# Patient Record
Sex: MALE | Race: WHITE | NOT HISPANIC OR LATINO | Employment: OTHER | URBAN - METROPOLITAN AREA
[De-identification: names, ages, dates, MRNs, and addresses within clinical notes are randomized per-mention and may not be internally consistent; named-entity substitution may affect disease eponyms.]

---

## 2017-02-02 ENCOUNTER — OFFICE VISIT (OUTPATIENT)
Dept: NEUROSURGERY | Facility: CLINIC | Age: 62
End: 2017-02-02

## 2017-02-02 VITALS
HEART RATE: 94 BPM | HEIGHT: 71 IN | DIASTOLIC BLOOD PRESSURE: 98 MMHG | BODY MASS INDEX: 29.96 KG/M2 | SYSTOLIC BLOOD PRESSURE: 154 MMHG | WEIGHT: 214 LBS

## 2017-02-02 DIAGNOSIS — M51.36 DDD (DEGENERATIVE DISC DISEASE), LUMBAR: Primary | ICD-10-CM

## 2017-02-02 PROCEDURE — 99212 OFFICE O/P EST SF 10 MIN: CPT | Performed by: NEUROLOGICAL SURGERY

## 2017-02-02 RX ORDER — CYCLOBENZAPRINE HCL 10 MG
10 TABLET ORAL 3 TIMES DAILY PRN
Qty: 50 TABLET | Refills: 0 | Status: SHIPPED | OUTPATIENT
Start: 2017-02-02 | End: 2017-03-16

## 2017-02-02 NOTE — PROGRESS NOTES
Subjective   Patient ID: Prem Thrasher is a 61 y.o. male is here today for follow-up. He has completed PT and doing well.  He complains of some intermittent leg pain.    History of Present Illness    This patient has some occasional flareup of pain in his leg but nothing on a regular basis.  He says when the pain really flares up it goes away fairly quickly if he gets up and moves.  Muscle relaxants seem to help.    The following portions of the patient's history were reviewed and updated as appropriate: allergies, current medications, past family history, past medical history, past social history, past surgical history and problem list.    Review of Systems   Respiratory: Negative for shortness of breath.    Cardiovascular: Negative for chest pain.   Musculoskeletal:        Left leg pain    Neurological: Negative for weakness.   All other systems reviewed and are negative.      Objective   Physical Exam   Constitutional: He is oriented to person, place, and time. He appears well-developed and well-nourished.   Neurological: He is oriented to person, place, and time.     Neurologic Exam     Mental Status   Oriented to person, place, and time.       Assessment/Plan   Independent Review of Radiographic Studies:      Medical Decision Making:      I told this patient that I thought this was just the nerve healing.  I think that these episodes will gradually decrease.  I offered to send him for an MRI but I did not recommend doing that right now.  I told him to stay on his exercise program.  We can give him a few more muscle relaxants and we will check him again in about 6 weeks.    I counseled the patient with regard to smoking cessation.  We gave the patient a handout about how to quit.    Prem was seen today for follow-up.    Diagnoses and all orders for this visit:    DDD (degenerative disc disease), lumbar    Other orders  -     cyclobenzaprine (FLEXERIL) 10 MG tablet; Take 1 tablet by mouth 3 (Three) Times a  Day As Needed for muscle spasms.      Return in about 6 weeks (around 3/16/2017).

## 2017-02-08 RX ORDER — ESCITALOPRAM OXALATE 10 MG/1
10 TABLET ORAL DAILY
Qty: 30 TABLET | Refills: 0 | Status: SHIPPED | OUTPATIENT
Start: 2017-02-08 | End: 2017-03-06

## 2017-02-26 ENCOUNTER — OFFICE VISIT (OUTPATIENT)
Dept: RETAIL CLINIC | Facility: CLINIC | Age: 62
End: 2017-02-26

## 2017-02-26 VITALS
HEART RATE: 84 BPM | DIASTOLIC BLOOD PRESSURE: 70 MMHG | SYSTOLIC BLOOD PRESSURE: 138 MMHG | RESPIRATION RATE: 18 BRPM | OXYGEN SATURATION: 96 % | TEMPERATURE: 99 F

## 2017-02-26 DIAGNOSIS — J06.9 ACUTE URI: Primary | ICD-10-CM

## 2017-02-26 PROCEDURE — 99213 OFFICE O/P EST LOW 20 MIN: CPT | Performed by: NURSE PRACTITIONER

## 2017-02-26 RX ORDER — ALBUTEROL SULFATE 90 UG/1
2 AEROSOL, METERED RESPIRATORY (INHALATION) EVERY 4 HOURS PRN
Qty: 1 INHALER | Refills: 0 | Status: SHIPPED | OUTPATIENT
Start: 2017-02-26 | End: 2017-03-08

## 2017-02-26 RX ORDER — PREDNISONE 20 MG/1
20 TABLET ORAL 2 TIMES DAILY
Qty: 10 TABLET | Refills: 0 | Status: SHIPPED | OUTPATIENT
Start: 2017-02-26 | End: 2017-03-16

## 2017-02-26 RX ORDER — BROMPHENIRAMINE MALEATE, PSEUDOEPHEDRINE HYDROCHLORIDE, AND DEXTROMETHORPHAN HYDROBROMIDE 2; 30; 10 MG/5ML; MG/5ML; MG/5ML
SYRUP ORAL
Qty: 240 ML | Refills: 0 | Status: SHIPPED | OUTPATIENT
Start: 2017-02-26 | End: 2017-03-16

## 2017-02-26 RX ORDER — MUPIROCIN CALCIUM 20 MG/G
CREAM TOPICAL 3 TIMES DAILY
Qty: 15 G | Refills: 0 | Status: SHIPPED | OUTPATIENT
Start: 2017-02-26 | End: 2017-03-05

## 2017-02-26 NOTE — PROGRESS NOTES
Subjective:     Prem Thrasher is a 62 y.o.     URI    This is a new problem. The current episode started 1 to 4 weeks ago. The problem has been gradually worsening. There has been no fever. Associated symptoms include congestion, coughing, headaches, rhinorrhea, vomiting and wheezing. Pertinent negatives include no diarrhea, ear pain, nausea, rash or sinus pain. Sore throat: related to drainage. Treatments tried: robitussin, alkaseltzer plus, sudafed, stahist. The treatment provided mild relief.         The following portions of the patient's history were reviewed and updated as appropriate: allergies, current medications, past family history, past medical history, past social history, past surgical history and problem list.      Review of Systems   Constitutional: Negative for appetite change. Unexpected weight change: unaware.   HENT: Positive for congestion, postnasal drip and rhinorrhea. Negative for ear pain and sinus pressure. Sore throat: related to drainage.         C/o sore in nose   Respiratory: Positive for cough and wheezing. Negative for shortness of breath.    Cardiovascular:        Hx: hypertension   Gastrointestinal: Positive for vomiting. Negative for diarrhea and nausea.   Musculoskeletal: Back pain: baseline. Myalgias: baeline from back pain.   Skin: Negative for color change, pallor and rash.   Allergic/Immunologic: Positive for environmental allergies.   Neurological: Positive for headaches. Negative for dizziness. Light-headedness: woth cough.         Objective:      Physical Exam   Constitutional: He is oriented to person, place, and time. He appears well-developed and well-nourished. He is cooperative.   HENT:   Head: Normocephalic and atraumatic.   Right Ear: Ear canal normal.   Left Ear: Ear canal normal.   Mouth/Throat: No oropharyngeal exudate or posterior oropharyngeal erythema.   Nares with some erythema and crusted lesions >left, no congestion   Eyes: EOM and lids are normal. Pupils  are equal, round, and reactive to light.   Neck: Normal range of motion. Neck supple.   Cardiovascular: Normal rate, regular rhythm, S1 normal, S2 normal and normal heart sounds.    Pulmonary/Chest: Effort normal. He has rhonchi in the right upper field, the right middle field and the left upper field.   Clear with cough   Abdominal: Soft. Normal appearance and bowel sounds are normal. There is no tenderness.   Musculoskeletal: Normal range of motion.   Lymphadenopathy:     He has no cervical adenopathy.   Neurological: He is alert and oriented to person, place, and time.   Skin: Skin is warm, dry and intact.   Psychiatric: He has a normal mood and affect. His speech is normal and behavior is normal.   Vitals reviewed.          Prem was seen today for uri.    Diagnoses and all orders for this visit:    Acute URI    Other orders  -     predniSONE (DELTASONE) 20 MG tablet; Take 1 tablet by mouth 2 (Two) Times a Day.  -     brompheniramine-pseudoephedrine-DM (BROMFED DM) 30-2-10 MG/5ML syrup; 5 to 10 cc every 4 hours as needed for cough, congestion, allergies  -     albuterol (PROVENTIL HFA;VENTOLIN HFA) 108 (90 BASE) MCG/ACT inhaler; Inhale 2 puffs Every 4 (Four) Hours As Needed for wheezing for up to 10 days.

## 2017-03-06 ENCOUNTER — OFFICE VISIT (OUTPATIENT)
Dept: FAMILY MEDICINE CLINIC | Facility: CLINIC | Age: 62
End: 2017-03-06

## 2017-03-06 VITALS
TEMPERATURE: 98 F | DIASTOLIC BLOOD PRESSURE: 76 MMHG | RESPIRATION RATE: 16 BRPM | OXYGEN SATURATION: 96 % | WEIGHT: 211 LBS | HEIGHT: 71 IN | HEART RATE: 88 BPM | BODY MASS INDEX: 29.54 KG/M2 | SYSTOLIC BLOOD PRESSURE: 122 MMHG

## 2017-03-06 DIAGNOSIS — J44.9 CHRONIC OBSTRUCTIVE PULMONARY DISEASE, UNSPECIFIED COPD TYPE (HCC): ICD-10-CM

## 2017-03-06 DIAGNOSIS — R73.01 IFG (IMPAIRED FASTING GLUCOSE): Primary | ICD-10-CM

## 2017-03-06 DIAGNOSIS — F41.1 GENERALIZED ANXIETY DISORDER: ICD-10-CM

## 2017-03-06 DIAGNOSIS — E78.2 MIXED HYPERLIPIDEMIA: ICD-10-CM

## 2017-03-06 PROCEDURE — 99214 OFFICE O/P EST MOD 30 MIN: CPT | Performed by: PHYSICIAN ASSISTANT

## 2017-03-06 RX ORDER — LEVOFLOXACIN 500 MG/1
500 TABLET, FILM COATED ORAL DAILY
Qty: 10 TABLET | Refills: 1 | Status: SHIPPED | OUTPATIENT
Start: 2017-03-06 | End: 2017-03-16

## 2017-03-06 RX ORDER — IRBESARTAN 300 MG/1
300 TABLET ORAL DAILY
Qty: 90 TABLET | Refills: 1 | Status: SHIPPED | OUTPATIENT
Start: 2017-03-06 | End: 2017-09-15 | Stop reason: SDUPTHER

## 2017-03-06 RX ORDER — EZETIMIBE 10 MG/1
10 TABLET ORAL EVERY EVENING
Qty: 90 TABLET | Refills: 3 | Status: SHIPPED | OUTPATIENT
Start: 2017-03-06 | End: 2018-04-23 | Stop reason: SDUPTHER

## 2017-03-06 RX ORDER — ESCITALOPRAM OXALATE 20 MG/1
20 TABLET ORAL DAILY
Qty: 90 TABLET | Refills: 3 | Status: SHIPPED | OUTPATIENT
Start: 2017-03-06 | End: 2018-04-23 | Stop reason: SDUPTHER

## 2017-03-06 RX ORDER — AMLODIPINE BESYLATE 10 MG/1
10 TABLET ORAL EVERY EVENING
Qty: 90 TABLET | Refills: 1 | Status: SHIPPED | OUTPATIENT
Start: 2017-03-06 | End: 2017-09-15 | Stop reason: SDUPTHER

## 2017-03-06 NOTE — PATIENT INSTRUCTIONS
For throat pain:  Can gargle with 1/2 Maalox and 1/2 Benadryl liquid ---mix, gargle and spit Take Tylenol for fever or aches  Rest as needed  Call not better in 7 days  Increase fluids  Call if worse  Can use generic Afrin nasal spray up to 5 days for nasal congestion  Finish antibiotic course of therapy  Low glycemic index diet  Exercise 30 minutes most days of the week  Make sure you get results on any labs or tests we ordered today  We discussed medications and how to take them as prescribed  Sleep 6-8 hours each night if possible  If you have not signed up for [facility], please activate your code ASAP from your After Visit Summary today    LDL goal <100  LDL goal if heart disease <70  HDL goal >60  Triglyceride goal <150  BP goal =<130/80  Fasting glucose <100    Stop smoking

## 2017-03-06 NOTE — PROGRESS NOTES
Subjective   Prem Thrasher is a 62 y.o. male.     History of Present Illness   Prem Thrasher 62 y.o. male who presents today for routine follow up check and medication refills.  he has a history of   Patient Active Problem List   Diagnosis   • Allergy   • COPD (chronic obstructive pulmonary disease)   • Hyperlipidemia   • IFG (impaired fasting glucose)   • Rheumatoid arthritis   • Hypertension   • Anxiety disorder   • Depression   • DDD (degenerative disc disease), lumbar   .  Since the last visit, he has overall felt tired.  He has Hypertenision and is well controlled on medication.  he has been compliant with current medications have reviewed them.  The patient denies medication side effects.    No results found for: CHOL  Lab Results   Component Value Date    TRIG 58 06/17/2016    TRIG 81 03/07/2015     Lab Results   Component Value Date    HDL 47 06/17/2016    HDL 35 (L) 03/07/2015     No results found for: LDLCALC  Lab Results   Component Value Date     (H) 06/17/2016    LDL 87 03/07/2015     No results found for: HDLLDLRATIO  No components found for: CHOLHDL  Lab Results   Component Value Date    GLUCOSE 88 10/03/2016    BUN 12 10/03/2016    CREATININE 1.14 10/03/2016    EGFRIFNONA 65 10/03/2016    EGFRIFAFRI 88 06/17/2016    BCR 10.5 10/03/2016    K 3.9 10/03/2016    CO2 22.4 10/03/2016    CALCIUM 9.7 10/03/2016    PROTENTOTREF 7.1 06/17/2016    ALBUMIN 4.60 06/17/2016    LABIL2 1.8 06/17/2016    AST 18 06/17/2016    ALT 17 06/17/2016     Lab Results   Component Value Date    HGBA1C 5.70 (H) 06/17/2016     He takes Zetia for cholesterol    He is on several meds for RA with DR John Thrasher male 62 y.o. who presents today for follow up of Anxiety.  He reports medication is working well, patient desires to continue on Rx, and needs refill. Onset of symptoms was approximately several years ago.  He denies current suicidal and homicidal ideation. Risk factors are family history of anxiety  and or depression, lifestyle of multiple roles, chronic illness and chronic pain.  Previous treatment includes current Rx.  He complains of the following medication side effects: none.  The patient declines to go to counseling..  Prem Thrasher 62 y.o. male who presents for evaluation of sinus pressure and congestion, cough. Symptoms include ear pressure, post nasal drip and cough described as productive of yellow sputum.  Onset of symptoms was 2 weeks ago, unchanged since that time. Patient denies fever.   Evaluation to date: [facility] Treatment to date:  pred and cough med.         The following portions of the patient's history were reviewed and updated as appropriate: allergies, current medications, past family history, past medical history, past social history, past surgical history and problem list.    Review of Systems   Constitutional: Positive for unexpected weight change. Negative for activity change and appetite change.   HENT: Positive for congestion, sinus pressure and tinnitus. Negative for nosebleeds and trouble swallowing.    Eyes: Negative for pain and visual disturbance.   Respiratory: Positive for cough and chest tightness. Negative for shortness of breath and wheezing.    Cardiovascular: Negative for chest pain and palpitations.   Gastrointestinal: Negative for abdominal pain and blood in stool.   Endocrine: Negative.    Genitourinary: Negative for difficulty urinating and hematuria.   Musculoskeletal: Positive for arthralgias and back pain. Negative for joint swelling.   Skin: Negative for color change and rash.   Allergic/Immunologic: Negative.    Neurological: Positive for headaches. Negative for syncope and speech difficulty.   Hematological: Negative for adenopathy.   Psychiatric/Behavioral: Negative for agitation and confusion.   All other systems reviewed and are negative.      Objective   Physical Exam   Constitutional: He is oriented to person, place, and time. He appears  well-developed and well-nourished.  Non-toxic appearance. No distress.   HENT:   Head: Normocephalic and atraumatic. Hair is normal.   Right Ear: External ear normal. No drainage, swelling or tenderness. Tympanic membrane is retracted.   Left Ear: External ear normal. No drainage, swelling or tenderness. Tympanic membrane is retracted.   Nose: Mucosal edema present. No epistaxis.   Mouth/Throat: Uvula is midline and mucous membranes are normal. No oral lesions. No uvula swelling. Posterior oropharyngeal erythema present. No oropharyngeal exudate.   Eyes: Conjunctivae and EOM are normal. Pupils are equal, round, and reactive to light. Right eye exhibits no discharge. Left eye exhibits no discharge. No scleral icterus.   Neck: Normal range of motion. Neck supple.   Cardiovascular: Normal rate, regular rhythm, normal heart sounds and intact distal pulses.  Exam reveals no gallop.    No murmur heard.  Pulmonary/Chest: Breath sounds normal. No stridor. No respiratory distress. He has no wheezes. He has no rales. He exhibits no tenderness.   Abdominal: Soft. There is no tenderness.   Lymphadenopathy:     He has cervical adenopathy.   Neurological: He is alert and oriented to person, place, and time. He exhibits normal muscle tone.   Skin: Skin is warm and dry. No rash noted. He is not diaphoretic.   Psychiatric: He has a normal mood and affect. His behavior is normal. Judgment and thought content normal.   Nursing note and vitals reviewed.      Assessment/Plan   Problems Addressed this Visit        Cardiovascular and Mediastinum    Hyperlipidemia    Relevant Medications    ezetimibe (ZETIA) 10 MG tablet    Other Relevant Orders    Comprehensive metabolic panel    Lipid panel    CBC and Differential    TSH    PSA    T4, Free       Respiratory    COPD (chronic obstructive pulmonary disease)    Relevant Orders    Comprehensive metabolic panel    Lipid panel    CBC and Differential    TSH    PSA    T4, Free       Endocrine     IFG (impaired fasting glucose) - Primary    Relevant Orders    Comprehensive metabolic panel    Lipid panel    CBC and Differential    TSH    PSA    T4, Free       Other    Anxiety disorder    Relevant Medications    escitalopram (LEXAPRO) 20 MG tablet    Other Relevant Orders    Comprehensive metabolic panel    Lipid panel    CBC and Differential    TSH    PSA    T4, Free

## 2017-03-16 ENCOUNTER — OFFICE VISIT (OUTPATIENT)
Dept: NEUROSURGERY | Facility: CLINIC | Age: 62
End: 2017-03-16

## 2017-03-16 VITALS — BODY MASS INDEX: 29.54 KG/M2 | WEIGHT: 211 LBS | HEIGHT: 71 IN

## 2017-03-16 DIAGNOSIS — M51.36 DDD (DEGENERATIVE DISC DISEASE), LUMBAR: Primary | ICD-10-CM

## 2017-03-16 PROCEDURE — 99212 OFFICE O/P EST SF 10 MIN: CPT | Performed by: NEUROLOGICAL SURGERY

## 2017-03-16 NOTE — PATIENT INSTRUCTIONS
Exercising to Lose Weight  Exercising can help you to lose weight. In order to lose weight through exercise, you need to do vigorous-intensity exercise. You can tell that you are exercising with vigorous intensity if you are breathing very hard and fast and cannot hold a conversation while exercising.  Moderate-intensity exercise helps to maintain your current weight. You can tell that you are exercising at a moderate level if you have a higher heart rate and faster breathing, but you are still able to hold a conversation.  HOW OFTEN SHOULD I EXERCISE?  Choose an activity that you enjoy and set realistic goals. Your health care provider can help you to make an activity plan that works for you. Exercise regularly as directed by your health care provider. This may include:  · Doing resistance training twice each week, such as:    Push-ups.    Sit-ups.    Lifting weights.    Using resistance bands.  · Doing a given intensity of exercise for a given amount of time. Choose from these options:    150 minutes of moderate-intensity exercise every week.    75 minutes of vigorous-intensity exercise every week.    A mix of moderate-intensity and vigorous-intensity exercise every week.  Children, pregnant women, people who are out of shape, people who are overweight, and older adults may need to consult a health care provider for individual recommendations. If you have any sort of medical condition, be sure to consult your health care provider before starting a new exercise program.  WHAT ARE SOME ACTIVITIES THAT CAN HELP ME TO LOSE WEIGHT?   · Walking at a rate of at least 4.5 miles an hour.  · Jogging or running at a rate of 5 miles per hour.  · Biking at a rate of at least 10 miles per hour.  · Lap swimming.  · Roller-skating or in-line skating.  · Cross-country skiing.  · Vigorous competitive sports, such as football, basketball, and soccer.  · Jumping rope.  · Aerobic dancing.  HOW CAN I BE MORE ACTIVE IN MY DAY-TO-DAY  "ACTIVITIES?  · Use the stairs instead of the elevator.  · Take a walk during your lunch break.  · If you drive, park your car farther away from work or school.  · If you take public transportation, get off one stop early and walk the rest of the way.  · Make all of your phone calls while standing up and walking around.  · Get up, stretch, and walk around every 30 minutes throughout the day.  WHAT GUIDELINES SHOULD I FOLLOW WHILE EXERCISING?  · Do not exercise so much that you hurt yourself, feel dizzy, or get very short of breath.  · Consult your health care provider prior to starting a new exercise program.  · Wear comfortable clothes and shoes with good support.  · Drink plenty of water while you exercise to prevent dehydration or heat stroke. Body water is lost during exercise and must be replaced.  · Work out until you breathe faster and your heart beats faster.     This information is not intended to replace advice given to you by your health care provider. Make sure you discuss any questions you have with your health care provider.     Document Released: 01/20/2012 Document Revised: 01/08/2016 Document Reviewed: 05/21/2015  [employer] Interactive Patient Education ©2016 [employer]    You Can Quit Smoking  If you are ready to quit smoking or are thinking about it, congratulations! You have chosen to help yourself be healthier and live longer! There are lots of different ways to quit smoking. Nicotine gum, nicotine patches, a nicotine inhaler, or nicotine nasal spray can help with physical craving. Hypnosis, support groups, and medicines help break the habit of smoking.  TIPS TO GET OFF AND STAY OFF CIGARETTES  · Learn to predict your moods. Do not let a bad situation be your excuse to have a cigarette. Some situations in your life might tempt you to have a cigarette.  · Ask friends and co-workers not to smoke around you.  · Make your home smoke-free.  · Never have \"just one\" cigarette. It leads to wanting " "another and another. Remind yourself of your decision to quit.  · On a card, make a list of your reasons for not smoking. Read it at least the same number of times a day as you have a cigarette. Tell yourself everyday, \"I do not want to smoke. I choose not to smoke.\"  · Ask someone at home or work to help you with your plan to quit smoking.  · Have something planned after you eat or have a cup of coffee. Take a walk or get other exercise to perk you up. This will help to keep you from overeating.  · Try a relaxation exercise to calm you down and decrease your stress. Remember, you may be tense and nervous the first two weeks after you quit. This will pass.  · Find new activities to keep your hands busy. Play with a pen, coin, or rubber band. Doodle or draw things on paper.  · Brush your teeth right after eating. This will help cut down the craving for the taste of tobacco after meals. You can try mouthwash too.  · Try gum, breath mints, or diet candy to keep something in your mouth.  IF YOU SMOKE AND WANT TO QUIT:  · Do not stock up on cigarettes. Never buy a carton. Wait until one pack is finished before you buy another.  · Never carry cigarettes with you at work or at home.  · Keep cigarettes as far away from you as possible. Leave them with someone else.  · Never carry matches or a lighter with you.  · Ask yourself, \"Do I need this cigarette or is this just a reflex?\"  · Bet with someone that you can quit. Put cigarette money in a [employer] bank every morning. If you smoke, you give up the money. If you do not smoke, by the end of the week, you keep the money.  · Keep trying. It takes 21 days to change a habit!  · Talk to your doctor about using medicines to help you quit. These include nicotine replacement gum, lozenges, or skin patches.     This information is not intended to replace advice given to you by your health care provider. Make sure you discuss any questions you have with your health care provider.   "   Document Released: 10/14/2010 Document Revised: 03/11/2013 Document Reviewed: 05/03/2016  [employer] Interactive Patient Education ©2016 [employer]

## 2017-03-16 NOTE — PROGRESS NOTES
Subjective   Patient ID: Prem Thrasher is a 62 y.o. male is here today for follow-up. He is 5 months out from a left L2-L3, L3-L4 laminectomy/discectomy with [facility] on 10/10/16. He has left leg pain and mild back pain.    History of Present Illness    This patient still has some pain in his leg but it does come and go.  Sometimes he doesn't have it at all and sometimes it is there.  He has taken steroids for other reasons in the past and this completely gets rid of his leg pain.    The following portions of the patient's history were reviewed and updated as appropriate: allergies, current medications, past family history, past medical history, past social history, past surgical history and problem list.    Review of Systems   Respiratory: Negative for chest tightness and shortness of breath.    Cardiovascular: Negative for chest pain.   Musculoskeletal: Positive for back pain.        Left leg pain   All other systems reviewed and are negative.      Objective   Physical Exam   Constitutional: He is oriented to person, place, and time. He appears well-developed and well-nourished.   Neurological: He is oriented to person, place, and time.     Neurologic Exam     Mental Status   Oriented to person, place, and time.       Assessment/Plan   Independent Review of Radiographic Studies:      Medical Decision Making:      I told the patient that I would be glad to send him for an MRI but I don't think it is all that likely to show anything and I think we would truly just been running up his bill.  With the pain coming and going I really think the problem is mostly inflammatory and the nerve.  He does admit that the pain is better now than it was a few months ago.  We will check him again in a couple of months from now.    Perm was seen today for back pain and leg pain.    Diagnoses and all orders for this visit:    DDD (degenerative disc disease), lumbar    Return in about 2 months (around 5/16/2017).

## 2017-04-02 RX ORDER — ESCITALOPRAM OXALATE 10 MG/1
TABLET ORAL
Qty: 30 TABLET | Refills: 5 | Status: SHIPPED | OUTPATIENT
Start: 2017-04-02 | End: 2017-05-16 | Stop reason: ALTCHOICE

## 2017-05-16 ENCOUNTER — OFFICE VISIT (OUTPATIENT)
Dept: NEUROSURGERY | Facility: CLINIC | Age: 62
End: 2017-05-16

## 2017-05-16 VITALS
HEART RATE: 87 BPM | SYSTOLIC BLOOD PRESSURE: 132 MMHG | BODY MASS INDEX: 28.7 KG/M2 | WEIGHT: 205 LBS | HEIGHT: 71 IN | DIASTOLIC BLOOD PRESSURE: 87 MMHG

## 2017-05-16 DIAGNOSIS — M51.36 DDD (DEGENERATIVE DISC DISEASE), LUMBAR: Primary | ICD-10-CM

## 2017-05-16 PROCEDURE — 99213 OFFICE O/P EST LOW 20 MIN: CPT | Performed by: NEUROLOGICAL SURGERY

## 2017-05-31 ENCOUNTER — HOSPITAL ENCOUNTER (OUTPATIENT)
Dept: MRI IMAGING | Facility: HOSPITAL | Age: 62
Discharge: HOME | End: 2017-05-31
Attending: NEUROLOGICAL SURGERY | Admitting: NEUROLOGICAL SURGERY

## 2017-05-31 ENCOUNTER — HOSPITAL ENCOUNTER (OUTPATIENT)
Dept: GENERAL RADIOLOGY | Facility: HOSPITAL | Age: 62
Discharge: HOME | End: 2017-05-31
Attending: NEUROLOGICAL SURGERY

## 2017-05-31 DIAGNOSIS — M51.36 DDD (DEGENERATIVE DISC DISEASE), LUMBAR: ICD-10-CM

## 2017-05-31 PROCEDURE — 82565 ASSAY OF CREATININE: CPT

## 2017-05-31 PROCEDURE — A9577 INJ MULTIHANCE: HCPCS | Performed by: NEUROLOGICAL SURGERY

## 2017-05-31 PROCEDURE — 0 GADOBENATE DIMEGLUMINE 529 MG/ML SOLUTION: Performed by: NEUROLOGICAL SURGERY

## 2017-05-31 PROCEDURE — 72158 MRI LUMBAR SPINE W/O & W/DYE: CPT

## 2017-05-31 PROCEDURE — 72114 X-RAY EXAM L-S SPINE BENDING: CPT

## 2017-05-31 RX ADMIN — GADOBENATE DIMEGLUMINE 20 ML: 529 INJECTION, SOLUTION INTRAVENOUS at 11:45

## 2017-06-01 LAB — CREAT BLDA-MCNC: 1.1 MG/DL (ref 0.6–1.3)

## 2017-06-15 DIAGNOSIS — R97.20 PSA ELEVATION: Primary | ICD-10-CM

## 2017-06-15 LAB
ALBUMIN SERPL-MCNC: 4.3 G/DL (ref 3.6–4.8)
ALBUMIN/GLOB SERPL: 1.7 {RATIO} (ref 1.2–2.2)
ALP SERPL-CCNC: 47 IU/L (ref 39–117)
ALT SERPL-CCNC: 18 IU/L (ref 0–44)
AST SERPL-CCNC: 19 IU/L (ref 0–40)
BASOPHILS # BLD AUTO: 0.1 X10E3/UL (ref 0–0.2)
BASOPHILS NFR BLD AUTO: 2 %
BILIRUB SERPL-MCNC: 0.4 MG/DL (ref 0–1.2)
BUN SERPL-MCNC: 15 MG/DL (ref 8–27)
BUN/CREAT SERPL: 13 (ref 10–24)
CALCIUM SERPL-MCNC: 9.3 MG/DL (ref 8.6–10.2)
CHLORIDE SERPL-SCNC: 98 MMOL/L (ref 96–106)
CHOLEST SERPL-MCNC: 169 MG/DL (ref 100–199)
CO2 SERPL-SCNC: 21 MMOL/L (ref 18–29)
CREAT SERPL-MCNC: 1.16 MG/DL (ref 0.76–1.27)
EOSINOPHIL # BLD AUTO: 0.2 X10E3/UL (ref 0–0.4)
EOSINOPHIL NFR BLD AUTO: 3 %
ERYTHROCYTE [DISTWIDTH] IN BLOOD BY AUTOMATED COUNT: 13.4 % (ref 12.3–15.4)
GLOBULIN SER CALC-MCNC: 2.6 G/DL (ref 1.5–4.5)
GLUCOSE SERPL-MCNC: 99 MG/DL (ref 65–99)
HCT VFR BLD AUTO: 40 % (ref 37.5–51)
HDLC SERPL-MCNC: 47 MG/DL
HGB BLD-MCNC: 13.4 G/DL (ref 12.6–17.7)
IMM GRANULOCYTES # BLD: 0 X10E3/UL (ref 0–0.1)
IMM GRANULOCYTES NFR BLD: 0 %
LDLC SERPL CALC-MCNC: 111 MG/DL (ref 0–99)
LYMPHOCYTES # BLD AUTO: 2.6 X10E3/UL (ref 0.7–3.1)
LYMPHOCYTES NFR BLD AUTO: 37 %
MCH RBC QN AUTO: 30 PG (ref 26.6–33)
MCHC RBC AUTO-ENTMCNC: 33.5 G/DL (ref 31.5–35.7)
MCV RBC AUTO: 90 FL (ref 79–97)
MONOCYTES # BLD AUTO: 0.8 X10E3/UL (ref 0.1–0.9)
MONOCYTES NFR BLD AUTO: 11 %
NEUTROPHILS # BLD AUTO: 3.3 X10E3/UL (ref 1.4–7)
NEUTROPHILS NFR BLD AUTO: 47 %
PLATELET # BLD AUTO: 280 X10E3/UL (ref 150–379)
POTASSIUM SERPL-SCNC: 4.5 MMOL/L (ref 3.5–5.2)
PROT SERPL-MCNC: 6.9 G/DL (ref 6–8.5)
PSA SERPL-MCNC: 2.9 NG/ML (ref 0–4)
RBC # BLD AUTO: 4.46 X10E6/UL (ref 4.14–5.8)
SODIUM SERPL-SCNC: 134 MMOL/L (ref 134–144)
T4 FREE SERPL-MCNC: 0.92 NG/DL (ref 0.82–1.77)
TRIGL SERPL-MCNC: 54 MG/DL (ref 0–149)
TSH SERPL DL<=0.005 MIU/L-ACNC: 0.82 UIU/ML (ref 0.45–4.5)
VLDLC SERPL CALC-MCNC: 11 MG/DL (ref 5–40)
WBC # BLD AUTO: 7 X10E3/UL (ref 3.4–10.8)

## 2017-06-22 ENCOUNTER — OFFICE VISIT (OUTPATIENT)
Dept: NEUROSURGERY | Facility: CLINIC | Age: 62
End: 2017-06-22

## 2017-06-22 VITALS
HEART RATE: 104 BPM | BODY MASS INDEX: 28.7 KG/M2 | SYSTOLIC BLOOD PRESSURE: 114 MMHG | HEIGHT: 71 IN | DIASTOLIC BLOOD PRESSURE: 81 MMHG | WEIGHT: 205 LBS

## 2017-06-22 DIAGNOSIS — M51.36 DDD (DEGENERATIVE DISC DISEASE), LUMBAR: Primary | ICD-10-CM

## 2017-06-22 PROCEDURE — 99213 OFFICE O/P EST LOW 20 MIN: CPT | Performed by: NEUROLOGICAL SURGERY

## 2017-06-22 NOTE — PROGRESS NOTES
Subjective   Patient ID: Prem Thrasher is a 62 y.o. male is here today for follow-up with new MRI and XR. He is 8 months out from a left L2-L3, L3-L4 laminectomy/discectomy with [facility] on 10/10/16. He has back pain that radiates into left leg.        Back Pain   This is a chronic problem. The current episode started more than 1 year ago. The problem occurs constantly. The problem has been waxing and waning since onset. The pain is present in the lumbar spine. The quality of the pain is described as aching and stabbing. Associated symptoms include leg pain, paresthesias, pelvic pain, tingling and weakness ( Low back and left leg). Pertinent negatives include no abdominal pain, bladder incontinence, bowel incontinence, chest pain, dysuria, fever, headaches, numbness, paresis, perianal numbness or weight loss. Risk factors include lack of exercise, obesity and sedentary lifestyle.     This patient continues with pain in his back and into his left leg.  It is primarily in the left buttock and left lateral thigh and lateral calf.  He doesn't have any trouble with his right leg at all.    The following portions of the patient's history were reviewed and updated as appropriate: allergies, current medications, past family history, past medical history, past social history, past surgical history and problem list.    Review of Systems   Constitutional: Negative for fever and weight loss.   Respiratory: Negative for chest tightness and shortness of breath.    Cardiovascular: Negative for chest pain.   Gastrointestinal: Negative for abdominal pain and bowel incontinence.   Genitourinary: Positive for pelvic pain. Negative for bladder incontinence and dysuria.   Musculoskeletal: Positive for back pain.   Neurological: Positive for tingling, weakness ( Low back and left leg) and paresthesias. Negative for numbness and headaches.   All other systems reviewed and are negative.      Objective   Physical Exam   Constitutional: He  is oriented to person, place, and time. He appears well-developed and well-nourished.   Neurological: He is oriented to person, place, and time.     Neurologic Exam     Mental Status   Oriented to person, place, and time.       Assessment/Plan   Independent Review of Radiographic Studies:      I reviewed his plain films, an MRI.  His plain films show degenerative change particularly at L2-3 but no evidence of movement on flexion and extension.  On the MRI there is some narrowing on the left side at L3 4 and a little bit at L2-3 but it is actually worse on the right than the left.  The other levels for the most part look okay.  I also reviewed his myelogram which looks similar at L2-3 on the right but the left side appears to be more open.  L3 4 also shows or patency on the left side of the right side looks about the same.    Medical Decision Making:      I told the patient and his wife about the imaging.  I told them that we could certainly consider an aggressive cleanout at L2-3 and L3 4 but if we do that it will definitely require a fusion.  I told the patient about the risks, complications and expected outcome of the lumbar surgery.  I explained that there was an 80% chance of getting rid of the pain in the leg.  I explained that there would still be back pain after the surgery.  Initially this will be quite severe but will improve over time.  There is a 2 or 3% chance of infection, bleeding, CSF leak, damage to the nerve as a result of surgery, paralysis, as well as anesthetic risk.  There is a 10% chance of recurrent problems.  There is a 10% chance of nonunion or failure of the instrumentation.  We discussed the postoperative hospital and home course.  I suggested that before we make a definite decision that we get him into see an orthopedic spine surgeon for both a second opinion as well as a fusion evaluation.  I told him he should stay away from surgery as long as he possibly can however.    Subsequent to  this visit the patient called the office having seen Dr. Perez and sheila to proceed with surgery.  He will need to be scheduled for a: L2 to L4 laminectomy with neural lysis and a fusion by orthopedics    Prem was seen today for back pain.    Diagnoses and all orders for this visit:    DDD (degenerative disc disease), lumbar  -     Ambulatory Referral to Orthopedic Surgery  -     Case Request; Standing  -     vancomycin (VANCOCIN) 1,500 mg in sodium chloride 0.9 % 250 mL IVPB; Infuse 1,500 mg into a venous catheter 1 (One) Time.  -     Case Request    Other orders  -     Follow anesthesia standing orders.  -     Obtain informed consent  -     Follow anesthesia standing orders.; Standing  -     OLIVIA hose- To be placed on patient in pre-op; Standing  -     SCD (sequential compression device)- to be placed on patient in Pre-op; Standing      Return for 2-3 week post op.

## 2017-07-12 ENCOUNTER — OFFICE VISIT (OUTPATIENT)
Dept: ORTHOPEDIC SURGERY | Facility: CLINIC | Age: 62
End: 2017-07-12

## 2017-07-12 VITALS — HEIGHT: 71 IN | BODY MASS INDEX: 28.56 KG/M2 | TEMPERATURE: 98.2 F | WEIGHT: 204 LBS

## 2017-07-12 DIAGNOSIS — M48.061 SPINAL STENOSIS OF LUMBAR REGION: ICD-10-CM

## 2017-07-12 DIAGNOSIS — M43.16 SPONDYLOLISTHESIS OF LUMBAR REGION: Primary | ICD-10-CM

## 2017-07-12 PROCEDURE — 99204 OFFICE O/P NEW MOD 45 MIN: CPT | Performed by: ORTHOPAEDIC SURGERY

## 2017-07-12 RX ORDER — LEVOFLOXACIN 500 MG/1
TABLET, FILM COATED ORAL
Refills: 1 | COMMUNITY
Start: 2017-07-06 | End: 2017-07-19

## 2017-07-12 NOTE — PROGRESS NOTES
Answers for HPI/ROS submitted by the patient on 7/5/2017   Back pain  Chronicity: chronic  Onset: more than 1 year ago  Frequency: constantly  Progression since onset: waxing and waning  Pain location: lumbar spine  Pain quality: aching, burning, stabbing  Radiates to: left foot, left knee, left thigh  Pain - numeric: 7/10  Pain is: worse during the night  Aggravated by: bending, position, sitting, standing, twisting  Stiffness is present: all day  abdominal pain: No  bladder incontinence: No  bowel incontinence: No  chest pain: No  dysuria: No  fever: No  headaches: No  leg pain: Yes  numbness: Yes  paresis: No  paresthesias: No  pelvic pain: Yes  perianal numbness: No  tingling: No  weakness: Yes  weight loss: No  Risk factors: lack of exercise, obesity, poor posture, sedentary lifestyle    New patient or new problem visit    Chief Complaint   Patient presents with   • Lumbar Spine - Pain       HPI: He is seen today at request of Dr. Rick for evaluation of moderate to severe chronic constant aching and burning low back pain.  He has failed to improve with physical therapy, epidurals and medication.  In October 2016 Dr. Rick performed a laminectomy which gave some fleeting relief of leg pain.  Patient has also complains of numbness in the foot.    PFSH: See chart- reviewed    Review of Systems   Constitutional: Negative for activity change, appetite change, chills, diaphoresis, fatigue, fever and unexpected weight change.   HENT: Negative for congestion, hearing loss, nosebleeds, postnasal drip, sore throat, tinnitus and trouble swallowing.    Eyes: Negative for pain and visual disturbance.   Respiratory: Negative for apnea, cough, chest tightness, shortness of breath and wheezing.    Cardiovascular: Negative for chest pain and palpitations.   Gastrointestinal: Negative for abdominal pain, blood in stool, diarrhea and nausea.   Genitourinary: Negative for difficulty urinating and dysuria.   Musculoskeletal:  Positive for back pain. Negative for arthralgias and joint swelling.   Skin: Negative for color change.   Neurological: Positive for weakness and numbness. Negative for light-headedness and headaches.   Psychiatric/Behavioral: Negative for agitation. The patient is not nervous/anxious.        PE: Constitutional: Vital signs above-noted.  Awake, alert and oriented    Psychiatric: Affect and insight do not appear grossly disturbed.    Pulmonary: Breathing is unlabored, color is good.    Skin: Warm, dry and normal turgor    Cardiac:  pedal pulses intact.  No edema.    Eyesight and hearing appear adequate for examination purposes      Musculoskeletal:  There is no tenderness to percussion and palpation of the spine. Motion appears undisturbed.  Posture is unremarkable to coronal and sagittal inspection.    The skin about the area is intact.  There is no palpable or visible deformity.  There is no local spasm.       Neurologic:   Reflexes are 2+ and symmetrical in the patellae and absent in the Achilles.   Motor function is undisturbed in quadriceps, EHL, and gastrocnemius      Sensation appears symmetrically intact to light touch   .  In the bilateral lower extremities there is no evidence of atrophy.   Clonus is absent..  Gait appears undisturbed.  SLR test negative      MEDICAL DECISION MAKING    XRAY: Plain film x-rays including flexion-extension films obtained recently and are compared to those obtained last year.  Each demonstrates 9 mm of retrolisthesis at L2-3 where there is extensive degenerative change and disc with significant narrowing along with the 4 mm of retrolisthesis at L3 4.  MRI scan before and after his lumbar laminectomiy are reviewed and demonstrate improvement in the stenosis but continued foraminal narrowing bilaterally.  I reviewed the radiologist's report.    Other: n/a    Impression: L2-3, L3-4 retrolisthesis and recurrent spinal stenosis and 62-year-old smoker with otherwise pristine discs  who has exhausted conservative care.    Plan: I think he would benefit from surgical intervention and I recommended L23, L3 4 repeat laminectomy and fusion with instrumentation with Dr. Rick.  I discussed the risks and benefits of posterior spinal fusion, including where applicable, laminectomy.  Risks include adverse anesthetic events such as death, stroke and myocardial infarction.  More specific surgical risks include infection, nonunion, hardware failure, spinal fluid leakage, nerve root injury or paralysis, visceral or vascular injury, persistent pain, deep venous thrombosis, and pulmonary embolism among others. There is a 70 to 90 % chance of success.   Alternatives have been discussed.  After careful consideration the patient wishes to proceed with surgery.  The significant amount of time 5 minutes or more was spent the discussion the adverse effects of smoking and the benefits to be obtained if he quits.

## 2017-07-14 RX ORDER — SODIUM CHLORIDE, SODIUM LACTATE, POTASSIUM CHLORIDE, CALCIUM CHLORIDE 600; 310; 30; 20 MG/100ML; MG/100ML; MG/100ML; MG/100ML
100 INJECTION, SOLUTION INTRAVENOUS CONTINUOUS
Status: CANCELLED | OUTPATIENT
Start: 2017-07-31

## 2017-07-14 RX ORDER — SODIUM CHLORIDE 0.9 % (FLUSH) 0.9 %
1-10 SYRINGE (ML) INJECTION AS NEEDED
Status: CANCELLED | OUTPATIENT
Start: 2017-07-31

## 2017-07-17 DIAGNOSIS — M43.16 SPONDYLOLISTHESIS OF LUMBAR REGION: Primary | ICD-10-CM

## 2017-07-17 DIAGNOSIS — M48.061 SPINAL STENOSIS OF LUMBAR REGION: ICD-10-CM

## 2017-07-17 DIAGNOSIS — F17.200 SMOKER: ICD-10-CM

## 2017-07-18 ENCOUNTER — OFFICE VISIT (OUTPATIENT)
Dept: NEUROSURGERY | Facility: CLINIC | Age: 62
End: 2017-07-18

## 2017-07-18 VITALS
HEIGHT: 71 IN | HEART RATE: 104 BPM | DIASTOLIC BLOOD PRESSURE: 85 MMHG | SYSTOLIC BLOOD PRESSURE: 123 MMHG | WEIGHT: 204 LBS | BODY MASS INDEX: 28.56 KG/M2

## 2017-07-18 DIAGNOSIS — M54.16 LUMBAR RADICULOPATHY: Primary | ICD-10-CM

## 2017-07-18 PROCEDURE — 99213 OFFICE O/P EST LOW 20 MIN: CPT | Performed by: NEUROLOGICAL SURGERY

## 2017-07-18 NOTE — PROGRESS NOTES
Subjective   Patient ID: Prem Thrasher is a 62 y.o. male is here today for follow-up. He is 9 months out from a left L2-L3, L3-L4 laminectomy/discectomy with [facility] on 10/10/16. He has back pain that radiates into left leg.    History of Present Illness    This patient continues with pain in his back and radiation into his left leg.  It is mostly in the left buttock and down the lateral thigh and lateral calf.  His right leg is still okay.  The pain is still coming and going but it times is quite severe.  He has no difficulty with bowel or bladder control or other associated symptoms.  Any kind of activity or sitting for a long time makes him worse.    The following portions of the patient's history were reviewed and updated as appropriate: allergies, current medications, past family history, past medical history, past social history, past surgical history and problem list.    Review of Systems   Constitutional: Positive for activity change.   Respiratory: Negative for chest tightness and shortness of breath.    Cardiovascular: Negative for chest pain.   Musculoskeletal: Positive for back pain.        Left leg   Neurological: Positive for weakness and numbness.        Tingling    All other systems reviewed and are negative.      Objective   Physical Exam   Constitutional: He is oriented to person, place, and time. He appears well-developed and well-nourished.   Eyes: EOM are normal. Pupils are equal, round, and reactive to light.   Neurological: He is oriented to person, place, and time. He has a normal Finger-Nose-Finger Test and a normal Heel to Shin Test. Gait normal.   Reflex Scores:       Tricep reflexes are 2+ on the right side and 2+ on the left side.       Bicep reflexes are 2+ on the right side and 2+ on the left side.       Brachioradialis reflexes are 2+ on the right side and 2+ on the left side.       Patellar reflexes are 2+ on the right side and 2+ on the left side.       Achilles reflexes are 2+ on  the right side and 2+ on the left side.  Psychiatric: His speech is normal.     Neurologic Exam     Mental Status   Oriented to person, place, and time.   Registration of memory: Good recent and remote memory.   Attention: normal. Concentration: normal.   Speech: speech is normal   Level of consciousness: alert  Knowledge: consistent with education.     Cranial Nerves     CN II   Visual fields full to confrontation.   Visual acuity: normal    CN III, IV, VI   Pupils are equal, round, and reactive to light.  Extraocular motions are normal.     CN V   Facial sensation intact.   Right corneal reflex: normal  Left corneal reflex: normal    CN VII   Facial expression full, symmetric.   Right facial weakness: none  Left facial weakness: none    CN VIII   Hearing: intact    CN IX, X   Palate: symmetric    CN XI   Right sternocleidomastoid strength: normal  Left sternocleidomastoid strength: normal    CN XII   Tongue: not atrophic  Tongue deviation: none    Motor Exam   Muscle bulk: normal  Right arm tone: normal  Left arm tone: normal  Right leg tone: normal  Left leg tone: normal    Strength   Strength 5/5 except as noted.     Sensory Exam   Light touch normal.     Gait, Coordination, and Reflexes     Gait  Gait: normal    Coordination   Finger to nose coordination: normal  Heel to shin coordination: normal    Reflexes   Right brachioradialis: 2+  Left brachioradialis: 2+  Right biceps: 2+  Left biceps: 2+  Right triceps: 2+  Left triceps: 2+  Right patellar: 2+  Left patellar: 2+  Right achilles: 2+  Left achilles: 2+  Right : 2+  Left : 2+      Assessment/Plan   Independent Review of Radiographic Studies:      I again reviewed his plain films and his MRI.  His plain films show degenerative change particularly at L2-3 but no evidence of movement on flexion and extension. On the MRI there is some narrowing on the left side at L3 4 and a little bit at L2-3 but it is actually worse on the right than the left. The  other levels for the most part look okay. I also reviewed his myelogram which looks similar at L2-3 on the right but the left side appears to be more open.  L3 4 shows some narrowing on the left side but the right side is fairly open.    Medical Decision Making:      I told the patient that I thought it was reasonable to proceed with a lumbar decompression and neural lysis at L2-3 and L3 4.  I told the patient about the risks, complications and expected outcome of the lumbar surgery.  I explained that there was an 80% chance of getting rid of the pain in the leg.  I explained that there would still be back pain after the surgery.  Initially this will be quite severe but will improve over time.  There is a 2 or 3% chance of infection, bleeding, CSF leak, damage to the nerve as a result of surgery, paralysis, as well as anesthetic risk.  There is a 10% chance of recurrent problems.  There is a 10% chance of nonunion or failure of the instrumentation.  We discussed the postoperative hospital and home course.  The patient does ask to proceed.    He will need to be scheduled for a: L2 to L4 laminectomy with neural lysis and a fusion by orthopedics    Prem was seen today for back pain.    Diagnoses and all orders for this visit:    Lumbar radiculopathy    Return for 2-3 week post op.

## 2017-07-18 NOTE — PATIENT INSTRUCTIONS
"You Can Quit Smoking  If you are ready to quit smoking or are thinking about it, congratulations! You have chosen to help yourself be healthier and live longer! There are lots of different ways to quit smoking. Nicotine gum, nicotine patches, a nicotine inhaler, or nicotine nasal spray can help with physical craving. Hypnosis, support groups, and medicines help break the habit of smoking.  TIPS TO GET OFF AND STAY OFF CIGARETTES  · Learn to predict your moods. Do not let a bad situation be your excuse to have a cigarette. Some situations in your life might tempt you to have a cigarette.  · Ask friends and co-workers not to smoke around you.  · Make your home smoke-free.  · Never have \"just one\" cigarette. It leads to wanting another and another. Remind yourself of your decision to quit.  · On a card, make a list of your reasons for not smoking. Read it at least the same number of times a day as you have a cigarette. Tell yourself everyday, \"I do not want to smoke. I choose not to smoke.\"  · Ask someone at home or work to help you with your plan to quit smoking.  · Have something planned after you eat or have a cup of coffee. Take a walk or get other exercise to perk you up. This will help to keep you from overeating.  · Try a relaxation exercise to calm you down and decrease your stress. Remember, you may be tense and nervous the first two weeks after you quit. This will pass.  · Find new activities to keep your hands busy. Play with a pen, coin, or rubber band. Doodle or draw things on paper.  · Brush your teeth right after eating. This will help cut down the craving for the taste of tobacco after meals. You can try mouthwash too.  · Try gum, breath mints, or diet candy to keep something in your mouth.  IF YOU SMOKE AND WANT TO QUIT:  · Do not stock up on cigarettes. Never buy a carton. Wait until one pack is finished before you buy another.  · Never carry cigarettes with you at work or at home.  · Keep cigarettes " "as far away from you as possible. Leave them with someone else.  · Never carry matches or a lighter with you.  · Ask yourself, \"Do I need this cigarette or is this just a reflex?\"  · Bet with someone that you can quit. Put cigarette money in a [employer] bank every morning. If you smoke, you give up the money. If you do not smoke, by the end of the week, you keep the money.  · Keep trying. It takes 21 days to change a habit!  · Talk to your doctor about using medicines to help you quit. These include nicotine replacement gum, lozenges, or skin patches.     This information is not intended to replace advice given to you by your health care provider. Make sure you discuss any questions you have with your health care provider.     Document Released: 10/14/2010 Document Revised: 03/11/2013 Document Reviewed: 05/03/2016  [employer] Interactive Patient Education ©2017 [employer]    Exercising to Lose Weight  Exercising can help you to lose weight. In order to lose weight through exercise, you need to do vigorous-intensity exercise. You can tell that you are exercising with vigorous intensity if you are breathing very hard and fast and cannot hold a conversation while exercising.  Moderate-intensity exercise helps to maintain your current weight. You can tell that you are exercising at a moderate level if you have a higher heart rate and faster breathing, but you are still able to hold a conversation.  HOW OFTEN SHOULD I EXERCISE?  Choose an activity that you enjoy and set realistic goals. Your health care provider can help you to make an activity plan that works for you. Exercise regularly as directed by your health care provider. This may include:  · Doing resistance training twice each week, such as:    Push-ups.    Sit-ups.    Lifting weights.    Using resistance bands.  · Doing a given intensity of exercise for a given amount of time. Choose from these options:    150 minutes of moderate-intensity exercise every week.   "  75 minutes of vigorous-intensity exercise every week.    A mix of moderate-intensity and vigorous-intensity exercise every week.  Children, pregnant women, people who are out of shape, people who are overweight, and older adults may need to consult a health care provider for individual recommendations. If you have any sort of medical condition, be sure to consult your health care provider before starting a new exercise program.  WHAT ARE SOME ACTIVITIES THAT CAN HELP ME TO LOSE WEIGHT?   · Walking at a rate of at least 4.5 miles an hour.  · Jogging or running at a rate of 5 miles per hour.  · Biking at a rate of at least 10 miles per hour.  · Lap swimming.  · Roller-skating or in-line skating.  · Cross-country skiing.  · Vigorous competitive sports, such as football, basketball, and soccer.  · Jumping rope.  · Aerobic dancing.  HOW CAN I BE MORE ACTIVE IN MY DAY-TO-DAY ACTIVITIES?  · Use the stairs instead of the elevator.  · Take a walk during your lunch break.  · If you drive, park your car farther away from work or school.  · If you take public transportation, get off one stop early and walk the rest of the way.  · Make all of your phone calls while standing up and walking around.  · Get up, stretch, and walk around every 30 minutes throughout the day.  WHAT GUIDELINES SHOULD I FOLLOW WHILE EXERCISING?  · Do not exercise so much that you hurt yourself, feel dizzy, or get very short of breath.  · Consult your health care provider prior to starting a new exercise program.  · Wear comfortable clothes and shoes with good support.  · Drink plenty of water while you exercise to prevent dehydration or heat stroke. Body water is lost during exercise and must be replaced.  · Work out until you breathe faster and your heart beats faster.     This information is not intended to replace advice given to you by your health care provider. Make sure you discuss any questions you have with your health care provider.     Document  Released: 01/20/2012 Document Revised: 01/08/2016 Document Reviewed: 05/21/2015  [employer] Interactive Patient Education ©2017 [employer]

## 2017-07-19 ENCOUNTER — APPOINTMENT (OUTPATIENT)
Dept: PREADMISSION TESTING | Facility: HOSPITAL | Age: 62
End: 2017-07-19

## 2017-07-19 VITALS
BODY MASS INDEX: 29.4 KG/M2 | TEMPERATURE: 97.6 F | SYSTOLIC BLOOD PRESSURE: 131 MMHG | DIASTOLIC BLOOD PRESSURE: 90 MMHG | WEIGHT: 210 LBS | HEART RATE: 90 BPM | OXYGEN SATURATION: 94 % | HEIGHT: 71 IN

## 2017-07-19 LAB
ANION GAP SERPL CALCULATED.3IONS-SCNC: 12.8 MMOL/L
BUN BLD-MCNC: 12 MG/DL (ref 8–23)
BUN/CREAT SERPL: 11.1 (ref 7–25)
CALCIUM SPEC-SCNC: 9.4 MG/DL (ref 8.6–10.5)
CHLORIDE SERPL-SCNC: 98 MMOL/L (ref 98–107)
CO2 SERPL-SCNC: 22.2 MMOL/L (ref 22–29)
CREAT BLD-MCNC: 1.08 MG/DL (ref 0.76–1.27)
DEPRECATED RDW RBC AUTO: 44.2 FL (ref 37–54)
ERYTHROCYTE [DISTWIDTH] IN BLOOD BY AUTOMATED COUNT: 13.2 % (ref 11.5–14.5)
GFR SERPL CREATININE-BSD FRML MDRD: 69 ML/MIN/1.73
GLUCOSE BLD-MCNC: 110 MG/DL (ref 65–99)
HCT VFR BLD AUTO: 40.8 % (ref 40.4–52.2)
HGB BLD-MCNC: 13.3 G/DL (ref 13.7–17.6)
MCH RBC QN AUTO: 29.9 PG (ref 27–32.7)
MCHC RBC AUTO-ENTMCNC: 32.6 G/DL (ref 32.6–36.4)
MCV RBC AUTO: 91.7 FL (ref 79.8–96.2)
PLATELET # BLD AUTO: 258 10*3/MM3 (ref 140–500)
PMV BLD AUTO: 10.3 FL (ref 6–12)
POTASSIUM BLD-SCNC: 3.8 MMOL/L (ref 3.5–5.2)
RBC # BLD AUTO: 4.45 10*6/MM3 (ref 4.6–6)
SODIUM BLD-SCNC: 133 MMOL/L (ref 136–145)
WBC NRBC COR # BLD: 7.37 10*3/MM3 (ref 4.5–10.7)

## 2017-07-19 PROCEDURE — 85027 COMPLETE CBC AUTOMATED: CPT | Performed by: INTERNAL MEDICINE

## 2017-07-19 PROCEDURE — 36415 COLL VENOUS BLD VENIPUNCTURE: CPT

## 2017-07-19 PROCEDURE — 93005 ELECTROCARDIOGRAM TRACING: CPT

## 2017-07-19 PROCEDURE — 93010 ELECTROCARDIOGRAM REPORT: CPT | Performed by: INTERNAL MEDICINE

## 2017-07-19 PROCEDURE — 80048 BASIC METABOLIC PNL TOTAL CA: CPT | Performed by: INTERNAL MEDICINE

## 2017-07-19 RX ORDER — CYCLOBENZAPRINE HCL 10 MG
10 TABLET ORAL 3 TIMES DAILY PRN
COMMUNITY
End: 2017-08-31 | Stop reason: SDUPTHER

## 2017-07-19 NOTE — DISCHARGE INSTRUCTIONS
Take the following medications the morning of surgery with a small sip of water:    NONE    ARRIVE AT 5:30    General Instructions:  • Do not eat solid food after midnight the night before surgery.  • You may drink clear liquids day of surgery but must stop at least one hour before your hospital arrival time.  • It is beneficial for you to have a clear drink that contains carbohydrates the day of surgery.  We suggest a 20 ounce bottle of Gatorade or Powerade for non-diabetic patients or a 20 ounce bottle of G2 or Powerade Zero for diabetic patients. (Pediatric patients, are not advised to drink a 20 ounce carbohydrate drink)    Clear liquids are liquids you can see through.  Nothing red in color.     Plain water                               Sports drinks  Sodas                                   Gelatin (Jell-O)  Fruit juices without pulp such as white grape juice and apple juice  Popsicles that contain no fruit or yogurt  Tea or coffee (no cream or milk added)  Gatorade / Powerade  G2 / Powerade Zero    • Infants may have breast milk up to four hours before surgery.  • Infants drinking formula may drink formula up to six hours before surgery.   • Patients who avoid smoking, chewing tobacco and alcohol for 4 weeks prior to surgery have a reduced risk of post-operative complications.  Quit smoking as many days before surgery as you can.  • Do not smoke, use chewing tobacco or drink alcohol the day of surgery.   • If applicable bring your C-PAP/ BI-PAP machine.  • Bring any papers given to you in the doctor’s office.  • Wear clean comfortable clothes and socks.  • Do not wear contact lenses or make-up.  Bring a case for your glasses.   • Bring crutches or walker if applicable.  • Leave all other valuables and jewelry at home.  • The Pre-Admission Testing nurse will instruct you to bring medications if unable to obtain an accurate list in Pre-Admission Testing.        If you were given a blood bank ID arm band remember  to bring it with you the day of surgery.    Preventing a Surgical Site Infection:  • For 2 to 3 days before surgery, avoid shaving with a razor because the razor can irritate skin and make it easier to develop an infection.  • The night prior to surgery sleep in a clean bed with clean clothing.  Do not allow pets to sleep with you.  • Shower on the morning of surgery using a fresh bar of anti-bacterial soap (such as Dial) and clean washcloth.  Dry with a clean towel and dress in clean clothing.  • Ask your surgeon if you will be receiving antibiotics prior to surgery.  • Make sure you, your family, and all healthcare providers clean their hands with soap and water or an alcohol based hand  before caring for you or your wound.    Day of surgery:  Upon arrival, a Pre-op nurse and Anesthesiologist will review your health history, obtain vital signs, and answer questions you may have.  The only belongings needed at this time will be your home medications and if applicable your C-PAP/BI-PAP machine.  If you are staying overnight your family can leave the rest of your belongings in the car and bring them to your room later.  A Pre-op nurse will start an IV and you may receive medication in preparation for surgery, including something to help you relax.  Your family will be able to see you in the Pre-op area.  While you are in surgery your family should notify the waiting room  if they leave the waiting room area and provide a contact phone number.    Please be aware that surgery does come with discomfort.  We want to make every effort to control your discomfort so please discuss any uncontrolled symptoms with your nurse.   Your doctor will most likely have prescribed pain medications.      If you are going home after surgery you will receive individualized written care instructions before being discharged.  A responsible adult must drive you to and from the hospital on the day of your surgery and stay  with you for 24 hours.    If you are staying overnight following surgery, you will be transported to your hospital room following the recovery period.  [facility] has all private rooms.    If you have any questions please call Pre-Admission Testing at [phone].  Deductibles and co-payments are collected on the day of service. Please be prepared to pay the required co-pay, deductible or deposit on the day of service as defined by your plan.

## 2017-07-26 ENCOUNTER — TELEPHONE (OUTPATIENT)
Dept: ORTHOPEDIC SURGERY | Facility: CLINIC | Age: 62
End: 2017-07-26

## 2017-07-26 NOTE — TELEPHONE ENCOUNTER
Have spoken with the patient.  We discussed all of his home medications and have given him the medication list that he should discontinue before surgery and also what medications he should take the morning of surgery

## 2017-07-31 ENCOUNTER — ANESTHESIA EVENT (OUTPATIENT)
Dept: PERIOP | Facility: HOSPITAL | Age: 62
End: 2017-07-31

## 2017-07-31 ENCOUNTER — APPOINTMENT (OUTPATIENT)
Dept: GENERAL RADIOLOGY | Facility: HOSPITAL | Age: 62
End: 2017-07-31

## 2017-07-31 ENCOUNTER — HOSPITAL ENCOUNTER (INPATIENT)
Facility: HOSPITAL | Age: 62
LOS: 3 days | Discharge: HOME | End: 2017-08-03
Attending: ORTHOPAEDIC SURGERY | Admitting: ORTHOPAEDIC SURGERY

## 2017-07-31 ENCOUNTER — ANESTHESIA (OUTPATIENT)
Dept: PERIOP | Facility: HOSPITAL | Age: 62
End: 2017-07-31

## 2017-07-31 DIAGNOSIS — M43.16 SPONDYLOLISTHESIS OF LUMBAR REGION: ICD-10-CM

## 2017-07-31 DIAGNOSIS — M48.061 SPINAL STENOSIS OF LUMBAR REGION: ICD-10-CM

## 2017-07-31 DIAGNOSIS — M54.16 LUMBAR RADICULOPATHY: ICD-10-CM

## 2017-07-31 DIAGNOSIS — R26.89 IMPAIRED GAIT AND MOBILITY: Primary | ICD-10-CM

## 2017-07-31 DIAGNOSIS — M51.36 DDD (DEGENERATIVE DISC DISEASE), LUMBAR: ICD-10-CM

## 2017-07-31 LAB
ABO GROUP BLD: NORMAL
BLD GP AB SCN SERPL QL: NEGATIVE
HCT VFR BLD AUTO: 36.4 % (ref 40.4–52.2)
HGB BLD-MCNC: 11.4 G/DL (ref 13.7–17.6)
RH BLD: POSITIVE

## 2017-07-31 PROCEDURE — C1713 ANCHOR/SCREW BN/BN,TIS/BN: HCPCS | Performed by: ORTHOPAEDIC SURGERY

## 2017-07-31 PROCEDURE — 25010000002 FENTANYL CITRATE (PF) 100 MCG/2ML SOLUTION: Performed by: NURSE ANESTHETIST, CERTIFIED REGISTERED

## 2017-07-31 PROCEDURE — 63048 LAM FACETEC &FORAMOT EA ADDL: CPT | Performed by: NEUROLOGICAL SURGERY

## 2017-07-31 PROCEDURE — 25010000003 CEFAZOLIN PER 500 MG: Performed by: ORTHOPAEDIC SURGERY

## 2017-07-31 PROCEDURE — 25010000002 DEXAMETHASONE PER 1 MG: Performed by: NURSE ANESTHETIST, CERTIFIED REGISTERED

## 2017-07-31 PROCEDURE — 0SP004Z REMOVAL OF INTERNAL FIXATION DEVICE FROM LUMBAR VERTEBRAL JOINT, OPEN APPROACH: ICD-10-PCS | Performed by: NEUROLOGICAL SURGERY

## 2017-07-31 PROCEDURE — 85018 HEMOGLOBIN: CPT | Performed by: ORTHOPAEDIC SURGERY

## 2017-07-31 PROCEDURE — 07DR3ZZ EXTRACTION OF ILIAC BONE MARROW, PERCUTANEOUS APPROACH: ICD-10-PCS | Performed by: ORTHOPAEDIC SURGERY

## 2017-07-31 PROCEDURE — 25010000002 ONDANSETRON PER 1 MG: Performed by: NURSE ANESTHETIST, CERTIFIED REGISTERED

## 2017-07-31 PROCEDURE — 25010000002 VANCOMYCIN 10 G RECONSTITUTED SOLUTION: Performed by: NEUROLOGICAL SURGERY

## 2017-07-31 PROCEDURE — 86900 BLOOD TYPING SEROLOGIC ABO: CPT | Performed by: ORTHOPAEDIC SURGERY

## 2017-07-31 PROCEDURE — 22612 ARTHRD PST TQ 1NTRSPC LUMBAR: CPT | Performed by: ORTHOPAEDIC SURGERY

## 2017-07-31 PROCEDURE — 63047 LAM FACETEC & FORAMOT LUMBAR: CPT | Performed by: NEUROLOGICAL SURGERY

## 2017-07-31 PROCEDURE — 22614 ARTHRD PST TQ 1NTRSPC EA ADD: CPT | Performed by: ORTHOPAEDIC SURGERY

## 2017-07-31 PROCEDURE — 22842 INSERT SPINE FIXATION DEVICE: CPT | Performed by: ORTHOPAEDIC SURGERY

## 2017-07-31 PROCEDURE — 25010000002 PROPOFOL 10 MG/ML EMULSION: Performed by: NURSE ANESTHETIST, CERTIFIED REGISTERED

## 2017-07-31 PROCEDURE — 25010000002 NEOSTIGMINE PER 0.5 MG: Performed by: NURSE ANESTHETIST, CERTIFIED REGISTERED

## 2017-07-31 PROCEDURE — 72100 X-RAY EXAM L-S SPINE 2/3 VWS: CPT

## 2017-07-31 PROCEDURE — 25010000002 FENTANYL CITRATE (PF) 100 MCG/2ML SOLUTION: Performed by: ANESTHESIOLOGY

## 2017-07-31 PROCEDURE — 25010000002 HYDROMORPHONE PER 4 MG: Performed by: NURSE ANESTHETIST, CERTIFIED REGISTERED

## 2017-07-31 PROCEDURE — 86850 RBC ANTIBODY SCREEN: CPT | Performed by: ORTHOPAEDIC SURGERY

## 2017-07-31 PROCEDURE — 94640 AIRWAY INHALATION TREATMENT: CPT

## 2017-07-31 PROCEDURE — 25010000002 HEPARIN (PORCINE) PER 1000 UNITS: Performed by: ORTHOPAEDIC SURGERY

## 2017-07-31 PROCEDURE — 85014 HEMATOCRIT: CPT | Performed by: ORTHOPAEDIC SURGERY

## 2017-07-31 PROCEDURE — 0SG1071 FUSION OF 2 OR MORE LUMBAR VERTEBRAL JOINTS WITH AUTOLOGOUS TISSUE SUBSTITUTE, POSTERIOR APPROACH, POSTERIOR COLUMN, OPEN APPROACH: ICD-10-PCS | Performed by: ORTHOPAEDIC SURGERY

## 2017-07-31 PROCEDURE — 76000 FLUOROSCOPY <1 HR PHYS/QHP: CPT

## 2017-07-31 PROCEDURE — 25010000002 VANCOMYCIN 10 G RECONSTITUTED SOLUTION: Performed by: ORTHOPAEDIC SURGERY

## 2017-07-31 PROCEDURE — 25810000003 POTASSIUM CHLORIDE PER 2 MEQ: Performed by: ORTHOPAEDIC SURGERY

## 2017-07-31 PROCEDURE — 38220 DX BONE MARROW ASPIRATIONS: CPT | Performed by: ORTHOPAEDIC SURGERY

## 2017-07-31 PROCEDURE — 94799 UNLISTED PULMONARY SVC/PX: CPT

## 2017-07-31 PROCEDURE — 86901 BLOOD TYPING SEROLOGIC RH(D): CPT | Performed by: ORTHOPAEDIC SURGERY

## 2017-07-31 DEVICE — SCRW INNR EXPEDIUM: Type: IMPLANTABLE DEVICE | Site: SPINE LUMBAR | Status: FUNCTIONAL

## 2017-07-31 DEVICE — ROD PREBNT SPINE EXPEDIUM TI 5.5X85MM: Type: IMPLANTABLE DEVICE | Site: SPINE LUMBAR | Status: FUNCTIONAL

## 2017-07-31 DEVICE — STRIP 7800310 MASTERGRAFT STRIP 10CM
Type: IMPLANTABLE DEVICE | Site: SPINE LUMBAR | Status: FUNCTIONAL
Brand: MASTERGRAFT® STRIP

## 2017-07-31 DEVICE — SCRW EXPEDIUM PA TI 6X45MM: Type: IMPLANTABLE DEVICE | Site: SPINE LUMBAR | Status: FUNCTIONAL

## 2017-07-31 DEVICE — SCRW EXPEDIUM PA TI 6X40MM: Type: IMPLANTABLE DEVICE | Site: SPINE LUMBAR | Status: FUNCTIONAL

## 2017-07-31 RX ORDER — PROMETHAZINE HYDROCHLORIDE 25 MG/1
25 SUPPOSITORY RECTAL ONCE AS NEEDED
Status: DISCONTINUED | OUTPATIENT
Start: 2017-07-31 | End: 2017-07-31 | Stop reason: HOSPADM

## 2017-07-31 RX ORDER — OXYCODONE HYDROCHLORIDE AND ACETAMINOPHEN 5; 325 MG/1; MG/1
2 TABLET ORAL EVERY 4 HOURS PRN
Status: DISCONTINUED | OUTPATIENT
Start: 2017-07-31 | End: 2017-08-03 | Stop reason: HOSPADM

## 2017-07-31 RX ORDER — DEXAMETHASONE SODIUM PHOSPHATE 10 MG/ML
INJECTION INTRAMUSCULAR; INTRAVENOUS AS NEEDED
Status: DISCONTINUED | OUTPATIENT
Start: 2017-07-31 | End: 2017-07-31 | Stop reason: SURG

## 2017-07-31 RX ORDER — FAMOTIDINE 10 MG/ML
20 INJECTION, SOLUTION INTRAVENOUS ONCE
Status: COMPLETED | OUTPATIENT
Start: 2017-07-31 | End: 2017-07-31

## 2017-07-31 RX ORDER — PROMETHAZINE HYDROCHLORIDE 25 MG/ML
6.25 INJECTION, SOLUTION INTRAMUSCULAR; INTRAVENOUS ONCE AS NEEDED
Status: DISCONTINUED | OUTPATIENT
Start: 2017-07-31 | End: 2017-07-31 | Stop reason: HOSPADM

## 2017-07-31 RX ORDER — ONDANSETRON 4 MG/1
4 TABLET, ORALLY DISINTEGRATING ORAL EVERY 6 HOURS PRN
Status: DISCONTINUED | OUTPATIENT
Start: 2017-07-31 | End: 2017-08-01

## 2017-07-31 RX ORDER — HYDRALAZINE HYDROCHLORIDE 20 MG/ML
5 INJECTION INTRAMUSCULAR; INTRAVENOUS
Status: DISCONTINUED | OUTPATIENT
Start: 2017-07-31 | End: 2017-07-31 | Stop reason: HOSPADM

## 2017-07-31 RX ORDER — ONDANSETRON 4 MG/1
4 TABLET, FILM COATED ORAL EVERY 6 HOURS PRN
Status: DISCONTINUED | OUTPATIENT
Start: 2017-07-31 | End: 2017-08-01

## 2017-07-31 RX ORDER — SENNA AND DOCUSATE SODIUM 50; 8.6 MG/1; MG/1
1 TABLET, FILM COATED ORAL 2 TIMES DAILY
Status: DISCONTINUED | OUTPATIENT
Start: 2017-07-31 | End: 2017-08-03 | Stop reason: HOSPADM

## 2017-07-31 RX ORDER — SODIUM CHLORIDE 0.9 % (FLUSH) 0.9 %
1-10 SYRINGE (ML) INJECTION AS NEEDED
Status: DISCONTINUED | OUTPATIENT
Start: 2017-07-31 | End: 2017-07-31 | Stop reason: HOSPADM

## 2017-07-31 RX ORDER — HYDROMORPHONE HCL IN 0.9% NACL 10 MG/50ML
PATIENT CONTROLLED ANALGESIA SYRINGE INTRAVENOUS CONTINUOUS
Status: DISCONTINUED | OUTPATIENT
Start: 2017-07-31 | End: 2017-08-03 | Stop reason: HOSPADM

## 2017-07-31 RX ORDER — HYDROMORPHONE HYDROCHLORIDE 1 MG/ML
0.5 INJECTION, SOLUTION INTRAMUSCULAR; INTRAVENOUS; SUBCUTANEOUS
Status: DISCONTINUED | OUTPATIENT
Start: 2017-07-31 | End: 2017-07-31 | Stop reason: HOSPADM

## 2017-07-31 RX ORDER — SODIUM CHLORIDE AND POTASSIUM CHLORIDE 150; 450 MG/100ML; MG/100ML
100 INJECTION, SOLUTION INTRAVENOUS CONTINUOUS
Status: DISCONTINUED | OUTPATIENT
Start: 2017-07-31 | End: 2017-08-02

## 2017-07-31 RX ORDER — ONDANSETRON 2 MG/ML
INJECTION INTRAMUSCULAR; INTRAVENOUS AS NEEDED
Status: DISCONTINUED | OUTPATIENT
Start: 2017-07-31 | End: 2017-07-31 | Stop reason: SURG

## 2017-07-31 RX ORDER — ONDANSETRON 2 MG/ML
4 INJECTION INTRAMUSCULAR; INTRAVENOUS EVERY 6 HOURS PRN
Status: DISCONTINUED | OUTPATIENT
Start: 2017-07-31 | End: 2017-08-01

## 2017-07-31 RX ORDER — LIDOCAINE HYDROCHLORIDE 20 MG/ML
INJECTION, SOLUTION INFILTRATION; PERINEURAL AS NEEDED
Status: DISCONTINUED | OUTPATIENT
Start: 2017-07-31 | End: 2017-07-31 | Stop reason: SURG

## 2017-07-31 RX ORDER — BISACODYL 10 MG
10 SUPPOSITORY, RECTAL RECTAL DAILY PRN
Status: DISCONTINUED | OUTPATIENT
Start: 2017-07-31 | End: 2017-08-01

## 2017-07-31 RX ORDER — FENTANYL CITRATE 50 UG/ML
50 INJECTION, SOLUTION INTRAMUSCULAR; INTRAVENOUS
Status: DISCONTINUED | OUTPATIENT
Start: 2017-07-31 | End: 2017-07-31 | Stop reason: HOSPADM

## 2017-07-31 RX ORDER — PROMETHAZINE HYDROCHLORIDE 25 MG/1
25 TABLET ORAL ONCE AS NEEDED
Status: DISCONTINUED | OUTPATIENT
Start: 2017-07-31 | End: 2017-07-31 | Stop reason: HOSPADM

## 2017-07-31 RX ORDER — ALBUTEROL SULFATE 2.5 MG/3ML
2.5 SOLUTION RESPIRATORY (INHALATION) ONCE AS NEEDED
Status: DISCONTINUED | OUTPATIENT
Start: 2017-07-31 | End: 2017-07-31 | Stop reason: HOSPADM

## 2017-07-31 RX ORDER — GLYCOPYRROLATE 0.2 MG/ML
INJECTION INTRAMUSCULAR; INTRAVENOUS AS NEEDED
Status: DISCONTINUED | OUTPATIENT
Start: 2017-07-31 | End: 2017-07-31 | Stop reason: SURG

## 2017-07-31 RX ORDER — PROPOFOL 10 MG/ML
VIAL (ML) INTRAVENOUS AS NEEDED
Status: DISCONTINUED | OUTPATIENT
Start: 2017-07-31 | End: 2017-07-31 | Stop reason: SURG

## 2017-07-31 RX ORDER — EPHEDRINE SULFATE 50 MG/ML
INJECTION, SOLUTION INTRAVENOUS AS NEEDED
Status: DISCONTINUED | OUTPATIENT
Start: 2017-07-31 | End: 2017-07-31 | Stop reason: SURG

## 2017-07-31 RX ORDER — SODIUM CHLORIDE, SODIUM LACTATE, POTASSIUM CHLORIDE, CALCIUM CHLORIDE 600; 310; 30; 20 MG/100ML; MG/100ML; MG/100ML; MG/100ML
100 INJECTION, SOLUTION INTRAVENOUS CONTINUOUS
Status: DISCONTINUED | OUTPATIENT
Start: 2017-07-31 | End: 2017-08-01

## 2017-07-31 RX ORDER — MAGNESIUM HYDROXIDE 1200 MG/15ML
LIQUID ORAL AS NEEDED
Status: DISCONTINUED | OUTPATIENT
Start: 2017-07-31 | End: 2017-07-31 | Stop reason: HOSPADM

## 2017-07-31 RX ORDER — DIPHENHYDRAMINE HYDROCHLORIDE 50 MG/ML
12.5 INJECTION INTRAMUSCULAR; INTRAVENOUS
Status: DISCONTINUED | OUTPATIENT
Start: 2017-07-31 | End: 2017-07-31 | Stop reason: HOSPADM

## 2017-07-31 RX ORDER — POLYETHYLENE GLYCOL 3350 17 G/17G
17 POWDER, FOR SOLUTION ORAL DAILY PRN
Status: DISCONTINUED | OUTPATIENT
Start: 2017-07-31 | End: 2017-08-03 | Stop reason: HOSPADM

## 2017-07-31 RX ORDER — MIDAZOLAM HYDROCHLORIDE 1 MG/ML
1 INJECTION INTRAMUSCULAR; INTRAVENOUS
Status: DISCONTINUED | OUTPATIENT
Start: 2017-07-31 | End: 2017-07-31 | Stop reason: HOSPADM

## 2017-07-31 RX ORDER — PROMETHAZINE HYDROCHLORIDE 25 MG/ML
12.5 INJECTION, SOLUTION INTRAMUSCULAR; INTRAVENOUS ONCE AS NEEDED
Status: DISCONTINUED | OUTPATIENT
Start: 2017-07-31 | End: 2017-07-31 | Stop reason: HOSPADM

## 2017-07-31 RX ORDER — SODIUM CHLORIDE 0.9 % (FLUSH) 0.9 %
1-10 SYRINGE (ML) INJECTION AS NEEDED
Status: DISCONTINUED | OUTPATIENT
Start: 2017-07-31 | End: 2017-08-03 | Stop reason: HOSPADM

## 2017-07-31 RX ORDER — TEMAZEPAM 15 MG/1
15 CAPSULE ORAL NIGHTLY PRN
Status: DISCONTINUED | OUTPATIENT
Start: 2017-07-31 | End: 2017-08-03 | Stop reason: HOSPADM

## 2017-07-31 RX ORDER — ESCITALOPRAM OXALATE 20 MG/1
20 TABLET ORAL DAILY
Status: DISCONTINUED | OUTPATIENT
Start: 2017-07-31 | End: 2017-08-03 | Stop reason: HOSPADM

## 2017-07-31 RX ORDER — FENTANYL CITRATE 50 UG/ML
INJECTION, SOLUTION INTRAMUSCULAR; INTRAVENOUS AS NEEDED
Status: DISCONTINUED | OUTPATIENT
Start: 2017-07-31 | End: 2017-07-31 | Stop reason: SURG

## 2017-07-31 RX ORDER — SODIUM CHLORIDE, SODIUM LACTATE, POTASSIUM CHLORIDE, CALCIUM CHLORIDE 600; 310; 30; 20 MG/100ML; MG/100ML; MG/100ML; MG/100ML
9 INJECTION, SOLUTION INTRAVENOUS CONTINUOUS
Status: DISCONTINUED | OUTPATIENT
Start: 2017-07-31 | End: 2017-08-01

## 2017-07-31 RX ORDER — ROCURONIUM BROMIDE 10 MG/ML
INJECTION, SOLUTION INTRAVENOUS AS NEEDED
Status: DISCONTINUED | OUTPATIENT
Start: 2017-07-31 | End: 2017-07-31 | Stop reason: SURG

## 2017-07-31 RX ORDER — LABETALOL HYDROCHLORIDE 5 MG/ML
5 INJECTION, SOLUTION INTRAVENOUS
Status: DISCONTINUED | OUTPATIENT
Start: 2017-07-31 | End: 2017-07-31 | Stop reason: HOSPADM

## 2017-07-31 RX ORDER — HYDROCODONE BITARTRATE AND ACETAMINOPHEN 5; 325 MG/1; MG/1
1 TABLET ORAL ONCE AS NEEDED
Status: DISCONTINUED | OUTPATIENT
Start: 2017-07-31 | End: 2017-07-31 | Stop reason: HOSPADM

## 2017-07-31 RX ORDER — HYDROMORPHONE HCL IN 0.9% NACL 10 MG/50ML
PATIENT CONTROLLED ANALGESIA SYRINGE INTRAVENOUS
Status: COMPLETED
Start: 2017-07-31 | End: 2017-07-31

## 2017-07-31 RX ORDER — CYCLOBENZAPRINE HCL 10 MG
10 TABLET ORAL 3 TIMES DAILY PRN
Status: DISCONTINUED | OUTPATIENT
Start: 2017-07-31 | End: 2017-08-01

## 2017-07-31 RX ORDER — NALOXONE HCL 0.4 MG/ML
0.1 VIAL (ML) INJECTION
Status: DISCONTINUED | OUTPATIENT
Start: 2017-07-31 | End: 2017-08-03 | Stop reason: HOSPADM

## 2017-07-31 RX ORDER — ONDANSETRON 2 MG/ML
4 INJECTION INTRAMUSCULAR; INTRAVENOUS ONCE AS NEEDED
Status: DISCONTINUED | OUTPATIENT
Start: 2017-07-31 | End: 2017-07-31 | Stop reason: HOSPADM

## 2017-07-31 RX ORDER — CYCLOBENZAPRINE HCL 10 MG
10 TABLET ORAL 3 TIMES DAILY PRN
Status: DISCONTINUED | OUTPATIENT
Start: 2017-07-31 | End: 2017-08-03 | Stop reason: HOSPADM

## 2017-07-31 RX ORDER — MIDAZOLAM HYDROCHLORIDE 1 MG/ML
2 INJECTION INTRAMUSCULAR; INTRAVENOUS
Status: DISCONTINUED | OUTPATIENT
Start: 2017-07-31 | End: 2017-07-31 | Stop reason: HOSPADM

## 2017-07-31 RX ADMIN — FENTANYL CITRATE 50 MCG: 50 INJECTION INTRAMUSCULAR; INTRAVENOUS at 07:09

## 2017-07-31 RX ADMIN — EPHEDRINE SULFATE 10 MG: 50 INJECTION INTRAMUSCULAR; INTRAVENOUS; SUBCUTANEOUS at 09:30

## 2017-07-31 RX ADMIN — ROCURONIUM BROMIDE 10 MG: 10 INJECTION INTRAVENOUS at 09:36

## 2017-07-31 RX ADMIN — POTASSIUM CHLORIDE AND SODIUM CHLORIDE 100 ML/HR: 450; 150 INJECTION, SOLUTION INTRAVENOUS at 23:40

## 2017-07-31 RX ADMIN — PROPOFOL 150 MG: 10 INJECTION, EMULSION INTRAVENOUS at 07:42

## 2017-07-31 RX ADMIN — POTASSIUM CHLORIDE AND SODIUM CHLORIDE 100 ML/HR: 450; 150 INJECTION, SOLUTION INTRAVENOUS at 13:36

## 2017-07-31 RX ADMIN — DOCUSATE SODIUM -SENNOSIDES 1 TABLET: 50; 8.6 TABLET, COATED ORAL at 17:54

## 2017-07-31 RX ADMIN — SODIUM CHLORIDE, POTASSIUM CHLORIDE, SODIUM LACTATE AND CALCIUM CHLORIDE 100 ML/HR: 600; 310; 30; 20 INJECTION, SOLUTION INTRAVENOUS at 06:19

## 2017-07-31 RX ADMIN — DEXAMETHASONE SODIUM PHOSPHATE 8 MG: 10 INJECTION INTRAMUSCULAR; INTRAVENOUS at 07:46

## 2017-07-31 RX ADMIN — FENTANYL CITRATE 50 MCG: 50 INJECTION INTRAMUSCULAR; INTRAVENOUS at 11:41

## 2017-07-31 RX ADMIN — FENTANYL CITRATE 100 MCG: 50 INJECTION INTRAMUSCULAR; INTRAVENOUS at 07:42

## 2017-07-31 RX ADMIN — EPHEDRINE SULFATE 10 MG: 50 INJECTION INTRAMUSCULAR; INTRAVENOUS; SUBCUTANEOUS at 08:45

## 2017-07-31 RX ADMIN — EPHEDRINE SULFATE 5 MG: 50 INJECTION INTRAMUSCULAR; INTRAVENOUS; SUBCUTANEOUS at 10:15

## 2017-07-31 RX ADMIN — FENTANYL CITRATE 50 MCG: 50 INJECTION INTRAMUSCULAR; INTRAVENOUS at 12:00

## 2017-07-31 RX ADMIN — ROCURONIUM BROMIDE 5 MG: 10 INJECTION INTRAVENOUS at 10:45

## 2017-07-31 RX ADMIN — LIDOCAINE HYDROCHLORIDE 60 MG: 20 INJECTION, SOLUTION INFILTRATION; PERINEURAL at 07:42

## 2017-07-31 RX ADMIN — Medication: at 11:56

## 2017-07-31 RX ADMIN — ONDANSETRON 4 MG: 2 INJECTION INTRAMUSCULAR; INTRAVENOUS at 10:50

## 2017-07-31 RX ADMIN — SODIUM CHLORIDE, POTASSIUM CHLORIDE, SODIUM LACTATE AND CALCIUM CHLORIDE: 600; 310; 30; 20 INJECTION, SOLUTION INTRAVENOUS at 10:51

## 2017-07-31 RX ADMIN — GLYCOPYRROLATE 0.6 MG: 0.2 INJECTION INTRAMUSCULAR; INTRAVENOUS at 11:05

## 2017-07-31 RX ADMIN — ESCITALOPRAM 20 MG: 20 TABLET, FILM COATED ORAL at 17:54

## 2017-07-31 RX ADMIN — EPHEDRINE SULFATE 5 MG: 50 INJECTION INTRAMUSCULAR; INTRAVENOUS; SUBCUTANEOUS at 08:40

## 2017-07-31 RX ADMIN — NEOSTIGMINE METHYLSULFATE 4 MG: 1 INJECTION INTRAMUSCULAR; INTRAVENOUS; SUBCUTANEOUS at 11:05

## 2017-07-31 RX ADMIN — VANCOMYCIN HYDROCHLORIDE 1500 MG: 10 INJECTION, POWDER, LYOPHILIZED, FOR SOLUTION INTRAVENOUS at 15:24

## 2017-07-31 RX ADMIN — SODIUM CHLORIDE, POTASSIUM CHLORIDE, SODIUM LACTATE AND CALCIUM CHLORIDE: 600; 310; 30; 20 INJECTION, SOLUTION INTRAVENOUS at 09:01

## 2017-07-31 RX ADMIN — TIOTROPIUM BROMIDE 1 CAPSULE: 18 CAPSULE ORAL; RESPIRATORY (INHALATION) at 22:23

## 2017-07-31 RX ADMIN — FAMOTIDINE 20 MG: 10 INJECTION INTRAVENOUS at 07:10

## 2017-07-31 RX ADMIN — CYCLOBENZAPRINE HYDROCHLORIDE 10 MG: 10 TABLET, FILM COATED ORAL at 12:10

## 2017-07-31 RX ADMIN — EPHEDRINE SULFATE 5 MG: 50 INJECTION INTRAMUSCULAR; INTRAVENOUS; SUBCUTANEOUS at 08:50

## 2017-07-31 RX ADMIN — EPHEDRINE SULFATE 5 MG: 50 INJECTION INTRAMUSCULAR; INTRAVENOUS; SUBCUTANEOUS at 10:13

## 2017-07-31 RX ADMIN — ROCURONIUM BROMIDE 50 MG: 10 INJECTION INTRAVENOUS at 07:42

## 2017-07-31 RX ADMIN — HYDROMORPHONE HYDROCHLORIDE 0.5 MG: 1 INJECTION, SOLUTION INTRAMUSCULAR; INTRAVENOUS; SUBCUTANEOUS at 11:41

## 2017-07-31 RX ADMIN — EPHEDRINE SULFATE 10 MG: 50 INJECTION INTRAMUSCULAR; INTRAVENOUS; SUBCUTANEOUS at 10:30

## 2017-07-31 RX ADMIN — VANCOMYCIN HYDROCHLORIDE 1500 MG: 10 INJECTION, POWDER, LYOPHILIZED, FOR SOLUTION INTRAVENOUS at 06:29

## 2017-07-31 RX ADMIN — ROCURONIUM BROMIDE 10 MG: 10 INJECTION INTRAVENOUS at 08:43

## 2017-07-31 NOTE — ANESTHESIA POSTPROCEDURE EVALUATION
Patient: Prem Thrasher    Procedure Summary     Date Anesthesia Start Anesthesia Stop Room / Location    07/31/17 0726 1126  [facility] / [facility]       Procedure Diagnosis Surgeon Provider    LUMBAR FUSION DECOMPRESSON WITH PEDICLE SCREWS with LAURA L2-3,L3-4 (N/A Spine Lumbar); L2 to L4 laminectomy with neural lysis and a fusion by orthopedics (N/A Spine Lumbar) Spinal stenosis of lumbar region; Spondylolisthesis of lumbar region  (Spinal stenosis of lumbar region [M48.06]; Spondylolisthesis of lumbar region [M43.16]) Jacky Perez MD; MD Eliceo Peacock MD          Anesthesia Type: general  Last vitals  BP   106/88 (07/31/17 1215)    Temp        Pulse   77 (07/31/17 1215)   Resp   16 (07/31/17 1215)    SpO2   96 % (07/31/17 1215)      Post Anesthesia Care and Evaluation    Patient location during evaluation: PACU  Patient participation: complete - patient participated  Level of consciousness: responsive to verbal stimuli  Anesthetic complications: No anesthetic complications

## 2017-07-31 NOTE — ANESTHESIA PREPROCEDURE EVALUATION
Anesthesia Evaluation     Patient summary reviewed and Nursing notes reviewed   NPO Solid Status: > 8 hours  NPO Liquid Status: < 2 hours     Airway   Neck ROM: full  no difficulty expected  Dental    (+) lower dentures and upper dentures    Pulmonary    (+) COPD,   Cardiovascular     Rhythm: regular    (+) hypertension, hyperlipidemia      Neuro/Psych  (+) numbness, psychiatric history Anxiety and Depression,    GI/Hepatic/Renal/Endo    (+) obesity,      Musculoskeletal     Abdominal   (+) obese,    Substance History      OB/GYN          Other   (+) arthritis                                     Anesthesia Plan    ASA 3     general   (Prone position discussed, both shoulders bad, one s/p repair)  intravenous induction   Anesthetic plan and risks discussed with patient.

## 2017-07-31 NOTE — ANESTHESIA PROCEDURE NOTES
Airway  Urgency: elective    Airway not difficult    General Information and Staff    Patient location during procedure: OR  Anesthesiologist: MARLA PURDY  CRNA: CARLOS PALOMINO    Indications and Patient Condition  Indications for airway management: airway protection    Preoxygenated: yes  MILS not maintained throughout  Mask difficulty assessment: 1 - vent by mask    Final Airway Details  Final airway type: endotracheal airway      Successful airway: ETT  Cuffed: yes   Successful intubation technique: direct laryngoscopy  Facilitating devices/methods: intubating stylet  Endotracheal tube insertion site: oral  Blade: Yvonne  Blade size: #3  ETT size: 8.0 mm  Cormack-Lehane Classification: grade I - full view of glottis  Placement verified by: chest auscultation   Cuff volume (mL): 9  Measured from: lips  ETT to lips (cm): 23  Number of attempts at approach: 1    Additional Comments  PreO2 100% face mask, IV induction, easy mask, DVL x1, cords noted, tube through, cuff up, EBBSH, +etCO2, = chest movement, tube secured in place, atraumatic, teeth and lips intact as preop.

## 2017-08-01 ENCOUNTER — APPOINTMENT (OUTPATIENT)
Dept: GENERAL RADIOLOGY | Facility: HOSPITAL | Age: 62
End: 2017-08-01
Attending: HOSPITALIST

## 2017-08-01 PROBLEM — R11.2 POSTOPERATIVE NAUSEA AND VOMITING: Status: ACTIVE | Noted: 2017-08-01

## 2017-08-01 PROBLEM — Z98.890 POSTOPERATIVE NAUSEA AND VOMITING: Status: ACTIVE | Noted: 2017-08-01

## 2017-08-01 PROBLEM — K56.7 POSTOPERATIVE ILEUS: Status: ACTIVE | Noted: 2017-08-01

## 2017-08-01 PROBLEM — K91.89 POSTOPERATIVE ILEUS: Status: ACTIVE | Noted: 2017-08-01

## 2017-08-01 LAB
ANION GAP SERPL CALCULATED.3IONS-SCNC: 12.4 MMOL/L
BUN BLD-MCNC: 11 MG/DL (ref 8–23)
BUN/CREAT SERPL: 11 (ref 7–25)
CALCIUM SPEC-SCNC: 8.5 MG/DL (ref 8.6–10.5)
CHLORIDE SERPL-SCNC: 98 MMOL/L (ref 98–107)
CO2 SERPL-SCNC: 23.6 MMOL/L (ref 22–29)
CREAT BLD-MCNC: 1 MG/DL (ref 0.76–1.27)
GFR SERPL CREATININE-BSD FRML MDRD: 75 ML/MIN/1.73
GLUCOSE BLD-MCNC: 137 MG/DL (ref 65–99)
HCT VFR BLD AUTO: 35.5 % (ref 40.4–52.2)
HGB BLD-MCNC: 11.5 G/DL (ref 13.7–17.6)
POTASSIUM BLD-SCNC: 4.5 MMOL/L (ref 3.5–5.2)
SODIUM BLD-SCNC: 134 MMOL/L (ref 136–145)

## 2017-08-01 PROCEDURE — 99024 POSTOP FOLLOW-UP VISIT: CPT | Performed by: ORTHOPAEDIC SURGERY

## 2017-08-01 PROCEDURE — 99024 POSTOP FOLLOW-UP VISIT: CPT | Performed by: PHYSICIAN ASSISTANT

## 2017-08-01 PROCEDURE — 85018 HEMOGLOBIN: CPT | Performed by: ORTHOPAEDIC SURGERY

## 2017-08-01 PROCEDURE — 74000 HC ABDOMEN KUB: CPT

## 2017-08-01 PROCEDURE — 80048 BASIC METABOLIC PNL TOTAL CA: CPT | Performed by: HOSPITALIST

## 2017-08-01 PROCEDURE — 25010000002 METOCLOPRAMIDE PER 10 MG: Performed by: HOSPITALIST

## 2017-08-01 PROCEDURE — 25810000003 POTASSIUM CHLORIDE PER 2 MEQ: Performed by: ORTHOPAEDIC SURGERY

## 2017-08-01 PROCEDURE — 25010000002 PROMETHAZINE PER 50 MG: Performed by: HOSPITALIST

## 2017-08-01 PROCEDURE — 25010000002 ONDANSETRON PER 1 MG: Performed by: ORTHOPAEDIC SURGERY

## 2017-08-01 PROCEDURE — 85014 HEMATOCRIT: CPT | Performed by: ORTHOPAEDIC SURGERY

## 2017-08-01 RX ORDER — PROMETHAZINE HYDROCHLORIDE 25 MG/ML
12.5 INJECTION, SOLUTION INTRAMUSCULAR; INTRAVENOUS EVERY 4 HOURS PRN
Status: DISCONTINUED | OUTPATIENT
Start: 2017-08-01 | End: 2017-08-03 | Stop reason: HOSPADM

## 2017-08-01 RX ORDER — ONDANSETRON 2 MG/ML
4 INJECTION INTRAMUSCULAR; INTRAVENOUS EVERY 4 HOURS PRN
Status: DISCONTINUED | OUTPATIENT
Start: 2017-08-01 | End: 2017-08-03 | Stop reason: HOSPADM

## 2017-08-01 RX ORDER — DEXTROSE, SODIUM CHLORIDE, AND POTASSIUM CHLORIDE 5; .45; .15 G/100ML; G/100ML; G/100ML
75 INJECTION INTRAVENOUS CONTINUOUS
Status: DISCONTINUED | OUTPATIENT
Start: 2017-08-01 | End: 2017-08-03 | Stop reason: HOSPADM

## 2017-08-01 RX ORDER — BISACODYL 10 MG
10 SUPPOSITORY, RECTAL RECTAL 2 TIMES DAILY
Status: DISCONTINUED | OUTPATIENT
Start: 2017-08-01 | End: 2017-08-02

## 2017-08-01 RX ORDER — FAMOTIDINE 10 MG/ML
20 INJECTION, SOLUTION INTRAVENOUS EVERY 12 HOURS SCHEDULED
Status: DISCONTINUED | OUTPATIENT
Start: 2017-08-01 | End: 2017-08-02

## 2017-08-01 RX ORDER — METOCLOPRAMIDE HYDROCHLORIDE 5 MG/ML
5 INJECTION INTRAMUSCULAR; INTRAVENOUS ONCE
Status: COMPLETED | OUTPATIENT
Start: 2017-08-01 | End: 2017-08-01

## 2017-08-01 RX ADMIN — PROMETHAZINE HYDROCHLORIDE 12.5 MG: 25 INJECTION INTRAMUSCULAR; INTRAVENOUS at 10:13

## 2017-08-01 RX ADMIN — ONDANSETRON 4 MG: 2 INJECTION INTRAMUSCULAR; INTRAVENOUS at 08:03

## 2017-08-01 RX ADMIN — POTASSIUM CHLORIDE, DEXTROSE MONOHYDRATE AND SODIUM CHLORIDE 75 ML/HR: 150; 5; 450 INJECTION, SOLUTION INTRAVENOUS at 14:05

## 2017-08-01 RX ADMIN — FAMOTIDINE 20 MG: 10 INJECTION, SOLUTION INTRAVENOUS at 20:59

## 2017-08-01 RX ADMIN — FAMOTIDINE 20 MG: 10 INJECTION, SOLUTION INTRAVENOUS at 09:45

## 2017-08-01 RX ADMIN — DOCUSATE SODIUM -SENNOSIDES 1 TABLET: 50; 8.6 TABLET, COATED ORAL at 17:36

## 2017-08-01 RX ADMIN — BISACODYL 10 MG: 10 SUPPOSITORY RECTAL at 17:39

## 2017-08-01 RX ADMIN — BISACODYL 10 MG: 10 SUPPOSITORY RECTAL at 09:41

## 2017-08-01 RX ADMIN — Medication: at 04:28

## 2017-08-01 RX ADMIN — POTASSIUM CHLORIDE AND SODIUM CHLORIDE 100 ML/HR: 450; 150 INJECTION, SOLUTION INTRAVENOUS at 10:13

## 2017-08-01 RX ADMIN — METOCLOPRAMIDE 5 MG: 5 INJECTION, SOLUTION INTRAMUSCULAR; INTRAVENOUS at 11:56

## 2017-08-01 RX ADMIN — POTASSIUM CHLORIDE AND SODIUM CHLORIDE 100 ML/HR: 450; 150 INJECTION, SOLUTION INTRAVENOUS at 14:00

## 2017-08-01 NOTE — NURSING NOTE
"Pt laying on left side, turns himself side to side frequently, HOB FLAT per orders.  No episodes of emesis since 0915 this morning (see flow sheets).  Pt is passing flatus frequently and burping frequently, but no BM yet today. Suppository given this AM (no results as of this time), see MAR.   Active bowel sounds in all quadrants, pt reports \"I'm feeling much better than this AM\".  Diet changed to Clear Liquid Diet, IV fluids changed per orders, see MAR.  Patient currently resting quietly, wife at bedside, no complaints at this time, denies any needs.   "

## 2017-08-01 NOTE — NURSING NOTE
"Called Dr IVY Winn reported patient is vomiting large amounts dark brown liquid, complains of \"burning\" to epigastric area.  Informed MD that I just gave suppository and IV pepcid, MD to enter order for abdominal xray, no new nursing orders at this time.  "

## 2017-08-01 NOTE — PROGRESS NOTES
Doing a bit better now. Had a lot of N/V earlier this am.   Pain in back as expected, not much leg pain  Has been flat since surgery, no HA    AVSS other than HR, 105-128  AA,Ox3  LIMON well  Incision ok-clean, flat, dressing dry    ..    Results from last 7 days  Lab Units 08/01/17  0452   HEMOGLOBIN g/dL 11.5*   HEMATOCRIT % 35.5*       POD#1 s/p L2-L4 decompression with fusion with small dural defect/CSF leak  ?ileus-IM following    Cont care  CBC in am  Keep flat for now

## 2017-08-01 NOTE — PROGRESS NOTES
Discharge Planning Assessment  [facility]     Patient Name: Prem Thrasher  MRN: [id number]  Today's Date: 8/1/2017    Admit Date: 7/31/2017          Discharge Needs Assessment       08/01/17 1419    Living Environment    Lives With spouse    Living Arrangements house    Home Accessibility stairs to enter home    Number of Stairs to Enter Home 2    Stair Railings at Home none    Type of Financial/Environmental Concern none    Transportation Available car;family or friend will provide    Living Environment    Provides Primary Care For no one    Quality Of Family Relationships supportive    Able to Return to Prior Living Arrangements yes    Discharge Needs Assessment    Concerns To Be Addressed no discharge needs identified    Readmission Within The Last 30 Days no previous admission in last 30 days    Anticipated Changes Related to Illness none    Equipment Currently Used at Home raised toilet;grab bar;shower chair;walker, rolling;cane, straight    Equipment Needed After Discharge none    Discharge Facility/Level Of Care Needs home with home health    Discharge Disposition home or self-care            Discharge Plan       08/01/17 1423    Case Management/Social Work Plan    Plan Home with wife    Patient/Family In Agreement With Plan yes    Additional Comments Met with patient and wife at bedside.  Verified facesheet info.  Patient plans to return home at discharge.  Has all necessary DME at home.  Will follow.          Discharge Placement     No information found                Demographic Summary       08/01/17 1418    Referral Information    Admission Type inpatient    Arrived From admitted as an inpatient    Referral Source admission list    Reason For Consult discharge planning    Primary Care Physician Information    Name ZO Boss            Functional Status       08/01/17 1418    Functional Status Current    Ambulation --   on bedrest    Transferring --   on bedrest    Toileting 2-->assistive person     Bathing 2-->assistive person    Dressing 2-->assistive person    Eating 0-->independent    Communication 0-->understands/communicates without difficulty    Swallowing (if score 2 or more for any item, consult Rehab Services) 0-->swallows foods/liquids without difficulty    Change in Functional Status Since Onset of Current Illness/Injury yes    Functional Status Prior    Ambulation 0-->independent    Transferring 0-->independent    Toileting 0-->independent    Bathing 0-->independent    Dressing 0-->independent    Eating 0-->independent    Communication 0-->understands/communicates without difficulty    Swallowing 0-->swallows foods/liquids without difficulty    IADL    Medications independent    Meal Preparation independent    Housekeeping assistive person    Laundry assistive person    Shopping assistive person    Oral Care independent    Activity Tolerance    Current Activity Limitations spinal precautions    Usual Activity Tolerance good    Current Activity Tolerance moderate    Cognitive/Perceptual/Developmental    Current Mental Status/Cognitive Functioning no deficits noted    Recent Changes in Mental Status/Cognitive Functioning no changes            Psychosocial     None            Abuse/Neglect     None            Legal     None            Substance Abuse     None            Patient Forms     None          Naz Gregg, RN

## 2017-08-01 NOTE — NURSING NOTE
Daisha Gentile RT on [address] and this nurse informed her that pt has order for IS and breathing tx's.

## 2017-08-01 NOTE — PROGRESS NOTES
Name: Prem Thrasher ADMIT: 2017   : 1955  PCP: Montse Cain PA-C    MRN: [id number] LOS: 1 days   AGE/SEX: 62 y.o. male  ROOM: [facility]     Subjective   Subjective  Complains of indigestion.  Has had mild nausea and vomiting this morning.  Feels abdomen is bloated.  Some abdominal cramping but no pain in his abdomen or chest.  No shortness of breath or diaphoresis.    Objective   Vital Signs  Temp:  [97.9 °F (36.6 °C)-98.1 °F (36.7 °C)] 97.9 °F (36.6 °C)  Heart Rate:  [] 105  Resp:  [16-20] 20  BP: (100-134)/(63-91) 119/88  SpO2:  [91 %-96 %] 94 %  on  Flow (L/min):  [2-4] 2;   O2 Device: nasal cannula  Body mass index is 29.19 kg/(m^2).    Physical Exam   Constitutional: He is oriented to person, place, and time. No distress.   Cardiovascular: Normal rate and regular rhythm.    No murmur heard.  Pulmonary/Chest: Effort normal and breath sounds normal.   Abdominal: Soft. Bowel sounds are normal. He exhibits distension. There is no tenderness.   Musculoskeletal: Normal range of motion. He exhibits no edema.   Neurological: He is alert and oriented to person, place, and time.   Skin: Skin is warm and dry. He is not diaphoretic.       Results Review:       I reviewed the patient's new clinical results.    Results from last 7 days  Lab Units 17  0452 17  1315   HEMOGLOBIN g/dL 11.5* 11.4*     Results from last 7 days  Lab Units 17  0452   SODIUM mmol/L 134*   POTASSIUM mmol/L 4.5   CHLORIDE mmol/L 98   CO2 mmol/L 23.6   BUN mg/dL 11   CREATININE mg/dL 1.00   GLUCOSE mg/dL 137*   Estimated Creatinine Clearance: 88.9 mL/min (by C-G formula based on Cr of 1).  Results from last 7 days  Lab Units 17  0452   CALCIUM mg/dL 8.5*       Lab Results   Component Value Date    HGBA1C 5.70 (H) 2016    HGBA1C 6.1 (H) 2015   No results found for: POCGLU      escitalopram 20 mg Oral Daily   sennosides-docusate sodium 1 tablet Oral BID   tiotropium 1 capsule Inhalation Q PM            HYDROmorphone HCl-NaCl     lactated ringers 100 mL/hr Last Rate: 100 mL/hr (07/31/17 0619)   lactated ringers 9 mL/hr    lactated ringers 100 mL/hr Last Rate: Stopped (07/31/17 1313)   sodium chloride 0.45 % with KCl 20 mEq 100 mL/hr Last Rate: 100 mL/hr (07/31/17 2340)         Assessment/Plan   Assessment:     Active Hospital Problems (** Indicates Principal Problem)    Diagnosis Date Noted   • **Spondylolisthesis of lumbar region [M43.16] 07/31/2017   • Postoperative nausea and vomiting [R11.2, Z98.890] 08/01/2017   • COPD (chronic obstructive pulmonary disease) [J44.9]    • Rheumatoid arthritis [M06.9]    • Hypertension [I10]       Resolved Hospital Problems    Diagnosis Date Noted Date Resolved   No resolved problems to display.       Plan:   - POD#1 LUMBAR FUSION DECOMPRESSON WITH PEDICLE SCREWS with LAURA L2-3,L3-4  - BMP this morning okay including glucose.  - Continue to hold his AVAPRO and NORVASC.    - We will start IV Pepcid.  Talked to nursing staff regarding monitoring his bowel sounds.  Symptoms could be due to developing ileus but currently has normal bowel sounds.  May benefit from a BM.  Advised nursing if still feels bad later this morning to administer suppository.  - Rheumatoid meds on hold.  - Continue IVFs as ordered.    - SCDs have been ordered for DVT prophylaxis per ortho.  - He was encouraged to use incentive spirometer as instructed.      Hernan Winn MD  [facility]  08/01/17  6:36 AM      ADDENDUM  Abdominal x-ray consistent with a postoperative ileus.  Will keep him on clear liquids for now.  Discussed with nurse and he has had no further nausea or vomiting.  Will hold off on a nasogastric tube.  Will schedule his suppositories for now.        Hernan Winn MD  [facility]  08/01/17  1:24 PM

## 2017-08-01 NOTE — PROGRESS NOTES
Postop day 1: AVSS awake alert and oriented.  No headache but he complains of nausea vomiting and abdominal distention.  No leg pain per se and he moves them well.  The abdomen is rotund tympanitic and nontender.  Hemoglobin 11.5.  He appears to have an ileus.  Our intention was to keep him flat another day in which case the NG decompression may be useful.  I will discuss this with Dr. Rick.

## 2017-08-01 NOTE — PLAN OF CARE
Problem: Patient Care Overview (Adult)  Goal: Plan of Care Review  Outcome: Ongoing (interventions implemented as appropriate)    08/01/17 5504   Coping/Psychosocial Response Interventions   Plan Of Care Reviewed With patient;spouse   Patient Care Overview   Progress improving   Outcome Evaluation   Outcome Summary/Follow up Plan Patient laying flat this shift, had large amount of emesis beginning of this shift, medicated per orders, see MAR. Pt to Radiology for abd xray which was positive for ileus, pt diet changed to CLD and IV fluids changed per orders, see MAR. Pt reports positive frequent flatus and burping, bowel sounds per charting, see notes. Pt using Dilaudid PCA for pain. wife and visitors at bedside.

## 2017-08-01 NOTE — PLAN OF CARE
Problem: Patient Care Overview (Adult)  Goal: Plan of Care Review  Outcome: Ongoing (interventions implemented as appropriate)    08/01/17 0309   Coping/Psychosocial Response Interventions   Plan Of Care Reviewed With patient   Patient Care Overview   Progress improving   Outcome Evaluation   Outcome Summary/Follow up Plan htn well controlled with po meds. pain well controlled.          Problem: Fall Risk (Adult)  Goal: Absence of Falls  Outcome: Ongoing (interventions implemented as appropriate)    08/01/17 0309   Fall Risk (Adult)   Absence of Falls making progress toward outcome

## 2017-08-02 LAB
ANION GAP SERPL CALCULATED.3IONS-SCNC: 13.6 MMOL/L
BASOPHILS # BLD AUTO: 0.05 10*3/MM3 (ref 0–0.2)
BASOPHILS NFR BLD AUTO: 0.4 % (ref 0–1.5)
BUN BLD-MCNC: 9 MG/DL (ref 8–23)
BUN/CREAT SERPL: 10.3 (ref 7–25)
CALCIUM SPEC-SCNC: 8.1 MG/DL (ref 8.6–10.5)
CHLORIDE SERPL-SCNC: 95 MMOL/L (ref 98–107)
CO2 SERPL-SCNC: 25.4 MMOL/L (ref 22–29)
CREAT BLD-MCNC: 0.87 MG/DL (ref 0.76–1.27)
DEPRECATED RDW RBC AUTO: 46.2 FL (ref 37–54)
EOSINOPHIL # BLD AUTO: 0.04 10*3/MM3 (ref 0–0.7)
EOSINOPHIL NFR BLD AUTO: 0.3 % (ref 0.3–6.2)
ERYTHROCYTE [DISTWIDTH] IN BLOOD BY AUTOMATED COUNT: 13.4 % (ref 11.5–14.5)
GFR SERPL CREATININE-BSD FRML MDRD: 89 ML/MIN/1.73
GLUCOSE BLD-MCNC: 108 MG/DL (ref 65–99)
HCT VFR BLD AUTO: 34.7 % (ref 40.4–52.2)
HGB BLD-MCNC: 11.1 G/DL (ref 13.7–17.6)
IMM GRANULOCYTES # BLD: 0.02 10*3/MM3 (ref 0–0.03)
IMM GRANULOCYTES NFR BLD: 0.2 % (ref 0–0.5)
LYMPHOCYTES # BLD AUTO: 2.26 10*3/MM3 (ref 0.9–4.8)
LYMPHOCYTES NFR BLD AUTO: 19.3 % (ref 19.6–45.3)
MAGNESIUM SERPL-MCNC: 2.2 MG/DL (ref 1.6–2.4)
MCH RBC QN AUTO: 30.1 PG (ref 27–32.7)
MCHC RBC AUTO-ENTMCNC: 32 G/DL (ref 32.6–36.4)
MCV RBC AUTO: 94 FL (ref 79.8–96.2)
MONOCYTES # BLD AUTO: 1.73 10*3/MM3 (ref 0.2–1.2)
MONOCYTES NFR BLD AUTO: 14.8 % (ref 5–12)
NEUTROPHILS # BLD AUTO: 7.61 10*3/MM3 (ref 1.9–8.1)
NEUTROPHILS NFR BLD AUTO: 65 % (ref 42.7–76)
PLATELET # BLD AUTO: 219 10*3/MM3 (ref 140–500)
PMV BLD AUTO: 10.5 FL (ref 6–12)
POTASSIUM BLD-SCNC: 3.8 MMOL/L (ref 3.5–5.2)
RBC # BLD AUTO: 3.69 10*6/MM3 (ref 4.6–6)
SODIUM BLD-SCNC: 134 MMOL/L (ref 136–145)
WBC NRBC COR # BLD: 11.71 10*3/MM3 (ref 4.5–10.7)

## 2017-08-02 PROCEDURE — 97162 PT EVAL MOD COMPLEX 30 MIN: CPT

## 2017-08-02 PROCEDURE — 99024 POSTOP FOLLOW-UP VISIT: CPT | Performed by: ORTHOPAEDIC SURGERY

## 2017-08-02 PROCEDURE — 99024 POSTOP FOLLOW-UP VISIT: CPT | Performed by: NURSE PRACTITIONER

## 2017-08-02 PROCEDURE — 83735 ASSAY OF MAGNESIUM: CPT | Performed by: HOSPITALIST

## 2017-08-02 PROCEDURE — 94799 UNLISTED PULMONARY SVC/PX: CPT

## 2017-08-02 PROCEDURE — 85025 COMPLETE CBC W/AUTO DIFF WBC: CPT | Performed by: PHYSICIAN ASSISTANT

## 2017-08-02 PROCEDURE — 80048 BASIC METABOLIC PNL TOTAL CA: CPT | Performed by: HOSPITALIST

## 2017-08-02 RX ORDER — BISACODYL 10 MG
10 SUPPOSITORY, RECTAL RECTAL DAILY PRN
Status: DISCONTINUED | OUTPATIENT
Start: 2017-08-02 | End: 2017-08-03 | Stop reason: HOSPADM

## 2017-08-02 RX ADMIN — OXYCODONE HYDROCHLORIDE AND ACETAMINOPHEN 2 TABLET: 5; 325 TABLET ORAL at 15:58

## 2017-08-02 RX ADMIN — DOCUSATE SODIUM -SENNOSIDES 1 TABLET: 50; 8.6 TABLET, COATED ORAL at 08:39

## 2017-08-02 RX ADMIN — POTASSIUM CHLORIDE, DEXTROSE MONOHYDRATE AND SODIUM CHLORIDE 75 ML/HR: 150; 5; 450 INJECTION, SOLUTION INTRAVENOUS at 16:06

## 2017-08-02 RX ADMIN — ESCITALOPRAM 20 MG: 20 TABLET, FILM COATED ORAL at 08:39

## 2017-08-02 RX ADMIN — DOCUSATE SODIUM -SENNOSIDES 1 TABLET: 50; 8.6 TABLET, COATED ORAL at 17:26

## 2017-08-02 RX ADMIN — CYCLOBENZAPRINE HYDROCHLORIDE 10 MG: 10 TABLET, FILM COATED ORAL at 17:26

## 2017-08-02 RX ADMIN — TIOTROPIUM BROMIDE 1 CAPSULE: 18 CAPSULE ORAL; RESPIRATORY (INHALATION) at 23:37

## 2017-08-02 NOTE — PLAN OF CARE
Problem: Patient Care Overview (Adult)  Goal: Plan of Care Review  Outcome: Ongoing (interventions implemented as appropriate)    08/02/17 0307   Coping/Psychosocial Response Interventions   Plan Of Care Reviewed With patient   Patient Care Overview   Progress progress towards functional goals is fair   Outcome Evaluation   Outcome Summary/Follow up Plan Patient alert and verbal with needs. Continues with PCA. BS + No BM this shift. Passing gas. Continues with IVF. Clear Liquid diet. Tolerating well.          Problem: Perioperative Period (Adult)  Goal: Signs and Symptoms of Listed Potential Problems Will be Absent or Manageable (Perioperative Period)  Outcome: Ongoing (interventions implemented as appropriate)    08/02/17 0307   Perioperative Period   Problems Assessed (Perioperative Period) all   Problems Present (Perioperative Period) pain;situational response;postoperative ileus         Problem: Fall Risk (Adult)  Goal: Identify Related Risk Factors and Signs and Symptoms  Outcome: Ongoing (interventions implemented as appropriate)    08/02/17 0307   Fall Risk   Fall Risk: Related Risk Factors culprit medication(s);gait/mobility problems;neuro disease/injury   Fall Risk: Signs and Symptoms presence of risk factors       Goal: Absence of Falls  Outcome: Ongoing (interventions implemented as appropriate)

## 2017-08-02 NOTE — PROGRESS NOTES
Name: Prem Thrasher ADMIT: 2017   : 1955  PCP: Montse Cain PA-C    MRN: [id number] LOS: 2 days   AGE/SEX: 62 y.o. male  ROOM: [facility]     Subjective   Subjective  GI complaints improved.  Had bowel movement last night and this morning.  No further nausea or emesis.  Has been cleared to ambulate by neurosurgery.  Will maintain clear liquid diet through lunch and advance as tolerated thereafter.    Objective   Vital Signs  Temp:  [98.2 °F (36.8 °C)-98.7 °F (37.1 °C)] 98.7 °F (37.1 °C)  Heart Rate:  [] 102  Resp:  [16-20] 16  BP: (111-136)/(69-91) 119/91  SpO2:  [89 %-97 %] 89 %  on  Flow (L/min):  [2] 2;   O2 Device: nasal cannula  Body mass index is 29.19 kg/(m^2).    Physical Exam   Constitutional: He is oriented to person, place, and time. No distress.   Cardiovascular: Normal rate and regular rhythm.    No murmur heard.  Pulmonary/Chest: Effort normal and breath sounds normal.   Abdominal: Soft. Bowel sounds are normal. He exhibits distension. There is no tenderness.   Musculoskeletal: Normal range of motion. He exhibits no edema.   Neurological: He is alert and oriented to person, place, and time.   Skin: Skin is warm and dry. He is not diaphoretic.       Results Review:       I reviewed the patient's new clinical results.    Results from last 7 days  Lab Units 17  0454 17  0452 17  1315   WBC 10*3/mm3 11.71*  --   --    HEMOGLOBIN g/dL 11.1* 11.5* 11.4*   PLATELETS 10*3/mm3 219  --   --        Results from last 7 days  Lab Units 17  0454 17  0452   SODIUM mmol/L 134* 134*   POTASSIUM mmol/L 3.8 4.5   CHLORIDE mmol/L 95* 98   CO2 mmol/L 25.4 23.6   BUN mg/dL 9 11   CREATININE mg/dL 0.87 1.00   GLUCOSE mg/dL 108* 137*   Estimated Creatinine Clearance: 102.2 mL/min (by C-G formula based on Cr of 0.87).    Results from last 7 days  Lab Units 17  0454 17  0452   CALCIUM mg/dL 8.1* 8.5*   MAGNESIUM mg/dL 2.2  --        EMERGENCY SUPINE  ABDOMEN     HISTORY: 62-year-old male with nausea vomiting abdominal distention  following lumbar spine surgery one day earlier     COMPARISON: None available     FINDINGS:  1. Prominent gaseous distention of the stomach.  2. Gaseous distention of the colon probably transverse measuring up to  76 mm.  3. Findings are most consistent with postop ileus without definite  obstruction.  4. Satisfactory appearance following posterior lumbar fusion L2-L3-4.        bisacodyl 10 mg Rectal BID   escitalopram 20 mg Oral Daily   famotidine 20 mg Intravenous Q12H   sennosides-docusate sodium 1 tablet Oral BID   tiotropium 1 capsule Inhalation Q PM       dextrose 5 % and sodium chloride 0.45 % with KCl 20 mEq/L 75 mL/hr Last Rate: 75 mL/hr (08/01/17 1405)   HYDROmorphone HCl-NaCl     sodium chloride 0.45 % with KCl 20 mEq 100 mL/hr Last Rate: 100 mL/hr (08/01/17 1400)         Assessment/Plan   Assessment:     Active Hospital Problems (** Indicates Principal Problem)    Diagnosis Date Noted   • **Spondylolisthesis of lumbar region [M43.16] 07/31/2017   • Postoperative nausea and vomiting [R11.2, Z98.890] 08/01/2017   • Postoperative ileus [K91.3] 08/01/2017   • COPD (chronic obstructive pulmonary disease) [J44.9]    • Rheumatoid arthritis [M06.9]    • Hypertension [I10]       Resolved Hospital Problems    Diagnosis Date Noted Date Resolved   No resolved problems to display.       Plan:   - POD#2 LUMBAR FUSION DECOMPRESSON WITH PEDICLE SCREWS with LAURA L2-3,L3-4  - Continue to hold his AVAPRO and NORVASC.    - Hopefully GI symptoms continue to improve.  May try advancing diet later today.  - Rheumatoid meds on hold.  - Continue IVFs as ordered.    - SCDs have been ordered for DVT prophylaxis per ortho.  - He was encouraged to use incentive spirometer as instructed.      Hernan Winn MD  [facility]  08/02/17  7:16 AM

## 2017-08-02 NOTE — PLAN OF CARE
Problem: Patient Care Overview (Adult)  Goal: Plan of Care Review  Outcome: Ongoing (interventions implemented as appropriate)    08/02/17 0307 08/02/17 1001 08/02/17 1935   Coping/Psychosocial Response Interventions   Plan Of Care Reviewed With --  patient --    Patient Care Overview   Progress progress towards functional goals is fair --  --    Outcome Evaluation   Outcome Summary/Follow up Plan --  --  PT pain well controlled with PRN pain medication. Mcgowan Cath pulled this shift and PT voiding without incident. Diet advanced to soft texture/ bland and tolerating. vital signs stable. PT up to chair for all meals.         Problem: Fall Risk (Adult)  Goal: Identify Related Risk Factors and Signs and Symptoms  Outcome: Ongoing (interventions implemented as appropriate)    08/02/17 0307   Fall Risk   Fall Risk: Related Risk Factors culprit medication(s);gait/mobility problems;neuro disease/injury   Fall Risk: Signs and Symptoms presence of risk factors       Goal: Absence of Falls  Outcome: Ongoing (interventions implemented as appropriate)    08/02/17 0307   Fall Risk (Adult)   Absence of Falls making progress toward outcome

## 2017-08-02 NOTE — PROGRESS NOTES
"   LOS: 2 days   Patient Care Team:  Montse Cain PA-C as PCP - General (Family Medicine)  Maria Antonia Lopez MD as Consulting Physician (Rheumatology)  Sawyer Rick MD as Surgeon (Neurosurgery)  Pradip Mcmillan MD as Consulting Physician (Pulmonary Disease)    Chief Complaint:     Post op visit    Subjective     Back Pain   The problem is unchanged. The pain is present in the lumbar spine. The quality of the pain is described as aching. The pain is moderate. The symptoms are aggravated by position (Slow moving but up in chair when I saw today.). Pertinent negatives include no abdominal pain, bladder incontinence (Mcgowan removed this am), chest pain, fever or leg pain. He has tried analgesics (encouraged use of muscle relaxers for back pain) for the symptoms.       Subjective:  Symptoms:  Stable.  No shortness of breath, chest pain or headache.    Diet:  No nausea (Better with reglan. Bowels moved last pm and again this am. ) or vomiting.    Pain:  He complains of pain that is moderate.        History taken from: patient chart    Objective     Vital Signs  Temp:  [98.2 °F (36.8 °C)-98.7 °F (37.1 °C)] 98.3 °F (36.8 °C)  Heart Rate:  [] 100  Resp:  [16-20] 18  BP: (111-136)/(69-91) 119/91    Objective:  General Appearance:  In no acute distress (Up in chair. Wearing lumbar brace).    Vital signs: (most recent): Blood pressure 119/91, pulse 100, temperature 98.3 °F (36.8 °C), temperature source Oral, resp. rate 18, height 71\" (180.3 cm), weight 209 lb 5 oz (94.9 kg), SpO2 94 %.  Vital signs are normal.  No fever.    Lungs:  Normal effort.    Heart: Normal rate.    Abdomen: Abdomen is soft.    Extremities: Normal range of motion.    Neurological: Patient is alert and oriented to person, place and time.  Normal strength.  GCS score is 15.    Skin:  Warm and dry.              Results Review:     I reviewed the patient's new clinical results.     Results for CARRERAMELINDAYELENA GARY (MRN [id number]) as of 8/2/2017 " 10:49   Ref. Range 8/2/2017 04:54   WBC Latest Ref Range: 4.50 - 10.70 10*3/mm3 11.71 (H)   RBC Latest Ref Range: 4.60 - 6.00 10*6/mm3 3.69 (L)   Hemoglobin Latest Ref Range: 13.7 - 17.6 g/dL 11.1 (L)   Hematocrit Latest Ref Range: 40.4 - 52.2 % 34.7 (L)         Assessment/Plan     Principal Problem:    Spondylolisthesis of lumbar region  Active Problems:    COPD (chronic obstructive pulmonary disease)    Rheumatoid arthritis    Hypertension    Postoperative nausea and vomiting    Postoperative ileus      Assessment & Plan    POD 2 bilateral L2/3, L3/4 lmain with entire L L2/3 facet removal buy Dr. Rick and L2-L4 fusion by Dr. Perez. Small non-repairable dural defect. Patient is headache-free.  Post op nausea/vomiting - improved  Post op ileus - improving    Continue to mobilize; PT to see  CBC in       TEN Jovel  08/02/17  10:41 AM

## 2017-08-02 NOTE — PROGRESS NOTES
Post op day 2: AVSS awake alert and oriented.  No new complaints.  No headache.  He's had loose stools after suppository and his abdominal pain is much improved.  Abdomen is soft, less tympanitic and rotund, and the nurses report return of bowel sounds.  He moves legs well.  The wound is pristine and completely dry.  Hemoglobin 11.1 WBC 11.7.  At this point I think that we can mobilize him and begin physical therapy.  Appreciate medical service help with regard to ileus.

## 2017-08-02 NOTE — PLAN OF CARE
Problem: Patient Care Overview (Adult)  Goal: Plan of Care Review    08/02/17 1001   Coping/Psychosocial Response Interventions   Plan Of Care Reviewed With patient   Outcome Evaluation   Outcome Summary/Follow up Plan pt presents w, generalized weakness POD 2 lumbar fusion/decompession w/small CSF leak, 1st time up with PT this AM limited to ambulation in room 2' 9/10 pain. may benefit from skilled PT acutely to address functional deficits, gait training. will cont to monitor status in prep for DC. Educated on mobility spinal precautions and brace use.          Problem: Inpatient Physical Therapy  Goal: Bed Mobility Goal LTG- PT    08/02/17 1001   Bed Mobility PT LTG   Bed Mobility PT LTG, Date Established 08/02/17   Bed Mobility PT LTG, Time to Achieve 1 wk   Bed Mobility PT LTG, Activity Type all bed mobility   Bed Mobility PT LTG, Cross Level supervision required       Goal: Transfer Training Goal 1 LTG- PT    08/02/17 1001   Transfer Training PT LTG   Transfer Training PT LTG, Date Established 08/02/17   Transfer Training PT LTG, Time to Achieve 1 wk   Transfer Training PT LTG, Activity Type all transfers   Transfer Training PT LTG, Cross Level supervision required   Transfer Training PT LTG, Assist Device walker, rolling       Goal: Gait Training Goal LTG- PT    08/02/17 1001   Gait Training PT LTG   Gait Training Goal PT LTG, Date Established 08/02/17   Gait Training Goal PT LTG, Time to Achieve 1 wk   Gait Training Goal PT LTG, Cross Level supervision required   Gait Training Goal PT LTG, Assist Device walker, rolling   Gait Training Goal PT LTG, Distance to Achieve 150       Goal: Stair Training Goal LTG- PT    08/02/17 1001   Stair Training PT LTG   Stair Training Goal PT LTG, Date Established 08/02/17   Stair Training Goal PT LTG, Time to Achieve 1 wk   Stair Training Goal PT LTG, Number of Steps 4   Stair Training Goal PT LTG, Cross Level contact guard assist

## 2017-08-03 VITALS
BODY MASS INDEX: 29.3 KG/M2 | TEMPERATURE: 98.4 F | HEART RATE: 82 BPM | WEIGHT: 209.31 LBS | DIASTOLIC BLOOD PRESSURE: 77 MMHG | OXYGEN SATURATION: 93 % | RESPIRATION RATE: 18 BRPM | HEIGHT: 71 IN | SYSTOLIC BLOOD PRESSURE: 108 MMHG

## 2017-08-03 PROBLEM — Z98.890 POSTOPERATIVE NAUSEA AND VOMITING: Status: RESOLVED | Noted: 2017-08-01 | Resolved: 2017-08-03

## 2017-08-03 PROBLEM — K91.89 POSTOPERATIVE ILEUS (HCC): Status: RESOLVED | Noted: 2017-08-01 | Resolved: 2017-08-03

## 2017-08-03 PROBLEM — R11.2 POSTOPERATIVE NAUSEA AND VOMITING: Status: RESOLVED | Noted: 2017-08-01 | Resolved: 2017-08-03

## 2017-08-03 PROBLEM — K56.7 POSTOPERATIVE ILEUS: Status: RESOLVED | Noted: 2017-08-01 | Resolved: 2017-08-03

## 2017-08-03 LAB
ANION GAP SERPL CALCULATED.3IONS-SCNC: 10.2 MMOL/L
BUN BLD-MCNC: 10 MG/DL (ref 8–23)
BUN/CREAT SERPL: 10.9 (ref 7–25)
CALCIUM SPEC-SCNC: 8.6 MG/DL (ref 8.6–10.5)
CHLORIDE SERPL-SCNC: 97 MMOL/L (ref 98–107)
CO2 SERPL-SCNC: 26.8 MMOL/L (ref 22–29)
CREAT BLD-MCNC: 0.92 MG/DL (ref 0.76–1.27)
DEPRECATED RDW RBC AUTO: 45.8 FL (ref 37–54)
ERYTHROCYTE [DISTWIDTH] IN BLOOD BY AUTOMATED COUNT: 13.4 % (ref 11.5–14.5)
GFR SERPL CREATININE-BSD FRML MDRD: 83 ML/MIN/1.73
GLUCOSE BLD-MCNC: 99 MG/DL (ref 65–99)
HCT VFR BLD AUTO: 35.1 % (ref 40.4–52.2)
HGB BLD-MCNC: 11.2 G/DL (ref 13.7–17.6)
MCH RBC QN AUTO: 29.9 PG (ref 27–32.7)
MCHC RBC AUTO-ENTMCNC: 31.9 G/DL (ref 32.6–36.4)
MCV RBC AUTO: 93.6 FL (ref 79.8–96.2)
PLATELET # BLD AUTO: 236 10*3/MM3 (ref 140–500)
PMV BLD AUTO: 10.5 FL (ref 6–12)
POTASSIUM BLD-SCNC: 4.4 MMOL/L (ref 3.5–5.2)
RBC # BLD AUTO: 3.75 10*6/MM3 (ref 4.6–6)
SODIUM BLD-SCNC: 134 MMOL/L (ref 136–145)
WBC NRBC COR # BLD: 12.63 10*3/MM3 (ref 4.5–10.7)

## 2017-08-03 PROCEDURE — 99024 POSTOP FOLLOW-UP VISIT: CPT | Performed by: ORTHOPAEDIC SURGERY

## 2017-08-03 PROCEDURE — 25010000002 PNEUMOCOCCAL VAC POLYVALENT PER 0.5 ML: Performed by: ORTHOPAEDIC SURGERY

## 2017-08-03 PROCEDURE — 99024 POSTOP FOLLOW-UP VISIT: CPT | Performed by: PHYSICIAN ASSISTANT

## 2017-08-03 PROCEDURE — 90732 PPSV23 VACC 2 YRS+ SUBQ/IM: CPT | Performed by: ORTHOPAEDIC SURGERY

## 2017-08-03 PROCEDURE — 80048 BASIC METABOLIC PNL TOTAL CA: CPT | Performed by: HOSPITALIST

## 2017-08-03 PROCEDURE — 97110 THERAPEUTIC EXERCISES: CPT

## 2017-08-03 PROCEDURE — G0009 ADMIN PNEUMOCOCCAL VACCINE: HCPCS | Performed by: ORTHOPAEDIC SURGERY

## 2017-08-03 PROCEDURE — 85027 COMPLETE CBC AUTOMATED: CPT | Performed by: NURSE PRACTITIONER

## 2017-08-03 PROCEDURE — 94799 UNLISTED PULMONARY SVC/PX: CPT

## 2017-08-03 PROCEDURE — 25010000002 VANCOMYCIN 10 G RECONSTITUTED SOLUTION: Performed by: ORTHOPAEDIC SURGERY

## 2017-08-03 RX ORDER — SENNA AND DOCUSATE SODIUM 50; 8.6 MG/1; MG/1
1 TABLET, FILM COATED ORAL 2 TIMES DAILY
Qty: 30 TABLET | Refills: 1 | Status: SHIPPED | OUTPATIENT
Start: 2017-08-03 | End: 2017-09-01 | Stop reason: SDUPTHER

## 2017-08-03 RX ORDER — OXYCODONE HYDROCHLORIDE AND ACETAMINOPHEN 5; 325 MG/1; MG/1
2 TABLET ORAL EVERY 4 HOURS PRN
Qty: 60 TABLET | Refills: 0 | Status: SHIPPED | OUTPATIENT
Start: 2017-08-03 | End: 2017-08-10

## 2017-08-03 RX ADMIN — ESCITALOPRAM 20 MG: 20 TABLET, FILM COATED ORAL at 09:19

## 2017-08-03 RX ADMIN — OXYCODONE HYDROCHLORIDE AND ACETAMINOPHEN 2 TABLET: 5; 325 TABLET ORAL at 12:26

## 2017-08-03 RX ADMIN — VANCOMYCIN HYDROCHLORIDE 1500 MG: 10 INJECTION, POWDER, LYOPHILIZED, FOR SOLUTION INTRAVENOUS at 09:23

## 2017-08-03 RX ADMIN — DOCUSATE SODIUM -SENNOSIDES 1 TABLET: 50; 8.6 TABLET, COATED ORAL at 09:19

## 2017-08-03 RX ADMIN — PNEUMOCOCCAL VACCINE POLYVALENT 0.5 ML
25; 25; 25; 25; 25; 25; 25; 25; 25; 25; 25; 25; 25; 25; 25; 25; 25; 25; 25; 25; 25; 25; 25 INJECTION, SOLUTION INTRAMUSCULAR; SUBCUTANEOUS at 13:02

## 2017-08-03 NOTE — PROGRESS NOTES
Doing well, c/o expected back pain  Walking well  Voiding ok    AVSS  AA,Ox3  Incision ok    ..    Results from last 7 days  Lab Units 08/03/17  0453   WBC 10*3/mm3 12.63*   HEMOGLOBIN g/dL 11.2*   HEMATOCRIT % 35.1*   PLATELETS 10*3/mm3 236       POD#3 s/p L2-4 decompression/fusion    D/c home. Doing well, has f/u scheduled. Call as needed.

## 2017-08-03 NOTE — DISCHARGE SUMMARY
Orthopedic Discharge Summary      Patient: Prem Thrasher  YOB: 1955  Medical Record Number: [id number]    Attending Physician: Jacky Perez MD  Consulting Physician(s):     Date of Admission: 7/31/2017  5:38 AM  Date of Discharge:     Discharge Diagnosis: LUMBAR FUSION DECOMPRESSON WITH PEDICLE SCREWS with LAURA L2-3,L3-4,   Acute Blood Loss Anemia      Presenting Problem/History of Present Illness: Spinal stenosis of lumbar region [M48.06]  Spondylolisthesis of lumbar region [M43.16]  Spondylolisthesis of lumbar region [M43.16]        Allergies:   Allergies   Allergen Reactions   • Atorvastatin      Leg cramps   • Ceclor [Cefaclor] Rash       Discharge Medications   Prem Thrasher   Home Medication Instructions FINESSE:[id number]    Printed on:08/03/17 1049   Medication Information                      amLODIPine (NORVASC) 10 MG tablet  Take 1 tablet by mouth Every Evening. For BP             aspirin 81 MG tablet  Take 81 mg by mouth Every Evening. TO HOLD 1 WEEK BEFORE SURGERY             Calcium Carbonate-Vitamin D (CALCIUM + D PO)  Take 1,200 mg by mouth Every Evening.             Cholecalciferol (VITAMIN D) 2000 UNITS tablet  Take 2,000 Units by mouth Every Evening.             cyclobenzaprine (FLEXERIL) 10 MG tablet  Take 10 mg by mouth 3 (Three) Times a Day As Needed for Muscle Spasms.             escitalopram (LEXAPRO) 20 MG tablet  Take 1 tablet by mouth Daily. For anxiety             ezetimibe (ZETIA) 10 MG tablet  Take 1 tablet by mouth Every Evening. For cholesterol             HUMIRA PEN 40 MG/0.8ML Pen-injector Kit  Inject 40 mg into the shoulder, thigh, or buttocks. TAKES EVERY OTHER WEEK/ TO CHECK WITH DR. PEREZ TO SEE WHEN HE HAS TO STOP             irbesartan (AVAPRO) 300 MG tablet  Take 1 tablet by mouth Daily. One PO daily for bp             leflunomide (ARAVA) 20 MG tablet  Take 20 mg by mouth Every Evening.             oxyCODONE-acetaminophen (PERCOCET) 5-325 MG per  tablet  Take 2 tablets by mouth Every 4 (Four) Hours As Needed for Moderate Pain (4-6) for up to 7 days.             sennosides-docusate sodium (SENOKOT-S) 8.6-50 MG tablet  Take 1 tablet by mouth 2 (Two) Times a Day.             tiotropium (SPIRIVA) 18 MCG per inhalation capsule  Place 1 capsule into inhaler and inhale Every Evening.                   Past Medical History:   Diagnosis Date   • Acute sinusitis    • Allergy    • Anxiety disorder    • Cervical disc disorder    • COPD (chronic obstructive pulmonary disease)    • CTS (carpal tunnel syndrome)    • Dermatophytosis of groin    • Encounter for eye exam 10/2014    normal   • History of bone density study 03/2014    Dr. Maria Antonia Lopez; normal   • History of chest x-ray 02/15/2011    also 3-6-15; normal chest   • History of nuclear stress test 03/09/2015    cardiolite; Dr. Greenberg; negative   • History of pulmonary function tests 03/2015    COPD   • Hyperlipidemia    • Hypertension    • IFG (impaired fasting glucose)    • Low back pain    • Lumbosacral disc disease    • Nerve damage     KAYLYNN ELBOWS   • Osteoarthritis, multiple sites    • Postoperative nausea and vomiting 8/1/2017   • Rheumatoid arthritis    • Sciatica    • Thoracic disc disorder         Past Surgical History:   Procedure Laterality Date   • BACK SURGERY     • COLONOSCOPY  02/02/2011    unremarkable only for scattered diverticulosis; had hx of prior polyp   • CYST REMOVAL  03/2016    x2  FROM FACE AND EAR   • DENTAL PROCEDURE  2008    teeth removed; dental implants   • EPIDURAL BLOCK  1995    L/S spine   • HAND SURGERY Right 2003    avulsion lacerations 4th R finger debrided   • LUMBAR DISCECTOMY FUSION INSTRUMENTATION Left 10/10/2016    Procedure: LEFT L2-L3, L3-L4 LUMBAR LAMINECTOMY/ DISCECTOMY WITH METRIX ;  Surgeon: Sawyer Rick MD;  Location: [facility] OR;  Service:    • LUMBAR DISCECTOMY FUSION INSTRUMENTATION N/A 7/31/2017    Procedure: LUMBAR FUSION DECOMPRESSON WITH PEDICLE SCREWS  with LAURA L2-3,L3-4;  Surgeon: Jacky Perez MD;  Location: [facility];  Service:    • LUMBAR LAMINECTOMY DISCECTOMY DECOMPRESSION N/A 7/31/2017    Procedure: L2 to L4 laminectomy with neural lysis and a fusion by orthopedics;  Surgeon: Sawyer Rick MD;  Location: [facility];  Service:    • SHOULDER SURGERY Right 02/23/2011    Dr. Navarro   • SINUS SURGERY  2003    Dr. Barrera        Social History     Occupational History   • Not on file.     Social History Main Topics   • Smoking status: Current Every Day Smoker     Packs/day: 1.00     Years: 45.00     Types: Cigarettes   • Smokeless tobacco: Not on file      Comment: last cigarette 7/30/17   • Alcohol use No   • Drug use: No   • Sexual activity: Not Currently     Partners: Female     Birth control/ protection: None    Social History     Social History Narrative        Family History   Problem Relation Age of Onset   • Diabetes Mother    • Heart disease Mother    • Hyperlipidemia Mother    • Stroke Mother    • Heart disease Father    • Rheum arthritis Father    • Alcohol abuse Father    • Hyperlipidemia Father    • Depression Brother    • Cancer Brother      prostate   • Depression Brother    • Heart disease Brother    • Hyperlipidemia Brother    • Cancer Brother    • Thyroid disease Sister          Physical Exam: 62 y.o. male  General Appearance:    Alert, cooperative, in no acute distress                    Vitals:    08/02/17 1736 08/02/17 2041 08/02/17 2337 08/03/17 0410   BP: 105/68 108/64  125/83   BP Location: Right arm Right arm  Right arm   Patient Position: Lying Lying  Lying   Pulse: 91 83 85 87   Resp: 18 18 16    Temp: 98.2 °F (36.8 °C) 97.6 °F (36.4 °C)  98.4 °F (36.9 °C)   TempSrc: Oral Oral  Oral   SpO2: 90% 96% 94% 94%   Weight:       Height:            DIAGNOSTIC TESTS:   Admission on 07/31/2017   Component Date Value Ref Range Status   • ABO Type 07/31/2017 A   Final   • RH type 07/31/2017 Positive   Final   • Antibody Screen  07/31/2017 Negative   Final   • Hemoglobin 07/31/2017 11.4* 13.7 - 17.6 g/dL Final   • Hematocrit 07/31/2017 36.4* 40.4 - 52.2 % Final   • Hemoglobin 08/01/2017 11.5* 13.7 - 17.6 g/dL Final   • Hematocrit 08/01/2017 35.5* 40.4 - 52.2 % Final   • Glucose 08/01/2017 137* 65 - 99 mg/dL Final   • BUN 08/01/2017 11  8 - 23 mg/dL Final   • Creatinine 08/01/2017 1.00  0.76 - 1.27 mg/dL Final   • Sodium 08/01/2017 134* 136 - 145 mmol/L Final   • Potassium 08/01/2017 4.5  3.5 - 5.2 mmol/L Final   • Chloride 08/01/2017 98  98 - 107 mmol/L Final   • CO2 08/01/2017 23.6  22.0 - 29.0 mmol/L Final   • Calcium 08/01/2017 8.5* 8.6 - 10.5 mg/dL Final   • eGFR Non African Amer 08/01/2017 75  >60 mL/min/1.73 Final   • BUN/Creatinine Ratio 08/01/2017 11.0  7.0 - 25.0 Final   • Anion Gap 08/01/2017 12.4  mmol/L Final   • Glucose 08/02/2017 108* 65 - 99 mg/dL Final   • BUN 08/02/2017 9  8 - 23 mg/dL Final   • Creatinine 08/02/2017 0.87  0.76 - 1.27 mg/dL Final   • Sodium 08/02/2017 134* 136 - 145 mmol/L Final   • Potassium 08/02/2017 3.8  3.5 - 5.2 mmol/L Final   • Chloride 08/02/2017 95* 98 - 107 mmol/L Final   • CO2 08/02/2017 25.4  22.0 - 29.0 mmol/L Final   • Calcium 08/02/2017 8.1* 8.6 - 10.5 mg/dL Final   • eGFR Non African Amer 08/02/2017 89  >60 mL/min/1.73 Final   • BUN/Creatinine Ratio 08/02/2017 10.3  7.0 - 25.0 Final   • Anion Gap 08/02/2017 13.6  mmol/L Final   • Magnesium 08/02/2017 2.2  1.6 - 2.4 mg/dL Final   • WBC 08/02/2017 11.71* 4.50 - 10.70 10*3/mm3 Final   • RBC 08/02/2017 3.69* 4.60 - 6.00 10*6/mm3 Final   • Hemoglobin 08/02/2017 11.1* 13.7 - 17.6 g/dL Final   • Hematocrit 08/02/2017 34.7* 40.4 - 52.2 % Final   • MCV 08/02/2017 94.0  79.8 - 96.2 fL Final   • MCH 08/02/2017 30.1  27.0 - 32.7 pg Final   • MCHC 08/02/2017 32.0* 32.6 - 36.4 g/dL Final   • RDW 08/02/2017 13.4  11.5 - 14.5 % Final   • RDW-SD 08/02/2017 46.2  37.0 - 54.0 fl Final   • MPV 08/02/2017 10.5  6.0 - 12.0 fL Final   • Platelets 08/02/2017 219   140 - 500 10*3/mm3 Final   • Neutrophil % 08/02/2017 65.0  42.7 - 76.0 % Final   • Lymphocyte % 08/02/2017 19.3* 19.6 - 45.3 % Final   • Monocyte % 08/02/2017 14.8* 5.0 - 12.0 % Final   • Eosinophil % 08/02/2017 0.3  0.3 - 6.2 % Final   • Basophil % 08/02/2017 0.4  0.0 - 1.5 % Final   • Immature Grans % 08/02/2017 0.2  0.0 - 0.5 % Final   • Neutrophils, Absolute 08/02/2017 7.61  1.90 - 8.10 10*3/mm3 Final   • Lymphocytes, Absolute 08/02/2017 2.26  0.90 - 4.80 10*3/mm3 Final   • Monocytes, Absolute 08/02/2017 1.73* 0.20 - 1.20 10*3/mm3 Final   • Eosinophils, Absolute 08/02/2017 0.04  0.00 - 0.70 10*3/mm3 Final   • Basophils, Absolute 08/02/2017 0.05  0.00 - 0.20 10*3/mm3 Final   • Immature Grans, Absolute 08/02/2017 0.02  0.00 - 0.03 10*3/mm3 Final   • Glucose 08/03/2017 99  65 - 99 mg/dL Final   • BUN 08/03/2017 10  8 - 23 mg/dL Final   • Creatinine 08/03/2017 0.92  0.76 - 1.27 mg/dL Final   • Sodium 08/03/2017 134* 136 - 145 mmol/L Final   • Potassium 08/03/2017 4.4  3.5 - 5.2 mmol/L Final   • Chloride 08/03/2017 97* 98 - 107 mmol/L Final   • CO2 08/03/2017 26.8  22.0 - 29.0 mmol/L Final   • Calcium 08/03/2017 8.6  8.6 - 10.5 mg/dL Final   • eGFR Non  Amer 08/03/2017 83  >60 mL/min/1.73 Final   • BUN/Creatinine Ratio 08/03/2017 10.9  7.0 - 25.0 Final   • Anion Gap 08/03/2017 10.2  mmol/L Final   • WBC 08/03/2017 12.63* 4.50 - 10.70 10*3/mm3 Final   • RBC 08/03/2017 3.75* 4.60 - 6.00 10*6/mm3 Final   • Hemoglobin 08/03/2017 11.2* 13.7 - 17.6 g/dL Final   • Hematocrit 08/03/2017 35.1* 40.4 - 52.2 % Final   • MCV 08/03/2017 93.6  79.8 - 96.2 fL Final   • MCH 08/03/2017 29.9  27.0 - 32.7 pg Final   • MCHC 08/03/2017 31.9* 32.6 - 36.4 g/dL Final   • RDW 08/03/2017 13.4  11.5 - 14.5 % Final   • RDW-SD 08/03/2017 45.8  37.0 - 54.0 fl Final   • MPV 08/03/2017 10.5  6.0 - 12.0 fL Final   • Platelets 08/03/2017 236  140 - 500 10*3/mm3 Final       Xr Spine Lumbar 2 Or 3 View    Result Date: 7/31/2017  Narrative: XR  SPINE LUMBAR 2 OR 3 VW-, FL C ARM DURING SURGERY-  INDICATIONS: Lumbar fusion  TECHNIQUE: FLUOROSCOPIC ASSISTANCE IN THE OPERATING ROOM.  FINDINGS:  3 intraoperative fluoroscopic spot views were obtained and recorded the PACS for review, revealing posterior julieta and screw fusion hardware at L2, L3, L4. Please see operative report for full details.  Fluoroscopy time: 12 seconds      Impression:  As described.  This report was finalized on 7/31/2017 3:27 PM by Dr. Leroy Jordan MD.      Fl C Arm During Surgery    Result Date: 7/31/2017  Narrative: XR SPINE LUMBAR 2 OR 3 VW-, FL C ARM DURING SURGERY-  INDICATIONS: Lumbar fusion  TECHNIQUE: FLUOROSCOPIC ASSISTANCE IN THE OPERATING ROOM.  FINDINGS:  3 intraoperative fluoroscopic spot views were obtained and recorded the PACS for review, revealing posterior julieta and screw fusion hardware at L2, L3, L4. Please see operative report for full details.  Fluoroscopy time: 12 seconds      Impression:  As described.  This report was finalized on 7/31/2017 3:27 PM by Dr. Leroy Jordan MD.      Xr Abdomen Kub    Result Date: 8/1/2017  Narrative: EMERGENCY SUPINE ABDOMEN  HISTORY: 62-year-old male with nausea vomiting abdominal distention following lumbar spine surgery one day earlier  COMPARISON: None available  FINDINGS: 1. Prominent gaseous distention of the stomach. 2. Gaseous distention of the colon probably transverse measuring up to 76 mm. 3. Findings are most consistent with postop ileus without definite obstruction. 4. Satisfactory appearance following posterior lumbar fusion L2-L3-4.  This report was finalized on 8/1/2017 10:54 AM by Dr. Jacob Mckoy MD.        Hospital Course:  62 y.o. male admitted to [facility] to services of Jacky Perez MD with Spinal stenosis of lumbar region [M48.06]  Spondylolisthesis of lumbar region [M43.16]  Spondylolisthesis of lumbar region [M43.16] on 7/31/2017 and underwent LUMBAR FUSION DECOMPRESSON WITH PEDICLE SCREWS  with LAURA L2-3,L3-4  Per Jacky Perez MD. Antibiotic and VTE prophylaxis were per SCIP protocols.  The patient was admitted to the floor where IV and/or oral pain medications were administered for postoperative pain.  Because of the CSF leak he was kept flat in bed on postoperative day 1.  He had no headache or wound drainage and was mobilized without event.  At discharge the incisional pain was tolerable and preop neurologic function was intact.  The dressing was dry and the wound was clean.    Condition on Discharge:  Stable    Discharge Instructions: . Patient may weight bear as tolerated unless otherwise specified. Continue OLIVIA hose daily (for two weeks) and ice regularly. Patient also instructed on incentive spirometer during hospitalization and encouraged to continue to use at home regularly. Patient may shower on POD #3 if and when all wound drainage has stopped.  Where applicable, the brace should be worn when up and about.  It need not be worn to the bathroom and certainly not in the shower.  A detailed list of instructions specific to the operation was given to the patient at the time of discharge.    Follow up Instructions:  Follow up in the office with Dr. Jacky Perez Jr. in 2-3 weeks - patient to call the office at [phone] to schedule. Prescriptions were given for pain medication.    Follow-up Appointments  Future Appointments  Date Time Provider Department Center   8/31/2017 2:00 PM TEN JovelK NS [facility] None         Discharge Disposition Plan:today to home    Date: 8/3/2017    Jacky Perez MD

## 2017-08-03 NOTE — PROGRESS NOTES
Continued Stay Note  [facility]     Patient Name: Prem Thrasher  MRN: [id number]  Today's Date: 8/3/2017    Admit Date: 7/31/2017          Discharge Plan       08/03/17 1248    Case Management/Social Work Plan    Additional Comments Patient will need a walker at discharge. Patient agreeable to using [employer]. [employer] contacted and walker delivered.......    ANNI La      08/03/17 1235    Final Note    Final Note Home              Discharge Codes       08/03/17 1236    Discharge Codes    Discharge Codes 01  Discharge to home        Expected Discharge Date and Time     Expected Discharge Date Expected Discharge Time    Aug 3, 2017             ANNI La

## 2017-08-03 NOTE — PLAN OF CARE
Problem: Patient Care Overview (Adult)  Goal: Plan of Care Review  Outcome: Ongoing (interventions implemented as appropriate)    08/03/17 0258   Coping/Psychosocial Response Interventions   Plan Of Care Reviewed With patient   Patient Care Overview   Progress progress towards functional goals is fair   Outcome Evaluation   Outcome Summary/Follow up Plan Patient alert and verbal with needs. Denies having any pain. Voiding without difficulty. BS+          Problem: Perioperative Period (Adult)  Goal: Signs and Symptoms of Listed Potential Problems Will be Absent or Manageable (Perioperative Period)  Outcome: Ongoing (interventions implemented as appropriate)    08/03/17 0258   Perioperative Period   Problems Assessed (Perioperative Period) all   Problems Present (Perioperative Period) pain;postoperative ileus;situational response         Problem: Fall Risk (Adult)  Goal: Identify Related Risk Factors and Signs and Symptoms  Outcome: Ongoing (interventions implemented as appropriate)    08/03/17 0258   Fall Risk   Fall Risk: Related Risk Factors gait/mobility problems;neuro disease/injury;culprit medication(s)   Fall Risk: Signs and Symptoms presence of risk factors       Goal: Absence of Falls  Outcome: Ongoing (interventions implemented as appropriate)

## 2017-08-03 NOTE — PLAN OF CARE
Problem: Patient Care Overview (Adult)  Goal: Plan of Care Review    08/03/17 1121   Coping/Psychosocial Response Interventions   Plan Of Care Reviewed With patient   Outcome Evaluation   Outcome Summary/Follow up Plan pt reports feeling much better, aldo bed mobility, long amb, and stair training CGA w/ FWW. Plan to DC home this afternoon w/ HH. FWW ordered for short term use while progressing. Pt demonstrated safe amb and mobility, educated on spinal precautions, does not warrant any further skilled PT intervention. PT will sign off.

## 2017-08-03 NOTE — PROGRESS NOTES
Name: Prem Thrasher ADMIT: 2017   : 1955  PCP: Montse Cain PA-C    MRN: [id number] LOS: 3 days   AGE/SEX: 62 y.o. male  ROOM: [facility]/     Subjective   Subjective  + BMs.  No further n/v.      Objective   Vital Signs  Temp:  [97.6 °F (36.4 °C)-99.1 °F (37.3 °C)] 98.4 °F (36.9 °C)  Heart Rate:  [] 87  Resp:  [16-18] 16  BP: (105-125)/(64-83) 125/83  SpO2:  [90 %-96 %] 94 %  on  Flow (L/min):  [2] 2;   O2 Device: nasal cannula  Body mass index is 29.19 kg/(m^2).    Physical Exam   Constitutional: He is oriented to person, place, and time. No distress.   Cardiovascular: Normal rate and regular rhythm.    No murmur heard.  Pulmonary/Chest: Effort normal and breath sounds normal.   Abdominal: Soft. Bowel sounds are normal. He exhibits no distension. There is no tenderness.   Musculoskeletal: Normal range of motion. He exhibits no edema.   Neurological: He is alert and oriented to person, place, and time.   Skin: Skin is warm and dry. He is not diaphoretic.       Results Review:       I reviewed the patient's new clinical results.    Results from last 7 days  Lab Units 17  0453 08/02/17  0454 08/01/17  04517  1315   WBC 10*3/mm3 12.63* 11.71*  --   --    HEMOGLOBIN g/dL 11.2* 11.1* 11.5* 11.4*   PLATELETS 10*3/mm3 236 219  --   --        Results from last 7 days  Lab Units 17  0453 08/02/17  04517  045   SODIUM mmol/L 134* 134* 134*   POTASSIUM mmol/L 4.4 3.8 4.5   CHLORIDE mmol/L 97* 95* 98   CO2 mmol/L 26.8 25.4 23.6   BUN mg/dL 10 9 11   CREATININE mg/dL 0.92 0.87 1.00   GLUCOSE mg/dL 99 108* 137*   Estimated Creatinine Clearance: 96.6 mL/min (by C-G formula based on Cr of 0.92).    Results from last 7 days  Lab Units 17  0453 17  0454 17  0452   CALCIUM mg/dL 8.6 8.1* 8.5*   MAGNESIUM mg/dL  --  2.2  --          escitalopram 20 mg Oral Daily   sennosides-docusate sodium 1 tablet Oral BID   tiotropium 1 capsule Inhalation Q PM       dextrose 5 %  and sodium chloride 0.45 % with KCl 20 mEq/L 75 mL/hr Last Rate: 75 mL/hr (08/02/17 1606)   HYDROmorphone HCl-NaCl           Assessment/Plan   Assessment:     Active Hospital Problems (** Indicates Principal Problem)    Diagnosis Date Noted   • **Spondylolisthesis of lumbar region [M43.16] 07/31/2017   • COPD (chronic obstructive pulmonary disease) [J44.9]    • Rheumatoid arthritis [M06.9]    • Hypertension [I10]       Resolved Hospital Problems    Diagnosis Date Noted Date Resolved   • Postoperative nausea and vomiting [R11.2, Z98.890] 08/01/2017 08/03/2017   • Postoperative ileus [K91.3] 08/01/2017 08/03/2017       Plan:   - POD#3 LUMBAR FUSION DECOMPRESSON WITH PEDICLE SCREWS with LAURA L2-3,L3-4  - Continue to hold his AVAPRO and NORVASC.  May resume at discharge.  - Tolerating regular diet.  - Rheumatoid meds on hold.  - SCDs have been ordered for DVT prophylaxis per ortho.  - He was encouraged to use incentive spirometer as instructed.      Hernan Winn MD  [facility]  08/03/17  7:12 AM

## 2017-08-03 NOTE — THERAPY DISCHARGE NOTE
Acute Care - Physical Therapy Treatment Note/Discharge  [facility]     Patient Name: Prem Thrasher  : 1955  MRN: [id number]  Today's Date: 8/3/2017  Onset of Illness/Injury or Date of Surgery Date: 17  Date of Referral to PT: 17  Referring Physician: Chris    Admit Date: 2017    Visit Dx:    ICD-10-CM ICD-9-CM   1. Impaired gait and mobility R26.89 781.2   2. Spondylolisthesis of lumbar region M43.16 738.4   3. Spinal stenosis of lumbar region M48.06 724.02   4. DDD (degenerative disc disease), lumbar M51.36 722.52     Patient Active Problem List   Diagnosis   • Allergy   • COPD (chronic obstructive pulmonary disease)   • Hyperlipidemia   • IFG (impaired fasting glucose)   • Rheumatoid arthritis   • Hypertension   • Anxiety disorder   • Depression   • Lumbar radiculopathy   • Spondylolisthesis of lumbar region       Physical Therapy Education     Title: PT OT SLP Therapies (Done)     Topic: Physical Therapy (Done)     Point: Mobility training (Done)    Learning Progress Summary    Learner Readiness Method Response Comment Documented by Status   Patient Acceptance E [facility] 17 1121 Done    Acceptance E NR   17 1001 Active               Point: Home exercise program (Done)    Learning Progress Summary    Learner Readiness Method Response Comment Documented by Status   Patient Acceptance E [facility] 17 1121 Done    Acceptance E NR   17 1001 Active               Point: Body mechanics (Done)    Learning Progress Summary    Learner Readiness Method Response Comment Documented by Status   Patient Acceptance E [facility] 17 1121 Done               Point: Precautions (Done)    Learning Progress Summary    Learner Readiness Method Response Comment Documented by Status   Patient Acceptance E [facility] 17 1121 Done                      User Key     Initials Effective Dates Name Provider Type Discipline     17 -  Naomie Francis, PT Physical Therapist PT    KS  05/08/17 -  Martine Gama, PT Student PT Student PT                    IP PT Goals       08/02/17 1001          Bed Mobility PT LTG    Bed Mobility PT LTG, Date Established 08/02/17  -      Bed Mobility PT LTG, Time to Achieve 1 wk  -LH      Bed Mobility PT LTG, Activity Type all bed mobility  -      Bed Mobility PT LTG, [place] Level supervision required  -      Transfer Training PT LTG    Transfer Training PT LTG, Date Established 08/02/17  -      Transfer Training PT LTG, Time to Achieve 1 wk  -LH      Transfer Training PT LTG, Activity Type all transfers  -      Transfer Training PT LTG, [place] Level supervision required  -      Transfer Training PT LTG, Assist Device walker, rolling  -      Gait Training PT LTG    Gait Training Goal PT LTG, Date Established 08/02/17  -      Gait Training Goal PT LTG, Time to Achieve 1 wk  -LH      Gait Training Goal PT LTG, [place] Level supervision required  -      Gait Training Goal PT LTG, Assist Device walker, rolling  -      Gait Training Goal PT LTG, Distance to Achieve 150  -LH      Stair Training PT LTG    Stair Training Goal PT LTG, Date Established 08/02/17  -      Stair Training Goal PT LTG, Time to Achieve 1 wk  -      Stair Training Goal PT LTG, Number of Steps 4  -LH      Stair Training Goal PT LTG, [place] Level contact guard assist  -        User Key  (r) = Recorded By, (t) = Taken By, (c) = Cosigned By    Initials Name Provider Type     Naomie Francis, PT Physical Therapist              Adult Rehabilitation Note       08/03/17 1100          Rehab Assessment/Intervention    Discipline (P)  physical therapist  -KS      Document Type (P)  therapy note (daily note);discharge summary  -KS      Subjective Information (P)  agree to therapy;no complaints  -KS      Patient Effort, Rehab Treatment (P)  excellent  -KS      Symptoms Noted During/After Treatment (P)  none  -KS      Precautions/Limitations (P)  brace on  when up;fall precautions  -KS      Precautions/Limitations, Vision (P)  WFL with corrective lenses  -KS      Precautions/Limitations, Hearing (P)  WFL  -KS      Equipment Issued to Patient (P)  front wheeled walker  -KS      Recorded by [KS] RAVINDER Howell      Vision Assessment/Intervention    Visual Impairment (P)  WFL with corrective lenses  -KS      Recorded by [KS] RAVINDER Howell      Cognitive Assessment/Intervention    Current Cognitive/Communication Assessment (P)  functional  -KS      Orientation Status (P)  oriented x 4  -KS      Follows Commands/Answers Questions (P)  100% of the time;able to follow multi-step instructions  -KS      Personal Safety (P)  WNL/WFL  -KS      Recorded by [KS] RAVINDER Howell      ROM (Range of Motion)    General ROM (P)  no range of motion deficits identified  -KS      Recorded by [KS] RAVINDER Howell      MMT (Manual Muscle Testing)    General MMT Assessment (P)  no strength deficits identified  -KS      Recorded by [KS] RAVINDER Howell      Bed Mobility, Assessment/Treatment    Bed Mobility, Assistive Device (P)  bed rails  -KS      Bed Mobility, Roll Left, [place] (P)  nonverbal cues required (demo/gesture);verbal cues required;supervision required  -KS      Bed Mobility, Scoot/Bridge, [place] (P)  contact guard assist  -KS      Bed Mob, Sidelying to Sit, [place] (P)  contact guard assist  -KS      Bed Mobility, Impairments (P)  pain  -KS      Bed Mobility, Comment (P)  log roll   -KS      Recorded by [KS] RAVINDER Howell      Transfer Assessment/Treatment    Transfers, Sit-Stand [place] (P)  contact guard assist  -KS      Transfers, Stand-Sit [place] (P)  contact guard assist  -KS      Transfers, Sit-Stand-Sit, Assist Device (P)  rolling walker  -KS      Transfer, Impairments (P)  pain  -KS      Recorded by [KS] RAVINDER Howell      Gait  Assessment/Treatment    Gait, [place] Level (P)  contact guard assist  -KS      Gait, Assistive Device (P)  rolling walker  -KS      Gait, Distance (Feet) (P)  100  -KS      Gait, Gait Pattern Analysis (P)  swing-through gait  -KS      Gait, Gait Deviations (P)  antalgic;norman decreased  -KS      Gait, Maintain Weight Bearing Status (P)  able to maintain weight bearing status  -KS      Gait, Impairments (P)  pain  -KS      Recorded by [KS] Martine Gama PT Student      Stairs Assessment/Treatment    Number of Stairs (P)  4  -KS      Stairs, Handrail Location (P)  right side (ascending)  -KS      Stairs, [place] Level (P)  contact guard assist;1 person + 1 person to manage equipment  -KS      Stairs, Technique Used (P)  step to step (descending);step to step (ascending)  -KS      Stairs, Maintain Weight Bearing Status (P)  able to maintain weight bearing status  -KS      Stairs, Impairments (P)  pain  -KS      Recorded by [KS] RAVINDER Howell      Positioning and Restraints    Pre-Treatment Position (P)  in bed  -KS      Post Treatment Position (P)  chair  -KS      In Chair (P)  call light within reach;encouraged to call for assist;sitting  -KS      Recorded by [KS] RAVINDER Howell        User Key  (r) = Recorded By, (t) = Taken By, (c) = Cosigned By    Initials Name Effective Dates    ANA MARÍA Gama PT Student 05/08/17 -           PT Recommendation and Plan  Anticipated Discharge Disposition: (P) home with home health  Planned Therapy Interventions: balance training, bed mobility training, gait training, home exercise program, ROM (Range of Motion), stair training, strengthening, stretching, transfer training  PT Frequency: daily  Plan of Care Review  Plan Of Care Reviewed With: (P) patient  Outcome Summary/Follow up Plan: (P) pt reports feeling much better, aldo bed mobility, long amb, and stair training CGA w/ FWW. Plan to DC home this afternoon w/ [facility]. FWW  ordered for short term use while progressing. Pt demonstrated safe amb and mobility, educated on spinal precautions, does not warrant any further skilled PT intervention. PT will sign off.           Outcome Measures       08/03/17 1100 08/02/17 1000       How much help from another person do you currently need...    Turning from your back to your side while in flat bed without using bedrails? (P)  4  -KS 2  -LH     Moving from lying on back to sitting on the side of a flat bed without bedrails? (P)  4  -KS 2  -LH     Moving to and from a bed to a chair (including a wheelchair)? (P)  4  -KS 2  -LH     Standing up from a chair using your arms (e.g., wheelchair, bedside chair)? (P)  4  -KS 2  -LH     Climbing 3-5 steps with a railing? (P)  3  -KS 2  -LH     To walk in hospital room? (P)  4  -KS 2  -LH     AM-PAC 6 Clicks Score (P)  23  -KS 12  -LH     Functional Assessment    Outcome Measure Options (P)  AM-PAC 6 Clicks Basic Mobility (PT)  -KS AM-PAC 6 Clicks Basic Mobility (PT)  -LH       User Key  (r) = Recorded By, (t) = Taken By, (c) = Cosigned By    Initials Name Provider Type     Naomie Francis, PT Physical Therapist    ANA MARÍA Gama, PT Student PT Student           Time Calculation:         PT Charges       08/03/17 1125          Time Calculation    Start Time (P)  1100  -KS      Stop Time (P)  1118  -KS      Time Calculation (min) (P)  18 min  -KS      PT Received On (P)  08/03/17  -KS        User Key  (r) = Recorded By, (t) = Taken By, (c) = Cosigned By    Initials Name Provider Type    ANA MARÍA Gama PT Student PT Student          Therapy Charges for Today     Code Description Service Date Service Provider Modifiers Qty    21902169900 HC PT THER PROC EA 15 MIN 8/3/2017 Martine Gama PT Student GP 1    89570264158 HC PT THER SUPP EA 15 MIN 8/3/2017 Martine Gama PT Student GP 1          PT G-Codes  Outcome Measure Options: (P) AM-PAC 6 Clicks Basic Mobility (PT)    PT Discharge  Summary  Anticipated Discharge Disposition: (P) home with home health  Reason for Discharge: (P) Independent    Martine Gama, PT Student  8/3/2017

## 2017-08-03 NOTE — PLAN OF CARE
Problem: Inpatient Physical Therapy  Goal: Bed Mobility Goal LTG- PT  Outcome: Unable to achieve outcome(s) by discharge Date Met:  08/03/17 08/03/17 1132   Bed Mobility PT LTG   Bed Mobility PT LTG, Date Goal Reviewed 08/03/17   Bed Mobility PT LTG, Outcome goal not met       Goal: Transfer Training Goal 1 LTG- PT  Outcome: Unable to achieve outcome(s) by discharge Date Met:  08/03/17 08/03/17 1132   Transfer Training PT LTG   Transfer Training PT LTG, Date Goal Reviewed 08/03/17   Transfer Training PT LTG, Outcome goal not met       Goal: Gait Training Goal LTG- PT  Outcome: Unable to achieve outcome(s) by discharge Date Met:  08/03/17 08/03/17 1132   Gait Training PT LTG   Gait Training Goal PT LTG, Date Goal Reviewed 08/03/17   Gait Training Goal PT LTG, Outcome goal not met       Goal: Stair Training Goal LTG- PT  Outcome: Outcome(s) achieved Date Met:  08/03/17 08/03/17 1132   Stair Training PT LTG   Stair Training Goal PT LTG, Date Goal Reviewed 08/03/17   Stair Training Goal PT LTG, Outcome goal met

## 2017-08-03 NOTE — DISCHARGE INSTR - ACTIVITY
LUMBAR FUSION SURGERY   HOME INSTRUCTIONS     1.     You will need to check your incision or have a family member to check           your incision EVERYDAY for the following signs:             Increase in redness           Increase in swelling around the incision and low back area           Increase in pain           Any drainage noted from the incision           Edges of the incision are pulling apart           Increase in overall body temperature (greater than 100.5 degrees)             Call your physician if any of these listed above occur after you are           discharged from the hospital.  DO NOT wait until your office visit to           inform the physician.     2.     Continue with the exercise program twice a day as instructed by the           physical therapist at the hospital.  A more involved physical therapy           program may be started 3 months after your surgery.     3.     Walking must be done on a daily basis.  You should walk at least twice           a day and more if you are able.  Start with short distances and gradually           add a little distance everyday.     4.     You may wish to use an elevated toilet seat for up to12 weeks after surgery.     5.     You MUST wear your brace anytime you are up.  You do not have to wear             your brace at nighttime when walking a short distance to the bathroom.     6.     You may shower three days after your surgery as long as there is no             drainage. You will need to keep your incision clean and dry.  It is not               necessary to cover your incision while taking a shower.  You may remove           your brace to shower.                                                                                                             7.     You can sit in a high-straight back chair with arms as tolerated, unless   instructed otherwise.  Do not flex your hips more than 90 degrees when        sitting.  Avoid low, soft chairs.  "    8.     You may want to sleep on a firm mattress.  Avoid waterbeds for 3-6 months             after surgery.     9.     Patients should not smoke.  Smoking interferes with the fusion and the                 healing time.     10.   There will be no lifting of anything over 5 pounds for the first few months.     11.   You may drive a car, usually 2-4 weeks after surgery, but only after you           have checked with your physician.     12.   You may ride in a car after your surgery and for no more than           30 minutes to one hour at a time.  Short distance riding to and from the           physician's office for follow-up visits is the only time you should be in a           car until after the 2-4 weeks time frame.     13.   You may go home in a car.  You must wear your seat belt.     14.   You will be sent home with pain medication, but it will only be used up to             4 weeks after surgery.  Refills may be obtained, but ONLY during office           hours.     15.   You may go and down stairs, but take it easy at first.  It is preferable that   the first few weeks at home, try to only use the stairs once or twice a day          until you have had a chance to regain your strength.     16.   NO bending at the waist.  You may need to use your \"reacher\" to assist you           in picking up items off of the floor. (not mandatory to have)  If you absolutely                   something, you may squat by bending at your hips and knees.           need to reach for.     17.   Sexual activity should be avoided for 2 weeks after surgery.  Be careful and         use common sense.  You may have to adjust your position to lessen the strain on your back.     18.   Must wear your compression stockings during the day and may remove           them at night for 4 weeks after your surgery.           19.   Return to the office in 2 weeks to see Dr. Perez.      SHANTAL PEREZ JR., M.D.   ORTHOPAEDIC SURGERY   [address] [place] [address] " [place] [address]     LUMBAR FUSION SURGERY   HOME INSTRUCTIONS     1.     You will need to check your incision or have a family member to check           your incision EVERYDAY for the following signs:             Increase in redness           Increase in swelling around the incision and low back area           Increase in pain           Any drainage noted from the incision           Edges of the incision are pulling apart           Increase in overall body temperature (greater than 100.5 degrees)             Call your physician if any of these listed above occur after you are           discharged from the hospital.  DO NOT wait until your office visit to           inform the physician.     2.     Continue with the exercise program twice a day as instructed by the           physical therapist at the hospital.  A more involved physical therapy           program may be started 3 months after your surgery.     3.     Walking must be done on a daily basis.  You should walk at least twice           a day and more if you are able.  Start with short distances and gradually           add a little distance everyday.     4.     You may wish to use an elevated toilet seat for up to12 weeks after surgery.     5.     You MUST wear your brace anytime you are up.  You do not have to wear             your brace at nighttime when walking a short distance to the bathroom.     6.     You may shower three days after your surgery as long as there is no             drainage. You will need to keep your incision clean and dry.  It is not               necessary to cover your incision while taking a shower.  You may remove           your brace to shower.                                                                                                             7.     You can sit in a high-straight back chair with arms as tolerated, unless   instructed otherwise.  Do not flex your hips more than 90 degrees when        " sitting.  Avoid low, soft chairs.     8.     You may want to sleep on a firm mattress.  Avoid waterbeds for 3-6 months             after surgery.     9.     Patients should not smoke.  Smoking interferes with the fusion and the                 healing time.     10.   There will be no lifting of anything over 5 pounds for the first few months.     11.   You may drive a car, usually 2-4 weeks after surgery, but only after you           have checked with your physician.     12.   You may ride in a car after your surgery and for no more than           30 minutes to one hour at a time.  Short distance riding to and from the           physician's office for follow-up visits is the only time you should be in a           car until after the 2-4 weeks time frame.     13.   You may go home in a car.  You must wear your seat belt.     14.   You will be sent home with pain medication, but it will only be used up to             4 weeks after surgery.  Refills may be obtained, but ONLY during office           hours.     15.   You may go and down stairs, but take it easy at first.  It is preferable that   the first few weeks at home, try to only use the stairs once or twice a day          until you have had a chance to regain your strength.     16.   NO bending at the waist.  You may need to use your \"reacher\" to assist you           in picking up items off of the floor. (not mandatory to have)  If you absolutely                   something, you may squat by bending at your hips and knees.           need to reach for.     17.   Sexual activity should be avoided for 2 weeks after surgery.  Be careful and         use common sense.  You may have to adjust your position to lessen the strain on your back.     18.   Must wear your compression stockings during the day and may remove           them at night for 4 weeks after your surgery.           19.   Return to the office in 2 weeks to see Dr. Perez.                         "

## 2017-08-03 NOTE — PROGRESS NOTES
Postop day 3: Maximum temperature 99.1, VSS.   No headache no leg pain moves legs well.  Expected back discomfort.  No further abdominal complaints.  Abdomen is soft and nontender.  The wound is clean and dry, nonfluctuant or indurated   Hemoglobin 11.2.  WBC 12.6.    Doing well.  Plan DC home today

## 2017-08-07 ENCOUNTER — TELEPHONE (OUTPATIENT)
Dept: NEUROSURGERY | Facility: CLINIC | Age: 62
End: 2017-08-07

## 2017-08-07 NOTE — TELEPHONE ENCOUNTER
How are you feeling? Sore    Are you having any pain? Where? Incisional pain, legs are tight and weak..Left side Is worse    Rate pain from 1-10 - 10    Are you taking the pain RX? yes    Do you think it's helping? Just a little bit    Do you feel better than before surgery? NO, feels worse    Was your dressing clean and dry?     Dermabond OK? Feels like the glue is putting pressure on the incision    Is you incision red, swollen or bleeding? None,has had a fever of 99    Are you having any trouble with nausea or constipation? Constipation, starting to use Mirilax.  Also taking a stool softener    Were your discharge instructions easy to understand? yes    Any other questions?    Confirm post op appt - Yes

## 2017-08-15 ENCOUNTER — OFFICE VISIT (OUTPATIENT)
Dept: ORTHOPEDIC SURGERY | Facility: CLINIC | Age: 62
End: 2017-08-15

## 2017-08-15 DIAGNOSIS — Z98.1 S/P LUMBAR SPINAL FUSION: Primary | ICD-10-CM

## 2017-08-15 PROCEDURE — 72100 X-RAY EXAM L-S SPINE 2/3 VWS: CPT | Performed by: ORTHOPAEDIC SURGERY

## 2017-08-15 PROCEDURE — 99024 POSTOP FOLLOW-UP VISIT: CPT | Performed by: ORTHOPAEDIC SURGERY

## 2017-08-15 RX ORDER — OXYCODONE HYDROCHLORIDE AND ACETAMINOPHEN 5; 325 MG/1; MG/1
TABLET ORAL
Qty: 50 TABLET | Refills: 0 | Status: SHIPPED | OUTPATIENT
Start: 2017-08-15 | End: 2017-08-31

## 2017-08-17 RX ORDER — CYCLOBENZAPRINE HCL 10 MG
TABLET ORAL
Qty: 50 TABLET | Refills: 0 | Status: ON HOLD | OUTPATIENT
Start: 2017-08-17 | End: 2017-09-02 | Stop reason: SDUPTHER

## 2017-08-31 ENCOUNTER — OFFICE VISIT (OUTPATIENT)
Dept: NEUROSURGERY | Facility: CLINIC | Age: 62
End: 2017-08-31

## 2017-08-31 VITALS
HEART RATE: 107 BPM | BODY MASS INDEX: 29.26 KG/M2 | SYSTOLIC BLOOD PRESSURE: 123 MMHG | WEIGHT: 209 LBS | HEIGHT: 71 IN | DIASTOLIC BLOOD PRESSURE: 81 MMHG

## 2017-08-31 DIAGNOSIS — Z09 SURGERY FOLLOW-UP EXAMINATION: Primary | ICD-10-CM

## 2017-08-31 PROCEDURE — 99024 POSTOP FOLLOW-UP VISIT: CPT | Performed by: NURSE PRACTITIONER

## 2017-09-02 RX ORDER — INCONTINENCE PAD,LINER,DISP
EACH MISCELLANEOUS
Qty: 30 TABLET | Refills: 1 | Status: SHIPPED | OUTPATIENT
Start: 2017-09-02 | End: 2017-09-26 | Stop reason: SDUPTHER

## 2017-09-03 ENCOUNTER — HOSPITAL ENCOUNTER (INPATIENT)
Facility: HOSPITAL | Age: 62
LOS: 4 days | Discharge: HOME HEALTH | End: 2017-09-08
Attending: EMERGENCY MEDICINE | Admitting: HOSPITALIST

## 2017-09-03 ENCOUNTER — APPOINTMENT (OUTPATIENT)
Dept: CT IMAGING | Facility: HOSPITAL | Age: 62
End: 2017-09-03

## 2017-09-03 DIAGNOSIS — M43.16 SPONDYLOLISTHESIS OF LUMBAR REGION: ICD-10-CM

## 2017-09-03 DIAGNOSIS — R10.9 RIGHT FLANK PAIN: ICD-10-CM

## 2017-09-03 DIAGNOSIS — R50.9 FEVER CHILLS: Primary | ICD-10-CM

## 2017-09-03 DIAGNOSIS — M54.50 ACUTE LOW BACK PAIN WITHOUT SCIATICA, UNSPECIFIED BACK PAIN LATERALITY: ICD-10-CM

## 2017-09-03 DIAGNOSIS — D72.829 LEUKOCYTOSIS, UNSPECIFIED TYPE: ICD-10-CM

## 2017-09-03 DIAGNOSIS — M54.16 LUMBAR RADICULOPATHY: ICD-10-CM

## 2017-09-03 LAB
ALBUMIN SERPL-MCNC: 3.3 G/DL (ref 3.5–5.2)
ALBUMIN/GLOB SERPL: 0.9 G/DL
ALP SERPL-CCNC: 80 U/L (ref 39–117)
ALT SERPL W P-5'-P-CCNC: 16 U/L (ref 1–41)
ANION GAP SERPL CALCULATED.3IONS-SCNC: 13.6 MMOL/L
AST SERPL-CCNC: 15 U/L (ref 1–40)
BACTERIA UR QL AUTO: NORMAL /HPF
BASOPHILS # BLD AUTO: 0.04 10*3/MM3 (ref 0–0.2)
BASOPHILS NFR BLD AUTO: 0.3 % (ref 0–1.5)
BILIRUB SERPL-MCNC: 0.5 MG/DL (ref 0.1–1.2)
BILIRUB UR QL STRIP: NEGATIVE
BUN BLD-MCNC: 14 MG/DL (ref 8–23)
BUN/CREAT SERPL: 13.5 (ref 7–25)
CALCIUM SPEC-SCNC: 8.5 MG/DL (ref 8.6–10.5)
CHLORIDE SERPL-SCNC: 94 MMOL/L (ref 98–107)
CLARITY UR: CLEAR
CO2 SERPL-SCNC: 25.4 MMOL/L (ref 22–29)
COLOR UR: YELLOW
CREAT BLD-MCNC: 1.04 MG/DL (ref 0.76–1.27)
D-LACTATE SERPL-SCNC: 1.3 MMOL/L (ref 0.5–2)
DEPRECATED RDW RBC AUTO: 44 FL (ref 37–54)
EOSINOPHIL # BLD AUTO: 0.05 10*3/MM3 (ref 0–0.7)
EOSINOPHIL NFR BLD AUTO: 0.4 % (ref 0.3–6.2)
ERYTHROCYTE [DISTWIDTH] IN BLOOD BY AUTOMATED COUNT: 13.2 % (ref 11.5–14.5)
GFR SERPL CREATININE-BSD FRML MDRD: 72 ML/MIN/1.73
GLOBULIN UR ELPH-MCNC: 3.6 GM/DL
GLUCOSE BLD-MCNC: 118 MG/DL (ref 65–99)
GLUCOSE UR STRIP-MCNC: NEGATIVE MG/DL
HCT VFR BLD AUTO: 31.4 % (ref 40.4–52.2)
HGB BLD-MCNC: 10.3 G/DL (ref 13.7–17.6)
HGB UR QL STRIP.AUTO: NEGATIVE
HYALINE CASTS UR QL AUTO: NORMAL /LPF
IMM GRANULOCYTES # BLD: 0.04 10*3/MM3 (ref 0–0.03)
IMM GRANULOCYTES NFR BLD: 0.3 % (ref 0–0.5)
KETONES UR QL STRIP: NEGATIVE
LEUKOCYTE ESTERASE UR QL STRIP.AUTO: NEGATIVE
LIPASE SERPL-CCNC: 20 U/L (ref 13–60)
LYMPHOCYTES # BLD AUTO: 1.77 10*3/MM3 (ref 0.9–4.8)
LYMPHOCYTES NFR BLD AUTO: 14.2 % (ref 19.6–45.3)
MCH RBC QN AUTO: 30 PG (ref 27–32.7)
MCHC RBC AUTO-ENTMCNC: 32.8 G/DL (ref 32.6–36.4)
MCV RBC AUTO: 91.5 FL (ref 79.8–96.2)
MONOCYTES # BLD AUTO: 1.63 10*3/MM3 (ref 0.2–1.2)
MONOCYTES NFR BLD AUTO: 13 % (ref 5–12)
NEUTROPHILS # BLD AUTO: 8.97 10*3/MM3 (ref 1.9–8.1)
NEUTROPHILS NFR BLD AUTO: 71.8 % (ref 42.7–76)
NITRITE UR QL STRIP: NEGATIVE
PH UR STRIP.AUTO: 6 [PH] (ref 5–8)
PLATELET # BLD AUTO: 275 10*3/MM3 (ref 140–500)
PMV BLD AUTO: 9.8 FL (ref 6–12)
POTASSIUM BLD-SCNC: 3.4 MMOL/L (ref 3.5–5.2)
PROT SERPL-MCNC: 6.9 G/DL (ref 6–8.5)
PROT UR QL STRIP: ABNORMAL
RBC # BLD AUTO: 3.43 10*6/MM3 (ref 4.6–6)
RBC # UR: NORMAL /HPF
REF LAB TEST METHOD: NORMAL
SODIUM BLD-SCNC: 133 MMOL/L (ref 136–145)
SP GR UR STRIP: 1.02 (ref 1–1.03)
SQUAMOUS #/AREA URNS HPF: NORMAL /HPF
UROBILINOGEN UR QL STRIP: ABNORMAL
WBC NRBC COR # BLD: 12.5 10*3/MM3 (ref 4.5–10.7)
WBC UR QL AUTO: NORMAL /HPF

## 2017-09-03 PROCEDURE — 87150 DNA/RNA AMPLIFIED PROBE: CPT | Performed by: EMERGENCY MEDICINE

## 2017-09-03 PROCEDURE — 85025 COMPLETE CBC W/AUTO DIFF WBC: CPT | Performed by: EMERGENCY MEDICINE

## 2017-09-03 PROCEDURE — 83605 ASSAY OF LACTIC ACID: CPT | Performed by: EMERGENCY MEDICINE

## 2017-09-03 PROCEDURE — 83690 ASSAY OF LIPASE: CPT | Performed by: PHYSICIAN ASSISTANT

## 2017-09-03 PROCEDURE — 87147 CULTURE TYPE IMMUNOLOGIC: CPT | Performed by: EMERGENCY MEDICINE

## 2017-09-03 PROCEDURE — 99284 EMERGENCY DEPT VISIT MOD MDM: CPT

## 2017-09-03 PROCEDURE — 81001 URINALYSIS AUTO W/SCOPE: CPT | Performed by: PHYSICIAN ASSISTANT

## 2017-09-03 PROCEDURE — 36415 COLL VENOUS BLD VENIPUNCTURE: CPT

## 2017-09-03 PROCEDURE — 25010000002 ONDANSETRON PER 1 MG: Performed by: EMERGENCY MEDICINE

## 2017-09-03 PROCEDURE — 80053 COMPREHEN METABOLIC PANEL: CPT | Performed by: EMERGENCY MEDICINE

## 2017-09-03 PROCEDURE — 86140 C-REACTIVE PROTEIN: CPT | Performed by: PHYSICIAN ASSISTANT

## 2017-09-03 PROCEDURE — 74177 CT ABD & PELVIS W/CONTRAST: CPT

## 2017-09-03 PROCEDURE — 0 IOPAMIDOL 61 % SOLUTION: Performed by: EMERGENCY MEDICINE

## 2017-09-03 PROCEDURE — 85652 RBC SED RATE AUTOMATED: CPT | Performed by: PHYSICIAN ASSISTANT

## 2017-09-03 PROCEDURE — 87040 BLOOD CULTURE FOR BACTERIA: CPT | Performed by: EMERGENCY MEDICINE

## 2017-09-03 PROCEDURE — 87186 SC STD MICRODIL/AGAR DIL: CPT | Performed by: EMERGENCY MEDICINE

## 2017-09-03 PROCEDURE — 25010000002 MORPHINE PER 10 MG: Performed by: EMERGENCY MEDICINE

## 2017-09-03 RX ORDER — ASPIRIN 81 MG/1
TABLET ORAL
COMMUNITY
End: 2017-09-08 | Stop reason: HOSPADM

## 2017-09-03 RX ORDER — ACETAMINOPHEN 500 MG
TABLET ORAL
COMMUNITY
End: 2018-03-30

## 2017-09-03 RX ORDER — SPIRONOLACTONE AND HYDROCHLOROTHIAZIDE 25; 25 MG/1; MG/1
1 TABLET ORAL
COMMUNITY
End: 2017-09-26

## 2017-09-03 RX ORDER — ONDANSETRON 2 MG/ML
4 INJECTION INTRAMUSCULAR; INTRAVENOUS ONCE
Status: COMPLETED | OUTPATIENT
Start: 2017-09-03 | End: 2017-09-03

## 2017-09-03 RX ORDER — CIPROFLOXACIN 500 MG/1
500 TABLET, FILM COATED ORAL
COMMUNITY
Start: 2017-09-03 | End: 2017-09-08 | Stop reason: HOSPADM

## 2017-09-03 RX ORDER — SODIUM CHLORIDE 0.9 % (FLUSH) 0.9 %
10 SYRINGE (ML) INJECTION AS NEEDED
Status: DISCONTINUED | OUTPATIENT
Start: 2017-09-03 | End: 2017-09-04

## 2017-09-03 RX ORDER — ALPRAZOLAM 0.25 MG/1
0.25 TABLET ORAL
COMMUNITY
End: 2018-04-23

## 2017-09-03 RX ORDER — MELOXICAM 15 MG/1
15 TABLET ORAL
Status: ON HOLD | COMMUNITY
End: 2017-09-08

## 2017-09-03 RX ADMIN — ONDANSETRON 4 MG: 2 INJECTION INTRAMUSCULAR; INTRAVENOUS at 22:25

## 2017-09-03 RX ADMIN — IOPAMIDOL 85 ML: 612 INJECTION, SOLUTION INTRAVENOUS at 23:34

## 2017-09-03 RX ADMIN — MORPHINE SULFATE 4 MG: 4 INJECTION, SOLUTION INTRAMUSCULAR; INTRAVENOUS at 22:26

## 2017-09-03 RX ADMIN — SODIUM CHLORIDE 1000 ML: 9 INJECTION, SOLUTION INTRAVENOUS at 22:25

## 2017-09-04 ENCOUNTER — APPOINTMENT (OUTPATIENT)
Dept: MRI IMAGING | Facility: HOSPITAL | Age: 62
End: 2017-09-04

## 2017-09-04 PROBLEM — R50.9 FEVER CHILLS: Status: ACTIVE | Noted: 2017-09-04

## 2017-09-04 LAB
ANION GAP SERPL CALCULATED.3IONS-SCNC: 13.6 MMOL/L
BACTERIA BLD CULT: ABNORMAL
BUN BLD-MCNC: 10 MG/DL (ref 8–23)
BUN/CREAT SERPL: 11.1 (ref 7–25)
CALCIUM SPEC-SCNC: 8.1 MG/DL (ref 8.6–10.5)
CHLORIDE SERPL-SCNC: 98 MMOL/L (ref 98–107)
CO2 SERPL-SCNC: 22.4 MMOL/L (ref 22–29)
CREAT BLD-MCNC: 0.9 MG/DL (ref 0.76–1.27)
CRP SERPL-MCNC: 16.42 MG/DL (ref 0–0.5)
DEPRECATED RDW RBC AUTO: 45.3 FL (ref 37–54)
ERYTHROCYTE [DISTWIDTH] IN BLOOD BY AUTOMATED COUNT: 13.4 % (ref 11.5–14.5)
ERYTHROCYTE [SEDIMENTATION RATE] IN BLOOD: 93 MM/HR (ref 0–20)
GFR SERPL CREATININE-BSD FRML MDRD: 85 ML/MIN/1.73
GLUCOSE BLD-MCNC: 126 MG/DL (ref 65–99)
HCT VFR BLD AUTO: 29.5 % (ref 40.4–52.2)
HGB BLD-MCNC: 9.6 G/DL (ref 13.7–17.6)
HOLD SPECIMEN: NORMAL
HOLD SPECIMEN: NORMAL
MAGNESIUM SERPL-MCNC: 2.2 MG/DL (ref 1.6–2.4)
MCH RBC QN AUTO: 29.9 PG (ref 27–32.7)
MCHC RBC AUTO-ENTMCNC: 32.5 G/DL (ref 32.6–36.4)
MCV RBC AUTO: 91.9 FL (ref 79.8–96.2)
PHOSPHATE SERPL-MCNC: 3.1 MG/DL (ref 2.5–4.5)
PLATELET # BLD AUTO: 256 10*3/MM3 (ref 140–500)
PMV BLD AUTO: 9.6 FL (ref 6–12)
POTASSIUM BLD-SCNC: 3.1 MMOL/L (ref 3.5–5.2)
RBC # BLD AUTO: 3.21 10*6/MM3 (ref 4.6–6)
SODIUM BLD-SCNC: 134 MMOL/L (ref 136–145)
WBC NRBC COR # BLD: 12.65 10*3/MM3 (ref 4.5–10.7)
WHOLE BLOOD HOLD SPECIMEN: NORMAL
WHOLE BLOOD HOLD SPECIMEN: NORMAL

## 2017-09-04 PROCEDURE — 36415 COLL VENOUS BLD VENIPUNCTURE: CPT | Performed by: HOSPITALIST

## 2017-09-04 PROCEDURE — 72158 MRI LUMBAR SPINE W/O & W/DYE: CPT

## 2017-09-04 PROCEDURE — 72157 MRI CHEST SPINE W/O & W/DYE: CPT

## 2017-09-04 PROCEDURE — A9577 INJ MULTIHANCE: HCPCS | Performed by: INTERNAL MEDICINE

## 2017-09-04 PROCEDURE — 25010000002 VANCOMYCIN 10 G RECONSTITUTED SOLUTION: Performed by: INTERNAL MEDICINE

## 2017-09-04 PROCEDURE — 80048 BASIC METABOLIC PNL TOTAL CA: CPT | Performed by: HOSPITALIST

## 2017-09-04 PROCEDURE — 85027 COMPLETE CBC AUTOMATED: CPT | Performed by: HOSPITALIST

## 2017-09-04 PROCEDURE — 25010000002 MORPHINE PER 10 MG: Performed by: EMERGENCY MEDICINE

## 2017-09-04 PROCEDURE — 0 GADOBENATE DIMEGLUMINE 529 MG/ML SOLUTION: Performed by: INTERNAL MEDICINE

## 2017-09-04 PROCEDURE — 25010000002 VANCOMYCIN: Performed by: PHYSICIAN ASSISTANT

## 2017-09-04 PROCEDURE — 25010000002 HYDROMORPHONE PER 4 MG: Performed by: HOSPITALIST

## 2017-09-04 PROCEDURE — 84100 ASSAY OF PHOSPHORUS: CPT | Performed by: HOSPITALIST

## 2017-09-04 PROCEDURE — 87040 BLOOD CULTURE FOR BACTERIA: CPT | Performed by: INTERNAL MEDICINE

## 2017-09-04 PROCEDURE — 83735 ASSAY OF MAGNESIUM: CPT | Performed by: HOSPITALIST

## 2017-09-04 PROCEDURE — 87147 CULTURE TYPE IMMUNOLOGIC: CPT | Performed by: INTERNAL MEDICINE

## 2017-09-04 RX ORDER — ACETAMINOPHEN 500 MG
TABLET ORAL
Status: COMPLETED
Start: 2017-09-04 | End: 2017-09-04

## 2017-09-04 RX ORDER — SODIUM CHLORIDE 9 MG/ML
100 INJECTION, SOLUTION INTRAVENOUS CONTINUOUS
Status: DISCONTINUED | OUTPATIENT
Start: 2017-09-04 | End: 2017-09-06

## 2017-09-04 RX ORDER — OXYCODONE AND ACETAMINOPHEN 10; 325 MG/1; MG/1
1 TABLET ORAL EVERY 6 HOURS PRN
Status: DISCONTINUED | OUTPATIENT
Start: 2017-09-04 | End: 2017-09-07

## 2017-09-04 RX ORDER — ACETAMINOPHEN 325 MG/1
650 TABLET ORAL EVERY 4 HOURS PRN
Status: DISCONTINUED | OUTPATIENT
Start: 2017-09-04 | End: 2017-09-08 | Stop reason: HOSPADM

## 2017-09-04 RX ORDER — ALPRAZOLAM 0.25 MG/1
0.25 TABLET ORAL NIGHTLY PRN
Status: DISCONTINUED | OUTPATIENT
Start: 2017-09-04 | End: 2017-09-08 | Stop reason: HOSPADM

## 2017-09-04 RX ORDER — ESCITALOPRAM OXALATE 20 MG/1
20 TABLET ORAL NIGHTLY
Status: DISCONTINUED | OUTPATIENT
Start: 2017-09-04 | End: 2017-09-08 | Stop reason: HOSPADM

## 2017-09-04 RX ORDER — ONDANSETRON 4 MG/1
4 TABLET, ORALLY DISINTEGRATING ORAL EVERY 6 HOURS PRN
Status: DISCONTINUED | OUTPATIENT
Start: 2017-09-04 | End: 2017-09-08 | Stop reason: HOSPADM

## 2017-09-04 RX ORDER — LOSARTAN POTASSIUM 50 MG/1
100 TABLET ORAL NIGHTLY
Status: DISCONTINUED | OUTPATIENT
Start: 2017-09-04 | End: 2017-09-08 | Stop reason: HOSPADM

## 2017-09-04 RX ORDER — ASPIRIN 81 MG/1
81 TABLET ORAL DAILY
Status: DISCONTINUED | OUTPATIENT
Start: 2017-09-04 | End: 2017-09-05

## 2017-09-04 RX ORDER — ACETAMINOPHEN 500 MG
1000 TABLET ORAL ONCE
Status: COMPLETED | OUTPATIENT
Start: 2017-09-04 | End: 2017-09-04

## 2017-09-04 RX ORDER — FAMOTIDINE 20 MG/1
20 TABLET, FILM COATED ORAL 2 TIMES DAILY
Status: DISCONTINUED | OUTPATIENT
Start: 2017-09-04 | End: 2017-09-08 | Stop reason: HOSPADM

## 2017-09-04 RX ORDER — BISACODYL 5 MG/1
5 TABLET, DELAYED RELEASE ORAL DAILY PRN
Status: DISCONTINUED | OUTPATIENT
Start: 2017-09-04 | End: 2017-09-08 | Stop reason: HOSPADM

## 2017-09-04 RX ORDER — SENNA AND DOCUSATE SODIUM 50; 8.6 MG/1; MG/1
2 TABLET, FILM COATED ORAL NIGHTLY
Status: DISCONTINUED | OUTPATIENT
Start: 2017-09-04 | End: 2017-09-08 | Stop reason: HOSPADM

## 2017-09-04 RX ORDER — AMLODIPINE BESYLATE 10 MG/1
10 TABLET ORAL EVERY EVENING
Status: DISCONTINUED | OUTPATIENT
Start: 2017-09-04 | End: 2017-09-08 | Stop reason: HOSPADM

## 2017-09-04 RX ORDER — ACETAMINOPHEN 500 MG
TABLET ORAL
Status: DISPENSED
Start: 2017-09-04 | End: 2017-09-04

## 2017-09-04 RX ORDER — BISACODYL 10 MG
10 SUPPOSITORY, RECTAL RECTAL DAILY PRN
Status: DISCONTINUED | OUTPATIENT
Start: 2017-09-04 | End: 2017-09-08 | Stop reason: HOSPADM

## 2017-09-04 RX ORDER — HYDROMORPHONE HYDROCHLORIDE 1 MG/ML
0.5 INJECTION, SOLUTION INTRAMUSCULAR; INTRAVENOUS; SUBCUTANEOUS
Status: DISCONTINUED | OUTPATIENT
Start: 2017-09-04 | End: 2017-09-07

## 2017-09-04 RX ORDER — CYCLOBENZAPRINE HCL 5 MG
5 TABLET ORAL 3 TIMES DAILY PRN
Status: DISCONTINUED | OUTPATIENT
Start: 2017-09-04 | End: 2017-09-08 | Stop reason: HOSPADM

## 2017-09-04 RX ORDER — POTASSIUM CHLORIDE 750 MG/1
40 CAPSULE, EXTENDED RELEASE ORAL ONCE
Status: COMPLETED | OUTPATIENT
Start: 2017-09-04 | End: 2017-09-04

## 2017-09-04 RX ORDER — SODIUM CHLORIDE 0.9 % (FLUSH) 0.9 %
1-10 SYRINGE (ML) INJECTION AS NEEDED
Status: DISCONTINUED | OUTPATIENT
Start: 2017-09-04 | End: 2017-09-08 | Stop reason: HOSPADM

## 2017-09-04 RX ORDER — ONDANSETRON 4 MG/1
4 TABLET, FILM COATED ORAL EVERY 6 HOURS PRN
Status: DISCONTINUED | OUTPATIENT
Start: 2017-09-04 | End: 2017-09-08 | Stop reason: HOSPADM

## 2017-09-04 RX ORDER — ONDANSETRON 2 MG/ML
4 INJECTION INTRAMUSCULAR; INTRAVENOUS EVERY 6 HOURS PRN
Status: DISCONTINUED | OUTPATIENT
Start: 2017-09-04 | End: 2017-09-08 | Stop reason: HOSPADM

## 2017-09-04 RX ADMIN — HYDROMORPHONE HYDROCHLORIDE 0.5 MG: 1 INJECTION, SOLUTION INTRAMUSCULAR; INTRAVENOUS; SUBCUTANEOUS at 03:44

## 2017-09-04 RX ADMIN — AZTREONAM 2 G: 2 INJECTION, POWDER, FOR SOLUTION INTRAMUSCULAR; INTRAVENOUS at 10:56

## 2017-09-04 RX ADMIN — FAMOTIDINE 20 MG: 20 TABLET, FILM COATED ORAL at 10:56

## 2017-09-04 RX ADMIN — LOSARTAN POTASSIUM 100 MG: 50 TABLET, FILM COATED ORAL at 21:00

## 2017-09-04 RX ADMIN — VANCOMYCIN HYDROCHLORIDE 1500 MG: 1 INJECTION, POWDER, LYOPHILIZED, FOR SOLUTION INTRAVENOUS at 13:34

## 2017-09-04 RX ADMIN — POTASSIUM CHLORIDE 40 MEQ: 750 CAPSULE, EXTENDED RELEASE ORAL at 13:37

## 2017-09-04 RX ADMIN — OXYCODONE AND ACETAMINOPHEN 1 TABLET: 10; 325 TABLET ORAL at 10:56

## 2017-09-04 RX ADMIN — DOCUSATE SODIUM -SENNOSIDES 2 TABLET: 50; 8.6 TABLET, COATED ORAL at 21:00

## 2017-09-04 RX ADMIN — Medication 1000 MG: at 01:26

## 2017-09-04 RX ADMIN — OXYCODONE AND ACETAMINOPHEN 1 TABLET: 10; 325 TABLET ORAL at 18:04

## 2017-09-04 RX ADMIN — FAMOTIDINE 20 MG: 20 TABLET, FILM COATED ORAL at 18:04

## 2017-09-04 RX ADMIN — AZTREONAM 2 G: 2 INJECTION, POWDER, FOR SOLUTION INTRAMUSCULAR; INTRAVENOUS at 18:04

## 2017-09-04 RX ADMIN — SODIUM CHLORIDE 100 ML/HR: 9 INJECTION, SOLUTION INTRAVENOUS at 04:26

## 2017-09-04 RX ADMIN — ASPIRIN 81 MG: 81 TABLET ORAL at 10:56

## 2017-09-04 RX ADMIN — AMLODIPINE BESYLATE 10 MG: 10 TABLET ORAL at 18:04

## 2017-09-04 RX ADMIN — ACETAMINOPHEN 1000 MG: 500 TABLET ORAL at 01:26

## 2017-09-04 RX ADMIN — VANCOMYCIN HYDROCHLORIDE 1750 MG: 1 INJECTION, POWDER, LYOPHILIZED, FOR SOLUTION INTRAVENOUS at 01:18

## 2017-09-04 RX ADMIN — MORPHINE SULFATE 4 MG: 4 INJECTION, SOLUTION INTRAMUSCULAR; INTRAVENOUS at 01:26

## 2017-09-04 RX ADMIN — ESCITALOPRAM 20 MG: 20 TABLET, FILM COATED ORAL at 21:00

## 2017-09-04 RX ADMIN — GADOBENATE DIMEGLUMINE 20 ML: 529 INJECTION, SOLUTION INTRAVENOUS at 08:59

## 2017-09-04 NOTE — SIGNIFICANT NOTE
09/04/17 1123   Rehab Treatment   Discipline physical therapist   Rehab Evaluation   Evaluation Not Performed patient unavailable for evaluation  (Pt admitted last night, h/o discectomy 7/31.  MRI results pending and consult to ortho MD.  Plan to attempt PT evaluation 9/5.   )   Recommendation   PT - Next Appointment 09/05/17

## 2017-09-04 NOTE — ED PROVIDER NOTES
" EMERGENCY DEPARTMENT ENCOUNTER    CHIEF COMPLAINT  Chief Complaint: Flank Pain  History given by: Patient  History limited by:   Room Number: P388/1  PMD: Montse Cain PA-C  Neurosurgeon: Dr. Rick    HPI:  Pt is a 62 y.o. male who presents complaining of R sided flank pain onset 2 days ago. He states the pain radiates to the RLQ. Pt has a hx of back issues. Pt was seen at [facility] today and was referred to ED. He denies any hx of nephrolithiasis. He states some mild dysuria, but denies any hematuria. He denies any N/V. Pt has had decreased PO. Pt denies any CP or SOA. Pt had a discectomy performed on 7/31/17.     Duration:  2 days  Onset: gradual  Timing: constant  Location: R flank  Radiation: none  Quality: \"pain\"  Intensity/Severity: moderate  Progression: worsening  Associated Symptoms: dysuria, decreased PO  Aggravating Factors: movement  Alleviating Factors: none  Previous Episodes: hx of back pain  Treatment before arrival: seen at [facility] and referred to ED.     PAST MEDICAL HISTORY  Active Ambulatory Problems     Diagnosis Date Noted   • Allergy    • COPD (chronic obstructive pulmonary disease)    • Hyperlipidemia    • IFG (impaired fasting glucose)    • Rheumatoid arthritis    • Hypertension    • Anxiety disorder 06/27/2016   • Depression 06/27/2016   • Lumbar radiculopathy 06/27/2016   • Spondylolisthesis of lumbar region 07/31/2017     Resolved Ambulatory Problems     Diagnosis Date Noted   • Surgery follow-up examination 10/24/2016   • Postoperative nausea and vomiting 08/01/2017   • Postoperative ileus 08/01/2017     Past Medical History:   Diagnosis Date   • Acute sinusitis    • Allergy    • Anxiety disorder    • Cervical disc disorder    • COPD (chronic obstructive pulmonary disease)    • CTS (carpal tunnel syndrome)    • Dermatophytosis of groin    • Encounter for eye exam 10/2014   • History of bone density study 03/2014   • History of chest x-ray 02/15/2011   • History of nuclear stress " test 03/09/2015   • History of pulmonary function tests 03/2015   • Hyperlipidemia    • Hypertension    • IFG (impaired fasting glucose)    • Low back pain    • Lumbosacral disc disease    • Nerve damage    • Osteoarthritis, multiple sites    • Postoperative nausea and vomiting 8/1/2017   • Rheumatoid arthritis    • Sciatica    • Thoracic disc disorder        PAST SURGICAL HISTORY  Past Surgical History:   Procedure Laterality Date   • BACK SURGERY     • COLONOSCOPY  02/02/2011    unremarkable only for scattered diverticulosis; had hx of prior polyp   • CYST REMOVAL  03/2016    x2  FROM FACE AND EAR   • DENTAL PROCEDURE  2008    teeth removed; dental implants   • EPIDURAL BLOCK  1995    L/S spine   • HAND SURGERY Right 2003    avulsion lacerations 4th R finger debrided   • LUMBAR DISCECTOMY FUSION INSTRUMENTATION Left 10/10/2016    Procedure: LEFT L2-L3, L3-L4 LUMBAR LAMINECTOMY/ DISCECTOMY WITH METRIX ;  Surgeon: Sawyer Rick MD;  Location: [facility];  Service:    • LUMBAR DISCECTOMY FUSION INSTRUMENTATION N/A 7/31/2017    Procedure: LUMBAR FUSION DECOMPRESSON WITH PEDICLE SCREWS with LAURA L2-3,L3-4;  Surgeon: Jacky Perez MD;  Location: [facility];  Service:    • LUMBAR LAMINECTOMY DISCECTOMY DECOMPRESSION N/A 7/31/2017    Procedure: L2 to L4 laminectomy with neural lysis and a fusion by orthopedics;  Surgeon: Sawyer Rick MD;  Location: [facility];  Service:    • SHOULDER SURGERY Right 02/23/2011    Dr. Navarro   • SINUS SURGERY  2003    Dr. Barrera       FAMILY HISTORY  Family History   Problem Relation Age of Onset   • Diabetes Mother    • Heart disease Mother    • Hyperlipidemia Mother    • Stroke Mother    • Heart disease Father    • Rheum arthritis Father    • Alcohol abuse Father    • Hyperlipidemia Father    • Depression Brother    • Cancer Brother      prostate   • Depression Brother    • Heart disease Brother    • Hyperlipidemia Brother    • Cancer Brother    • Thyroid disease  Sister        SOCIAL HISTORY  Social History     Social History   • Marital status:      Spouse name: N/A   • Number of children: N/A   • Years of education: N/A     Occupational History   • Not on file.     Social History Main Topics   • Smoking status: Current Every Day Smoker     Packs/day: 1.00     Years: 45.00     Types: Cigarettes   • Smokeless tobacco: Not on file      Comment: last cigarette 7/30/17   • Alcohol use No   • Drug use: No   • Sexual activity: Not Currently     Partners: Female     Birth control/ protection: None     Other Topics Concern   • Not on file     Social History Narrative       ALLERGIES  Atorvastatin and Ceclor [cefaclor]    REVIEW OF SYSTEMS  Review of Systems   Constitutional: Positive for appetite change (decreased). Negative for activity change and fever.   HENT: Negative for congestion and sore throat.    Eyes: Negative.    Respiratory: Negative for cough and shortness of breath.    Cardiovascular: Negative for chest pain and leg swelling.   Gastrointestinal: Positive for abdominal pain. Negative for diarrhea and vomiting.   Endocrine: Negative.    Genitourinary: Positive for dysuria (mild) and flank pain. Negative for decreased urine volume and hematuria.   Musculoskeletal: Negative for neck pain.   Skin: Negative for rash and wound.   Allergic/Immunologic: Negative.    Neurological: Negative for weakness, numbness and headaches.   Hematological: Negative.    Psychiatric/Behavioral: Negative.    All other systems reviewed and are negative.      PHYSICAL EXAM  ED Triage Vitals   Temp Heart Rate Resp BP SpO2   09/03/17 2143 09/03/17 2143 09/03/17 2143 09/03/17 2143 09/03/17 2143   101 °F (38.3 °C) 106 18 143/89 95 %      Temp src Heart Rate Source Patient Position BP Location FiO2 (%)   09/03/17 2143 09/03/17 2143 -- -- --   Tympanic Monitor          Physical Exam   Constitutional: He is oriented to person, place, and time and well-developed, well-nourished, and in no  distress. He appears healthy.  Non-toxic appearance.   HENT:   Head: Normocephalic and atraumatic.   Eyes: EOM are normal. Pupils are equal, round, and reactive to light.   Neck: Normal range of motion. Neck supple.   Cardiovascular: Normal rate, regular rhythm and normal heart sounds.    Pulmonary/Chest: Effort normal and breath sounds normal. No respiratory distress.   Abdominal: Soft. There is no tenderness. There is CVA tenderness (R side). There is no rebound and no guarding.   Musculoskeletal: Normal range of motion. He exhibits no edema.        Lumbar back: He exhibits tenderness.   Neurological: He is alert and oriented to person, place, and time. He has normal sensation and normal strength.   Skin: Skin is warm and dry.   L-spine surgical scar this is clean, dry, and intact without sign of infection.    Psychiatric: Mood and affect normal.   Nursing note and vitals reviewed.      LAB RESULTS  Lab Results (last 24 hours)     Procedure Component Value Units Date/Time    CBC & Differential [[id number]] Collected:  09/03/17 2224    Specimen:  Blood Updated:  09/03/17 2240    Narrative:       The following orders were created for panel order CBC & Differential.  Procedure                               Abnormality         Status                     ---------                               -----------         ------                     CBC Auto Differential[[id number]]        Abnormal            Final result                 Please view results for these tests on the individual orders.    Comprehensive Metabolic Panel [[id number]]  (Abnormal) Collected:  09/03/17 2224    Specimen:  Blood Updated:  09/03/17 2300     Glucose 118 (H) mg/dL      BUN 14 mg/dL      Creatinine 1.04 mg/dL      Sodium 133 (L) mmol/L      Potassium 3.4 (L) mmol/L      Chloride 94 (L) mmol/L      CO2 25.4 mmol/L      Calcium 8.5 (L) mg/dL      Total Protein 6.9 g/dL      Albumin 3.30 (L) g/dL      ALT (SGPT) 16 U/L      AST (SGOT) 15 U/L       Alkaline Phosphatase 80 U/L      Total Bilirubin 0.5 mg/dL      eGFR Non African Amer 72 mL/min/1.73      Globulin 3.6 gm/dL      A/G Ratio 0.9 g/dL      BUN/Creatinine Ratio 13.5     Anion Gap 13.6 mmol/L     Lactic Acid, Plasma [[id number]]  (Normal) Collected:  09/03/17 2224    Specimen:  Blood Updated:  09/03/17 2300     Lactate 1.3 mmol/L     Blood Culture [[id number]] Collected:  09/03/17 2224    Specimen:  Blood from Arm, Left Updated:  09/03/17 2233    CBC Auto Differential [[id number]]  (Abnormal) Collected:  09/03/17 2224    Specimen:  Blood Updated:  09/03/17 2240     WBC 12.50 (H) 10*3/mm3      RBC 3.43 (L) 10*6/mm3      Hemoglobin 10.3 (L) g/dL      Hematocrit 31.4 (L) %      MCV 91.5 fL      MCH 30.0 pg      MCHC 32.8 g/dL      RDW 13.2 %      RDW-SD 44.0 fl      MPV 9.8 fL      Platelets 275 10*3/mm3      Neutrophil % 71.8 %      Lymphocyte % 14.2 (L) %      Monocyte % 13.0 (H) %      Eosinophil % 0.4 %      Basophil % 0.3 %      Immature Grans % 0.3 %      Neutrophils, Absolute 8.97 (H) 10*3/mm3      Lymphocytes, Absolute 1.77 10*3/mm3      Monocytes, Absolute 1.63 (H) 10*3/mm3      Eosinophils, Absolute 0.05 10*3/mm3      Basophils, Absolute 0.04 10*3/mm3      Immature Grans, Absolute 0.04 (H) 10*3/mm3     Lipase [[id number]]  (Normal) Collected:  09/03/17 2224    Specimen:  Blood Updated:  09/03/17 2300     Lipase 20 U/L     Sedimentation Rate [[id number]]  (Abnormal) Collected:  09/03/17 2224    Specimen:  Blood Updated:  09/04/17 0122     Sed Rate 93 (H) mm/hr     C-reactive Protein [[id number]]  (Abnormal) Collected:  09/03/17 2224    Specimen:  Blood Updated:  09/04/17 0034     C-Reactive Protein 16.42 (H) mg/dL     Urinalysis With / Culture If Indicated [[id number]]  (Abnormal) Collected:  09/03/17 7642    Specimen:  Urine from Urine, Catheter Updated:  09/03/17 2310     Color, UA Yellow     Appearance, UA Clear     pH, UA 6.0     Specific Gravity, UA 1.017     Glucose, UA Negative     Ketones, UA  Negative     Bilirubin, UA Negative     Blood, UA Negative     Protein, UA 30 mg/dL (1+) (A)     Leuk Esterase, UA Negative     Nitrite, UA Negative     Urobilinogen, UA 0.2 E.U./dL    Urinalysis, Microscopic Only [[id number]] Collected:  09/03/17 2256    Specimen:  Urine from Urine, Catheter Updated:  09/03/17 2312     RBC, UA 0-2 /HPF      WBC, UA 0-2 /HPF      Bacteria, UA None Seen /HPF      Squamous Epithelial Cells, UA 0-2 /HPF      Hyaline Casts, UA 3-6 /LPF      Methodology Automated Microscopy    Blood Culture [[id number]] Collected:  09/03/17 2302    Specimen:  Blood from Arm, Left Updated:  09/03/17 2305          I ordered the above labs and reviewed the results    RADIOLOGY  CT Abdomen Pelvis With Contrast   Final Result   IMPRESSION :    1. Mild colonic diverticulosis, otherwise unremarkable exam                           This study was performed with techniques to keep radiation doses as low   as reasonably achievable (ALARA). Individualized dose reduction   techniques using automated exposure control or adjustment of mA and/or   kV according to the patient size were employed.        This report was finalized on 9/3/2017 11:48 PM by Reza Linares MD.          MRI Lumbar Spine With & Without Contrast    (Results Pending)   MRI Thoracic Spine With & Without Contrast    (Results Pending)        I ordered the above noted radiological studies. Interpreted by radiologist.  Reviewed by me in PACS.       PROCEDURES  Procedures      PROGRESS AND CONSULTS  ED Course   10:10 PM  Ordered CT abd/pel to rule out abnormality. Blood work and UA ordered.  12:28 AM  Reviewed pt's history and workup with Dr. Michelle.  After a bedside evaluation; Dr Michelle agrees with the plan of care. Will admit to rule out epidural abscess.  Time 12:37 AM  Discussed case with Dr Coyle (Hospitalist)  Reviewed history, exam, results and treatments.  Discussed concerns and plan of care. Dr Coyle accepts pt to be admitted to med/surg.  Will start on Vancomycin.             MEDICAL DECISION MAKING  Results were reviewed/discussed with the patient and they were also made aware of online access. Pt also made aware that some labs, such as cultures, will not be resulted during ER visit and follow up with PMD is necessary.     MDM  Number of Diagnoses or Management Options  Fever chills:   Leukocytosis, unspecified type:      Amount and/or Complexity of Data Reviewed  Clinical lab tests: ordered and reviewed (WBC 12.50, CRP 16.42)  Tests in the radiology section of CPT®: reviewed and ordered  Discuss the patient with other providers: yes (Dr. Coyle)           DIAGNOSIS  Final diagnoses:   Fever chills   Leukocytosis, unspecified type   Acute low back pain without sciatica, unspecified back pain laterality   Right flank pain       DISPOSITION  ADMISSION    Discussed treatment plan and reason for admission with pt/family and admitting physician.  Pt/family voiced understanding of the plan for admission for further testing/treatment as needed.         Latest Documented Vital Signs:  As of 4:49 AM  BP- 117/73 HR- 96 Temp- (!) 102.2 °F (39 °C) (Oral) O2 sat- (!) 88%    --  Documentation assistance provided by rubina Pham for Singh Villar PA-C.  Information recorded by the rubina was done at my direction and has been verified and validated by me.     Tyrone Pham  09/04/17 0039       ZO Celaya III  09/04/17 0449

## 2017-09-04 NOTE — ED PROVIDER NOTES
Pt presents to the ED complaining of back pain that onset 1 week ago. Pt is febrile. Pt denies numbness/tingling. Pt underwent a back surgery on 7/31/17. Pt states that his back pain was improved after his surgery before these sxs began. Discussed unremarkable lab and imaging findings. Discussed plan to admit for IV antibiotics and further evaluation of presumed post-operative infection. Pt and family agree to plan and all questions are addressed.     On exam, pt is in moderate distress due to pain, not septic appearing, has a midline incision to the lumbar spine that is clean, dry, and intact, has soft tissue tenderness and warmth to R flank in upper lumbar region with no abscess or redness of the skin, has no new neurologic deficit (pre-existing tingling in R thigh), and no sign of acute spinal cord compression.     I supervised care provided by the midlevel provider.    We have discussed this patient's history, physical exam, and treatment plan.   I have reviewed the note and personally saw and examined the patient and agree with the plan of care.    Documentation assistance provided by rubina Patel for Dr. Michelle.  Information recorded by the rubina was done at my direction and has been verified and validated by me.     Suzi Patel  09/04/17 [id number]       Antione Michelle MD  09/04/17 5057

## 2017-09-04 NOTE — CONSULTS
Inpatient Consult to Orthopedic Surgery  Consult performed by: MARIE YANG JR  Consult ordered by: DIAN JETT          Patient Care Team:  Montse Cain PA-C as PCP - General (Family Medicine)  Maria Antonia Lopez MD as Consulting Physician (Rheumatology)  Sawyer Rick MD as Surgeon (Neurosurgery)  Pradip Mcmillan MD as Consulting Physician (Pulmonary Disease)    Chief complaint: Back pain, malaise    Subjective     History of Present Illness    The patient is a 61 y/o male approximately 5 weeks s/p PSF by Dr. Perez who was admitted with increasing back/flank pain and swelling with some delayed incisional wound healing.  He does have RA maintained on DMARDs.  In the ED, WBC was 12.5 on arrival, markedly elevated ESR and CRP.  He was febrile to 39.0 in the ED.  Blood cultures have been NGTD, urine studies unrevealing.  He was admitted to the medicine service.    Orthopaedics was consulted for evaluation and management of possible spine infection.    He endorses malaise and pain and subjective weakness.  Denies SOB, CP.          Review of Systems   Review of Systems:  Constitutional: Malaise and weakness per HPI  HENT: Negative  Eyes: Negative  Respiratory: Negative; no shortness of breath  Cardiovascular: Negative; no current chest pain  Gastrointestinal: Negative  : Negative  Skin: Negative except as listed in HPI  Neurological: Negative, no numbness or deficits  Hematological: Negative  Muscoloskeletal: Per HPI    Past Medical History:   Diagnosis Date   • Acute sinusitis    • Allergy    • Anxiety disorder    • Cervical disc disorder    • COPD (chronic obstructive pulmonary disease)    • CTS (carpal tunnel syndrome)    • Dermatophytosis of groin    • Encounter for eye exam 10/2014    normal   • History of bone density study 03/2014    Dr. Maria Antonia Lopez; normal   • History of chest x-ray 02/15/2011    also 3-6-15; normal chest   • History of nuclear stress test 03/09/2015    cardiolite;   Dionicio; negative   • History of pulmonary function tests 03/2015    COPD   • Hyperlipidemia    • Hypertension    • IFG (impaired fasting glucose)    • Low back pain    • Lumbosacral disc disease    • Nerve damage     KAYLYNN ELBOWS   • Osteoarthritis, multiple sites    • Postoperative nausea and vomiting 8/1/2017   • Rheumatoid arthritis    • Sciatica    • Thoracic disc disorder    ,   Past Surgical History:   Procedure Laterality Date   • BACK SURGERY     • COLONOSCOPY  02/02/2011    unremarkable only for scattered diverticulosis; had hx of prior polyp   • CYST REMOVAL  03/2016    x2  FROM FACE AND EAR   • DENTAL PROCEDURE  2008    teeth removed; dental implants   • EPIDURAL BLOCK  1995    L/S spine   • HAND SURGERY Right 2003    avulsion lacerations 4th R finger debrided   • LUMBAR DISCECTOMY FUSION INSTRUMENTATION Left 10/10/2016    Procedure: LEFT L2-L3, L3-L4 LUMBAR LAMINECTOMY/ DISCECTOMY WITH METRIX ;  Surgeon: Sawyer Rick MD;  Location: [facility];  Service:    • LUMBAR DISCECTOMY FUSION INSTRUMENTATION N/A 7/31/2017    Procedure: LUMBAR FUSION DECOMPRESSON WITH PEDICLE SCREWS with LAURA L2-3,L3-4;  Surgeon: Jacky Perez MD;  Location: [facility];  Service:    • LUMBAR LAMINECTOMY DISCECTOMY DECOMPRESSION N/A 7/31/2017    Procedure: L2 to L4 laminectomy with neural lysis and a fusion by orthopedics;  Surgeon: Sawyer Rick MD;  Location: [facility];  Service:    • SHOULDER SURGERY Right 02/23/2011    Dr. Navarro   • SINUS SURGERY  2003    Dr. Barrera   ,   Family History   Problem Relation Age of Onset   • Diabetes Mother    • Heart disease Mother    • Hyperlipidemia Mother    • Stroke Mother    • Heart disease Father    • Rheum arthritis Father    • Alcohol abuse Father    • Hyperlipidemia Father    • Depression Brother    • Cancer Brother      prostate   • Depression Brother    • Heart disease Brother    • Hyperlipidemia Brother    • Cancer Brother    • Thyroid disease Sister    ,   Social  History   Substance Use Topics   • Smoking status: Current Every Day Smoker     Packs/day: 1.00     Years: 45.00     Types: Cigarettes   • Smokeless tobacco: None      Comment: last cigarette 7/30/17   • Alcohol use No   ,   Prescriptions Prior to Admission   Medication Sig Dispense Refill Last Dose   • ciprofloxacin (CIPRO) 500 MG tablet Take 500 mg by mouth.      • ALPRAZolam (XANAX) 0.25 MG tablet Take 0.25 mg by mouth.      • amLODIPine (NORVASC) 10 MG tablet Take 1 tablet by mouth Every Evening. For BP 90 tablet 1 Taking   • aspirin 81 MG EC tablet Take  by mouth.      • aspirin 81 MG tablet Take 81 mg by mouth Every Evening. TO HOLD 1 WEEK BEFORE SURGERY   Taking   • Calcium Carbonate-Vit D-Min (CALCIUM 1200) 2447-6835 MG-UNIT chewable tablet Chew.      • Calcium Carbonate-Vitamin D (CALCIUM + D PO) Take 1,200 mg by mouth Every Evening.   Taking   • Cholecalciferol (VITAMIN D) 2000 UNITS tablet Take 2,000 Units by mouth Every Evening.   Taking   • [employer] SENNA PLUS 8.6-50 MG per tablet TAKE 1 TABLET BY MOUTH TWICE A DAY 30 tablet 1    • cyclobenzaprine (FLEXERIL) 10 MG tablet TAKE 1 TABLET BY MOUTH 3 (THREE) TIMES A DAY AS NEEDED FOR MUSCLE SPASMS. 50 tablet 0 Taking   • escitalopram (LEXAPRO) 20 MG tablet Take 1 tablet by mouth Daily. For anxiety (Patient taking differently: Take 20 mg by mouth Every Night. For anxiety) 90 tablet 3 Taking   • ezetimibe (ZETIA) 10 MG tablet Take 1 tablet by mouth Every Evening. For cholesterol 90 tablet 3 Taking   • HUMIRA PEN 40 MG/0.8ML Pen-injector Kit Inject 40 mg into the shoulder, thigh, or buttocks. TAKES EVERY OTHER WEEK/ TO CHECK WITH DR. NGUYỄN TO SEE WHEN HE HAS TO STOP   Taking   • irbesartan (AVAPRO) 300 MG tablet Take 1 tablet by mouth Daily. One PO daily for bp (Patient taking differently: Take 300 mg by mouth Every Night. One PO daily for bp) 90 tablet 1 Taking   • leflunomide (ARAVA) 20 MG tablet Take 20 mg by mouth Every Evening.   Taking   • meloxicam (MOBIC)  15 MG tablet Take 15 mg by mouth.      • spironolactone-hydrochlorothiazide (ALDACTAZIDE) 25-25 MG tablet Take 1 tablet by mouth.      • tiotropium (SPIRIVA) 18 MCG per inhalation capsule Place 1 capsule into inhaler and inhale Every Evening.   Taking   , Scheduled Meds:    amLODIPine 10 mg Oral Q PM   aspirin 81 mg Oral Daily   aztreonam 2 g Intravenous Q8H   escitalopram 20 mg Oral Nightly   famotidine 20 mg Oral BID   losartan 100 mg Oral Nightly   sennosides-docusate sodium 2 tablet Oral Nightly   tiotropium 1 capsule Inhalation Q PM   vancomycin 1,500 mg Intravenous Q12H   , Continuous Infusions:    Pharmacy to dose vancomycin     sodium chloride 100 mL/hr Last Rate: 100 mL/hr (09/04/17 0426)   , PRN Meds:  •  acetaminophen  •  ALPRAZolam  •  bisacodyl  •  bisacodyl  •  cyclobenzaprine  •  HYDROmorphone  •  ondansetron **OR** ondansetron ODT **OR** ondansetron  •  oxyCODONE-acetaminophen  •  Pharmacy to dose vancomycin  •  sodium chloride and Allergies:  Atorvastatin and Ceclor [cefaclor]    Objective      Vital Signs  Temp:  [98.4 °F (36.9 °C)-102.2 °F (39 °C)] 98.4 °F (36.9 °C)  Heart Rate:  [] 86  Resp:  [18-20] 20  BP: (107-143)/(66-89) 114/75    Physical Exam   Physical Exam:  Vital signs reviewed.  Constitutional:  Appears to be in mild distress due to pain/uncomfortable.  Nontoxic appearing.  HEENT:  Head: normocephalic, atraumatic  Eyes: EOMI, grossly normal.  No scleral icterus.  Neck: Normal range of motion.  Supple, no tracheal deviation.  Cardiovascular: Regular rate.  Pulmonary: Effort normal, symmetric chest expansion, no labored breathing.  Abdominal: Soft, non distended  Neurological: No gross deficits, oriented to person, place, and time.  Skin: Warm and dry  Psychiatric: Normal mood and affect  Musculoskeletal:  Examination of his lower back shows the majority of the incision to be healed.  There is a small ellipsoid area centrally that has not sealed, ~2cm in length.  Appears to have  granulation tissue without deep undermining/dehiscence.  No significant surrounding erythema, no drainage.  He is very tender to palpation along the incision and right flank.  No obviously palpable abscess/fluid collection    BLE:  4+/5 hamstrings, TA limited by pain/effort; 5/5 Q/GS, 5/5 EHL on left, 4+/5 EHL on right  SILT L2-S1 bilaterally    Results Review:      CT of the C/A/P from yesterday independently reviewed.  PSF hardware in place L2-L4 without malposition/failure.  No obvious abscess in posterior spine/back.      Assessment/Plan     Active Problems:    COPD (chronic obstructive pulmonary disease)    Rheumatoid arthritis    Hypertension    Spondylolisthesis of lumbar region    Fever chills      Assessment & Plan    This is a 61 y/o male with RA on DMARDs 5 weeks s/p PSF by Dr. Perez with increasing pain in the back/flank in the setting of elevated inflammatory markers/WBC and fevers.  However, there is no gross infection/drainage from the wound.  CT was unrevealing.  MRI pending.    - Will f/u MRI results for possible abscess  - Will discuss case with Dr. Perez  - Pain control  - F/U ID input, antibiotics per primary/ID  - DVT: OLIVIA/SCDs  -PT: [facility]  - Dispo: pending      Will follow, call with questions, thank you for this consult    Ryan Nazario Jr, MD  09/04/17  11:52 AM

## 2017-09-04 NOTE — H&P
Name: Prem Thrasher ADMIT: 9/3/2017   : 1955  PCP: Montse Cain PA-C    MRN: [id number] LOS: 0 days   AGE/SEX: 62 y.o. male  ROOM: P388/     Chief Complaint   Patient presents with   • Flank Pain     Right flank pain radiating to the right abdomen with elevated WBC and fever from [facility].       Subjective   Patient is a 62 y.o. male who presents to [facility] with the above chief complaint.  Started having pain last week.  He went and saw his doctor for follow-up and he had some little drainage from the superior wound edge.  Some fluid was expressed that it was felt not be infectious.  He was otherwise given a clean bill of health at that follow-up.  He's continued to have increasing pain and symptoms over the last week.  He began having fevers and chills a couple days ago.  He went to the urgent care today and was started on oral antibiotics.  They said if he continued to have fevers to go to the emergency room for further evaluation.  He's been off pain medication from since the weekend given increased constipation.  The pains been worsening his fevers and chills of been worsening.  He denies any other new symptoms or complaints this we admitted him last.    Flank Pain   Associated symptoms include weakness. Pertinent negatives include no abdominal pain, chest pain, fever or numbness.       Past Medical History:   Diagnosis Date   • Acute sinusitis    • Allergy    • Anxiety disorder    • Cervical disc disorder    • COPD (chronic obstructive pulmonary disease)    • CTS (carpal tunnel syndrome)    • Dermatophytosis of groin    • Encounter for eye exam 10/2014    normal   • History of bone density study 2014    Dr. Maria Antonia Lopez; normal   • History of chest x-ray 02/15/2011    also 3-6-15; normal chest   • History of nuclear stress test 2015    cardiolite; Dr. Greenberg; negative   • History of pulmonary function tests 2015    COPD   • Hyperlipidemia    •  Hypertension    • IFG (impaired fasting glucose)    • Low back pain    • Lumbosacral disc disease    • Nerve damage     KAYLYNN ELBOWS   • Osteoarthritis, multiple sites    • Postoperative nausea and vomiting 8/1/2017   • Rheumatoid arthritis    • Sciatica    • Thoracic disc disorder      Past Surgical History:   Procedure Laterality Date   • BACK SURGERY     • COLONOSCOPY  02/02/2011    unremarkable only for scattered diverticulosis; had hx of prior polyp   • CYST REMOVAL  03/2016    x2  FROM FACE AND EAR   • DENTAL PROCEDURE  2008    teeth removed; dental implants   • EPIDURAL BLOCK  1995    L/S spine   • HAND SURGERY Right 2003    avulsion lacerations 4th R finger debrided   • LUMBAR DISCECTOMY FUSION INSTRUMENTATION Left 10/10/2016    Procedure: LEFT L2-L3, L3-L4 LUMBAR LAMINECTOMY/ DISCECTOMY WITH METRIX ;  Surgeon: Sawyer Rick MD;  Location: [facility];  Service:    • LUMBAR DISCECTOMY FUSION INSTRUMENTATION N/A 7/31/2017    Procedure: LUMBAR FUSION DECOMPRESSON WITH PEDICLE SCREWS with LAURA L2-3,L3-4;  Surgeon: Jacky Perez MD;  Location: [facility];  Service:    • LUMBAR LAMINECTOMY DISCECTOMY DECOMPRESSION N/A 7/31/2017    Procedure: L2 to L4 laminectomy with neural lysis and a fusion by orthopedics;  Surgeon: Sawyer Rick MD;  Location: [facility];  Service:    • SHOULDER SURGERY Right 02/23/2011    Dr. Navarro   • SINUS SURGERY  2003    Dr. Barrera     Family History   Problem Relation Age of Onset   • Diabetes Mother    • Heart disease Mother    • Hyperlipidemia Mother    • Stroke Mother    • Heart disease Father    • Rheum arthritis Father    • Alcohol abuse Father    • Hyperlipidemia Father    • Depression Brother    • Cancer Brother      prostate   • Depression Brother    • Heart disease Brother    • Hyperlipidemia Brother    • Cancer Brother    • Thyroid disease Sister      Social History   Substance Use Topics   • Smoking status: Current Every Day Smoker     Packs/day: 1.00      Years: 45.00     Types: Cigarettes   • Smokeless tobacco: None      Comment: last cigarette 7/30/17   • Alcohol use No     Prescriptions Prior to Admission   Medication Sig Dispense Refill Last Dose   • ciprofloxacin (CIPRO) 500 MG tablet Take 500 mg by mouth.      • ALPRAZolam (XANAX) 0.25 MG tablet Take 0.25 mg by mouth.      • amLODIPine (NORVASC) 10 MG tablet Take 1 tablet by mouth Every Evening. For BP 90 tablet 1 Taking   • aspirin 81 MG EC tablet Take  by mouth.      • aspirin 81 MG tablet Take 81 mg by mouth Every Evening. TO HOLD 1 WEEK BEFORE SURGERY   Taking   • Calcium Carbonate-Vit D-Min (CALCIUM 1200) 9693-1429 MG-UNIT chewable tablet Chew.      • Calcium Carbonate-Vitamin D (CALCIUM + D PO) Take 1,200 mg by mouth Every Evening.   Taking   • Cholecalciferol (VITAMIN D) 2000 UNITS tablet Take 2,000 Units by mouth Every Evening.   Taking   • [employer] SENNA PLUS 8.6-50 MG per tablet TAKE 1 TABLET BY MOUTH TWICE A DAY 30 tablet 1    • cyclobenzaprine (FLEXERIL) 10 MG tablet TAKE 1 TABLET BY MOUTH 3 (THREE) TIMES A DAY AS NEEDED FOR MUSCLE SPASMS. 50 tablet 0 Taking   • escitalopram (LEXAPRO) 20 MG tablet Take 1 tablet by mouth Daily. For anxiety (Patient taking differently: Take 20 mg by mouth Every Night. For anxiety) 90 tablet 3 Taking   • ezetimibe (ZETIA) 10 MG tablet Take 1 tablet by mouth Every Evening. For cholesterol 90 tablet 3 Taking   • HUMIRA PEN 40 MG/0.8ML Pen-injector Kit Inject 40 mg into the shoulder, thigh, or buttocks. TAKES EVERY OTHER WEEK/ TO CHECK WITH DR. NGUYỄN TO SEE WHEN HE HAS TO STOP   Taking   • irbesartan (AVAPRO) 300 MG tablet Take 1 tablet by mouth Daily. One PO daily for bp (Patient taking differently: Take 300 mg by mouth Every Night. One PO daily for bp) 90 tablet 1 Taking   • leflunomide (ARAVA) 20 MG tablet Take 20 mg by mouth Every Evening.   Taking   • meloxicam (MOBIC) 15 MG tablet Take 15 mg by mouth.      • spironolactone-hydrochlorothiazide (ALDACTAZIDE) 25-25 MG  tablet Take 1 tablet by mouth.      • tiotropium (SPIRIVA) 18 MCG per inhalation capsule Place 1 capsule into inhaler and inhale Every Evening.   Taking     Allergies:  Atorvastatin and Ceclor [cefaclor]    Review of Systems   Constitutional: Negative for fatigue, fever and unexpected weight change.   HENT: Negative for congestion, rhinorrhea and tinnitus.    Eyes: Negative for photophobia, redness and visual disturbance.   Respiratory: Negative for apnea, cough and wheezing.    Cardiovascular: Negative for chest pain, palpitations and leg swelling.   Gastrointestinal: Negative for abdominal distention, abdominal pain and blood in stool.   Endocrine: Negative for polydipsia, polyphagia and polyuria.   Genitourinary: Positive for flank pain. Negative for difficulty urinating, hematuria and urgency.   Musculoskeletal: Positive for back pain. Negative for arthralgias, neck pain and neck stiffness.   Skin: Negative for pallor and rash.   Allergic/Immunologic: Negative for environmental allergies and immunocompromised state.   Neurological: Positive for weakness. Negative for dizziness, tremors and numbness.   Hematological: Negative for adenopathy. Does not bruise/bleed easily.   Psychiatric/Behavioral: Negative for agitation, behavioral problems and confusion.        Objective    Vital Signs  Temp:  [101 °F (38.3 °C)-101.9 °F (38.8 °C)] 101.9 °F (38.8 °C)  Heart Rate:  [] 98  Resp:  [18] 18  BP: (123-143)/(72-89) 123/78  SpO2:  [93 %-96 %] 96 %  on   ;   O2 Device: room air  Body mass index is 27.62 kg/(m^2).    Physical Exam   Constitutional: He is oriented to person, place, and time. He appears well-developed and well-nourished.   HENT:   Head: Normocephalic and atraumatic.   Nose: Nose normal.   Eyes: EOM are normal. No scleral icterus.   Neck: Neck supple. No JVD present.   Cardiovascular: Normal rate, regular rhythm and normal heart sounds.    Pulmonary/Chest: Effort normal and breath sounds normal. No  respiratory distress.   Abdominal: Soft. Bowel sounds are normal. He exhibits no distension. There is no tenderness.   Musculoskeletal: Normal range of motion.   The incision is closed and well-healed there is no fluctuance or drainage noted.  There is some erythema and the wound is hot.  It's extremely tender to palpation.   Neurological: He is alert and oriented to person, place, and time. No cranial nerve deficit.   Skin: Skin is warm. No erythema.   Psychiatric: He has a normal mood and affect. His behavior is normal. Thought content normal.   Nursing note and vitals reviewed.      Results Review:   I reviewed the patient's new clinical results.    Results from last 7 days  Lab Units 09/03/17  2224   WBC 10*3/mm3 12.50*   HEMOGLOBIN g/dL 10.3*   PLATELETS 10*3/mm3 275     Results from last 7 days  Lab Units 09/03/17  2224   SODIUM mmol/L 133*   POTASSIUM mmol/L 3.4*   CHLORIDE mmol/L 94*   CO2 mmol/L 25.4   BUN mg/dL 14   CREATININE mg/dL 1.04   GLUCOSE mg/dL 118*   ALBUMIN g/dL 3.30*   BILIRUBIN mg/dL 0.5   ALK PHOS U/L 80   AST (SGOT) U/L 15   ALT (SGPT) U/L 16   Estimated Creatinine Clearance: 92.3 mL/min (by C-G formula based on Cr of 1.04).      Results from last 7 days  Lab Units 09/03/17  2256   NITRITE UA  Negative   WBC UA /HPF 0-2   BACTERIA UA /HPF None Seen   SQUAM EPITHEL UA /HPF 0-2     CT Abdomen Pelvis With Contrast   Final Result   IMPRESSION :    1. Mild colonic diverticulosis, otherwise unremarkable exam                           This study was performed with techniques to keep radiation doses as low   as reasonably achievable (ALARA). Individualized dose reduction   techniques using automated exposure control or adjustment of mA and/or   kV according to the patient size were employed.        This report was finalized on 9/3/2017 11:48 PM by Reza Linares MD.          MRI Lumbar Spine With & Without Contrast    (Results Pending)   MRI Thoracic Spine With & Without Contrast    (Results  Pending)     Assessment/Plan     Active Problems:    COPD (chronic obstructive pulmonary disease)    Rheumatoid arthritis    Hypertension    Spondylolisthesis of lumbar region    Fever chills      Assessment & Plan  I'm concerned with his fevers and chills and accompanying elevated inflammatory markers that he may have an infection.  He would be at increased risk for development of this given his Humira and Arava.  We'll plan to check an MRI of his thoracic and lumbar spine tomorrow.  I've asked Dr. Bower to come by and evaluate.  We'll start on empiric antibiotics with vancomycin for now and ask infectious disease to further way and in the morning.  I've ordered pain medications and other meds for symptom control and management.    I discussed the patients findings and my recommendations with patient, family and nursing staff.    Josh Coyle MD  [facility]  09/04/17  2:09 AM

## 2017-09-04 NOTE — CONSULTS
CONSULT NOTE    Infectious Diseases - Charli Benjamin MD  [facility]       Patient Identification:  Name: Prem Thrasher  Age: 62 y.o.  Sex: male  :  1955  MRN: [id number]             Date of Consultation: 2017        Primary Care Physician: Montse Cain PA-C                               Requesting Physician: Dr. Josh Coyle  Reason for Consultation: Sepsis    Impression: 61-year-old male with  1-sepsis likely due to  2-lumbar surgical site infection with underlying process could range from superficial abscess to deep abscess with hardware involvement  3-status post lumbar fusion, decompression with pedicle screws on 2017  4-other diagnosis as per internal medicine service.      Recommendations/Discussions: At this juncture I agree with her antibody was consisting of vancomycin, would like to add gram-negative coverage until culture data is available.  Patient would need MRI to decide on the depth of infection and also to decide if the surgical intervention is needed besides antibiotic therapy to address that issue.  The duration of antibiotic treatment would depend upon his   -  clinical course and    -  results of culture types of pathogen isolated   -  and depth of infection and    -  need for surgery and intervention.  Thank you Dr. Coyle for letting me be the part of your patient care please see above impression and recommendation        History of Present Illness:   Patient is a 61-year-old male who underwent elective lumbar fusion with instrumentation and decompression involving the L2 and L3 4 lumbar spondylolisthesis on 2017.  His postoperative course was essentially unremarkable until about a week ago when he started having increasing pain and discomfort and drainage from the surgical incision.  The pain was getting worse and at times shooting in his legs.  Over the weekend his condition deteriorated to the point that he could not move and felt really ill  with fever and chills.  Patient was brought to the emergency room and is being admitted for further care.  At this time patient feels awful and complains of severe pain in his back.  He says that his appetite is not good      Past Medical History:  Past Medical History:   Diagnosis Date   • Acute sinusitis    • Allergy    • Anxiety disorder    • Cervical disc disorder    • COPD (chronic obstructive pulmonary disease)    • CTS (carpal tunnel syndrome)    • Dermatophytosis of groin    • Encounter for eye exam 10/2014    normal   • History of bone density study 03/2014    Dr. Maria Antonia Lopez; normal   • History of chest x-ray 02/15/2011    also 3-6-15; normal chest   • History of nuclear stress test 03/09/2015    cardiolite; Dr. Greenberg; negative   • History of pulmonary function tests 03/2015    COPD   • Hyperlipidemia    • Hypertension    • IFG (impaired fasting glucose)    • Low back pain    • Lumbosacral disc disease    • Nerve damage     KAYLYNN ELBOWS   • Osteoarthritis, multiple sites    • Postoperative nausea and vomiting 8/1/2017   • Rheumatoid arthritis    • Sciatica    • Thoracic disc disorder      Past Surgical History:  Past Surgical History:   Procedure Laterality Date   • BACK SURGERY     • COLONOSCOPY  02/02/2011    unremarkable only for scattered diverticulosis; had hx of prior polyp   • CYST REMOVAL  03/2016    x2  FROM FACE AND EAR   • DENTAL PROCEDURE  2008    teeth removed; dental implants   • EPIDURAL BLOCK  1995    L/S spine   • HAND SURGERY Right 2003    avulsion lacerations 4th R finger debrided   • LUMBAR DISCECTOMY FUSION INSTRUMENTATION Left 10/10/2016    Procedure: LEFT L2-L3, L3-L4 LUMBAR LAMINECTOMY/ DISCECTOMY WITH METRIX ;  Surgeon: Sawyer Rick MD;  Location: [facility];  Service:    • LUMBAR DISCECTOMY FUSION INSTRUMENTATION N/A 7/31/2017    Procedure: LUMBAR FUSION DECOMPRESSON WITH PEDICLE SCREWS with LAURA L2-3,L3-4;  Surgeon: Jacky Perez MD;  Location: [facility];   Service:    • LUMBAR LAMINECTOMY DISCECTOMY DECOMPRESSION N/A 7/31/2017    Procedure: L2 to L4 laminectomy with neural lysis and a fusion by orthopedics;  Surgeon: Sawyer Rick MD;  Location: [facility] OR;  Service:    • SHOULDER SURGERY Right 02/23/2011    Dr. Navarro   • SINUS SURGERY  2003    Dr. Barrera      Home Meds:  Prescriptions Prior to Admission   Medication Sig Dispense Refill Last Dose   • ciprofloxacin (CIPRO) 500 MG tablet Take 500 mg by mouth.      • ALPRAZolam (XANAX) 0.25 MG tablet Take 0.25 mg by mouth.      • amLODIPine (NORVASC) 10 MG tablet Take 1 tablet by mouth Every Evening. For BP 90 tablet 1 Taking   • aspirin 81 MG EC tablet Take  by mouth.      • aspirin 81 MG tablet Take 81 mg by mouth Every Evening. TO HOLD 1 WEEK BEFORE SURGERY   Taking   • Calcium Carbonate-Vit D-Min (CALCIUM 1200) 7041-4580 MG-UNIT chewable tablet Chew.      • Calcium Carbonate-Vitamin D (CALCIUM + D PO) Take 1,200 mg by mouth Every Evening.   Taking   • Cholecalciferol (VITAMIN D) 2000 UNITS tablet Take 2,000 Units by mouth Every Evening.   Taking   • CVS SENNA PLUS 8.6-50 MG per tablet TAKE 1 TABLET BY MOUTH TWICE A DAY 30 tablet 1    • cyclobenzaprine (FLEXERIL) 10 MG tablet TAKE 1 TABLET BY MOUTH 3 (THREE) TIMES A DAY AS NEEDED FOR MUSCLE SPASMS. 50 tablet 0 Taking   • escitalopram (LEXAPRO) 20 MG tablet Take 1 tablet by mouth Daily. For anxiety (Patient taking differently: Take 20 mg by mouth Every Night. For anxiety) 90 tablet 3 Taking   • ezetimibe (ZETIA) 10 MG tablet Take 1 tablet by mouth Every Evening. For cholesterol 90 tablet 3 Taking   • HUMIRA PEN 40 MG/0.8ML Pen-injector Kit Inject 40 mg into the shoulder, thigh, or buttocks. TAKES EVERY OTHER WEEK/ TO CHECK WITH DR. NGUYỄN TO SEE WHEN HE HAS TO STOP   Taking   • irbesartan (AVAPRO) 300 MG tablet Take 1 tablet by mouth Daily. One PO daily for bp (Patient taking differently: Take 300 mg by mouth Every Night. One PO daily for bp) 90 tablet 1 Taking    • leflunomide (ARAVA) 20 MG tablet Take 20 mg by mouth Every Evening.   Taking   • meloxicam (MOBIC) 15 MG tablet Take 15 mg by mouth.      • spironolactone-hydrochlorothiazide (ALDACTAZIDE) 25-25 MG tablet Take 1 tablet by mouth.      • tiotropium (SPIRIVA) 18 MCG per inhalation capsule Place 1 capsule into inhaler and inhale Every Evening.   Taking     Current Meds:     Current Facility-Administered Medications:   •  acetaminophen (TYLENOL) tablet 650 mg, 650 mg, Oral, Q4H PRN, Josh Coyle MD  •  ALPRAZolam (XANAX) tablet 0.25 mg, 0.25 mg, Oral, Nightly PRN, Josh Coyle MD  •  amLODIPine (NORVASC) tablet 10 mg, 10 mg, Oral, Q PM, Josh Coyle MD  •  aspirin EC tablet 81 mg, 81 mg, Oral, Daily, Josh Coyle MD  •  bisacodyl (DULCOLAX) EC tablet 5 mg, 5 mg, Oral, Daily PRN, Josh Coyle MD  •  bisacodyl (DULCOLAX) suppository 10 mg, 10 mg, Rectal, Daily PRN, Josh Coyle MD  •  cyclobenzaprine (FLEXERIL) tablet 5 mg, 5 mg, Oral, TID PRN, Josh Coyle MD  •  escitalopram (LEXAPRO) tablet 20 mg, 20 mg, Oral, Nightly, Josh Coyle MD  •  famotidine (PEPCID) tablet 20 mg, 20 mg, Oral, BID, Josh Coyle MD  •  HYDROmorphone (DILAUDID) injection 0.5 mg, 0.5 mg, Intravenous, Q2H PRN, Josh Coyle MD, 0.5 mg at 09/04/17 0344  •  losartan (COZAAR) tablet 100 mg, 100 mg, Oral, Nightly, Josh Coyle MD  •  ondansetron (ZOFRAN) tablet 4 mg, 4 mg, Oral, Q6H PRN **OR** ondansetron ODT (ZOFRAN-ODT) disintegrating tablet 4 mg, 4 mg, Oral, Q6H PRN **OR** ondansetron (ZOFRAN) injection 4 mg, 4 mg, Intravenous, Q6H PRN, Josh Coyle MD  •  oxyCODONE-acetaminophen (PERCOCET)  MG per tablet 1 tablet, 1 tablet, Oral, Q6H PRN, Josh Coyle MD  •  sennosides-docusate sodium (SENOKOT-S) 8.6-50 MG tablet 2 tablet, 2 tablet, Oral, Nightly, Josh Coyle MD  •  sodium chloride 0.9 % flush 1-10 mL, 1-10 mL, Intravenous, PRN, Josh HAYNES  MD Leonides  •  sodium chloride 0.9 % infusion, 100 mL/hr, Intravenous, Continuous, Josh Coyle MD, Last Rate: 100 mL/hr at 09/04/17 0426, 100 mL/hr at 09/04/17 0426  •  tiotropium (SPIRIVA) 18 MCG per inhalation capsule 1 capsule, 1 capsule, Inhalation, Q PM, Josh Coyle MD  Allergies:  Allergies   Allergen Reactions   • Atorvastatin      Leg cramps   • Ceclor [Cefaclor] Rash     Social History:   Social History   Substance Use Topics   • Smoking status: Current Every Day Smoker     Packs/day: 1.00     Years: 45.00     Types: Cigarettes   • Smokeless tobacco: Not on file      Comment: last cigarette 7/30/17   • Alcohol use No      Family History:  Family History   Problem Relation Age of Onset   • Diabetes Mother    • Heart disease Mother    • Hyperlipidemia Mother    • Stroke Mother    • Heart disease Father    • Rheum arthritis Father    • Alcohol abuse Father    • Hyperlipidemia Father    • Depression Brother    • Cancer Brother      prostate   • Depression Brother    • Heart disease Brother    • Hyperlipidemia Brother    • Cancer Brother    • Thyroid disease Sister           Review of Systems  See history of present illness and past medical history.   Constitutional: Positive for appetite change (decreased). Negative for activity change and fever.   HENT: Negative for congestion and sore throat.    Eyes: Negative.    Respiratory: Negative for cough and shortness of breath.    Cardiovascular: Negative for chest pain and leg swelling.   Gastrointestinal: Positive for abdominal pain. Negative for diarrhea and vomiting.   Endocrine: Negative.    Genitourinary: Positive for dysuria (mild) and flank pain. Negative for decreased urine volume and hematuria.   Musculoskeletal: Negative for neck pain.   Skin: Negative for rash and wound.   Allergic/Immunologic: Negative.    Neurological: Negative for weakness, numbness and headaches.   Hematological: Negative.    Psychiatric/Behavioral: Negative.   "    Vitals:   /66 (BP Location: Left arm, Patient Position: Lying)  Pulse 82  Temp 99.5 °F (37.5 °C) (Oral)   Resp 20  Ht 71\" (180.3 cm)  Wt 206 lb 9.6 oz (93.7 kg)  SpO2 93%  BMI 28.81 kg/m2  I/O:   Intake/Output Summary (Last 24 hours) at 09/04/17 0741  Last data filed at 09/04/17 0644   Gross per 24 hour   Intake             1362 ml   Output             1175 ml   Net              187 ml     Exam:  General Appearance:    Ill-appearing male who appears toxic and in distress because of pain    Head:    Normocephalic, without obvious abnormality, atraumatic   Eyes:    PERRL, conjunctiva/corneas clear, EOM's intact, both eyes   Ears:    Normal external ear canals, both ears   Nose:   Nares normal, septum midline, mucosa normal, no drainage    or sinus tenderness   Throat:   Lips, tongue, gums normal; oral mucosa pink and moist   Neck:   Supple, symmetrical, trachea midline, no adenopathy;     thyroid:  no enlargement/tenderness/nodules; no carotid    bruit or JVD   Back:     Lumbar incision is approximated with tenderness along the incision with minimal drainage    Lungs:     Clear to auscultation bilaterally, respirations unlabored   Chest Wall:    No tenderness or deformity    Heart:    Regular rate and rhythm, S1 and S2 normal, no murmur, rub   or gallop   Abdomen:     Soft, non-tender, bowel sounds active all four quadrants,     no masses, no hepatomegaly, no splenomegaly   Extremities:   Extremities normal, atraumatic, no cyanosis or edema   Pulses:   Pulses palpable in all extremities; symmetric all extremities   Skin:   Skin color normal, Skin is warm and dry,  no rashes or palpable lesions   Neurologic:   Grossly nonfocal       Data Review:    I reviewed the patient's new clinical results.    Results from last 7 days  Lab Units 09/04/17  0519 09/03/17  2224   WBC 10*3/mm3 12.65* 12.50*   HEMOGLOBIN g/dL 9.6* 10.3*   PLATELETS 10*3/mm3 256 275       Results from last 7 days  Lab Units " 09/04/17  0519 09/03/17  2224   SODIUM mmol/L 134* 133*   POTASSIUM mmol/L 3.1* 3.4*   CHLORIDE mmol/L 98 94*   CO2 mmol/L 22.4 25.4   BUN mg/dL 10 14   CREATININE mg/dL 0.90 1.04   CALCIUM mg/dL 8.1* 8.5*   GLUCOSE mg/dL 126* 118*     Blood cultures are pending    Assessment:  Active Hospital Problems (** Indicates Principal Problem)    Diagnosis Date Noted   • Fever chills [R50.9] 09/04/2017   • Spondylolisthesis of lumbar region [M43.16] 07/31/2017   • COPD (chronic obstructive pulmonary disease) [J44.9]    • Rheumatoid arthritis [M06.9]    • Hypertension [I10]       Resolved Hospital Problems    Diagnosis Date Noted Date Resolved   No resolved problems to display.         Plan:  See above  Charli Sen MD   9/4/2017  7:41 AM    Much of this encounter note is an electronic transcription/translation of spoken language to printed text. The electronic translation of spoken language may permit erroneous, or at times, nonsensical words or phrases to be inadvertently transcribed; Although I have reviewed the note for such errors, some may still exist

## 2017-09-04 NOTE — PLAN OF CARE
Problem: Patient Care Overview (Adult)  Goal: Plan of Care Review  Outcome: Ongoing (interventions implemented as appropriate)    09/04/17 0307 09/04/17 0339   Outcome Evaluation   Outcome Summary/Follow up Plan --  From ER. C/O lower back and right flank pain. Dilaudid given. On 2 liters o2. Up with assist. Tingling reported anette lower extremities. TEDS and SCDS on. Temp 102. Vanc given, blood cultures and urine sent. Iv fluids started. ID and ortho consults made.    Coping/Psychosocial Response Interventions   Plan Of Care Reviewed With patient --        Goal: Adult Individualization and Mutuality  Outcome: Ongoing (interventions implemented as appropriate)  Goal: Discharge Needs Assessment  Outcome: Ongoing (interventions implemented as appropriate)    Problem: Fall Risk (Adult)  Goal: Identify Related Risk Factors and Signs and Symptoms  Outcome: Ongoing (interventions implemented as appropriate)  Goal: Absence of Falls  Outcome: Ongoing (interventions implemented as appropriate)    Problem: Pain, Acute (Adult)  Goal: Identify Related Risk Factors and Signs and Symptoms  Outcome: Ongoing (interventions implemented as appropriate)  Goal: Acceptable Pain Control/Comfort Level  Outcome: Ongoing (interventions implemented as appropriate)    Problem: Infection, Risk/Actual (Adult)  Goal: Identify Related Risk Factors and Signs and Symptoms  Outcome: Ongoing (interventions implemented as appropriate)  Goal: Infection Prevention/Resolution  Outcome: Ongoing (interventions implemented as appropriate)

## 2017-09-04 NOTE — PLAN OF CARE
Problem: Patient Care Overview (Adult)  Goal: Plan of Care Review  Outcome: Ongoing (interventions implemented as appropriate)    09/04/17 1705   Outcome Evaluation   Outcome Summary/Follow up Plan pt took lortab x 1, iv antibiotics, sleeping today, VSS,NPo at midnight, MRI of lumbar spine today, 40 meq of potassium given for 3.1 potassium, oxygen at 3 liters to maintain sats       Goal: Adult Individualization and Mutuality  Outcome: Ongoing (interventions implemented as appropriate)    Problem: Fall Risk (Adult)  Goal: Identify Related Risk Factors and Signs and Symptoms  Outcome: Ongoing (interventions implemented as appropriate)  Goal: Absence of Falls  Outcome: Ongoing (interventions implemented as appropriate)    09/04/17 1705   Fall Risk (Adult)   Absence of Falls making progress toward outcome         Problem: Pain, Acute (Adult)  Goal: Identify Related Risk Factors and Signs and Symptoms  Outcome: Ongoing (interventions implemented as appropriate)  Goal: Acceptable Pain Control/Comfort Level  Outcome: Ongoing (interventions implemented as appropriate)    09/04/17 1705   Pain, Acute (Adult)   Acceptable Pain Control/Comfort Level making progress toward outcome         Problem: Infection, Risk/Actual (Adult)  Goal: Identify Related Risk Factors and Signs and Symptoms  Outcome: Ongoing (interventions implemented as appropriate)  Goal: Infection Prevention/Resolution  Outcome: Ongoing (interventions implemented as appropriate)    09/04/17 1705   Infection, Risk/Actual (Adult)   Infection Prevention/Resolution making progress toward outcome

## 2017-09-04 NOTE — PROGRESS NOTES
"Pharmacokinetic Evaluation - Vancomycin    Prem Thrasher is a 62 y.o. male on vancomycin pharmacy to dose.  MRN: [id number]  : 1955    Day of vancomycin therapy: 1  Indication: SSTI  Consulted by: Dr. Sen  Goal trough: 15-20mg/L    Other antimicrobials: azactam 2gm q8h    Blood pressure 107/66, pulse 82, temperature 99.5 °F (37.5 °C), temperature source Oral, resp. rate 20, height 71\" (180.3 cm), weight 206 lb 9.6 oz (93.7 kg), SpO2 93 %.    Results from last 7 days  Lab Units 17  0519 17  2224   CREATININE mg/dL 0.90 1.04     Estimated Creatinine Clearance: 98.3 mL/min (by C-G formula based on Cr of 0.9).    Results from last 7 days  Lab Units 17  0519 17  2224   WBC 10*3/mm3 12.65* 12.50*   HEMOGLOBIN g/dL 9.6* 10.3*   HEMATOCRIT % 29.5* 31.4*   PLATELETS 10*3/mm3 256 275       Other relevant labs/chart info: CRP 16.42    Cultures:   9/3 Bcx: in process      Assessment:    Renal function is appears to be normal and stable. Pt rec'd 1750mg in ER @ 0118 this morning. Will plan to start schedule dosing today. Trough after 5 total doses (steady state) and check Scr in am     Plan:  1) start vancomycin 1500 mg (16mg/kg) every 12 hours.  2) Next trough on  at 1130 after 5 total doses.  3) Monitor for renal dysfunction. Scr in am, monitor UOP closely.    Nicko Bryan [facility]    "

## 2017-09-05 ENCOUNTER — ANESTHESIA EVENT (OUTPATIENT)
Dept: PERIOP | Facility: HOSPITAL | Age: 62
End: 2017-09-05

## 2017-09-05 ENCOUNTER — ANESTHESIA (OUTPATIENT)
Dept: PERIOP | Facility: HOSPITAL | Age: 62
End: 2017-09-05

## 2017-09-05 PROBLEM — A41.9 SEPSIS: Status: ACTIVE | Noted: 2017-09-04

## 2017-09-05 LAB
ANION GAP SERPL CALCULATED.3IONS-SCNC: 13.9 MMOL/L
BUN BLD-MCNC: 7 MG/DL (ref 8–23)
BUN/CREAT SERPL: 8.2 (ref 7–25)
CALCIUM SPEC-SCNC: 8.6 MG/DL (ref 8.6–10.5)
CHLORIDE SERPL-SCNC: 94 MMOL/L (ref 98–107)
CO2 SERPL-SCNC: 23.1 MMOL/L (ref 22–29)
CREAT BLD-MCNC: 0.85 MG/DL (ref 0.76–1.27)
DEPRECATED RDW RBC AUTO: 42.5 FL (ref 37–54)
ERYTHROCYTE [DISTWIDTH] IN BLOOD BY AUTOMATED COUNT: 13 % (ref 11.5–14.5)
GFR SERPL CREATININE-BSD FRML MDRD: 91 ML/MIN/1.73
GLUCOSE BLD-MCNC: 95 MG/DL (ref 65–99)
HCT VFR BLD AUTO: 29.1 % (ref 40.4–52.2)
HCT VFR BLD AUTO: 30.5 % (ref 40.4–52.2)
HGB BLD-MCNC: 9.4 G/DL (ref 13.7–17.6)
HGB BLD-MCNC: 9.7 G/DL (ref 13.7–17.6)
MCH RBC QN AUTO: 28.9 PG (ref 27–32.7)
MCHC RBC AUTO-ENTMCNC: 32.3 G/DL (ref 32.6–36.4)
MCV RBC AUTO: 89.5 FL (ref 79.8–96.2)
PLATELET # BLD AUTO: 289 10*3/MM3 (ref 140–500)
PMV BLD AUTO: 9.6 FL (ref 6–12)
POTASSIUM BLD-SCNC: 3.5 MMOL/L (ref 3.5–5.2)
RBC # BLD AUTO: 3.25 10*6/MM3 (ref 4.6–6)
SODIUM BLD-SCNC: 131 MMOL/L (ref 136–145)
WBC NRBC COR # BLD: 12.98 10*3/MM3 (ref 4.5–10.7)

## 2017-09-05 PROCEDURE — 22015 I&D ABSCESS P-SPINE L/S/LS: CPT | Performed by: ORTHOPAEDIC SURGERY

## 2017-09-05 PROCEDURE — 25010000002 FENTANYL CITRATE (PF) 100 MCG/2ML SOLUTION: Performed by: ANESTHESIOLOGY

## 2017-09-05 PROCEDURE — 87186 SC STD MICRODIL/AGAR DIL: CPT | Performed by: ORTHOPAEDIC SURGERY

## 2017-09-05 PROCEDURE — 87205 SMEAR GRAM STAIN: CPT | Performed by: ORTHOPAEDIC SURGERY

## 2017-09-05 PROCEDURE — 85014 HEMATOCRIT: CPT | Performed by: ORTHOPAEDIC SURGERY

## 2017-09-05 PROCEDURE — 80048 BASIC METABOLIC PNL TOTAL CA: CPT | Performed by: INTERNAL MEDICINE

## 2017-09-05 PROCEDURE — 97110 THERAPEUTIC EXERCISES: CPT

## 2017-09-05 PROCEDURE — 94799 UNLISTED PULMONARY SVC/PX: CPT

## 2017-09-05 PROCEDURE — 0QP00KZ REMOVAL OF NONAUTOLOGOUS TISSUE SUBSTITUTE FROM LUMBAR VERTEBRA, OPEN APPROACH: ICD-10-PCS | Performed by: ORTHOPAEDIC SURGERY

## 2017-09-05 PROCEDURE — 85027 COMPLETE CBC AUTOMATED: CPT | Performed by: INTERNAL MEDICINE

## 2017-09-05 PROCEDURE — 87075 CULTR BACTERIA EXCEPT BLOOD: CPT | Performed by: ORTHOPAEDIC SURGERY

## 2017-09-05 PROCEDURE — 97162 PT EVAL MOD COMPLEX 30 MIN: CPT

## 2017-09-05 PROCEDURE — 25010000002 ONDANSETRON PER 1 MG: Performed by: HOSPITALIST

## 2017-09-05 PROCEDURE — 25010000002 VANCOMYCIN: Performed by: INTERNAL MEDICINE

## 2017-09-05 PROCEDURE — 25010000002 HYDROMORPHONE PER 4 MG: Performed by: HOSPITALIST

## 2017-09-05 PROCEDURE — 25010000002 NEOSTIGMINE 10 MG/10ML SOLUTION: Performed by: ANESTHESIOLOGY

## 2017-09-05 PROCEDURE — 25010000002 VANCOMYCIN PER 500 MG: Performed by: ORTHOPAEDIC SURGERY

## 2017-09-05 PROCEDURE — 99024 POSTOP FOLLOW-UP VISIT: CPT | Performed by: ORTHOPAEDIC SURGERY

## 2017-09-05 PROCEDURE — 25010000002 HYDROMORPHONE PER 4 MG: Performed by: ANESTHESIOLOGY

## 2017-09-05 PROCEDURE — 3E01329 INTRODUCTION OF OTHER ANTI-INFECTIVE INTO SUBCUTANEOUS TISSUE, PERCUTANEOUS APPROACH: ICD-10-PCS | Performed by: ORTHOPAEDIC SURGERY

## 2017-09-05 PROCEDURE — 25010000002 ONDANSETRON PER 1 MG: Performed by: ANESTHESIOLOGY

## 2017-09-05 PROCEDURE — 0J9700Z DRAINAGE OF BACK SUBCUTANEOUS TISSUE AND FASCIA WITH DRAINAGE DEVICE, OPEN APPROACH: ICD-10-PCS | Performed by: ORTHOPAEDIC SURGERY

## 2017-09-05 PROCEDURE — 25810000003 POTASSIUM CHLORIDE PER 2 MEQ: Performed by: ORTHOPAEDIC SURGERY

## 2017-09-05 PROCEDURE — 25010000002 PROPOFOL 10 MG/ML EMULSION: Performed by: ANESTHESIOLOGY

## 2017-09-05 PROCEDURE — 25010000002 MIDAZOLAM PER 1 MG: Performed by: ANESTHESIOLOGY

## 2017-09-05 PROCEDURE — 87070 CULTURE OTHR SPECIMN AEROBIC: CPT | Performed by: ORTHOPAEDIC SURGERY

## 2017-09-05 PROCEDURE — 87147 CULTURE TYPE IMMUNOLOGIC: CPT | Performed by: ORTHOPAEDIC SURGERY

## 2017-09-05 PROCEDURE — 85018 HEMOGLOBIN: CPT | Performed by: ORTHOPAEDIC SURGERY

## 2017-09-05 RX ORDER — HYDROCODONE BITARTRATE AND ACETAMINOPHEN 7.5; 325 MG/1; MG/1
1 TABLET ORAL ONCE AS NEEDED
Status: DISCONTINUED | OUTPATIENT
Start: 2017-09-05 | End: 2017-09-05 | Stop reason: HOSPADM

## 2017-09-05 RX ORDER — FLUMAZENIL 0.1 MG/ML
0.2 INJECTION INTRAVENOUS AS NEEDED
Status: DISCONTINUED | OUTPATIENT
Start: 2017-09-05 | End: 2017-09-05 | Stop reason: HOSPADM

## 2017-09-05 RX ORDER — FAMOTIDINE 10 MG/ML
20 INJECTION, SOLUTION INTRAVENOUS ONCE
Status: COMPLETED | OUTPATIENT
Start: 2017-09-05 | End: 2017-09-05

## 2017-09-05 RX ORDER — FENTANYL CITRATE 50 UG/ML
INJECTION, SOLUTION INTRAMUSCULAR; INTRAVENOUS AS NEEDED
Status: DISCONTINUED | OUTPATIENT
Start: 2017-09-05 | End: 2017-09-05 | Stop reason: SURG

## 2017-09-05 RX ORDER — EPHEDRINE SULFATE 50 MG/ML
5 INJECTION, SOLUTION INTRAVENOUS ONCE AS NEEDED
Status: DISCONTINUED | OUTPATIENT
Start: 2017-09-05 | End: 2017-09-05 | Stop reason: HOSPADM

## 2017-09-05 RX ORDER — NEOSTIGMINE METHYLSULFATE 1 MG/ML
INJECTION, SOLUTION INTRAVENOUS AS NEEDED
Status: DISCONTINUED | OUTPATIENT
Start: 2017-09-05 | End: 2017-09-05 | Stop reason: SURG

## 2017-09-05 RX ORDER — SODIUM CHLORIDE 0.9 % (FLUSH) 0.9 %
1-10 SYRINGE (ML) INJECTION AS NEEDED
Status: DISCONTINUED | OUTPATIENT
Start: 2017-09-05 | End: 2017-09-08 | Stop reason: HOSPADM

## 2017-09-05 RX ORDER — IPRATROPIUM BROMIDE AND ALBUTEROL SULFATE 2.5; .5 MG/3ML; MG/3ML
3 SOLUTION RESPIRATORY (INHALATION) EVERY 6 HOURS PRN
Status: DISCONTINUED | OUTPATIENT
Start: 2017-09-05 | End: 2017-09-08 | Stop reason: HOSPADM

## 2017-09-05 RX ORDER — LIDOCAINE HYDROCHLORIDE 20 MG/ML
INJECTION, SOLUTION INFILTRATION; PERINEURAL AS NEEDED
Status: DISCONTINUED | OUTPATIENT
Start: 2017-09-05 | End: 2017-09-05 | Stop reason: SURG

## 2017-09-05 RX ORDER — PROMETHAZINE HYDROCHLORIDE 25 MG/ML
12.5 INJECTION, SOLUTION INTRAMUSCULAR; INTRAVENOUS ONCE AS NEEDED
Status: DISCONTINUED | OUTPATIENT
Start: 2017-09-05 | End: 2017-09-05 | Stop reason: HOSPADM

## 2017-09-05 RX ORDER — ONDANSETRON 2 MG/ML
INJECTION INTRAMUSCULAR; INTRAVENOUS AS NEEDED
Status: DISCONTINUED | OUTPATIENT
Start: 2017-09-05 | End: 2017-09-05 | Stop reason: SURG

## 2017-09-05 RX ORDER — PROMETHAZINE HYDROCHLORIDE 25 MG/1
25 TABLET ORAL ONCE AS NEEDED
Status: DISCONTINUED | OUTPATIENT
Start: 2017-09-05 | End: 2017-09-05 | Stop reason: HOSPADM

## 2017-09-05 RX ORDER — HYDROMORPHONE HYDROCHLORIDE 1 MG/ML
0.5 INJECTION, SOLUTION INTRAMUSCULAR; INTRAVENOUS; SUBCUTANEOUS
Status: DISCONTINUED | OUTPATIENT
Start: 2017-09-05 | End: 2017-09-05 | Stop reason: HOSPADM

## 2017-09-05 RX ORDER — DIPHENHYDRAMINE HYDROCHLORIDE 50 MG/ML
12.5 INJECTION INTRAMUSCULAR; INTRAVENOUS
Status: DISCONTINUED | OUTPATIENT
Start: 2017-09-05 | End: 2017-09-05 | Stop reason: HOSPADM

## 2017-09-05 RX ORDER — MIDAZOLAM HYDROCHLORIDE 1 MG/ML
1 INJECTION INTRAMUSCULAR; INTRAVENOUS
Status: DISCONTINUED | OUTPATIENT
Start: 2017-09-05 | End: 2017-09-05 | Stop reason: HOSPADM

## 2017-09-05 RX ORDER — OXYCODONE HYDROCHLORIDE AND ACETAMINOPHEN 5; 325 MG/1; MG/1
2 TABLET ORAL EVERY 4 HOURS PRN
Status: DISCONTINUED | OUTPATIENT
Start: 2017-09-05 | End: 2017-09-08 | Stop reason: HOSPADM

## 2017-09-05 RX ORDER — SODIUM CHLORIDE AND POTASSIUM CHLORIDE 150; 450 MG/100ML; MG/100ML
100 INJECTION, SOLUTION INTRAVENOUS CONTINUOUS
Status: DISCONTINUED | OUTPATIENT
Start: 2017-09-05 | End: 2017-09-06

## 2017-09-05 RX ORDER — OXYCODONE AND ACETAMINOPHEN 7.5; 325 MG/1; MG/1
1 TABLET ORAL ONCE AS NEEDED
Status: DISCONTINUED | OUTPATIENT
Start: 2017-09-05 | End: 2017-09-05 | Stop reason: HOSPADM

## 2017-09-05 RX ORDER — SODIUM CHLORIDE 0.9 % (FLUSH) 0.9 %
1-10 SYRINGE (ML) INJECTION AS NEEDED
Status: DISCONTINUED | OUTPATIENT
Start: 2017-09-05 | End: 2017-09-05 | Stop reason: HOSPADM

## 2017-09-05 RX ORDER — ROCURONIUM BROMIDE 10 MG/ML
INJECTION, SOLUTION INTRAVENOUS AS NEEDED
Status: DISCONTINUED | OUTPATIENT
Start: 2017-09-05 | End: 2017-09-05 | Stop reason: SURG

## 2017-09-05 RX ORDER — SODIUM CHLORIDE, SODIUM LACTATE, POTASSIUM CHLORIDE, CALCIUM CHLORIDE 600; 310; 30; 20 MG/100ML; MG/100ML; MG/100ML; MG/100ML
9 INJECTION, SOLUTION INTRAVENOUS CONTINUOUS
Status: DISCONTINUED | OUTPATIENT
Start: 2017-09-05 | End: 2017-09-08 | Stop reason: HOSPADM

## 2017-09-05 RX ORDER — PROMETHAZINE HYDROCHLORIDE 25 MG/1
25 SUPPOSITORY RECTAL ONCE AS NEEDED
Status: DISCONTINUED | OUTPATIENT
Start: 2017-09-05 | End: 2017-09-05 | Stop reason: HOSPADM

## 2017-09-05 RX ORDER — CYCLOBENZAPRINE HCL 10 MG
TABLET ORAL
Qty: 50 TABLET | Refills: 0 | OUTPATIENT
Start: 2017-09-05 | End: 2017-09-08 | Stop reason: HOSPADM

## 2017-09-05 RX ORDER — LABETALOL HYDROCHLORIDE 5 MG/ML
5 INJECTION, SOLUTION INTRAVENOUS
Status: DISCONTINUED | OUTPATIENT
Start: 2017-09-05 | End: 2017-09-05 | Stop reason: HOSPADM

## 2017-09-05 RX ORDER — NALOXONE HCL 0.4 MG/ML
0.2 VIAL (ML) INJECTION AS NEEDED
Status: DISCONTINUED | OUTPATIENT
Start: 2017-09-05 | End: 2017-09-05 | Stop reason: HOSPADM

## 2017-09-05 RX ORDER — FENTANYL CITRATE 50 UG/ML
50 INJECTION, SOLUTION INTRAMUSCULAR; INTRAVENOUS
Status: DISCONTINUED | OUTPATIENT
Start: 2017-09-05 | End: 2017-09-05 | Stop reason: HOSPADM

## 2017-09-05 RX ORDER — ONDANSETRON 2 MG/ML
4 INJECTION INTRAMUSCULAR; INTRAVENOUS ONCE AS NEEDED
Status: DISCONTINUED | OUTPATIENT
Start: 2017-09-05 | End: 2017-09-05 | Stop reason: HOSPADM

## 2017-09-05 RX ORDER — HYDRALAZINE HYDROCHLORIDE 20 MG/ML
5 INJECTION INTRAMUSCULAR; INTRAVENOUS
Status: DISCONTINUED | OUTPATIENT
Start: 2017-09-05 | End: 2017-09-05 | Stop reason: HOSPADM

## 2017-09-05 RX ORDER — GLYCOPYRROLATE 0.2 MG/ML
INJECTION INTRAMUSCULAR; INTRAVENOUS AS NEEDED
Status: DISCONTINUED | OUTPATIENT
Start: 2017-09-05 | End: 2017-09-05 | Stop reason: SURG

## 2017-09-05 RX ORDER — PROPOFOL 10 MG/ML
VIAL (ML) INTRAVENOUS AS NEEDED
Status: DISCONTINUED | OUTPATIENT
Start: 2017-09-05 | End: 2017-09-05 | Stop reason: SURG

## 2017-09-05 RX ORDER — MIDAZOLAM HYDROCHLORIDE 1 MG/ML
2 INJECTION INTRAMUSCULAR; INTRAVENOUS
Status: DISCONTINUED | OUTPATIENT
Start: 2017-09-05 | End: 2017-09-05 | Stop reason: HOSPADM

## 2017-09-05 RX ORDER — PROMETHAZINE HYDROCHLORIDE 25 MG/1
12.5 TABLET ORAL ONCE AS NEEDED
Status: DISCONTINUED | OUTPATIENT
Start: 2017-09-05 | End: 2017-09-05 | Stop reason: HOSPADM

## 2017-09-05 RX ADMIN — POTASSIUM CHLORIDE AND SODIUM CHLORIDE 100 ML/HR: 450; 150 INJECTION, SOLUTION INTRAVENOUS at 21:28

## 2017-09-05 RX ADMIN — OXYCODONE AND ACETAMINOPHEN 1 TABLET: 10; 325 TABLET ORAL at 23:27

## 2017-09-05 RX ADMIN — GLYCOPYRROLATE 0.6 MG: 0.2 INJECTION INTRAMUSCULAR; INTRAVENOUS at 17:50

## 2017-09-05 RX ADMIN — LOSARTAN POTASSIUM 100 MG: 50 TABLET, FILM COATED ORAL at 21:04

## 2017-09-05 RX ADMIN — NAFCILLIN SODIUM 2 G: 2 INJECTION, POWDER, LYOPHILIZED, FOR SOLUTION INTRAMUSCULAR; INTRAVENOUS at 14:35

## 2017-09-05 RX ADMIN — MIDAZOLAM 1 MG: 1 INJECTION INTRAMUSCULAR; INTRAVENOUS at 15:39

## 2017-09-05 RX ADMIN — SODIUM CHLORIDE, POTASSIUM CHLORIDE, SODIUM LACTATE AND CALCIUM CHLORIDE 9 ML/HR: 600; 310; 30; 20 INJECTION, SOLUTION INTRAVENOUS at 15:35

## 2017-09-05 RX ADMIN — FENTANYL CITRATE 50 MCG: 50 INJECTION INTRAMUSCULAR; INTRAVENOUS at 15:39

## 2017-09-05 RX ADMIN — ONDANSETRON 4 MG: 2 INJECTION INTRAMUSCULAR; INTRAVENOUS at 17:18

## 2017-09-05 RX ADMIN — FAMOTIDINE 20 MG: 10 INJECTION, SOLUTION INTRAVENOUS at 15:38

## 2017-09-05 RX ADMIN — ONDANSETRON 4 MG: 2 INJECTION INTRAMUSCULAR; INTRAVENOUS at 11:19

## 2017-09-05 RX ADMIN — HYDROMORPHONE HYDROCHLORIDE 0.5 MG: 1 INJECTION, SOLUTION INTRAMUSCULAR; INTRAVENOUS; SUBCUTANEOUS at 22:32

## 2017-09-05 RX ADMIN — AMLODIPINE BESYLATE 10 MG: 10 TABLET ORAL at 21:04

## 2017-09-05 RX ADMIN — NAFCILLIN SODIUM 2 G: 2 INJECTION, POWDER, LYOPHILIZED, FOR SOLUTION INTRAMUSCULAR; INTRAVENOUS at 17:34

## 2017-09-05 RX ADMIN — FENTANYL CITRATE 50 MCG: 50 INJECTION INTRAMUSCULAR; INTRAVENOUS at 17:53

## 2017-09-05 RX ADMIN — CYCLOBENZAPRINE HYDROCHLORIDE 5 MG: 5 TABLET, FILM COATED ORAL at 23:27

## 2017-09-05 RX ADMIN — NAFCILLIN SODIUM 2 G: 2 INJECTION, POWDER, LYOPHILIZED, FOR SOLUTION INTRAMUSCULAR; INTRAVENOUS at 21:27

## 2017-09-05 RX ADMIN — FENTANYL CITRATE 50 MCG: 50 INJECTION INTRAMUSCULAR; INTRAVENOUS at 17:33

## 2017-09-05 RX ADMIN — LIDOCAINE HYDROCHLORIDE 100 MG: 20 INJECTION, SOLUTION INFILTRATION; PERINEURAL at 16:38

## 2017-09-05 RX ADMIN — NAFCILLIN SODIUM 2 G: 2 INJECTION, POWDER, LYOPHILIZED, FOR SOLUTION INTRAMUSCULAR; INTRAVENOUS at 11:13

## 2017-09-05 RX ADMIN — HYDROMORPHONE HYDROCHLORIDE 0.5 MG: 1 INJECTION, SOLUTION INTRAMUSCULAR; INTRAVENOUS; SUBCUTANEOUS at 20:29

## 2017-09-05 RX ADMIN — OXYCODONE AND ACETAMINOPHEN 1 TABLET: 10; 325 TABLET ORAL at 00:04

## 2017-09-05 RX ADMIN — VANCOMYCIN HYDROCHLORIDE 1500 MG: 1 INJECTION, POWDER, LYOPHILIZED, FOR SOLUTION INTRAVENOUS at 00:00

## 2017-09-05 RX ADMIN — DOCUSATE SODIUM -SENNOSIDES 2 TABLET: 50; 8.6 TABLET, COATED ORAL at 21:04

## 2017-09-05 RX ADMIN — NEOSTIGMINE METHYLSULFATE 3 MG: 1 INJECTION INTRAVENOUS at 17:50

## 2017-09-05 RX ADMIN — PROPOFOL 150 MG: 10 INJECTION, EMULSION INTRAVENOUS at 16:38

## 2017-09-05 RX ADMIN — FENTANYL CITRATE 100 MCG: 50 INJECTION INTRAMUSCULAR; INTRAVENOUS at 16:38

## 2017-09-05 RX ADMIN — HYDROMORPHONE HYDROCHLORIDE 0.5 MG: 1 INJECTION, SOLUTION INTRAMUSCULAR; INTRAVENOUS; SUBCUTANEOUS at 14:36

## 2017-09-05 RX ADMIN — FAMOTIDINE 20 MG: 20 TABLET, FILM COATED ORAL at 21:04

## 2017-09-05 RX ADMIN — ROCURONIUM BROMIDE 30 MG: 10 INJECTION INTRAVENOUS at 16:38

## 2017-09-05 RX ADMIN — HYDROMORPHONE HYDROCHLORIDE 0.5 MG: 1 INJECTION, SOLUTION INTRAMUSCULAR; INTRAVENOUS; SUBCUTANEOUS at 09:08

## 2017-09-05 RX ADMIN — PROPOFOL 50 MG: 10 INJECTION, EMULSION INTRAVENOUS at 17:33

## 2017-09-05 RX ADMIN — ESCITALOPRAM 20 MG: 20 TABLET, FILM COATED ORAL at 21:04

## 2017-09-05 RX ADMIN — HYDROMORPHONE HYDROCHLORIDE 0.5 MG: 1 INJECTION, SOLUTION INTRAMUSCULAR; INTRAVENOUS; SUBCUTANEOUS at 18:45

## 2017-09-05 NOTE — PLAN OF CARE
Problem: Perioperative Period (Adult)  Goal: Signs and Symptoms of Listed Potential Problems Will be Absent or Manageable (Perioperative Period)  Outcome: Ongoing (interventions implemented as appropriate)    09/05/17 9345   Perioperative Period   Problems Assessed (Perioperative Period) all   Problems Present (Perioperative Period) none

## 2017-09-05 NOTE — ANESTHESIA PREPROCEDURE EVALUATION
Anesthesia Evaluation     Patient summary reviewed and Nursing notes reviewed   history of anesthetic complications: PONV  NPO Solid Status: > 8 hours  NPO Liquid Status: > 8 hours     Airway   Mallampati: II  TM distance: >3 FB  Neck ROM: full  no difficulty expected  Dental - normal exam     Pulmonary - normal exam   (+) a smoker Current, COPD,   Cardiovascular - normal exam  Exercise tolerance: good (4-7 METS)    ECG reviewed  Rhythm: regular  Rate: normal    (+) hypertension, hyperlipidemia      Neuro/Psych  (+) numbness (carpel tunnel, cubital tunnel), psychiatric history Anxiety,    GI/Hepatic/Renal/Endo      Musculoskeletal     Abdominal  - normal exam   Substance History - negative use     OB/GYN          Other   (+) arthritis                                     Anesthesia Plan    ASA 3     general     intravenous induction   Anesthetic plan and risks discussed with patient.

## 2017-09-05 NOTE — PLAN OF CARE
Problem: Patient Care Overview (Adult)  Goal: Plan of Care Review  Outcome: Ongoing (interventions implemented as appropriate)    09/05/17 1529   Outcome Evaluation   Outcome Summary/Follow up Plan preparing for surgery   Coping/Psychosocial Response Interventions   Plan Of Care Reviewed With patient   Patient Care Overview   Progress no change         Problem: Perioperative Period (Adult)  Goal: Signs and Symptoms of Listed Potential Problems Will be Absent or Manageable (Perioperative Period)  Outcome: Ongoing (interventions implemented as appropriate)    09/05/17 1529   Perioperative Period   Problems Assessed (Perioperative Period) pain;infection;situational response   Problems Present (Perioperative Period) pain;infection

## 2017-09-05 NOTE — PLAN OF CARE
Problem: Patient Care Overview (Adult)  Goal: Plan of Care Review    09/05/17 1122   Outcome Evaluation   Outcome Summary/Follow up Plan Patient is a pleasant 62 y.o. male s/p lumbar fusion decompression and admitted with fever and chills. Assistance required for bed mobility, transfers, and ambulation. Patient will benefit from skilled PT services acutely to address funct mobility deficits and improve level of independence.    Coping/Psychosocial Response Interventions   Plan Of Care Reviewed With patient         Problem: Inpatient Physical Therapy  Goal: Bed Mobility Goal LTG- PT    09/05/17 1122   Bed Mobility PT LTG   Bed Mobility PT LTG, Date Established 09/05/17   Bed Mobility PT LTG, Time to Achieve 1 wk   Bed Mobility PT LTG, Activity Type all bed mobility   Bed Mobility PT LTG, [place] Level supervision required       Goal: Transfer Training Goal 1 LTG- PT    09/05/17 1122   Transfer Training PT LTG   Transfer Training PT LTG, Date Established 09/05/17   Transfer Training PT LTG, Time to Achieve 1 wk   Transfer Training PT LTG, Activity Type all transfers   Transfer Training PT LTG, [place] Level supervision required   Transfer Training PT LTG, Assist Device walker, rolling       Goal: Gait Training Goal LTG- PT    09/05/17 1122   Gait Training PT LTG   Gait Training Goal PT LTG, Date Established 09/05/17   Gait Training Goal PT LTG, Time to Achieve 1 wk   Gait Training Goal PT LTG, [place] Level supervision required   Gait Training Goal PT LTG, Assist Device walker, rolling   Gait Training Goal PT LTG, Distance to Achieve 250       Goal: Stair Training Goal LTG- PT    09/05/17 1122   Stair Training PT LTG   Stair Training Goal PT LTG, Date Established 09/05/17   Stair Training Goal PT LTG, Time to Achieve 1 wk   Stair Training Goal PT LTG, Number of Steps 4   Stair Training Goal PT LTG, [place] Level contact guard assist   Stair Training Goal PT LTG, Assist Device walker, rolling;1  handrail

## 2017-09-05 NOTE — PLAN OF CARE
Problem: Patient Care Overview (Adult)  Goal: Plan of Care Review  Outcome: Ongoing (interventions implemented as appropriate)    09/05/17 0333   Outcome Evaluation   Outcome Summary/Follow up Plan Pt admitted on 9/3 with fever and chills. Pt is status post a lumbar fusion decompression. Pt has another set of blood cultures pending. Pt continues on IV Vancomycin . Pt continues on 3L O2, O2 sat: 94-95%. Pt is NPO for possible surgery in AM. Dr. Perez will see pt in AM. Pt is resting at this time, will continue to monitor.   Coping/Psychosocial Response Interventions   Plan Of Care Reviewed With patient   Patient Care Overview   Progress progress toward functional goals as expected       Goal: Adult Individualization and Mutuality  Outcome: Ongoing (interventions implemented as appropriate)  Goal: Discharge Needs Assessment  Outcome: Ongoing (interventions implemented as appropriate)    Problem: Fall Risk (Adult)  Goal: Identify Related Risk Factors and Signs and Symptoms  Outcome: Ongoing (interventions implemented as appropriate)  Goal: Absence of Falls  Outcome: Ongoing (interventions implemented as appropriate)    Problem: Pain, Acute (Adult)  Goal: Identify Related Risk Factors and Signs and Symptoms  Outcome: Ongoing (interventions implemented as appropriate)  Goal: Acceptable Pain Control/Comfort Level  Outcome: Ongoing (interventions implemented as appropriate)    Problem: Infection, Risk/Actual (Adult)  Goal: Identify Related Risk Factors and Signs and Symptoms  Outcome: Ongoing (interventions implemented as appropriate)

## 2017-09-05 NOTE — ANESTHESIA POSTPROCEDURE EVALUATION
Patient: Prem Thrasher    Procedure Summary     Date Anesthesia Start Anesthesia Stop Room / Location    09/05/17 2069 [id number]  [facility] /  [facility]       Procedure Diagnosis Surgeon Provider    I&D LUMBAR SPINE  (N/A Spine Lumbar) Spondylolisthesis of lumbar region  (Spondylolisthesis of lumbar region [M43.16]) MD Rayray Coronel MD          Anesthesia Type: general  Last vitals  BP   116/76 (09/05/17 1935)    Temp   37 °C (98.6 °F) (09/05/17 1935)    Pulse   81 (09/05/17 1935)   Resp   20 (09/05/17 1935)    SpO2   93 % (09/05/17 1935)      Post Anesthesia Care and Evaluation    Patient location during evaluation: PACU  Patient participation: complete - patient participated  Level of consciousness: awake and alert  Pain management: adequate  Airway patency: patent  Anesthetic complications: No anesthetic complications    Cardiovascular status: acceptable  Respiratory status: acceptable  Hydration status: acceptable

## 2017-09-05 NOTE — ANESTHESIA PROCEDURE NOTES
Airway  Airway not difficult    General Information and Staff    Anesthesiologist: MINERVA RIVAS    Indications and Patient Condition    Preoxygenated: yes  Mask difficulty assessment: 1 - vent by mask    Final Airway Details  Final airway type: endotracheal airway      Successful airway: ETT  Cuffed: yes   Successful intubation technique: direct laryngoscopy  Endotracheal tube insertion site: oral  Blade: [place]  Blade size: #3  ETT size: 7.5 mm  Cormack-Lehane Classification: grade I - full view of glottis  Placement verified by: chest auscultation and capnometry   Measured from: gums  ETT to gums (cm): 22    Additional Comments  Teeth checked after intubation, no damage noted.

## 2017-09-05 NOTE — THERAPY EVALUATION
Acute Care - Physical Therapy Initial Evaluation  [facility]     Patient Name: Prem Thrasher  : 1955  MRN: [id number]  Today's Date: 2017   Onset of Illness/Injury or Date of Surgery Date: 17  Date of Referral to PT: 17  Referring Physician: Dr. Coyle      Admit Date: 9/3/2017     Visit Dx:    ICD-10-CM ICD-9-CM   1. Fever chills R50.9 780.60   2. Leukocytosis, unspecified type D72.829 288.60   3. Acute low back pain without sciatica, unspecified back pain laterality M54.5 724.2   4. Right flank pain R10.9 789.09   5. Spondylolisthesis of lumbar region M43.16 738.4     Patient Active Problem List   Diagnosis   • Allergy   • COPD (chronic obstructive pulmonary disease)   • Hyperlipidemia   • IFG (impaired fasting glucose)   • Rheumatoid arthritis   • Hypertension   • Anxiety disorder   • Depression   • Lumbar radiculopathy   • Spondylolisthesis of lumbar region   • Sepsis     Past Medical History:   Diagnosis Date   • Acute sinusitis    • Allergy    • Anxiety disorder    • Cervical disc disorder    • COPD (chronic obstructive pulmonary disease)    • CTS (carpal tunnel syndrome)    • Dermatophytosis of groin    • Encounter for eye exam 10/2014    normal   • History of bone density study 2014    Dr. Maria Antonia Lopez; normal   • History of chest x-ray 02/15/2011    also 3-6-15; normal chest   • History of nuclear stress test 2015    cardiolite; Dr. Greenberg; negative   • History of pulmonary function tests 2015    COPD   • Hyperlipidemia    • Hypertension    • IFG (impaired fasting glucose)    • Low back pain    • Lumbosacral disc disease    • Nerve damage     KAYLYNN ELBOWS   • Osteoarthritis, multiple sites    • Postoperative nausea and vomiting 2017   • Rheumatoid arthritis    • Sciatica    • Thoracic disc disorder      Past Surgical History:   Procedure Laterality Date   • BACK SURGERY     • COLONOSCOPY  2011    unremarkable only for scattered diverticulosis; had hx of  prior polyp   • CYST REMOVAL  03/2016    x2  FROM FACE AND EAR   • DENTAL PROCEDURE  2008    teeth removed; dental implants   • EPIDURAL BLOCK  1995    L/S spine   • HAND SURGERY Right 2003    avulsion lacerations 4th R finger debrided   • LUMBAR DISCECTOMY FUSION INSTRUMENTATION Left 10/10/2016    Procedure: LEFT L2-L3, L3-L4 LUMBAR LAMINECTOMY/ DISCECTOMY WITH METRIX ;  Surgeon: Sawyer Rick MD;  Location: [facility];  Service:    • LUMBAR DISCECTOMY FUSION INSTRUMENTATION N/A 7/31/2017    Procedure: LUMBAR FUSION DECOMPRESSON WITH PEDICLE SCREWS with LAURA L2-3,L3-4;  Surgeon: Jacky Perez MD;  Location: [facility] OR;  Service:    • LUMBAR LAMINECTOMY DISCECTOMY DECOMPRESSION N/A 7/31/2017    Procedure: L2 to L4 laminectomy with neural lysis and a fusion by orthopedics;  Surgeon: Sawyer Rick MD;  Location: [facility];  Service:    • SHOULDER SURGERY Right 02/23/2011    Dr. Navarro   • SINUS SURGERY  2003    Dr. Barrera          PT ASSESSMENT (last 72 hours)      PT Evaluation       09/05/17 1114 09/05/17 0333    Rehab Evaluation    Document Type evaluation  -MA     Subjective Information agree to therapy;complains of;pain  -MA     Patient Effort, Rehab Treatment good  -MA     Symptoms Noted During/After Treatment increased pain;other (see comments)   nausea  -MA     Symptoms Noted Comment nausea/dizziness noted post ambulation. RN notifed and aware.  -MA     General Information    Patient Profile Review yes  -MA     Onset of Illness/Injury or Date of Surgery Date 09/05/17  -MA     Referring Physician Dr. Coyle  -MA     General Observations patient supine in bed with HOB elevated, no acute distress noted  -MA     Precautions/Limitations fall precautions  -MA     Prior Level of Function independent:;all household mobility;community mobility  -MA     Equipment Currently Used at Home raised toilet;walker, rolling  -MA raised toilet;walker, rolling  -JF    Plans/Goals Discussed With patient  -MA      Barriers to Rehab none identified  -MA     Living Environment    Transportation Available  car  -JF    Clinical Impression    Date of Referral to PT 09/05/17  -MA     PT Diagnosis decr funct mobility, decr strength, decr endurance  -MA     Criteria for Skilled Therapeutic Interventions Met yes;treatment indicated  -MA     Impairments Found (describe specific impairments) aerobic capacity/endurance;gait, locomotion, and balance  -MA     Rehab Potential good, to achieve stated therapy goals  -MA     Pain Assessment    Pain Assessment 0-10  -MA     Pain Score 5  -MA     Pain Type Acute pain  -MA     Pain Location --   R flank pain  -MA     Pain Intervention(s) Repositioned;Ambulation/increased activity;Rest  -MA     Response to Interventions tolerated well  -MA     Vision Assessment/Intervention    Visual Impairment WFL with corrective lenses  -MA     Cognitive Assessment/Intervention    Current Cognitive/Communication Assessment functional  -MA     Orientation Status oriented x 4  -MA     Follows Commands/Answers Questions 100% of the time;able to follow multi-step instructions;needs cueing  -MA     Personal Safety WNL/WFL  -MA     Personal Safety Interventions fall prevention program maintained;gait belt;nonskid shoes/slippers when out of bed  -MA     ROM (Range of Motion)    General ROM no range of motion deficits identified  -MA     MMT (Manual Muscle Testing)    General MMT Assessment Detail B LE MMT grossly 4+/5   not formally assessed  -MA     Bed Mobility, Assessment/Treatment    Bed Mobility, Assistive Device bed rails;head of bed elevated  -MA     Bed Mobility, Scoot/Bridge, [place] contact guard assist;verbal cues required  -MA     Bed Mob, Supine to Sit, [place] minimum assist (75% patient effort);verbal cues required  -MA     Bed Mobility, Safety Issues impaired trunk control for bed mobility  -MA     Bed Mobility, Impairments strength decreased  -MA     Bed Mobility, Comment VC for sequencing   -MA     Transfer Assessment/Treatment    Transfers, Sit-Stand [place] contact guard assist;verbal cues required  -MA     Transfers, Stand-Sit [place] contact guard assist;verbal cues required  -MA     Transfers, Sit-Stand-Sit, Assist Device rolling walker  -MA     Transfer, Safety Issues sequencing ability decreased  -MA     Transfer, Impairments strength decreased  -MA     Transfer, Comment VC for posture  -MA     Gait Assessment/Treatment    Gait, [place] Level contact guard assist;verbal cues required  -MA     Gait, Assistive Device rolling walker  -MA     Gait, Distance (Feet) 100  -MA     Gait, Gait Pattern Analysis swing-to gait  -MA     Gait, Gait Deviations bilateral:;norman decreased;decreased heel strike;step length decreased  -MA     Gait, Safety Issues step length decreased  -MA     Gait, Impairments strength decreased  -MA     Gait, Comment VC for step length, posture, and norman  -MA     Therapy Exercises    Exercise Protocols --   Did not perform exs this date due to nausea episode  -MA     Positioning and Restraints    Pre-Treatment Position in bed  -MA     Post Treatment Position chair  -MA     In Chair notified nsg;sitting;call light within reach;encouraged to call for assist;with nsg;legs elevated  -MA       09/04/17 1123 09/04/17 0308    Rehab Evaluation    Evaluation Not Performed patient unavailable for evaluation   Pt admitted last night, h/o discectomy 7/31.  MRI results pending and consult to ortho MD.  Plan to attempt PT evaluation 9/5.     -AR     Living Environment    Lives With  spouse  -MB    Living Arrangements  house  -MB    Home Accessibility  bed and bath on same level;stairs to enter home  -MB    Number of Stairs to Enter Home  4  -MB    Stair Railings at Home  outside, present on left side  -MB    Type of Financial/Environmental Concern  none  -MB    Transportation Available  car  -MB      09/04/17 0300       General Information    Equipment Currently Used at  Home raised toilet;walker, rolling  -MB       User Key  (r) = Recorded By, (t) = Taken By, (c) = Cosigned By    Initials Name Provider Type    RACHELLE Hunt, PT Physical Therapist    VITO Rodriguez, RN Registered Nurse    KEVIN Domingo, RN Registered Nurse    TANIKA Perkins PT Physical Therapist          Physical Therapy Education     Title: PT OT SLP Therapies (Active)     Topic: Physical Therapy (Active)     Point: Mobility training (Done)    Learning Progress Summary    Learner Readiness Method Response Comment Documented by Status   Patient Acceptance [facility] 09/05/17 1122 Done               Point: Body mechanics (Done)    Learning Progress Summary    Learner Readiness Method Response Comment Documented by Status   Patient Acceptance [facility] 09/05/17 1122 Done               Point: Precautions (Done)    Learning Progress Summary    Learner Readiness Method Response Comment Documented by Status   Patient Acceptance [facility] 09/05/17 1122 Done                      User Key     Initials Effective Dates Name Provider Type Discipline    MA 12/13/16 -  Susu Perkins, PT Physical Therapist PT                PT Recommendation and Plan  Anticipated Discharge Disposition: home, home with assist, home with home health (pending patient's progress)  Planned Therapy Interventions: balance training, bed mobility training, gait training, home exercise program, patient/family education, postural re-education, strengthening, transfer training  PT Frequency: daily  Plan of Care Review  Plan Of Care Reviewed With: (P) patient  Outcome Summary/Follow up Plan: (P) Patient is a pleasant 62 y.o. male s/p lumbar fusion decompression and admitted with fever and chills. Assistance required for bed mobility, transfers, and ambulation. Patient will benefit from skilled PT services acutely to address funct mobility deficits and improve level of independence.           IP PT Goals       09/05/17 1122          Bed  Mobility PT LTG    Bed Mobility PT LTG, Date Established (P)  09/05/17  -MA      Bed Mobility PT LTG, Time to Achieve (P)  1 wk  -MA      Bed Mobility PT LTG, Activity Type (P)  all bed mobility  -MA      Bed Mobility PT LTG, [place] Level (P)  supervision required  -MA      Transfer Training PT LTG    Transfer Training PT LTG, Date Established (P)  09/05/17  -MA      Transfer Training PT LTG, Time to Achieve (P)  1 wk  -MA      Transfer Training PT LTG, Activity Type (P)  all transfers  -MA      Transfer Training PT LTG, [place] Level (P)  supervision required  -MA      Transfer Training PT LTG, Assist Device (P)  walker, rolling  -MA      Gait Training PT LTG    Gait Training Goal PT LTG, Date Established (P)  09/05/17  -MA      Gait Training Goal PT LTG, Time to Achieve (P)  1 wk  -MA      Gait Training Goal PT LTG, [place] Level (P)  supervision required  -MA      Gait Training Goal PT LTG, Assist Device (P)  walker, rolling  -MA      Gait Training Goal PT LTG, Distance to Achieve (P)  250  -MA      Stair Training PT LTG    Stair Training Goal PT LTG, Date Established (P)  09/05/17  -MA      Stair Training Goal PT LTG, Time to Achieve (P)  1 wk  -MA      Stair Training Goal PT LTG, Number of Steps (P)  4  -MA      Stair Training Goal PT LTG, [place] Level (P)  contact guard assist  -MA      Stair Training Goal PT LTG, Assist Device (P)  walker, rolling;1 handrail  -MA        User Key  (r) = Recorded By, (t) = Taken By, (c) = Cosigned By    Initials Name Provider Type    TANIKA Perkins, PT Physical Therapist                Outcome Measures       09/05/17 1100          How much help from another person do you currently need...    Turning from your back to your side while in flat bed without using bedrails? 3  -MA      Moving from lying on back to sitting on the side of a flat bed without bedrails? 3  -MA      Moving to and from a bed to a chair (including a wheelchair)? 3  -MA       Standing up from a chair using your arms (e.g., wheelchair, bedside chair)? 3  -MA      Climbing 3-5 steps with a railing? 3  -MA      To walk in hospital room? 3  -MA      AM-PAC 6 Clicks Score 18  -MA      Functional Assessment    Outcome Measure Options AM-PAC 6 Clicks Basic Mobility (PT)  -MA        User Key  (r) = Recorded By, (t) = Taken By, (c) = Cosigned By    Initials Name Provider Type    TANIKA Perkins PT Physical Therapist           Time Calculation:         PT Charges       09/05/17 1125          Time Calculation    Start Time 1103  -MA      Stop Time 1122  -MA      Time Calculation (min) 19 min  -MA      PT Received On 09/05/17  -MA      PT - Next Appointment 09/06/17  -MA      PT Goal Re-Cert Due Date 09/12/17  -MA        User Key  (r) = Recorded By, (t) = Taken By, (c) = Cosigned By    Initials Name Provider Type    TANIKA Perkins PT Physical Therapist          Therapy Charges for Today     Code Description Service Date Service Provider Modifiers Qty    49872864701 HC PT EVAL MOD COMPLEXITY 2 9/5/2017 Susu Perkins, PT GP 1    64546953609 HC PT THER PROC EA 15 MIN 9/5/2017 Susu Perkins, PT GP 1    98628631887 HC PT THER SUPP EA 15 MIN 9/5/2017 Susu Perkins, PT GP 1          PT G-Codes  Outcome Measure Options: AM-PAC 6 Clicks Basic Mobility (PT)      Susu Perkins PT  9/5/2017

## 2017-09-05 NOTE — PROGRESS NOTES
"  Infectious Diseases Progress Note    Charli Sen MD     [facility]  [place]: 1 day  Patient Identification:  Name: Prem Thrasher  Age: 62 y.o.  Sex: male  :  1955  MRN: [id number]         Primary Care Physician: Montse Cain PA-C            Subjective: Resting well after surgery  Interval History: Had I&D today    Objective:    Scheduled Meds:  amLODIPine 10 mg Oral Q PM   aspirin 81 mg Oral Daily   escitalopram 20 mg Oral Nightly   famotidine 20 mg Oral BID   losartan 100 mg Oral Nightly   nafcillin 2 g Intravenous Q4H   sennosides-docusate sodium 2 tablet Oral Nightly   tiotropium 1 capsule Inhalation Q PM     Continuous Infusions:  Pharmacy to dose vancomycin     sodium chloride 100 mL/hr Last Rate: 100 mL/hr (17 0426)       Vital signs in last 24 hours:  Temp:  [97 °F (36.1 °C)-102.4 °F (39.1 °C)] 100 °F (37.8 °C)  Heart Rate:  [86-94] 90  Resp:  [16-20] 16  BP: (114-144)/(71-91) 136/82    Intake/Output:    Intake/Output Summary (Last 24 hours) at 17 0932  Last data filed at 17 0923   Gross per 24 hour   Intake              480 ml   Output             2415 ml   Net            -1935 ml       Exam:  /82 (BP Location: Right arm, Patient Position: Lying)  Pulse 90  Temp 100 °F (37.8 °C) (Oral)   Resp 16  Ht 71\" (180.3 cm)  Wt 211 lb 9.6 oz (96 kg)  SpO2 90%  BMI 29.51 kg/m2    General Appearance:    Alert, cooperative, no distress, AAOx3, able to get up and go to bathroom and walk earlier.  Now sleeping after surgery                          Head:    Normocephalic, without obvious abnormality, atraumatic                           Eyes:    PERRL, conjunctiva/corneas clear, EOM's intact, both eyes                         Throat:   Lips, tongue, gums normal; oral mucosa pink and moist                           Neck:   Supple, symmetrical, trachea midline, no JVD                         Lungs:    Clear to auscultation bilaterally, respirations unlabored             "     Chest Wall:    No tenderness or deformity                          Heart:    Regular rate and rhythm, S1 and S2 normal, no murmur,no  Rub                                      or gallop                  Abdomen:     Soft, non-tender, bowel sounds active, no masses, no                                                        organomegaly                  Extremities:   Extremities normal, atraumatic, no cyanosis or edema                        Pulses:   Pulses palpable in all extremities                            Skin:   Surgical incision dressed drains in place                      Data Review:    I reviewed the patient's new clinical results.    Results from last 7 days  Lab Units 09/05/17 0739 09/04/17 0519 09/03/17 2224   WBC 10*3/mm3 12.98* 12.65* 12.50*   HEMOGLOBIN g/dL 9.4* 9.6* 10.3*   PLATELETS 10*3/mm3 289 256 275       Results from last 7 days  Lab Units 09/05/17 0739 09/04/17 0519 09/03/17 2224   SODIUM mmol/L 131* 134* 133*   POTASSIUM mmol/L 3.5 3.1* 3.4*   CHLORIDE mmol/L 94* 98 94*   CO2 mmol/L 23.1 22.4 25.4   BUN mg/dL 7* 10 14   CREATININE mg/dL 0.85 0.90 1.04   CALCIUM mg/dL 8.6 8.1* 8.5*   GLUCOSE mg/dL 95 126* 118*     Microbiology Results (last 10 days)     Procedure Component Value - Date/Time    Blood Culture [[id number]]  (Abnormal) Collected:  09/03/17 2302    Lab Status:  Preliminary result Specimen:  Blood from Arm, Left Updated:  09/04/17 2001     Blood Culture Abnormal Stain (A)     Gram Stain Result Anaerobic Bottle Gram positive cocci in clusters      Aerobic Bottle Gram positive cocci in clusters    Blood Culture ID, PCR [[id number]]  (Abnormal) Collected:  09/03/17 2302    Lab Status:  Final result Specimen:  Blood from Arm, Left Updated:  09/04/17 2337     BCID, PCR Staphylococcus aureus, not MRSA. Identification by BCID PCR. (C)    Blood Culture [[id number]]  (Abnormal) Collected:  09/03/17 2224    Lab Status:  Preliminary result Specimen:  Blood from Arm, Left Updated:   09/04/17 2001     Blood Culture Abnormal Stain (A)     Gram Stain Result Aerobic Bottle Gram positive cocci in clusters            Assessment:  Principal Problem:    Sepsis  Active Problems:    COPD (chronic obstructive pulmonary disease)    Rheumatoid arthritis    Hypertension    Spondylolisthesis of lumbar region      Plan:  Dc vanc, start nafcillin.  This on the operative report it appears to infection is in close proximity to the hardware.  We'll add rifampin to consider 6-8 weeks of IV antibiotic therapy followed by may be chronic suppressive therapy based on patient's clinical response.     Charli Sen MD  9/5/2017  9:32 AM    Much of this encounter note is an electronic transcription/translation of spoken language to printed text. The electronic translation of spoken language may permit erroneous, or at times, nonsensical words or phrases to be inadvertently transcribed; Although I have reviewed the note for such errors, some may still exist

## 2017-09-05 NOTE — PROGRESS NOTES
Name: Prem Thrasher ADMIT: 9/3/2017   : 1955  PCP: Montse Cain PA-C    MRN: [id number] LOS: 1 days   AGE/SEX: 62 y.o. male  ROOM: [facility] [place]   Subjective   Subjective  Still with fever and back pain. Pain more on right side, lumbar. No CP SOA NVD or HA.    Objective   Vital Signs  Temp:  [97 °F (36.1 °C)-102.4 °F (39.1 °C)] 100 °F (37.8 °C)  Heart Rate:  [86-94] 90  Resp:  [16-20] 16  BP: (114-144)/(71-91) 136/82  SpO2:  [90 %-94 %] 90 %  on  Flow (L/min):  [1-3] 3;   O2 Device: nasal cannula  Body mass index is 29.51 kg/(m^2).    Physical Exam   Constitutional: He appears well-developed and well-nourished. No distress.   HENT:   Head: Normocephalic and atraumatic.   Nose: Nose normal.   Eyes: EOM are normal. Pupils are equal, round, and reactive to light.   Neck: Normal range of motion. Neck supple.   Cardiovascular: Normal rate, regular rhythm, normal heart sounds and intact distal pulses.    No murmur heard.  Pulmonary/Chest: Effort normal. He has no wheezes. He has rhonchi. He has no rales.   Abdominal: Soft. Bowel sounds are normal. He exhibits no distension. There is no tenderness.   Musculoskeletal: Normal range of motion. He exhibits no edema.   Neurological: He is alert. No cranial nerve deficit.   Skin: Skin is warm and dry. He is not diaphoretic.   Psychiatric: He has a normal mood and affect. His behavior is normal.   Nursing note and vitals reviewed.      Results Review:       I reviewed the patient's new clinical results. Reviewed imaging, agree with interpretation. Reviewed telemetry, sinus rhythm.    Results from last 7 days  Lab Units 17  0739 17  0519 17  2224   WBC 10*3/mm3 12.98* 12.65* 12.50*   HEMOGLOBIN g/dL 9.4* 9.6* 10.3*   PLATELETS 10*3/mm3 289 256 275     Results from last 7 days  Lab Units 17  0739 17  0519 17  2224   SODIUM mmol/L 131* 134* 133*   POTASSIUM mmol/L 3.5 3.1* 3.4*   CHLORIDE mmol/L 94* 98 94*   CO2 mmol/L 23.1 22.4 25.4   BUN  mg/dL 7* 10 14   CREATININE mg/dL 0.85 0.90 1.04   GLUCOSE mg/dL 95 126* 118*   Estimated Creatinine Clearance: 105.2 mL/min (by C-G formula based on Cr of 0.85).  Results from last 7 days  Lab Units 09/05/17  0739 09/04/17  0519 09/03/17  2224   CALCIUM mg/dL 8.6 8.1* 8.5*   ALBUMIN g/dL  --   --  3.30*   MAGNESIUM mg/dL  --  2.2  --    PHOSPHORUS mg/dL  --  3.1  --          amLODIPine 10 mg Oral Q PM   aspirin 81 mg Oral Daily   escitalopram 20 mg Oral Nightly   famotidine 20 mg Oral BID   losartan 100 mg Oral Nightly   sennosides-docusate sodium 2 tablet Oral Nightly   tiotropium 1 capsule Inhalation Q PM   vancomycin 1,500 mg Intravenous Q12H       Pharmacy to dose vancomycin     sodium chloride 100 mL/hr Last Rate: 100 mL/hr (09/04/17 0426)   NPO Diet      Assessment/Plan   Assessment:     Active Hospital Problems (** Indicates Principal Problem)    Diagnosis Date Noted   • **Sepsis [A41.9] 09/04/2017   • Spondylolisthesis of lumbar region [M43.16] 07/31/2017   • COPD (chronic obstructive pulmonary disease) [J44.9]    • Rheumatoid arthritis [M06.9]    • Hypertension [I10]       Resolved Hospital Problems    Diagnosis Date Noted Date Resolved   No resolved problems to display.       Plan:   - Sepsis: MSSA by PCR of Blood Cx. Possible spinal seroma v abscess on lumbar MRI. Repeat Cx pending. Plan for OR today. Continue Vancomycin and await formal speciation of blood cultures. Orthopedics and ID following.  - COPD: Some rhonchi today but no wheezes. Continue supportive care.  - HTN: Stable  - Hypokalemia: Resolved    Disposition  · TBD.    Luke Lacy MD  [facility]  09/05/17  9:25 AM

## 2017-09-05 NOTE — OP NOTE
Operative note    Preoperative diagnosis:  Lumbar spinal postoperative deep wound infection    Postoperative diagnosis:  same    Procedure:  Irrigation and debridement of lumbar spinal deep wound infection    Surgeon:  Jacky Perez Jr. MD    Asst.:  Bina Lord    Anesthetic: Gen.    EBL:  Minimal    Condition: Satisfactory    Specimens: Superficial and deep culturettes to microbiology for Gram stain culture and sensitivity    Findings: Deep wound abscess, superficial wound abscess    Description of procedure:    The patient was anesthetized and positioned prone on a Fredo table.  Bony prominences were well padded.  The back was prepped and draped in sterile fashion.  I elliptically excised the's previous scar.  A small subcutaneous abscess was encountered and cultured.  The fluid was seropurulent.  I found no obvious deep connection bed elevated the fascia and stripped the muscle back down to the hardware bilaterally.  I found bilateral abscesses around the implants with about 30 cc of creamy pus on either side.  A second culture was obtained.  All the tissue appeared well vascularized.  I removed a small amount of the master graft bone graft substitute the bone graft itself looked decent.  The wound was vigorously irrigated with 3 L of bacitracin saline.  Hemostasis was obtained.  I placed 1 g of vancomycin powder in divided segments in the lateral gutters and over the hardware on each side.  An eighth inch Hemovac drain was brought out through the fascia and to the right upper aspect of the wound area, away from the incision.  The fascia was closed with running and interrupted #1 Prolene.  Skin and subcutaneous tissues were closed with 3-0 nylon in vertical mattress sutures and staples.  Sterile dressing was applied the drain was sewn to the skin.  Patient tolerated the procedure was about returned recovery.

## 2017-09-05 NOTE — CONSULTS
New patient or new problem visit    CC: Low back pain    HPI: He is 1 month out from L2 to 4 laminectomy and fusion with instrumentation which I performed with Dr. Rick.  I saw him 2 weeks ago and the he was seen in Dr. Rick's office just last week at which time he was doing reasonably well.  He's had increasing back pain but the real problem is fevers and the elevated white blood count.  He's had no drainage said no neurologic symptoms.  He has rheumatoid arthritis.  See Dr. Ryan Nazario' consultation dictated yesterday    PFSH: See attached    ROS: See attached    PE: On exam he is lying in bed comfortable enough.  I had MRIs and inspected the back couple of areas of macerated eschar near the apex of the incision which is about an inch in length near the bottom which is about half inch there is no drainage whatsoever from either area and there is minimal surrounding redness no fluctuance no induration is noted.  Good strength in lower extremities bilaterally and sensations intact.    XRAY: MRI scan of the lumbar spine is reviewed in detail and demonstrates appropriately placed hardware from L2 to 4 where laminectomy was performed no significant signal change in the disc spaces are within the spinal canal.  No evidence of the epidural abscess.  There is subcutaneous fluid collection present in tubular fashion above the fascial closure in the midline.    Other: White count 12 .4 sedimentation rate CRP markedly elevated.    Impression: Probable lumbar deep wound infection, may be subcutaneous only.    Plan: I have recommended irrigation and debridement which I will perform tonight.  Risk and benefits were discussed.

## 2017-09-05 NOTE — PLAN OF CARE
Problem: Patient Care Overview (Adult)  Goal: Plan of Care Review  Outcome: Ongoing (interventions implemented as appropriate)  Goal: Adult Individualization and Mutuality  Outcome: Ongoing (interventions implemented as appropriate)  Goal: Discharge Needs Assessment  Outcome: Ongoing (interventions implemented as appropriate)    Problem: Fall Risk (Adult)  Goal: Identify Related Risk Factors and Signs and Symptoms  Outcome: Outcome(s) achieved Date Met:  09/05/17  Goal: Absence of Falls  Outcome: Ongoing (interventions implemented as appropriate)    Problem: Pain, Acute (Adult)  Goal: Identify Related Risk Factors and Signs and Symptoms  Outcome: Outcome(s) achieved Date Met:  09/05/17  Goal: Acceptable Pain Control/Comfort Level  Outcome: Ongoing (interventions implemented as appropriate)    Problem: Infection, Risk/Actual (Adult)  Goal: Identify Related Risk Factors and Signs and Symptoms  Outcome: Outcome(s) achieved Date Met:  09/05/17  Goal: Infection Prevention/Resolution  Outcome: Ongoing (interventions implemented as appropriate)

## 2017-09-06 PROBLEM — M46.26 INFECTION OF LUMBAR SPINE (HCC): Status: ACTIVE | Noted: 2017-09-06

## 2017-09-06 LAB
ANION GAP SERPL CALCULATED.3IONS-SCNC: 13.9 MMOL/L
BACTERIA SPEC AEROBE CULT: ABNORMAL
BACTERIA SPEC AEROBE CULT: ABNORMAL
BASOPHILS # BLD AUTO: 0.05 10*3/MM3 (ref 0–0.2)
BASOPHILS NFR BLD AUTO: 0.4 % (ref 0–1.5)
BUN BLD-MCNC: 10 MG/DL (ref 8–23)
BUN/CREAT SERPL: 10.8 (ref 7–25)
CALCIUM SPEC-SCNC: 8.7 MG/DL (ref 8.6–10.5)
CHLORIDE SERPL-SCNC: 92 MMOL/L (ref 98–107)
CO2 SERPL-SCNC: 24.1 MMOL/L (ref 22–29)
CREAT BLD-MCNC: 0.93 MG/DL (ref 0.76–1.27)
DEPRECATED RDW RBC AUTO: 43.7 FL (ref 37–54)
EOSINOPHIL # BLD AUTO: 0.22 10*3/MM3 (ref 0–0.7)
EOSINOPHIL NFR BLD AUTO: 1.9 % (ref 0.3–6.2)
ERYTHROCYTE [DISTWIDTH] IN BLOOD BY AUTOMATED COUNT: 13 % (ref 11.5–14.5)
GFR SERPL CREATININE-BSD FRML MDRD: 82 ML/MIN/1.73
GLUCOSE BLD-MCNC: 99 MG/DL (ref 65–99)
GRAM STN SPEC: ABNORMAL
HCT VFR BLD AUTO: 30.9 % (ref 40.4–52.2)
HGB BLD-MCNC: 10 G/DL (ref 13.7–17.6)
IMM GRANULOCYTES # BLD: 0 10*3/MM3 (ref 0–0.03)
IMM GRANULOCYTES NFR BLD: 0 % (ref 0–0.5)
LYMPHOCYTES # BLD AUTO: 2.19 10*3/MM3 (ref 0.9–4.8)
LYMPHOCYTES NFR BLD AUTO: 18.6 % (ref 19.6–45.3)
MCH RBC QN AUTO: 29.9 PG (ref 27–32.7)
MCHC RBC AUTO-ENTMCNC: 32.4 G/DL (ref 32.6–36.4)
MCV RBC AUTO: 92.5 FL (ref 79.8–96.2)
MONOCYTES # BLD AUTO: 1.09 10*3/MM3 (ref 0.2–1.2)
MONOCYTES NFR BLD AUTO: 9.3 % (ref 5–12)
NEUTROPHILS # BLD AUTO: 8.21 10*3/MM3 (ref 1.9–8.1)
NEUTROPHILS NFR BLD AUTO: 69.8 % (ref 42.7–76)
PLATELET # BLD AUTO: 320 10*3/MM3 (ref 140–500)
PMV BLD AUTO: 9.7 FL (ref 6–12)
POTASSIUM BLD-SCNC: 3.6 MMOL/L (ref 3.5–5.2)
RBC # BLD AUTO: 3.34 10*6/MM3 (ref 4.6–6)
SODIUM BLD-SCNC: 130 MMOL/L (ref 136–145)
WBC NRBC COR # BLD: 11.76 10*3/MM3 (ref 4.5–10.7)

## 2017-09-06 PROCEDURE — 25010000002 RIFAMPIN 600 MG RECONSTITUTED SOLUTION 1 EACH VIAL: Performed by: INTERNAL MEDICINE

## 2017-09-06 PROCEDURE — 97110 THERAPEUTIC EXERCISES: CPT

## 2017-09-06 PROCEDURE — 94799 UNLISTED PULMONARY SVC/PX: CPT

## 2017-09-06 PROCEDURE — 80048 BASIC METABOLIC PNL TOTAL CA: CPT | Performed by: INTERNAL MEDICINE

## 2017-09-06 PROCEDURE — 94640 AIRWAY INHALATION TREATMENT: CPT

## 2017-09-06 PROCEDURE — 85025 COMPLETE CBC W/AUTO DIFF WBC: CPT | Performed by: INTERNAL MEDICINE

## 2017-09-06 PROCEDURE — 99024 POSTOP FOLLOW-UP VISIT: CPT | Performed by: ORTHOPAEDIC SURGERY

## 2017-09-06 RX ORDER — POTASSIUM CHLORIDE 750 MG/1
40 CAPSULE, EXTENDED RELEASE ORAL AS NEEDED
Status: DISCONTINUED | OUTPATIENT
Start: 2017-09-06 | End: 2017-09-08 | Stop reason: HOSPADM

## 2017-09-06 RX ORDER — POTASSIUM CHLORIDE 7.45 MG/ML
10 INJECTION INTRAVENOUS
Status: DISCONTINUED | OUTPATIENT
Start: 2017-09-06 | End: 2017-09-08 | Stop reason: HOSPADM

## 2017-09-06 RX ORDER — POTASSIUM CHLORIDE 1.5 G/1.77G
40 POWDER, FOR SOLUTION ORAL AS NEEDED
Status: DISCONTINUED | OUTPATIENT
Start: 2017-09-06 | End: 2017-09-08 | Stop reason: HOSPADM

## 2017-09-06 RX ADMIN — NAFCILLIN SODIUM 2 G: 2 INJECTION, POWDER, LYOPHILIZED, FOR SOLUTION INTRAMUSCULAR; INTRAVENOUS at 20:32

## 2017-09-06 RX ADMIN — OXYCODONE AND ACETAMINOPHEN 1 TABLET: 10; 325 TABLET ORAL at 04:06

## 2017-09-06 RX ADMIN — NAFCILLIN SODIUM 2 G: 2 INJECTION, POWDER, LYOPHILIZED, FOR SOLUTION INTRAMUSCULAR; INTRAVENOUS at 11:06

## 2017-09-06 RX ADMIN — SODIUM CHLORIDE 300 MG: 9 INJECTION, SOLUTION INTRAVENOUS at 01:50

## 2017-09-06 RX ADMIN — FAMOTIDINE 20 MG: 20 TABLET, FILM COATED ORAL at 17:11

## 2017-09-06 RX ADMIN — LOSARTAN POTASSIUM 100 MG: 50 TABLET, FILM COATED ORAL at 20:32

## 2017-09-06 RX ADMIN — TIOTROPIUM BROMIDE 1 CAPSULE: 18 CAPSULE ORAL; RESPIRATORY (INHALATION) at 19:24

## 2017-09-06 RX ADMIN — NAFCILLIN SODIUM 2 G: 2 INJECTION, POWDER, LYOPHILIZED, FOR SOLUTION INTRAMUSCULAR; INTRAVENOUS at 05:20

## 2017-09-06 RX ADMIN — NAFCILLIN SODIUM 2 G: 2 INJECTION, POWDER, LYOPHILIZED, FOR SOLUTION INTRAMUSCULAR; INTRAVENOUS at 02:27

## 2017-09-06 RX ADMIN — OXYCODONE HYDROCHLORIDE AND ACETAMINOPHEN 2 TABLET: 5; 325 TABLET ORAL at 17:06

## 2017-09-06 RX ADMIN — NAFCILLIN SODIUM 2 G: 2 INJECTION, POWDER, LYOPHILIZED, FOR SOLUTION INTRAMUSCULAR; INTRAVENOUS at 17:53

## 2017-09-06 RX ADMIN — ESCITALOPRAM 20 MG: 20 TABLET, FILM COATED ORAL at 20:33

## 2017-09-06 RX ADMIN — NAFCILLIN SODIUM 2 G: 2 INJECTION, POWDER, LYOPHILIZED, FOR SOLUTION INTRAMUSCULAR; INTRAVENOUS at 14:41

## 2017-09-06 RX ADMIN — FAMOTIDINE 20 MG: 20 TABLET, FILM COATED ORAL at 08:13

## 2017-09-06 RX ADMIN — POTASSIUM CHLORIDE 40 MEQ: 750 CAPSULE, EXTENDED RELEASE ORAL at 11:02

## 2017-09-06 RX ADMIN — OXYCODONE HYDROCHLORIDE AND ACETAMINOPHEN 2 TABLET: 5; 325 TABLET ORAL at 23:14

## 2017-09-06 RX ADMIN — AMLODIPINE BESYLATE 10 MG: 10 TABLET ORAL at 17:11

## 2017-09-06 RX ADMIN — SODIUM CHLORIDE 300 MG: 9 INJECTION, SOLUTION INTRAVENOUS at 17:06

## 2017-09-06 RX ADMIN — DOCUSATE SODIUM -SENNOSIDES 2 TABLET: 50; 8.6 TABLET, COATED ORAL at 20:33

## 2017-09-06 RX ADMIN — BISACODYL 5 MG: 5 TABLET, COATED ORAL at 17:53

## 2017-09-06 RX ADMIN — SODIUM CHLORIDE 300 MG: 9 INJECTION, SOLUTION INTRAVENOUS at 08:13

## 2017-09-06 RX ADMIN — OXYCODONE HYDROCHLORIDE AND ACETAMINOPHEN 2 TABLET: 5; 325 TABLET ORAL at 09:08

## 2017-09-06 NOTE — PROGRESS NOTES
Name: Prem Thrasher ADMIT: 9/3/2017   : 1955  PCP: Montse Cain PA-C    MRN: [id number] LOS: 2 days   AGE/SEX: 62 y.o. male  ROOM: \[facility]\""/   Subjective   Subjective  Back pain improved. Some irritation with drain. No CP SOA NVD.    Objective   Vital Signs  Temp:  [98.3 °F (36.8 °C)-99.8 °F (37.7 °C)] 98.3 °F (36.8 °C)  Heart Rate:  [] 104  Resp:  [14-22] 18  BP: (101-148)/(71-96) 128/75  SpO2:  [91 %-97 %] 94 %  on  Flow (L/min):  [3-4] 3;   O2 Device: room air  Body mass index is 29.51 kg/(m^2).    Physical Exam   Constitutional: He appears well-developed and well-nourished. No distress.   HENT:   Head: Normocephalic and atraumatic.   Nose: Nose normal.   Eyes: EOM are normal. Pupils are equal, round, and reactive to light.   Neck: Normal range of motion. Neck supple.   Cardiovascular: Normal rate, regular rhythm, normal heart sounds and intact distal pulses.    No murmur heard.  Pulmonary/Chest: Effort normal and breath sounds normal. He has no wheezes.   Abdominal: Soft. Bowel sounds are normal. He exhibits no distension. There is no tenderness.   Musculoskeletal: Normal range of motion. He exhibits no edema.   Lumbar spine dressed, drain in place   Neurological: He is alert. No cranial nerve deficit.   Skin: Skin is warm and dry. He is not diaphoretic.   Psychiatric: He has a normal mood and affect. His behavior is normal.   Nursing note and vitals reviewed.      Results Review:       I reviewed the patient's new clinical results.    Results from last 7 days  Lab Units 17  0510 17  2041 17  0739 17  0519 17  2224   WBC 10*3/mm3 11.76*  --  12.98* 12.65* 12.50*   HEMOGLOBIN g/dL 10.0* 9.7* 9.4* 9.6* 10.3*   PLATELETS 10*3/mm3 320  --  289 256 275     Results from last 7 days  Lab Units 17  0510 17  0739 17  0519 17  2224   SODIUM mmol/L 130* 131* 134* 133*   POTASSIUM mmol/L 3.6 3.5 3.1* 3.4*   CHLORIDE mmol/L 92* 94* 98 94*   CO2 mmol/L  24.1 23.1 22.4 25.4   BUN mg/dL 10 7* 10 14   CREATININE mg/dL 0.93 0.85 0.90 1.04   GLUCOSE mg/dL 99 95 126* 118*   Estimated Creatinine Clearance: 96.1 mL/min (by C-G formula based on Cr of 0.93).  Results from last 7 days  Lab Units 09/06/17  0510 09/05/17  0739 09/04/17  0519 09/03/17  2224   CALCIUM mg/dL 8.7 8.6 8.1* 8.5*   ALBUMIN g/dL  --   --   --  3.30*   MAGNESIUM mg/dL  --   --  2.2  --    PHOSPHORUS mg/dL  --   --  3.1  --          amLODIPine 10 mg Oral Q PM   escitalopram 20 mg Oral Nightly   famotidine 20 mg Oral BID   losartan 100 mg Oral Nightly   nafcillin 2 g Intravenous Q4H   rifAMPin 300 mg Intravenous Q8H   sennosides-docusate sodium 2 tablet Oral Nightly   tiotropium 1 capsule Inhalation Q PM       lactated ringers 9 mL/hr Last Rate: Stopped (09/05/17 1805)   sodium chloride 0.45 % with KCl 20 mEq 100 mL/hr Last Rate: 100 mL/hr (09/05/17 2128)   sodium chloride 100 mL/hr Last Rate: 100 mL/hr (09/04/17 0426)   Diet Regular      Assessment/Plan   Assessment:     Active Hospital Problems (** Indicates Principal Problem)    Diagnosis Date Noted   • **Infection of lumbar spine [M86.9] 09/06/2017   • Sepsis [A41.9] 09/04/2017   • Spondylolisthesis of lumbar region [M43.16] 07/31/2017   • COPD (chronic obstructive pulmonary disease) [J44.9]    • Rheumatoid arthritis [M06.9]    • Hypertension [I10]       Resolved Hospital Problems    Diagnosis Date Noted Date Resolved   No resolved problems to display.       Plan:   - Lumbar spine deep wound infection: Sepsis with MSSA Bacteremia as well. Wound Cx pending. WBC improving and afebrile. Continue Nafcillin and Rifampin (hardware present). With source drained and afebrile, if repeat BCx remain negative can place PICC tomorrow.Orthopedics and ID following.  - COPD: Stable  - HTN: Stable    Disposition  HH/few days    Luke Lacy MD  [facility]  09/06/17  9:08 AM

## 2017-09-06 NOTE — PROGRESS NOTES
Postop day 1: AVSS awake alert oriented back pain better.  Globin 10 white count 11 5, down slightly.  Gram stain showed gram-positive cocci and gross pus was seen at the surgery.  Blood cultures growing staph aureus for which she is on antibiotics is managed by Dr. Sen.  Overall doing better plan home on IV antibiotics and a couple of days.

## 2017-09-06 NOTE — PLAN OF CARE
Problem: Patient Care Overview (Adult)  Goal: Plan of Care Review    09/06/17 1525   Outcome Evaluation   Outcome Summary/Follow up Plan Improved tolerance to functional activity this day with an increase in gait distance and decreased assist required for functional mobility. Pt. also able to ambulate without assist from A.D.   Coping/Psychosocial Response Interventions   Plan Of Care Reviewed With patient   Patient Care Overview   Progress improving

## 2017-09-06 NOTE — PLAN OF CARE
Problem: Patient Care Overview (Adult)  Goal: Plan of Care Review  Outcome: Ongoing (interventions implemented as appropriate)    09/06/17 1341   Outcome Evaluation   Outcome Summary/Follow up Plan up in chair, ambulated x 2 with brace on, percocet given for pain, on 2 iv abx, may have picc placed tomorrow for home abx, minimal outpt in drain,   Coping/Psychosocial Response Interventions   Plan Of Care Reviewed With patient   Patient Care Overview   Progress improving       Goal: Adult Individualization and Mutuality  Outcome: Ongoing (interventions implemented as appropriate)  Goal: Discharge Needs Assessment  Outcome: Ongoing (interventions implemented as appropriate)    Problem: Fall Risk (Adult)  Goal: Absence of Falls  Outcome: Ongoing (interventions implemented as appropriate)    Problem: Pain, Acute (Adult)  Goal: Acceptable Pain Control/Comfort Level  Outcome: Ongoing (interventions implemented as appropriate)    Problem: Infection, Risk/Actual (Adult)  Goal: Infection Prevention/Resolution  Outcome: Ongoing (interventions implemented as appropriate)    Problem: Perioperative Period (Adult)  Goal: Signs and Symptoms of Listed Potential Problems Will be Absent or Manageable (Perioperative Period)  Outcome: Ongoing (interventions implemented as appropriate)

## 2017-09-06 NOTE — PROGRESS NOTES
Orthopedic Progress Note      Patient: Prem Thrasher    YOB: 1955    Medical Record Number: [id number]    Attending Physician: Luke Lacy MD    Date of Admission: 9/3/2017  9:49 PM    Status Post: I&D LUMBAR SPINE     Post Operative Day Number: 1    Admitting Dx: Fever chills [R50.9]  Right flank pain [R10.9]  Leukocytosis, unspecified type [D72.829]  Acute low back pain without sciatica, unspecified back pain laterality [M54.5]    Current Problem List:   Patient Active Problem List   Diagnosis   • Allergy   • COPD (chronic obstructive pulmonary disease)   • Hyperlipidemia   • IFG (impaired fasting glucose)   • Rheumatoid arthritis   • Hypertension   • Anxiety disorder   • Depression   • Lumbar radiculopathy   • Spondylolisthesis of lumbar region   • Sepsis   • Infection of lumbar spine         Past Medical History:   Diagnosis Date   • Acute sinusitis    • Allergy    • Anxiety disorder    • Cervical disc disorder    • COPD (chronic obstructive pulmonary disease)    • CTS (carpal tunnel syndrome)    • Dermatophytosis of groin    • Encounter for eye exam 10/2014    normal   • History of bone density study 03/2014    Dr. Maria Antonia Lopez; normal   • History of chest x-ray 02/15/2011    also 3-6-15; normal chest   • History of nuclear stress test 03/09/2015    cardiolite; Dr. Greenberg; negative   • History of pulmonary function tests 03/2015    COPD   • Hyperlipidemia    • Hypertension    • IFG (impaired fasting glucose)    • Low back pain    • Lumbosacral disc disease    • Nerve damage     KAYLYNN ELBOWS   • Osteoarthritis, multiple sites    • Postoperative nausea and vomiting 8/1/2017   • Rheumatoid arthritis    • Sciatica    • Thoracic disc disorder        Current Medications:  Scheduled Meds:  amLODIPine 10 mg Oral Q PM   escitalopram 20 mg Oral Nightly   famotidine 20 mg Oral BID   losartan 100 mg Oral Nightly   nafcillin 2 g Intravenous Q4H   rifAMPin 300 mg Intravenous Q8H    sennosides-docusate sodium 2 tablet Oral Nightly   tiotropium 1 capsule Inhalation Q PM     PRN Meds:.•  acetaminophen  •  ALPRAZolam  •  bisacodyl  •  bisacodyl  •  cyclobenzaprine  •  HYDROmorphone  •  ipratropium-albuterol  •  ondansetron **OR** ondansetron ODT **OR** ondansetron  •  oxyCODONE-acetaminophen  •  oxyCODONE-acetaminophen  •  potassium chloride **OR** potassium chloride **OR** potassium chloride  •  sodium chloride  •  sodium chloride    Diagnostic Tests:    Lab Results (last 24 hours)     Procedure Component Value Units Date/Time    Anaerobic Culture [[id number]] Collected:  09/05/17 1706    Specimen:  Wound from Spine, Lumbar Updated:  09/05/17 1726    Anaerobic Culture [[id number]] Collected:  09/05/17 1709    Specimen:  Wound from Spine, Lumbar Updated:  09/05/17 1726    Hemoglobin & Hematocrit, Blood [[id number]]  (Abnormal) Collected:  09/05/17 2041    Specimen:  Blood Updated:  09/05/17 2108     Hemoglobin 9.7 (L) g/dL      Hematocrit 30.5 (L) %     Blood Culture [[id number]]  (Normal) Collected:  09/04/17 2335    Specimen:  Blood from Arm, Left Updated:  09/06/17 0004     Blood Culture No growth at 24 hours    Blood Culture [[id number]]  (Normal) Collected:  09/04/17 2336    Specimen:  Blood from Arm, Left Updated:  09/06/17 0004     Blood Culture No growth at 24 hours    CBC & Differential [[id number]] Collected:  09/06/17 0510    Specimen:  Blood Updated:  09/06/17 0604    Narrative:       The following orders were created for panel order CBC & Differential.  Procedure                               Abnormality         Status                     ---------                               -----------         ------                     CBC Auto Differential[[id number]]        Abnormal            Final result                 Please view results for these tests on the individual orders.    CBC Auto Differential [[id number]]  (Abnormal) Collected:  09/06/17 0510    Specimen:  Blood Updated:  09/06/17 0604      WBC 11.76 (H) 10*3/mm3      RBC 3.34 (L) 10*6/mm3      Hemoglobin 10.0 (L) g/dL      Hematocrit 30.9 (L) %      MCV 92.5 fL      MCH 29.9 pg      MCHC 32.4 (L) g/dL      RDW 13.0 %      RDW-SD 43.7 fl      MPV 9.7 fL      Platelets 320 10*3/mm3      Neutrophil % 69.8 %      Lymphocyte % 18.6 (L) %      Monocyte % 9.3 %      Eosinophil % 1.9 %      Basophil % 0.4 %      Immature Grans % 0.0 %      Neutrophils, Absolute 8.21 (H) 10*3/mm3      Lymphocytes, Absolute 2.19 10*3/mm3      Monocytes, Absolute 1.09 10*3/mm3      Eosinophils, Absolute 0.22 10*3/mm3      Basophils, Absolute 0.05 10*3/mm3      Immature Grans, Absolute 0.00 10*3/mm3     Basic Metabolic Panel [[id number]]  (Abnormal) Collected:  09/06/17 0510    Specimen:  Blood Updated:  09/06/17 0630     Glucose 99 mg/dL      BUN 10 mg/dL      Creatinine 0.93 mg/dL      Sodium 130 (L) mmol/L      Potassium 3.6 mmol/L      Chloride 92 (L) mmol/L      CO2 24.1 mmol/L      Calcium 8.7 mg/dL      eGFR Non African Amer 82 mL/min/1.73      BUN/Creatinine Ratio 10.8     Anion Gap 13.9 mmol/L     Narrative:       GFR Normal >60  Chronic Kidney Disease <60  Kidney Failure <15    Blood Culture [[id number]]  (Abnormal)  (Susceptibility) Collected:  09/03/17 2302    Specimen:  Blood from Arm, Left Updated:  09/06/17 0715     Blood Culture Staphylococcus aureus (A)        Consider infectious disease consult to rule out distant focus of infection.        Gram Stain Result Anaerobic Bottle Gram positive cocci in clusters      Aerobic Bottle Gram positive cocci in clusters    Susceptibility      Staphylococcus aureus     MAUREEN     Clindamycin <=0.25 ug/ml Susceptible     Erythromycin >=8 ug/ml Resistant     Oxacillin 0.5 ug/ml Susceptible     Penicillin G 0.06 ug/ml Susceptible     Rifampin <=0.5 ug/ml Susceptible     Tetracycline <=1 ug/ml Susceptible     Trimethoprim + Sulfamethoxazole <=10 ug/ml Susceptible     Vancomycin <=0.5 ug/ml Susceptible                    Blood  Culture [[id number]]  (Abnormal) Collected:  09/03/17 2224    Specimen:  Blood from Arm, Left Updated:  09/06/17 0718     Blood Culture Staphylococcus aureus (A)      Refer to previous blood culture collected on 9/3/17 for MAUREEN results    Consider infectious disease consult to rule out distant focus of infection.        Gram Stain Result Aerobic Bottle Gram positive cocci in clusters      Anaerobic Bottle Gram positive cocci in clusters    Wound Culture [[id number]]  (Abnormal) Collected:  09/05/17 1709    Specimen:  Wound from Spine, Lumbar Updated:  09/06/17 0925     Wound Culture Light growth (2+) Staphylococcus aureus (A)     Gram Stain Result Moderate (3+) Red blood cells      Few (2+) WBCs seen      Few (2+) Gram positive cocci in pairs and clusters    Wound Culture [[id number]]  (Abnormal) Collected:  09/05/17 1706    Specimen:  Wound from Spine, Lumbar Updated:  09/06/17 0948     Wound Culture Light growth (2+) Staphylococcus aureus (A)     Gram Stain Result Moderate (3+) Red blood cells      Rare (1+) WBCs seen      No organisms seen          Objective:  General Appearance:  62 y.o. male . Awake and alert.  Felling better today.     Assessment:  Physical Exam: Patient up independently at bedside this morning and accidentally pulled Hemovac drain apart and the reservoir.  Patient reinserted tubing, however, there is air in the tubing and the drain does not appear to be working.   Lumbar dressing also coming apart.  Dressing changed.  Drain sutured in and does not appear to have pulled loose.  Incision intact with staples.  Moderate bloody drainage on dressing, but no drainage noted from incision.  Dressing changed.  Pain better today.  Cultures positive for Staph aureus.     Vitals:    09/05/17 2222 09/06/17 0007 09/06/17 0338 09/06/17 0807   BP:  122/77 125/78 128/75   BP Location:  Right arm Right arm Right arm   Patient Position:  Lying Lying Sitting   Pulse:  88 96 104   Resp:  18 16 18   Temp:  98.7 °F  (37.1 °C) 99.8 °F (37.7 °C) 98.3 °F (36.8 °C)   TempSrc:  Oral Oral Oral   SpO2: 93% 92% 94% 94%   Weight:       Height:         I/O last 3 completed shifts:  In: 5166.7 [P.O.:3080; I.V.:1786.7; IV Piggyback:300]  Out: 4810 [Urine:4760; Other:50]          Plan:  Continue IV antibiotics per ID.    Continue Pain management efforts  Continue mobilization efforts  Continue incisional care  Continue DVT Prophylaxis - Mechanical      Date: 9/6/2017    ADDISON Lopez MD

## 2017-09-06 NOTE — PLAN OF CARE
Problem: Patient Care Overview (Adult)  Goal: Plan of Care Review  Outcome: Ongoing (interventions implemented as appropriate)    09/06/17 0612   Outcome Evaluation   Outcome Summary/Follow up Plan Doing well post-op. Ambulated x 1 and sat in chair x 1 overnight. Brace in room. Some complaints of pain overngiht. Percocet given with good results. I/d added another abx to regimen. VSS, continue to monitor.    Coping/Psychosocial Response Interventions   Plan Of Care Reviewed With patient   Patient Care Overview   Progress progress toward functional goals as expected       Goal: Discharge Needs Assessment  Outcome: Ongoing (interventions implemented as appropriate)    Problem: Fall Risk (Adult)  Goal: Absence of Falls  Outcome: Ongoing (interventions implemented as appropriate)    Problem: Pain, Acute (Adult)  Goal: Acceptable Pain Control/Comfort Level  Outcome: Ongoing (interventions implemented as appropriate)    Problem: Infection, Risk/Actual (Adult)  Goal: Infection Prevention/Resolution  Outcome: Ongoing (interventions implemented as appropriate)    Problem: Perioperative Period (Adult)  Goal: Signs and Symptoms of Listed Potential Problems Will be Absent or Manageable (Perioperative Period)  Outcome: Ongoing (interventions implemented as appropriate)

## 2017-09-06 NOTE — PROGRESS NOTES
Discharge Planning Assessment  [facility]     Patient Name: Prem Thrasher  MRN: [id number]  Today's Date: 9/6/2017    Admit Date: 9/3/2017          Discharge Needs Assessment       09/06/17 1006    Living Environment    Lives With spouse    Living Arrangements house    Home Accessibility bed and bath on same level;stairs to enter home    Transportation Available car;family or friend will provide    Discharge Needs Assessment    Concerns To Be Addressed discharge planning concerns    Equipment Needed After Discharge walker, rolling;raised toilet    Current Discharge Risk dependent with mobility/activities of daily living            Discharge Plan       09/06/17 1008    Case Management/Social Work Plan    Plan Assessing home vs needing a SNU stay    Additional Comments Spoke with pt for screening of DC Plan/needs.  Pt confirmed facesheet information as correct.   Pt reports that he lives at home with his wife who does work during the day at the [employer].   Pt reports that he was using a walker at home since surgery 5 weeks ago.   Discussed the probable need for IV antibx therapy  upon D/C.  Pt voiced understanding and that he would prefer to return home if at all possible.   Informed pt that [facility] would need to make a referral to  to follow.  Pt stated that he would like [facility] to talk with his wife Linda  [phone] to inform her of need for HH choice and discuss DCP.     Call placed and VM left for pt's wife Linda to retturn call to [facility] x [phone] to discuss DCP.               Discharge Placement     No information found                Demographic Summary       09/06/17 1005    Referral Information    Admission Type inpatient    Arrived From home or self-care    Referral Source admission list    Reason For Consult discharge planning            Functional Status       09/06/17 1005    Functional Status Current    Ambulation 3-->assistive equipment and person    Transferring 1-->assistive equipment     Toileting 1-->assistive equipment    Bathing 2-->assistive person    Dressing 0-->independent    Eating 0-->independent    Communication 0-->understands/communicates without difficulty    Functional Status Prior    Ambulation 1-->assistive equipment    Transferring 1-->assistive equipment    Toileting 1-->assistive equipment    Bathing 1-->assistive equipment    Dressing 0-->independent    Eating 0-->independent    Communication 0-->understands/communicates without difficulty    Swallowing 0-->swallows foods/liquids without difficulty            Psychosocial     None            Abuse/Neglect     None            Legal     None            Substance Abuse     None            Patient Forms     None          ROGELIO Franks

## 2017-09-06 NOTE — PROGRESS NOTES
"  Infectious Diseases Progress Note    Charli Sen MD     [facility]  [place]: 2 days  Patient Identification:  Name: Prem Thrasher  Age: 62 y.o.  Sex: male  :  1955  MRN: [id number]         Primary Care Physician: Montse Cain PA-C            Subjective: Feeling much better denies any fever and chills  Interval History: Had I&D 2017   Objective:    Scheduled Meds:    amLODIPine 10 mg Oral Q PM   escitalopram 20 mg Oral Nightly   famotidine 20 mg Oral BID   losartan 100 mg Oral Nightly   nafcillin 2 g Intravenous Q4H   rifAMPin 300 mg Intravenous Q8H   sennosides-docusate sodium 2 tablet Oral Nightly   tiotropium 1 capsule Inhalation Q PM     Continuous Infusions:    lactated ringers 9 mL/hr Last Rate: Stopped (17 180)   sodium chloride 0.45 % with KCl 20 mEq 100 mL/hr Last Rate: 100 mL/hr (17)   sodium chloride 100 mL/hr Last Rate: 100 mL/hr (17)       Vital signs in last 24 hours:  Temp:  [98.3 °F (36.8 °C)-99.8 °F (37.7 °C)] 98.3 °F (36.8 °C)  Heart Rate:  [] 104  Resp:  [14-22] 18  BP: (101-148)/(71-96) 128/75    Intake/Output:    Intake/Output Summary (Last 24 hours) at 17 0819  Last data filed at 17 0644   Gross per 24 hour   Intake          5046.67 ml   Output             3650 ml   Net          1396.67 ml       Exam:  /75 (BP Location: Right arm, Patient Position: Sitting)  Pulse 104  Temp 98.3 °F (36.8 °C) (Oral)   Resp 18  Ht 71\" (180.3 cm)  Wt 211 lb 9.6 oz (96 kg)  SpO2 94%  BMI 29.51 kg/m2    General Appearance:    Alert, cooperative, no distress, AAOx3, able to get up and go to bathroom and walk earlier.  Now sleeping after surgery                          Head:    Normocephalic, without obvious abnormality, atraumatic                           Eyes:    PERRL, conjunctiva/corneas clear, EOM's intact, both eyes                         Throat:   Lips, tongue, gums normal; oral mucosa pink and moist                       "     Neck:   Supple, symmetrical, trachea midline, no JVD                         Lungs:    Clear to auscultation bilaterally, respirations unlabored                 Chest Wall:    No tenderness or deformity                          Heart:    Regular rate and rhythm, S1 and S2 normal, no murmur,no  Rub                                      or gallop                  Abdomen:     Soft, non-tender, bowel sounds active, no masses, no                                                        organomegaly                  Extremities:   Extremities normal, atraumatic, no cyanosis or edema                        Pulses:   Pulses palpable in all extremities                            Skin:   Surgical incision dressed drains in place                      Data Review:    I reviewed the patient's new clinical results.    Results from last 7 days  Lab Units 09/06/17  0510 09/05/17  2041 09/05/17  0739 09/04/17  0519 09/03/17  2224   WBC 10*3/mm3 11.76*  --  12.98* 12.65* 12.50*   HEMOGLOBIN g/dL 10.0* 9.7* 9.4* 9.6* 10.3*   PLATELETS 10*3/mm3 320  --  289 256 275       Results from last 7 days  Lab Units 09/06/17  0510 09/05/17  0739 09/04/17  0519 09/03/17  2224   SODIUM mmol/L 130* 131* 134* 133*   POTASSIUM mmol/L 3.6 3.5 3.1* 3.4*   CHLORIDE mmol/L 92* 94* 98 94*   CO2 mmol/L 24.1 23.1 22.4 25.4   BUN mg/dL 10 7* 10 14   CREATININE mg/dL 0.93 0.85 0.90 1.04   CALCIUM mg/dL 8.7 8.6 8.1* 8.5*   GLUCOSE mg/dL 99 95 126* 118*     Microbiology Results (last 10 days)     Procedure Component Value - Date/Time    Wound Culture [[id number]] Collected:  09/05/17 1709    Lab Status:  Preliminary result Specimen:  Wound from Spine, Lumbar Updated:  09/06/17 0617     Gram Stain Result Moderate (3+) Red blood cells      Few (2+) WBCs seen      Few (2+) Gram positive cocci in pairs and clusters    Wound Culture [[id number]] Collected:  09/05/17 1706    Lab Status:  Preliminary result Specimen:  Wound from Spine, Lumbar Updated:  09/06/17 0620      Gram Stain Result Moderate (3+) Red blood cells      Rare (1+) WBCs seen      No organisms seen    Blood Culture [[id number]]  (Normal) Collected:  09/04/17 2336    Lab Status:  Preliminary result Specimen:  Blood from Arm, Left Updated:  09/06/17 0004     Blood Culture No growth at 24 hours    Blood Culture [[id number]]  (Normal) Collected:  09/04/17 2335    Lab Status:  Preliminary result Specimen:  Blood from Arm, Left Updated:  09/06/17 0004     Blood Culture No growth at 24 hours    Blood Culture [[id number]]  (Abnormal)  (Susceptibility) Collected:  09/03/17 2302    Lab Status:  Final result Specimen:  Blood from Arm, Left Updated:  09/06/17 0715     Blood Culture Staphylococcus aureus (A)        Consider infectious disease consult to rule out distant focus of infection.        Gram Stain Result Anaerobic Bottle Gram positive cocci in clusters      Aerobic Bottle Gram positive cocci in clusters    Susceptibility      Staphylococcus aureus     MAUREEN     Clindamycin <=0.25 ug/ml Susceptible     Erythromycin >=8 ug/ml Resistant     Oxacillin 0.5 ug/ml Susceptible     Penicillin G 0.06 ug/ml Susceptible     Rifampin <=0.5 ug/ml Susceptible     Tetracycline <=1 ug/ml Susceptible     Trimethoprim + Sulfamethoxazole <=10 ug/ml Susceptible     Vancomycin <=0.5 ug/ml Susceptible                    Blood Culture ID, PCR [[id number]]  (Abnormal) Collected:  09/03/17 2302    Lab Status:  Final result Specimen:  Blood from Arm, Left Updated:  09/04/17 2337     BCID, PCR Staphylococcus aureus, not MRSA. Identification by BCID PCR. (C)    Blood Culture [[id number]]  (Abnormal) Collected:  09/03/17 2224    Lab Status:  Final result Specimen:  Blood from Arm, Left Updated:  09/06/17 0718     Blood Culture Staphylococcus aureus (A)      Refer to previous blood culture collected on 9/3/17 for MAUREEN results    Consider infectious disease consult to rule out distant focus of infection.        Gram Stain Result Aerobic Bottle Gram  positive cocci in clusters      Anaerobic Bottle Gram positive cocci in clusters            Assessment:  Principal Problem:    Sepsis  Active Problems:    COPD (chronic obstructive pulmonary disease)    Rheumatoid arthritis    Hypertension    Spondylolisthesis of lumbar region      Plan:  Dc vanc, start nafcillin. Based on the operative report it appears to infection is in close proximity to the hardware.  We'll add rifampin to consider 6-8 weeks of IV antibiotic therapy followed by may be chronic suppressive therapy based on patient's clinical response.     Charli Sen MD  9/6/2017  8:19 AM    Much of this encounter note is an electronic transcription/translation of spoken language to printed text. The electronic translation of spoken language may permit erroneous, or at times, nonsensical words or phrases to be inadvertently transcribed; Although I have reviewed the note for such errors, some may still exist

## 2017-09-06 NOTE — THERAPY TREATMENT NOTE
"Acute Care - Physical Therapy Treatment Note  [facility]     Patient Name: Prem Thrasher  : 1955  MRN: [id number]  Today's Date: 2017  Onset of Illness/Injury or Date of Surgery Date: 17  Date of Referral to PT: 17  Referring Physician: Dr. Coyle    Admit Date: 9/3/2017    Visit Dx:    ICD-10-CM ICD-9-CM   1. Fever chills R50.9 780.60   2. Leukocytosis, unspecified type D72.829 288.60   3. Acute low back pain without sciatica, unspecified back pain laterality M54.5 724.2   4. Right flank pain R10.9 789.09   5. Spondylolisthesis of lumbar region M43.16 738.4     Patient Active Problem List   Diagnosis   • Allergy   • COPD (chronic obstructive pulmonary disease)   • Hyperlipidemia   • IFG (impaired fasting glucose)   • Rheumatoid arthritis   • Hypertension   • Anxiety disorder   • Depression   • Lumbar radiculopathy   • Spondylolisthesis of lumbar region   • Sepsis   • Infection of lumbar spine               Adult Rehabilitation Note       17 1521          Rehab Assessment/Intervention    Discipline physical therapist  -MS      Document Type therapy note (daily note)  -MS      Subjective Information agree to therapy;complains of;fatigue;pain  -MS      Patient Effort, Rehab Treatment good  -MS      Symptoms Noted Comment Pt. reports feeling \"tired\" this PM with continued back pain/soreness from his surgery  -MS      Precautions/Limitations fall precautions;brace on when up   back precautions; Hemovac drain  -MS      Recorded by [MS] Reza Suarez, PT      Pain Assessment    Pain Assessment 0-10  -MS      Pain Score 5  -MS      Post Pain Score 5  -MS      Pain Type Acute pain;Surgical pain  -MS      Pain Location Back  -MS      Pain Intervention(s) Medication (See MAR);Repositioned;Elevated;Rest  -MS      Recorded by [MS] Reza Suarez, PT      Cognitive Assessment/Intervention    Current Cognitive/Communication Assessment functional  -MS      Orientation Status oriented x 4  " -MS      Follows Commands/Answers Questions 100% of the time  -MS      Personal Safety WNL/WFL  -MS      Personal Safety Interventions fall prevention program maintained;gait belt;nonskid shoes/slippers when out of bed;supervised activity  -MS      Recorded by [MS] Reza Suarez, PT      Bed Mobility, Assessment/Treatment    Bed Mob, Supine to Sit, Bates contact guard assist  -MS      Bed Mob, Sit to Supine, Bates contact guard assist  -MS      Bed Mobility, Comment via logroll  -MS      Recorded by [MS] Reza Suarez PT      Transfer Assessment/Treatment    Transfers, Sit-Stand Bates contact guard assist  -MS      Transfers, Stand-Sit Bates contact guard assist  -MS      Transfer, Comment no use of A.D. this day.  -MS      Recorded by [MS] Reza Suarez PT      Gait Assessment/Treatment    Gait, Bates Level contact guard assist  -MS      Gait, Distance (Feet) 350  -MS      Gait, Gait Pattern Analysis swing-through gait  -MS      Gait, Gait Deviations antalgic;norman decreased;step length decreased  -MS      Gait, Comment Guarded in ambulation with decreased bilateral arm swing.  Unsteady at times but no overt losses of balance. No use of A.D. this day.  -MS      Recorded by [MS] Reza Suarez PT      Positioning and Restraints    Pre-Treatment Position in bed  -MS      Post Treatment Position bed  -MS      In Bed notified nsg;supine;call light within reach;encouraged to call for assist;with family/caregiver   All lines intact.  -MS      Recorded by [MS] Reza Suarez PT        User Key  (r) = Recorded By, (t) = Taken By, (c) = Cosigned By    Initials Name Effective Dates    MS Reza Suarez, PT 12/01/15 -                 IP PT Goals       09/05/17 1122          Bed Mobility PT LTG    Bed Mobility PT LTG, Date Established 09/05/17  -MA      Bed Mobility PT LTG, Time to Achieve 1 wk  -MA      Bed Mobility PT LTG, Activity Type all bed mobility  -MA      Bed  Mobility PT LTG, Sharp Level supervision required  -MA      Transfer Training PT LTG    Transfer Training PT LTG, Date Established 09/05/17  -MA      Transfer Training PT LTG, Time to Achieve 1 wk  -MA      Transfer Training PT LTG, Activity Type all transfers  -MA      Transfer Training PT LTG, Sharp Level supervision required  -MA      Transfer Training PT LTG, Assist Device walker, rolling  -MA      Gait Training PT LTG    Gait Training Goal PT LTG, Date Established 09/05/17  -MA      Gait Training Goal PT LTG, Time to Achieve 1 wk  -MA      Gait Training Goal PT LTG, Sharp Level supervision required  -MA      Gait Training Goal PT LTG, Assist Device walker, rolling  -MA      Gait Training Goal PT LTG, Distance to Achieve 250  -MA      Stair Training PT LTG    Stair Training Goal PT LTG, Date Established 09/05/17  -MA      Stair Training Goal PT LTG, Time to Achieve 1 wk  -MA      Stair Training Goal PT LTG, Number of Steps 4  -MA      Stair Training Goal PT LTG, Sharp Level contact guard assist  -MA      Stair Training Goal PT LTG, Assist Device walker, rolling;1 handrail  -MA        User Key  (r) = Recorded By, (t) = Taken By, (c) = Cosigned By    Initials Name Provider Type    TANIKA Perkins PT Physical Therapist          Physical Therapy Education     Title: PT OT SLP Therapies (Done)     Topic: Physical Therapy (Done)     Point: Mobility training (Done)    Learning Progress Summary    Learner Readiness Method Response Comment Documented by Status   Patient Acceptance SELENE FLANNERY NR  MS 09/06/17 1525 Done    Acceptance E LEAH  MA 09/05/17 1122 Done               Point: Home exercise program (Done)    Learning Progress Summary    Learner Readiness Method Response Comment Documented by Status   Patient Acceptance SELENE FLANNERY NR  MS 09/06/17 1525 Done               Point: Body mechanics (Done)    Learning Progress Summary    Learner Readiness Method Response Comment Documented by Status    Patient Acceptance SELENE FLANNERY NR  MS 09/06/17 1525 Done    Acceptance E LEAH  MA 09/05/17 1122 Done               Point: Precautions (Done)    Learning Progress Summary    Learner Readiness Method Response Comment Documented by Status   Patient Acceptance SELENE FLANNERY NR  MS 09/06/17 1525 Done    Acceptance E LEAH  MA 09/05/17 1122 Done                      User Key     Initials Effective Dates Name Provider Type Discipline    MS 12/01/15 -  Reza Suarez, PT Physical Therapist PT    MA 12/13/16 -  Susu Perkins, PT Physical Therapist PT                    PT Recommendation and Plan  Anticipated Discharge Disposition: home, home with assist, home with home health (pending patient's progress)  Planned Therapy Interventions: balance training, bed mobility training, gait training, home exercise program, patient/family education, postural re-education, strengthening, transfer training  PT Frequency: daily  Plan of Care Review  Plan Of Care Reviewed With: patient  Progress: improving  Outcome Summary/Follow up Plan: Improved tolerance to functional activity this day with an increase in gait distance and decreased assist required for functional mobility. Pt. also able to ambulate without assist from A.D.          Outcome Measures       09/06/17 1500 09/05/17 1100       How much help from another person do you currently need...    Turning from your back to your side while in flat bed without using bedrails? 3  -MS 3  -MA     Moving from lying on back to sitting on the side of a flat bed without bedrails? 3  -MS 3  -MA     Moving to and from a bed to a chair (including a wheelchair)? 3  -MS 3  -MA     Standing up from a chair using your arms (e.g., wheelchair, bedside chair)? 3  -MS 3  -MA     Climbing 3-5 steps with a railing? 3  -MS 3  -MA     To walk in hospital room? 3  -MS 3  -MA     AM-PAC 6 Clicks Score 18  -MS 18  -MA     Functional Assessment    Outcome Measure Options AM-PAC 6 Clicks Basic Mobility (PT)  -MS AM-PAC 6  Clicks Basic Mobility (PT)  -MA       User Key  (r) = Recorded By, (t) = Taken By, (c) = Cosigned By    Initials Name Provider Type    MS Reza Suarez, PT Physical Therapist    TANIKA Perkins, PT Physical Therapist           Time Calculation:         PT Charges       09/06/17 1526          Time Calculation    Start Time 1450  -MS      Stop Time 1504  -MS      Time Calculation (min) 14 min  -MS      PT Received On 09/06/17  -MS      PT - Next Appointment 09/07/17  -MS        User Key  (r) = Recorded By, (t) = Taken By, (c) = Cosigned By    Initials Name Provider Type    MS Reza Suarez, PT Physical Therapist          Therapy Charges for Today     Code Description Service Date Service Provider Modifiers Qty    09821378643 HC PT THER PROC EA 15 MIN 9/6/2017 Reza Suarez, PT GP 1          PT G-Codes  Outcome Measure Options: AM-PAC 6 Clicks Basic Mobility (PT)    Reza Suarez, PT  9/6/2017

## 2017-09-07 ENCOUNTER — APPOINTMENT (OUTPATIENT)
Dept: GENERAL RADIOLOGY | Facility: HOSPITAL | Age: 62
End: 2017-09-07
Attending: INTERNAL MEDICINE

## 2017-09-07 LAB
ANION GAP SERPL CALCULATED.3IONS-SCNC: 13.3 MMOL/L
BACTERIA SPEC AEROBE CULT: ABNORMAL
BACTERIA SPEC AEROBE CULT: ABNORMAL
BASOPHILS # BLD AUTO: 0.08 10*3/MM3 (ref 0–0.2)
BASOPHILS NFR BLD AUTO: 0.9 % (ref 0–1.5)
BUN BLD-MCNC: 6 MG/DL (ref 8–23)
BUN/CREAT SERPL: 6.1 (ref 7–25)
CALCIUM SPEC-SCNC: 8.7 MG/DL (ref 8.6–10.5)
CHLORIDE SERPL-SCNC: 95 MMOL/L (ref 98–107)
CO2 SERPL-SCNC: 25.7 MMOL/L (ref 22–29)
CREAT BLD-MCNC: 0.98 MG/DL (ref 0.76–1.27)
DEPRECATED RDW RBC AUTO: 43.2 FL (ref 37–54)
EOSINOPHIL # BLD AUTO: 0.38 10*3/MM3 (ref 0–0.7)
EOSINOPHIL NFR BLD AUTO: 4.4 % (ref 0.3–6.2)
ERYTHROCYTE [DISTWIDTH] IN BLOOD BY AUTOMATED COUNT: 13.1 % (ref 11.5–14.5)
GFR SERPL CREATININE-BSD FRML MDRD: 77 ML/MIN/1.73
GLUCOSE BLD-MCNC: 117 MG/DL (ref 65–99)
GRAM STN SPEC: ABNORMAL
HCT VFR BLD AUTO: 28.5 % (ref 40.4–52.2)
HGB BLD-MCNC: 9.4 G/DL (ref 13.7–17.6)
IMM GRANULOCYTES # BLD: 0.02 10*3/MM3 (ref 0–0.03)
IMM GRANULOCYTES NFR BLD: 0.2 % (ref 0–0.5)
LYMPHOCYTES # BLD AUTO: 1.73 10*3/MM3 (ref 0.9–4.8)
LYMPHOCYTES NFR BLD AUTO: 20.2 % (ref 19.6–45.3)
MCH RBC QN AUTO: 29.8 PG (ref 27–32.7)
MCHC RBC AUTO-ENTMCNC: 33 G/DL (ref 32.6–36.4)
MCV RBC AUTO: 90.5 FL (ref 79.8–96.2)
MONOCYTES # BLD AUTO: 1.15 10*3/MM3 (ref 0.2–1.2)
MONOCYTES NFR BLD AUTO: 13.4 % (ref 5–12)
NEUTROPHILS # BLD AUTO: 5.21 10*3/MM3 (ref 1.9–8.1)
NEUTROPHILS NFR BLD AUTO: 60.9 % (ref 42.7–76)
PLATELET # BLD AUTO: 329 10*3/MM3 (ref 140–500)
PMV BLD AUTO: 9.2 FL (ref 6–12)
POTASSIUM BLD-SCNC: 3.5 MMOL/L (ref 3.5–5.2)
RBC # BLD AUTO: 3.15 10*6/MM3 (ref 4.6–6)
SODIUM BLD-SCNC: 134 MMOL/L (ref 136–145)
WBC NRBC COR # BLD: 8.57 10*3/MM3 (ref 4.5–10.7)

## 2017-09-07 PROCEDURE — 85025 COMPLETE CBC W/AUTO DIFF WBC: CPT | Performed by: INTERNAL MEDICINE

## 2017-09-07 PROCEDURE — C1894 INTRO/SHEATH, NON-LASER: HCPCS

## 2017-09-07 PROCEDURE — 02HV33Z INSERTION OF INFUSION DEVICE INTO SUPERIOR VENA CAVA, PERCUTANEOUS APPROACH: ICD-10-PCS | Performed by: INTERNAL MEDICINE

## 2017-09-07 PROCEDURE — 99024 POSTOP FOLLOW-UP VISIT: CPT | Performed by: ORTHOPAEDIC SURGERY

## 2017-09-07 PROCEDURE — 71010 HC CHEST PA OR AP: CPT

## 2017-09-07 PROCEDURE — 94799 UNLISTED PULMONARY SVC/PX: CPT

## 2017-09-07 PROCEDURE — 80048 BASIC METABOLIC PNL TOTAL CA: CPT | Performed by: INTERNAL MEDICINE

## 2017-09-07 PROCEDURE — 25010000002 RIFAMPIN 600 MG RECONSTITUTED SOLUTION 1 EACH VIAL: Performed by: INTERNAL MEDICINE

## 2017-09-07 PROCEDURE — 25010000002 HYDROMORPHONE PER 4 MG: Performed by: INTERNAL MEDICINE

## 2017-09-07 RX ORDER — RIFAMPIN 300 MG/1
300 CAPSULE ORAL 3 TIMES DAILY
Qty: 126 CAPSULE | Refills: 5 | Status: SHIPPED | OUTPATIENT
Start: 2017-09-05 | End: 2017-10-17

## 2017-09-07 RX ORDER — SODIUM CHLORIDE 0.9 % (FLUSH) 0.9 %
10 SYRINGE (ML) INJECTION EVERY 12 HOURS SCHEDULED
Status: DISCONTINUED | OUTPATIENT
Start: 2017-09-07 | End: 2017-09-08 | Stop reason: HOSPADM

## 2017-09-07 RX ORDER — SODIUM CHLORIDE 0.9 % (FLUSH) 0.9 %
10 SYRINGE (ML) INJECTION AS NEEDED
Status: DISCONTINUED | OUTPATIENT
Start: 2017-09-07 | End: 2017-09-08 | Stop reason: HOSPADM

## 2017-09-07 RX ORDER — RIFAMPIN 300 MG/1
300 CAPSULE ORAL 3 TIMES DAILY
Status: DISCONTINUED | OUTPATIENT
Start: 2017-09-07 | End: 2017-09-08 | Stop reason: HOSPADM

## 2017-09-07 RX ORDER — RIFAMPIN 300 MG/1
300 CAPSULE ORAL 3 TIMES DAILY
Status: DISCONTINUED | OUTPATIENT
Start: 2017-09-07 | End: 2017-09-07

## 2017-09-07 RX ORDER — OXYCODONE AND ACETAMINOPHEN 10; 325 MG/1; MG/1
1 TABLET ORAL EVERY 4 HOURS PRN
Status: DISCONTINUED | OUTPATIENT
Start: 2017-09-07 | End: 2017-09-08 | Stop reason: HOSPADM

## 2017-09-07 RX ORDER — OXYCODONE HYDROCHLORIDE AND ACETAMINOPHEN 5; 325 MG/1; MG/1
TABLET ORAL
Qty: 60 TABLET | Refills: 0 | Status: SHIPPED | OUTPATIENT
Start: 2017-09-07 | End: 2017-09-18 | Stop reason: SDUPTHER

## 2017-09-07 RX ADMIN — HYDROMORPHONE HYDROCHLORIDE 1 MG: 1 INJECTION, SOLUTION INTRAMUSCULAR; INTRAVENOUS; SUBCUTANEOUS at 18:08

## 2017-09-07 RX ADMIN — DOCUSATE SODIUM -SENNOSIDES 2 TABLET: 50; 8.6 TABLET, COATED ORAL at 20:07

## 2017-09-07 RX ADMIN — OXYCODONE HYDROCHLORIDE AND ACETAMINOPHEN 2 TABLET: 5; 325 TABLET ORAL at 05:48

## 2017-09-07 RX ADMIN — RIFAMPIN 300 MG: 300 CAPSULE ORAL at 16:28

## 2017-09-07 RX ADMIN — NAFCILLIN SODIUM 2 G: 2 INJECTION, POWDER, LYOPHILIZED, FOR SOLUTION INTRAMUSCULAR; INTRAVENOUS at 22:07

## 2017-09-07 RX ADMIN — HYDROMORPHONE HYDROCHLORIDE 1 MG: 1 INJECTION, SOLUTION INTRAMUSCULAR; INTRAVENOUS; SUBCUTANEOUS at 15:15

## 2017-09-07 RX ADMIN — SODIUM CHLORIDE 300 MG: 9 INJECTION, SOLUTION INTRAVENOUS at 00:47

## 2017-09-07 RX ADMIN — Medication 10 ML: at 15:15

## 2017-09-07 RX ADMIN — BISACODYL 5 MG: 5 TABLET, COATED ORAL at 08:16

## 2017-09-07 RX ADMIN — ESCITALOPRAM 20 MG: 20 TABLET, FILM COATED ORAL at 20:07

## 2017-09-07 RX ADMIN — NAFCILLIN SODIUM 2 G: 2 INJECTION, POWDER, LYOPHILIZED, FOR SOLUTION INTRAMUSCULAR; INTRAVENOUS at 15:14

## 2017-09-07 RX ADMIN — RIFAMPIN 300 MG: 300 CAPSULE ORAL at 11:21

## 2017-09-07 RX ADMIN — FAMOTIDINE 20 MG: 20 TABLET, FILM COATED ORAL at 16:49

## 2017-09-07 RX ADMIN — RIFAMPIN 300 MG: 300 CAPSULE ORAL at 20:07

## 2017-09-07 RX ADMIN — SODIUM CHLORIDE 300 MG: 9 INJECTION, SOLUTION INTRAVENOUS at 08:16

## 2017-09-07 RX ADMIN — BISACODYL 10 MG: 10 SUPPOSITORY RECTAL at 16:50

## 2017-09-07 RX ADMIN — OXYCODONE AND ACETAMINOPHEN 1 TABLET: 10; 325 TABLET ORAL at 11:22

## 2017-09-07 RX ADMIN — POTASSIUM CHLORIDE 40 MEQ: 750 CAPSULE, EXTENDED RELEASE ORAL at 10:12

## 2017-09-07 RX ADMIN — NAFCILLIN SODIUM 2 G: 2 INJECTION, POWDER, LYOPHILIZED, FOR SOLUTION INTRAMUSCULAR; INTRAVENOUS at 18:08

## 2017-09-07 RX ADMIN — LOSARTAN POTASSIUM 100 MG: 50 TABLET, FILM COATED ORAL at 20:07

## 2017-09-07 RX ADMIN — FAMOTIDINE 20 MG: 20 TABLET, FILM COATED ORAL at 08:16

## 2017-09-07 RX ADMIN — TIOTROPIUM BROMIDE 1 CAPSULE: 18 CAPSULE ORAL; RESPIRATORY (INHALATION) at 19:21

## 2017-09-07 RX ADMIN — AMLODIPINE BESYLATE 10 MG: 10 TABLET ORAL at 16:28

## 2017-09-07 RX ADMIN — NAFCILLIN SODIUM 2 G: 2 INJECTION, POWDER, LYOPHILIZED, FOR SOLUTION INTRAMUSCULAR; INTRAVENOUS at 02:18

## 2017-09-07 RX ADMIN — NAFCILLIN SODIUM 2 G: 2 INJECTION, POWDER, LYOPHILIZED, FOR SOLUTION INTRAMUSCULAR; INTRAVENOUS at 06:52

## 2017-09-07 RX ADMIN — Medication 10 ML: at 22:00

## 2017-09-07 NOTE — PROGRESS NOTES
Name: Prem Thrasher ADMIT: 9/3/2017   : 1955  PCP: Montse Cain PA-C    MRN: [id number] LOS: 3 days   AGE/SEX: 62 y.o. male  ROOM: [facility]   Subjective   Subjective  Pain in back when laying down, poorly controlled. No CP SOA NVD.    Objective   Vital Signs  Temp:  [97.5 °F (36.4 °C)-99.8 °F (37.7 °C)] 98.1 °F (36.7 °C)  Heart Rate:  [80-90] 84  Resp:  [16-18] 16  BP: (122-140)/(79-85) 122/84  SpO2:  [93 %-97 %] 97 %  on   ;   O2 Device: room air  Body mass index is 29.16 kg/(m^2).    Physical Exam   Constitutional: He appears well-developed and well-nourished. No distress.   HENT:   Head: Normocephalic and atraumatic.   Nose: Nose normal.   Eyes: EOM are normal. Pupils are equal, round, and reactive to light.   Neck: Normal range of motion. Neck supple.   Cardiovascular: Normal rate, regular rhythm, normal heart sounds and intact distal pulses.    No murmur heard.  Pulmonary/Chest: Effort normal and breath sounds normal. He has no wheezes.   Abdominal: Soft. Bowel sounds are normal. He exhibits no distension. There is no tenderness.   Musculoskeletal: Normal range of motion. He exhibits no edema.   Lumbar spine dressed, drain in place   Neurological: He is alert. No cranial nerve deficit.   Skin: Skin is warm and dry. He is not diaphoretic.   Psychiatric: He has a normal mood and affect. His behavior is normal.   Nursing note and vitals reviewed.      Results Review:       I reviewed the patient's new clinical results.    Results from last 7 days  Lab Units 17  0733 17  0510 17  2041 17  0739 17  0519 17  2224   WBC 10*3/mm3 8.57 11.76*  --  12.98* 12.65* 12.50*   HEMOGLOBIN g/dL 9.4* 10.0* 9.7* 9.4* 9.6* 10.3*   PLATELETS 10*3/mm3 329 320  --  289 256 275     Results from last 7 days  Lab Units 17  0733 17  0510 17  0739 17  0519 17  2224   SODIUM mmol/L 134* 130* 131* 134* 133*   POTASSIUM mmol/L 3.5 3.6 3.5 3.1* 3.4*   CHLORIDE  mmol/L 95* 92* 94* 98 94*   CO2 mmol/L 25.7 24.1 23.1 22.4 25.4   BUN mg/dL 6* 10 7* 10 14   CREATININE mg/dL 0.98 0.93 0.85 0.90 1.04   GLUCOSE mg/dL 117* 99 95 126* 118*   Estimated Creatinine Clearance: 90.7 mL/min (by C-G formula based on Cr of 0.98).  Results from last 7 days  Lab Units 09/07/17  0733 09/06/17  0510 09/05/17  0739 09/04/17  0519 09/03/17  2224   CALCIUM mg/dL 8.7 8.7 8.6 8.1* 8.5*   ALBUMIN g/dL  --   --   --   --  3.30*   MAGNESIUM mg/dL  --   --   --  2.2  --    PHOSPHORUS mg/dL  --   --   --  3.1  --          amLODIPine 10 mg Oral Q PM   escitalopram 20 mg Oral Nightly   famotidine 20 mg Oral BID   losartan 100 mg Oral Nightly   nafcillin 2 g Intravenous Q4H   rifAMPin 300 mg Oral TID   sennosides-docusate sodium 2 tablet Oral Nightly   tiotropium 1 capsule Inhalation Q PM       lactated ringers 9 mL/hr Last Rate: Stopped (09/05/17 1805)   Diet Regular      Assessment/Plan   Assessment:     Active Hospital Problems (** Indicates Principal Problem)    Diagnosis Date Noted   • **Infection of lumbar spine [M86.9] 09/06/2017   • Sepsis [A41.9] 09/04/2017   • Spondylolisthesis of lumbar region [M43.16] 07/31/2017   • COPD (chronic obstructive pulmonary disease) [J44.9]    • Rheumatoid arthritis [M06.9]    • Hypertension [I10]       Resolved Hospital Problems    Diagnosis Date Noted Date Resolved   No resolved problems to display.       Plan:   - Lumbar spine deep wound infection: Sepsis with MSSA on blood and wound cx. Continue Nafcillin and Rifampin (hardware present). Repeat Cx negative, afebrile, WBC improving. Ok to place PICC. Increase pain regimen today. Continue bowel regimen. Orthopedics and ID following.  - COPD: Stable  - HTN: Stable     Disposition  HH/few days    Luke Lacy MD  [facility]  09/07/17  9:37 AM

## 2017-09-07 NOTE — PROGRESS NOTES
Orthopedic Progress Note      Patient: Prem Thrasher    YOB: 1955    Medical Record Number: [id number]    Attending Physician: Luke Lcay MD    Date of Admission: 9/3/2017  9:49 PM    Status Post: I&D LUMBAR SPINE     Post Operative Day Number: 2    Admitting Dx: Fever chills [R50.9]  Right flank pain [R10.9]  Leukocytosis, unspecified type [D72.829]  Acute low back pain without sciatica, unspecified back pain laterality [M54.5]    Current Problem List:   Patient Active Problem List   Diagnosis   • Allergy   • COPD (chronic obstructive pulmonary disease)   • Hyperlipidemia   • IFG (impaired fasting glucose)   • Rheumatoid arthritis   • Hypertension   • Anxiety disorder   • Depression   • Lumbar radiculopathy   • Spondylolisthesis of lumbar region   • Sepsis   • Infection of lumbar spine         Past Medical History:   Diagnosis Date   • Acute sinusitis    • Allergy    • Anxiety disorder    • Cervical disc disorder    • COPD (chronic obstructive pulmonary disease)    • CTS (carpal tunnel syndrome)    • Dermatophytosis of groin    • Encounter for eye exam 10/2014    normal   • History of bone density study 03/2014    Dr. Maria Antonia Lopez; normal   • History of chest x-ray 02/15/2011    also 3-6-15; normal chest   • History of nuclear stress test 03/09/2015    cardiolite; Dr. Greenberg; negative   • History of pulmonary function tests 03/2015    COPD   • Hyperlipidemia    • Hypertension    • IFG (impaired fasting glucose)    • Low back pain    • Lumbosacral disc disease    • Nerve damage     KAYLYNN ELBOWS   • Osteoarthritis, multiple sites    • Postoperative nausea and vomiting 8/1/2017   • Rheumatoid arthritis    • Sciatica    • Thoracic disc disorder        Current Medications:  Scheduled Meds:  amLODIPine 10 mg Oral Q PM   escitalopram 20 mg Oral Nightly   famotidine 20 mg Oral BID   losartan 100 mg Oral Nightly   nafcillin 2 g Intravenous Q4H   rifAMPin 300 mg Oral TID   sennosides-docusate  sodium 2 tablet Oral Nightly   tiotropium 1 capsule Inhalation Q PM     PRN Meds:.•  acetaminophen  •  ALPRAZolam  •  bisacodyl  •  bisacodyl  •  cyclobenzaprine  •  HYDROmorphone  •  ipratropium-albuterol  •  ondansetron **OR** ondansetron ODT **OR** ondansetron  •  oxyCODONE-acetaminophen  •  oxyCODONE-acetaminophen  •  potassium chloride **OR** potassium chloride **OR** potassium chloride  •  sodium chloride  •  sodium chloride    Diagnostic Tests:    Lab Results (last 24 hours)     Procedure Component Value Units Date/Time    Blood Culture [[id number]]  (Normal) Collected:  09/04/17 2335    Specimen:  Blood from Arm, Left Updated:  09/07/17 0005     Blood Culture No growth at 2 days    Blood Culture [[id number]]  (Normal) Collected:  09/04/17 2336    Specimen:  Blood from Arm, Left Updated:  09/07/17 0005     Blood Culture No growth at 2 days    CBC & Differential [[id number]] Collected:  09/07/17 0733    Specimen:  Blood Updated:  09/07/17 0757    Narrative:       The following orders were created for panel order CBC & Differential.  Procedure                               Abnormality         Status                     ---------                               -----------         ------                     CBC Auto Differential[[id number]]        Abnormal            Final result                 Please view results for these tests on the individual orders.    CBC Auto Differential [[id number]]  (Abnormal) Collected:  09/07/17 0733    Specimen:  Blood Updated:  09/07/17 0757     WBC 8.57 10*3/mm3      RBC 3.15 (L) 10*6/mm3      Hemoglobin 9.4 (L) g/dL      Hematocrit 28.5 (L) %      MCV 90.5 fL      MCH 29.8 pg      MCHC 33.0 g/dL      RDW 13.1 %      RDW-SD 43.2 fl      MPV 9.2 fL      Platelets 329 10*3/mm3      Neutrophil % 60.9 %      Lymphocyte % 20.2 %      Monocyte % 13.4 (H) %      Eosinophil % 4.4 %      Basophil % 0.9 %      Immature Grans % 0.2 %      Neutrophils, Absolute 5.21 10*3/mm3      Lymphocytes,  "Absolute 1.73 10*3/mm3      Monocytes, Absolute 1.15 10*3/mm3      Eosinophils, Absolute 0.38 10*3/mm3      Basophils, Absolute 0.08 10*3/mm3      Immature Grans, Absolute 0.02 10*3/mm3     Basic Metabolic Panel [[id number]]  (Abnormal) Collected:  09/07/17 0733    Specimen:  Blood Updated:  09/07/17 0821     Glucose 117 (H) mg/dL      BUN 6 (L) mg/dL      Creatinine 0.98 mg/dL      Sodium 134 (L) mmol/L      Potassium 3.5 mmol/L      Chloride 95 (L) mmol/L      CO2 25.7 mmol/L      Calcium 8.7 mg/dL      eGFR Non African Amer 77 mL/min/1.73      BUN/Creatinine Ratio 6.1 (L)     Anion Gap 13.3 mmol/L     Narrative:       GFR Normal >60  Chronic Kidney Disease <60  Kidney Failure <15    Wound Culture [[id number]]  (Abnormal)  (Susceptibility) Collected:  09/05/17 1706    Specimen:  Wound from Spine, Lumbar Updated:  09/07/17 1015     Wound Culture Light growth (2+) Staphylococcus aureus (A)      D test is negative. Isolate does not exhibit \"inducible\" resistance to Clindamycin (isolate remains susceptible to Clindamycin).            Gram Stain Result Moderate (3+) Red blood cells      Rare (1+) WBCs seen      No organisms seen    Susceptibility      Staphylococcus aureus     MAUREEN     Clindamycin <=0.25 ug/ml Susceptible     Erythromycin >=8 ug/ml Resistant     Oxacillin <=0.25 ug/ml Susceptible     Penicillin G 0.06 ug/ml Susceptible     Rifampin <=0.5 ug/ml Susceptible     Tetracycline <=1 ug/ml Susceptible     Trimethoprim + Sulfamethoxazole <=10 ug/ml Susceptible     Vancomycin <=0.5 ug/ml Susceptible                    Wound Culture [[id number]]  (Abnormal)  (Susceptibility) Collected:  09/05/17 1709    Specimen:  Wound from Spine, Lumbar Updated:  09/07/17 1016     Wound Culture Light growth (2+) Staphylococcus aureus (A)      D test is negative. Isolate does not exhibit \"inducible\" resistance to Clindamycin (isolate remains susceptible to Clindamycin).            Gram Stain Result Moderate (3+) Red blood cells      " Few (2+) WBCs seen      Few (2+) Gram positive cocci in pairs and clusters    Susceptibility      Staphylococcus aureus     MAUREEN     Clindamycin <=0.25 ug/ml Susceptible     Erythromycin >=8 ug/ml Resistant     Oxacillin 0.5 ug/ml Susceptible     Penicillin G 0.06 ug/ml Susceptible     Rifampin <=0.5 ug/ml Susceptible     Tetracycline <=1 ug/ml Susceptible     Trimethoprim + Sulfamethoxazole <=10 ug/ml Susceptible     Vancomycin <=0.5 ug/ml Susceptible                          Objective:  General Appearance:  62 y.o. male. Awake and alert.  Moderate pain today, but has not been taking meds on a regular basis.  Anxious to go home     Assessment:  Physical Exam:  Back dressing dry and intact.  Did have drainage from distal end of incision yesterday, but none at present.  Moves legs well.  PICC line today.  IV antibiotics per Dr. Sen.  Ambulating well    Vitals:    09/06/17 2006 09/07/17 0337 09/07/17 0600 09/07/17 0739   BP: 132/80 140/85  122/84   BP Location: Right arm Left arm  Left arm   Patient Position: Lying Sitting  Sitting   Pulse: 80 90  84   Resp: 16 18  16   Temp: 98.4 °F (36.9 °C) 97.5 °F (36.4 °C)  98.1 °F (36.7 °C)   TempSrc: Oral Oral  Oral   SpO2: 94% 96%  97%   Weight:   209 lb 1.6 oz (94.8 kg)    Height:         I/O last 3 completed shifts:  In: 6802.7 [P.O.:4640; I.V.:1062.7; IV Piggyback:1100]  Out: 3100 [Urine:3050; Other:50]          Plan:  Continue present treatment.  Drain not staying compressed.  Will change dressing and d/c drain  Continue Pain management efforts  Continue mobilization efforts  Continue incisional care    Discharge Plan:  Home tomorrow with home health.     Date: 9/7/2017    ADDISON Lopez MD

## 2017-09-07 NOTE — PROGRESS NOTES
Continued Stay Note  [facility]     Patient Name: Prem Thrasher  MRN: [id number]  Today's Date: 9/7/2017    Admit Date: 9/3/2017          Discharge Plan       09/07/17 1015    Case Management/Social Work Plan    Plan to be determined, IV ABX needs HH vs SNF    Additional Comments VM for Linda Thrasher, spouse, [phone].  Per patient's instructions to discuss need for HH vs skilled nursing and discharge planning.  CCP will continue to assist..................Lizeth Barlow RN              Discharge Codes     None            Lizeth Barlow RN

## 2017-09-07 NOTE — PROGRESS NOTES
Continued Stay Note  [facility]     Patient Name: Prem Thrasher  MRN: [id number]  Today's Date: 9/7/2017    Admit Date: 9/3/2017          Discharge Plan       09/07/17 1101    Case Management/Social Work Plan    Plan HH for IV ABX and labs and PT and nursing    Additional Comments Spoke with Linda Thrasher, spouse.  She prefers [facility] and referral called to [facility].  Continue to assist with DC planning...................Lizeth Barlow RN      09/07/17 1015    Case Management/Social Work Plan    Plan to be determined, IV ABX needs HH vs SNF    Additional Comments VM for Linda Thrasher, spouse, [phone].  Per patient's instructions to discuss need for HH vs skilled nursing and discharge planning.  CCP will continue to assist..................Lizeth Barlow RN              Discharge Codes     None            Lizeth Barlow RN

## 2017-09-07 NOTE — PLAN OF CARE
Problem: Patient Care Overview (Adult)  Goal: Plan of Care Review  Outcome: Ongoing (interventions implemented as appropriate)    09/07/17 3191   Outcome Evaluation   Outcome Summary/Follow up Plan picc placed for home abx (6 weeks), drain removed, dressing chgd, percocet and lortab given for pain, steady gait   Coping/Psychosocial Response Interventions   Plan Of Care Reviewed With patient   Patient Care Overview   Progress improving       Goal: Adult Individualization and Mutuality  Outcome: Ongoing (interventions implemented as appropriate)  Goal: Discharge Needs Assessment  Outcome: Ongoing (interventions implemented as appropriate)    Problem: Fall Risk (Adult)  Goal: Absence of Falls  Outcome: Ongoing (interventions implemented as appropriate)    Problem: Pain, Acute (Adult)  Goal: Acceptable Pain Control/Comfort Level  Outcome: Ongoing (interventions implemented as appropriate)    Problem: Infection, Risk/Actual (Adult)  Goal: Infection Prevention/Resolution  Outcome: Ongoing (interventions implemented as appropriate)

## 2017-09-07 NOTE — SIGNIFICANT NOTE
09/07/17 1523   Rehab Treatment   Treatment Not Performed other (see comments)  (Observed pt. ambulating independently (No A.D.) 350 feet and perform transfers as well as bed mobility independently. Per nurse, pt. has been up ad mo. Pt. no longer requires skilled inpt. P.T. Will sign off.)

## 2017-09-07 NOTE — DISCHARGE PLACEMENT REQUEST
"Yelena Thrasher (62 y.o. Male)     Date of Birth Social Security Number Address Home Phone MRN    1955  [address] [place] [facility] [address] [phone] [id number]    Hoahaoism Marital Status          Sabianist        Admission Date Admission Type Admitting Provider Attending Provider Department, Room/Bed    9/3/17 Emergency Josh Coyle MD Baumann, Patrick D, MD [address], [address]    Discharge Date Discharge Disposition Discharge Destination                      Attending Provider: Luke Lacy MD     Allergies:  Atorvastatin, Methotrexate Derivatives, Ceclor [Cefaclor]    Isolation:  None   Infection:  None   Code Status:  FULL    Ht:  71\" (180.3 cm)   Wt:  209 lb 1.6 oz (94.8 kg)    Admission Cmt:  None   Principal Problem:  Infection of lumbar spine [M86.9]                 Active Insurance as of 9/3/2017     Primary Coverage     Payor Plan Insurance Group Employer/Plan Group    MEDICARE MEDICARE A & B      Payor Plan Address Payor Plan Phone Number Effective From Effective To    [address] [phone] 4/1/2017     [place] [address]       Subscriber Name Subscriber Birth Date Member ID       YELENA THRASHER 1955 [id number]           Secondary Coverage     Payor Plan Insurance Group Employer/Plan Group    [employer] BLUE [employer]OSS [employer] BLUE CROSS BLUE SHIELD PPO [id number]     Payor Plan Address Payor Plan Phone Number Effective From Effective To    [address] [phone] 1/1/2013     [place] [address]       Subscriber Name Subscriber Birth Date Member ID       YELENA THRASHER 1955 [id number]                 Emergency Contacts      (Rel.) Home Phone Work Phone Mobile Phone    Linda Thrasher (Spouse) [phone] -- [phone]            "

## 2017-09-07 NOTE — PLAN OF CARE
Problem: Patient Care Overview (Adult)  Goal: Plan of Care Review  Outcome: Ongoing (interventions implemented as appropriate)    09/06/17 1525 09/06/17 2000 09/07/17 0051   Outcome Evaluation   Outcome Summary/Follow up Plan --  --  Antibiotics continued. Hemivac with scant amount serosangerious drainage. ABD and 4x4s dressings to mid-back incision. Percocet for incisional pain. Steady gait.    Coping/Psychosocial Response Interventions   Plan Of Care Reviewed With --  patient --    Patient Care Overview   Progress improving --  --        Goal: Adult Individualization and Mutuality  Outcome: Ongoing (interventions implemented as appropriate)  Goal: Discharge Needs Assessment  Outcome: Ongoing (interventions implemented as appropriate)    Problem: Fall Risk (Adult)  Goal: Absence of Falls  Outcome: Ongoing (interventions implemented as appropriate)    Problem: Pain, Acute (Adult)  Goal: Acceptable Pain Control/Comfort Level  Outcome: Ongoing (interventions implemented as appropriate)    Problem: Infection, Risk/Actual (Adult)  Goal: Infection Prevention/Resolution  Outcome: Ongoing (interventions implemented as appropriate)    Problem: Perioperative Period (Adult)  Goal: Signs and Symptoms of Listed Potential Problems Will be Absent or Manageable (Perioperative Period)  Outcome: Ongoing (interventions implemented as appropriate)

## 2017-09-07 NOTE — PROGRESS NOTES
"  Infectious Diseases Progress Note    Charli Sen MD     [facility]  Los: 3 days  Patient Identification:  Name: Prem Thrasher  Age: 62 y.o.  Sex: male  :  1955  MRN: [id number]         Primary Care Physician: Montse Cain PA-C            Subjective: Feeling better.  Up and about in the hallways.  Interval History: Had I&D 2017   Objective:    Scheduled Meds:    amLODIPine 10 mg Oral Q PM   escitalopram 20 mg Oral Nightly   famotidine 20 mg Oral BID   losartan 100 mg Oral Nightly   nafcillin 2 g Intravenous Q4H   rifAMPin 300 mg Intravenous Q8H   sennosides-docusate sodium 2 tablet Oral Nightly   tiotropium 1 capsule Inhalation Q PM     Continuous Infusions:    lactated ringers 9 mL/hr Last Rate: Stopped (17 1805)       Vital signs in last 24 hours:  Temp:  [97.5 °F (36.4 °C)-99.8 °F (37.7 °C)] 98.1 °F (36.7 °C)  Heart Rate:  [80-90] 84  Resp:  [16-18] 16  BP: (122-140)/(79-85) 122/84    Intake/Output:    Intake/Output Summary (Last 24 hours) at 17 0843  Last data filed at 17 0600   Gross per 24 hour   Intake             2196 ml   Output                0 ml   Net             2196 ml       Exam:  /84 (BP Location: Left arm, Patient Position: Sitting)  Pulse 84  Temp 98.1 °F (36.7 °C) (Oral)   Resp 16  Ht 71\" (180.3 cm)  Wt 209 lb 1.6 oz (94.8 kg)  SpO2 97%  BMI 29.16 kg/m2    General Appearance:    Alert, cooperative, no distress, AAOx3, able to get up and go to bathroom and walk earlier.  Now sleeping after surgery                          Head:    Normocephalic, without obvious abnormality, atraumatic                           Eyes:    PERRL, conjunctiva/corneas clear, EOM's intact, both eyes                         Throat:   Lips, tongue, gums normal; oral mucosa pink and moist                           Neck:   Supple, symmetrical, trachea midline, no JVD                         Lungs:    Clear to auscultation bilaterally, respirations unlabored        "          Chest Wall:    No tenderness or deformity                          Heart:    Regular rate and rhythm, S1 and S2 normal, no murmur,no  Rub                                      or gallop                  Abdomen:     Soft, non-tender, bowel sounds active, no masses, no                                                        organomegaly                  Extremities:   Extremities normal, atraumatic, no cyanosis or edema                        Pulses:   Pulses palpable in all extremities                            Skin:   Surgical incision place drain in place                      Data Review:    I reviewed the patient's new clinical results.    Results from last 7 days  Lab Units 09/07/17  0733 09/06/17  0510 09/05/17  2041 09/05/17  0739 09/04/17  0519 09/03/17  2224   WBC 10*3/mm3 8.57 11.76*  --  12.98* 12.65* 12.50*   HEMOGLOBIN g/dL 9.4* 10.0* 9.7* 9.4* 9.6* 10.3*   PLATELETS 10*3/mm3 329 320  --  289 256 275       Results from last 7 days  Lab Units 09/07/17  0733 09/06/17  0510 09/05/17  0739 09/04/17  0519 09/03/17  2224   SODIUM mmol/L 134* 130* 131* 134* 133*   POTASSIUM mmol/L 3.5 3.6 3.5 3.1* 3.4*   CHLORIDE mmol/L 95* 92* 94* 98 94*   CO2 mmol/L 25.7 24.1 23.1 22.4 25.4   BUN mg/dL 6* 10 7* 10 14   CREATININE mg/dL 0.98 0.93 0.85 0.90 1.04   CALCIUM mg/dL 8.7 8.7 8.6 8.1* 8.5*   GLUCOSE mg/dL 117* 99 95 126* 118*     Microbiology Results (last 10 days)     Procedure Component Value - Date/Time    Wound Culture [[id number]]  (Abnormal) Collected:  09/05/17 1709    Lab Status:  Preliminary result Specimen:  Wound from Spine, Lumbar Updated:  09/06/17 0925     Wound Culture Light growth (2+) Staphylococcus aureus (A)     Gram Stain Result Moderate (3+) Red blood cells      Few (2+) WBCs seen      Few (2+) Gram positive cocci in pairs and clusters    Wound Culture [[id number]]  (Abnormal) Collected:  09/05/17 1706    Lab Status:  Preliminary result Specimen:  Wound from Spine, Lumbar Updated:  09/06/17  0948     Wound Culture Light growth (2+) Staphylococcus aureus (A)     Gram Stain Result Moderate (3+) Red blood cells      Rare (1+) WBCs seen      No organisms seen    Blood Culture [[id number]]  (Normal) Collected:  09/04/17 2336    Lab Status:  Preliminary result Specimen:  Blood from Arm, Left Updated:  09/07/17 0005     Blood Culture No growth at 2 days    Blood Culture [[id number]]  (Normal) Collected:  09/04/17 2335    Lab Status:  Preliminary result Specimen:  Blood from Arm, Left Updated:  09/07/17 0005     Blood Culture No growth at 2 days    Blood Culture [[id number]]  (Abnormal)  (Susceptibility) Collected:  09/03/17 2302    Lab Status:  Final result Specimen:  Blood from Arm, Left Updated:  09/06/17 0715     Blood Culture Staphylococcus aureus (A)        Consider infectious disease consult to rule out distant focus of infection.        Gram Stain Result Anaerobic Bottle Gram positive cocci in clusters      Aerobic Bottle Gram positive cocci in clusters    Susceptibility      Staphylococcus aureus     MAUREEN     Clindamycin <=0.25 ug/ml Susceptible     Erythromycin >=8 ug/ml Resistant     Oxacillin 0.5 ug/ml Susceptible     Penicillin G 0.06 ug/ml Susceptible     Rifampin <=0.5 ug/ml Susceptible     Tetracycline <=1 ug/ml Susceptible     Trimethoprim + Sulfamethoxazole <=10 ug/ml Susceptible     Vancomycin <=0.5 ug/ml Susceptible                    Blood Culture ID, PCR [[id number]]  (Abnormal) Collected:  09/03/17 2302    Lab Status:  Final result Specimen:  Blood from Arm, Left Updated:  09/04/17 2337     BCID, PCR Staphylococcus aureus, not MRSA. Identification by BCID PCR. (C)    Blood Culture [[id number]]  (Abnormal) Collected:  09/03/17 2224    Lab Status:  Final result Specimen:  Blood from Arm, Left Updated:  09/06/17 0718     Blood Culture Staphylococcus aureus (A)      Refer to previous blood culture collected on 9/3/17 for MAUREEN results    Consider infectious disease consult to rule out distant focus  of infection.        Gram Stain Result Aerobic Bottle Gram positive cocci in clusters      Anaerobic Bottle Gram positive cocci in clusters            Assessment:  Principal Problem:    Infection of lumbar spine  Active Problems:    COPD (chronic obstructive pulmonary disease)    Rheumatoid arthritis    Hypertension    Spondylolisthesis of lumbar region    Sepsis      Plan:  See CCP orders.  Start PICC line today.  Arrange for 42 days of IV nafcillin 2 g few 4 hours and oral rifampin 300 mg every 8 hours followed by chronic suppressive therapy with doxycycline(patient is allergic to cephalosporins)       Charli Sen MD  9/7/2017  8:43 AM    Much of this encounter note is an electronic transcription/translation of spoken language to printed text. The electronic translation of spoken language may permit erroneous, or at times, nonsensical words or phrases to be inadvertently transcribed; Although I have reviewed the note for such errors, some may still exist

## 2017-09-08 VITALS
HEIGHT: 71 IN | RESPIRATION RATE: 16 BRPM | DIASTOLIC BLOOD PRESSURE: 90 MMHG | HEART RATE: 77 BPM | OXYGEN SATURATION: 96 % | TEMPERATURE: 98.4 F | SYSTOLIC BLOOD PRESSURE: 151 MMHG | WEIGHT: 209.1 LBS | BODY MASS INDEX: 29.27 KG/M2

## 2017-09-08 LAB
ANION GAP SERPL CALCULATED.3IONS-SCNC: 13.5 MMOL/L
BACTERIA SPEC AEROBE CULT: ABNORMAL
BUN BLD-MCNC: 5 MG/DL (ref 8–23)
BUN/CREAT SERPL: 7 (ref 7–25)
CALCIUM SPEC-SCNC: 8.5 MG/DL (ref 8.6–10.5)
CHLORIDE SERPL-SCNC: 96 MMOL/L (ref 98–107)
CO2 SERPL-SCNC: 25.5 MMOL/L (ref 22–29)
CREAT BLD-MCNC: 0.71 MG/DL (ref 0.76–1.27)
DEPRECATED RDW RBC AUTO: 44.2 FL (ref 37–54)
ERYTHROCYTE [DISTWIDTH] IN BLOOD BY AUTOMATED COUNT: 13.3 % (ref 11.5–14.5)
GFR SERPL CREATININE-BSD FRML MDRD: 112 ML/MIN/1.73
GLUCOSE BLD-MCNC: 101 MG/DL (ref 65–99)
GRAM STN SPEC: ABNORMAL
HCT VFR BLD AUTO: 26.6 % (ref 40.4–52.2)
HGB BLD-MCNC: 8.7 G/DL (ref 13.7–17.6)
MCH RBC QN AUTO: 29.9 PG (ref 27–32.7)
MCHC RBC AUTO-ENTMCNC: 32.7 G/DL (ref 32.6–36.4)
MCV RBC AUTO: 91.4 FL (ref 79.8–96.2)
PLATELET # BLD AUTO: 337 10*3/MM3 (ref 140–500)
PMV BLD AUTO: 9.1 FL (ref 6–12)
POTASSIUM BLD-SCNC: 3.4 MMOL/L (ref 3.5–5.2)
RBC # BLD AUTO: 2.91 10*6/MM3 (ref 4.6–6)
SODIUM BLD-SCNC: 135 MMOL/L (ref 136–145)
WBC NRBC COR # BLD: 6.82 10*3/MM3 (ref 4.5–10.7)

## 2017-09-08 PROCEDURE — 80048 BASIC METABOLIC PNL TOTAL CA: CPT | Performed by: INTERNAL MEDICINE

## 2017-09-08 PROCEDURE — 85027 COMPLETE CBC AUTOMATED: CPT | Performed by: INTERNAL MEDICINE

## 2017-09-08 PROCEDURE — 99024 POSTOP FOLLOW-UP VISIT: CPT | Performed by: ORTHOPAEDIC SURGERY

## 2017-09-08 PROCEDURE — 25010000002 ONDANSETRON PER 1 MG: Performed by: HOSPITALIST

## 2017-09-08 PROCEDURE — 25010000002 HYDROMORPHONE PER 4 MG: Performed by: INTERNAL MEDICINE

## 2017-09-08 RX ORDER — MELOXICAM 15 MG/1
15 TABLET ORAL DAILY
Start: 2017-09-08 | End: 2018-03-30

## 2017-09-08 RX ORDER — LEFLUNOMIDE 20 MG/1
20 TABLET ORAL EVERY EVENING
Start: 2017-09-08 | End: 2018-03-30

## 2017-09-08 RX ORDER — ADALIMUMAB 40MG/0.8ML
40 KIT SUBCUTANEOUS
Qty: 2 EACH
Start: 2017-09-08 | End: 2018-03-30

## 2017-09-08 RX ORDER — ONDANSETRON 4 MG/1
4 TABLET, FILM COATED ORAL EVERY 8 HOURS PRN
Qty: 30 TABLET | Refills: 0 | Status: SHIPPED | OUTPATIENT
Start: 2017-09-08 | End: 2018-06-29

## 2017-09-08 RX ADMIN — Medication 10 ML: at 08:54

## 2017-09-08 RX ADMIN — POTASSIUM CHLORIDE 40 MEQ: 750 CAPSULE, EXTENDED RELEASE ORAL at 06:31

## 2017-09-08 RX ADMIN — NAFCILLIN SODIUM 2 G: 2 INJECTION, POWDER, LYOPHILIZED, FOR SOLUTION INTRAMUSCULAR; INTRAVENOUS at 02:18

## 2017-09-08 RX ADMIN — FAMOTIDINE 20 MG: 20 TABLET, FILM COATED ORAL at 08:54

## 2017-09-08 RX ADMIN — NAFCILLIN SODIUM 2 G: 2 INJECTION, POWDER, LYOPHILIZED, FOR SOLUTION INTRAMUSCULAR; INTRAVENOUS at 10:18

## 2017-09-08 RX ADMIN — ONDANSETRON 4 MG: 2 INJECTION INTRAMUSCULAR; INTRAVENOUS at 08:54

## 2017-09-08 RX ADMIN — RIFAMPIN 300 MG: 300 CAPSULE ORAL at 08:54

## 2017-09-08 RX ADMIN — HYDROMORPHONE HYDROCHLORIDE 1 MG: 1 INJECTION, SOLUTION INTRAMUSCULAR; INTRAVENOUS; SUBCUTANEOUS at 07:40

## 2017-09-08 RX ADMIN — NAFCILLIN SODIUM 2 G: 2 INJECTION, POWDER, LYOPHILIZED, FOR SOLUTION INTRAMUSCULAR; INTRAVENOUS at 06:31

## 2017-09-08 NOTE — PROGRESS NOTES
"  Infectious Diseases Progress Note    Charli Sen MD     [facility]  [place]: 4 days  Patient Identification:  Name: Prem Thrsaher  Age: 62 y.o.  Sex: male  :  1955  MRN: [id number]         Primary Care Physician: Montse Cain PA-C            Subjective: Up and about doing well ready to go home  Interval History: Had I&D 2017   Objective:    Scheduled Meds:    amLODIPine 10 mg Oral Q PM   escitalopram 20 mg Oral Nightly   famotidine 20 mg Oral BID   losartan 100 mg Oral Nightly   nafcillin 2 g Intravenous Q4H   rifAMPin 300 mg Oral TID   sennosides-docusate sodium 2 tablet Oral Nightly   sodium chloride 10 mL Intracatheter Q12H   tiotropium 1 capsule Inhalation Q PM     Continuous Infusions:    lactated ringers 9 mL/hr Last Rate: Stopped (17 1805)       Vital signs in last 24 hours:  Temp:  [97.7 °F (36.5 °C)-98.8 °F (37.1 °C)] 98.4 °F (36.9 °C)  Heart Rate:  [75-88] 77  Resp:  [16] 16  BP: (135-169)/(85-91) 151/90    Intake/Output:    Intake/Output Summary (Last 24 hours) at 17 0835  Last data filed at 17 0539   Gross per 24 hour   Intake             4213 ml   Output                0 ml   Net             4213 ml       Exam:  /90 (BP Location: Right arm, Patient Position: Lying)  Pulse 77  Temp 98.4 °F (36.9 °C) (Oral)   Resp 16  Ht 71\" (180.3 cm)  Wt 209 lb 1.6 oz (94.8 kg)  SpO2 96%  BMI 29.16 kg/m2    General Appearance:    Alert, cooperative, no distress, AAOx3, able to get up and go to bathroom and walk earlier.  Now sleeping after surgery                          Head:    Normocephalic, without obvious abnormality, atraumatic                           Eyes:    PERRL, conjunctiva/corneas clear, EOM's intact, both eyes                         Throat:   Lips, tongue, gums normal; oral mucosa pink and moist                           Neck:   Supple, symmetrical, trachea midline, no JVD                         Lungs:    Clear to auscultation bilaterally, " "respirations unlabored                 Chest Wall:    No tenderness or deformity                          Heart:    Regular rate and rhythm, S1 and S2 normal, no murmur,no  Rub                                      or gallop                  Abdomen:     Soft, non-tender, bowel sounds active, no masses, no                                                        organomegaly                  Extremities:   Extremities normal, atraumatic, no cyanosis or edema                        Pulses:   Pulses palpable in all extremities                            Skin:   Surgical incision place drain in place                      Data Review:    I reviewed the patient's new clinical results.    Results from last 7 days  Lab Units 09/08/17  0439 09/07/17  0733 09/06/17  0510 09/05/17  2041 09/05/17  0739 09/04/17  0519 09/03/17  2224   WBC 10*3/mm3 6.82 8.57 11.76*  --  12.98* 12.65* 12.50*   HEMOGLOBIN g/dL 8.7* 9.4* 10.0* 9.7* 9.4* 9.6* 10.3*   PLATELETS 10*3/mm3 337 329 320  --  289 256 275       Results from last 7 days  Lab Units 09/08/17  0439 09/07/17  0733 09/06/17  0510 09/05/17  0739 09/04/17  0519 09/03/17  2224   SODIUM mmol/L 135* 134* 130* 131* 134* 133*   POTASSIUM mmol/L 3.4* 3.5 3.6 3.5 3.1* 3.4*   CHLORIDE mmol/L 96* 95* 92* 94* 98 94*   CO2 mmol/L 25.5 25.7 24.1 23.1 22.4 25.4   BUN mg/dL 5* 6* 10 7* 10 14   CREATININE mg/dL 0.71* 0.98 0.93 0.85 0.90 1.04   CALCIUM mg/dL 8.5* 8.7 8.7 8.6 8.1* 8.5*   GLUCOSE mg/dL 101* 117* 99 95 126* 118*     Microbiology Results (last 10 days)     Procedure Component Value - Date/Time    Wound Culture [[id number]]  (Abnormal)  (Susceptibility) Collected:  09/05/17 1703    Lab Status:  Edited Result - FINAL Specimen:  Wound from Spine, Lumbar Updated:  09/07/17 1016     Wound Culture Light growth (2+) Staphylococcus aureus (A)      D test is negative. Isolate does not exhibit \"inducible\" resistance to Clindamycin (isolate remains susceptible to Clindamycin).            Gram Stain " "Result Moderate (3+) Red blood cells      Few (2+) WBCs seen      Few (2+) Gram positive cocci in pairs and clusters    Susceptibility      Staphylococcus aureus     MAUREEN     Clindamycin <=0.25 ug/ml Susceptible     Erythromycin >=8 ug/ml Resistant     Oxacillin 0.5 ug/ml Susceptible     Penicillin G 0.06 ug/ml Susceptible     Rifampin <=0.5 ug/ml Susceptible     Tetracycline <=1 ug/ml Susceptible     Trimethoprim + Sulfamethoxazole <=10 ug/ml Susceptible     Vancomycin <=0.5 ug/ml Susceptible                    Wound Culture [[id number]]  (Abnormal)  (Susceptibility) Collected:  09/05/17 1706    Lab Status:  Edited Result - FINAL Specimen:  Wound from Spine, Lumbar Updated:  09/07/17 1015     Wound Culture Light growth (2+) Staphylococcus aureus (A)      D test is negative. Isolate does not exhibit \"inducible\" resistance to Clindamycin (isolate remains susceptible to Clindamycin).            Gram Stain Result Moderate (3+) Red blood cells      Rare (1+) WBCs seen      No organisms seen    Susceptibility      Staphylococcus aureus     MAUREEN     Clindamycin <=0.25 ug/ml Susceptible     Erythromycin >=8 ug/ml Resistant     Oxacillin <=0.25 ug/ml Susceptible     Penicillin G 0.06 ug/ml Susceptible     Rifampin <=0.5 ug/ml Susceptible     Tetracycline <=1 ug/ml Susceptible     Trimethoprim + Sulfamethoxazole <=10 ug/ml Susceptible     Vancomycin <=0.5 ug/ml Susceptible                    Blood Culture [[id number]]  (Normal) Collected:  09/04/17 2336    Lab Status:  Preliminary result Specimen:  Blood from Arm, Left Updated:  09/08/17 0004     Blood Culture No growth at 3 days    Blood Culture [[id number]]  (Abnormal) Collected:  09/04/17 2335    Lab Status:  Preliminary result Specimen:  Blood from Arm, Left Updated:  09/07/17 1311     Blood Culture Abnormal Stain (A)     Gram Stain Result Anaerobic Bottle Gram positive cocci in clusters    Blood Culture [[id number]]  (Abnormal)  (Susceptibility) Collected:  09/03/17 2302    " Lab Status:  Final result Specimen:  Blood from Arm, Left Updated:  09/06/17 0715     Blood Culture Staphylococcus aureus (A)        Consider infectious disease consult to rule out distant focus of infection.        Gram Stain Result Anaerobic Bottle Gram positive cocci in clusters      Aerobic Bottle Gram positive cocci in clusters    Susceptibility      Staphylococcus aureus     MAUREEN     Clindamycin <=0.25 ug/ml Susceptible     Erythromycin >=8 ug/ml Resistant     Oxacillin 0.5 ug/ml Susceptible     Penicillin G 0.06 ug/ml Susceptible     Rifampin <=0.5 ug/ml Susceptible     Tetracycline <=1 ug/ml Susceptible     Trimethoprim + Sulfamethoxazole <=10 ug/ml Susceptible     Vancomycin <=0.5 ug/ml Susceptible                    Blood Culture ID, PCR [[id number]]  (Abnormal) Collected:  09/03/17 2302    Lab Status:  Final result Specimen:  Blood from Arm, Left Updated:  09/04/17 2337     BCID, PCR Staphylococcus aureus, not MRSA. Identification by BCID PCR. (C)    Blood Culture [[id number]]  (Abnormal) Collected:  09/03/17 2224    Lab Status:  Final result Specimen:  Blood from Arm, Left Updated:  09/06/17 0718     Blood Culture Staphylococcus aureus (A)      Refer to previous blood culture collected on 9/3/17 for MAUREEN results    Consider infectious disease consult to rule out distant focus of infection.        Gram Stain Result Aerobic Bottle Gram positive cocci in clusters      Anaerobic Bottle Gram positive cocci in clusters            Assessment:  Principal Problem:    Infection of lumbar spine  Active Problems:    COPD (chronic obstructive pulmonary disease)    Rheumatoid arthritis    Hypertension    Spondylolisthesis of lumbar region    Sepsis      Plan:See below  See CCP orders.  Start PICC line today.  Arrange for 42 days of IV nafcillin 2 g few 4 hours and oral rifampin 300 mg every 8 hours followed by chronic suppressive therapy with doxycycline(patient is allergic to cephalosporins)       Charli Sen  MD  9/8/2017  8:35 AM    Much of this encounter note is an electronic transcription/translation of spoken language to printed text. The electronic translation of spoken language may permit erroneous, or at times, nonsensical words or phrases to be inadvertently transcribed; Although I have reviewed the note for such errors, some may still exist

## 2017-09-08 NOTE — PROGRESS NOTES
Case Management Discharge Note    Final Note: [facility] and [facility] for IV ABX.  Per Crystal/ [facility] copay is zero. Family transport    Discharge Placement     Facility/Agency Request Status Selected? Address Phone Number Fax Number    [facility] Accepted    Yes [address] [place] [id number] [phone]        Lizeth Barlow RN 9/7/2017 11:00    Mary notified of referral                   Taxi: other (family transport)  Other: Other ([facility])    Discharge Codes: 06  Discharged/transferred to home under care of organized home health service organization in anticipation of skilled care

## 2017-09-08 NOTE — PROGRESS NOTES
Postop day 3: AVSS awake alert oriented.  No new complaints.  The drain was discontinued yesterday.  He moves the legs well his pain is tolerable.  The wound is evening and dry and there is minimal serosanguineous drainage from the drain site.  Overall doing well.  He'll be discharged home today with home IV antibiotics for staph aureus postoperative lumbar wound infection.  I'll see him back in 2 weeks them available to see him any time meantime.

## 2017-09-08 NOTE — DISCHARGE INSTR - ACTIVITY
LUMBAR FUSION SURGERY   HOME INSTRUCTIONS     1.     You will need to check your incision or have a family member to check           your incision EVERYDAY for the following signs:             Increase in redness           Increase in swelling around the incision and low back area           Increase in pain           Any drainage noted from the incision           Edges of the incision are pulling apart           Increase in overall body temperature (greater than 100.5 degrees)             Call your physician if any of these listed above occur after you are           discharged from the hospital.  DO NOT wait until your office visit to           inform the physician.     2.     Continue with the exercise program twice a day as instructed by the           physical therapist at the hospital.  A more involved physical therapy           program may be started 3 months after your surgery.     3.     Walking must be done on a daily basis.  You should walk at least twice           a day and more if you are able.  Start with short distances and gradually           add a little distance everyday.     4.     You may wish to use an elevated toilet seat for up to12 weeks after surgery.     5.     You MUST wear your brace anytime you are up.  You do not have to wear             your brace at nighttime when walking a short distance to the bathroom.     6.     You may shower three days after your surgery as long as there is no             drainage. You will need to keep your incision clean and dry.  It is not               necessary to cover your incision while taking a shower.  You may remove           your brace to shower.                                                                                                             7.     You can sit in a high-straight back chair with arms as tolerated, unless   instructed otherwise.  Do not flex your hips more than 90 degrees when        sitting.  Avoid low, soft chairs.     8.      "You may want to sleep on a firm mattress.  Avoid waterbeds for 3-6 months             after surgery.     9.     Patients should not smoke.  Smoking interferes with the fusion and the                 healing time.     10.   There will be no lifting of anything over 5 pounds for the first few months.     11.   You may drive a car, usually 2-4 weeks after surgery, but only after you           have checked with your physician.     12.   You may ride in a car after your surgery and for no more than           30 minutes to one hour at a time.  Short distance riding to and from the           physician's office for follow-up visits is the only time you should be in a           car until after the 2-4 weeks time frame.     13.   You may go home in a car.  You must wear your seat belt.     14.   You will be sent home with pain medication, but it will only be used up to             4 weeks after surgery.  Refills may be obtained, but ONLY during office           hours.     15.   You may go and down stairs, but take it easy at first.  It is preferable that   the first few weeks at home, try to only use the stairs once or twice a day          until you have had a chance to regain your strength.     16.   NO bending at the waist.  You may need to use your \"reacher\" to assist you           in picking up items off of the floor. (not mandatory to have)  If you absolutely                   something, you may squat by bending at your hips and knees.           need to reach for.     17.   Sexual activity should be avoided for 2 weeks after surgery.  Be careful and         use common sense.  You may have to adjust your position to lessen the strain on your back.     18.   Must wear your compression stockings during the day and may remove           them at night for 4 weeks after your surgery.           19.   Return to the office in 2 weeks to see Dr. Perez.  "

## 2017-09-08 NOTE — DISCHARGE SUMMARY
Date of Admission: 9/3/2017  Date of Discharge:  9/8/2017  Primary Care Physician: Montse Cain PA-C     Discharge Diagnosis:  Active Hospital Problems (** Indicates Principal Problem)    Diagnosis Date Noted   • **Infection of lumbar spine [M86.9] 09/06/2017   • Sepsis [A41.9] 09/04/2017   • Spondylolisthesis of lumbar region [M43.16] 07/31/2017   • COPD (chronic obstructive pulmonary disease) [J44.9]    • Rheumatoid arthritis [M06.9]    • Hypertension [I10]       Resolved Hospital Problems    Diagnosis Date Noted Date Resolved   No resolved problems to display.       Presenting Problem/History of Present Illness:  Fever chills [R50.9]  Right flank pain [R10.9]  Leukocytosis, unspecified type [D72.829]  Acute low back pain without sciatica, unspecified back pain laterality [M54.5]     Patient is a 62 y.o. male who presents to [facility] with the above chief complaint.  Started having pain last week.  He went and saw his doctor for follow-up and he had some little drainage from the superior wound edge.  Some fluid was expressed that it was felt not be infectious.  He was otherwise given a clean bill of health at that follow-up.  He's continued to have increasing pain and symptoms over the last week.  He began having fevers and chills a couple days ago.  He went to the urgent care today and was started on oral antibiotics.  They said if he continued to have fevers to go to the emergency room for further evaluation.  He's been off pain medication from since the weekend given increased constipation.  The pains been worsening his fevers and chills of been worsening.  He denies any other new symptoms or complaints this we admitted him last.    Hospital Course:  The patient is a 62 y.o. male who presented with sepsis from MSSA bacteremia and lumbar spine deep wound infection complicated by presence of hardware. He was admitted and placed on antibiotics. CT and MRI were performed and showed possible seroma  vs abscess. Dr Perez, Orthopedic Surgery, and  Dr Sen, Infectious Diesease, were consulted. He underwent irrigation, debridement, and percutaneous drain on 09/05/17. Blood and Wound Cx returned with MSSA. His drain was removed yesterday. Repeat BCx were negative and he remained afebrile. PICC was placed. He will be discharged today with  for infusions and lab monitoring. He should take Nafcillin 2g Q4H IV and Rifampin 300mg Q8H PO x42 days. This is to be followed by chronic suppressive therapy with doxycycline.    MRI Thoracis/Lumbar Spine:  1. In the thoracic spine, the thoracic spinal cord is normal in size and  position. The vertebral height and disc spaces are well-maintained.  There is no central or foraminal encroachment. There is no evidence of  epidural abscess in the thoracic spine.  2. The conus appears normal. There is no disc or bony abnormality at  T11-12 or T12-L1. At L1-2, there is also no disc or bony abnormality.  3. At L2-3, the patient has had previous laminectomy and posterior  fusion including plates and pedicle screws. There is some metallic  artifact present with signal loss. There is very severe disc space  narrowing at L2-3 with posterior subluxation of L2 measuring 7 mm and  slight unroofing of the disc. There is a somewhat rounded area of  decreased signal in the right lateral recess showing no enhancement as  seen on both T1 and T2 sequences. It measures 10 x 10 mm. This could  potentially affect the exiting right L2 nerve root. There are some  localized postsurgical changes at the laminectomy site but no evidence  of epidural abscess. There is a small fluid collection located more  superficially near the skin surface just beneath the midline incision.  It measures up to 1.9 cm in transverse diameter, 1.8 cm in AP diameter,  and 7.7 cm in craniocaudal extent. The signal characteristics suggest a  postsurgical seroma although I cannot completely exclude the possibility  of infected  fluid.  4. At L3-4, the patient has had laminectomy and posterior fusion  including posterior plates and pedicle screws. There is no central or  foraminal encroachment. There is no evidence of epidural abscess.  5. At L4-5, there is no disc abnormality. There is mild facet  hypertrophy and spurring without central or foraminal encroachment.  6. At L5-S1, there is a very minimal posterior disc bulge and mild facet  hypertrophy and spurring resulting in some borderline foraminal  encroachment on the left    Culture   Staphylococcus aureus (A)     Consider infectious disease consult to rule out distant focus of infection.   Stain   Anaerobic Bottle Gram positive cocci in clusters      Aerobic Bottle Gram positive cocci in clusters            Resulting Agency: [facility]   Susceptibility    Staphylococcus aureus     MAUREEN     Clindamycin <=0.25 ug/ml Susceptible     Erythromycin >=8 ug/ml Resistant     Oxacillin 0.5 ug/ml Susceptible     Penicillin G 0.06 ug/ml Susceptible     Rifampin <=0.5 ug/ml Susceptible     Tetracycline <=1 ug/ml Susceptible     Trimethoprim + Sulfamethoxazole <=10 ug/ml Susceptible     Vancomycin <=0.5 ug/ml Susceptible         Exam Today:  Constitutional: He appears well-developed and well-nourished. No distress.   HENT:   Head: Normocephalic and atraumatic.   Nose: Nose normal.   Eyes: EOM are normal. Pupils are equal, round, and reactive to light.   Neck: Normal range of motion. Neck supple.   Cardiovascular: Normal rate, regular rhythm, normal heart sounds and intact distal pulses.    No murmur heard.  Pulmonary/Chest: Effort normal and breath sounds normal. He has no wheezes.   Abdominal: Soft. Bowel sounds are normal. He exhibits no distension. There is no tenderness.   Musculoskeletal: Normal range of motion. He exhibits no edema.   Lumbar spine dressed   Neurological: He is alert. No cranial nerve deficit.   Skin: Skin is warm and dry. He is not diaphoretic.   Psychiatric: He has a normal  mood and affect. His behavior is normal.     Procedures Performed:  Procedure(s):  I&D LUMBAR SPINE        Consults:   Consults     Date and Time Order Name Status Description    9/4/2017 0134 Inpatient Consult to Infectious Diseases Completed     9/4/2017 0134 Inpatient Consult to Orthopedic Surgery Completed     9/4/2017 0027 [facility] (on-call MD unless specified) Completed            Discharge Disposition:  [place] [facility] [place]    Discharge Medications:   ThrasherRacielPrem W   Home Medication Instructions FINESSE:[id number]    Printed on:09/08/17 9315   Medication Information                      ALPRAZolam (XANAX) 0.25 MG tablet  Take 0.25 mg by mouth.             amLODIPine (NORVASC) 10 MG tablet  Take 1 tablet by mouth Every Evening. For BP             aspirin 81 MG tablet  Take 81 mg by mouth Every Evening. TO HOLD 1 WEEK BEFORE SURGERY             Calcium Carbonate-Vit D-Min (CALCIUM 1200) 3490-9386 MG-UNIT chewable tablet  Chew.             Cholecalciferol (VITAMIN D) 2000 UNITS tablet  Take 2,000 Units by mouth Every Evening.             [employer] SENNA PLUS 8.6-50 MG per tablet  TAKE 1 TABLET BY MOUTH TWICE A DAY             escitalopram (LEXAPRO) 20 MG tablet  Take 1 tablet by mouth Daily. For anxiety             ezetimibe (ZETIA) 10 MG tablet  Take 1 tablet by mouth Every Evening. For cholesterol             HUMIRA PEN 40 MG/0.8ML Pen-injector Kit  Inject 40 mg into the shoulder, thigh, or buttocks. TAKES EVERY OTHER WEEK/ TO CHECK WITH DR. NGUYỄN TO SEE WHEN HE HAS TO STOP             irbesartan (AVAPRO) 300 MG tablet  Take 1 tablet by mouth Daily. One PO daily for bp             leflunomide (ARAVA) 20 MG tablet  Take 20 mg by mouth Every Evening.             nafcillin (UNIPEN) 2 g/100 mL 0.9% NS IVPB (mbp)  Infuse 100 mL into a venous catheter Every 4 (Four) Hours for 42 days. Indications: Bacteria in the Blood             ondansetron (ZOFRAN) 4 MG tablet  Take 1 tablet by mouth Every 8 (Eight) Hours As Needed for  Nausea or Vomiting.             oxyCODONE-acetaminophen (PERCOCET) 5-325 MG per tablet  Take 1-2 tabs po q 4 hours prn pain             rifAMPin (RIFADIN) 300 MG capsule  Take 1 capsule by mouth 3 (Three) Times a Day for 42 days. Indications: Bacteria in the Blood, Bone and/or Joint Infection             spironolactone-hydrochlorothiazide (ALDACTAZIDE) 25-25 MG tablet  Take 1 tablet by mouth.             tiotropium (SPIRIVA) 18 MCG per inhalation capsule  Place 1 capsule into inhaler and inhale Every Evening.                 Discharge Diet:   Diet Instructions     Resume home diet               Activity at Discharge:   Activity Instructions     LUMBAR FUSION SURGERY   HOME INSTRUCTIONS     1.     You will need to check your incision or have a family member to check           your incision EVERYDAY for the following signs:             Increase in redness           Increase in swelling around the incision and low back area           Increase in pain           Any drainage noted from the incision           Edges of the incision are pulling apart           Increase in overall body temperature (greater than 100.5 degrees)             Call your physician if any of these listed above occur after you are           discharged from the hospital.  DO NOT wait until your office visit to           inform the physician.     2.     Continue with the exercise program twice a day as instructed by the           physical therapist at the hospital.  A more involved physical therapy           program may be started 3 months after your surgery.     3.     Walking must be done on a daily basis.  You should walk at least twice           a day and more if you are able.  Start with short distances and gradually           add a little distance everyday.     4.     You may wish to use an elevated toilet seat for up to12 weeks after surgery.     5.     You MUST wear your brace anytime you are up.  You do not have to wear             your brace  "at nighttime when walking a short distance to the bathroom.     6.     You may shower three days after your surgery as long as there is no             drainage. You will need to keep your incision clean and dry.  It is not               necessary to cover your incision while taking a shower.  You may remove           your brace to shower.                                                                                                             7.     You can sit in a high-straight back chair with arms as tolerated, unless   instructed otherwise.  Do not flex your hips more than 90 degrees when        sitting.  Avoid low, soft chairs.     8.     You may want to sleep on a firm mattress.  Avoid waterbeds for 3-6 months             after surgery.     9.     Patients should not smoke.  Smoking interferes with the fusion and the                 healing time.     10.   There will be no lifting of anything over 5 pounds for the first few months.     11.   You may drive a car, usually 2-4 weeks after surgery, but only after you           have checked with your physician.     12.   You may ride in a car after your surgery and for no more than           30 minutes to one hour at a time.  Short distance riding to and from the           physician's office for follow-up visits is the only time you should be in a           car until after the 2-4 weeks time frame.     13.   You may go home in a car.  You must wear your seat belt.     14.   You will be sent home with pain medication, but it will only be used up to             4 weeks after surgery.  Refills may be obtained, but ONLY during office           hours.     15.   You may go and down stairs, but take it easy at first.  It is preferable that   the first few weeks at home, try to only use the stairs once or twice a day          until you have had a chance to regain your strength.     16.   NO bending at the waist.  You may need to use your \"reacher\" to assist you           " in picking up items off of the floor. (not mandatory to have)  If you absolutely                   something, you may squat by bending at your hips and knees.           need to reach for.     17.   Sexual activity should be avoided for 2 weeks after surgery.  Be careful and         use common sense.  You may have to adjust your position to lessen the strain on your back.     18.   Must wear your compression stockings during the day and may remove           them at night for 4 weeks after your surgery.           19.   Return to the office in 2 weeks to see Dr. Perez.               Follow-up Appointments:  Future Appointments  Date Time Provider Department Center   9/26/2017 3:50 PM Shantal Perez MD [facility] None     Additional Instructions for the Follow-ups that You Need to Schedule     Ambulatory Referral to Home Health    As directed    Face to Face Visit Date:  9/7/2017   Follow-up Provider for Plan of Care?:  I will be treating the patient on an ongoing basis.  Please send me the Plan of Care for signature.   Follow-up Provider:  SHANTAL PEREZ   Reason/Clinical Findings:  post surgical   Describe mobility limitations that make leaving home difficult:  Requires the assistance of another to leave home   Nursing/Therapeutic Services Requested:   Skilled Nursing  Physical Therapy      Skilled nursing orders:   Neurovascular assessments Comment - IV antibiotics and labs per Dr. Sen  Wound care dressing/changes  PICC line care/instruction  Other      PT orders:   Therapeutic exercise  Gait Training  Transfer training  Strengthening      Weight Bearing Status:  As Tolerated   Frequency:  1 Week 1       Discharge Follow-up with PCP    As directed    Follow Up Details:  1-2 weeks       Discharge Follow-up with Specialty    As directed    Specialty:  Orthopedics   Follow Up Details:  as scheduled       Discharge Follow-up with Specialty    As directed    Specialty:  Infectious Disease   Follow Up Details:  as  scheduled                 Test Results Pending at Discharge:   Order Current Status    Anaerobic Culture In process    Anaerobic Culture In process    Blood Culture Preliminary result    Blood Culture Preliminary result           Luke Lacy MD  09/08/17  9:27 AM    Time Spent on Discharge Activities: >30 minutes

## 2017-09-08 NOTE — PLAN OF CARE
Problem: Patient Care Overview (Adult)  Goal: Plan of Care Review  Outcome: Ongoing (interventions implemented as appropriate)    09/07/17 1729 09/07/17 2000 09/08/17 0029   Outcome Evaluation   Outcome Summary/Follow up Plan --  --  Steady gsit. Pos d/c home on iv antibiotics 9/8. Antibiotics continued. TEDS on. Dressing to mid-back incision. percocet for incisional pain.    Coping/Psychosocial Response Interventions   Plan Of Care Reviewed With --  patient --    Patient Care Overview   Progress improving --  --        Goal: Adult Individualization and Mutuality  Outcome: Ongoing (interventions implemented as appropriate)  Goal: Discharge Needs Assessment  Outcome: Ongoing (interventions implemented as appropriate)    Problem: Fall Risk (Adult)  Goal: Absence of Falls  Outcome: Ongoing (interventions implemented as appropriate)    Problem: Pain, Acute (Adult)  Goal: Acceptable Pain Control/Comfort Level  Outcome: Ongoing (interventions implemented as appropriate)    Problem: Infection, Risk/Actual (Adult)  Goal: Infection Prevention/Resolution  Outcome: Ongoing (interventions implemented as appropriate)    Problem: Perioperative Period (Adult)  Goal: Signs and Symptoms of Listed Potential Problems Will be Absent or Manageable (Perioperative Period)  Outcome: Ongoing (interventions implemented as appropriate)

## 2017-09-08 NOTE — PROGRESS NOTES
Continued Stay Note  [facility]     Patient Name: Prem Thrasher  MRN: [id number]  Today's Date: 9/8/2017    Admit Date: 9/3/2017          Discharge Plan       09/08/17 1113    Case Management/Social Work Plan    Plan [facility] and [facility] infusion    Additional Comments Spoke with Mary and notified of patient DC to home, [facility] pending copay with patient.  Spoke with Crystal/ [facility] and she was notified of patient DC to home.  She will follow up with patient at home.  She states information of this copay with his Medicare is still pending.  [facility] will follow up with patient at home if needed........................Lizeth Barlow RN      09/08/17 1036    Case Management/Social Work Plan    Plan [facility]    Additional Comments Mary with [facility] was notified of DC orders and she is working with [facility] for IV ABX.  She accepts patient and requested 1000 dose of medication be given prior DC to home.  Per nursing patient has a walker and elevated toilet seat.  No other DME needs............................Lizeth Barlow RN              Discharge Codes     None        Expected Discharge Date and Time     Expected Discharge Date Expected Discharge Time    Sep 8, 2017             Lizeth Barlow RN

## 2017-09-08 NOTE — PROGRESS NOTES
Continued Stay Note  [facility]     Patient Name: Prem Thrasher  MRN: [id number]  Today's Date: 9/8/2017    Admit Date: 9/3/2017          Discharge Plan       09/08/17 1036    Case Management/Social Work Plan    Plan [facility]    Additional Comments Mary with [facility]  was notified of DC orders and she is working with [facility] for IV ABX.  She accepts patient and requested 1000 dose of medication be given prior DC to home.  Per nursing patient has a walker and elevated toilet seat.  No other DME needs............................Lizeth Barlow RN              Discharge Codes     None        Expected Discharge Date and Time     Expected Discharge Date Expected Discharge Time    Sep 8, 2017             Lizeth Barlow RN

## 2017-09-10 LAB
BACTERIA SPEC AEROBE CULT: NORMAL
BACTERIA SPEC ANAEROBE CULT: NORMAL
BACTERIA SPEC ANAEROBE CULT: NORMAL

## 2017-09-15 RX ORDER — IRBESARTAN 300 MG/1
TABLET ORAL
Qty: 90 TABLET | Refills: 0 | Status: SHIPPED | OUTPATIENT
Start: 2017-09-15 | End: 2018-02-06 | Stop reason: SDUPTHER

## 2017-09-15 RX ORDER — AMLODIPINE BESYLATE 10 MG/1
TABLET ORAL
Qty: 90 TABLET | Refills: 0 | Status: SHIPPED | OUTPATIENT
Start: 2017-09-15 | End: 2018-02-06 | Stop reason: SDUPTHER

## 2017-09-18 DIAGNOSIS — M54.50 ACUTE LOW BACK PAIN WITHOUT SCIATICA, UNSPECIFIED BACK PAIN LATERALITY: ICD-10-CM

## 2017-09-18 DIAGNOSIS — R50.9 FEVER CHILLS: ICD-10-CM

## 2017-09-18 DIAGNOSIS — M54.16 LUMBAR RADICULOPATHY: ICD-10-CM

## 2017-09-19 RX ORDER — OXYCODONE HYDROCHLORIDE AND ACETAMINOPHEN 5; 325 MG/1; MG/1
TABLET ORAL
Qty: 60 TABLET | Refills: 0 | Status: SHIPPED | OUTPATIENT
Start: 2017-09-19 | End: 2017-09-26 | Stop reason: SDUPTHER

## 2017-09-22 ENCOUNTER — DOCUMENTATION (OUTPATIENT)
Dept: INTERNAL MEDICINE | Facility: HOSPITAL | Age: 62
End: 2017-09-22

## 2017-09-23 NOTE — PROGRESS NOTES
Delayed entry documentation for office visit on 2017      Infectious Diseases Progress Note    Charli Sen MD     [facility]  Los: 0 days  Patient Identification:  Name: Prem Thrasher  Age: 62 y.o.  Sex: male  :  1955  MRN: [id number]         Primary Care Physician: Montse Cain PA-C        While here for the follow-up after being discharged from the hospital on 2017 when he was treated for MSSA bacteremic sepsis secondary to infected lumbar wound and infected hardware    Subjective: Feeling much better still have back pain but improving at times when he turns he gets dizzy.  No weakness of arm or legs.  Denies any diarrhea or rash.  Interval History:The patient is a 62 y.o. male who presented with sepsis from MSSA bacteremia and lumbar spine deep wound infection complicated by presence of hardware. He was admitted and placed on antibiotics. CT and MRI were performed and showed possible seroma vs abscess. Dr Perez, Orthopedic Surgery, and myself, were consulted. He underwent irrigation, debridement, and percutaneous drain on 17. Blood and Wound Cx returned with MSSA. His drain was removed yesterday. Repeat BCx were negative and he remained afebrile. PICC was placed. He will be discharged today with  for infusions and lab monitoring. He should take Nafcillin 2g Q4H IV and Rifampin 300mg Q8H PO x42 days from final I&D. This is to be followed by chronic suppressive therapy with doxycycline.    Objective:   afebrile vital signs stable  General Appearance:    Alert, cooperative, no distress, AAOx3                          Head:    Normocephalic, without obvious abnormality, atraumatic                           Eyes:    PERRL, conjunctiva/corneas clear, EOM's intact, both eyes                         Throat:   Lips, tongue, gums normal; oral mucosa pink and moist                           Neck:   Supple, symmetrical, trachea midline, no JVD                         Lungs:     Clear to auscultation bilaterally, respirations unlabored                 Chest Wall:    No tenderness or deformity                          Heart:    Regular rate and rhythm, S1 and S2 normal, no murmur                  Abdomen:     Soft, non-tender, bowel sounds active, no masses                 Extremities:   Extremities normal, atraumatic, no cyanosis or edema, PICC site is clean                        Pulses:   Pulses palpable in all extremities                            Skin:   Surgical site in the spine area is clean                         Data Review:      Assessment: Overall improved   MSSA bacteremia and lumbar spine deep wound infection complicated by presence of hardware.  Status post I&D washout and retention of hardware on 9/5/2017  • Spondylolisthesis of lumbar region [M43.16] 07/31/2017   • COPD (chronic obstructive pulmonary disease) [J44.9]     • Rheumatoid arthritis [M06.9]     • Hypertension [I10]     Immunocompromised host    Plan:  Continue IV nafcillin and oral rifampin for 42 days from 9/5/2017.  Follow-up on weekly labs as ordered and instructions are to be called to me if they are abnormal.  Keep follow-up with Dr. Perez and I will see him back on 10/11/2017.  Plan written for [facility] to arrange at the time of discharge is as follows:  Please arrange 42 days of IV nafcillin at 2 g every 4 hours starting 9/5/2017.  Check weekly CBC CMP UA ESR and CRP and call abnormal results to Dr. vergara at [phone].  Educated the home health services to provide patient with hard copy of lab results to bring with them on the day of follow-up office visit with me.    Diagnosis   1-postoperative lumbar spine infection with probable involvement of hardware with methicillin sensitive staph aureus  2-staph aureus bacteremia  Charli Vergara MD  9/20/2017  11:45 AM  Much of this encounter note is an electronic transcription/translation of spoken language to printed text. The electronic translation of spoken  language may permit erroneous, or at times, nonsensical words or phrases to be inadvertently transcribed; Although I have reviewed the note for such errors, some may still exist

## 2017-09-25 ENCOUNTER — DOCUMENTATION (OUTPATIENT)
Dept: INTERNAL MEDICINE | Facility: HOSPITAL | Age: 62
End: 2017-09-25

## 2017-09-26 ENCOUNTER — TELEPHONE (OUTPATIENT)
Dept: FAMILY MEDICINE CLINIC | Facility: CLINIC | Age: 62
End: 2017-09-26

## 2017-09-26 ENCOUNTER — OFFICE VISIT (OUTPATIENT)
Dept: ORTHOPEDIC SURGERY | Facility: CLINIC | Age: 62
End: 2017-09-26

## 2017-09-26 VITALS — WEIGHT: 209 LBS | TEMPERATURE: 98.4 F | HEIGHT: 71 IN | BODY MASS INDEX: 29.26 KG/M2

## 2017-09-26 DIAGNOSIS — M54.50 ACUTE LOW BACK PAIN WITHOUT SCIATICA, UNSPECIFIED BACK PAIN LATERALITY: ICD-10-CM

## 2017-09-26 DIAGNOSIS — R50.9 FEVER CHILLS: ICD-10-CM

## 2017-09-26 DIAGNOSIS — M54.16 LUMBAR RADICULOPATHY: ICD-10-CM

## 2017-09-26 PROCEDURE — 99024 POSTOP FOLLOW-UP VISIT: CPT | Performed by: ORTHOPAEDIC SURGERY

## 2017-09-26 RX ORDER — OXYCODONE HYDROCHLORIDE AND ACETAMINOPHEN 5; 325 MG/1; MG/1
TABLET ORAL
Qty: 60 TABLET | Refills: 0 | Status: SHIPPED | OUTPATIENT
Start: 2017-09-26 | End: 2017-10-04 | Stop reason: SDUPTHER

## 2017-09-26 RX ORDER — AMOXICILLIN 250 MG
1 CAPSULE ORAL 2 TIMES DAILY
Qty: 60 TABLET | Refills: 1 | Status: SHIPPED | OUTPATIENT
Start: 2017-09-26 | End: 2018-01-15 | Stop reason: SDUPTHER

## 2017-09-26 RX ORDER — POTASSIUM CHLORIDE 750 MG/1
TABLET, FILM COATED, EXTENDED RELEASE ORAL
Qty: 60 TABLET | Refills: 5 | Status: SHIPPED | OUTPATIENT
Start: 2017-09-26 | End: 2017-09-27 | Stop reason: SDUPTHER

## 2017-09-26 NOTE — PROGRESS NOTES
No new complaints still with slight leg pain and back pain is better no fevers he has had some tenderness of 99 and he's on IV antibiotics for deep wound infection the wound is absolutely pristine.  He has some irritation around the staples and mattress sutures because I have left him in, intentionally, for 3 weeks there is no fluctuance induration and no evidence of warmth or redness.  No new x-rays today.  Overall doing well I refilled his meds I'll see him back in a month  Answers for HPI/ROS submitted by the patient on 9/19/2017   Back pain  Chronicity: chronic  Onset: more than 1 year ago  Frequency: constantly  Progression since onset: waxing and waning  Pain location: lumbar spine  Pain quality: aching, burning, cramping, shooting, stabbing  Pain - numeric: 7/10  Pain is: the same all the time  Aggravated by: bending, position, lying down, sitting, standing, stress, twisting  Stiffness is present: all day  abdominal pain: Yes  bladder incontinence: No  bowel incontinence: No  chest pain: No  dysuria: No  fever: No  headaches: Yes  leg pain: Yes  numbness: Yes  paresis: No  paresthesias: Yes  perianal numbness: No  tingling: Yes  weakness: Yes  weight loss: No  Risk factors: poor posture

## 2017-09-26 NOTE — TELEPHONE ENCOUNTER
Bina SANCHEZ is calling about low potassium 2.5. Pt is wanting a RX for Potassium. I seen where Dr Sen took the call yesterday (please review his documentation). Please advise pt has not had any potassium.

## 2017-09-26 NOTE — PROGRESS NOTES
"9/25/2017  Call received from [facility] about his labs and critical potassium of 2.5.  I called phone number [phone] and left a message for April to provide him with 40 mEq of by mouth potassium and instruct him to follow-up with his primary care physician preferably today to get his medicines adjusted and ongoing potassium replacement program and follow-up labs to be arranged.  Later  Mr. Thrasher called to find out \"game plan\".  He is having generalized body aches and muscle spasms and was questioning whether potassium could be contributing to it.  I've explained that there is a possibility and is very important that he sees his primary care physician to go over his medications and have a proper potassium replacement program in place.  I have explained to him that it is important to have his electrolytes monitored closely and corrected appropriately.  It's important that after these interventions somebody checks his labs to make sure the potassium is normalized.  He expressed understanding  "

## 2017-09-26 NOTE — TELEPHONE ENCOUNTER
I called him and talked to patient and Linda;  He is NOT taking Aldactazide and has no idea why Rx on list.  He is taking my 2 bp meds:  Avapro and Amlodipine  I called [facility] nurse and discussed K+ 2.5mg and pt did not get Rx.  I did send 20meq BID with stat BMP ordered for tomorrow to confirm this low K+.  I plan f/u labs also.  He is on IV antibiotics.

## 2017-09-27 ENCOUNTER — TELEPHONE (OUTPATIENT)
Dept: FAMILY MEDICINE CLINIC | Facility: CLINIC | Age: 62
End: 2017-09-27

## 2017-09-27 RX ORDER — CYCLOBENZAPRINE HCL 10 MG
TABLET ORAL
Qty: 50 TABLET | Refills: 0 | OUTPATIENT
Start: 2017-09-27 | End: 2018-03-30

## 2017-09-27 RX ORDER — POTASSIUM CHLORIDE 750 MG/1
TABLET, FILM COATED, EXTENDED RELEASE ORAL
Qty: 60 TABLET | Refills: 0
Start: 2017-09-27 | End: 2017-10-02

## 2017-09-27 NOTE — TELEPHONE ENCOUNTER
I did call him.  I had sent 10meq KCl---he needs total daily dose of 40mg;  He will take 2 PO BID with food.  I did call him.  I see him Monday.  I will get BMP again Sat and contacted home health [facility].  He sees me Monday

## 2017-10-02 ENCOUNTER — OFFICE VISIT (OUTPATIENT)
Dept: FAMILY MEDICINE CLINIC | Facility: CLINIC | Age: 62
End: 2017-10-02

## 2017-10-02 VITALS
TEMPERATURE: 98.8 F | DIASTOLIC BLOOD PRESSURE: 100 MMHG | BODY MASS INDEX: 28 KG/M2 | WEIGHT: 200 LBS | RESPIRATION RATE: 16 BRPM | HEIGHT: 71 IN | OXYGEN SATURATION: 97 % | SYSTOLIC BLOOD PRESSURE: 160 MMHG | HEART RATE: 91 BPM

## 2017-10-02 DIAGNOSIS — E87.6 HYPOKALEMIA: ICD-10-CM

## 2017-10-02 DIAGNOSIS — Z86.19 HISTORY OF SEPSIS: ICD-10-CM

## 2017-10-02 DIAGNOSIS — I10 ESSENTIAL HYPERTENSION: Primary | ICD-10-CM

## 2017-10-02 DIAGNOSIS — J44.9 CHRONIC OBSTRUCTIVE PULMONARY DISEASE, UNSPECIFIED COPD TYPE (HCC): ICD-10-CM

## 2017-10-02 PROCEDURE — 99214 OFFICE O/P EST MOD 30 MIN: CPT | Performed by: PHYSICIAN ASSISTANT

## 2017-10-02 RX ORDER — CARVEDILOL 6.25 MG/1
6.25 TABLET ORAL 2 TIMES DAILY WITH MEALS
Qty: 60 TABLET | Refills: 1 | Status: SHIPPED | OUTPATIENT
Start: 2017-10-02 | End: 2017-10-11

## 2017-10-02 RX ORDER — NEBIVOLOL 5 MG/1
5 TABLET ORAL DAILY
Qty: 30 TABLET | Refills: 1 | Status: SHIPPED | OUTPATIENT
Start: 2017-10-02 | End: 2017-10-02

## 2017-10-02 NOTE — PATIENT INSTRUCTIONS
Low glycemic index diet  Exercise 30 minutes most days of the week  Make sure you get results on any labs or tests we ordered today  We discussed medications and how to take them as prescribed  Sleep 6-8 hours each night if possible  If you have not signed up for [employer], please activate your code ASAP from your After Visit Summary today    LDL goal <100  LDL goal if heart disease <70  HDL goal >60  Triglyceride goal <150  BP goal =<130/80  Fasting glucose <100    Stop smoking    Will cont KCL

## 2017-10-02 NOTE — PROGRESS NOTES
Subjective   Prem Thrasher is a 62 y.o. male.     History of Present Illness   Prem Thrasher 62 y.o. male who presents today for routine follow up check and medication refills.  he has a history of   Patient Active Problem List   Diagnosis   • Allergy   • COPD (chronic obstructive pulmonary disease)   • Hyperlipidemia   • IFG (impaired fasting glucose)   • Rheumatoid arthritis   • Hypertension   • Anxiety disorder   • Depression   • Lumbar radiculopathy   • Spondylolisthesis of lumbar region   • Sepsis   • Infection of lumbar spine   .  Since the last visit, he has overall felt tired.  He has has been having elevated blood pressure readings and here to evaluate this and hospital follow up.  he has been compliant with current medications have reviewed them.  The patient denies medication side effects.    He has been to Dr. Perez since inpatient 9-3 to 9-9-17  For wound infection    He is being followed by infectious disease and is on IV ab for deep tissue wound infx and is healing;  I started him on KCl on Thurs and did 20meq that day and then increased to 40meq daily after confirming labs:  2.5 then 2.7 Wed, then Sat 3.1  I still have him on KCL and he confirmed he was NOT on Aldactone    Nafcillin can lower K+;  Dr Hartmann was here for lunch and went over pt hx and labs/meds  Need to figure   Nurse took blood today  Plan f/u labs    RA med on hold from Dr. Lopez d/t antibiotics  He has RA and is in pain  Did have the back surgery  Stop smoking  163/99  170s/100      Current outpatient and discharge medications have been reconciled for the patient.  Montse Cain PA-C    The following portions of the patient's history were reviewed and updated as appropriate: allergies, current medications, past family history, past medical history, past social history, past surgical history and problem list.    Review of Systems   Constitutional: Positive for fatigue. Negative for activity change, appetite change and  unexpected weight change.   HENT: Positive for tinnitus. Negative for nosebleeds and trouble swallowing.    Eyes: Negative for pain and visual disturbance.   Respiratory: Negative for chest tightness, shortness of breath and wheezing.    Cardiovascular: Negative for chest pain, palpitations and leg swelling.   Gastrointestinal: Negative for abdominal pain and blood in stool.   Endocrine: Negative.    Genitourinary: Negative for difficulty urinating and hematuria.   Musculoskeletal: Positive for arthralgias, back pain, joint swelling and myalgias.   Skin: Positive for wound. Negative for color change and rash.   Allergic/Immunologic: Negative.    Neurological: Positive for weakness. Negative for syncope and speech difficulty.   Hematological: Negative for adenopathy.   Psychiatric/Behavioral: Positive for dysphoric mood and sleep disturbance. Negative for agitation and confusion. The patient is nervous/anxious.    All other systems reviewed and are negative.      Objective   Physical Exam   Constitutional: He is oriented to person, place, and time. He appears well-developed and well-nourished. No distress.   HENT:   Head: Normocephalic and atraumatic.   Eyes: Conjunctivae and EOM are normal. Pupils are equal, round, and reactive to light. Right eye exhibits no discharge. Left eye exhibits no discharge. No scleral icterus.   Neck: Normal range of motion. Neck supple. No tracheal deviation present. No thyromegaly present.   Cardiovascular: Normal rate, regular rhythm, normal heart sounds, intact distal pulses and normal pulses.  Exam reveals no gallop.    No murmur heard.  Pulmonary/Chest: Effort normal and breath sounds normal. No respiratory distress. He has no wheezes. He has no rales.   Musculoskeletal: Normal range of motion.   Neurological: He is alert and oriented to person, place, and time. He exhibits normal muscle tone. Coordination normal.   Skin: Skin is warm. No rash noted. No erythema. No pallor.    Psychiatric: He has a normal mood and affect. His behavior is normal. Judgment and thought content normal.   Nursing note and vitals reviewed.      Assessment/Plan   Problems Addressed this Visit        Cardiovascular and Mediastinum    Hypertension - Primary       Respiratory    COPD (chronic obstructive pulmonary disease)      Other Visit Diagnoses     Hypokalemia        History of sepsis

## 2017-10-04 DIAGNOSIS — R50.9 FEVER CHILLS: ICD-10-CM

## 2017-10-04 DIAGNOSIS — M54.16 LUMBAR RADICULOPATHY: ICD-10-CM

## 2017-10-04 DIAGNOSIS — M54.50 ACUTE LOW BACK PAIN WITHOUT SCIATICA, UNSPECIFIED BACK PAIN LATERALITY: ICD-10-CM

## 2017-10-04 RX ORDER — OXYCODONE HYDROCHLORIDE AND ACETAMINOPHEN 5; 325 MG/1; MG/1
TABLET ORAL
Qty: 60 TABLET | Refills: 0 | Status: SHIPPED | OUTPATIENT
Start: 2017-10-04 | End: 2017-10-13 | Stop reason: SDUPTHER

## 2017-10-10 ENCOUNTER — HOSPITAL ENCOUNTER (EMERGENCY)
Facility: HOSPITAL | Age: 62
Discharge: HOME | End: 2017-10-11
Attending: EMERGENCY MEDICINE | Admitting: EMERGENCY MEDICINE

## 2017-10-10 ENCOUNTER — APPOINTMENT (OUTPATIENT)
Dept: CT IMAGING | Facility: HOSPITAL | Age: 62
End: 2017-10-10

## 2017-10-10 DIAGNOSIS — G43.909 MIGRAINE WITHOUT STATUS MIGRAINOSUS, NOT INTRACTABLE, UNSPECIFIED MIGRAINE TYPE: Primary | ICD-10-CM

## 2017-10-10 PROCEDURE — 70450 CT HEAD/BRAIN W/O DYE: CPT

## 2017-10-10 PROCEDURE — 99284 EMERGENCY DEPT VISIT MOD MDM: CPT

## 2017-10-10 PROCEDURE — 96374 THER/PROPH/DIAG INJ IV PUSH: CPT

## 2017-10-10 PROCEDURE — 96361 HYDRATE IV INFUSION ADD-ON: CPT

## 2017-10-10 PROCEDURE — 96375 TX/PRO/DX INJ NEW DRUG ADDON: CPT

## 2017-10-10 RX ORDER — KETOROLAC TROMETHAMINE 30 MG/ML
15 INJECTION, SOLUTION INTRAMUSCULAR; INTRAVENOUS ONCE
Status: COMPLETED | OUTPATIENT
Start: 2017-10-10 | End: 2017-10-11

## 2017-10-10 RX ORDER — DIPHENHYDRAMINE HYDROCHLORIDE 50 MG/ML
25 INJECTION INTRAMUSCULAR; INTRAVENOUS ONCE
Status: COMPLETED | OUTPATIENT
Start: 2017-10-10 | End: 2017-10-11

## 2017-10-10 RX ORDER — POTASSIUM CHLORIDE 750 MG/1
10 TABLET, EXTENDED RELEASE ORAL 2 TIMES DAILY
COMMUNITY
End: 2017-10-12 | Stop reason: SDUPTHER

## 2017-10-10 RX ORDER — SODIUM CHLORIDE 0.9 % (FLUSH) 0.9 %
10 SYRINGE (ML) INJECTION AS NEEDED
Status: DISCONTINUED | OUTPATIENT
Start: 2017-10-10 | End: 2017-10-11 | Stop reason: HOSPADM

## 2017-10-11 ENCOUNTER — DOCUMENTATION (OUTPATIENT)
Dept: INTERNAL MEDICINE | Facility: HOSPITAL | Age: 62
End: 2017-10-11

## 2017-10-11 VITALS
HEART RATE: 91 BPM | SYSTOLIC BLOOD PRESSURE: 154 MMHG | TEMPERATURE: 97.7 F | RESPIRATION RATE: 18 BRPM | BODY MASS INDEX: 28 KG/M2 | WEIGHT: 200 LBS | DIASTOLIC BLOOD PRESSURE: 86 MMHG | HEIGHT: 71 IN | OXYGEN SATURATION: 95 %

## 2017-10-11 LAB
ALBUMIN SERPL-MCNC: 4 G/DL (ref 3.5–5.2)
ALBUMIN/GLOB SERPL: 1.1 G/DL
ALP SERPL-CCNC: 63 U/L (ref 39–117)
ALT SERPL W P-5'-P-CCNC: 18 U/L (ref 1–41)
ANION GAP SERPL CALCULATED.3IONS-SCNC: 14.7 MMOL/L
AST SERPL-CCNC: 20 U/L (ref 1–40)
BASOPHILS # BLD AUTO: 0.13 10*3/MM3 (ref 0–0.2)
BASOPHILS NFR BLD AUTO: 1.7 % (ref 0–1.5)
BILIRUB SERPL-MCNC: 0.4 MG/DL (ref 0.1–1.2)
BUN BLD-MCNC: 11 MG/DL (ref 8–23)
BUN/CREAT SERPL: 13.4 (ref 7–25)
CALCIUM SPEC-SCNC: 9.6 MG/DL (ref 8.6–10.5)
CHLORIDE SERPL-SCNC: 98 MMOL/L (ref 98–107)
CO2 SERPL-SCNC: 24.3 MMOL/L (ref 22–29)
CREAT BLD-MCNC: 0.82 MG/DL (ref 0.76–1.27)
DEPRECATED RDW RBC AUTO: 58.5 FL (ref 37–54)
EOSINOPHIL # BLD AUTO: 0.24 10*3/MM3 (ref 0–0.7)
EOSINOPHIL NFR BLD AUTO: 3.1 % (ref 0.3–6.2)
ERYTHROCYTE [DISTWIDTH] IN BLOOD BY AUTOMATED COUNT: 16.9 % (ref 11.5–14.5)
GFR SERPL CREATININE-BSD FRML MDRD: 95 ML/MIN/1.73
GLOBULIN UR ELPH-MCNC: 3.5 GM/DL
GLUCOSE BLD-MCNC: 121 MG/DL (ref 65–99)
HCT VFR BLD AUTO: 35.5 % (ref 40.4–52.2)
HGB BLD-MCNC: 11.6 G/DL (ref 13.7–17.6)
IMM GRANULOCYTES # BLD: 0.02 10*3/MM3 (ref 0–0.03)
IMM GRANULOCYTES NFR BLD: 0.3 % (ref 0–0.5)
LYMPHOCYTES # BLD AUTO: 1.17 10*3/MM3 (ref 0.9–4.8)
LYMPHOCYTES NFR BLD AUTO: 15.1 % (ref 19.6–45.3)
MCH RBC QN AUTO: 30.9 PG (ref 27–32.7)
MCHC RBC AUTO-ENTMCNC: 32.7 G/DL (ref 32.6–36.4)
MCV RBC AUTO: 94.7 FL (ref 79.8–96.2)
MONOCYTES # BLD AUTO: 0.64 10*3/MM3 (ref 0.2–1.2)
MONOCYTES NFR BLD AUTO: 8.2 % (ref 5–12)
NEUTROPHILS # BLD AUTO: 5.56 10*3/MM3 (ref 1.9–8.1)
NEUTROPHILS NFR BLD AUTO: 71.6 % (ref 42.7–76)
PLATELET # BLD AUTO: 352 10*3/MM3 (ref 140–500)
PMV BLD AUTO: 10.5 FL (ref 6–12)
POTASSIUM BLD-SCNC: 3.2 MMOL/L (ref 3.5–5.2)
PROT SERPL-MCNC: 7.5 G/DL (ref 6–8.5)
RBC # BLD AUTO: 3.75 10*6/MM3 (ref 4.6–6)
SODIUM BLD-SCNC: 137 MMOL/L (ref 136–145)
WBC NRBC COR # BLD: 7.76 10*3/MM3 (ref 4.5–10.7)

## 2017-10-11 PROCEDURE — 96361 HYDRATE IV INFUSION ADD-ON: CPT

## 2017-10-11 PROCEDURE — 25010000002 DIPHENHYDRAMINE PER 50 MG: Performed by: EMERGENCY MEDICINE

## 2017-10-11 PROCEDURE — 25010000002 PROCHLORPERAZINE EDISYLATE PER 10 MG: Performed by: EMERGENCY MEDICINE

## 2017-10-11 PROCEDURE — 85025 COMPLETE CBC W/AUTO DIFF WBC: CPT | Performed by: EMERGENCY MEDICINE

## 2017-10-11 PROCEDURE — 96374 THER/PROPH/DIAG INJ IV PUSH: CPT

## 2017-10-11 PROCEDURE — 80053 COMPREHEN METABOLIC PANEL: CPT | Performed by: EMERGENCY MEDICINE

## 2017-10-11 PROCEDURE — 96375 TX/PRO/DX INJ NEW DRUG ADDON: CPT

## 2017-10-11 PROCEDURE — 25010000002 KETOROLAC TROMETHAMINE PER 15 MG: Performed by: EMERGENCY MEDICINE

## 2017-10-11 RX ORDER — CARVEDILOL 12.5 MG/1
12.5 TABLET ORAL 2 TIMES DAILY WITH MEALS
Qty: 60 TABLET | Refills: 5 | Status: SHIPPED | OUTPATIENT
Start: 2017-10-11 | End: 2018-04-10 | Stop reason: SDUPTHER

## 2017-10-11 RX ADMIN — SODIUM CHLORIDE 500 ML: 9 INJECTION, SOLUTION INTRAVENOUS at 00:51

## 2017-10-11 RX ADMIN — PROCHLORPERAZINE EDISYLATE 10 MG: 5 INJECTION INTRAMUSCULAR; INTRAVENOUS at 00:58

## 2017-10-11 RX ADMIN — KETOROLAC TROMETHAMINE 15 MG: 30 INJECTION, SOLUTION INTRAMUSCULAR at 00:57

## 2017-10-11 RX ADMIN — DIPHENHYDRAMINE HYDROCHLORIDE 25 MG: 50 INJECTION, SOLUTION INTRAMUSCULAR; INTRAVENOUS at 01:02

## 2017-10-11 NOTE — PROGRESS NOTES
Infectious Diseases Progress Note    Charli Sen MD     [facility]  Los: 0 days  Patient Identification:  Name: Prem Thrasher  Age: 62 y.o.  Sex: male  :  1955  MRN: [id number]         Primary Care Physician: Montse Cain PA-C    Here  for the follow-up after being discharged from the hospital on 2017 when he was treated for MSSA bacteremic sepsis secondary to infected lumbar wound and infected hardware    Subjective: Issues about low potassium has been addressed by his primary care physician.  Denies any fever and chills.  Still have mechanical issues including back pain and limited mobility.        Interval History:The patient is a 62 y.o. male who presented with sepsis from MSSA bacteremia and lumbar spine deep wound infection complicated by presence of hardware. He was admitted and placed on antibiotics. CT and MRI were performed and showed possible seroma vs abscess. Dr Perez, Orthopedic Surgery, and myself, were consulted. He underwent irrigation, debridement, and percutaneous drain on 17. Blood and Wound Cx returned with MSSA. His drain was removed yesterday. Repeat BCx were negative and he remained afebrile. PICC was placed. He will be discharged today with  for infusions and lab monitoring. He should take Nafcillin 2g Q4H IV and Rifampin 300mg Q8H PO x42 days from final I&D. This is to be followed by 6 months of  suppressive therapy with doxycycline and rifampin.    Objective:   afebrile vital signs stable  General Appearance:    Alert, cooperative, no distress, AAOx3                          Head:    Normocephalic, without obvious abnormality, atraumatic                           Eyes:    PERRL, conjunctiva/corneas clear, EOM's intact, both eyes                         Throat:   Lips, tongue, gums normal; oral mucosa pink and moist                           Neck:   Supple, symmetrical, trachea midline, no JVD                         Lungs:    Clear to  auscultation bilaterally, respirations unlabored                 Chest Wall:    No tenderness or deformity                          Heart:    Regular rate and rhythm, S1 and S2 normal, no murmur                  Abdomen:     Soft, non-tender, bowel sounds active, no masses                 Extremities:   Extremities normal, atraumatic, no cyanosis or edema, PICC site is clean                        Pulses:   Pulses palpable in all extremities                            Skin:   Abdomen surgical site is clean with no drainage no erythema or tenderness or warmth                           Results from last 7 days  Lab Units 10/11/17  0050   WBC 10*3/mm3 7.76   HEMOGLOBIN g/dL 11.6*   PLATELETS 10*3/mm3 352       Results from last 7 days  Lab Units 10/11/17  0050   SODIUM mmol/L 137   POTASSIUM mmol/L 3.2*   CHLORIDE mmol/L 98   CO2 mmol/L 24.3   BUN mg/dL 11   CREATININE mg/dL 0.82   CALCIUM mg/dL 9.6   GLUCOSE mg/dL 121*               Assessment: Overall improved   MSSA bacteremia and lumbar spine deep wound infection complicated by presence of hardware.  Status post I&D washout and retention of hardware on 9/5/2017  • Spondylolisthesis of lumbar region [M43.16] 07/31/2017   • COPD (chronic obstructive pulmonary disease) [J44.9]     • Rheumatoid arthritis [M06.9]     • Hypertension [I10]     Immunocompromised host    Plan/Recommendation: Continue IV nafcillin and oral rifampin through 10/17/2017 to finish 6 weeks of treatment for MSSA sepsis manifesting as bacteremia and deep lumbar spine infection with probably infected hardware with attempt to retain hardware.  Subsequently he would like to use 6 months of rifampin and doxycycline with his lab to be checked once a month and that would include CMP and CBC and CRP and UA.  After 6 months of combination treatment he needs to be reassessed for:   - Discontinuation of antibiotics and observe without any further treatment or   - Placed him on indefinite oral  doxycycline.  Patient is instructed to keep a close follow-up with Dr. Perez and with his primary care physician.  I have called his pharmacy [phone] and have left a prescription for:   -doxycycline 100 mg by mouth twice a day 90 day supply with 1 refill and    -rifampin 300 mg by mouth every 8 hours 90 day supply with 1 refill.    Much of this encounter note is an electronic transcription/translation of spoken language to printed text. The electronic translation of spoken language may permit erroneous, or at times, nonsensical words or phrases to be inadvertently transcribed; Although I have reviewed the note for such errors, some may still exist

## 2017-10-11 NOTE — ED PROVIDER NOTES
"EMERGENCY DEPARTMENT ENCOUNTER    CHIEF COMPLAINT  Chief Complaint: HA  History given by: pt  History limited by: N/A  Room Number: 12/12  PMD: Montse Cain PA-C      HPI:  Pt is a 62 y.o. male who presents complaining of a worsening HA that began this morning.  Pt also c/o of N/V, dizziness, and photophobia, but denies neck pain, neck stiffness, or any other pertinent symptoms. Pt denies a hx of HA's/ migraines. Pt states light and auditory stimuli worsen his HA.    Duration:  1 day   Onset: gradual  Timing: constant   Location: Head  Radiation: None reported   Quality: \"pain\"  Intensity/Severity: moderate   Progression: worsening   Associated Symptoms: N/V, dizziness, photophobia   Aggravating Factors: Light and auditory stimuli   Alleviating Factors: None reported   Previous Episodes: PT denies a hx of HA's/ migraines.   Treatment before arrival: None reported     PAST MEDICAL HISTORY  Active Ambulatory Problems     Diagnosis Date Noted   • Allergy    • COPD (chronic obstructive pulmonary disease)    • Hyperlipidemia    • IFG (impaired fasting glucose)    • Rheumatoid arthritis    • Hypertension    • Anxiety disorder 06/27/2016   • Depression 06/27/2016   • Lumbar radiculopathy 06/27/2016   • Spondylolisthesis of lumbar region 07/31/2017   • Sepsis 09/04/2017   • Infection of lumbar spine 09/06/2017     Resolved Ambulatory Problems     Diagnosis Date Noted   • Surgery follow-up examination 10/24/2016   • Postoperative nausea and vomiting 08/01/2017   • Postoperative ileus 08/01/2017     Past Medical History:   Diagnosis Date   • Acute sinusitis    • Allergy    • Anxiety disorder    • Cervical disc disorder    • COPD (chronic obstructive pulmonary disease)    • CTS (carpal tunnel syndrome)    • Dermatophytosis of groin    • Encounter for eye exam 10/2014   • History of bone density study 03/2014   • History of chest x-ray 02/15/2011   • History of nuclear stress test 03/09/2015   • History of pulmonary function " tests 03/2015   • Hyperlipidemia    • Hypertension    • IFG (impaired fasting glucose)    • Low back pain    • Lumbosacral disc disease    • Nerve damage    • Osteoarthritis, multiple sites    • Postoperative nausea and vomiting 8/1/2017   • Rheumatoid arthritis    • Sciatica    • Thoracic disc disorder        PAST SURGICAL HISTORY  Past Surgical History:   Procedure Laterality Date   • BACK SURGERY     • COLONOSCOPY  02/02/2011    unremarkable only for scattered diverticulosis; had hx of prior polyp   • CYST REMOVAL  03/2016    x2  FROM FACE AND EAR   • DENTAL PROCEDURE  2008    teeth removed; dental implants   • EPIDURAL BLOCK  1995    L/S spine   • HAND SURGERY Right 2003    avulsion lacerations 4th R finger debrided   • LUMBAR DISCECTOMY FUSION INSTRUMENTATION Left 10/10/2016    Procedure: LEFT L2-L3, L3-L4 LUMBAR LAMINECTOMY/ DISCECTOMY WITH METRIX ;  Surgeon: Sawyer Rick MD;  Location: [facility] OR;  Service:    • LUMBAR DISCECTOMY FUSION INSTRUMENTATION N/A 7/31/2017    Procedure: LUMBAR FUSION DECOMPRESSON WITH PEDICLE SCREWS with LAURA L2-3,L3-4;  Surgeon: Jacky Perez MD;  Location: [facility] OR;  Service:    • LUMBAR LAMINECTOMY DISCECTOMY DECOMPRESSION N/A 7/31/2017    Procedure: L2 to L4 laminectomy with neural lysis and a fusion by orthopedics;  Surgeon: Sawyer Rick MD;  Location: [facility] OR;  Service:    • LUMBAR LAMINECTOMY DISCECTOMY DECOMPRESSION N/A 9/5/2017    Procedure: I&D LUMBAR SPINE ;  Surgeon: Jacky Perez MD;  Location: [facility] OR;  Service:    • SHOULDER SURGERY Right 02/23/2011    Dr. Navarro   • SINUS SURGERY  2003    Dr. Barrera       FAMILY HISTORY  Family History   Problem Relation Age of Onset   • Diabetes Mother    • Heart disease Mother    • Hyperlipidemia Mother    • Stroke Mother    • Heart disease Father    • Rheum arthritis Father    • Alcohol abuse Father    • Hyperlipidemia Father    • Depression Brother    • Cancer Brother      prostate   •  Depression Brother    • Heart disease Brother    • Hyperlipidemia Brother    • Cancer Brother    • Thyroid disease Sister        SOCIAL HISTORY  Social History     Social History   • Marital status:      Spouse name: N/A   • Number of children: N/A   • Years of education: N/A     Occupational History   • Not on file.     Social History Main Topics   • Smoking status: Current Every Day Smoker     Packs/day: 1.00     Years: 45.00     Types: Cigarettes   • Smokeless tobacco: Never Used      Comment: last cigarette 7/30/17   • Alcohol use No   • Drug use: No   • Sexual activity: Not Currently     Partners: Female     Birth control/ protection: None     Other Topics Concern   • Not on file     Social History Narrative       ALLERGIES  Atorvastatin; Methotrexate derivatives; and Ceclor [cefaclor]    REVIEW OF SYSTEMS  Review of Systems   Constitutional: Negative.  Negative for chills and fever.   HENT: Negative for sore throat.    Eyes: Positive for photophobia.   Respiratory: Negative.  Negative for cough.    Cardiovascular: Negative.  Negative for chest pain.   Gastrointestinal: Positive for nausea and vomiting.   Genitourinary: Negative.  Negative for dysuria.   Musculoskeletal: Negative.  Negative for back pain.   Skin: Negative.  Negative for rash.   Neurological: Positive for dizziness and headaches.       PHYSICAL EXAM  ED Triage Vitals   Temp Heart Rate Resp BP SpO2   10/10/17 2211 10/10/17 2211 10/10/17 2220 10/10/17 2227 10/10/17 2211   97.7 °F (36.5 °C) 93 18 179/105 97 %      Temp src Heart Rate Source Patient Position BP Location FiO2 (%)   10/10/17 2211 -- -- -- --   Oral           Physical Exam   Constitutional: He is oriented to person, place, and time and well-developed, well-nourished, and in no distress.   Pt has a current BP of 174/104 and O2 Sats of 94% on room air.    HENT:   Head: Normocephalic and atraumatic.   Eyes: EOM are normal. Pupils are equal, round, and reactive to light.    Photophobia    Neck: Normal range of motion. Neck supple.   Cardiovascular: Normal rate, regular rhythm and normal heart sounds.    Pulmonary/Chest: Effort normal and breath sounds normal. No respiratory distress.   Abdominal: Soft. There is no tenderness. There is no rebound and no guarding.   Musculoskeletal: Normal range of motion. He exhibits no edema.   Neurological: He is alert and oriented to person, place, and time. He has normal sensation and normal strength.   Skin: Skin is warm and dry.   Psychiatric: Mood and affect normal.   Nursing note and vitals reviewed.      LAB RESULTS  Lab Results (last 24 hours)     Procedure Component Value Units Date/Time    CBC & Differential [[id number]] Collected:  10/11/17 0050    Specimen:  Blood Updated:  10/11/17 0102    Narrative:       The following orders were created for panel order CBC & Differential.  Procedure                               Abnormality         Status                     ---------                               -----------         ------                     CBC Auto Differential[[id number]]        Abnormal            Final result                 Please view results for these tests on the individual orders.    Comprehensive Metabolic Panel [[id number]]  (Abnormal) Collected:  10/11/17 0050    Specimen:  Blood Updated:  10/11/17 0126     Glucose 121 (H) mg/dL      BUN 11 mg/dL      Creatinine 0.82 mg/dL      Sodium 137 mmol/L      Potassium 3.2 (L) mmol/L      Chloride 98 mmol/L      CO2 24.3 mmol/L      Calcium 9.6 mg/dL      Total Protein 7.5 g/dL      Albumin 4.00 g/dL      ALT (SGPT) 18 U/L      AST (SGOT) 20 U/L      Alkaline Phosphatase 63 U/L      Total Bilirubin 0.4 mg/dL      eGFR Non African Amer 95 mL/min/1.73      Globulin 3.5 gm/dL      A/G Ratio 1.1 g/dL      BUN/Creatinine Ratio 13.4     Anion Gap 14.7 mmol/L     CBC Auto Differential [[id number]]  (Abnormal) Collected:  10/11/17 0050    Specimen:  Blood Updated:  10/11/17 0102     WBC  7.76 10*3/mm3      RBC 3.75 (L) 10*6/mm3      Hemoglobin 11.6 (L) g/dL      Hematocrit 35.5 (L) %      MCV 94.7 fL      MCH 30.9 pg      MCHC 32.7 g/dL      RDW 16.9 (H) %      RDW-SD 58.5 (H) fl      MPV 10.5 fL      Platelets 352 10*3/mm3      Neutrophil % 71.6 %      Lymphocyte % 15.1 (L) %      Monocyte % 8.2 %      Eosinophil % 3.1 %      Basophil % 1.7 (H) %      Immature Grans % 0.3 %      Neutrophils, Absolute 5.56 10*3/mm3      Lymphocytes, Absolute 1.17 10*3/mm3      Monocytes, Absolute 0.64 10*3/mm3      Eosinophils, Absolute 0.24 10*3/mm3      Basophils, Absolute 0.13 10*3/mm3      Immature Grans, Absolute 0.02 10*3/mm3           I ordered the above labs and reviewed the results    RADIOLOGY  CT Head Without Contrast   Preliminary Result   No definite acute intracranial pathology.                           I ordered the above noted radiological studies. Interpreted by radiologist. Reviewed by me in PACS.       PROCEDURES  Procedures      PROGRESS AND CONSULTS  ED Course     2331: Ordered IVF, Toradol, Compazine, and benadryl for HA. Ordered CT Head and labs for further evaluation.     0237: Rechecked pt who is feeling improved. Pt states he still has a HA, but it is only mild. Pt reports resolved nausea. Discussed pt's lab and radiology results. Discussed that pt most likely experienced a migraine. Discussed discharge plans with pt who understands and agrees to plan. All questions addressed at time.     MEDICAL DECISION MAKING  Results were reviewed/discussed with the patient and they were also made aware of online access. Pt also made aware that some labs, such as cultures, will not be resulted during ER visit and follow up with PMD is necessary.     MDM  Number of Diagnoses or Management Options     Amount and/or Complexity of Data Reviewed  Clinical lab tests: ordered and reviewed (Potassium =3.2, Glucose =121, RBC =3.75, HGB =11.6)  Tests in the radiology section of CPT®: ordered and reviewed (CT  Head: Impression   No definite acute intracranial pathology.     )  Independent visualization of images, tracings, or specimens: yes           DIAGNOSIS  Final diagnoses:   Migraine without status migrainosus, not intractable, unspecified migraine type       DISPOSITION  DISCHARGE    Patient discharged in stable condition.    Reviewed implications of results, diagnosis, meds, responsibility to follow up, warning signs and symptoms of possible worsening, potential complications and reasons to return to ER.    Patient/Family voiced understanding of above instructions.    Discussed plan for discharge, as there is no emergent indication for admission.  Pt/family is agreeable and understands need for follow up and repeat testing.  Pt is aware that discharge does not mean that nothing is wrong but it indicates no emergency is present that requires admission and they must continue care with follow-up as given below or physician of their choice.     FOLLOW-UP  Montse Cain PA-C  [id number] [id number] [phone]    Schedule an appointment as soon as possible for a visit           Medication List      Changed          escitalopram 20 MG tablet   Commonly known as:  LEXAPRO   Take 1 tablet by mouth Daily. For anxiety   What changed:    - when to take this  - additional instructions         Stop          CALCIUM 1200 8848-9513 MG-UNIT chewable tablet       cyclobenzaprine 10 MG tablet   Commonly known as:  FLEXERIL       HUMIRA PEN 40 MG/0.8ML Pen-injector Kit   Generic drug:  Adalimumab       leflunomide 20 MG tablet   Commonly known as:  ARAVA       meloxicam 15 MG tablet   Commonly known as:  MOBIC               Latest Documented Vital Signs:  As of 5:09 AM  BP- 154/86 HR- 91 Temp- 97.7 °F (36.5 °C) (Oral) O2 sat- 95%    --  Documentation assistance provided by rubina Carrasco for Dr. Bay.  Information recorded by the scribe was done at my direction and has been verified and  validated by me.     Jerson Carrasco  10/11/17 0152       Jerson Carrasco  10/11/17 0242       Satya Bay MD  10/11/17 [id number]

## 2017-10-12 ENCOUNTER — TELEPHONE (OUTPATIENT)
Dept: SOCIAL WORK | Facility: HOSPITAL | Age: 62
End: 2017-10-12

## 2017-10-12 RX ORDER — POTASSIUM CHLORIDE 750 MG/1
20 TABLET, EXTENDED RELEASE ORAL 2 TIMES DAILY
Qty: 120 TABLET | Refills: 5 | Status: SHIPPED | OUTPATIENT
Start: 2017-10-12 | End: 2018-01-23

## 2017-10-13 DIAGNOSIS — M54.50 ACUTE LOW BACK PAIN WITHOUT SCIATICA, UNSPECIFIED BACK PAIN LATERALITY: ICD-10-CM

## 2017-10-13 DIAGNOSIS — R50.9 FEVER CHILLS: ICD-10-CM

## 2017-10-13 DIAGNOSIS — M54.16 LUMBAR RADICULOPATHY: ICD-10-CM

## 2017-10-13 RX ORDER — OXYCODONE HYDROCHLORIDE AND ACETAMINOPHEN 5; 325 MG/1; MG/1
TABLET ORAL
Qty: 60 TABLET | Refills: 0 | Status: SHIPPED | OUTPATIENT
Start: 2017-10-13 | End: 2018-01-23

## 2017-10-13 NOTE — TELEPHONE ENCOUNTER
Rx ready at the ; tried calling pt to inform but there was no answer or option to leave a message.

## 2017-10-17 ENCOUNTER — TELEPHONE (OUTPATIENT)
Dept: FAMILY MEDICINE CLINIC | Facility: CLINIC | Age: 62
End: 2017-10-17

## 2017-10-17 DIAGNOSIS — E87.6 HYPOKALEMIA: Primary | ICD-10-CM

## 2017-10-17 NOTE — TELEPHONE ENCOUNTER
----- Message from Prem Thrasher sent at 10/17/2017  8:33 AM EDT -----  Regarding: Visit Follow-Up Question  Contact: [phone]  Blood pressure, 161/95, pulse 84

## 2017-10-18 NOTE — TELEPHONE ENCOUNTER
He has finish the IV antibiotic but he is taking 2 oral antibiotics. Doxycycline 100 and rifampin 300. I have put in a order. I talked with pt

## 2017-10-18 NOTE — TELEPHONE ENCOUNTER
He should of had last dose antibiotics yesterday;  K+ was 3.2 and will do KCl until Friday and then want BMP Monday;  May have to order lab;  Not sure is still on home health

## 2017-10-19 ENCOUNTER — DOCUMENTATION (OUTPATIENT)
Dept: INTERNAL MEDICINE | Facility: HOSPITAL | Age: 62
End: 2017-10-19

## 2017-10-19 NOTE — PROGRESS NOTES
I had conversation with Dr. Maria Antonia Lopez - his rheumatologist earlier today. Question was: When can he restart his anti-rheumatic regimen?  We talked about the importance of intact immune system needed to deal with invasive bacterial infection. But at the same time he should not be overwhelmed with underlying RA. The best recommendation is : to treat the most pressing issue to its fullest based on treatment guidelines and standard of care while monitoring the underlying condition.  So if he has flare of Rheumatoid arthritis and he is being miserable from that then he needs to be treated with best treatment option for this while continuing suppressive treatment for underlying MSSA infection with retained hardware.    I then spoke with Mr. Thrasher. He had an episode of migraine and left sided pain which were better after he woke up this morning and  not taking any of his meds except blood pressure meds yesterday.  I explained to him about importance of this regimen of doxy and rifampin in the context of MSSA sepsis with retained hardware.   See my last office visit documentation.  He wanted medical records for dr. Lopez. I asked him to get it from his PCP office and have it forwarded to Dr. Lopez as all of his care is documented in [facility]

## 2017-10-24 ENCOUNTER — OFFICE VISIT (OUTPATIENT)
Dept: ORTHOPEDIC SURGERY | Facility: CLINIC | Age: 62
End: 2017-10-24

## 2017-10-24 VITALS — TEMPERATURE: 98.8 F | HEIGHT: 71 IN | WEIGHT: 200 LBS | BODY MASS INDEX: 28 KG/M2

## 2017-10-24 DIAGNOSIS — M43.26 FUSION OF LUMBAR SPINE: Primary | ICD-10-CM

## 2017-10-24 LAB
BUN SERPL-MCNC: 17 MG/DL (ref 8–27)
BUN/CREAT SERPL: 17 (ref 10–24)
CALCIUM SERPL-MCNC: 10 MG/DL (ref 8.6–10.2)
CHLORIDE SERPL-SCNC: 100 MMOL/L (ref 96–106)
CO2 SERPL-SCNC: 19 MMOL/L (ref 18–29)
CREAT SERPL-MCNC: 1 MG/DL (ref 0.76–1.27)
GFR SERPLBLD CREATININE-BSD FMLA CKD-EPI: 80 ML/MIN/1.73
GFR SERPLBLD CREATININE-BSD FMLA CKD-EPI: 92 ML/MIN/1.73
GLUCOSE SERPL-MCNC: 117 MG/DL (ref 65–99)
POTASSIUM SERPL-SCNC: 4.7 MMOL/L (ref 3.5–5.2)
SODIUM SERPL-SCNC: 139 MMOL/L (ref 134–144)

## 2017-10-24 PROCEDURE — 72100 X-RAY EXAM L-S SPINE 2/3 VWS: CPT | Performed by: ORTHOPAEDIC SURGERY

## 2017-10-24 PROCEDURE — 99024 POSTOP FOLLOW-UP VISIT: CPT | Performed by: ORTHOPAEDIC SURGERY

## 2017-10-24 RX ORDER — PREDNISONE 1 MG/1
TABLET ORAL
Refills: 0 | COMMUNITY
Start: 2017-10-18 | End: 2018-04-23

## 2017-10-24 NOTE — PROGRESS NOTES
He complains of back pain now 3 months out from 2 level decompression and fusion which was complicated by infection.  The wound looks pristine.  No fluctuance or induration or sign of infection.  He is afebrile.  Minimal tenderness to palpation.  Good strength in the lower extremities for a slight EHL weakness.  Two-view x-ray show good position of graft and implants unchanged from prior films.  Continue observation see him back in a month he also has a heel bursa with a small prominence and swelling which is not infected but which is causing him some pain I put a moleskin donut around this but told him he'll need to follow up with the podiatrist if it continues.  I'll see him back in 6 weeks with repeat lumbar x-rays.  Answers for HPI/ROS submitted by the patient on 10/17/2017   Back pain  Chronicity: chronic  Onset: more than 1 year ago  Frequency: constantly  Progression since onset: waxing and waning  Pain location: lumbar spine  Pain quality: aching, stabbing  Radiates to: left foot, left knee, left thigh  Pain - numeric: 7/10  Pain is: the same all the time  Aggravated by: bending, position, lying down, sitting, standing, stress, twisting  Stiffness is present: all day  abdominal pain: No  bladder incontinence: No  bowel incontinence: No  chest pain: No  dysuria: Yes  fever: No  headaches: Yes  leg pain: Yes  numbness: Yes  paresis: No  paresthesias: Yes  pelvic pain: Yes  perianal numbness: No  tingling: Yes  weakness: Yes  weight loss: Yes  Risk factors: lack of exercise, sedentary lifestyle

## 2017-10-25 ENCOUNTER — DOCUMENTATION (OUTPATIENT)
Dept: INTERNAL MEDICINE | Facility: HOSPITAL | Age: 62
End: 2017-10-25

## 2017-10-26 NOTE — PROGRESS NOTES
Infectious Diseases Progress Note    Charli Sen MD     [facility]  Los: 0 days  Patient Identification:  Name: Prem Thrasher  Age: 62 y.o.  Sex: male  :  1955  MRN: [id number]         Primary Care Physician: Montse Cain PA-C            Subjective: Here for the follow-up.  Claims that he's feeling better except for significant pain after the period of negativity mainly affects his back.  He denies any fever or denies any chills.  He says that he is tolerating his rifampin and doxycycline without any issues.  He has been to see his rheumatologist with whom I had conversation on 10/19/2017.  See below.  Interval History:I had conversation with Dr. Maria Antonia Lopez on 10/19/2017 - his rheumatologist. Question was: When can he restart his anti-rheumatic regimen?  We talked about the importance of intact immune system needed to deal with invasive bacterial infection. But at the same time he should not be overwhelmed with underlying RA.   The best recommendation is: to treat the most pressing issue to its fullest based on treatment guidelines and standard of care while monitoring the underlying condition.  So if he has flare of Rheumatoid arthritis and he is being miserable from that then he needs to be treated with best treatment option for this while continuing suppressive treatment for underlying MSSA infection with retained hardware.     I then spoke with Mr. Thrasher. He had an episode of migraine and left sided pain which were better after he woke up this morning and  not taking any of his meds except blood pressure meds yesterday.  I explained to him about importance of this regimen of doxy and rifampin in the context of MSSA sepsis with retained hardware.      Objective:      Afebrile vital signs stable  General Appearance:    Alert, cooperative, no distress, AAOx3 ambulating and does not appear to be toxic or septic.                          Head:    Normocephalic, without obvious  abnormality, atraumatic                           Eyes:    PERRL, conjunctiva/corneas clear, EOM's intact, both eyes                         Throat:   Lips, tongue, gums normal; oral mucosa pink and moist                           Neck:   Supple, symmetrical, trachea midline, no JVD                         Lungs:    Clear to auscultation bilaterally, respirations unlabored                 Chest Wall:    No tenderness or deformity                          Heart:    Regular rate and rhythm, S1 and S2 normal                  Abdomen:     Soft, non-tender, bowel sounds active, no masses                 Extremities:   Extremities normal, atraumatic, no cyanosis or edema                        Pulses:   Pulses palpable in all extremities                            Skin:   Skin is warm and dry,  no rashes or palpable lesions, lumbar spine surgical incision is healed and no drainage or redness noted.                  Neurologic:   CNII-XII intact, motor strength grossly intact       Data Review:    I reviewed the patient's new clinical results.        Results from last 7 days  Lab Units 10/23/17  1111   SODIUM mmol/L 139   POTASSIUM mmol/L 4.7   CHLORIDE mmol/L 100   TOTAL CO2, ARTERIAL mmol/L 19   BUN mg/dL 17   CREATININE mg/dL 1.00   CALCIUM mg/dL 10.0         Assessment:  MSSA bacteremia and lumbar spine deep wound infection complicated by presence of hardware.  Status post I&D washout and retention of hardware on 9/5/2017        • Spondylolisthesis of lumbar region [M43.16] 07/31/2017   • COPD (chronic obstructive pulmonary disease) [J44.9]      • Rheumatoid arthritis [M06.9]      • Hypertension [I10]       Immunocompromised host      Plan:  Continue rifampin and doxycycline for 6 months from 10/17/2017 with monthly CBC CMP ESR CRP and UA.  We would like his primary care physician Ms. Montse Cain to coordinate with me these labs so that antibiotic toxicity can be monitored.  I would be available for any conversation with  his primary care physician if there is a need.  Can reach me at [phone] anytime.  I recommended the patient is to follow-up with me in few months.  He is instructed to keep a close follow-up with his spine surgeon and primary care physician.    Charli Sen MD  10/25/2017  9:33 PM    Much of this encounter note is an electronic transcription/translation of spoken language to printed text. The electronic translation of spoken language may permit erroneous, or at times, nonsensical words or phrases to be inadvertently transcribed; Although I have reviewed the note for such errors, some may still exist

## 2017-10-31 LAB
BUN SERPL-MCNC: 20 MG/DL (ref 8–27)
BUN/CREAT SERPL: 19 (ref 10–24)
CALCIUM SERPL-MCNC: 9.4 MG/DL (ref 8.6–10.2)
CHLORIDE SERPL-SCNC: 97 MMOL/L (ref 96–106)
CO2 SERPL-SCNC: 19 MMOL/L (ref 18–29)
CREAT SERPL-MCNC: 1.06 MG/DL (ref 0.76–1.27)
GFR SERPLBLD CREATININE-BSD FMLA CKD-EPI: 74 ML/MIN/1.73
GFR SERPLBLD CREATININE-BSD FMLA CKD-EPI: 86 ML/MIN/1.73
GLUCOSE SERPL-MCNC: 89 MG/DL (ref 65–99)
POTASSIUM SERPL-SCNC: 4.7 MMOL/L (ref 3.5–5.2)
SODIUM SERPL-SCNC: 137 MMOL/L (ref 134–144)

## 2017-11-21 ENCOUNTER — OFFICE VISIT (OUTPATIENT)
Dept: ORTHOPEDIC SURGERY | Facility: CLINIC | Age: 62
End: 2017-11-21

## 2017-11-21 VITALS — WEIGHT: 187 LBS | TEMPERATURE: 98.2 F | HEIGHT: 71 IN | BODY MASS INDEX: 26.18 KG/M2

## 2017-11-21 DIAGNOSIS — Z98.1 HISTORY OF LUMBAR FUSION: Primary | ICD-10-CM

## 2017-11-21 PROCEDURE — 72100 X-RAY EXAM L-S SPINE 2/3 VWS: CPT | Performed by: ORTHOPAEDIC SURGERY

## 2017-11-21 PROCEDURE — 99213 OFFICE O/P EST LOW 20 MIN: CPT | Performed by: ORTHOPAEDIC SURGERY

## 2017-11-21 RX ORDER — DOXYCYCLINE HYCLATE 100 MG/1
CAPSULE ORAL
Refills: 1 | COMMUNITY
Start: 2017-10-12 | End: 2018-04-23

## 2017-11-21 RX ORDER — RIFAMPIN 150 MG/1
150 CAPSULE ORAL
COMMUNITY
End: 2018-04-23

## 2017-11-21 NOTE — PROGRESS NOTES
He is doing reasonably well was doing better before the infection he states.  Still with some leg pain but overall improved.  The wound is clean neurologic function intact two-view x-rays of lumbar spine show posterior lateral bone but does not appear to exhibit any further consolidation since prior films.  I'm going to send him to PT see him back in 2 months with repeat x-rays.  I told him he needs to use a bone stimulator which she hasn't been doing regularly.  Answers for HPI/ROS submitted by the patient on 11/14/2017   Back pain  Chronicity: chronic  Onset: more than 1 year ago  Frequency: constantly  Progression since onset: waxing and waning  Pain location: lumbar spine  Pain quality: aching, cramping, stabbing  Radiates to: left foot  Pain - numeric: 6/10  Pain is: the same all the time  Aggravated by: bending, position, sitting, standing, stress, twisting  Stiffness is present: all day  bowel incontinence: Yes  leg pain: Yes  pelvic pain: Yes  weakness: Yes  weight loss: Yes  Risk factors: poor posture, sedentary lifestyle

## 2017-11-22 ENCOUNTER — DOCUMENTATION (OUTPATIENT)
Dept: INTERNAL MEDICINE | Facility: HOSPITAL | Age: 62
End: 2017-11-22
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
## 2017-11-27 ENCOUNTER — TELEPHONE (OUTPATIENT)
Dept: FAMILY MEDICINE CLINIC | Facility: CLINIC | Age: 62
End: 2017-11-27

## 2017-11-27 DIAGNOSIS — D64.9 LOW HEMOGLOBIN: ICD-10-CM

## 2017-11-27 DIAGNOSIS — E87.6 HYPOKALEMIA: ICD-10-CM

## 2017-11-27 DIAGNOSIS — I10 ESSENTIAL HYPERTENSION: Primary | ICD-10-CM

## 2017-11-27 NOTE — TELEPHONE ENCOUNTER
Pt is wanting to know if he needs to do any follow up labs.     Also he went to the foot dr, he has neuropathy in the heel. He was given gabapentin and wants to make sure it is okay to take.

## 2017-11-29 LAB
ALBUMIN SERPL-MCNC: 3.8 G/DL (ref 3.6–4.8)
ALBUMIN/GLOB SERPL: 1.4 {RATIO} (ref 1.2–2.2)
ALP SERPL-CCNC: 76 IU/L (ref 39–117)
ALT SERPL-CCNC: 15 IU/L (ref 0–44)
AST SERPL-CCNC: 17 IU/L (ref 0–40)
BASOPHILS # BLD AUTO: 0.1 X10E3/UL (ref 0–0.2)
BASOPHILS NFR BLD AUTO: 1 %
BILIRUB SERPL-MCNC: 0.3 MG/DL (ref 0–1.2)
BUN SERPL-MCNC: 20 MG/DL (ref 8–27)
BUN/CREAT SERPL: 22 (ref 10–24)
CALCIUM SERPL-MCNC: 9.3 MG/DL (ref 8.6–10.2)
CHLORIDE SERPL-SCNC: 100 MMOL/L (ref 96–106)
CO2 SERPL-SCNC: 22 MMOL/L (ref 18–29)
CREAT SERPL-MCNC: 0.9 MG/DL (ref 0.76–1.27)
EOSINOPHIL # BLD AUTO: 0.1 X10E3/UL (ref 0–0.4)
EOSINOPHIL NFR BLD AUTO: 1 %
ERYTHROCYTE [DISTWIDTH] IN BLOOD BY AUTOMATED COUNT: 14.4 % (ref 12.3–15.4)
GFR SERPLBLD CREATININE-BSD FMLA CKD-EPI: 105 ML/MIN/1.73
GFR SERPLBLD CREATININE-BSD FMLA CKD-EPI: 91 ML/MIN/1.73
GLOBULIN SER CALC-MCNC: 2.7 G/DL (ref 1.5–4.5)
GLUCOSE SERPL-MCNC: 114 MG/DL (ref 65–99)
HCT VFR BLD AUTO: 35.9 % (ref 37.5–51)
HGB BLD-MCNC: 12 G/DL (ref 12.6–17.7)
IMM GRANULOCYTES # BLD: 0 X10E3/UL (ref 0–0.1)
IMM GRANULOCYTES NFR BLD: 0 %
LYMPHOCYTES # BLD AUTO: 1.2 X10E3/UL (ref 0.7–3.1)
LYMPHOCYTES NFR BLD AUTO: 19 %
MCH RBC QN AUTO: 30.2 PG (ref 26.6–33)
MCHC RBC AUTO-ENTMCNC: 33.4 G/DL (ref 31.5–35.7)
MCV RBC AUTO: 90 FL (ref 79–97)
MONOCYTES # BLD AUTO: 0.5 X10E3/UL (ref 0.1–0.9)
MONOCYTES NFR BLD AUTO: 8 %
NEUTROPHILS # BLD AUTO: 4.3 X10E3/UL (ref 1.4–7)
NEUTROPHILS NFR BLD AUTO: 71 %
PLATELET # BLD AUTO: 289 X10E3/UL (ref 150–379)
POTASSIUM SERPL-SCNC: 4.3 MMOL/L (ref 3.5–5.2)
PROT SERPL-MCNC: 6.5 G/DL (ref 6–8.5)
RBC # BLD AUTO: 3.97 X10E6/UL (ref 4.14–5.8)
SODIUM SERPL-SCNC: 138 MMOL/L (ref 134–144)
WBC # BLD AUTO: 6.1 X10E3/UL (ref 3.4–10.8)

## 2018-01-15 RX ORDER — INCONTINENCE PAD,LINER,DISP
EACH MISCELLANEOUS
Qty: 60 TABLET | Refills: 1 | Status: SHIPPED | OUTPATIENT
Start: 2018-01-15 | End: 2018-05-05 | Stop reason: SDUPTHER

## 2018-01-23 ENCOUNTER — OFFICE VISIT (OUTPATIENT)
Dept: ORTHOPEDIC SURGERY | Facility: CLINIC | Age: 63
End: 2018-01-23

## 2018-01-23 VITALS — BODY MASS INDEX: 26.6 KG/M2 | WEIGHT: 190 LBS | TEMPERATURE: 98 F | HEIGHT: 71 IN

## 2018-01-23 DIAGNOSIS — Z98.1 S/P LUMBAR FUSION: Primary | ICD-10-CM

## 2018-01-23 PROCEDURE — 99213 OFFICE O/P EST LOW 20 MIN: CPT | Performed by: ORTHOPAEDIC SURGERY

## 2018-01-23 NOTE — PROGRESS NOTES
6 months out and still having some back pain no real leg pain good strength on exam some lumbar tenderness.  X-rays today are poor quality and I can't say anything about the fusion other than to say that the hardware remains in a similar position.  I did not charge him for these x-rays in any event she'll need to continue the stimulator.  Again recommended cessation of smoking.  I'll see him back in 2 months with repeat films.  Answers for HPI/ROS submitted by the patient on 1/17/2018   Back pain  Chronicity: chronic  Onset: more than 1 year ago  Frequency: every several days  Progression since onset: waxing and waning  Pain location: lumbar spine  Pain quality: aching, cramping  Radiates to: left foot, left knee  Pain - numeric: 6/10  Pain is: the same all the time  Aggravated by: bending, position, lying down, sitting, standing, twisting  Stiffness is present: all day  bladder incontinence: No  chest pain: No  dysuria: No  headaches: Yes  leg pain: Yes  paresis: No  paresthesias: Yes  perianal numbness: No  tingling: Yes  weight loss: Yes  Risk factors: lack of exercise, sedentary lifestyle

## 2018-02-06 ENCOUNTER — TELEPHONE (OUTPATIENT)
Dept: ORTHOPEDIC SURGERY | Facility: CLINIC | Age: 63
End: 2018-02-06

## 2018-02-07 RX ORDER — IRBESARTAN 300 MG/1
TABLET ORAL
Qty: 90 TABLET | Refills: 0 | Status: SHIPPED | OUTPATIENT
Start: 2018-02-07 | End: 2018-04-23 | Stop reason: SDUPTHER

## 2018-02-07 RX ORDER — AMLODIPINE BESYLATE 10 MG/1
TABLET ORAL
Qty: 90 TABLET | Refills: 0 | Status: SHIPPED | OUTPATIENT
Start: 2018-02-07 | End: 2018-04-23 | Stop reason: SDUPTHER

## 2018-03-05 ENCOUNTER — TELEPHONE (OUTPATIENT)
Dept: ORTHOPEDIC SURGERY | Facility: CLINIC | Age: 63
End: 2018-03-05

## 2018-03-05 DIAGNOSIS — M54.50 LUMBAR SPINE PAIN: Primary | ICD-10-CM

## 2018-03-22 ENCOUNTER — TELEPHONE (OUTPATIENT)
Dept: FAMILY MEDICINE CLINIC | Facility: CLINIC | Age: 63
End: 2018-03-22

## 2018-03-22 NOTE — TELEPHONE ENCOUNTER
Dr Sen wants pt to have labs done before his appt 4/11/18. He also took patient off antibiotics. He would like for you to order the labs

## 2018-03-23 ENCOUNTER — OFFICE VISIT (OUTPATIENT)
Dept: ORTHOPEDIC SURGERY | Facility: CLINIC | Age: 63
End: 2018-03-23

## 2018-03-23 VITALS — BODY MASS INDEX: 30.57 KG/M2 | HEIGHT: 69 IN | WEIGHT: 206.4 LBS | TEMPERATURE: 98.7 F

## 2018-03-23 DIAGNOSIS — E87.6 LOW SERUM POTASSIUM: ICD-10-CM

## 2018-03-23 DIAGNOSIS — IMO0001 POSTOPERATIVE WOUND INFECTION, SUBSEQUENT ENCOUNTER: ICD-10-CM

## 2018-03-23 DIAGNOSIS — J44.9 CHRONIC OBSTRUCTIVE PULMONARY DISEASE, UNSPECIFIED COPD TYPE (HCC): Primary | ICD-10-CM

## 2018-03-23 DIAGNOSIS — Z98.1 HISTORY OF LUMBAR FUSION: Primary | ICD-10-CM

## 2018-03-23 PROCEDURE — 99213 OFFICE O/P EST LOW 20 MIN: CPT | Performed by: ORTHOPAEDIC SURGERY

## 2018-03-23 PROCEDURE — 72100 X-RAY EXAM L-S SPINE 2/3 VWS: CPT | Performed by: ORTHOPAEDIC SURGERY

## 2018-03-23 RX ORDER — GABAPENTIN 300 MG/1
CAPSULE ORAL
Refills: 0 | COMMUNITY
Start: 2018-02-19 | End: 2018-06-29

## 2018-03-23 RX ORDER — METHYLPREDNISOLONE 4 MG/1
TABLET ORAL
Qty: 21 TABLET | Refills: 0 | Status: SHIPPED | OUTPATIENT
Start: 2018-03-23 | End: 2018-04-23

## 2018-03-23 NOTE — PROGRESS NOTES
4 months out from multilevel decompression and fusion and overall doing well still some heel pain which I think may be intrinsic and plantar fasciitis good strength on exam.  The patellar reflexes intact sensation intact.  Two-view x-rays of the lumbar spine show good position of graft and implants much improved posture from preop.  There is posterior lateral healing bone but also screw lucency unchanged from last x-ray.  I want to see him back in 2 months for final x-rays and evaluation.  Answers for HPI/ROS submitted by the patient on 3/21/2018   Lower extremity pain  Incident occurred: more than 1 week ago  Incident location: other  Injury mechanism: other  Pain location: left ankle  Pain quality: aching, shooting, stabbing  Pain - numeric: 7/10  tingling: No  inability to bear weight: Yes  loss of motion: Yes  muscle weakness: Yes  Foreign body present: unknown  Aggravated by: palpation, weight bearing

## 2018-03-27 LAB
ALBUMIN SERPL-MCNC: 4 G/DL (ref 3.6–4.8)
ALBUMIN/GLOB SERPL: 1.3 {RATIO} (ref 1.2–2.2)
ALP SERPL-CCNC: 60 IU/L (ref 39–117)
ALT SERPL-CCNC: 19 IU/L (ref 0–44)
AST SERPL-CCNC: 29 IU/L (ref 0–40)
BASOPHILS # BLD AUTO: 0.1 X10E3/UL (ref 0–0.2)
BASOPHILS NFR BLD AUTO: 1 %
BILIRUB SERPL-MCNC: 0.2 MG/DL (ref 0–1.2)
BUN SERPL-MCNC: 17 MG/DL (ref 8–27)
BUN/CREAT SERPL: 16 (ref 10–24)
CALCIUM SERPL-MCNC: 9.3 MG/DL (ref 8.6–10.2)
CHLORIDE SERPL-SCNC: 98 MMOL/L (ref 96–106)
CO2 SERPL-SCNC: 22 MMOL/L (ref 18–29)
CREAT SERPL-MCNC: 1.04 MG/DL (ref 0.76–1.27)
EOSINOPHIL # BLD AUTO: 0.1 X10E3/UL (ref 0–0.4)
EOSINOPHIL NFR BLD AUTO: 1 %
ERYTHROCYTE [DISTWIDTH] IN BLOOD BY AUTOMATED COUNT: 13.8 % (ref 12.3–15.4)
GFR SERPLBLD CREATININE-BSD FMLA CKD-EPI: 76 ML/MIN/1.73
GFR SERPLBLD CREATININE-BSD FMLA CKD-EPI: 87 ML/MIN/1.73
GLOBULIN SER CALC-MCNC: 3 G/DL (ref 1.5–4.5)
GLUCOSE SERPL-MCNC: 101 MG/DL (ref 65–99)
HCT VFR BLD AUTO: 39.1 % (ref 37.5–51)
HGB BLD-MCNC: 13.1 G/DL (ref 13–17.7)
IMM GRANULOCYTES # BLD: 0 X10E3/UL (ref 0–0.1)
IMM GRANULOCYTES NFR BLD: 0 %
LYMPHOCYTES # BLD AUTO: 2.4 X10E3/UL (ref 0.7–3.1)
LYMPHOCYTES NFR BLD AUTO: 30 %
MCH RBC QN AUTO: 29.5 PG (ref 26.6–33)
MCHC RBC AUTO-ENTMCNC: 33.5 G/DL (ref 31.5–35.7)
MCV RBC AUTO: 88 FL (ref 79–97)
MONOCYTES # BLD AUTO: 0.6 X10E3/UL (ref 0.1–0.9)
MONOCYTES NFR BLD AUTO: 7 %
NEUTROPHILS # BLD AUTO: 4.9 X10E3/UL (ref 1.4–7)
NEUTROPHILS NFR BLD AUTO: 61 %
PLATELET # BLD AUTO: 270 X10E3/UL (ref 150–379)
POTASSIUM SERPL-SCNC: 5.9 MMOL/L (ref 3.5–5.2)
PROT SERPL-MCNC: 7 G/DL (ref 6–8.5)
RBC # BLD AUTO: 4.44 X10E6/UL (ref 4.14–5.8)
SODIUM SERPL-SCNC: 136 MMOL/L (ref 134–144)
WBC # BLD AUTO: 8.1 X10E3/UL (ref 3.4–10.8)

## 2018-03-28 ENCOUNTER — TELEPHONE (OUTPATIENT)
Dept: FAMILY MEDICINE CLINIC | Facility: CLINIC | Age: 63
End: 2018-03-28

## 2018-03-28 DIAGNOSIS — E87.5 HYPERKALEMIA: Primary | ICD-10-CM

## 2018-03-28 NOTE — TELEPHONE ENCOUNTER
----- Message from Montse Cain PA-C sent at 3/27/2018  7:42 AM EDT -----  This is hemolyzed and must be repeated today!!!!

## 2018-03-29 ENCOUNTER — HOSPITAL ENCOUNTER (OUTPATIENT)
Dept: GENERAL RADIOLOGY | Facility: HOSPITAL | Age: 63
Discharge: HOME | End: 2018-03-29
Attending: INTERNAL MEDICINE

## 2018-03-29 ENCOUNTER — HOSPITAL ENCOUNTER (OUTPATIENT)
Dept: GENERAL RADIOLOGY | Facility: HOSPITAL | Age: 63
Discharge: HOME | End: 2018-03-29
Attending: INTERNAL MEDICINE | Admitting: INTERNAL MEDICINE

## 2018-03-29 DIAGNOSIS — M79.641 HAND PAIN, RIGHT: ICD-10-CM

## 2018-03-29 DIAGNOSIS — M06.09 RHEUMATOID ARTHRITIS WITHOUT RHEUMATOID FACTOR, MULTIPLE SITES (HCC): ICD-10-CM

## 2018-03-29 DIAGNOSIS — M15.0 PRIMARY GENERALIZED (OSTEO)ARTHRITIS: ICD-10-CM

## 2018-03-29 DIAGNOSIS — M25.511 RIGHT SHOULDER PAIN, UNSPECIFIED CHRONICITY: ICD-10-CM

## 2018-03-29 PROCEDURE — 73130 X-RAY EXAM OF HAND: CPT

## 2018-03-29 PROCEDURE — 73030 X-RAY EXAM OF SHOULDER: CPT

## 2018-03-30 LAB
ALBUMIN SERPL-MCNC: 4 G/DL (ref 3.6–4.8)
ALBUMIN/GLOB SERPL: 1.3 {RATIO} (ref 1.2–2.2)
ALP SERPL-CCNC: 46 IU/L (ref 39–117)
ALT SERPL-CCNC: 19 IU/L (ref 0–44)
AST SERPL-CCNC: 23 IU/L (ref 0–40)
BILIRUB SERPL-MCNC: 0.3 MG/DL (ref 0–1.2)
BUN SERPL-MCNC: 10 MG/DL (ref 8–27)
BUN/CREAT SERPL: 10 (ref 10–24)
CALCIUM SERPL-MCNC: 9.4 MG/DL (ref 8.6–10.2)
CHLORIDE SERPL-SCNC: 99 MMOL/L (ref 96–106)
CO2 SERPL-SCNC: 21 MMOL/L (ref 18–29)
CREAT SERPL-MCNC: 1.02 MG/DL (ref 0.76–1.27)
GFR SERPLBLD CREATININE-BSD FMLA CKD-EPI: 77 ML/MIN/1.73
GFR SERPLBLD CREATININE-BSD FMLA CKD-EPI: 89 ML/MIN/1.73
GLOBULIN SER CALC-MCNC: 3 G/DL (ref 1.5–4.5)
GLUCOSE SERPL-MCNC: 95 MG/DL (ref 65–99)
POTASSIUM SERPL-SCNC: 4.3 MMOL/L (ref 3.5–5.2)
PROT SERPL-MCNC: 7 G/DL (ref 6–8.5)
SODIUM SERPL-SCNC: 136 MMOL/L (ref 134–144)

## 2018-04-10 RX ORDER — CARVEDILOL 12.5 MG/1
12.5 TABLET ORAL 2 TIMES DAILY WITH MEALS
Qty: 180 TABLET | Refills: 0 | Status: SHIPPED | OUTPATIENT
Start: 2018-04-10 | End: 2018-04-23 | Stop reason: SDUPTHER

## 2018-04-11 ENCOUNTER — DOCUMENTATION (OUTPATIENT)
Dept: INTERNAL MEDICINE | Facility: HOSPITAL | Age: 63
End: 2018-04-11

## 2018-04-11 NOTE — PROGRESS NOTES
Infectious Diseases Progress Note    Charli Sen MD     [facility]  Los: 0 days  Patient Identification:  Name: Prem Thrasher  Age: 63 y.o.  Sex: male  :  1955  MRN: [id number]         Primary Care Physician: Montse Cain PA-C      Here for the follow-up for MSSA bacteremic sepsis with infected lumbar hardware status post I&D.  Finished  on suppressive therapy      Subjective: Frustrated with ongoing discomfort and stiffness in the back.  Denies any fever or denies any chills.  Claims that his appetite is good.  Interval History:The patient is a 62 y.o. male who presented with sepsis from MSSA bacteremia and lumbar spine deep wound infection complicated by presence of hardware. He was admitted and placed on antibiotics. CT and MRI were performed and showed possible seroma vs abscess. Dr Perez, Orthopedic Surgery, and myself, were consulted. He underwent irrigation, debridement, and percutaneous drain on 17. Blood and Wound Cx returned with MSSA. His drain was removed yesterday. Repeat BCx were negative and he remained afebrile. PICC was placed. He will be discharged today with  for infusions and lab monitoring. He should take Nafcillin 2g Q4H IV and Rifampin 300mg Q8H PO x42 days from final I&D. This is to be followed by 6 months of  suppressive therapy with doxycycline and rifampin.  He finished rifampin and doxycycline last month after taking it for 6 months from 10/17/2017.    Objective:    Afebrile vital signs stable  General Appearance:    Alert, cooperative, no distress, AAOx3, does not appear to be toxic or septic.                          Head:    Normocephalic, without obvious abnormality, atraumatic                           Eyes:    PERRL, conjunctiva/corneas clear, EOM's intact, both eyes                         Throat:   Lips, tongue, gums normal; oral mucosa pink and moist                           Neck:   Supple, symmetrical, trachea midline, no JVD                          Lungs:    Clear to auscultation bilaterally, respirations unlabored                 Chest Wall:    No tenderness or deformity                          Heart:    Regular rate and rhythm, S1 and S2 normal                  Abdomen:     Soft, non-tender, bowel sounds active, no masses                 Extremities:   Extremities normal, atraumatic, no cyanosis or edema                        Pulses:   Pulses palpable in all extremities                            Skin:   Skin is warm and dry,  no rashes or palpable lesions                  Neurologic:   CNII-XII intact, motor strength grossly intact       Data Review:  No recent labs available.  Assessment:  MSSA bacteremia and lumbar spine deep wound infection complicated by presence of hardware.  Status post I&D washout and retention of hardware on 9/5/2017            • Spondylolisthesis of lumbar region [M43.16] 07/31/2017   • COPD (chronic obstructive pulmonary disease) [J44.9]      • Rheumatoid arthritis [M06.9]      • Hypertension [I10]         Immunocompromised host - Currently on Arava and he is being planned to be on Humira later.      Plan:  Has done well since completion of antibiotics on 3/17/2018. I have released him from ID follow ups and suggested him to call me if his condition changes. He needs to keep f/u with Dr. Perez and, his rheumatologist and his pcp.  I have educated him to call me if there is any need a question.  Charli Sen MD  4/11/2018  9:53 AM    Much of this encounter note is an electronic transcription/translation of spoken language to printed text. The electronic translation of spoken language may permit erroneous, or at times, nonsensical words or phrases to be inadvertently transcribed; Although I have reviewed the note for such errors, some may still exist

## 2018-04-23 ENCOUNTER — OFFICE VISIT (OUTPATIENT)
Dept: FAMILY MEDICINE CLINIC | Facility: CLINIC | Age: 63
End: 2018-04-23

## 2018-04-23 VITALS
RESPIRATION RATE: 16 BRPM | HEIGHT: 69 IN | OXYGEN SATURATION: 97 % | BODY MASS INDEX: 30.51 KG/M2 | DIASTOLIC BLOOD PRESSURE: 78 MMHG | SYSTOLIC BLOOD PRESSURE: 114 MMHG | WEIGHT: 206 LBS | TEMPERATURE: 97.9 F | HEART RATE: 83 BPM

## 2018-04-23 DIAGNOSIS — E55.9 VITAMIN D DEFICIENCY: ICD-10-CM

## 2018-04-23 DIAGNOSIS — I10 ESSENTIAL HYPERTENSION: ICD-10-CM

## 2018-04-23 DIAGNOSIS — R73.01 IFG (IMPAIRED FASTING GLUCOSE): ICD-10-CM

## 2018-04-23 DIAGNOSIS — F41.1 GENERALIZED ANXIETY DISORDER: ICD-10-CM

## 2018-04-23 DIAGNOSIS — Z12.5 SCREENING PSA (PROSTATE SPECIFIC ANTIGEN): ICD-10-CM

## 2018-04-23 DIAGNOSIS — J44.9 CHRONIC OBSTRUCTIVE PULMONARY DISEASE, UNSPECIFIED COPD TYPE (HCC): ICD-10-CM

## 2018-04-23 DIAGNOSIS — E78.2 MIXED HYPERLIPIDEMIA: Primary | ICD-10-CM

## 2018-04-23 DIAGNOSIS — R35.1 NOCTURIA: ICD-10-CM

## 2018-04-23 PROBLEM — L72.3 SEBACEOUS CYST: Status: ACTIVE | Noted: 2018-04-23

## 2018-04-23 PROCEDURE — G0438 PPPS, INITIAL VISIT: HCPCS | Performed by: PHYSICIAN ASSISTANT

## 2018-04-23 PROCEDURE — 99214 OFFICE O/P EST MOD 30 MIN: CPT | Performed by: PHYSICIAN ASSISTANT

## 2018-04-23 RX ORDER — CARVEDILOL 12.5 MG/1
12.5 TABLET ORAL 2 TIMES DAILY WITH MEALS
Qty: 180 TABLET | Refills: 1 | Status: SHIPPED | OUTPATIENT
Start: 2018-04-23 | End: 2018-10-23

## 2018-04-23 RX ORDER — EZETIMIBE 10 MG/1
10 TABLET ORAL EVERY EVENING
Qty: 90 TABLET | Refills: 3 | Status: SHIPPED | OUTPATIENT
Start: 2018-04-23 | End: 2019-03-08 | Stop reason: SDUPTHER

## 2018-04-23 RX ORDER — ESCITALOPRAM OXALATE 20 MG/1
20 TABLET ORAL NIGHTLY
Qty: 90 TABLET | Refills: 3 | Status: SHIPPED | OUTPATIENT
Start: 2018-04-23 | End: 2019-03-08 | Stop reason: SDUPTHER

## 2018-04-23 RX ORDER — IRBESARTAN 300 MG/1
300 TABLET ORAL DAILY
Qty: 90 TABLET | Refills: 1 | Status: SHIPPED | OUTPATIENT
Start: 2018-04-23 | End: 2018-10-01 | Stop reason: SDUPTHER

## 2018-04-23 RX ORDER — AMLODIPINE BESYLATE 10 MG/1
10 TABLET ORAL DAILY
Qty: 90 TABLET | Refills: 1 | Status: SHIPPED | OUTPATIENT
Start: 2018-04-23 | End: 2018-10-01 | Stop reason: SDUPTHER

## 2018-04-23 NOTE — PROGRESS NOTES
Subjective   Prem Thrasher is a 63 y.o. male.     History of Present Illness   Prem Thrasher 63 y.o. male who presents today for routine follow up check and medication refills.  he has a history of   Patient Active Problem List   Diagnosis   • Allergy   • COPD (chronic obstructive pulmonary disease)   • Hyperlipidemia   • IFG (impaired fasting glucose)   • Rheumatoid arthritis   • Hypertension   • Anxiety disorder   • Depression   • Lumbar radiculopathy   • Spondylolisthesis of lumbar region   • Sepsis   • Infection of lumbar spine   .  Since the last visit, he has overall felt tired.  He has Hypertenision and is well controlled on medication, Impaired fasting glucose and will continue close lab follow up to watch for DMII, Hyperlipidemia and here to discuss treatment and Vitamin D deficiency and will update labs to confirm level is at goal >30.  he has been compliant with current medications have reviewed them.  The patient denies medication side effects.  Stop NSAID if GI upset or any signs tarry or blood in stools  Long term use of NSAIDs can lead to heart disease and strokes, as well as GI bleeding/ulcers, and cause kidney disease. Advise labs to monitor renal functions to be done at least every 6 months.  Results for orders placed or performed in visit on 03/28/18   Comprehensive metabolic panel   Result Value Ref Range    Glucose 95 65 - 99 mg/dL    BUN 10 8 - 27 mg/dL    Creatinine 1.02 0.76 - 1.27 mg/dL    eGFR Non African Am 77 >59 mL/min/1.73    eGFR African Am 89 >59 mL/min/1.73    BUN/Creatinine Ratio 10 10 - 24    Sodium 136 134 - 144 mmol/L    Potassium 4.3 3.5 - 5.2 mmol/L    Chloride 99 96 - 106 mmol/L    Total CO2 21 18 - 29 mmol/L    Calcium 9.4 8.6 - 10.2 mg/dL    Total Protein 7.0 6.0 - 8.5 g/dL    Albumin 4.0 3.6 - 4.8 g/dL    Globulin 3.0 1.5 - 4.5 g/dL    A/G Ratio 1.3 1.2 - 2.2    Total Bilirubin 0.3 0.0 - 1.2 mg/dL    Alkaline Phosphatase 46 39 - 117 IU/L    AST (SGOT) 23 0 - 40 IU/L     ALT (SGPT) 19 0 - 44 IU/L   intol to statins    Sees Dr Lopez for RA   He sees pulm for COPD; sees ortho for the back pain; bp controlled on Rx  Prem Thrasher male 63 y.o. who presents today for follow up of Anxiety.  He reports medication is working well, patient desires to continue on Rx, and needs refill. Onset of symptoms was approximately several years ago.  He denies current suicidal and homicidal ideation. Risk factors are lifestyle of multiple roles, chronic illness and chronic pain.  Previous treatment includes current Rx.  He complains of the following medication side effects: none.  The patient declines to go to counseling..  Labs in June    He is seeing ortho  The following portions of the patient's history were reviewed and updated as appropriate: allergies, current medications, past family history, past medical history, past social history, past surgical history and problem list.    Review of Systems   Constitutional: Positive for fatigue. Negative for activity change, appetite change and unexpected weight change.   HENT: Positive for tinnitus. Negative for nosebleeds and trouble swallowing.    Eyes: Negative for pain and visual disturbance.   Respiratory: Negative for chest tightness, shortness of breath and wheezing.    Cardiovascular: Negative for chest pain, palpitations and leg swelling.   Gastrointestinal: Negative for abdominal pain and blood in stool.   Endocrine: Negative.    Genitourinary: Negative for difficulty urinating and hematuria.   Musculoskeletal: Positive for arthralgias, back pain, joint swelling and myalgias.   Skin: Negative for color change, rash and wound.   Allergic/Immunologic: Negative.    Neurological: Negative for syncope and speech difficulty.   Hematological: Negative for adenopathy.   Psychiatric/Behavioral: Positive for sleep disturbance. Negative for agitation and confusion. The patient is nervous/anxious.    All other systems reviewed and are  negative.      Objective   Physical Exam   Constitutional: He is oriented to person, place, and time. He appears well-developed and well-nourished. No distress.   HENT:   Head: Normocephalic and atraumatic.   Eyes: Conjunctivae and EOM are normal. Pupils are equal, round, and reactive to light. Right eye exhibits no discharge. Left eye exhibits no discharge. No scleral icterus.   Neck: Normal range of motion. Neck supple. No tracheal deviation present. No thyromegaly present.   Cardiovascular: Normal rate, regular rhythm, normal heart sounds, intact distal pulses and normal pulses.  Exam reveals no gallop.    No murmur heard.  Pulmonary/Chest: Effort normal and breath sounds normal. No respiratory distress. He has no wheezes. He has no rales.   Musculoskeletal: Normal range of motion.   Neurological: He is alert and oriented to person, place, and time. He exhibits normal muscle tone. Coordination normal.   Skin: Skin is warm. No rash noted. No erythema. No pallor.   Small cyst left in front of ear; <pea size; not tender   Psychiatric: He has a normal mood and affect. His behavior is normal. Judgment and thought content normal.   Nursing note and vitals reviewed.      Assessment/Plan   Problems Addressed this Visit        Cardiovascular and Mediastinum    Hyperlipidemia - Primary    Relevant Medications    ezetimibe (ZETIA) 10 MG tablet    Other Relevant Orders    Comprehensive metabolic panel    Lipid panel    CBC and Differential    TSH    PSA    T4, Free    Vitamin B12    Folate    Vitamin D 25 Hydroxy    Hemoglobin A1c    Hypertension    Relevant Medications    amLODIPine (NORVASC) 10 MG tablet    carvedilol (COREG) 12.5 MG tablet    irbesartan (AVAPRO) 300 MG tablet    Other Relevant Orders    Comprehensive metabolic panel    Lipid panel    CBC and Differential    TSH    PSA    T4, Free    Vitamin B12    Folate    Vitamin D 25 Hydroxy    Hemoglobin A1c       Respiratory    COPD (chronic obstructive pulmonary  disease)    Relevant Orders    Comprehensive metabolic panel    Lipid panel    CBC and Differential    TSH    PSA    T4, Free    Vitamin B12    Folate    Vitamin D 25 Hydroxy    Hemoglobin A1c       Endocrine    IFG (impaired fasting glucose)    Relevant Orders    Comprehensive metabolic panel    Lipid panel    CBC and Differential    TSH    PSA    T4, Free    Vitamin B12    Folate    Vitamin D 25 Hydroxy    Hemoglobin A1c       Other    Anxiety disorder    Relevant Medications    escitalopram (LEXAPRO) 20 MG tablet    Other Relevant Orders    Comprehensive metabolic panel    Lipid panel    CBC and Differential    TSH    PSA    T4, Free    Vitamin B12    Folate    Vitamin D 25 Hydroxy    Hemoglobin A1c      Other Visit Diagnoses     Screening PSA (prostate specific antigen)        Relevant Orders    Comprehensive metabolic panel    Lipid panel    CBC and Differential    TSH    PSA    T4, Free    Vitamin B12    Folate    Vitamin D 25 Hydroxy    Hemoglobin A1c    Vitamin D deficiency        Relevant Orders    Comprehensive metabolic panel    Lipid panel    CBC and Differential    TSH    PSA    T4, Free    Vitamin B12    Folate    Vitamin D 25 Hydroxy    Hemoglobin A1c    Nocturia         Relevant Orders    PSA

## 2018-04-23 NOTE — PROGRESS NOTES
QUICK REFERENCE INFORMATION:  The ABCs of the Annual Wellness Visit    Initial Medicare Wellness Visit    HEALTH RISK ASSESSMENT    1955    Recent Hospitalizations:  Recently treated at the following:  [facility]. Had back surgery and then infection        Current Medical Providers:  Patient Care Team:  Montse Cain PA-C as PCP - General (Family Medicine)  Maria Antonia Lopez MD as Consulting Physician (Rheumatology)  Sawyer Rick MD as Surgeon (Neurosurgery)  Pradip Mcmillan MD as Consulting Physician (Pulmonary Disease)  Jacky Perez MD as Surgeon (Orthopedic Surgery)  Charli Sen MD as Consulting Physician (Infectious Diseases)  Tray Moody MD as Consulting Physician (Urology)  Suman Richards DPM as Consulting Physician (Podiatry)        Smoking Status:  History   Smoking Status   • Current Every Day Smoker   • Packs/day: 1.00   • Years: 45.00   • Types: Cigarettes   Smokeless Tobacco   • Never Used     Comment: last cigarette 7/30/17       Alcohol Consumption:  History   Alcohol Use No       Depression Screen:   PHQ-2/PHQ-9 Depression Screening 4/23/2018   Little interest or pleasure in doing things 3   Feeling down, depressed, or hopeless 3   Trouble falling or staying asleep, or sleeping too much 3   Feeling tired or having little energy 3   Poor appetite or overeating 3   Feeling bad about yourself - or that you are a failure or have let yourself or your family down 3   Trouble concentrating on things, such as reading the newspaper or watching television 3   Moving or speaking so slowly that other people could have noticed. Or the opposite - being so fidgety or restless that you have been moving around a lot more than usual 3   Thoughts that you would be better off dead, or of hurting yourself in some way 1   Total Score 25       Health Habits and Functional and Cognitive Screening:  Functional & Cognitive Status 4/23/2018   Do you have difficulty preparing food and  eating? No   Do you have difficulty bathing yourself, getting dressed or grooming yourself? Yes   Do you have difficulty using the toilet? No   Do you have difficulty moving around from place to place? Yes   Do you have trouble with steps or getting out of a bed or a chair? Yes   In the past year have you fallen or experienced a near fall? No   Current Diet Limited Junk Food   Dental Exam Up to date   Eye Exam Up to date   Exercise (times per week) 2 times per week   Current Exercise Activities Include Light Weight/Kettebells   Do you need help using the phone?  No   Are you deaf or do you have serious difficulty hearing?  No   Do you need help with transportation? Yes   Do you need help shopping? Yes   Do you need help preparing meals?  No   Do you need help with housework?  Yes   Do you need help with laundry? Yes   Do you need help taking your medications? No   Do you need help managing money? No   Do you ever drive or ride in a car without wearing a seat belt? No   Have you felt unusual stress, anger or loneliness in the last month? Yes   Who do you live with? Spouse   If you need help, do you have trouble finding someone available to you? No   Have you been bothered in the last four weeks by sexual problems? Yes   Do you have difficulty concentrating, remembering or making decisions? Yes           Does the patient have evidence of cognitive impairment? No    Asiprin use counseling: Taking ASA appropriately as indicated      Recent Lab Results:    Visual Acuity:  No exam data present    Age-appropriate Screening Schedule:  Refer to the list below for future screening recommendations based on patient's age, sex and/or medical conditions. Orders for these recommended tests are listed in the plan section. The patient has been provided with a written plan.    Health Maintenance   Topic Date Due   • TDAP/TD VACCINES (1 - Tdap) 07/13/2003   • ZOSTER VACCINE  05/19/2016   • LIPID PANEL  06/14/2018   • COLONOSCOPY   02/02/2021   • PNEUMOCOCCAL VACCINE (19-64 MEDIUM RISK)  Completed   • INFLUENZA VACCINE  Excluded        Subjective   History of Present Illness    Prem Thrasher is a 63 y.o. male who presents for an Annual Wellness Visit.    The following portions of the patient's history were reviewed and updated as appropriate: allergies, current medications, past family history, past medical history, past social history, past surgical history and problem list.    Outpatient Medications Prior to Visit   Medication Sig Dispense Refill   • ALPRAZolam (XANAX) 0.25 MG tablet Take 0.25 mg by mouth.     • amLODIPine (NORVASC) 10 MG tablet TAKE 1 TABLET BY MOUTH  EVERY EVENING FOR BLOOD  PRESSURE 90 tablet 0   • aspirin 81 MG tablet Take 81 mg by mouth Every Evening. TO HOLD 1 WEEK BEFORE SURGERY     • carvedilol (COREG) 12.5 MG tablet Take 1 tablet by mouth 2 (Two) Times a Day With Meals for 90 days. For BP and new dose 180 tablet 0   • Cholecalciferol (VITAMIN D) 2000 UNITS tablet Take 2,000 Units by mouth Every Evening.     • [facility] SENNA PLUS 8.6-50 MG per tablet TAKE 1 TABLET BY MOUTH 2 (TWO) TIMES A DAY. 60 tablet 1   • doxycycline (VIBRAMYCIN) 100 MG capsule TAKE ONE CAPSULE BY MOUTH TWICE A DAY-START ON 10-17-17  1   • escitalopram (LEXAPRO) 20 MG tablet Take 1 tablet by mouth Daily. For anxiety (Patient taking differently: Take 20 mg by mouth Every Night. For anxiety) 90 tablet 3   • ezetimibe (ZETIA) 10 MG tablet Take 1 tablet by mouth Every Evening. For cholesterol 90 tablet 3   • gabapentin (NEURONTIN) 300 MG capsule TAKE ONE CAPSULE BY MOUTH DAILY AT BEDTIME.  0   • irbesartan (AVAPRO) 300 MG tablet TAKE 1 TABLET BY MOUTH  DAILY FOR BLOOD PRESSURE 90 tablet 0   • MethylPREDNISolone (MEDROL, MINA,) 4 MG tablet Use as directed by package instructions 21 tablet 0   • ondansetron (ZOFRAN) 4 MG tablet Take 1 tablet by mouth Every 8 (Eight) Hours As Needed for Nausea or Vomiting. 30 tablet 0   • predniSONE (DELTASONE) 5 MG tablet  "TAKE IN MORNING AS DIRECTED  0   • Probiotic Product (PROBIOTIC-10 PO) Take  by mouth.     • rifAMPin (RIFADIN) 150 MG capsule Take 150 mg by mouth.     • tiotropium (SPIRIVA) 18 MCG per inhalation capsule Place 1 capsule into inhaler and inhale Every Evening.       No facility-administered medications prior to visit.        Patient Active Problem List   Diagnosis   • Allergy   • COPD (chronic obstructive pulmonary disease)   • Hyperlipidemia   • IFG (impaired fasting glucose)   • Rheumatoid arthritis   • Hypertension   • Anxiety disorder   • Depression   • Lumbar radiculopathy   • Spondylolisthesis of lumbar region   • Sepsis   • Infection of lumbar spine       Advance Care Planning:  has NO advance directive - not interested in additional information    Identification of Risk Factors:  Risk factors include: inactivity.    Review of Systems    Compared to one year ago, the patient feels his physical health is the same.  Compared to one year ago, the patient feels his mental health is the same.    Objective     Physical Exam    Vitals:    04/23/18 1404   BP: 114/78   BP Location: Left arm   Patient Position: Sitting   Cuff Size: Large Adult   Pulse: 83   Resp: 16   Temp: 97.9 °F (36.6 °C)   TempSrc: Oral   SpO2: 97%   Weight: 93.4 kg (206 lb)   Height: 175.3 cm (69\")       Patient's Body mass index is 30.42 kg/m². BMI is above normal parameters. Follow-up plan includes:  exercise counseling.  Do recommend Shingrix  Assessment/Plan   Patient Self-Management and Personalized Health Advice  The patient has been provided with information about: prevention of cardiac or vascular disease, tobacco cessation and designing advance directives and preventive services including:   · Advance directive, AAA screen next year; declines low dose CT chest for now.    Visit Diagnoses:  No diagnosis found.    No orders of the defined types were placed in this encounter.      Outpatient Encounter Prescriptions as of 4/23/2018 "   Medication Sig Dispense Refill   • ALPRAZolam (XANAX) 0.25 MG tablet Take 0.25 mg by mouth.     • amLODIPine (NORVASC) 10 MG tablet TAKE 1 TABLET BY MOUTH  EVERY EVENING FOR BLOOD  PRESSURE 90 tablet 0   • aspirin 81 MG tablet Take 81 mg by mouth Every Evening. TO HOLD 1 WEEK BEFORE SURGERY     • carvedilol (COREG) 12.5 MG tablet Take 1 tablet by mouth 2 (Two) Times a Day With Meals for 90 days. For BP and new dose 180 tablet 0   • Cholecalciferol (VITAMIN D) 2000 UNITS tablet Take 2,000 Units by mouth Every Evening.     • [facility] SENNA PLUS 8.6-50 MG per tablet TAKE 1 TABLET BY MOUTH 2 (TWO) TIMES A DAY. 60 tablet 1   • doxycycline (VIBRAMYCIN) 100 MG capsule TAKE ONE CAPSULE BY MOUTH TWICE A DAY-START ON 10-17-17  1   • escitalopram (LEXAPRO) 20 MG tablet Take 1 tablet by mouth Daily. For anxiety (Patient taking differently: Take 20 mg by mouth Every Night. For anxiety) 90 tablet 3   • ezetimibe (ZETIA) 10 MG tablet Take 1 tablet by mouth Every Evening. For cholesterol 90 tablet 3   • gabapentin (NEURONTIN) 300 MG capsule TAKE ONE CAPSULE BY MOUTH DAILY AT BEDTIME.  0   • irbesartan (AVAPRO) 300 MG tablet TAKE 1 TABLET BY MOUTH  DAILY FOR BLOOD PRESSURE 90 tablet 0   • MethylPREDNISolone (MEDROL, MINA,) 4 MG tablet Use as directed by package instructions 21 tablet 0   • ondansetron (ZOFRAN) 4 MG tablet Take 1 tablet by mouth Every 8 (Eight) Hours As Needed for Nausea or Vomiting. 30 tablet 0   • predniSONE (DELTASONE) 5 MG tablet TAKE IN MORNING AS DIRECTED  0   • Probiotic Product (PROBIOTIC-10 PO) Take  by mouth.     • rifAMPin (RIFADIN) 150 MG capsule Take 150 mg by mouth.     • tiotropium (SPIRIVA) 18 MCG per inhalation capsule Place 1 capsule into inhaler and inhale Every Evening.       No facility-administered encounter medications on file as of 4/23/2018.        Reviewed use of high risk medication in the elderly: yes  Reviewed for potential of harmful drug interactions in the elderly: yes    Follow Up:  No  Follow-up on file.     An After Visit Summary and PPPS with all of these plans were given to the patient.    Has pneumovax 2017

## 2018-04-23 NOTE — PATIENT INSTRUCTIONS
Exercising to Lose Weight  Exercising can help you to lose weight. In order to lose weight through exercise, you need to do vigorous-intensity exercise. You can tell that you are exercising with vigorous intensity if you are breathing very hard and fast and cannot hold a conversation while exercising.  Moderate-intensity exercise helps to maintain your current weight. You can tell that you are exercising at a moderate level if you have a higher heart rate and faster breathing, but you are still able to hold a conversation.  How often should I exercise?  Choose an activity that you enjoy and set realistic goals. Your health care provider can help you to make an activity plan that works for you. Exercise regularly as directed by your health care provider. This may include:  · Doing resistance training twice each week, such as:  ¨ Push-ups.  ¨ Sit-ups.  ¨ Lifting weights.  ¨ Using resistance bands.  · Doing a given intensity of exercise for a given amount of time. Choose from these options:  ¨ 150 minutes of moderate-intensity exercise every week.  ¨ 75 minutes of vigorous-intensity exercise every week.  ¨ A mix of moderate-intensity and vigorous-intensity exercise every week.  Children, pregnant women, people who are out of shape, people who are overweight, and older adults may need to consult a health care provider for individual recommendations. If you have any sort of medical condition, be sure to consult your health care provider before starting a new exercise program.  What are some activities that can help me to lose weight?  · Walking at a rate of at least 4.5 miles an hour.  · Jogging or running at a rate of 5 miles per hour.  · Biking at a rate of at least 10 miles per hour.  · Lap swimming.  · Roller-skating or in-line skating.  · Cross-country skiing.  · Vigorous competitive sports, such as football, basketball, and soccer.  · Jumping rope.  · Aerobic dancing.  How can I be more active in my day-to-day  activities?  · Use the stairs instead of the elevator.  · Take a walk during your lunch break.  · If you drive, park your car farther away from work or school.  · If you take public transportation, get off one stop early and walk the rest of the way.  · Make all of your phone calls while standing up and walking around.  · Get up, stretch, and walk around every 30 minutes throughout the day.  What guidelines should I follow while exercising?  · Do not exercise so much that you hurt yourself, feel dizzy, or get very short of breath.  · Consult your health care provider prior to starting a new exercise program.  · Wear comfortable clothes and shoes with good support.  · Drink plenty of water while you exercise to prevent dehydration or heat stroke. Body water is lost during exercise and must be replaced.  · Work out until you breathe faster and your heart beats faster.  This information is not intended to replace advice given to you by your health care provider. Make sure you discuss any questions you have with your health care provider.  Document Released: 01/20/2012 Document Revised: 05/25/2017 Document Reviewed: 05/21/2015  [employer] Patient Education © 2017 [employer]    Fall Prevention in the Home  Falls can cause injuries and can affect people from all age groups. There are many simple things that you can do to make your home safe and to help prevent falls.  What can I do on the outside of my home?  · Regularly repair the edges of walkways and driveways and fix any cracks.  · Remove high doorway thresholds.  · Trim any shrubbery on the main path into your home.  · Use bright outdoor lighting.  · Clear walkways of debris and clutter, including tools and rocks.  · Regularly check that handrails are securely fastened and in good repair. Both sides of any steps should have handrails.  · Install guardrails along the edges of any raised decks or porches.  · Have leaves, snow, and ice cleared  regularly.  · Use sand or salt on walkways during winter months.  · In the garage, clean up any spills right away, including grease or oil spills.  What can I do in the bathroom?  · Use night lights.  · Install grab bars by the toilet and in the tub and shower. Do not use towel bars as grab bars.  · Use non-skid mats or decals on the floor of the tub or shower.  · If you need to sit down while you are in the shower, use a plastic, non-slip stool.  · Keep the floor dry. Immediately clean up any water that spills on the floor.  · Remove soap buildup in the tub or shower on a regular basis.  · Attach bath mats securely with double-sided non-slip rug tape.  · Remove throw rugs and other tripping hazards from the floor.  What can I do in the bedroom?  · Use night lights.  · Make sure that a bedside light is easy to reach.  · Do not use oversized bedding that drapes onto the floor.  · Have a firm chair that has side arms to use for getting dressed.  · Remove throw rugs and other tripping hazards from the floor.  What can I do in the kitchen?  · Clean up any spills right away.  · Avoid walking on wet floors.  · Place frequently used items in easy-to-reach places.  · If you need to reach for something above you, use a sturdy step stool that has a grab bar.  · Keep electrical cables out of the way.  · Do not use floor polish or wax that makes floors slippery. If you have to use wax, make sure that it is non-skid floor wax.  · Remove throw rugs and other tripping hazards from the floor.  What can I do in the stairways?  · Do not leave any items on the stairs.  · Make sure that there are handrails on both sides of the stairs. Fix handrails that are broken or loose. Make sure that handrails are as long as the stairways.  · Check any carpeting to make sure that it is firmly attached to the stairs. Fix any carpet that is loose or worn.  · Avoid having throw rugs at the top or bottom of stairways, or secure the rugs with carpet  tape to prevent them from moving.  · Make sure that you have a light switch at the top of the stairs and the bottom of the stairs. If you do not have them, have them installed.  What are some other fall prevention tips?  · Wear closed-toe shoes that fit well and support your feet. Wear shoes that have rubber soles or low heels.  · When you use a stepladder, make sure that it is completely opened and that the sides are firmly locked. Have someone hold the ladder while you are using it. Do not climb a closed stepladder.  · Add color or contrast paint or tape to grab bars and handrails in your home. Place contrasting color strips on the first and last steps.  · Use mobility aids as needed, such as canes, walkers, scooters, and crutches.  · Turn on lights if it is dark. Replace any light bulbs that burn out.  · Set up furniture so that there are clear paths. Keep the furniture in the same spot.  · Fix any uneven floor surfaces.  · Choose a carpet design that does not hide the edge of steps of a stairway.  · Be aware of any and all pets.  · Review your medicines with your healthcare provider. Some medicines can cause dizziness or changes in blood pressure, which increase your risk of falling.  Talk with your health care provider about other ways that you can decrease your risk of falls. This may include working with a physical therapist or  to improve your strength, balance, and endurance.  This information is not intended to replace advice given to you by your health care provider. Make sure you discuss any questions you have with your health care provider.  Document Released: 12/08/2003 Document Revised: 05/16/2017 Document Reviewed: 01/22/2016  [employer] Interactive Patient Education © 2017 [employer]

## 2018-05-07 RX ORDER — CYCLOBENZAPRINE HCL 10 MG
TABLET ORAL
Qty: 50 TABLET | Refills: 0 | OUTPATIENT
Start: 2018-05-07

## 2018-05-07 RX ORDER — INCONTINENCE PAD,LINER,DISP
EACH MISCELLANEOUS
Qty: 60 TABLET | Refills: 1 | Status: SHIPPED | OUTPATIENT
Start: 2018-05-07 | End: 2018-06-29

## 2018-05-14 ENCOUNTER — OFFICE VISIT (OUTPATIENT)
Dept: FAMILY MEDICINE CLINIC | Facility: CLINIC | Age: 63
End: 2018-05-14

## 2018-05-14 VITALS
WEIGHT: 205 LBS | TEMPERATURE: 98.4 F | DIASTOLIC BLOOD PRESSURE: 80 MMHG | BODY MASS INDEX: 30.36 KG/M2 | OXYGEN SATURATION: 94 % | HEART RATE: 87 BPM | SYSTOLIC BLOOD PRESSURE: 120 MMHG | HEIGHT: 69 IN | RESPIRATION RATE: 16 BRPM

## 2018-05-14 DIAGNOSIS — J01.90 ACUTE SINUSITIS, RECURRENCE NOT SPECIFIED, UNSPECIFIED LOCATION: Primary | ICD-10-CM

## 2018-05-14 PROCEDURE — 99213 OFFICE O/P EST LOW 20 MIN: CPT | Performed by: PHYSICIAN ASSISTANT

## 2018-05-14 RX ORDER — AMOXICILLIN AND CLAVULANATE POTASSIUM 875; 125 MG/1; MG/1
1 TABLET, FILM COATED ORAL EVERY 12 HOURS SCHEDULED
Qty: 20 TABLET | Refills: 0 | Status: SHIPPED | OUTPATIENT
Start: 2018-05-14 | End: 2018-06-08

## 2018-05-14 NOTE — PROGRESS NOTES
Subjective   Prem Thrasher is a 63 y.o. male.     History of Present Illness   Prem Thrasher 63 y.o. male who presents for evaluation of sinus pressure and congestion. Symptoms include ear pressure, nasal blockage, post nasal drip, cough described as productive of white sputum, fever and ear pain.  Onset of symptoms was 5 days ago, unchanged since that time. Patient denies new SOA or wheezing; has COPD and sees pulm.   Evaluation to date: none Treatment to date:  OTC oral decongestants and Tylenol.   Fever 100 at onset and nothing since yesterday      Saw DR Mcmillan for pulm check for the COPD 1-26-18    The following portions of the patient's history were reviewed and updated as appropriate: allergies, current medications, past family history, past medical history, past social history, past surgical history and problem list.    Review of Systems   Constitutional: Positive for appetite change, fatigue and unexpected weight change. Negative for activity change.   HENT: Positive for congestion, ear pain, postnasal drip, sinus pain and tinnitus.    Eyes: Positive for itching. Negative for pain and discharge.   Respiratory: Positive for cough and chest tightness. Negative for shortness of breath and wheezing.    Cardiovascular: Negative for chest pain and palpitations.   Gastrointestinal: Positive for abdominal pain (muscle), constipation and nausea. Negative for diarrhea and vomiting.   Endocrine: Negative.    Genitourinary: Negative.    Musculoskeletal: Positive for back pain and neck pain. Negative for joint swelling.   Skin: Negative for color change, rash and wound.   Allergic/Immunologic: Negative.    Neurological: Positive for dizziness, weakness, light-headedness and headaches. Negative for seizures and syncope.   Psychiatric/Behavioral: Negative.        Objective   Physical Exam   Constitutional: He is oriented to person, place, and time. He appears well-developed and well-nourished.  Non-toxic appearance.  No distress.   HENT:   Head: Normocephalic and atraumatic. Hair is normal.   Right Ear: External ear normal. No drainage, swelling or tenderness. Tympanic membrane is retracted.   Left Ear: External ear normal. No drainage, swelling or tenderness. Tympanic membrane is retracted.   Nose: Mucosal edema present. No epistaxis. Right sinus exhibits maxillary sinus tenderness and frontal sinus tenderness. Left sinus exhibits maxillary sinus tenderness and frontal sinus tenderness.   Mouth/Throat: Uvula is midline and mucous membranes are normal. No oral lesions. No uvula swelling. Posterior oropharyngeal erythema present. No oropharyngeal exudate.   Eyes: Conjunctivae and EOM are normal. Pupils are equal, round, and reactive to light. Right eye exhibits no discharge. Left eye exhibits no discharge. No scleral icterus.   Neck: Normal range of motion. Neck supple.   Cardiovascular: Normal rate, regular rhythm and normal heart sounds.  Exam reveals no gallop.    No murmur heard.  Pulmonary/Chest: Breath sounds normal. No stridor. No respiratory distress. He has no wheezes. He has no rales. He exhibits no tenderness.   Abdominal: Soft. There is no tenderness.   Lymphadenopathy:     He has cervical adenopathy.   Neurological: He is alert and oriented to person, place, and time. He exhibits normal muscle tone.   Skin: Skin is warm and dry. No rash noted. He is not diaphoretic.   Psychiatric: He has a normal mood and affect. His behavior is normal. Judgment and thought content normal.   Nursing note and vitals reviewed.      Assessment/Plan   Prem was seen today for illness.    Diagnoses and all orders for this visit:    Acute sinusitis, recurrence not specified, unspecified location    Other orders  -     amoxicillin-clavulanate (AUGMENTIN) 875-125 MG per tablet; Take 1 tablet by mouth Every 12 (Twelve) Hours. One PO BID for infection with food

## 2018-05-15 RX ORDER — CYCLOBENZAPRINE HCL 10 MG
10 TABLET ORAL 3 TIMES DAILY PRN
Qty: 30 TABLET | Refills: 0 | Status: SHIPPED | OUTPATIENT
Start: 2018-05-15 | End: 2018-06-06 | Stop reason: SDUPTHER

## 2018-05-15 NOTE — TELEPHONE ENCOUNTER
----- Message from Prem Thrasher sent at 5/14/2018  7:03 PM EDT -----  Regarding: Prescription Question  Contact: [phone]  [facility] MESSAGE    My prescription for Flexeril has been declined and I get musle cramps it helps releives my back pain I used it at night and sometimes during the day because I hurt so much please let me know if you can refill this prescription thanks again

## 2018-05-30 ENCOUNTER — OFFICE VISIT (OUTPATIENT)
Dept: ORTHOPEDIC SURGERY | Facility: CLINIC | Age: 63
End: 2018-05-30

## 2018-05-30 DIAGNOSIS — Z98.1 HISTORY OF LUMBAR FUSION: Primary | ICD-10-CM

## 2018-05-30 DIAGNOSIS — M48.062 SPINAL STENOSIS OF LUMBAR REGION WITH NEUROGENIC CLAUDICATION: ICD-10-CM

## 2018-05-30 PROCEDURE — 72100 X-RAY EXAM L-S SPINE 2/3 VWS: CPT | Performed by: ORTHOPAEDIC SURGERY

## 2018-05-30 PROCEDURE — 99213 OFFICE O/P EST LOW 20 MIN: CPT | Performed by: ORTHOPAEDIC SURGERY

## 2018-05-30 RX ORDER — LEFLUNOMIDE 20 MG/1
20 TABLET ORAL EVERY MORNING
COMMUNITY
End: 2022-04-01

## 2018-05-30 RX ORDER — CELECOXIB 200 MG/1
200 CAPSULE ORAL NIGHTLY
COMMUNITY
End: 2018-07-03 | Stop reason: HOSPADM

## 2018-05-30 RX ORDER — MELOXICAM 15 MG/1
15 TABLET ORAL EVERY MORNING
COMMUNITY
End: 2018-07-12 | Stop reason: HOSPADM

## 2018-05-30 NOTE — PROGRESS NOTES
He still has pain in the left heel which seems intrinsic to the area but is failed to improve with physical therapy.  His back pain and leg pain are somewhat better than before surgery but the heel pain plagues him.  He appears to have decent strength on examination the lower extremities.  X-rays today demonstrate the good position of the graft and implants further consolidation.  He does have some lucency around the cephalad screws but I think he solid there.  Any event he doesn't have much in way of back pain and these improved.  I'm going to have him see Dr. Smith for the foot but don't see anything else to help him with at this point I will see him as needed.

## 2018-06-01 ENCOUNTER — RESULTS ENCOUNTER (OUTPATIENT)
Dept: FAMILY MEDICINE CLINIC | Facility: CLINIC | Age: 63
End: 2018-06-01

## 2018-06-01 DIAGNOSIS — R35.1 NOCTURIA: ICD-10-CM

## 2018-06-01 DIAGNOSIS — Z12.5 SCREENING PSA (PROSTATE SPECIFIC ANTIGEN): ICD-10-CM

## 2018-06-01 DIAGNOSIS — J44.9 CHRONIC OBSTRUCTIVE PULMONARY DISEASE, UNSPECIFIED COPD TYPE (HCC): ICD-10-CM

## 2018-06-01 DIAGNOSIS — F41.1 GENERALIZED ANXIETY DISORDER: ICD-10-CM

## 2018-06-01 DIAGNOSIS — I10 ESSENTIAL HYPERTENSION: ICD-10-CM

## 2018-06-01 DIAGNOSIS — E55.9 VITAMIN D DEFICIENCY: ICD-10-CM

## 2018-06-01 DIAGNOSIS — R73.01 IFG (IMPAIRED FASTING GLUCOSE): ICD-10-CM

## 2018-06-01 DIAGNOSIS — E78.2 MIXED HYPERLIPIDEMIA: ICD-10-CM

## 2018-06-06 RX ORDER — CYCLOBENZAPRINE HCL 10 MG
TABLET ORAL
Qty: 30 TABLET | Refills: 0 | Status: SHIPPED | OUTPATIENT
Start: 2018-06-06 | End: 2018-06-29

## 2018-06-07 NOTE — PROGRESS NOTES
"   New Patient Complaint      Patient: Prem Thrasher  YOB: 1955 63 y.o. male  Medical Record Number: [id number]    Chief Complaints: My heel hurts    History of Present Illness: Describes an 8-9 month history of moderate constant stabbing aching pain in the posterior aspect of the left hindfoot.  Does have some increased pain with start up in the morning the symptoms progressed throughout the day with prolonged walking and standing.  He's had treatment elsewhere including stretching and exhaustive physical therapy.     He had back surgery last fall by Dr. Perez        HPI    Allergies:   Allergies   Allergen Reactions   • Atorvastatin Myalgia     Leg cramps   • Ceclor [Cefaclor] Rash     Patient tolerated nafcillin during 9/2017 admission and has tolerated Augmentin in past outpatient.    • Methotrexate Derivatives Other (See Comments)     \"Blisters\"       Medications:   Current Outpatient Prescriptions on File Prior to Visit   Medication Sig   • amLODIPine (NORVASC) 10 MG tablet Take 1 tablet by mouth Daily. For BP   • amoxicillin-clavulanate (AUGMENTIN) 875-125 MG per tablet Take 1 tablet by mouth Every 12 (Twelve) Hours. One PO BID for infection with food   • aspirin 81 MG tablet Take 81 mg by mouth Every Evening. TO HOLD 1 WEEK BEFORE SURGERY   • carvedilol (COREG) 12.5 MG tablet Take 1 tablet by mouth 2 (Two) Times a Day With Meals for 90 days. For BP   • celecoxib (CeleBREX) 200 MG capsule Take 200 mg by mouth Daily.   • Cholecalciferol (VITAMIN D) 2000 UNITS tablet Take 2,000 Units by mouth Every Evening.   • [employer] SENNA PLUS 8.6-50 MG per tablet TAKE 1 TABLET BY MOUTH 2 (TWO) TIMES A DAY.   • cyclobenzaprine (FLEXERIL) 10 MG tablet TAKE 1 TABLET BY MOUTH THREE TIMES A DAY AS NEEDED FOR MUSCLE SPASMS   • escitalopram (LEXAPRO) 20 MG tablet Take 1 tablet by mouth Every Night. For anxiety   • ezetimibe (ZETIA) 10 MG tablet Take 1 tablet by mouth Every Evening. For cholesterol   • gabapentin " (NEURONTIN) 300 MG capsule TAKE ONE CAPSULE BY MOUTH DAILY AT BEDTIME.   • irbesartan (AVAPRO) 300 MG tablet Take 1 tablet by mouth Daily. for blood pressure   • leflunomide (ARAVA) 20 MG tablet Take 20 mg by mouth Daily.   • meloxicam (MOBIC) 15 MG tablet Take 15 mg by mouth Daily.   • ondansetron (ZOFRAN) 4 MG tablet Take 1 tablet by mouth Every 8 (Eight) Hours As Needed for Nausea or Vomiting.   • Probiotic Product (PROBIOTIC PO) Take  by mouth.   • Probiotic Product (PROBIOTIC-10 PO) Take  by mouth.   • tiotropium (SPIRIVA) 18 MCG per inhalation capsule Place 1 capsule into inhaler and inhale Every Evening.     No current facility-administered medications on file prior to visit.        Past Medical History:   Diagnosis Date   • Acute sinusitis    • Allergy    • Anxiety disorder    • Cervical disc disorder    • COPD (chronic obstructive pulmonary disease)    • CTS (carpal tunnel syndrome)    • Dermatophytosis of groin    • Encounter for eye exam 10/2014    normal   • History of bone density study 03/2014    Dr. Maria Antonia Lopez; normal   • History of chest x-ray 02/15/2011    also 3-6-15; normal chest   • History of nuclear stress test 03/09/2015    cardiolite; Dr. Greenberg; negative   • History of pulmonary function tests 03/2015    COPD   • Hyperlipidemia    • Hypertension    • IFG (impaired fasting glucose)    • Low back pain    • Lumbosacral disc disease    • Nerve damage     KAYLYNN ELBOWS   • Osteoarthritis, multiple sites    • Postoperative nausea and vomiting 8/1/2017   • Rheumatoid arthritis    • Sciatica    • Thoracic disc disorder      Past Surgical History:   Procedure Laterality Date   • BACK SURGERY     • COLONOSCOPY  02/02/2011    unremarkable only for scattered diverticulosis; had hx of prior polyp   • CYST REMOVAL  03/2016    x2  FROM FACE AND EAR   • DENTAL PROCEDURE  2008    teeth removed; dental implants   • EPIDURAL BLOCK  1995    L/S spine   • HAND SURGERY Right 2003    avulsion lacerations 4th R  finger debrided   • LUMBAR DISCECTOMY FUSION INSTRUMENTATION Left 10/10/2016    Procedure: LEFT L2-L3, L3-L4 LUMBAR LAMINECTOMY/ DISCECTOMY WITH METRIX ;  Surgeon: Sawyer Rick MD;  Location: [facility] OR;  Service:    • LUMBAR DISCECTOMY FUSION INSTRUMENTATION N/A 7/31/2017    Procedure: LUMBAR FUSION DECOMPRESSON WITH PEDICLE SCREWS with LAURA L2-3,L3-4;  Surgeon: Jacky Perez MD;  Location: [facility] OR;  Service:    • LUMBAR LAMINECTOMY DISCECTOMY DECOMPRESSION N/A 7/31/2017    Procedure: L2 to L4 laminectomy with neural lysis and a fusion by orthopedics;  Surgeon: Sawyer Rick MD;  Location: [facility] OR;  Service:    • LUMBAR LAMINECTOMY DISCECTOMY DECOMPRESSION N/A 9/5/2017    Procedure: I&D LUMBAR SPINE ;  Surgeon: Jacky Perez MD;  Location: [facility] OR;  Service:    • SHOULDER SURGERY Right 02/23/2011    Dr. Navarro   • SINUS SURGERY  2003    Dr. Barrera     Social History     Occupational History   • Not on file.     Social History Main Topics   • Smoking status: Current Every Day Smoker     Packs/day: 1.00     Years: 45.00     Types: Cigarettes   • Smokeless tobacco: Never Used      Comment: last cigarette 7/30/17   • Alcohol use No   • Drug use: No   • Sexual activity: Not Currently     Partners: Female     Birth control/ protection: None      Social History     Social History Narrative   • No narrative on file     Family History   Problem Relation Age of Onset   • Diabetes Mother    • Heart disease Mother    • Hyperlipidemia Mother    • Stroke Mother    • Heart disease Father    • Rheum arthritis Father    • Alcohol abuse Father    • Hyperlipidemia Father    • Depression Brother    • Cancer Brother         prostate   • Depression Brother    • Heart disease Brother    • Hyperlipidemia Brother    • Cancer Brother    • Thyroid disease Sister        Review of Systems: 14 point review of systems performed, positive pertinent findings identified in HPI. All remaining systems negative      Review of Systems      Physical Exam:   There were no vitals filed for this visit.  Physical Exam   Constitutional: pleasant, well developed  he is with his wife  Eyes: sclera non icteric  Hearing : adequate for exam  Cardiovascular: palpable pulses in left foot, Left calf/ thigh NT without sign of DVT  Respiratoy: breathing unlabored   Neurological: grossly sensate to LT throughout left LE  Psychiatric: oriented with normal mood and affect.   Lymphatic: No palpable popliteal lymphadenopathy left LE  Skin: intact throughout left leg/foot  Musculoskeletal: He was nontender on the first MTP joint.  There was moderate bony prominence at the insertion of the Achilles with tenderness to palpation but no palpable defects.  He has about 10° dorsiflexion beyond neutral and 5 out of 5 plantarflexion strength.  Physical Exam  Ortho Exam    Radiology:3 views of the left foot and Tse view of the calcaneus were ordered to evaluate pain reviewed and no prior x-rays that will for comparison.  There is spurring at the insertion of the Achilles with multiple calcifications and prominent superior calcaneal tuberosity was some plantar calcaneal spurring and there appeared to be a small ossification dorsal to the talar neck.  There is arthritic change of the first MTP joint with some spurring along the medial border as well as what appears to be a multipartite medial sesamoid.    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    We discussed operative and nonoperative treatment options and he's had exhaustive nonoperative measures and with like to pursue operative treatment.  I briefly discussed the surgical procedure with him in detail and we need to get an MRI to evaluate the extent of his involvement for preoperative planning.    We'll send him for this and he understands to call to schedule follow-up appointment after MRI has been scheduled and ordered to review results in the office  and determine definitive treatment plan.  He was encouraged to call if he has any questions or problems prior to follow-up

## 2018-06-08 ENCOUNTER — CONSULT (OUTPATIENT)
Dept: ORTHOPEDIC SURGERY | Facility: CLINIC | Age: 63
End: 2018-06-08

## 2018-06-08 VITALS — WEIGHT: 201 LBS | TEMPERATURE: 98.2 F | HEIGHT: 69 IN | BODY MASS INDEX: 29.77 KG/M2

## 2018-06-08 DIAGNOSIS — M79.672 LEFT FOOT PAIN: ICD-10-CM

## 2018-06-08 DIAGNOSIS — M65.28 CALCIFIC ACHILLES TENDONITIS: ICD-10-CM

## 2018-06-08 DIAGNOSIS — M79.672 PAIN OF LEFT HEEL: Primary | ICD-10-CM

## 2018-06-08 DIAGNOSIS — M92.62 HAGLUND'S DEFORMITY OF LEFT HEEL: ICD-10-CM

## 2018-06-08 PROBLEM — M76.60 CALCIFIC ACHILLES TENDONITIS: Status: ACTIVE | Noted: 2018-06-08

## 2018-06-08 PROCEDURE — 99214 OFFICE O/P EST MOD 30 MIN: CPT | Performed by: ORTHOPAEDIC SURGERY

## 2018-06-08 PROCEDURE — 73630 X-RAY EXAM OF FOOT: CPT | Performed by: ORTHOPAEDIC SURGERY

## 2018-06-18 ENCOUNTER — HOSPITAL ENCOUNTER (OUTPATIENT)
Dept: MRI IMAGING | Facility: HOSPITAL | Age: 63
Discharge: HOME | End: 2018-06-18
Attending: ORTHOPAEDIC SURGERY | Admitting: ORTHOPAEDIC SURGERY

## 2018-06-18 DIAGNOSIS — M92.62 HAGLUND'S DEFORMITY OF LEFT HEEL: ICD-10-CM

## 2018-06-18 DIAGNOSIS — M65.28 CALCIFIC ACHILLES TENDONITIS: ICD-10-CM

## 2018-06-18 PROCEDURE — 73721 MRI JNT OF LWR EXTRE W/O DYE: CPT

## 2018-06-27 ENCOUNTER — OFFICE VISIT (OUTPATIENT)
Dept: ORTHOPEDIC SURGERY | Facility: CLINIC | Age: 63
End: 2018-06-27

## 2018-06-27 VITALS — WEIGHT: 198 LBS | TEMPERATURE: 98 F | BODY MASS INDEX: 27.72 KG/M2 | HEIGHT: 71 IN

## 2018-06-27 DIAGNOSIS — M92.62 HAGLUND'S DEFORMITY OF LEFT HEEL: ICD-10-CM

## 2018-06-27 DIAGNOSIS — M65.28 CALCIFIC ACHILLES TENDONITIS: Primary | ICD-10-CM

## 2018-06-27 DIAGNOSIS — Z72.0 TOBACCO ABUSE: ICD-10-CM

## 2018-06-27 DIAGNOSIS — M21.862 GASTROCNEMIUS EQUINUS, LEFT: ICD-10-CM

## 2018-06-27 PROBLEM — M62.462 GASTROCNEMIUS EQUINUS, LEFT: Status: ACTIVE | Noted: 2018-06-27

## 2018-06-27 PROCEDURE — 99213 OFFICE O/P EST LOW 20 MIN: CPT | Performed by: ORTHOPAEDIC SURGERY

## 2018-06-27 PROCEDURE — 99406 BEHAV CHNG SMOKING 3-10 MIN: CPT | Performed by: ORTHOPAEDIC SURGERY

## 2018-06-27 NOTE — PROGRESS NOTES
"Ankle Follow Up      Patient: Prem Thrasher    YOB: 1955 63 y.o. male    Chief Complaints: Heel hurts    History of Present Illness: Patient follows up with a 9 month history of moderate constant stabbing aching pain in the posterior aspect of his left hindfoot.  He's been unimproved despite exhaustive conservative treatment including stretching and physical therapy.  Symptoms progressed throughout the day with walking and standing.  He states he does get intermittent feeling of numbness over the dorsum of his left foot.  He had back surgery last fall.   HPI    ROS: ankle pain  Past Medical History:   Diagnosis Date   • Acute sinusitis    • Allergy    • Anxiety disorder    • Cervical disc disorder    • COPD (chronic obstructive pulmonary disease)    • CTS (carpal tunnel syndrome)    • Dermatophytosis of groin    • Encounter for eye exam 10/2014    normal   • History of bone density study 03/2014    Dr. Maria Antonia Lopez; normal   • History of chest x-ray 02/15/2011    also 3-6-15; normal chest   • History of nuclear stress test 03/09/2015    cardiolite; Dr. Greenberg; negative   • History of pulmonary function tests 03/2015    COPD   • Hyperlipidemia    • Hypertension    • IFG (impaired fasting glucose)    • Low back pain    • Lumbosacral disc disease    • Nerve damage     KAYLYNN ELBOWS   • Osteoarthritis, multiple sites    • Postoperative nausea and vomiting 8/1/2017   • Rheumatoid arthritis    • Sciatica    • Thoracic disc disorder        Physical Exam:   Vitals:    06/27/18 1124   Temp: 98 °F (36.7 °C)   TempSrc: Temporal Artery    Weight: 89.8 kg (198 lb)   Height: 180.3 cm (71\")     Well developed with normal mood.Nonantalgic gait.  Moderate bony prominence at the insertion of the Achilles with tenderness to palpation overlying skin was intact and no palpable defects.  Neutral dorsiflexion a heel cord but demonstrated a positive Silverskiold test.  He was grossly sensate light touch over the dorsal aspect " of the hindfoot and ankle.  Left calf is nontender without sign of DVT      Radiology: MRI films and report of the left hindfoot dated 6/18/18 reviewed which show thickening of the distal Achilles with several small areas of intense interstitial high T2 signal at the insertion was felt to have some small areas of interstitial tear      Assessment/Plan:  1.  Left insertional calcific Achilles tendinosis  2.  Left Allegra deformity  3.  Left gastroc contracture  4.  Tobacco use    I reviewed operative and nonoperative treatment options with him.  He's had exhaustive conservative treatment and although no guarantees could be given neutral decision was made to proceed with operative treatment.  I described the procedure to him in detail which will entail excisional debridement of the Achilles with resection of the Allegra deformity and gastroc recession.  He understands he may require tendon transfer from the great toe and on the amount of tendon that required debridement.    I reviewed the procedure and postoperative protocol in detail with he and his wife which they clearly understand.  risks benefits potential outcomes and complications were reviewed which can include but are not limited to heart attack stroke death pneumonia infection bleeding damage to blood vessels and nerves or tendons blood clots pulmonary embolism persistent or worsening pain stiffness, rupture of the tendon repair and wound healing complications that could require long-term wound care or plastics reconstruction or even catastrophic events that could result in loss of the limb.    He understands that smoking puts him at an even higher risk for complications.    Regarding persistent habitual smoking.  I have discussed for at least 4 minutes with the patient the ill effects of continued smoking on pulmonary and cardiovascular health as well as financial burden.  I also discussed at length the effects on peripheral circulation as well as  inhibition of soft tissue and bone healing.  I've recommended that they speak with their primary care physician regarding cessation techniques.    All of he and his wife questions are answered to her full satisfaction and we'll plan to schedule this on an outpatient basis at a mutually convenient time.  They're encouraged to call if he has any questions prior to surgery

## 2018-06-29 ENCOUNTER — APPOINTMENT (OUTPATIENT)
Dept: PREADMISSION TESTING | Facility: HOSPITAL | Age: 63
End: 2018-06-29

## 2018-06-29 VITALS
SYSTOLIC BLOOD PRESSURE: 104 MMHG | DIASTOLIC BLOOD PRESSURE: 78 MMHG | HEART RATE: 82 BPM | TEMPERATURE: 97 F | RESPIRATION RATE: 18 BRPM | WEIGHT: 202 LBS | BODY MASS INDEX: 28.28 KG/M2 | HEIGHT: 71 IN | OXYGEN SATURATION: 94 %

## 2018-06-29 LAB
ANION GAP SERPL CALCULATED.3IONS-SCNC: 13.3 MMOL/L
BUN BLD-MCNC: 17 MG/DL (ref 8–23)
BUN/CREAT SERPL: 14.3 (ref 7–25)
CALCIUM SPEC-SCNC: 9.2 MG/DL (ref 8.6–10.5)
CHLORIDE SERPL-SCNC: 102 MMOL/L (ref 98–107)
CO2 SERPL-SCNC: 22.7 MMOL/L (ref 22–29)
CREAT BLD-MCNC: 1.19 MG/DL (ref 0.76–1.27)
DEPRECATED RDW RBC AUTO: 44.4 FL (ref 37–54)
ERYTHROCYTE [DISTWIDTH] IN BLOOD BY AUTOMATED COUNT: 13.3 % (ref 11.5–14.5)
GFR SERPL CREATININE-BSD FRML MDRD: 62 ML/MIN/1.73
GLUCOSE BLD-MCNC: 108 MG/DL (ref 65–99)
HCT VFR BLD AUTO: 39.8 % (ref 40.4–52.2)
HGB BLD-MCNC: 12.7 G/DL (ref 13.7–17.6)
MCH RBC QN AUTO: 29.3 PG (ref 27–32.7)
MCHC RBC AUTO-ENTMCNC: 31.9 G/DL (ref 32.6–36.4)
MCV RBC AUTO: 91.9 FL (ref 79.8–96.2)
PLATELET # BLD AUTO: 251 10*3/MM3 (ref 140–500)
PMV BLD AUTO: 10.6 FL (ref 6–12)
POTASSIUM BLD-SCNC: 4.2 MMOL/L (ref 3.5–5.2)
RBC # BLD AUTO: 4.33 10*6/MM3 (ref 4.6–6)
SODIUM BLD-SCNC: 138 MMOL/L (ref 136–145)
WBC NRBC COR # BLD: 7.97 10*3/MM3 (ref 4.5–10.7)

## 2018-06-29 PROCEDURE — 85027 COMPLETE CBC AUTOMATED: CPT | Performed by: ORTHOPAEDIC SURGERY

## 2018-06-29 PROCEDURE — 93005 ELECTROCARDIOGRAM TRACING: CPT

## 2018-06-29 PROCEDURE — 93010 ELECTROCARDIOGRAM REPORT: CPT | Performed by: INTERNAL MEDICINE

## 2018-06-29 PROCEDURE — 80048 BASIC METABOLIC PNL TOTAL CA: CPT | Performed by: ORTHOPAEDIC SURGERY

## 2018-06-29 PROCEDURE — 36415 COLL VENOUS BLD VENIPUNCTURE: CPT

## 2018-06-29 RX ORDER — SENNA PLUS 8.6 MG/1
1 TABLET ORAL EVERY MORNING
COMMUNITY
End: 2019-08-01 | Stop reason: SDUPTHER

## 2018-06-29 RX ORDER — CYCLOBENZAPRINE HCL 10 MG
10 TABLET ORAL NIGHTLY PRN
COMMUNITY
End: 2018-07-19 | Stop reason: SDUPTHER

## 2018-07-02 NOTE — H&P
"Ankle Follow Up        Patient: Prem Thrasher     YOB: 1955 63 y.o. male     Chief Complaints: Heel hurts     History of Present Illness: Patient follows up with a 9 month history of moderate constant stabbing aching pain in the posterior aspect of his left hindfoot.  He's been unimproved despite exhaustive conservative treatment including stretching and physical therapy.  Symptoms progressed throughout the day with walking and standing.  He states he does get intermittent feeling of numbness over the dorsum of his left foot.  He had back surgery last fall.   HPI     ROS: ankle pain  Medical History        Past Medical History:   Diagnosis Date   • Acute sinusitis     • Allergy     • Anxiety disorder     • Cervical disc disorder     • COPD (chronic obstructive pulmonary disease)     • CTS (carpal tunnel syndrome)     • Dermatophytosis of groin     • Encounter for eye exam 10/2014     normal   • History of bone density study 03/2014     Dr. Maria Antonia Lopez; normal   • History of chest x-ray 02/15/2011     also 3-6-15; normal chest   • History of nuclear stress test 03/09/2015     cardiolite; Dr. Greenberg; negative   • History of pulmonary function tests 03/2015     COPD   • Hyperlipidemia     • Hypertension     • IFG (impaired fasting glucose)     • Low back pain     • Lumbosacral disc disease     • Nerve damage       KAYLYNN ELBOWS   • Osteoarthritis, multiple sites     • Postoperative nausea and vomiting 8/1/2017   • Rheumatoid arthritis     • Sciatica     • Thoracic disc disorder              Physical Exam:   Vitals       Vitals:     06/27/18 1124   Temp: 98 °F (36.7 °C)   TempSrc: Temporal Artery    Weight: 89.8 kg (198 lb)   Height: 180.3 cm (71\")         Well developed with normal mood.Nonantalgic gait.  Moderate bony prominence at the insertion of the Achilles with tenderness to palpation overlying skin was intact and no palpable defects.  Neutral dorsiflexion a heel cord but demonstrated a positive " Silverskiold test.  He was grossly sensate light touch over the dorsal aspect of the hindfoot and ankle.  Left calf is nontender without sign of DVT        Radiology: MRI films and report of the left hindfoot dated 6/18/18 reviewed which show thickening of the distal Achilles with several small areas of intense interstitial high T2 signal at the insertion was felt to have some small areas of interstitial tear        Assessment/Plan:  1.  Left insertional calcific Achilles tendinosis  2.  Left Allegra deformity  3.  Left gastroc contracture  4.  Tobacco use     I reviewed operative and nonoperative treatment options with him.  He's had exhaustive conservative treatment and although no guarantees could be given neutral decision was made to proceed with operative treatment.  I described the procedure to him in detail which will entail excisional debridement of the Achilles with resection of the Allegra deformity and gastroc recession.  He understands he may require tendon transfer from the great toe and on the amount of tendon that required debridement.     I reviewed the procedure and postoperative protocol in detail with he and his wife which they clearly understand.  risks benefits potential outcomes and complications were reviewed which can include but are not limited to heart attack stroke death pneumonia infection bleeding damage to blood vessels and nerves or tendons blood clots pulmonary embolism persistent or worsening pain stiffness, rupture of the tendon repair and wound healing complications that could require long-term wound care or plastics reconstruction or even catastrophic events that could result in loss of the limb.     He understands that smoking puts him at an even higher risk for complications.     Regarding persistent habitual smoking.  I have discussed for at least 4 minutes with the patient the ill effects of continued smoking on pulmonary and cardiovascular health as well as financial burden.   I also discussed at length the effects on peripheral circulation as well as inhibition of soft tissue and bone healing.  I've recommended that they speak with their primary care physician regarding cessation techniques.     All of he and his wife questions are answered to her full satisfaction and we'll plan to schedule this on an outpatient basis at a mutually convenient time.  They're encouraged to call if he has any questions prior to surgery

## 2018-07-03 ENCOUNTER — ANESTHESIA (OUTPATIENT)
Dept: PERIOP | Facility: HOSPITAL | Age: 63
End: 2018-07-03

## 2018-07-03 ENCOUNTER — HOSPITAL ENCOUNTER (OUTPATIENT)
Facility: HOSPITAL | Age: 63
Setting detail: HOSPITAL OUTPATIENT SURGERY
Discharge: HOME | End: 2018-07-03
Attending: ORTHOPAEDIC SURGERY | Admitting: ORTHOPAEDIC SURGERY

## 2018-07-03 ENCOUNTER — ANESTHESIA EVENT (OUTPATIENT)
Dept: PERIOP | Facility: HOSPITAL | Age: 63
End: 2018-07-03

## 2018-07-03 VITALS
HEART RATE: 74 BPM | TEMPERATURE: 97.8 F | OXYGEN SATURATION: 95 % | SYSTOLIC BLOOD PRESSURE: 137 MMHG | BODY MASS INDEX: 28.29 KG/M2 | WEIGHT: 202.82 LBS | RESPIRATION RATE: 18 BRPM | DIASTOLIC BLOOD PRESSURE: 88 MMHG

## 2018-07-03 DIAGNOSIS — M21.862 GASTROCNEMIUS EQUINUS, LEFT: ICD-10-CM

## 2018-07-03 DIAGNOSIS — M65.28 CALCIFIC ACHILLES TENDONITIS: ICD-10-CM

## 2018-07-03 DIAGNOSIS — M92.62 HAGLUND'S DEFORMITY OF LEFT HEEL: ICD-10-CM

## 2018-07-03 PROCEDURE — 25010000002 PROPOFOL 10 MG/ML EMULSION: Performed by: NURSE ANESTHETIST, CERTIFIED REGISTERED

## 2018-07-03 PROCEDURE — 28120 PART REMOVAL OF ANKLE/HEEL: CPT | Performed by: ORTHOPAEDIC SURGERY

## 2018-07-03 PROCEDURE — 25010000002 VANCOMYCIN 10 G RECONSTITUTED SOLUTION: Performed by: ORTHOPAEDIC SURGERY

## 2018-07-03 PROCEDURE — 25010000002 PHENYLEPHRINE PER 1 ML: Performed by: NURSE ANESTHETIST, CERTIFIED REGISTERED

## 2018-07-03 PROCEDURE — 25010000002 FENTANYL CITRATE (PF) 100 MCG/2ML SOLUTION: Performed by: ANESTHESIOLOGY

## 2018-07-03 PROCEDURE — 25010000002 DEXAMETHASONE PER 1 MG: Performed by: NURSE ANESTHETIST, CERTIFIED REGISTERED

## 2018-07-03 PROCEDURE — C1713 ANCHOR/SCREW BN/BN,TIS/BN: HCPCS | Performed by: ORTHOPAEDIC SURGERY

## 2018-07-03 PROCEDURE — 25010000002 ONDANSETRON PER 1 MG: Performed by: NURSE ANESTHETIST, CERTIFIED REGISTERED

## 2018-07-03 PROCEDURE — 27687 REVISION OF CALF TENDON: CPT | Performed by: ORTHOPAEDIC SURGERY

## 2018-07-03 PROCEDURE — 25010000002 ROPIVACAINE PER 1 MG: Performed by: ANESTHESIOLOGY

## 2018-07-03 PROCEDURE — 25010000002 MIDAZOLAM PER 1 MG: Performed by: ANESTHESIOLOGY

## 2018-07-03 PROCEDURE — 25010000002 FENTANYL CITRATE (PF) 100 MCG/2ML SOLUTION: Performed by: NURSE ANESTHETIST, CERTIFIED REGISTERED

## 2018-07-03 PROCEDURE — 27654 REPAIR OF ACHILLES TENDON: CPT | Performed by: ORTHOPAEDIC SURGERY

## 2018-07-03 DEVICE — SYS SUT/ANCH ACHILLES SPEEDBRIDGE BIOCOMP: Type: IMPLANTABLE DEVICE | Site: ACHILLES TENDON | Status: FUNCTIONAL

## 2018-07-03 RX ORDER — ASPIRIN 325 MG
325 TABLET, DELAYED RELEASE (ENTERIC COATED) ORAL DAILY
Qty: 30 TABLET | Refills: 0 | Status: SHIPPED | OUTPATIENT
Start: 2018-07-04 | End: 2018-07-30 | Stop reason: SDUPTHER

## 2018-07-03 RX ORDER — FLUMAZENIL 0.1 MG/ML
0.2 INJECTION INTRAVENOUS AS NEEDED
Status: DISCONTINUED | OUTPATIENT
Start: 2018-07-03 | End: 2018-07-03 | Stop reason: HOSPADM

## 2018-07-03 RX ORDER — LIDOCAINE HYDROCHLORIDE 10 MG/ML
0.5 INJECTION, SOLUTION EPIDURAL; INFILTRATION; INTRACAUDAL; PERINEURAL ONCE AS NEEDED
Status: DISCONTINUED | OUTPATIENT
Start: 2018-07-03 | End: 2018-07-03 | Stop reason: HOSPADM

## 2018-07-03 RX ORDER — SODIUM CHLORIDE, SODIUM LACTATE, POTASSIUM CHLORIDE, CALCIUM CHLORIDE 600; 310; 30; 20 MG/100ML; MG/100ML; MG/100ML; MG/100ML
9 INJECTION, SOLUTION INTRAVENOUS CONTINUOUS
Status: DISCONTINUED | OUTPATIENT
Start: 2018-07-03 | End: 2018-07-03 | Stop reason: HOSPADM

## 2018-07-03 RX ORDER — FAMOTIDINE 10 MG/ML
20 INJECTION, SOLUTION INTRAVENOUS ONCE
Status: COMPLETED | OUTPATIENT
Start: 2018-07-03 | End: 2018-07-03

## 2018-07-03 RX ORDER — HYDROCODONE BITARTRATE AND ACETAMINOPHEN 7.5; 325 MG/1; MG/1
1 TABLET ORAL ONCE AS NEEDED
Status: DISCONTINUED | OUTPATIENT
Start: 2018-07-03 | End: 2018-07-03 | Stop reason: HOSPADM

## 2018-07-03 RX ORDER — FENTANYL CITRATE 50 UG/ML
50 INJECTION, SOLUTION INTRAMUSCULAR; INTRAVENOUS
Status: DISCONTINUED | OUTPATIENT
Start: 2018-07-03 | End: 2018-07-03 | Stop reason: HOSPADM

## 2018-07-03 RX ORDER — EPHEDRINE SULFATE 50 MG/ML
5 INJECTION, SOLUTION INTRAVENOUS ONCE AS NEEDED
Status: DISCONTINUED | OUTPATIENT
Start: 2018-07-03 | End: 2018-07-03 | Stop reason: HOSPADM

## 2018-07-03 RX ORDER — ROPIVACAINE HYDROCHLORIDE 5 MG/ML
INJECTION, SOLUTION EPIDURAL; INFILTRATION; PERINEURAL AS NEEDED
Status: DISCONTINUED | OUTPATIENT
Start: 2018-07-03 | End: 2018-07-03 | Stop reason: SURG

## 2018-07-03 RX ORDER — FENTANYL CITRATE 50 UG/ML
100 INJECTION, SOLUTION INTRAMUSCULAR; INTRAVENOUS
Status: DISCONTINUED | OUTPATIENT
Start: 2018-07-03 | End: 2018-07-03 | Stop reason: HOSPADM

## 2018-07-03 RX ORDER — ERYTHROMYCIN 500 MG/1
500 TABLET, COATED ORAL EVERY 6 HOURS
Qty: 8 TABLET | Refills: 0 | Status: ON HOLD | OUTPATIENT
Start: 2018-07-03 | End: 2018-07-09

## 2018-07-03 RX ORDER — MIDAZOLAM HYDROCHLORIDE 1 MG/ML
1 INJECTION INTRAMUSCULAR; INTRAVENOUS
Status: DISCONTINUED | OUTPATIENT
Start: 2018-07-03 | End: 2018-07-03 | Stop reason: HOSPADM

## 2018-07-03 RX ORDER — OXYCODONE HYDROCHLORIDE AND ACETAMINOPHEN 5; 325 MG/1; MG/1
1-2 TABLET ORAL EVERY 4 HOURS PRN
Qty: 80 TABLET | Refills: 0 | Status: SHIPPED | OUTPATIENT
Start: 2018-07-03 | End: 2018-07-19

## 2018-07-03 RX ORDER — ACETAMINOPHEN 650 MG
TABLET, EXTENDED RELEASE ORAL AS NEEDED
Status: DISCONTINUED | OUTPATIENT
Start: 2018-07-03 | End: 2018-07-03 | Stop reason: HOSPADM

## 2018-07-03 RX ORDER — PROMETHAZINE HYDROCHLORIDE 25 MG/ML
6.25 INJECTION, SOLUTION INTRAMUSCULAR; INTRAVENOUS ONCE AS NEEDED
Status: DISCONTINUED | OUTPATIENT
Start: 2018-07-03 | End: 2018-07-03 | Stop reason: HOSPADM

## 2018-07-03 RX ORDER — DEXAMETHASONE SODIUM PHOSPHATE 10 MG/ML
INJECTION INTRAMUSCULAR; INTRAVENOUS AS NEEDED
Status: DISCONTINUED | OUTPATIENT
Start: 2018-07-03 | End: 2018-07-03 | Stop reason: SURG

## 2018-07-03 RX ORDER — PROMETHAZINE HYDROCHLORIDE 25 MG/1
25 SUPPOSITORY RECTAL ONCE AS NEEDED
Status: DISCONTINUED | OUTPATIENT
Start: 2018-07-03 | End: 2018-07-03 | Stop reason: HOSPADM

## 2018-07-03 RX ORDER — OXYCODONE HYDROCHLORIDE AND ACETAMINOPHEN 5; 325 MG/1; MG/1
1 TABLET ORAL ONCE AS NEEDED
Status: DISCONTINUED | OUTPATIENT
Start: 2018-07-03 | End: 2018-07-03 | Stop reason: HOSPADM

## 2018-07-03 RX ORDER — OXYCODONE HYDROCHLORIDE AND ACETAMINOPHEN 5; 325 MG/1; MG/1
2 TABLET ORAL ONCE AS NEEDED
Status: DISCONTINUED | OUTPATIENT
Start: 2018-07-03 | End: 2018-07-03 | Stop reason: HOSPADM

## 2018-07-03 RX ORDER — ONDANSETRON 2 MG/ML
4 INJECTION INTRAMUSCULAR; INTRAVENOUS ONCE AS NEEDED
Status: DISCONTINUED | OUTPATIENT
Start: 2018-07-03 | End: 2018-07-03 | Stop reason: HOSPADM

## 2018-07-03 RX ORDER — PROMETHAZINE HYDROCHLORIDE 25 MG/1
25 TABLET ORAL ONCE AS NEEDED
Status: DISCONTINUED | OUTPATIENT
Start: 2018-07-03 | End: 2018-07-03 | Stop reason: HOSPADM

## 2018-07-03 RX ORDER — MAGNESIUM HYDROXIDE 1200 MG/15ML
LIQUID ORAL AS NEEDED
Status: DISCONTINUED | OUTPATIENT
Start: 2018-07-03 | End: 2018-07-03 | Stop reason: HOSPADM

## 2018-07-03 RX ORDER — MIDAZOLAM HYDROCHLORIDE 1 MG/ML
2 INJECTION INTRAMUSCULAR; INTRAVENOUS
Status: DISCONTINUED | OUTPATIENT
Start: 2018-07-03 | End: 2018-07-03 | Stop reason: HOSPADM

## 2018-07-03 RX ORDER — PROPOFOL 10 MG/ML
VIAL (ML) INTRAVENOUS AS NEEDED
Status: DISCONTINUED | OUTPATIENT
Start: 2018-07-03 | End: 2018-07-03 | Stop reason: SURG

## 2018-07-03 RX ORDER — EPHEDRINE SULFATE 50 MG/ML
INJECTION, SOLUTION INTRAVENOUS AS NEEDED
Status: DISCONTINUED | OUTPATIENT
Start: 2018-07-03 | End: 2018-07-03 | Stop reason: SURG

## 2018-07-03 RX ORDER — ONDANSETRON 2 MG/ML
INJECTION INTRAMUSCULAR; INTRAVENOUS AS NEEDED
Status: DISCONTINUED | OUTPATIENT
Start: 2018-07-03 | End: 2018-07-03 | Stop reason: SURG

## 2018-07-03 RX ORDER — FENTANYL CITRATE 50 UG/ML
INJECTION, SOLUTION INTRAMUSCULAR; INTRAVENOUS AS NEEDED
Status: DISCONTINUED | OUTPATIENT
Start: 2018-07-03 | End: 2018-07-03 | Stop reason: SURG

## 2018-07-03 RX ORDER — SODIUM CHLORIDE 0.9 % (FLUSH) 0.9 %
1-10 SYRINGE (ML) INJECTION AS NEEDED
Status: DISCONTINUED | OUTPATIENT
Start: 2018-07-03 | End: 2018-07-03 | Stop reason: HOSPADM

## 2018-07-03 RX ORDER — ROCURONIUM BROMIDE 10 MG/ML
INJECTION, SOLUTION INTRAVENOUS AS NEEDED
Status: DISCONTINUED | OUTPATIENT
Start: 2018-07-03 | End: 2018-07-03 | Stop reason: SURG

## 2018-07-03 RX ORDER — LABETALOL HYDROCHLORIDE 5 MG/ML
5 INJECTION, SOLUTION INTRAVENOUS
Status: DISCONTINUED | OUTPATIENT
Start: 2018-07-03 | End: 2018-07-03 | Stop reason: HOSPADM

## 2018-07-03 RX ORDER — DIPHENHYDRAMINE HYDROCHLORIDE 50 MG/ML
6.25 INJECTION INTRAMUSCULAR; INTRAVENOUS
Status: DISCONTINUED | OUTPATIENT
Start: 2018-07-03 | End: 2018-07-03 | Stop reason: HOSPADM

## 2018-07-03 RX ADMIN — SODIUM CHLORIDE, POTASSIUM CHLORIDE, SODIUM LACTATE AND CALCIUM CHLORIDE: 600; 310; 30; 20 INJECTION, SOLUTION INTRAVENOUS at 13:21

## 2018-07-03 RX ADMIN — DEXAMETHASONE SODIUM PHOSPHATE 8 MG: 10 INJECTION INTRAMUSCULAR; INTRAVENOUS at 12:55

## 2018-07-03 RX ADMIN — SUGAMMADEX 300 MG: 100 INJECTION, SOLUTION INTRAVENOUS at 14:43

## 2018-07-03 RX ADMIN — FAMOTIDINE 20 MG: 10 INJECTION INTRAVENOUS at 11:17

## 2018-07-03 RX ADMIN — MIDAZOLAM HYDROCHLORIDE 2 MG: 2 INJECTION, SOLUTION INTRAMUSCULAR; INTRAVENOUS at 11:26

## 2018-07-03 RX ADMIN — PHENYLEPHRINE HYDROCHLORIDE 100 MCG: 10 INJECTION INTRAVENOUS at 13:12

## 2018-07-03 RX ADMIN — PHENYLEPHRINE HYDROCHLORIDE 100 MCG: 10 INJECTION INTRAVENOUS at 13:17

## 2018-07-03 RX ADMIN — EPHEDRINE SULFATE 5 MG: 50 INJECTION INTRAMUSCULAR; INTRAVENOUS; SUBCUTANEOUS at 13:11

## 2018-07-03 RX ADMIN — ONDANSETRON 4 MG: 2 INJECTION INTRAMUSCULAR; INTRAVENOUS at 14:48

## 2018-07-03 RX ADMIN — FENTANYL CITRATE 50 MCG: 50 INJECTION INTRAMUSCULAR; INTRAVENOUS at 12:41

## 2018-07-03 RX ADMIN — PHENYLEPHRINE HYDROCHLORIDE 100 MCG: 10 INJECTION INTRAVENOUS at 12:46

## 2018-07-03 RX ADMIN — FENTANYL CITRATE 100 MCG: 50 INJECTION INTRAMUSCULAR; INTRAVENOUS at 11:26

## 2018-07-03 RX ADMIN — EPHEDRINE SULFATE 5 MG: 50 INJECTION INTRAMUSCULAR; INTRAVENOUS; SUBCUTANEOUS at 12:59

## 2018-07-03 RX ADMIN — ROCURONIUM BROMIDE 50 MG: 10 INJECTION INTRAVENOUS at 12:41

## 2018-07-03 RX ADMIN — ROPIVACAINE HYDROCHLORIDE 40 ML: 5 INJECTION, SOLUTION EPIDURAL; INFILTRATION; PERINEURAL at 11:34

## 2018-07-03 RX ADMIN — EPHEDRINE SULFATE 5 MG: 50 INJECTION INTRAMUSCULAR; INTRAVENOUS; SUBCUTANEOUS at 12:53

## 2018-07-03 RX ADMIN — PHENYLEPHRINE HYDROCHLORIDE 100 MCG: 10 INJECTION INTRAVENOUS at 12:59

## 2018-07-03 RX ADMIN — PHENYLEPHRINE HYDROCHLORIDE 100 MCG: 10 INJECTION INTRAVENOUS at 13:09

## 2018-07-03 RX ADMIN — EPHEDRINE SULFATE 5 MG: 50 INJECTION INTRAMUSCULAR; INTRAVENOUS; SUBCUTANEOUS at 12:57

## 2018-07-03 RX ADMIN — VANCOMYCIN HYDROCHLORIDE 1250 MG: 10 INJECTION, POWDER, LYOPHILIZED, FOR SOLUTION INTRAVENOUS at 11:42

## 2018-07-03 RX ADMIN — PROPOFOL 200 MG: 10 INJECTION, EMULSION INTRAVENOUS at 12:41

## 2018-07-03 RX ADMIN — EPHEDRINE SULFATE 5 MG: 50 INJECTION INTRAMUSCULAR; INTRAVENOUS; SUBCUTANEOUS at 13:22

## 2018-07-03 RX ADMIN — SODIUM CHLORIDE, POTASSIUM CHLORIDE, SODIUM LACTATE AND CALCIUM CHLORIDE 9 ML/HR: 600; 310; 30; 20 INJECTION, SOLUTION INTRAVENOUS at 11:03

## 2018-07-03 RX ADMIN — EPHEDRINE SULFATE 5 MG: 50 INJECTION INTRAMUSCULAR; INTRAVENOUS; SUBCUTANEOUS at 12:55

## 2018-07-03 NOTE — ANESTHESIA POSTPROCEDURE EVALUATION
Patient: Prem Thrasher    Procedure Summary     Date:  07/03/18 Room / Location:   [facility]  /  [facility]    Anesthesia Start:  1236 Anesthesia Stop:  1503    Procedure:  Left Achilles debridement and secondary repair with partial excision of calcaneus. Possible left Achilles lengthening at the calf (Left Ankle) Diagnosis:       Calcific Achilles tendonitis      Gastrocnemius equinus, left      Allegra's deformity of left heel      (Calcific Achilles tendonitis [M65.28])      (Gastrocnemius equinus, left [M21.6X2])      (Allegra's deformity of left heel [M92.62])    Surgeon:  Vinay Smith MD Provider:  Ryan May MD    Anesthesia Type:  general ASA Status:  3          Anesthesia Type: general  Last vitals  BP   137/88 (07/03/18 1600)   Temp   36.6 °C (97.8 °F) (07/03/18 1555)   Pulse   74 (07/03/18 1600)   Resp   18 (07/03/18 1600)     SpO2   95 % (07/03/18 1600)     Post Anesthesia Care and Evaluation    Patient location during evaluation: PACU  Patient participation: complete - patient participated  Level of consciousness: awake and alert  Pain management: adequate  Airway patency: patent  Anesthetic complications: No anesthetic complications    Cardiovascular status: acceptable  Respiratory status: acceptable  Hydration status: acceptable    Comments: /88 (BP Location: Right arm, Patient Position: Sitting)   Pulse 74   Temp 36.6 °C (97.8 °F) (Temporal Artery )   Resp 18   Wt 92 kg (202 lb 13.2 oz)   SpO2 95%   BMI 28.29 kg/m²

## 2018-07-03 NOTE — OP NOTE
Operative Note      Facility: [facility]  Patient Name: Prem Thrasher  YOB: 1955  Date: 7/3/2018  Medical Record Number: [id number]      Pre-op Diagnosis:   1.left calcific insertional Achilles tendinosis  2.left Allegra deformity of the calcaneus  3.left gastroc contracture      Post-op Diagnosis:     1.left calcific insertional Achilles tendinosis  2.left Allegra deformity of the calcaneus  3.left gastroc contracture      Procedure:  1left Achilles debridement and secondary repair  2.left resection of Allegra deformity of calcaneus  3.left gastroc recession    Surgeon(s):  Vinay Smith MD    Anesthesia: General  Anesthesiologist: Mary Carmen Gonzales MD; Ryan May MD  CRNA: Anjelica Stanley CRNA    Staff:   Cell Saver : Kenny Higgins  Circulator: Jeronimo Perez RN; Jeanne Crain RN  Scrub Person: Lucy Stoll; Anjelica Akbar  Assistants : none      Tourniquet time:23 min        Estimated Blood Loss:  minimal  Drains: None  Specimens: * No orders in the log *  Findings: See Dictation    Complications: None      Indications for Procedure: Patient has had history of progressive worsening pain at the insertion of the Achilles associated with calcific tendinosis and Allegra deformity as well as gastroc contracture.  They have been unimproved with conservative treatment and present now for operative treatment         Description of Procedure:    Preoperative informed consent and anesthesia evaluation were obtained.  IV antibiotics were administered in the surgical site was marked.  Block was measured by anesthesia.  Patient was brought to the operating and placed in a supine position.  Anesthesia was induced and the patient was intubated.  Exam under anesthesia showed positive Silverskiold test.  Well-padded tourniquet was placed left proximal thigh.  Patient was then placed into a prone position and all bony prominences were well-padded.    left leg was  prepped and draped in a sterile fashion and surgical timeout was performed.  Left leg was exsanguinated and pneumatic tourniquet inflated to 250 mmHg.  Midline incision was made over the Achilles and carried down distally.  Using a no touch technique.  Full thickness flaps were elevated including the peritenon.  The distal medial and lateral extents were exposed with care taken to preserve the posterior fat pad.  Midline tenotomy was then performed and extended distally.  Full-thickness flaps were elevated both medially and laterally  elevating this off of the calcifications within the Achilles.  The medial and lateral borders were left intact for later isometric repair.  The anterior 25%-30% of the tendon demonstrated mucinous and calcific changes and was debrided down to more healthy-appearing tissue.  I did not feel that tendon transfer was necessary.    The spurs were then removed from the distal posterior aspect of the calcaneus using a large rongeur down to a soft cancellus bed..  The retrocalcaneal bursa was then fully excised using electrocautery with care taken to protect any tenderness or neurovascular structures.  The superior aspect of the calcaneal tuberosity was exposed.  I then used a sagittal saw to resect the Allegra deformity from the calcaneus such that it was flush with the superior border of the tuberosity.  This created a nice hemispherical footprint posteriorly.    The wound was irrigated copiously and the tourniquet was released.  Meticulous hemostasis was obtained.  The retrocalcaneal bursa was sprayed with PRP.  I then held the foot in previously noted preoperative alignment and noted the isometric reattachment points for the medial and lateral limbs.  The repair bed was sprayed with PRP as were the tendons.  These were then secured using the Arthrex speed bridge technique with excellent approximation of the limbs down to the cancellus footprint.  The additional limbs of FiberWire were  brought back up through the repair and oversewn.    The ankle was taken through range of motion and the repair was found to be quite stable.    Attention was then turned to the gastroc recession.  A 3 cm incision was made over the musculotendinous junction proximally.  Full-thickness flaps were elevated and blunt dissection was carried down with care taken to avoid the sural nerve.  The fascia was identified just distal to the musculotendinous junction and I clearly visualized the medial and lateral borders.  The fascia was incised sharply with care taken not to disrupt underlying muscle.  I was then able to bring him into about 5-10° of dorsiflexion beyond neutral with only about 1-1/2 cm of gapping at the gastroc recession.  This wound was then irrigated copiously with dilute Betadine solution and closed using 3-0 Vicryl and staples.      Attention was then turned distally,  again the wound was irrigated with dilute Betadine solution and again sprayed with PRP.  The central tenotomy was repaired with 2-0 fiberwire sutures and the peritenon and subcutaneous tissues were closed with 4-0 Vicryl and skin was closed with 3-0 nylon modified horizontal mattress sutures with nice wound edge approximation without blanching.  Xeroform was placed over both wounds and a Mepilex dressing was placed distally.  A very well-padded short-leg posterior splint in 10° of plantar flexion was then applied.  Patient was awakened and transferred to stretcher and taken to recovery room in stable condition

## 2018-07-03 NOTE — ANESTHESIA PROCEDURE NOTES
Airway  Urgency: elective    Airway not difficult    General Information and Staff    Patient location during procedure: OR  Anesthesiologist: MARIE HATHAWAY  CRNA: CARLOS PALOMINO    Indications and Patient Condition  Indications for airway management: airway protection    Preoxygenated: yes  MILS not maintained throughout  Mask difficulty assessment: 1 - vent by mask    Final Airway Details  Final airway type: endotracheal airway      Successful airway: ETT  Cuffed: yes   Successful intubation technique: direct laryngoscopy  Facilitating devices/methods: intubating stylet  Endotracheal tube insertion site: oral  Blade: Yvonne  Blade size: #3  ETT size: 8.0 mm  Cormack-Lehane Classification: grade I - full view of glottis  Placement verified by: chest auscultation   Cuff volume (mL): 9  Measured from: lips  Number of attempts at approach: 1    Additional Comments  PreO2 100% face mask, IV induction, easy mask, DVL x1, cords noted, tube through, cuff up, EBBSH, +etCO2, = chest movement, tube secured in place, atraumatic, teeth and lips intact as preop.

## 2018-07-03 NOTE — ANESTHESIA PREPROCEDURE EVALUATION
Anesthesia Evaluation     Patient summary reviewed and Nursing notes reviewed   NPO Solid Status: > 8 hours             Airway   Mallampati: II  TM distance: >3 FB  Neck ROM: full  no difficulty expected  Dental - normal exam   (+) edentulous    Pulmonary - normal exam   (+) COPD moderate,   Cardiovascular - normal exam    (+) hypertension,       Neuro/Psych- negative ROS  GI/Hepatic/Renal/Endo - negative ROS     Musculoskeletal (-) negative ROS    Abdominal  - normal exam   Substance History - negative use     OB/GYN negative ob/gyn ROS         Other                        Anesthesia Plan    ASA 3     general   (Fem/ sci popc)  intravenous induction   Anesthetic plan and risks discussed with patient.    Plan discussed with CRNA.

## 2018-07-03 NOTE — ANESTHESIA PROCEDURE NOTES
Peripheral Block    Patient location during procedure: pre-op  Start time: 7/3/2018 11:31 AM  Stop time: 7/3/2018 11:37 AM  Reason for block: at surgeon's request and post-op pain management  Performed by  Anesthesiologist: LIZA JESSICA  CRNA: LIZA JESSICA  Preanesthetic Checklist  Completed: patient identified, site marked, surgical consent, pre-op evaluation, timeout performed, IV checked, risks and benefits discussed and monitors and equipment checked  Prep:  Sterile barriers:gloves  Prep: ChloraPrep  Patient monitoring: blood pressure monitoring, continuous pulse oximetry and EKG  Procedure  Sedation:yes    Guidance:ultrasound guided  ULTRASOUND INTERPRETATION.  Using ultrasound guidance a 22 G gauge needle was placed in close proximity to the femoral nerve, at which point, under ultrasound guidance anesthetic was injected in the area of the nerve and spread of the anesthesia was seen on ultrasound in close proximity thereto.  There were no abnormalities seen on ultrasound; a digital image was taken; and the patient tolerated the procedure with no complications. Images:still images obtained    Block Type:femoral  Injection Technique:single-shot  Needle Type:short-bevel  Needle Gauge:22 G    Medications  Local Injected:ropivacaine 0.5% Local Amount Injected:20mL  Post Assessment  Injection Assessment: negative aspiration for heme, no paresthesia on injection and incremental injection  Patient Tolerance:comfortable throughout block  Complications:no

## 2018-07-03 NOTE — BRIEF OP NOTE
ACHILLES TENDON REPAIR  Progress Note    Prem Thrasher  7/3/2018    Pre-op Diagnosis:   Calcific Achilles tendonitis [M65.28]  Gastrocnemius equinus, left [M21.6X2]  Allegra's deformity of left heel [M92.62]       Post-Op Diagnosis Codes:     * Calcific Achilles tendonitis [M65.28]     * Gastrocnemius equinus, left [M21.6X2]     * Allegra's deformity of left heel [M92.62]    Procedure/CPT® Codes:      Procedure(s):  Left Achilles debridement and secondary repair with partial excision of calcaneus.left Achilles lengthening at the calf    Surgeon(s):  Vinay Smith MD    Anesthesia: General    Staff:   Cell Saver : Kenny Higgins  Circulator: Jeronimo Perez RN; Jeanne Crain RN  Scrub Person: Lucy Stoll; Anjelica Akbar    Estimated Blood Loss: minimal    Urine Voided: * No values recorded between 7/3/2018 12:34 PM and 7/3/2018  2:44 PM *    Specimens:                None      Drains:  none    Findings: see dict      tt 23 min    Complications: none      Vinay Smith MD     Date: 7/3/2018  Time: 2:45 PM

## 2018-07-03 NOTE — ANESTHESIA PROCEDURE NOTES
Peripheral Block    Patient location during procedure: pre-op  Start time: 7/3/2018 11:28 AM  Stop time: 7/3/2018 11:31 AM  Reason for block: at surgeon's request and post-op pain management  Performed by  Anesthesiologist: LIZA JESSICA  Preanesthetic Checklist  Completed: patient identified, site marked, surgical consent, pre-op evaluation, timeout performed, IV checked, risks and benefits discussed and monitors and equipment checked  Prep:  Sterile barriers:gloves  Prep: ChloraPrep  Patient monitoring: blood pressure monitoring, continuous pulse oximetry and EKG  Procedure  Sedation:yes    Guidance:ultrasound guided  ULTRASOUND INTERPRETATION.  Using ultrasound guidance a 22 G gauge needle was placed in close proximity to the sciatic nerve, at which point, under ultrasound guidance anesthetic was injected in the area of the nerve and spread of the anesthesia was seen on ultrasound in close proximity thereto.  There were no abnormalities seen on ultrasound; a digital image was taken; and the patient tolerated the procedure with no complications. Images:still images obtained    Laterality:left  Block Type:sciatic  Injection Technique:single-shot  Needle Type:short-bevel  Needle Gauge:22 G    Medications  Local Injected:ropivacaine 0.5% Local Amount Injected:20mL  Post Assessment  Injection Assessment: negative aspiration for heme, no paresthesia on injection and incremental injection  Patient Tolerance:comfortable throughout block  Complications:no

## 2018-07-07 RX ORDER — CARVEDILOL 12.5 MG/1
12.5 TABLET ORAL 2 TIMES DAILY WITH MEALS
Qty: 180 TABLET | Refills: 0 | Status: ON HOLD | OUTPATIENT
Start: 2018-07-07 | End: 2018-07-09

## 2018-07-09 ENCOUNTER — APPOINTMENT (OUTPATIENT)
Dept: CT IMAGING | Facility: HOSPITAL | Age: 63
End: 2018-07-09

## 2018-07-09 ENCOUNTER — HOSPITAL ENCOUNTER (INPATIENT)
Facility: HOSPITAL | Age: 63
LOS: 3 days | Discharge: HOME | End: 2018-07-12
Attending: EMERGENCY MEDICINE | Admitting: INTERNAL MEDICINE

## 2018-07-09 ENCOUNTER — TELEPHONE (OUTPATIENT)
Dept: ORTHOPEDIC SURGERY | Facility: CLINIC | Age: 63
End: 2018-07-09

## 2018-07-09 DIAGNOSIS — L02.31 ABSCESS AND CELLULITIS OF GLUTEAL REGION: Primary | ICD-10-CM

## 2018-07-09 DIAGNOSIS — L03.317 ABSCESS AND CELLULITIS OF GLUTEAL REGION: Primary | ICD-10-CM

## 2018-07-09 DIAGNOSIS — A41.9 SEPSIS, DUE TO UNSPECIFIED ORGANISM: ICD-10-CM

## 2018-07-09 DIAGNOSIS — K61.1 PERIRECTAL ABSCESS: ICD-10-CM

## 2018-07-09 PROBLEM — Z72.0 TOBACCO ABUSE: Status: ACTIVE | Noted: 2018-07-09

## 2018-07-09 LAB
ALBUMIN SERPL-MCNC: 3.7 G/DL (ref 3.5–5.2)
ALBUMIN/GLOB SERPL: 1.2 G/DL
ALP SERPL-CCNC: 69 U/L (ref 39–117)
ALT SERPL W P-5'-P-CCNC: 19 U/L (ref 1–41)
ANION GAP SERPL CALCULATED.3IONS-SCNC: 11.7 MMOL/L
AST SERPL-CCNC: 20 U/L (ref 1–40)
BACTERIA UR QL AUTO: NORMAL /HPF
BASOPHILS # BLD AUTO: 0.03 10*3/MM3 (ref 0–0.2)
BASOPHILS NFR BLD AUTO: 0.2 % (ref 0–1.5)
BILIRUB SERPL-MCNC: 0.9 MG/DL (ref 0.1–1.2)
BILIRUB UR QL STRIP: NEGATIVE
BUN BLD-MCNC: 11 MG/DL (ref 8–23)
BUN/CREAT SERPL: 10.5 (ref 7–25)
CALCIUM SPEC-SCNC: 8.4 MG/DL (ref 8.6–10.5)
CHLORIDE SERPL-SCNC: 91 MMOL/L (ref 98–107)
CLARITY UR: CLEAR
CO2 SERPL-SCNC: 25.3 MMOL/L (ref 22–29)
COLOR UR: YELLOW
CREAT BLD-MCNC: 1.05 MG/DL (ref 0.76–1.27)
D-LACTATE SERPL-SCNC: 1.2 MMOL/L (ref 0.5–2)
DEPRECATED RDW RBC AUTO: 41.4 FL (ref 37–54)
EOSINOPHIL # BLD AUTO: 0.15 10*3/MM3 (ref 0–0.7)
EOSINOPHIL NFR BLD AUTO: 0.8 % (ref 0.3–6.2)
ERYTHROCYTE [DISTWIDTH] IN BLOOD BY AUTOMATED COUNT: 13 % (ref 11.5–14.5)
GFR SERPL CREATININE-BSD FRML MDRD: 71 ML/MIN/1.73
GLOBULIN UR ELPH-MCNC: 3 GM/DL
GLUCOSE BLD-MCNC: 112 MG/DL (ref 65–99)
GLUCOSE UR STRIP-MCNC: NEGATIVE MG/DL
HCT VFR BLD AUTO: 31.2 % (ref 40.4–52.2)
HGB BLD-MCNC: 10.3 G/DL (ref 13.7–17.6)
HGB UR QL STRIP.AUTO: NEGATIVE
HYALINE CASTS UR QL AUTO: NORMAL /LPF
IMM GRANULOCYTES # BLD: 0.09 10*3/MM3 (ref 0–0.03)
IMM GRANULOCYTES NFR BLD: 0.5 % (ref 0–0.5)
KETONES UR QL STRIP: NEGATIVE
LEUKOCYTE ESTERASE UR QL STRIP.AUTO: NEGATIVE
LYMPHOCYTES # BLD AUTO: 1.23 10*3/MM3 (ref 0.9–4.8)
LYMPHOCYTES NFR BLD AUTO: 6.4 % (ref 19.6–45.3)
MCH RBC QN AUTO: 29.3 PG (ref 27–32.7)
MCHC RBC AUTO-ENTMCNC: 33 G/DL (ref 32.6–36.4)
MCV RBC AUTO: 88.6 FL (ref 79.8–96.2)
MONOCYTES # BLD AUTO: 2.39 10*3/MM3 (ref 0.2–1.2)
MONOCYTES NFR BLD AUTO: 12.4 % (ref 5–12)
NEUTROPHILS # BLD AUTO: 15.54 10*3/MM3 (ref 1.9–8.1)
NEUTROPHILS NFR BLD AUTO: 80.2 % (ref 42.7–76)
NITRITE UR QL STRIP: NEGATIVE
PH UR STRIP.AUTO: 8 [PH] (ref 5–8)
PLATELET # BLD AUTO: 273 10*3/MM3 (ref 140–500)
PMV BLD AUTO: 9.9 FL (ref 6–12)
POTASSIUM BLD-SCNC: 3.5 MMOL/L (ref 3.5–5.2)
PROT SERPL-MCNC: 6.7 G/DL (ref 6–8.5)
PROT UR QL STRIP: ABNORMAL
RBC # BLD AUTO: 3.52 10*6/MM3 (ref 4.6–6)
RBC # UR: NORMAL /HPF
REF LAB TEST METHOD: NORMAL
SODIUM BLD-SCNC: 128 MMOL/L (ref 136–145)
SP GR UR STRIP: 1.01 (ref 1–1.03)
SQUAMOUS #/AREA URNS HPF: NORMAL /HPF
UROBILINOGEN UR QL STRIP: ABNORMAL
WBC NRBC COR # BLD: 19.34 10*3/MM3 (ref 4.5–10.7)
WBC UR QL AUTO: NORMAL /HPF

## 2018-07-09 PROCEDURE — 81001 URINALYSIS AUTO W/SCOPE: CPT | Performed by: EMERGENCY MEDICINE

## 2018-07-09 PROCEDURE — 74177 CT ABD & PELVIS W/CONTRAST: CPT

## 2018-07-09 PROCEDURE — 85025 COMPLETE CBC W/AUTO DIFF WBC: CPT | Performed by: EMERGENCY MEDICINE

## 2018-07-09 PROCEDURE — 83605 ASSAY OF LACTIC ACID: CPT | Performed by: EMERGENCY MEDICINE

## 2018-07-09 PROCEDURE — 99284 EMERGENCY DEPT VISIT MOD MDM: CPT

## 2018-07-09 PROCEDURE — 25010000002 IOPAMIDOL 61 % SOLUTION: Performed by: EMERGENCY MEDICINE

## 2018-07-09 PROCEDURE — 87040 BLOOD CULTURE FOR BACTERIA: CPT | Performed by: EMERGENCY MEDICINE

## 2018-07-09 PROCEDURE — 99221 1ST HOSP IP/OBS SF/LOW 40: CPT | Performed by: SURGERY

## 2018-07-09 PROCEDURE — 25010000002 HYDROMORPHONE PER 4 MG: Performed by: EMERGENCY MEDICINE

## 2018-07-09 PROCEDURE — 25010000002 PIPERACILLIN SOD-TAZOBACTAM PER 1 G: Performed by: EMERGENCY MEDICINE

## 2018-07-09 PROCEDURE — 25010000002 ONDANSETRON PER 1 MG: Performed by: EMERGENCY MEDICINE

## 2018-07-09 PROCEDURE — 80053 COMPREHEN METABOLIC PANEL: CPT | Performed by: EMERGENCY MEDICINE

## 2018-07-09 RX ORDER — OXYCODONE HYDROCHLORIDE AND ACETAMINOPHEN 5; 325 MG/1; MG/1
1 TABLET ORAL EVERY 4 HOURS PRN
Status: DISCONTINUED | OUTPATIENT
Start: 2018-07-09 | End: 2018-07-12 | Stop reason: HOSPADM

## 2018-07-09 RX ORDER — ONDANSETRON 2 MG/ML
4 INJECTION INTRAMUSCULAR; INTRAVENOUS EVERY 6 HOURS PRN
Status: DISCONTINUED | OUTPATIENT
Start: 2018-07-09 | End: 2018-07-12 | Stop reason: HOSPADM

## 2018-07-09 RX ORDER — CARVEDILOL 12.5 MG/1
12.5 TABLET ORAL 2 TIMES DAILY WITH MEALS
Status: DISCONTINUED | OUTPATIENT
Start: 2018-07-09 | End: 2018-07-12 | Stop reason: HOSPADM

## 2018-07-09 RX ORDER — ALBUTEROL SULFATE 2.5 MG/3ML
2.5 SOLUTION RESPIRATORY (INHALATION) EVERY 6 HOURS PRN
Status: DISCONTINUED | OUTPATIENT
Start: 2018-07-09 | End: 2018-07-12 | Stop reason: HOSPADM

## 2018-07-09 RX ORDER — L.ACID,PARA/B.BIFIDUM/S.THERM 8B CELL
1 CAPSULE ORAL EVERY MORNING
Status: DISCONTINUED | OUTPATIENT
Start: 2018-07-10 | End: 2018-07-12 | Stop reason: HOSPADM

## 2018-07-09 RX ORDER — MORPHINE SULFATE 2 MG/ML
1 INJECTION, SOLUTION INTRAMUSCULAR; INTRAVENOUS EVERY 4 HOURS PRN
Status: DISCONTINUED | OUTPATIENT
Start: 2018-07-09 | End: 2018-07-12 | Stop reason: HOSPADM

## 2018-07-09 RX ORDER — ESCITALOPRAM OXALATE 20 MG/1
20 TABLET ORAL NIGHTLY
Status: DISCONTINUED | OUTPATIENT
Start: 2018-07-09 | End: 2018-07-12 | Stop reason: HOSPADM

## 2018-07-09 RX ORDER — HYDROMORPHONE HYDROCHLORIDE 1 MG/ML
0.5 INJECTION, SOLUTION INTRAMUSCULAR; INTRAVENOUS; SUBCUTANEOUS ONCE
Status: COMPLETED | OUTPATIENT
Start: 2018-07-09 | End: 2018-07-09

## 2018-07-09 RX ORDER — SENNOSIDES 8.6 MG
1 TABLET ORAL EVERY MORNING
Status: DISCONTINUED | OUTPATIENT
Start: 2018-07-10 | End: 2018-07-12 | Stop reason: HOSPADM

## 2018-07-09 RX ORDER — AMLODIPINE BESYLATE 10 MG/1
10 TABLET ORAL DAILY
Status: DISCONTINUED | OUTPATIENT
Start: 2018-07-10 | End: 2018-07-12 | Stop reason: HOSPADM

## 2018-07-09 RX ORDER — SODIUM CHLORIDE 0.9 % (FLUSH) 0.9 %
1-10 SYRINGE (ML) INJECTION AS NEEDED
Status: DISCONTINUED | OUTPATIENT
Start: 2018-07-09 | End: 2018-07-12 | Stop reason: HOSPADM

## 2018-07-09 RX ORDER — ACETAMINOPHEN 500 MG
1000 TABLET ORAL ONCE
Status: COMPLETED | OUTPATIENT
Start: 2018-07-09 | End: 2018-07-09

## 2018-07-09 RX ORDER — ASPIRIN 325 MG
325 TABLET, DELAYED RELEASE (ENTERIC COATED) ORAL DAILY
Status: DISCONTINUED | OUTPATIENT
Start: 2018-07-10 | End: 2018-07-12 | Stop reason: HOSPADM

## 2018-07-09 RX ORDER — LOSARTAN POTASSIUM 100 MG/1
100 TABLET ORAL
Status: DISCONTINUED | OUTPATIENT
Start: 2018-07-10 | End: 2018-07-12 | Stop reason: HOSPADM

## 2018-07-09 RX ORDER — ONDANSETRON 2 MG/ML
4 INJECTION INTRAMUSCULAR; INTRAVENOUS ONCE
Status: COMPLETED | OUTPATIENT
Start: 2018-07-09 | End: 2018-07-09

## 2018-07-09 RX ORDER — NALOXONE HCL 0.4 MG/ML
0.4 VIAL (ML) INJECTION
Status: DISCONTINUED | OUTPATIENT
Start: 2018-07-09 | End: 2018-07-12 | Stop reason: HOSPADM

## 2018-07-09 RX ORDER — ACETAMINOPHEN 500 MG
500 TABLET ORAL EVERY 4 HOURS PRN
Status: DISCONTINUED | OUTPATIENT
Start: 2018-07-09 | End: 2018-07-12 | Stop reason: HOSPADM

## 2018-07-09 RX ADMIN — HYDROMORPHONE HYDROCHLORIDE 0.5 MG: 1 INJECTION, SOLUTION INTRAMUSCULAR; INTRAVENOUS; SUBCUTANEOUS at 16:50

## 2018-07-09 RX ADMIN — ONDANSETRON 4 MG: 2 INJECTION, SOLUTION INTRAMUSCULAR; INTRAVENOUS at 16:50

## 2018-07-09 RX ADMIN — ACETAMINOPHEN 1000 MG: 500 TABLET, FILM COATED ORAL at 16:49

## 2018-07-09 RX ADMIN — IOPAMIDOL 85 ML: 612 INJECTION, SOLUTION INTRAVENOUS at 17:40

## 2018-07-09 RX ADMIN — SODIUM CHLORIDE 1000 ML: 9 INJECTION, SOLUTION INTRAVENOUS at 16:42

## 2018-07-09 RX ADMIN — TAZOBACTAM SODIUM AND PIPERACILLIN SODIUM 4.5 G: 500; 4 INJECTION, SOLUTION INTRAVENOUS at 18:16

## 2018-07-09 NOTE — ED PROVIDER NOTES
EMERGENCY DEPARTMENT ENCOUNTER    CHIEF COMPLAINT  Chief Complaint: rectal pain  History given by: patient  History limited by: nothing  Room Number: 616/1  PMD: Montse Cain PA-C      HPI:  Pt is a 63 y.o. male who presents complaining of right sided rectal pain that began 2 nights ago. Pt states that his pain is worse while having a BM and states that his last BM was earlier today. Pt denies constipation, cough, sore throat, dysuria but complains of an intermittent fever for the last 2-3 days and dark urine. Pt denies having similar symptoms previously.    Duration:  2-3 days  Onset: gradual  Timing: constant  Location: right rectal pain  Radiation: none  Quality: unspecified  Intensity/Severity: moderate to severe  Progression: worsening  Associated Symptoms: fever, dark urine, dry heaving  Aggravating Factors: defecating  Alleviating Factors: none  Previous Episodes: Pt denies having similar symptoms previously  Treatment before arrival: none    PAST MEDICAL HISTORY  Active Ambulatory Problems     Diagnosis Date Noted   • Allergy    • COPD (chronic obstructive pulmonary disease) (CMS/[facility])    • Hyperlipidemia    • IFG (impaired fasting glucose)    • Rheumatoid arthritis (CMS/[facility])    • Hypertension    • Anxiety disorder 06/27/2016   • Depression 06/27/2016   • Lumbar radiculopathy 06/27/2016   • Spondylolisthesis of lumbar region 07/31/2017   • Sepsis (CMS/[facility]) 09/04/2017   • Infection of lumbar spine (CMS/[facility]) 09/06/2017   • Sebaceous cyst 04/23/2018   • 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/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   • Calcific Achilles tendonitis s/p OR 7/18 06/08/2018   • Gastrocnemius equinus, left 06/27/2018     Resolved Ambulatory Problems     Diagnosis Date Noted   • Surgery follow-up examination 10/24/2016   • Postoperative nausea and vomiting 08/01/2017   • Postoperative ileus (CMS/[facility]) 08/01/2017     Past Medical History:   Diagnosis Date   • Achilles tendon pain    • Anxiety disorder    • COPD (chronic obstructive  pulmonary disease) (CMS/H[facility])    • CTS (carpal tunnel syndrome)    • Encounter for eye exam 10/2014   • History of bone density study 03/2014   • History of chest x-ray 02/15/2011   • History of nuclear stress test 03/09/2015   • History of pulmonary function tests 03/2015   • Hyperlipidemia    • Hypertension    • IFG (impaired fasting glucose)    • Low back pain    • Lumbosacral disc disease    • Nerve damage    • Osteoarthritis, multiple sites    • Postoperative nausea and vomiting 8/1/2017   • Rheumatoid arthritis (CMS/HCC)    • Rheumatoid arthritis (CMS/HCC)    • Sciatica    • Staph infection        PAST SURGICAL HISTORY  Past Surgical History:   Procedure Laterality Date   • ACHILLES TENDON SURGERY Left 7/3/2018    Procedure: Left Achilles debridement and secondary repair with partial excision of calcaneus. Possible left Achilles lengthening at the calf;  Surgeon: Vinay Smith MD;  Location: [facility];  Service: Orthopedics   • BACK SURGERY     • COLONOSCOPY  02/02/2011    unremarkable only for scattered diverticulosis; had hx of prior polyp   • CYST REMOVAL  03/2016    x2  FROM FACE AND EAR   • DENTAL PROCEDURE  2008    teeth removed; dental implants   • EPIDURAL BLOCK  1995    L/S spine   • HAND SURGERY Right 2003    avulsion lacerations 4th R finger debrided   • LUMBAR DISCECTOMY FUSION INSTRUMENTATION Left 10/10/2016    Procedure: LEFT L2-L3, L3-L4 LUMBAR LAMINECTOMY/ DISCECTOMY WITH METRIX ;  Surgeon: Sawyer Rick MD;  Location: [facility];  Service:    • LUMBAR DISCECTOMY FUSION INSTRUMENTATION N/A 7/31/2017    Procedure: LUMBAR FUSION DECOMPRESSON WITH PEDICLE SCREWS with LAURA L2-3,L3-4;  Surgeon: Jacky Perez MD;  Location: [facility] OR;  Service:    • LUMBAR LAMINECTOMY DISCECTOMY DECOMPRESSION N/A 7/31/2017    Procedure: L2 to L4 laminectomy with neural lysis and a fusion by orthopedics;  Surgeon: Sawyer Rick MD;  Location: [facility];  Service:    • LUMBAR  LAMINECTOMY DISCECTOMY DECOMPRESSION N/A 9/5/2017    Procedure: I&D LUMBAR SPINE ;  Surgeon: Jacky Perez MD;  Location: [facility] MAIN OR;  Service:    • SHOULDER SURGERY Right 02/23/2011    Dr. Navarro   • SINUS SURGERY  2003    Dr. Barrera       FAMILY HISTORY  Family History   Problem Relation Age of Onset   • Diabetes Mother    • Heart disease Mother    • Hyperlipidemia Mother    • Stroke Mother    • Heart disease Father    • Rheum arthritis Father    • Alcohol abuse Father    • Hyperlipidemia Father    • Depression Brother    • Cancer Brother         prostate   • Depression Brother    • Heart disease Brother    • Hyperlipidemia Brother    • Cancer Brother    • Thyroid disease Sister    • Malig Hyperthermia Neg Hx        SOCIAL HISTORY  Social History     Social History   • Marital status:      Spouse name: N/A   • Number of children: N/A   • Years of education: N/A     Occupational History   • Not on file.     Social History Main Topics   • Smoking status: Current Some Day Smoker     Packs/day: 0.25     Years: 45.00     Types: Cigarettes   • Smokeless tobacco: Never Used      Comment: last cigarette 7/30/17   • Alcohol use No   • Drug use: No   • Sexual activity: Defer     Other Topics Concern   • Not on file     Social History Narrative   • No narrative on file       ALLERGIES  Atorvastatin; Ceclor [cefaclor]; and Methotrexate derivatives    REVIEW OF SYSTEMS  Review of Systems   Constitutional: Positive for fever (JVgi=286). Negative for activity change and appetite change.   HENT: Negative for congestion and sore throat.    Eyes: Negative.    Respiratory: Negative for cough and shortness of breath.    Cardiovascular: Negative for chest pain and leg swelling.   Gastrointestinal: Positive for rectal pain and vomiting (dry heaving). Negative for abdominal pain and diarrhea.   Endocrine: Negative.    Genitourinary: Negative for decreased urine volume and dysuria.        Dark urine   Musculoskeletal:  Negative for neck pain.   Skin: Negative for rash and wound.   Allergic/Immunologic: Negative.    Neurological: Negative for weakness, numbness and headaches.   Hematological: Negative.    Psychiatric/Behavioral: Negative.    All other systems reviewed and are negative.      PHYSICAL EXAM  ED Triage Vitals   Temp Heart Rate Resp BP SpO2   07/09/18 1502 07/09/18 1502 07/09/18 1502 07/09/18 1520 07/09/18 1502   (!) 101.9 °F (38.8 °C) 104 18 123/81 96 %      Temp src Heart Rate Source Patient Position BP Location FiO2 (%)   07/09/18 1502 07/09/18 1502 -- -- --   Tympanic Monitor          Physical Exam   Constitutional: He is oriented to person, place, and time.  Non-toxic appearance. No distress.   HENT:   Head: Normocephalic and atraumatic.   Mouth/Throat: Oropharynx is clear and moist.   Eyes: EOM are normal. Pupils are equal, round, and reactive to light.   Neck: Normal range of motion. Neck supple.   Cardiovascular: Normal rate, regular rhythm, normal heart sounds and intact distal pulses.    Pt has brisk capillary refill distally   Pulmonary/Chest: Effort normal and breath sounds normal. No respiratory distress.   Abdominal: Soft. There is no tenderness. There is no rebound and no guarding.   Genitourinary: Rectal exam shows tenderness (mild, right perianal area). Rectal exam shows no external hemorrhoid.   Genitourinary Comments: There is no fluctuance or erythema. There is mild tenderness and induration on right lateral rectal wall on digital exam.   Musculoskeletal: He exhibits no edema.   Posterior short leg splint on LLE   Neurological: He is alert and oriented to person, place, and time. He has normal sensation and normal strength.   Skin: Skin is warm and dry.   Psychiatric: Mood and affect normal.   Nursing note and vitals reviewed.      LAB RESULTS  Lab Results (last 24 hours)     Procedure Component Value Units Date/Time    CBC & Differential [[id number]] Collected:  07/09/18 1644    Specimen:  Blood  Updated:  07/09/18 1700    Narrative:       The following orders were created for panel order CBC & Differential.  Procedure                               Abnormality         Status                     ---------                               -----------         ------                     CBC Auto Differential[[id number]]        Abnormal            Final result                 Please view results for these tests on the individual orders.    Comprehensive Metabolic Panel [[id number]]  (Abnormal) Collected:  07/09/18 1644    Specimen:  Blood Updated:  07/09/18 1805     Glucose 112 (H) mg/dL      BUN 11 mg/dL      Creatinine 1.05 mg/dL      Sodium 128 (L) mmol/L      Potassium 3.5 mmol/L      Chloride 91 (L) mmol/L      CO2 25.3 mmol/L      Calcium 8.4 (L) mg/dL      Total Protein 6.7 g/dL      Albumin 3.70 g/dL      ALT (SGPT) 19 U/L      AST (SGOT) 20 U/L      Alkaline Phosphatase 69 U/L      Total Bilirubin 0.9 mg/dL      eGFR Non African Amer 71 mL/min/1.73      Globulin 3.0 gm/dL      A/G Ratio 1.2 g/dL      BUN/Creatinine Ratio 10.5     Anion Gap 11.7 mmol/L     Blood Culture - Blood, [[id number]] Collected:  07/09/18 1644    Specimen:  Blood from Arm, Left Updated:  07/09/18 1654    Lactic Acid, Plasma [[id number]]  (Normal) Collected:  07/09/18 1644    Specimen:  Blood Updated:  07/09/18 1805     Lactate 1.2 mmol/L     CBC Auto Differential [[id number]]  (Abnormal) Collected:  07/09/18 1644    Specimen:  Blood Updated:  07/09/18 1805     WBC 19.34 (H) 10*3/mm3      RBC 3.52 (L) 10*6/mm3      Hemoglobin 10.3 (L) g/dL      Hematocrit 31.2 (L) %      MCV 88.6 fL      MCH 29.3 pg      MCHC 33.0 g/dL      RDW 13.0 %      RDW-SD 41.4 fl      MPV 9.9 fL      Platelets 273 10*3/mm3      Neutrophil % 80.2 (H) %      Lymphocyte % 6.4 (L) %      Monocyte % 12.4 (H) %      Eosinophil % 0.8 %      Basophil % 0.2 %      Immature Grans % 0.5 %      Neutrophils, Absolute 15.54 (H) 10*3/mm3      Lymphocytes, Absolute 1.23 10*3/mm3       Monocytes, Absolute 2.39 (H) 10*3/mm3      Eosinophils, Absolute 0.15 10*3/mm3      Basophils, Absolute 0.03 10*3/mm3      Immature Grans, Absolute 0.09 (H) 10*3/mm3     Urinalysis With Microscopic If Indicated (No Culture) - Urine, Clean Catch [[id number]]  (Abnormal) Collected:  07/09/18 1757    Specimen:  Urine from Urine, Catheter Updated:  07/09/18 1819     Color, UA Yellow     Appearance, UA Clear     pH, UA 8.0     Specific Gravity, UA 1.013     Glucose, UA Negative     Ketones, UA Negative     Bilirubin, UA Negative     Blood, UA Negative     Protein, UA 30 mg/dL (1+) (A)     Leuk Esterase, UA Negative     Nitrite, UA Negative     Urobilinogen, UA 0.2 E.U./dL    Urinalysis, Microscopic Only - Urine, Clean Catch [[id number]] Collected:  07/09/18 1757    Specimen:  Urine from Urine, Catheter Updated:  07/09/18 1819     RBC, UA 0-2 /HPF      WBC, UA 0-2 /HPF      Bacteria, UA None Seen /HPF      Squamous Epithelial Cells, UA 0-2 /HPF      Hyaline Casts, UA None Seen /LPF      Methodology Automated Microscopy    Blood Culture - Blood, [[id number]] Collected:  07/09/18 1815    Specimen:  Blood from Arm, Right Updated:  07/09/18 1820          I ordered the above labs and reviewed the results    RADIOLOGY  CT Abdomen Pelvis With Contrast   Final Result   Mild localized injection of the cutaneous fat of the right   gluteal crease consistent with localized cellulitis. A tiny 9 mm   diameter abscess is also noted. There is mild colonic diverticulosis   without evidence of diverticulitis.       This report was finalized on 7/9/2018 6:03 PM by Dr. Rory Eason M.D.             CT abd/pelvis shows mild injection of fat to the right of gluteal crease with maybe a 9mm fluid collection.    I ordered the above noted radiological studies. Interpreted by radiologist. Discussed with radiologist (Dr. Eason). Reviewed by me in PACS.       PROCEDURES  Critical Care  Performed by: CARMEL RODRIGUEZ  Authorized by: JENNIFER  CARMEL MORTON     Critical care provider statement:     Critical care time (minutes):  30    Critical care time was exclusive of:  Separately billable procedures and treating other patients    Critical care was necessary to treat or prevent imminent or life-threatening deterioration of the following conditions:  Sepsis    Critical care was time spent personally by me on the following activities:  Development of treatment plan with patient or surrogate, discussions with consultants, evaluation of patient's response to treatment, examination of patient, obtaining history from patient or surrogate, ordering and performing treatments and interventions, ordering and review of laboratory studies, ordering and review of radiographic studies, pulse oximetry, re-evaluation of patient's condition and review of old charts            PROGRESS AND CONSULTS       1621- Ordered blood work, lactic acid, blood cultures, UA and CT abd/pelvis for further evaluation. Ordered Dilaudid and Zofran for pain, tylenol for fever and IVF for hydration.    1806- Placed call to general surgery on call for consult. Ordered zosyn for developing perianal abscess.    1815- Rechecked pt. Pt is resting comfortably. Notified pt and family of the pt's lab and imaging results, including the pt's abscess seen on CT and elevated WBC count. Discussed the plan to consult general surgery prior to admitting the pt for IV abx and further evaluation. Pt understands and agrees with the plan, all questions answered.    1820- Discussed the pt's case with Dr. Arcos (general surgery), who agrees to consult.    1823- Placed call to [facility] for admission.    1844- Discussed the pt's case with Dr. Smyth ([facility]), who agrees to admit the pt to a telemetry bed.    MEDICAL DECISION MAKING  Results were reviewed/discussed with the patient and they were also made aware of online access. Pt also made aware that some labs, such as cultures, will not be resulted during ER visit and  follow up with PMD is necessary.     MDM  Number of Diagnoses or Management Options  Abscess and cellulitis of gluteal region:   Sepsis, due to unspecified organism (CMS/HCC):   Diagnosis management comments: Patient was found to be septic due to perianal abscess/cellulitis.  He was treated with IV Zosyn, IV Dilaudid, IV Zofran, and IV fluids.  Case was discussed with Dr. Arcos of general surgery.  Case was also discussed with Dr. Smyth and he agreed to admit the patient.  Patient's blood pressure was stable while in the ER.  Critical care was performed on this patient.       Amount and/or Complexity of Data Reviewed  Clinical lab tests: ordered and reviewed (WBC=19.34)  Tests in the radiology section of CPT®: ordered and reviewed (CT abd/pelvis shows mild injection of fat to the right of gluteal crease with maybe a 9mm fluid collection.)  Discussion of test results with the performing providers: yes (D/w Dr. Eason)  Decide to obtain previous medical records or to obtain history from someone other than the patient: yes  Obtain history from someone other than the patient: yes (family)  Review and summarize past medical records: yes (Pt had a left achilles debridement and repair on 7/3/18 by Dr. Smith (ortho). )  Discuss the patient with other providers: yes (D/w Dr. Arcos (general surgery) and Dr. Smyth ([facility]))  Independent visualization of images, tracings, or specimens: yes    Critical Care  Total time providing critical care: 30-74 minutes         DIAGNOSIS  Final diagnoses:   Abscess and cellulitis of gluteal region   Sepsis, due to unspecified organism (CMS/HCC)       DISPOSITION  ADMISSION    Discussed treatment plan and reason for admission with pt/family and admitting physician.  Pt/family voiced understanding of the plan for admission for further testing/treatment as needed.     Latest Documented Vital Signs:  As of 11:44 PM  BP- 102/62 HR- 71 Temp- 99 °F (37.2 °C) (Oral) O2 sat-  95%    --  Documentation assistance provided by rubnia Leon for Dr. Irizarry.  Information recorded by the rubina was done at my direction and has been verified and validated by me.     Betty Leon  07/09/18 [id number]       Merlin Irizarry MD  07/09/18 1949

## 2018-07-09 NOTE — ED NOTES
"Pt complains of rectal pain for \"a couple days\". Pt reports having a painful BM X2 this am. Complains of nausea and fever. Family at bedside.     Lucretia Chowdhury RN  07/09/18 1568    "

## 2018-07-09 NOTE — TELEPHONE ENCOUNTER
I spoke with patient he said for at least a day or so he's had intermittent fevers 202 he reports no pain at all to the ankle but says it is grossly sensate to light touch.  Said the main problem is having a severe pain around his anus refill if there is a bulging cyst that is very painful and especially painful when he is having bowel movements.  By the time he called me today was already 2:00 and I felt it was best for him to proceed to the emergency room for evaluation of this areas and may be a perirectal abscess rather than have him come to the office to look at his ankle at this point.  He understands these instructions

## 2018-07-10 ENCOUNTER — ANESTHESIA EVENT (OUTPATIENT)
Dept: PERIOP | Facility: HOSPITAL | Age: 63
End: 2018-07-10

## 2018-07-10 ENCOUNTER — ANESTHESIA (OUTPATIENT)
Dept: PERIOP | Facility: HOSPITAL | Age: 63
End: 2018-07-10

## 2018-07-10 PROBLEM — E87.20 METABOLIC ACIDOSIS: Status: ACTIVE | Noted: 2018-07-10

## 2018-07-10 PROBLEM — D64.9 ANEMIA: Status: ACTIVE | Noted: 2018-07-10

## 2018-07-10 LAB
ANION GAP SERPL CALCULATED.3IONS-SCNC: 15.2 MMOL/L
BUN BLD-MCNC: 15 MG/DL (ref 8–23)
BUN/CREAT SERPL: 13.9 (ref 7–25)
CALCIUM SPEC-SCNC: 7.9 MG/DL (ref 8.6–10.5)
CHLORIDE SERPL-SCNC: 95 MMOL/L (ref 98–107)
CO2 SERPL-SCNC: 20.8 MMOL/L (ref 22–29)
CREAT BLD-MCNC: 1.08 MG/DL (ref 0.76–1.27)
DEPRECATED RDW RBC AUTO: 42.7 FL (ref 37–54)
ERYTHROCYTE [DISTWIDTH] IN BLOOD BY AUTOMATED COUNT: 12.9 % (ref 11.5–14.5)
GFR SERPL CREATININE-BSD FRML MDRD: 69 ML/MIN/1.73
GLUCOSE BLD-MCNC: 106 MG/DL (ref 65–99)
HCT VFR BLD AUTO: 30.5 % (ref 40.4–52.2)
HGB BLD-MCNC: 9.8 G/DL (ref 13.7–17.6)
MCH RBC QN AUTO: 29.2 PG (ref 27–32.7)
MCHC RBC AUTO-ENTMCNC: 32.1 G/DL (ref 32.6–36.4)
MCV RBC AUTO: 90.8 FL (ref 79.8–96.2)
PLATELET # BLD AUTO: 277 10*3/MM3 (ref 140–500)
PMV BLD AUTO: 10.2 FL (ref 6–12)
POTASSIUM BLD-SCNC: 3.5 MMOL/L (ref 3.5–5.2)
RBC # BLD AUTO: 3.36 10*6/MM3 (ref 4.6–6)
SODIUM BLD-SCNC: 131 MMOL/L (ref 136–145)
WBC NRBC COR # BLD: 17.8 10*3/MM3 (ref 4.5–10.7)

## 2018-07-10 PROCEDURE — 25010000002 VANCOMYCIN: Performed by: HOSPITALIST

## 2018-07-10 PROCEDURE — 25010000002 VANCOMYCIN: Performed by: SURGERY

## 2018-07-10 PROCEDURE — 25010000002 MORPHINE PER 10 MG: Performed by: HOSPITALIST

## 2018-07-10 PROCEDURE — 85027 COMPLETE CBC AUTOMATED: CPT | Performed by: HOSPITALIST

## 2018-07-10 PROCEDURE — 87205 SMEAR GRAM STAIN: CPT | Performed by: SURGERY

## 2018-07-10 PROCEDURE — 87186 SC STD MICRODIL/AGAR DIL: CPT | Performed by: SURGERY

## 2018-07-10 PROCEDURE — 25010000002 ONDANSETRON PER 1 MG: Performed by: HOSPITALIST

## 2018-07-10 PROCEDURE — 25010000002 PROPOFOL 10 MG/ML EMULSION: Performed by: NURSE ANESTHETIST, CERTIFIED REGISTERED

## 2018-07-10 PROCEDURE — 87070 CULTURE OTHR SPECIMN AEROBIC: CPT | Performed by: SURGERY

## 2018-07-10 PROCEDURE — 46040 I&D ISCHIORCT&/PERIRCT ABSC: CPT | Performed by: SURGERY

## 2018-07-10 PROCEDURE — 87075 CULTR BACTERIA EXCEPT BLOOD: CPT | Performed by: SURGERY

## 2018-07-10 PROCEDURE — 94799 UNLISTED PULMONARY SVC/PX: CPT

## 2018-07-10 PROCEDURE — 25010000002 PIPERACILLIN SOD-TAZOBACTAM PER 1 G: Performed by: HOSPITALIST

## 2018-07-10 PROCEDURE — 80048 BASIC METABOLIC PNL TOTAL CA: CPT | Performed by: HOSPITALIST

## 2018-07-10 PROCEDURE — 87185 SC STD ENZYME DETCJ PER NZM: CPT | Performed by: SURGERY

## 2018-07-10 PROCEDURE — 87015 SPECIMEN INFECT AGNT CONCNTJ: CPT | Performed by: SURGERY

## 2018-07-10 PROCEDURE — 25010000002 MIDAZOLAM PER 1 MG: Performed by: ANESTHESIOLOGY

## 2018-07-10 PROCEDURE — 0D9P0ZZ DRAINAGE OF RECTUM, OPEN APPROACH: ICD-10-PCS | Performed by: SURGERY

## 2018-07-10 PROCEDURE — 25010000002 FENTANYL CITRATE (PF) 100 MCG/2ML SOLUTION: Performed by: NURSE ANESTHETIST, CERTIFIED REGISTERED

## 2018-07-10 PROCEDURE — 87076 CULTURE ANAEROBE IDENT EACH: CPT | Performed by: SURGERY

## 2018-07-10 PROCEDURE — 0J993ZX DRAINAGE OF BUTTOCK SUBCUTANEOUS TISSUE AND FASCIA, PERCUTANEOUS APPROACH, DIAGNOSTIC: ICD-10-PCS | Performed by: SURGERY

## 2018-07-10 PROCEDURE — 25010000002 VANCOMYCIN 10 G RECONSTITUTED SOLUTION: Performed by: HOSPITALIST

## 2018-07-10 RX ORDER — OXYCODONE AND ACETAMINOPHEN 7.5; 325 MG/1; MG/1
1 TABLET ORAL ONCE AS NEEDED
Status: DISCONTINUED | OUTPATIENT
Start: 2018-07-10 | End: 2018-07-10 | Stop reason: HOSPADM

## 2018-07-10 RX ORDER — PROPOFOL 10 MG/ML
VIAL (ML) INTRAVENOUS AS NEEDED
Status: DISCONTINUED | OUTPATIENT
Start: 2018-07-10 | End: 2018-07-10 | Stop reason: SURG

## 2018-07-10 RX ORDER — MIDAZOLAM HYDROCHLORIDE 1 MG/ML
1 INJECTION INTRAMUSCULAR; INTRAVENOUS
Status: DISCONTINUED | OUTPATIENT
Start: 2018-07-10 | End: 2018-07-10 | Stop reason: HOSPADM

## 2018-07-10 RX ORDER — PROMETHAZINE HYDROCHLORIDE 25 MG/1
25 SUPPOSITORY RECTAL ONCE AS NEEDED
Status: DISCONTINUED | OUTPATIENT
Start: 2018-07-10 | End: 2018-07-10 | Stop reason: HOSPADM

## 2018-07-10 RX ORDER — ONDANSETRON 2 MG/ML
4 INJECTION INTRAMUSCULAR; INTRAVENOUS ONCE AS NEEDED
Status: DISCONTINUED | OUTPATIENT
Start: 2018-07-10 | End: 2018-07-10 | Stop reason: HOSPADM

## 2018-07-10 RX ORDER — MEPERIDINE HYDROCHLORIDE 25 MG/ML
12.5 INJECTION INTRAMUSCULAR; INTRAVENOUS; SUBCUTANEOUS
Status: DISCONTINUED | OUTPATIENT
Start: 2018-07-10 | End: 2018-07-10 | Stop reason: HOSPADM

## 2018-07-10 RX ORDER — SODIUM CHLORIDE, SODIUM LACTATE, POTASSIUM CHLORIDE, CALCIUM CHLORIDE 600; 310; 30; 20 MG/100ML; MG/100ML; MG/100ML; MG/100ML
9 INJECTION, SOLUTION INTRAVENOUS CONTINUOUS
Status: DISCONTINUED | OUTPATIENT
Start: 2018-07-10 | End: 2018-07-10

## 2018-07-10 RX ORDER — BUPIVACAINE HYDROCHLORIDE AND EPINEPHRINE 5; 5 MG/ML; UG/ML
INJECTION, SOLUTION PERINEURAL AS NEEDED
Status: DISCONTINUED | OUTPATIENT
Start: 2018-07-10 | End: 2018-07-10 | Stop reason: HOSPADM

## 2018-07-10 RX ORDER — FLUMAZENIL 0.1 MG/ML
0.2 INJECTION INTRAVENOUS AS NEEDED
Status: DISCONTINUED | OUTPATIENT
Start: 2018-07-10 | End: 2018-07-10

## 2018-07-10 RX ORDER — DIPHENHYDRAMINE HYDROCHLORIDE 50 MG/ML
12.5 INJECTION INTRAMUSCULAR; INTRAVENOUS
Status: DISCONTINUED | OUTPATIENT
Start: 2018-07-10 | End: 2018-07-10 | Stop reason: HOSPADM

## 2018-07-10 RX ORDER — PROMETHAZINE HYDROCHLORIDE 25 MG/ML
12.5 INJECTION, SOLUTION INTRAMUSCULAR; INTRAVENOUS ONCE AS NEEDED
Status: DISCONTINUED | OUTPATIENT
Start: 2018-07-10 | End: 2018-07-10 | Stop reason: HOSPADM

## 2018-07-10 RX ORDER — LIDOCAINE HYDROCHLORIDE 10 MG/ML
0.5 INJECTION, SOLUTION EPIDURAL; INFILTRATION; INTRACAUDAL; PERINEURAL ONCE AS NEEDED
Status: DISCONTINUED | OUTPATIENT
Start: 2018-07-10 | End: 2018-07-10 | Stop reason: HOSPADM

## 2018-07-10 RX ORDER — NALOXONE HCL 0.4 MG/ML
0.2 VIAL (ML) INJECTION AS NEEDED
Status: DISCONTINUED | OUTPATIENT
Start: 2018-07-10 | End: 2018-07-10 | Stop reason: HOSPADM

## 2018-07-10 RX ORDER — PROMETHAZINE HYDROCHLORIDE 25 MG/1
25 TABLET ORAL ONCE AS NEEDED
Status: DISCONTINUED | OUTPATIENT
Start: 2018-07-10 | End: 2018-07-10 | Stop reason: HOSPADM

## 2018-07-10 RX ORDER — LABETALOL HYDROCHLORIDE 5 MG/ML
5 INJECTION, SOLUTION INTRAVENOUS
Status: DISCONTINUED | OUTPATIENT
Start: 2018-07-10 | End: 2018-07-10 | Stop reason: HOSPADM

## 2018-07-10 RX ORDER — HYDROCODONE BITARTRATE AND ACETAMINOPHEN 7.5; 325 MG/1; MG/1
1 TABLET ORAL ONCE AS NEEDED
Status: DISCONTINUED | OUTPATIENT
Start: 2018-07-10 | End: 2018-07-10 | Stop reason: HOSPADM

## 2018-07-10 RX ORDER — PROMETHAZINE HYDROCHLORIDE 25 MG/1
12.5 TABLET ORAL ONCE AS NEEDED
Status: DISCONTINUED | OUTPATIENT
Start: 2018-07-10 | End: 2018-07-10 | Stop reason: HOSPADM

## 2018-07-10 RX ORDER — SODIUM CHLORIDE 0.9 % (FLUSH) 0.9 %
1-10 SYRINGE (ML) INJECTION AS NEEDED
Status: DISCONTINUED | OUTPATIENT
Start: 2018-07-10 | End: 2018-07-10 | Stop reason: HOSPADM

## 2018-07-10 RX ORDER — MIDAZOLAM HYDROCHLORIDE 1 MG/ML
2 INJECTION INTRAMUSCULAR; INTRAVENOUS
Status: DISCONTINUED | OUTPATIENT
Start: 2018-07-10 | End: 2018-07-10 | Stop reason: HOSPADM

## 2018-07-10 RX ORDER — MAGNESIUM HYDROXIDE 1200 MG/15ML
LIQUID ORAL AS NEEDED
Status: DISCONTINUED | OUTPATIENT
Start: 2018-07-10 | End: 2018-07-10 | Stop reason: HOSPADM

## 2018-07-10 RX ORDER — FENTANYL CITRATE 50 UG/ML
INJECTION, SOLUTION INTRAMUSCULAR; INTRAVENOUS AS NEEDED
Status: DISCONTINUED | OUTPATIENT
Start: 2018-07-10 | End: 2018-07-10 | Stop reason: SURG

## 2018-07-10 RX ORDER — EPHEDRINE SULFATE 50 MG/ML
5 INJECTION, SOLUTION INTRAVENOUS ONCE AS NEEDED
Status: DISCONTINUED | OUTPATIENT
Start: 2018-07-10 | End: 2018-07-10

## 2018-07-10 RX ORDER — FAMOTIDINE 10 MG/ML
20 INJECTION, SOLUTION INTRAVENOUS ONCE
Status: COMPLETED | OUTPATIENT
Start: 2018-07-10 | End: 2018-07-10

## 2018-07-10 RX ORDER — FENTANYL CITRATE 50 UG/ML
50 INJECTION, SOLUTION INTRAMUSCULAR; INTRAVENOUS
Status: DISCONTINUED | OUTPATIENT
Start: 2018-07-10 | End: 2018-07-10

## 2018-07-10 RX ORDER — HYDROMORPHONE HYDROCHLORIDE 1 MG/ML
0.5 INJECTION, SOLUTION INTRAMUSCULAR; INTRAVENOUS; SUBCUTANEOUS
Status: DISCONTINUED | OUTPATIENT
Start: 2018-07-10 | End: 2018-07-10 | Stop reason: HOSPADM

## 2018-07-10 RX ADMIN — VANCOMYCIN HYDROCHLORIDE 1500 MG: 10 INJECTION, POWDER, LYOPHILIZED, FOR SOLUTION INTRAVENOUS at 10:42

## 2018-07-10 RX ADMIN — TAZOBACTAM SODIUM AND PIPERACILLIN SODIUM 3.38 G: 375; 3 INJECTION, SOLUTION INTRAVENOUS at 02:53

## 2018-07-10 RX ADMIN — Medication 1 CAPSULE: at 00:34

## 2018-07-10 RX ADMIN — OXYCODONE HYDROCHLORIDE AND ACETAMINOPHEN 1 TABLET: 5; 325 TABLET ORAL at 10:43

## 2018-07-10 RX ADMIN — ONDANSETRON 4 MG: 2 INJECTION INTRAMUSCULAR; INTRAVENOUS at 17:00

## 2018-07-10 RX ADMIN — SENNOSIDES 8.6 MG: 8.6 TABLET, FILM COATED ORAL at 00:34

## 2018-07-10 RX ADMIN — SENNOSIDES 8.6 MG: 8.6 TABLET, FILM COATED ORAL at 06:52

## 2018-07-10 RX ADMIN — FAMOTIDINE 20 MG: 10 INJECTION, SOLUTION INTRAVENOUS at 14:43

## 2018-07-10 RX ADMIN — FENTANYL CITRATE 25 MCG: 50 INJECTION INTRAMUSCULAR; INTRAVENOUS at 16:58

## 2018-07-10 RX ADMIN — VANCOMYCIN HYDROCHLORIDE 1750 MG: 1 INJECTION, POWDER, LYOPHILIZED, FOR SOLUTION INTRAVENOUS at 00:34

## 2018-07-10 RX ADMIN — FENTANYL CITRATE 25 MCG: 50 INJECTION INTRAMUSCULAR; INTRAVENOUS at 16:40

## 2018-07-10 RX ADMIN — CARVEDILOL 12.5 MG: 12.5 TABLET, FILM COATED ORAL at 20:38

## 2018-07-10 RX ADMIN — ESCITALOPRAM 20 MG: 20 TABLET, FILM COATED ORAL at 20:38

## 2018-07-10 RX ADMIN — LOSARTAN POTASSIUM 100 MG: 100 TABLET, FILM COATED ORAL at 10:42

## 2018-07-10 RX ADMIN — MORPHINE SULFATE 1 MG: 2 INJECTION, SOLUTION INTRAMUSCULAR; INTRAVENOUS at 13:50

## 2018-07-10 RX ADMIN — SODIUM CHLORIDE, POTASSIUM CHLORIDE, SODIUM LACTATE AND CALCIUM CHLORIDE 9 ML/HR: 600; 310; 30; 20 INJECTION, SOLUTION INTRAVENOUS at 14:42

## 2018-07-10 RX ADMIN — MIDAZOLAM HYDROCHLORIDE 1 MG: 2 INJECTION, SOLUTION INTRAMUSCULAR; INTRAVENOUS at 14:53

## 2018-07-10 RX ADMIN — TAZOBACTAM SODIUM AND PIPERACILLIN SODIUM 3.38 G: 375; 3 INJECTION, SOLUTION INTRAVENOUS at 10:42

## 2018-07-10 RX ADMIN — ESCITALOPRAM 20 MG: 20 TABLET, FILM COATED ORAL at 00:34

## 2018-07-10 RX ADMIN — AMLODIPINE BESYLATE 10 MG: 10 TABLET ORAL at 10:42

## 2018-07-10 RX ADMIN — VANCOMYCIN HYDROCHLORIDE 1500 MG: 10 INJECTION, POWDER, LYOPHILIZED, FOR SOLUTION INTRAVENOUS at 22:07

## 2018-07-10 RX ADMIN — Medication 1 CAPSULE: at 06:52

## 2018-07-10 RX ADMIN — PROPOFOL 200 MG: 10 INJECTION, EMULSION INTRAVENOUS at 16:33

## 2018-07-10 RX ADMIN — FENTANYL CITRATE 25 MCG: 50 INJECTION INTRAMUSCULAR; INTRAVENOUS at 17:04

## 2018-07-10 RX ADMIN — MIDAZOLAM HYDROCHLORIDE 1 MG: 2 INJECTION, SOLUTION INTRAMUSCULAR; INTRAVENOUS at 14:42

## 2018-07-10 RX ADMIN — CARVEDILOL 12.5 MG: 12.5 TABLET, FILM COATED ORAL at 00:34

## 2018-07-10 RX ADMIN — FENTANYL CITRATE 50 MCG: 50 INJECTION INTRAMUSCULAR; INTRAVENOUS at 17:12

## 2018-07-10 RX ADMIN — CARVEDILOL 12.5 MG: 12.5 TABLET, FILM COATED ORAL at 10:42

## 2018-07-10 NOTE — H&P
[facility]    Patient Identification:  Name: Prem Thrasher  Age: 63 y.o.  Sex: male  :  1955  MRN: [id number]         Primary Care Physician: Montse Cain PA-C    Chief Complaint   Patient presents with   • Rectal Pain     x3 days     Subjective   Patient is a 63 y.o. male the last few days has had rectal pain that he states is severe. Worse with bowel movements sitting down and better when he is up. He states his right sided. States his appetite is okay. He denied fevers and chills to me however ER documented intermittent fever for the last 2 or 3 days.he had recent left leg surgery earlier this month (Achilles debridement and secondary repair with partial excision of calcaneus). He has a history of COPD. Denies any heart disease, stroke, diabetes, kidney disease.     Past Medical History:   Diagnosis Date   • Achilles tendon pain     LEFT   • Anxiety disorder    • COPD (chronic obstructive pulmonary disease) (CMS/HCC)    • CTS (carpal tunnel syndrome)    • Encounter for eye exam 10/2014    normal   • History of bone density study 2014    Dr. Maria Antonia Lopez; normal   • History of chest x-ray 02/15/2011    also 3-6-15; normal chest   • History of nuclear stress test 2015    cardiolite; Dr. Greenberg; negative   • History of pulmonary function tests 2015    COPD   • Hyperlipidemia    • Hypertension    • IFG (impaired fasting glucose)    • Low back pain    • Lumbosacral disc disease    • Nerve damage     KAYLYNN ELBOWS   • Osteoarthritis, multiple sites    • Postoperative nausea and vomiting 2017   • Rheumatoid arthritis (CMS/HCC)    • Rheumatoid arthritis (CMS/HCC)    • Sciatica    • Staph infection     WITH BACK SURGERY   ON LONG TERM ANTIBIOTICS     Past Surgical History:   Procedure Laterality Date   • ACHILLES TENDON SURGERY Left 7/3/2018    Procedure: Left Achilles debridement and secondary repair with partial excision of calcaneus.  Possible left Achilles lengthening at the calf;  Surgeon: Vinay Smith MD;  Location: [facility];  Service: Orthopedics   • BACK SURGERY     • COLONOSCOPY  02/02/2011    unremarkable only for scattered diverticulosis; had hx of prior polyp   • CYST REMOVAL  03/2016    x2  FROM FACE AND EAR   • DENTAL PROCEDURE  2008    teeth removed; dental implants   • EPIDURAL BLOCK  1995    L/S spine   • HAND SURGERY Right 2003    avulsion lacerations 4th R finger debrided   • LUMBAR DISCECTOMY FUSION INSTRUMENTATION Left 10/10/2016    Procedure: LEFT L2-L3, L3-L4 LUMBAR LAMINECTOMY/ DISCECTOMY WITH METRIX ;  Surgeon: Sawyer Rick MD;  Location: [facility] OR;  Service:    • LUMBAR DISCECTOMY FUSION INSTRUMENTATION N/A 7/31/2017    Procedure: LUMBAR FUSION DECOMPRESSON WITH PEDICLE SCREWS with LAURA L2-3,L3-4;  Surgeon: Jacky Perez MD;  Location: [facility] OR;  Service:    • LUMBAR LAMINECTOMY DISCECTOMY DECOMPRESSION N/A 7/31/2017    Procedure: L2 to L4 laminectomy with neural lysis and a fusion by orthopedics;  Surgeon: Sawyer Rick MD;  Location: [facility] OR;  Service:    • LUMBAR LAMINECTOMY DISCECTOMY DECOMPRESSION N/A 9/5/2017    Procedure: I&D LUMBAR SPINE ;  Surgeon: Jacky Perez MD;  Location: [facility] OR;  Service:    • SHOULDER SURGERY Right 02/23/2011    Dr. Navarro   • SINUS SURGERY  2003    Dr. Barrera     Current medications reviewed.    Family History   Problem Relation Age of Onset   • Diabetes Mother    • Heart disease Mother    • Hyperlipidemia Mother    • Stroke Mother    • Heart disease Father    • Rheum arthritis Father    • Alcohol abuse Father    • Hyperlipidemia Father    • Depression Brother    • Cancer Brother         prostate   • Depression Brother    • Heart disease Brother    • Hyperlipidemia Brother    • Cancer Brother    • Thyroid disease Sister    • Malig Hyperthermia Neg Hx      Social History   Substance Use Topics   • Smoking status: Current Some Day Smoker      Packs/day: 0.25     Years: 45.00     Types: Cigarettes   • Smokeless tobacco: Never Used      Comment: last cigarette 7/30/17   • Alcohol use No     Review of Systems   Constitutional: Positive for fever. Negative for appetite change and chills.   HENT: Negative.    Eyes: Negative.    Respiratory: Negative.  Negative for chest tightness and shortness of breath.    Cardiovascular: Negative.  Negative for chest pain and leg swelling.   Gastrointestinal: Positive for rectal pain. Negative for constipation, diarrhea, nausea and vomiting.   Endocrine: Negative.    Genitourinary: Negative.    Musculoskeletal: Negative.    Skin: Negative.    Allergic/Immunologic: Negative.    Neurological: Negative.    Hematological: Negative.    Psychiatric/Behavioral: Negative.       Objective      Vital Signs  Temp:  [99 °F (37.2 °C)-101.9 °F (38.8 °C)] 99 °F (37.2 °C)  Heart Rate:  [] 88  Resp:  [15-18] 17  BP: (108-129)/(60-81) 113/64  Body mass index is 28.03 kg/m².    Physical Exam   Constitutional: He is oriented to person, place, and time. He appears well-developed and well-nourished.  Non-toxic appearance. He does not have a sickly appearance. He does not appear ill. No distress.   HENT:   Head: Normocephalic and atraumatic.   Eyes: Conjunctivae and EOM are normal.   Neck: Neck supple. No tracheal deviation present.   Cardiovascular: Normal rate, regular rhythm and intact distal pulses.    Pulses:       Radial pulses are 2+ on the right side, and 2+ on the left side.        Dorsalis pedis pulses are 2+ on the right side. Left dorsalis pedis pulse not accessible.   LLE dressed   Pulmonary/Chest: Effort normal and breath sounds normal. No respiratory distress. He has no wheezes. He has no rales.   Abdominal: Soft. He exhibits no distension. There is no tenderness. There is no rebound and no guarding.   Genitourinary:   Genitourinary Comments: ER performed digital exam with tenderness and induration right lateral rectal  wall. Externally mild erythema right perianal  area.   Musculoskeletal: He exhibits no edema.   Lymphadenopathy:     He has no cervical adenopathy.   Neurological: He is alert and oriented to person, place, and time.   Skin: Skin is warm and dry.   Psychiatric: He has a normal mood and affect. His behavior is normal.     Lab Results   Component Value Date    WBC 19.34 (H) 07/09/2018    HGB 10.3 (L) 07/09/2018    HCT 31.2 (L) 07/09/2018     07/09/2018     Lab Results   Component Value Date     (L) 07/09/2018    K 3.5 07/09/2018    CL 91 (L) 07/09/2018    CO2 25.3 07/09/2018    BUN 11 07/09/2018    CREATININE 1.05 07/09/2018    GLUCOSE 112 (H) 07/09/2018     Lab Results   Component Value Date    CALCIUM 8.4 (L) 07/09/2018     Lab Results   Component Value Date    AST 20 07/09/2018    ALT 19 07/09/2018    ALKPHOS 69 07/09/2018     Lab Results   Component Value Date    COLORU Yellow 07/09/2018    CLARITYU Clear 07/09/2018    PHUR 8.0 07/09/2018    GLUCOSEU Negative 07/09/2018    KETONESU Negative 07/09/2018    BLOODU Negative 07/09/2018    LEUKOCYTESUR Negative 07/09/2018    BILIRUBINUR Negative 07/09/2018    UROBILINOGEN 0.2 E.U./dL 07/09/2018    RBCUA 0-2 07/09/2018    WBCUA 0-2 07/09/2018    BACTERIA None Seen 07/09/2018     Lab Results   Component Value Date    ANIONGAP 11.7 07/09/2018     Estimated Creatinine Clearance: 82.1 mL/min (by C-G formula based on SCr of 1.05 mg/dL).    Assessment/Plan   Active Hospital Problems    Diagnosis Date Noted   • **Abscess and cellulitis of gluteal region [L02.31, L03.317] 07/09/2018   • Tobacco abuse [Z72.0] 07/09/2018   • Calcific Achilles tendonitis s/p OR 7/18 [M65.28] 06/08/2018   • COPD (chronic obstructive pulmonary disease) (CMS/[facility]) [J44.9]    • Hypertension [I10]    • Rheumatoid arthritis (CMS/[facility]) [M06.9]       Resolved Hospital Problems    Diagnosis Date Noted Date Resolved   No resolved problems to display.     · 63 y.o. male with perirectal abscess, WBC  19k  · IV pain medication as needed  · IV antibiotics  · NPO after midnight for I+D tomorrow. no history of ischemic heart disease, heart failure, cerebrovascular disease, or diabetes. Creatinine is okay. acceptable cardiac risk for surgery  · RA: Hold arava  · HTN and COPD stable continue meds. needs to stop smoking.  · Wife is health care surrogate  · I discussed the patient's findings and my recommendations with patient and consulting provider (Dr Irizarry and Dr Arcos).    Fede Smyth MD  07/09/18  10:00 PM

## 2018-07-10 NOTE — PROGRESS NOTES
"Pharmacokinetic Consult - Vancomycin Dosing (Initial Note)    Prem VEGA Price has been consulted for pharmacy to dose vancomycin for SSTI.  Pharmacy dosing vancomycin per Dr Smyth's request.   Goal trough: 15-20 mg/L   Other antimicrobials: Zosyn    Relevant clinical data and objective history reviewed:  63 y.o. male 180.3 cm (71\") 91.2 kg (201 lb)    Past Medical History:   Diagnosis Date   • Achilles tendon pain     LEFT   • Anxiety disorder    • COPD (chronic obstructive pulmonary disease) (CMS/HCC)    • CTS (carpal tunnel syndrome)    • Encounter for eye exam 10/2014    normal   • History of bone density study 03/2014    Dr. Maria Antonia Lopez; normal   • History of chest x-ray 02/15/2011    also 3-6-15; normal chest   • History of nuclear stress test 03/09/2015    cardiolite; Dr. Greenberg; negative   • History of pulmonary function tests 03/2015    COPD   • Hyperlipidemia    • Hypertension    • IFG (impaired fasting glucose)    • Low back pain    • Lumbosacral disc disease    • Nerve damage     KAYLYNN ELBOWS   • Osteoarthritis, multiple sites    • Postoperative nausea and vomiting 8/1/2017   • Rheumatoid arthritis (CMS/[facility])    • Rheumatoid arthritis (CMS/[facility])    • Sciatica    • Staph infection     WITH BACK SURGERY 2017  ON LONG TERM ANTIBIOTICS     Creatinine   Date Value Ref Range Status   07/09/2018 1.05 0.76 - 1.27 mg/dL Final   06/29/2018 1.19 0.76 - 1.27 mg/dL Final   03/29/2018 1.02 0.76 - 1.27 mg/dL Final   03/26/2018 1.04 0.76 - 1.27 mg/dL Final   11/28/2017 0.90 0.76 - 1.27 mg/dL Final   10/11/2017 0.82 0.76 - 1.27 mg/dL Final   05/31/2017 1.10 0.60 - 1.30 mg/dL Final     Comment:     Serial Number: [id number]    : [id number]     BUN   Date Value Ref Range Status   07/09/2018 11 8 - 23 mg/dL Final     Estimated Creatinine Clearance: 82.1 mL/min (by C-G formula based on SCr of 1.05 mg/dL).    Lab Results   Component Value Date    WBC 19.34 (H) 07/09/2018     Temp Readings from Last 3 Encounters:   07/09/18 99 °F " (37.2 °C) (Oral)   07/03/18 97.8 °F (36.6 °C) (Temporal Artery )   06/29/18 97 °F (36.1 °C) (Oral)           Assessment/Plan  Will start vancomycin 1,500 mg IV Q12h (LD 1,750mg). Will monitor serum creatinine every 24 hours for the first 3 days then at least every 48 hours per dosing recommendations. Vancomycin level prior to 6th dose. Pharmacy will continue to follow daily while on vancomycin and adjust as needed.     Gary Nieto RPH

## 2018-07-10 NOTE — CONSULTS
General Surgery Consultation    Consulting Physician: Porsha Arcos MD  Referring Physician: Merlin Irizarry MD    Reason for consultation: Perirectal abscess    CC: Rectal pain    HPI:   The patient is a very pleasant 63 y.o. male that presented to the hospital with a two day history of rectal pain that has been sharp and constant in duration since its onset, but getting progressively worse. He has never had pain like this before. Passing bowel movements makes the pain worse.     Past Medical History:  Hypertension  Hyperlipidemia  COPD  Rheumatoid arthritis    Past Surgical History:  Achilles tendon repair (left)  Colonoscopy (2011)  Lumbar discectomy (L2-L4)  Right shoulder surgery  Sinus surgery    Medications:    Current Facility-Administered Medications:   •  acetaminophen (TYLENOL) tablet 500 mg, 500 mg, Oral, Q4H PRN, Fede Smyth MD  •  albuterol (PROVENTIL) nebulizer solution 0.083% 2.5 mg/3mL, 2.5 mg, Nebulization, Q6H PRN, Fede Smyth MD  •  [START ON 7/10/2018] amLODIPine (NORVASC) tablet 10 mg, 10 mg, Oral, Daily, Fede Smyth MD  •  [START ON 7/10/2018] aspirin EC tablet 325 mg, 325 mg, Oral, Daily, Fede Smyth MD  •  carvedilol (COREG) tablet 12.5 mg, 12.5 mg, Oral, BID With Meals, Fede Smyth MD  •  escitalopram (LEXAPRO) tablet 20 mg, 20 mg, Oral, Nightly, Fede Smyth MD  •  [START ON 7/10/2018] lactobacillus acidophilus (RISAQUAD) capsule 1 capsule, 1 capsule, Oral, QAM, Fede Smyth MD  •  [START ON 7/10/2018] losartan (COZAAR) tablet 100 mg, 100 mg, Oral, Q24H, Fede Smyth MD  •  morphine injection 1 mg, 1 mg, Intravenous, Q4H PRN **AND** naloxone (NARCAN) injection 0.4 mg, 0.4 mg, Intravenous, Q5 Min PRN, Fede Smyth MD  •  ondansetron (ZOFRAN) injection 4 mg, 4 mg, Intravenous, Q6H PRN, Fede Smyth MD  •  oxyCODONE-acetaminophen (PERCOCET) 5-325 MG per tablet 1 tablet, 1 tablet, Oral, Q4H PRN, Fede Smyth MD  •  Pharmacy to dose  vancomycin, , Does not apply, Continuous PRN, Fede Smyth MD  •  Pharmacy to Dose Zosyn, , Does not apply, Continuous PRN, Fede Smyth MD  •  [START ON 7/10/2018] senna (SENOKOT) tablet 8.6 mg, 1 tablet, Oral, QAM, Fede Smyth MD  •  sodium chloride 0.9 % flush 1-10 mL, 1-10 mL, Intravenous, PRN, Fede Smyth MD  •  tiotropium (SPIRIVA) 18 MCG per inhalation capsule 1 capsule, 1 capsule, Inhalation, PRN, Fede Smyth MD    Allergies: Ceclor, Methotrexate, atorvastatin    Social History: , smokes 1/4 ppd cigarettes daily, no regular alcohol use    Family History: Brother with prostate cancer, father with alcohol abuse    Review of Systems:  Constitutional: denies any weight changes, fatigue or weakness.  Eyes: denies blurred or double vision; denies scleral icterus  Cardiovascular: denies chest pain, palpitations, edemas.  Respiratory: denies cough, sputum, SOB.  Gastrointestinal:  Positive for rectal pain; denies abdominal pain, nausea, vomiting, diarrhea, and constipation.  Genitourinary: denies dysuria, or hematuria.  Endocrine: denies cold intolerance, lethargy and flushing.  Hem: denies excessive bruising or bleeding.  Musculoskeletal: denies weakness, joint swelling, pain or stiffness.  Neuro: denies seizures, CVA, paresthesia, or peripheral neuropathy.   Skin: denies change in nevi, rashes, masses; denies jaundice     All other systems reviewed and were negative.    Physical Exam:   Vitals:    07/09/18 1824   BP: 113/64   Pulse: 88   Resp: 17   Temp:    SpO2: 92%     GENERAL: awake and alert, no acute distress, oriented to person, place, and time  HEENT: normocephalic, atraumatic, no scleral icterus, moist mucous membranes.  NECK: Supple, there is no thyromegaly or lymphadenopathy  CHEST: clear to auscultation, no wheezes, rales or rhonchi, symmetric air entry  CARDIAC: regular rate and rhythm    ABDOMEN: soft, nontender, nondistended, no masses or organomegaly  EXTREMITIES: no  cyanosis, clubbing, or edema   NEURO: alert and oriented, normal speech, cranial nerves 2-12 grossly intact, no focal deficits   SKIN: Moist, warm, no rashes, no jaundice.  RECTUM: pain with digital rectal exam, slight induration and erythema along perineum with no fluctuance or drainage    Diagnostic workup:     Pertinent labs:     Results from last 7 days  Lab Units 07/09/18  1644   WBC 10*3/mm3 19.34*   HEMOGLOBIN g/dL 10.3*   HEMATOCRIT % 31.2*   PLATELETS 10*3/mm3 273       Results from last 7 days  Lab Units 07/09/18  1644   SODIUM mmol/L 128*   POTASSIUM mmol/L 3.5   CHLORIDE mmol/L 91*   CO2 mmol/L 25.3   BUN mg/dL 11   CREATININE mg/dL 1.05   CALCIUM mg/dL 8.4*   BILIRUBIN mg/dL 0.9   ALK PHOS U/L 69   ALT (SGPT) U/L 19   AST (SGOT) U/L 20   GLUCOSE mg/dL 112*       IMAGING:  CT ABDOMEN/PELVIS:  FINDINGS: The liver, spleen, pancreas, and adrenal glands are unremarkable. There is mild stranding of the perirenal fat bilaterally that was also present on the previous CT scan, and therefore may be of no clinical significance. Correlation with urinalysis results is recommended. The stomach and small bowel appear nondistended. There are several diverticuli scattered throughout the colon. There is no CT evidence of diverticulitis. There is mild injection of the subcutaneous fat of the right gluteal crease that is new since the preceding CT scan. This is consistent with localized cellulitis. There is also a tiny approximately 9 mm diameter fluid collection in this region abscess.    I personally have reviewed the above imaging and found the following additional findings: tiny perirectal abscess with adjacent subcutaneous fat stranding      Assessment and plan:     The patient is a 63 y.o. male with a small perirectal abscess    I reviewed his CT scan and he does indeed have a very small perirectal/gluteal abscess. I will plan to take him to the OR tomorrow for rectal exam under anesthesia and I&D of the perirectal  abscess. He can have a regular diet tonight and then NPO after midnight. I have also started him on empiric antibiotics and will obtain a wound culture intraoperatively to help guide antibiotics at the time of discharge.    This case was discussed with Fede Smyth MD and the patient/family.    The risks and benefits of the current recommended treatment were explained to the patient, who had time to ask questions that were answered. He verbalized understanding and agrees with the plan. Thank you very much for this consult, I am happy to assist in this patient's care.    Porsha Arcos MD  General and Endoscopic Surgery  [facility]    [address], [address]  [place], [place], [address]  P: [phone]  F: [phone]

## 2018-07-10 NOTE — OP NOTE
Operative Note :  Porsha Arcos MD      Prem Thrasher  1955    Procedure Date: 07/10/18    Pre-op Diagnosis:  · Perirectal abscess [K61.1]    Post-Operative Diagnosis:  · Perirectal abscess [K61.1]    Procedure:   · Incision and drainage of perirectal abscess, rectal exam under anesthesia    Surgeon: Porsha Arcos MD    Assistant: None    Anesthesia:  General (general endotracheal tube)    Estimated Blood Loss: 10 mL    Specimens: Perirectal abscess wound culture    Complications: None    Indications:  · Mr. Thrasher is a 63-year-old gentleman who was admitted to the hospital yesterday evening with a right-sided perirectal abscess and associated cellulitis.  He is being brought to the operating room today for an incision and drainage of this perirectal abscess under general anesthesia.  He has been counseled on the risks of the procedure, and has consented to proceed.    Findings:   · Deep right posterior perirectal abscess with macerated internal anal mucosa adjacent to engorged internal hemorrhoids but no obvious anal fistula    Description of procedure:  The patient was brought to the operating room and placed on the OR table in supine position.  An endotracheal tube was inserted, and general anesthesia was induced.  He was then repositioned in lithotomy with his feet supported in candycane stirrups.  His perianal skin was prepped and draped in a sterile fashion using Betadine paint and a surgical timeout completed.  A digital rectal exam was performed, revealing a fluctuant lesion along the right posterior rectum.  An 18-gauge needle with attached syringe were used to probe the soft tissues deep to the skin here and I was able to aspirate approximately 10 cc of milky purulent fluid from the right posterior perianal tissues.  This fluid was sent off to microbiology as a wound culture.  The overlying skin was inspected and a horizontal skin incision made parallel to the anus.  The underlying  subcutaneous tissues were also incised sharply, penetrating the abscess cavity.  A large amount of purulent fluid was evacuated and suctioned dry.  The abscess cavity was probed, deloculated, and washed out with normal saline.  The lit Su bivalve retractor was then inserted into the anus and rectum, and circumferential anal mucosa inspected.  Along the right posterior rectum there were engorged internal hemorrhoids with macerated anal mucosa surrounding it.  I was able to divide this anal mucosa down to the sphincter muscle complex.  The abscess cavity was probed, and seems to extend up and over the sphincter muscles, but I was unable to identify an anal fistula opening up above the sphincters.  One half inch iodoform gauze was then packed up into the abscess cavity.  4 x 4 dressings were then applied over the incision, he was extubated, and transferred to PACU in stable condition.  All counts were correct per nursing.    Porsha Arcos MD  General and Endoscopic Surgery  [facility]    [address], [address]  [place], [place], [address]  P: [phone]  F: [phone]

## 2018-07-10 NOTE — ANESTHESIA POSTPROCEDURE EVALUATION
Patient: Prem Thrasher    Procedure Summary     Date:  07/10/18 Room / Location:  [facility] OR [address] [place] [address] MAIN OR    Anesthesia Start:  1630 Anesthesia Stop:  1720    Procedure:  INCISION AND DRAINAGE OF PERIRECTAL ABSCESS (N/A Perirectal) Diagnosis:       Perirectal abscess      Abscess and cellulitis of gluteal region      (Perirectal abscess [K61.1])      (Abscess and cellulitis of gluteal region [L02.31, L03.317])    Surgeon:  Porsha Arcos MD Provider:  Merlin Gale MD    Anesthesia Type:  general ASA Status:  3          Anesthesia Type: general  Last vitals  BP   140/83 (07/10/18 1805)   Temp   37.2 °C (98.9 °F) (07/10/18 1718)   Pulse   76 (07/10/18 1805)   Resp   14 (07/10/18 1805)     SpO2   96 % (07/10/18 1805)     Post Anesthesia Care and Evaluation    Patient participation: complete - patient participated  Level of consciousness: awake  Pain management: adequate  Airway patency: patent  Anesthetic complications: No anesthetic complications    Cardiovascular status: acceptable  Respiratory status: acceptable  Hydration status: acceptable

## 2018-07-10 NOTE — ANESTHESIA PREPROCEDURE EVALUATION
Anesthesia Evaluation     Patient summary reviewed and Nursing notes reviewed   history of anesthetic complications:  NPO Solid Status: > 8 hours  NPO Liquid Status: > 8 hours           Airway   Mallampati: II  TM distance: >3 FB  Neck ROM: full  no difficulty expected  Dental - normal exam     Pulmonary - normal exam   (+) COPD,   Cardiovascular - normal exam    (+) hypertension, hyperlipidemia,       Neuro/Psych  (+) numbness, psychiatric history Anxiety and Depression,     GI/Hepatic/Renal/Endo      Musculoskeletal     Abdominal  - normal exam   Substance History      OB/GYN          Other   (+) arthritis                     Anesthesia Plan    ASA 3     general     intravenous induction   Anesthetic plan and risks discussed with patient.

## 2018-07-10 NOTE — PROGRESS NOTES
Discharge Planning Assessment  [facility]     Patient Name: Prem Thrasher  MRN: [id number]  Today's Date: 7/10/2018    Admit Date: 7/9/2018          Discharge Needs Assessment     Row Name 07/10/18 0930       Living Environment    Lives With spouse    Current Living Arrangements home/apartment/condo    Primary Care Provided by self;spouse/significant other    Provides Primary Care For no one    Family Caregiver Names Wife Linda    Quality of Family Relationships helpful    Able to Return to Prior Arrangements yes       Resource/Environmental Concerns    Resource/Environmental Concerns none       Transition Planning    Patient/Family Anticipates Transition to home with family    Patient/Family Anticipated Services at Transition none    Transportation Anticipated family or friend will provide       Discharge Needs Assessment    Concerns to be Addressed denies needs/concerns at this time    Equipment Currently Used at Home other (see comments);crutches;grab bar   Scooter    Anticipated Changes Related to Illness none    Equipment Needed After Discharge wound care supplies            Discharge Plan     Row Name 07/10/18 0932       Plan    Plan Return home with wife    Patient/Family in Agreement with Plan yes    Plan Comments Spoke with patient at bedside.  Patient lives with wife Linda [phone].  He had recent lf. foot OR and is using a scooter at present.  He states he has crutches but they hurt too much to use.  He has grab bars on his bed and bars at the toilet.  He has used HH in the past but is not current.   Patient plans to return home at DC and does not currently anticipate any DC needs.  CCP will F/U post-op I&D of abscess.  BHumeniuk RN         Destination     No service coordination in this encounter.      Durable Medical Equipment     No service coordination in this encounter.      Dialysis/Infusion     No service coordination in this encounter.      Home Medical Care     No service  coordination in this encounter.      Social Care     No service coordination in this encounter.                Demographic Summary     Row Name 07/10/18 0927       General Information    Admission Type inpatient    Arrived From home    Referral Source admission list    Reason for Consult discharge planning    Preferred Language English     Used During This Interaction no            Functional Status     Row Name 07/10/18 0927       Functional Status    Usual Activity Tolerance fair    Current Activity Tolerance poor       Functional Status, IADL    Medications assistive person    Meal Preparation assistive person    Housekeeping assistive person    Laundry assistive person    Shopping assistive person       Mental Status    General Appearance WDL WDL       Mental Status Summary    Recent Changes in Mental Status/Cognitive Functioning no changes            Psychosocial    No documentation.           Abuse/Neglect    No documentation.           Legal    No documentation.           Substance Abuse    No documentation.           Patient Forms    No documentation.         Becky S. Humeniuk, RN

## 2018-07-10 NOTE — PROGRESS NOTES
"Pharmacy Consult - Zosyn    Prem Thrasher is a 63 y.o. on whom pharmacy has been consulted dose Zosyn for SSTI.  Pharmacy dosing per Dr Smyth's request.         Relevant clinical data and objective history reviewed:  63 y.o. male 180.3 cm (71\")   91.2 kg (201 lb)   Ideal body weight: 75.3 kg (166 lb 0.1 oz)  Adjusted ideal body weight: 81.7 kg (180 lb 0.1 oz)    Past medical history: has a past medical history of Achilles tendon pain; Anxiety disorder; COPD (chronic obstructive pulmonary disease) (CMS/[facility]); CTS (carpal tunnel syndrome); Encounter for eye exam (10/2014); History of bone density study (03/2014); History of chest x-ray (02/15/2011); History of nuclear stress test (03/09/2015); History of pulmonary function tests (03/2015); Hyperlipidemia; Hypertension; IFG (impaired fasting glucose); Low back pain; Lumbosacral disc disease; Nerve damage; Osteoarthritis, multiple sites; Postoperative nausea and vomiting (8/1/2017); Rheumatoid arthritis (CMS/[facility]); Rheumatoid arthritis (CMS/[facility]); Sciatica; and Staph infection.  [unfilled]    Other Antibiotics/Anti-infectives on profile: Vancomyin        Lab Results   Component Value Date    GLUCOSE 112 (H) 07/09/2018    CALCIUM 8.4 (L) 07/09/2018     (L) 07/09/2018    K 3.5 07/09/2018    CO2 25.3 07/09/2018    CL 91 (L) 07/09/2018    BUN 11 07/09/2018    CREATININE 1.05 07/09/2018    EGFRIFAFRI 89 03/29/2018    EGFRIFNONA 71 07/09/2018    BCR 10.5 07/09/2018    ANIONGAP 11.7 07/09/2018     Lab Results   Component Value Date    WBC 19.34 (H) 07/09/2018    HGB 10.3 (L) 07/09/2018    HCT 31.2 (L) 07/09/2018    MCV 88.6 07/09/2018     07/09/2018       Estimated Creatinine Clearance: 82.1 mL/min (by C-G formula based on SCr of 1.05 mg/dL).  I/O last 3 completed shifts:  In: 1000 [IV Piggyback:1000]  Out: -   Blood pressure 102/62, pulse 71, temperature 99 °F (37.2 °C), temperature source Oral, resp. rate 17, height 180.3 cm (71\"), weight 91.2 kg (201 lb), SpO2 " 95 %.        Assessment/Plan    Will start patient on 3.375 gm q8h. Pharmacy will continue to dose and monitor.       Gary Nieto RPH

## 2018-07-10 NOTE — PROGRESS NOTES
LOS: 1 day   Patient Care Team:  Montse Cain PA-C as PCP - General (Family Medicine)  Maria Antonia Lopez MD as Consulting Physician (Rheumatology)  Sawyer Rick MD as Surgeon (Neurosurgery)  Pradip Mcmillan MD as Consulting Physician (Pulmonary Disease)  Jacky Perez MD as Surgeon (Orthopedic Surgery)  Charli Sen MD as Consulting Physician (Infectious Diseases)  Tray Moody MD as Consulting Physician (Urology)  Suman Richards DPM as Consulting Physician (Podiatry)    Chief Complaint   Patient presents with   • Rectal Pain     x3 days         Subjective   Doing ok. Pain controlled.     Objective     Vital Signs  Temp:  [98.3 °F (36.8 °C)-101.9 °F (38.8 °C)] 98.3 °F (36.8 °C)  Heart Rate:  [] 82  Resp:  [15-18] 16  BP: ()/(56-81) 94/56    Physical Exam:  WDWN male in NAD. Alert & oriented.  Lungs clear  Heart RRR   Abd soft, NT, BS+  Ext no edema on right. LLE in ACE wrap.   Skin warm & dry       Results Review:     I reviewed the patient's new clinical results.    Medication Review:       LABS    Results from last 7 days  Lab Units 07/10/18  0622 07/09/18  1644   SODIUM mmol/L 131* 128*   POTASSIUM mmol/L 3.5 3.5   CHLORIDE mmol/L 95* 91*   CO2 mmol/L 20.8* 25.3   BUN mg/dL 15 11   CREATININE mg/dL 1.08 1.05   GLUCOSE mg/dL 106* 112*   CALCIUM mg/dL 7.9* 8.4*       Results from last 7 days  Lab Units 07/10/18  0622 07/09/18  1644   WBC 10*3/mm3 17.80* 19.34*   HEMOGLOBIN g/dL 9.8* 10.3*   HEMATOCRIT % 30.5* 31.2*   PLATELETS 10*3/mm3 277 273               Assessment/Plan     Principal Problem:    Abscess and cellulitis of gluteal region - scheduled for surgery this afternoon.  Active Problems:    COPD (chronic obstructive pulmonary disease) (CMS/HCC)    Rheumatoid arthritis (CMS/HCC)    Hypertension    Calcific Achilles tendonitis s/p OR 7/18    Tobacco abuse    Perirectal abscess    Anemia    Metabolic acidosis          Aleida Chowdhury MD  07/10/18  8:43 AM      Time:

## 2018-07-11 ENCOUNTER — TELEPHONE (OUTPATIENT)
Dept: ORTHOPEDIC SURGERY | Facility: CLINIC | Age: 63
End: 2018-07-11

## 2018-07-11 LAB
ANION GAP SERPL CALCULATED.3IONS-SCNC: 9.7 MMOL/L
BASOPHILS # BLD AUTO: 0.08 10*3/MM3 (ref 0–0.2)
BASOPHILS NFR BLD AUTO: 0.5 % (ref 0–1.5)
BUN BLD-MCNC: 11 MG/DL (ref 8–23)
BUN/CREAT SERPL: 10.1 (ref 7–25)
CALCIUM SPEC-SCNC: 8.1 MG/DL (ref 8.6–10.5)
CHLORIDE SERPL-SCNC: 95 MMOL/L (ref 98–107)
CO2 SERPL-SCNC: 25.3 MMOL/L (ref 22–29)
CREAT BLD-MCNC: 1.09 MG/DL (ref 0.76–1.27)
DEPRECATED RDW RBC AUTO: 43.4 FL (ref 37–54)
EOSINOPHIL # BLD AUTO: 0.21 10*3/MM3 (ref 0–0.7)
EOSINOPHIL NFR BLD AUTO: 1.2 % (ref 0.3–6.2)
ERYTHROCYTE [DISTWIDTH] IN BLOOD BY AUTOMATED COUNT: 12.9 % (ref 11.5–14.5)
GFR SERPL CREATININE-BSD FRML MDRD: 68 ML/MIN/1.73
GLUCOSE BLD-MCNC: 100 MG/DL (ref 65–99)
HCT VFR BLD AUTO: 30.6 % (ref 40.4–52.2)
HGB BLD-MCNC: 9.6 G/DL (ref 13.7–17.6)
IMM GRANULOCYTES # BLD: 0.06 10*3/MM3 (ref 0–0.03)
IMM GRANULOCYTES NFR BLD: 0.4 % (ref 0–0.5)
LYMPHOCYTES # BLD AUTO: 1.7 10*3/MM3 (ref 0.9–4.8)
LYMPHOCYTES NFR BLD AUTO: 10 % (ref 19.6–45.3)
MCH RBC QN AUTO: 28.7 PG (ref 27–32.7)
MCHC RBC AUTO-ENTMCNC: 31.4 G/DL (ref 32.6–36.4)
MCV RBC AUTO: 91.6 FL (ref 79.8–96.2)
MONOCYTES # BLD AUTO: 2.22 10*3/MM3 (ref 0.2–1.2)
MONOCYTES NFR BLD AUTO: 13.1 % (ref 5–12)
NEUTROPHILS # BLD AUTO: 12.66 10*3/MM3 (ref 1.9–8.1)
NEUTROPHILS NFR BLD AUTO: 74.8 % (ref 42.7–76)
PLATELET # BLD AUTO: 265 10*3/MM3 (ref 140–500)
PMV BLD AUTO: 9.6 FL (ref 6–12)
POTASSIUM BLD-SCNC: 4 MMOL/L (ref 3.5–5.2)
RBC # BLD AUTO: 3.34 10*6/MM3 (ref 4.6–6)
SODIUM BLD-SCNC: 130 MMOL/L (ref 136–145)
WBC NRBC COR # BLD: 16.93 10*3/MM3 (ref 4.5–10.7)

## 2018-07-11 PROCEDURE — 80048 BASIC METABOLIC PNL TOTAL CA: CPT | Performed by: INTERNAL MEDICINE

## 2018-07-11 PROCEDURE — 85025 COMPLETE CBC W/AUTO DIFF WBC: CPT | Performed by: INTERNAL MEDICINE

## 2018-07-11 PROCEDURE — 25010000002 PIPERACILLIN SOD-TAZOBACTAM PER 1 G: Performed by: HOSPITALIST

## 2018-07-11 PROCEDURE — 25010000002 VANCOMYCIN 10 G RECONSTITUTED SOLUTION: Performed by: HOSPITALIST

## 2018-07-11 PROCEDURE — 99024 POSTOP FOLLOW-UP VISIT: CPT | Performed by: SURGERY

## 2018-07-11 RX ADMIN — TAZOBACTAM SODIUM AND PIPERACILLIN SODIUM 3.38 G: 375; 3 INJECTION, SOLUTION INTRAVENOUS at 02:31

## 2018-07-11 RX ADMIN — AMLODIPINE BESYLATE 10 MG: 10 TABLET ORAL at 09:17

## 2018-07-11 RX ADMIN — VANCOMYCIN HYDROCHLORIDE 1500 MG: 10 INJECTION, POWDER, LYOPHILIZED, FOR SOLUTION INTRAVENOUS at 09:17

## 2018-07-11 RX ADMIN — CARVEDILOL 12.5 MG: 12.5 TABLET, FILM COATED ORAL at 09:17

## 2018-07-11 RX ADMIN — ACETAMINOPHEN 500 MG: 500 TABLET, FILM COATED ORAL at 00:32

## 2018-07-11 RX ADMIN — SENNOSIDES 8.6 MG: 8.6 TABLET, FILM COATED ORAL at 06:04

## 2018-07-11 RX ADMIN — Medication 1 CAPSULE: at 06:04

## 2018-07-11 RX ADMIN — ASPIRIN 325 MG: 325 TABLET, DELAYED RELEASE ORAL at 09:17

## 2018-07-11 RX ADMIN — CARVEDILOL 12.5 MG: 12.5 TABLET, FILM COATED ORAL at 18:54

## 2018-07-11 RX ADMIN — TAZOBACTAM SODIUM AND PIPERACILLIN SODIUM 3.38 G: 375; 3 INJECTION, SOLUTION INTRAVENOUS at 20:27

## 2018-07-11 RX ADMIN — TAZOBACTAM SODIUM AND PIPERACILLIN SODIUM 3.38 G: 375; 3 INJECTION, SOLUTION INTRAVENOUS at 11:52

## 2018-07-11 RX ADMIN — LOSARTAN POTASSIUM 100 MG: 100 TABLET, FILM COATED ORAL at 09:17

## 2018-07-11 RX ADMIN — OXYCODONE HYDROCHLORIDE AND ACETAMINOPHEN 1 TABLET: 5; 325 TABLET ORAL at 12:50

## 2018-07-11 RX ADMIN — ESCITALOPRAM 20 MG: 20 TABLET, FILM COATED ORAL at 20:27

## 2018-07-11 NOTE — PROGRESS NOTES
"General Surgery  Progress Note    CC: Follow-up perirectal abscess    POD#1 I&D perirectal abscess    S: Still has gupta catheter due to urinary retention. Perirectal pain much better following I&D of large abscess yesterday    O:/73   Pulse 67   Temp 98 °F (36.7 °C) (Oral)   Resp 16   Ht 180.3 cm (71\")   Wt 93.4 kg (206 lb)   SpO2 90%   BMI 28.73 kg/m²       Intake & Output (last day)       07/10 0701 - 07/11 0700 07/11 0701 - 07/12 0700    I.V. (mL/kg) 650 (7)     IV Piggyback      Total Intake(mL/kg) 650 (7)     Urine (mL/kg/hr) 2100 (0.9)     Blood 10 (0)     Total Output 2110      Net -1460            Unmeasured Urine Occurrence 1 x             GENERAL: alert, well appearing, and in no distress  HEENT: normocephalic, atraumatic, moist mucous membranes, clear sclera   CHEST: clear to auscultation, no wheezes, rales or rhonchi, symmetric air entry  CARDIAC: regular rate and rhythm    ABDOMEN: soft, nondistended  EXTREMITIES: no cyanosis, clubbing, or edema   SKIN: Warm and moist, no rashes  RECTUM: right posterior perianal incision with packing intact, no surrounding erythema    LABS    Results from last 7 days  Lab Units 07/11/18  0551 07/10/18  0622 07/09/18  1644   WBC 10*3/mm3 16.93* 17.80* 19.34*   HEMOGLOBIN g/dL 9.6* 9.8* 10.3*   HEMATOCRIT % 30.6* 30.5* 31.2*   PLATELETS 10*3/mm3 265 277 273       Results from last 7 days  Lab Units 07/11/18  0551 07/10/18  0622 07/09/18  1644   SODIUM mmol/L 130* 131* 128*   POTASSIUM mmol/L 4.0 3.5 3.5   CHLORIDE mmol/L 95* 95* 91*   CO2 mmol/L 25.3 20.8* 25.3   BUN mg/dL 11 15 11   CREATININE mg/dL 1.09 1.08 1.05   CALCIUM mg/dL 8.1* 7.9* 8.4*   BILIRUBIN mg/dL  --   --  0.9   ALK PHOS U/L  --   --  69   ALT (SGPT) U/L  --   --  19   AST (SGOT) U/L  --   --  20   GLUCOSE mg/dL 100* 106* 112*             A/P: 63 y.o. male POD#1 I&D perirectal abscess    Keep packing in place for today, change ABD pad as needed for ongoing drainage from abscess cavity. I " wrote to d/c his gupta, as I suspect his urinary retention was secondary to the perirectal abscess which is now drained. OK to discharge home once he has been afebrile for 24 hours. Follow-up wound culture and adjust antibiotics accordingly.    Porsha Arcos MD  General and Endoscopic Surgery  [facility]    [address], [address]  [place], [place], [address]  P: [phone]  F: [phone]

## 2018-07-11 NOTE — PROGRESS NOTES
Patient had surgery on his left Achilles on 7/3/18.  He contacted me the morning of 7/9/18 tingling that he been having fevers.  He told me that time that his ankle was not hurting but he had severe perianal pain.  He subsequently went to the emergency room and had surgery yesterday for perirectal abscess.  I came back to see him today and he was resting comfortably his splint was intact and he did not demonstrate any significant pain with ability to flex and extend his toes and his calf was nontender without sign of DVT.    I reviewed with he and with Dr. Chowdhury personally that obviously this is a very serious situation if he were to see his operative site he could have catastrophic consequences including loss of the leg.  They will continue with IV antibiotics for now and I will come back tomorrow to change his dressing and inspect his wound.

## 2018-07-11 NOTE — PLAN OF CARE
Problem: Patient Care Overview  Goal: Plan of Care Review  Outcome: Ongoing (interventions implemented as appropriate)   07/11/18 5353   Coping/Psychosocial   Plan of Care Reviewed With patient   Plan of Care Review   Progress no change   OTHER   Outcome Summary Pt had no c/o pain this shift. Pt had low-grade fever, treated with tylenol. Replaced ABD on pt rectum x2, moderate serosanguineous drainage. VSS. Will continue to monitor.       Problem: Skin Injury Risk (Adult)  Goal: Identify Related Risk Factors and Signs and Symptoms  Outcome: Ongoing (interventions implemented as appropriate)    Goal: Skin Health and Integrity  Outcome: Ongoing (interventions implemented as appropriate)

## 2018-07-11 NOTE — PROGRESS NOTES
LOS: 2 days   Patient Care Team:  Montse Cain PA-C as PCP - General (Family Medicine)  Maria Antonia Lopez MD as Consulting Physician (Rheumatology)  Sawyer Rick MD as Surgeon (Neurosurgery)  Pradip Mcmillan MD as Consulting Physician (Pulmonary Disease)  Jacky Perez MD as Surgeon (Orthopedic Surgery)  Charli Sen MD as Consulting Physician (Infectious Diseases)  Tray Moody MD as Consulting Physician (Urology)  Suman Richards DPM as Consulting Physician (Podiatry)    Chief Complaint   Patient presents with   • Rectal Pain     x3 days         Subjective   Doing ok. Pain much better. Asking about discharge.     Objective     Vital Signs  Temp:  [98 °F (36.7 °C)-101.8 °F (38.8 °C)] 99.4 °F (37.4 °C)  Heart Rate:  [67-96] 67  Resp:  [12-18] 16  BP: (103-150)/(63-91) 103/63    Physical Exam:  WDWN male in NAD. Alert & oriented.  Lungs clear  Heart RRR   Abd soft, NT, BS+  Ext no edema on right. LLE in ACE wrap.   Skin warm & dry       Results Review:     I reviewed the patient's new clinical results.    Medication Review:       LABS    Results from last 7 days  Lab Units 07/11/18  0551 07/10/18  0622 07/09/18  1644   SODIUM mmol/L 130* 131* 128*   POTASSIUM mmol/L 4.0 3.5 3.5   CHLORIDE mmol/L 95* 95* 91*   CO2 mmol/L 25.3 20.8* 25.3   BUN mg/dL 11 15 11   CREATININE mg/dL 1.09 1.08 1.05   GLUCOSE mg/dL 100* 106* 112*   CALCIUM mg/dL 8.1* 7.9* 8.4*       Results from last 7 days  Lab Units 07/11/18  0551 07/10/18  0622 07/09/18  1644   WBC 10*3/mm3 16.93* 17.80* 19.34*   HEMOGLOBIN g/dL 9.6* 9.8* 10.3*   HEMATOCRIT % 30.6* 30.5* 31.2*   PLATELETS 10*3/mm3 265 277 273               Assessment/Plan     Principal Problem:    Abscess and cellulitis of gluteal region - WBC improving but still high. Discussed with Dr Smith. With recent tendon surgery, his WBC needs to be normal before changing to po antibx.  Active Problems:    COPD (chronic obstructive pulmonary disease) (CMS/HCC)     Rheumatoid arthritis (CMS/HCC)    Hypertension    Calcific Achilles tendonitis s/p OR 7/18    Tobacco abuse    Perirectal abscess    Anemia    Metabolic acidosis - resolved          Aleida Chowdhury MD  07/11/18  3:03 PM      Time:

## 2018-07-11 NOTE — PLAN OF CARE
Problem: Patient Care Overview  Goal: Plan of Care Review  Outcome: Ongoing (interventions implemented as appropriate)   07/11/18 0926 07/11/18 0912   Coping/Psychosocial   Plan of Care Reviewed With patient --    Plan of Care Review   Progress --  improving   OTHER   Outcome Summary --  no c/o pain today; iv antibiotics x2; up in chair today; pt hopeful for dc home tomorrow     Goal: Individualization and Mutuality  Outcome: Ongoing (interventions implemented as appropriate)    Goal: Discharge Needs Assessment  Outcome: Ongoing (interventions implemented as appropriate)    Goal: Interprofessional Rounds/Family Conf  Outcome: Ongoing (interventions implemented as appropriate)      Problem: Skin Injury Risk (Adult)  Goal: Identify Related Risk Factors and Signs and Symptoms  Outcome: Ongoing (interventions implemented as appropriate)    Goal: Skin Health and Integrity  Outcome: Ongoing (interventions implemented as appropriate)

## 2018-07-12 VITALS
TEMPERATURE: 97.4 F | HEART RATE: 69 BPM | BODY MASS INDEX: 28.84 KG/M2 | OXYGEN SATURATION: 94 % | SYSTOLIC BLOOD PRESSURE: 140 MMHG | WEIGHT: 206 LBS | RESPIRATION RATE: 16 BRPM | DIASTOLIC BLOOD PRESSURE: 81 MMHG | HEIGHT: 71 IN

## 2018-07-12 LAB
ANION GAP SERPL CALCULATED.3IONS-SCNC: 14.7 MMOL/L
BASOPHILS # BLD AUTO: 0.06 10*3/MM3 (ref 0–0.2)
BASOPHILS NFR BLD AUTO: 0.5 % (ref 0–1.5)
BUN BLD-MCNC: 9 MG/DL (ref 8–23)
BUN/CREAT SERPL: 9 (ref 7–25)
CALCIUM SPEC-SCNC: 8.5 MG/DL (ref 8.6–10.5)
CHLORIDE SERPL-SCNC: 99 MMOL/L (ref 98–107)
CO2 SERPL-SCNC: 25.3 MMOL/L (ref 22–29)
CREAT BLD-MCNC: 1 MG/DL (ref 0.76–1.27)
DEPRECATED RDW RBC AUTO: 42.5 FL (ref 37–54)
EOSINOPHIL # BLD AUTO: 0.37 10*3/MM3 (ref 0–0.7)
EOSINOPHIL NFR BLD AUTO: 3 % (ref 0.3–6.2)
ERYTHROCYTE [DISTWIDTH] IN BLOOD BY AUTOMATED COUNT: 12.9 % (ref 11.5–14.5)
GFR SERPL CREATININE-BSD FRML MDRD: 75 ML/MIN/1.73
GLUCOSE BLD-MCNC: 91 MG/DL (ref 65–99)
HCT VFR BLD AUTO: 31.8 % (ref 40.4–52.2)
HGB BLD-MCNC: 10.1 G/DL (ref 13.7–17.6)
IMM GRANULOCYTES # BLD: 0.06 10*3/MM3 (ref 0–0.03)
IMM GRANULOCYTES NFR BLD: 0.5 % (ref 0–0.5)
LYMPHOCYTES # BLD AUTO: 1.86 10*3/MM3 (ref 0.9–4.8)
LYMPHOCYTES NFR BLD AUTO: 15 % (ref 19.6–45.3)
MCH RBC QN AUTO: 28.9 PG (ref 27–32.7)
MCHC RBC AUTO-ENTMCNC: 31.8 G/DL (ref 32.6–36.4)
MCV RBC AUTO: 90.9 FL (ref 79.8–96.2)
MONOCYTES # BLD AUTO: 1.28 10*3/MM3 (ref 0.2–1.2)
MONOCYTES NFR BLD AUTO: 10.3 % (ref 5–12)
NEUTROPHILS # BLD AUTO: 8.8 10*3/MM3 (ref 1.9–8.1)
NEUTROPHILS NFR BLD AUTO: 70.7 % (ref 42.7–76)
PLATELET # BLD AUTO: 310 10*3/MM3 (ref 140–500)
PMV BLD AUTO: 9.7 FL (ref 6–12)
POTASSIUM BLD-SCNC: 4 MMOL/L (ref 3.5–5.2)
RBC # BLD AUTO: 3.5 10*6/MM3 (ref 4.6–6)
SODIUM BLD-SCNC: 139 MMOL/L (ref 136–145)
WBC NRBC COR # BLD: 12.43 10*3/MM3 (ref 4.5–10.7)

## 2018-07-12 PROCEDURE — 80048 BASIC METABOLIC PNL TOTAL CA: CPT | Performed by: INTERNAL MEDICINE

## 2018-07-12 PROCEDURE — 99024 POSTOP FOLLOW-UP VISIT: CPT | Performed by: SURGERY

## 2018-07-12 PROCEDURE — 85025 COMPLETE CBC W/AUTO DIFF WBC: CPT | Performed by: INTERNAL MEDICINE

## 2018-07-12 PROCEDURE — 25010000002 PIPERACILLIN SOD-TAZOBACTAM PER 1 G: Performed by: HOSPITALIST

## 2018-07-12 RX ORDER — AMOXICILLIN AND CLAVULANATE POTASSIUM 875; 125 MG/1; MG/1
1 TABLET, FILM COATED ORAL EVERY 12 HOURS SCHEDULED
Status: DISCONTINUED | OUTPATIENT
Start: 2018-07-12 | End: 2018-07-12 | Stop reason: HOSPADM

## 2018-07-12 RX ORDER — AMOXICILLIN AND CLAVULANATE POTASSIUM 875; 125 MG/1; MG/1
1 TABLET, FILM COATED ORAL EVERY 12 HOURS SCHEDULED
Qty: 11 TABLET | Refills: 0 | Status: SHIPPED | OUTPATIENT
Start: 2018-07-12 | End: 2018-07-16

## 2018-07-12 RX ADMIN — CARVEDILOL 12.5 MG: 12.5 TABLET, FILM COATED ORAL at 08:48

## 2018-07-12 RX ADMIN — SENNOSIDES 8.6 MG: 8.6 TABLET, FILM COATED ORAL at 06:46

## 2018-07-12 RX ADMIN — ASPIRIN 325 MG: 325 TABLET, DELAYED RELEASE ORAL at 08:48

## 2018-07-12 RX ADMIN — CARVEDILOL 12.5 MG: 12.5 TABLET, FILM COATED ORAL at 17:24

## 2018-07-12 RX ADMIN — Medication 1 CAPSULE: at 06:46

## 2018-07-12 RX ADMIN — AMLODIPINE BESYLATE 10 MG: 10 TABLET ORAL at 08:48

## 2018-07-12 RX ADMIN — LOSARTAN POTASSIUM 100 MG: 100 TABLET, FILM COATED ORAL at 08:48

## 2018-07-12 RX ADMIN — TAZOBACTAM SODIUM AND PIPERACILLIN SODIUM 3.38 G: 375; 3 INJECTION, SOLUTION INTRAVENOUS at 04:30

## 2018-07-12 RX ADMIN — TAZOBACTAM SODIUM AND PIPERACILLIN SODIUM 3.38 G: 375; 3 INJECTION, SOLUTION INTRAVENOUS at 11:58

## 2018-07-12 NOTE — PROGRESS NOTES
Continued Stay Note  [facility]     Patient Name: Prem Thrasher  MRN: [id number]  Today's Date: 7/12/2018    Admit Date: 7/9/2018          Discharge Plan     Row Name 07/12/18 1447       Plan    Plan return home with wife    Patient/Family in Agreement with Plan yes    Plan Comments Spoke with patient and wife, Linda, at bedside.  They deny any needs and patient plans to return home.  CCP will follow. Suad Guillermo RN              Discharge Codes    No documentation.           Suad Guillermo RN

## 2018-07-12 NOTE — PLAN OF CARE
Problem: Patient Care Overview  Goal: Plan of Care Review  Outcome: Ongoing (interventions implemented as appropriate)   07/12/18 1630   Coping/Psychosocial   Plan of Care Reviewed With patient   Plan of Care Review   Progress improving   OTHER   Outcome Summary VSS Appetite good. Left leg/ foot dressing changed per surgeon. Perirectal area with moderate amt. serous drainage. No complaints of pain. Pt provided instruction and given sitz bath equipment for home use. Will continue to monitor. 07/12/18       Problem: Skin Injury Risk (Adult)  Goal: Identify Related Risk Factors and Signs and Symptoms  Outcome: Ongoing (interventions implemented as appropriate)    Goal: Skin Health and Integrity  Outcome: Ongoing (interventions implemented as appropriate)

## 2018-07-12 NOTE — PROGRESS NOTES
"General Surgery  Progress Note    CC: Follow-up perirectal abscess    POD#2 I&D perirectal abscess    S: Packing came out this morning while he was having a BM. He still has some purulent fluid draining from the abscess cavity but his pain is much improved.    O:/89 (BP Location: Left arm, Patient Position: Lying)   Pulse 74   Temp 98.3 °F (36.8 °C) (Oral)   Resp 18   Ht 180.3 cm (71\")   Wt 93.4 kg (206 lb)   SpO2 97%   BMI 28.73 kg/m²       Intake & Output (last day)       07/11 0701 - 07/12 0700 07/12 0701 - 07/13 0700    P.O. 360 240    I.V. (mL/kg)      IV Piggyback 100     Total Intake(mL/kg) 460 (4.9) 240 (2.6)    Urine (mL/kg/hr) 3550 (1.6) 925 (2.5)    Stool 0 (0)     Blood      Total Output 3550 925    Net -3090 -685          Unmeasured Stool Occurrence 1 x             GENERAL: alert, well appearing, and in no distress  HEENT: normocephalic, atraumatic, moist mucous membranes, clear sclera   CHEST: clear to auscultation, no wheezes, rales or rhonchi, symmetric air entry  CARDIAC: regular rate and rhythm    ABDOMEN: soft, nondistended  EXTREMITIES: no cyanosis, clubbing, or edema   SKIN: Warm and moist, no rashes  RECTUM: right posterior perianal incision with packing out and purulent fluid on his ABD pad, no surrounding skin erythema    LABS    Results from last 7 days  Lab Units 07/12/18  0452 07/11/18  0551 07/10/18  0622   WBC 10*3/mm3 12.43* 16.93* 17.80*   HEMOGLOBIN g/dL 10.1* 9.6* 9.8*   HEMATOCRIT % 31.8* 30.6* 30.5*   PLATELETS 10*3/mm3 310 265 277       Results from last 7 days  Lab Units 07/12/18  0452 07/11/18  0551 07/10/18  0622 07/09/18  1644   SODIUM mmol/L 139 130* 131* 128*   POTASSIUM mmol/L 4.0 4.0 3.5 3.5   CHLORIDE mmol/L 99 95* 95* 91*   CO2 mmol/L 25.3 25.3 20.8* 25.3   BUN mg/dL 9 11 15 11   CREATININE mg/dL 1.00 1.09 1.08 1.05   CALCIUM mg/dL 8.5* 8.1* 7.9* 8.4*   BILIRUBIN mg/dL  --   --   --  0.9   ALK PHOS U/L  --   --   --  69   ALT (SGPT) U/L  --   --   --  19 "   AST (SGOT) U/L  --   --   --  20   GLUCOSE mg/dL 91 100* 106* 112*             A/P: 63 y.o. male POD#2 I&D perirectal abscess    OK to d/c home on Augmentin x7 days to treat the streptococcus in the perirectal abscess. His packing already fell out and does not need to be replaced. He should perform Sitz baths after every BM for the next two weeks while the abscess heals. He should follow-up with me in 2 weeks and has my card. He knows to call to schedule the appointment.    Porsha Arcos MD  General and Endoscopic Surgery  [facility]    [address], [address]  [place], [place], [address]  P: [phone]  F: [phone]

## 2018-07-12 NOTE — PROGRESS NOTES
Patient had left Achilles surgery on 7/3/18 and after surgery developed a perirectal abscess and underwent treatment for that earlier this week.  He was supposed of followed up in the office today and I checked on him yesterday.  On exam today states he is really not having any pain at the ankle and it has felt much better since his surgery.  I removed his splint and dressings his proximal wound was healing very nicely his distal wound appeared to be well approximated but had some slight maceration but no evidence of full-thickness breakdown.  There was no warmth erythema or sign of infection.  The wound was then carefully painted with Betadine and clean sterile dressings were reapplied to both wounds and the leg and heel were well-padded with cast padding and ABDs as was the forefoot.  I reapplied short-leg posterior splint in plantarflexion with instructions to continue with elevation and nonweightbearing and he will call to schedule follow-up appointment in about one week

## 2018-07-13 LAB
BACTERIA FLD CULT: ABNORMAL
GRAM STN SPEC: ABNORMAL

## 2018-07-13 NOTE — PROGRESS NOTES
Case Management Discharge Note    Final Note: DC home via private auto. Suad Guillermo RN    Destination     No service has been selected for the patient.      Durable Medical Equipment     No service has been selected for the patient.      Dialysis/Infusion     No service has been selected for the patient.      Home Medical Care     No service has been selected for the patient.      Social Care     No service has been selected for the patient.        Other: Other (private auto)    Final Discharge Disposition Code: 01 - home or self-care

## 2018-07-13 NOTE — DISCHARGE SUMMARY
Date of Discharge:  7/12/2018  Date of Admit: 7/9/2018    Discharge Diagnosis:  Principal Problem:    Abscess and cellulitis of gluteal region  Active Problems:    COPD (chronic obstructive pulmonary disease) (CMS/[facility])    Rheumatoid arthritis (CMS/[facility])    Hypertension    Calcific Achilles tendonitis s/p OR 7/18    Tobacco abuse    Perirectal abscess    Anemia    Metabolic acidosis      Procedures Performed  Procedure(s):  INCISION AND DRAINAGE OF PERIRECTAL ABSCESS       Consults     Date and Time Order Name Status Description    7/9/2018 1821 [facility] (on-call MD unless specified) Completed     7/9/2018 180 Surgery (on-call MD unless specified) Completed             Hospital Course:   62 yo male who had recently undergone surgery on his left Achilles tendon, who presented with rectal pain & intermittent fevers. He was found to have a perirectal abscess with cellulitis. He was admitted & started on IV antibiotics. He was seen in consultation by general surgery who subsequently taken to surgery for I&D of perirectal abscess. He tolerated this well & was tolerating a regular diet. His white count had improved the next day but was even better today. He was very anxious for discharge. His culture grew strep species. He has been on zosyn so changing to augmentin should cover him. The anaerobic cultures are still pending & that was of concern, but given that his white count had markedly improved, it seemed likely that augmentin was going to cover things.      Physical Exam:  WDWN male in NAD. Alert & oriented.  Lungs clear  Heart RRR  Abd soft, NT, BS+  Ext no edema.  Skin warm & dry      Discharge Medications     Discharge Medications      New Medications      Instructions Start Date   amoxicillin-clavulanate 875-125 MG per tablet  Commonly known as:  AUGMENTIN   1 tablet, Oral, Every 12 Hours Scheduled         Changes to Medications      Instructions Start Date   amLODIPine 10 MG tablet  Commonly known as:  NORVASC  What  changed:  · when to take this  · additional instructions   10 mg, Oral, Daily, For BP      irbesartan 300 MG tablet  Commonly known as:  AVAPRO  What changed:  · when to take this  · additional instructions   300 mg, Oral, Daily, for blood pressure         Continue These Medications      Instructions Start Date   aspirin  MG tablet   325 mg, Oral, Daily      carvedilol 12.5 MG tablet  Commonly known as:  COREG   12.5 mg, Oral, 2 Times Daily With Meals, For BP      [employer] SENNA 8.6 MG tablet  Generic drug:  senna   1 tablet, Oral, Every Morning      cyclobenzaprine 10 MG tablet  Commonly known as:  FLEXERIL   10 mg, Oral, Nightly PRN      escitalopram 20 MG tablet  Commonly known as:  LEXAPRO   20 mg, Oral, Nightly, For anxiety      ezetimibe 10 MG tablet  Commonly known as:  ZETIA   10 mg, Oral, Every Evening, For cholesterol      leflunomide 20 MG tablet  Commonly known as:  ARAVA   20 mg, Oral, Every Morning      oxyCODONE-acetaminophen 5-325 MG per tablet  Commonly known as:  PERCOCET   1-2 tablets, Oral, Every 4 Hours PRN      PROBIOTIC PO   1 tablet, Oral, Every Morning      tiotropium 18 MCG per inhalation capsule  Commonly known as:  SPIRIVA   1 capsule, Inhalation, As Needed      Vitamin D 2000 units tablet   2,000 Units, Oral, Every Evening         Stop These Medications    meloxicam 15 MG tablet  Commonly known as:  MOBIC              Activity at Discharge:     Follow-up Appointments  Additional Instructions for the Follow-ups that You Need to Schedule     Discharge Follow-up with Specified Provider: Dr Smith; 1 Week    As directed      To:  Dr Smith    Follow Up:  1 Week           Follow-up Information     Porsha Arcos MD Follow up in 2 week(s).    Specialty:  General Surgery  Why:  Call for appointment.  Contact information:  [address] [address] [phone]             Montse Cain PA-C .    Specialty:  Family Medicine  Contact information:  [id number]  [address] [address]  [phone]                   DISCHARGE DISPOSITION:     Greater than 30 minutes spent on discharge      Aleida Chowdhury MD  07/12/18  8:12 PM

## 2018-07-14 LAB
BACTERIA SPEC AEROBE CULT: NORMAL
BACTERIA SPEC AEROBE CULT: NORMAL

## 2018-07-15 LAB — BACTERIA SPEC ANAEROBE CULT: ABNORMAL

## 2018-07-16 ENCOUNTER — TELEPHONE (OUTPATIENT)
Dept: SURGERY | Facility: CLINIC | Age: 63
End: 2018-07-16

## 2018-07-16 RX ORDER — CLINDAMYCIN HYDROCHLORIDE 300 MG/1
300 CAPSULE ORAL 3 TIMES DAILY
Qty: 15 CAPSULE | Refills: 0 | Status: SHIPPED | OUTPATIENT
Start: 2018-07-16 | End: 2018-07-21

## 2018-07-16 NOTE — TELEPHONE ENCOUNTER
I called and spoke with Mr. Thrasher regarding the abscess culture results from his perirectal abscess that was drained last week. The streptococcus is indeterminate towards all penicillin drugs and he was discharged home on Augmentin. He reports he is still having some drainage from the abscess, so I have e-prescribed Clindamycin for him to take in place of the Augmentin, only for another 5 days. He expressed understanding and will stop taking the Augmentin today.    Porsha Arcos MD  General and Endoscopic Surgery  [facility]    [address], [address]  [place], [place], [address]  P: [phone]  F: [phone]

## 2018-07-18 ENCOUNTER — OFFICE VISIT (OUTPATIENT)
Dept: ORTHOPEDIC SURGERY | Facility: CLINIC | Age: 63
End: 2018-07-18

## 2018-07-18 VITALS — TEMPERATURE: 98.4 F | BODY MASS INDEX: 28.84 KG/M2 | WEIGHT: 206 LBS | HEIGHT: 71 IN

## 2018-07-18 DIAGNOSIS — M65.28 CALCIFIC ACHILLES TENDONITIS: Primary | ICD-10-CM

## 2018-07-18 DIAGNOSIS — M92.62 HAGLUND'S DEFORMITY OF LEFT HEEL: ICD-10-CM

## 2018-07-18 DIAGNOSIS — T81.30XA WOUND DEHISCENCE: ICD-10-CM

## 2018-07-18 PROCEDURE — 99024 POSTOP FOLLOW-UP VISIT: CPT | Performed by: ORTHOPAEDIC SURGERY

## 2018-07-18 RX ORDER — CYCLOBENZAPRINE HCL 10 MG
TABLET ORAL
Qty: 30 TABLET | Refills: 0 | Status: SHIPPED | OUTPATIENT
Start: 2018-07-18 | End: 2018-08-15 | Stop reason: SDUPTHER

## 2018-07-18 NOTE — PROGRESS NOTES
"Ankle Follow Up      Patient: Prem Thrasher    YOB: 1955 63 y.o. male    Chief Complaints: Ankle doing okay    History of Present Illness:  had left Achilles debridement and secondary repair with resection of Allegra deformity and gastroc recession on 7/3/18.  His postoperative period he was admitted with a perirectal abscess and underwent incision and drainage of that and was discharged on oral antibiotics.  I saw him was in the hospital last week and had some mild maceration to his heel wound and his splint was changed.  He has minimal if any complaints of pain to the left heel.  HPI    ROS: ankle pain , no fevers chills chest pain or shortness of breath  Past Medical History:   Diagnosis Date   • Achilles tendon pain     LEFT   • Anxiety disorder    • COPD (chronic obstructive pulmonary disease) (CMS/HCC)    • CTS (carpal tunnel syndrome)    • Diverticulitis    • Diverticulosis    • Encounter for eye exam 10/2014    normal   • History of bone density study 03/2014    Dr. Maria Antonia Lopez; normal   • History of chest x-ray 02/15/2011    also 3-6-15; normal chest   • History of colon polyps    • History of nuclear stress test 03/09/2015    cardiolite; Dr. Greenberg; negative   • History of pulmonary function tests 03/2015    COPD   • Hyperlipidemia    • Hypertension    • IFG (impaired fasting glucose)    • Low back pain    • Lumbosacral disc disease    • Nerve damage     KAYLYNN ELBOWS   • Osteoarthritis, multiple sites    • Postoperative nausea and vomiting 8/1/2017   • Rheumatoid arthritis (CMS/HCC)    • Sciatica    • Staph infection     WITH BACK SURGERY 2017  ON LONG TERM ANTIBIOTICS       Physical Exam:   Vitals:    07/18/18 1026   Temp: 98.4 °F (36.9 °C)   Weight: 93.4 kg (206 lb)   Height: 180.3 cm (71\")     Well developed with normal mood.Left leg proximal incision is healing very nicely.  The distal incision has some maceration was about 1 mm of wound dehiscence over the distal one fourth.  There is " no sign of full-thickness breakdown at this time no warmth erythema or drainage.  Left calf is nontender without sign of DVT      Radiology: None performed      Assessment/Plan:status post left Achilles surgery as outlined above.  Thanks that I'll see any sign of infection to his Achilles and discussed his wound with him.  Sutures and staples removed and several Steri-Strips were applied.  He will paint the area twice daily with Betadine and apply clean sterile dressing.  He is to remain nonweightbearing and was fitted with a boot with double heel lift for use when he is up and about to protect this but will remove this at rest and understands how to elevate and rest this with no pressure on the heel.    I will check him back in one week with lateral x-ray of his left heel.

## 2018-07-19 ENCOUNTER — OFFICE VISIT (OUTPATIENT)
Dept: SURGERY | Facility: CLINIC | Age: 63
End: 2018-07-19

## 2018-07-19 DIAGNOSIS — K61.1 PERIRECTAL ABSCESS: Primary | ICD-10-CM

## 2018-07-19 PROCEDURE — 99024 POSTOP FOLLOW-UP VISIT: CPT | Performed by: SURGERY

## 2018-07-19 NOTE — PROGRESS NOTES
CHIEF COMPLAINT:   Chief Complaint   Patient presents with   • Post-op     Incision and drainage of perirectal abscess, rectal exam under anesthesia on 7/10/2018       HISTORY OF PRESENT ILLNESS:  This is a 63 y.o. male who presents for a post-operative visit after undergoing incision and drainage of large perirectal abscess on 7/10/2018.  The micro-biology returned positive for Streptococcus and Prevotella. He was initially discharged on Augmentin but I switched him to Clindamycin based on the microbiology sensitivities. He has been tolerating antibiotics without diarrhea and has had no residual rectal or anal pain, especially with defecation which had previously been exquisitely painful for him.  He has still experienced some mild purulent drainage from the incision, but this is improving day by day.  He is doing twice daily sitz baths.    Microbiology:   Streptococcus pluranimalium and prevotella    PHYSICAL EXAM:  Lungs: Clear  Heart: RRR  ABD: soft, nontender  RECTUM: Perianal incision not completely healed, with scant purulent drainage on his surrounding skin but no surrounding erythema    A/P:  This is a 63 y.o. male patient who is S/P incision and drainage of a large perirectal abscess    He is healing well, and I advised him to take the clindamycin to completion.  I do not think he needs any further antibiotics beyond the next 2 days.  I advised him to continue sitz baths until the drainage completely ceases, as there is still a small skin opening remaining and the abscess simply needs to heal from the inside out.  I would like to see him back in the office in about 4 weeks, for recheck.  If he is not completely healed by then, I will plan to proceed with a rectal exam under anesthesia to rule out an anal fistula.    Porsha Arcos MD  General and Endoscopic Surgery  [facility]    [address], [address]  [place], [place], [address]  P: [phone]  F: [phone]

## 2018-07-24 NOTE — PROGRESS NOTES
Ankle Follow Up      Patient: Prem Thrasher    YOB: 1955 63 y.o. male    Chief Complaints: Ankle doing okay    History of Present Illness: had left Achilles debridement and secondary repair with resection of Allegra deformity and gastroc recession on 7/3/18.  His postoperative period he was admitted with a perirectal abscess and underwent incision and drainage of that and was discharged on oral antibiotics.  I saw him was in the hospital and had some mild maceration to his heel wound and his splint was changed.  He was last seen in the office on 7/18/18 with some mild maceration and wound dehiscence but no sign of infection.  Instructions were given to continue nonweightbearing and wound care with Betadine.       He has since continue with nonweightbearing and protection with the boot is been painting the area with Betadine.  He reports no drainage and really no significant pain.  HPI    ROS: No ankle pain, fevers chills chest pain or shortness of breath  Past Medical History:   Diagnosis Date   • Achilles tendon pain     LEFT   • Anxiety disorder    • COPD (chronic obstructive pulmonary disease) (CMS/HCC)    • CTS (carpal tunnel syndrome)    • Diverticulitis    • Diverticulitis of colon    • Diverticulosis    • Encounter for eye exam 10/2014    normal   • Fissure, anal    • History of bone density study 03/2014    Dr. Maria Antonia Lopez; normal   • History of chest x-ray 02/15/2011    also 3-6-15; normal chest   • History of colon polyps    • History of nuclear stress test 03/09/2015    cardiolite; Dr. Greenberg; negative   • History of pulmonary function tests 03/2015    COPD   • Hyperlipidemia    • Hypertension    • IFG (impaired fasting glucose)    • Low back pain    • Lumbosacral disc disease    • Nerve damage     KAYLYNN ELBOWS   • Osteoarthritis, multiple sites    • Postoperative nausea and vomiting 8/1/2017   • Postoperative wound infection    • Rectal bleeding    • Rheumatoid arthritis (CMS/HCC)    •  "Sciatica    • Staph infection     WITH BACK SURGERY 2017  ON LONG TERM ANTIBIOTICS       Physical Exam:   Vitals:    07/25/18 1034   Temp: 98.3 °F (36.8 °C)   Weight: 90.7 kg (200 lb)   Height: 180.3 cm (71\")   PainSc:   3     Well developed with normal mood.He is with his wife.  His incision is improving and I don't see any further dehiscence.  I removed his Steri-Strips and there remained one small area at the distal one third with some slight maceration over the lateral wound border.  This macerated skin was removed as well as some crusting and there was no evidence of any type of full-thickness breakdown.  No warmth erythema or drainage.  Proximal incision is well-healed and calf is nontender without sign of DVT      Radiology:Lateral view of the left heel was obtained to evaluate postoperative alignment reviewed and compared with preoperative views.  These show adequate decompression of the posterior calcaneus and Achilles insertion      Assessment/Plan:  Status post left Achilles surgery as outlined above with slight wound dehiscence    Overall seems to be improved I don't see any sign of infection or full-thickness breakdown.  He will continue with local wound care over this area with Betadine twice a day and clean dressings and will remain nonweightbearing with protection in a boot whenever he is up and about and we will otherwise elevate with no pressure on the heel.    I will see him back in 2 weeks no x-rays       "

## 2018-07-25 ENCOUNTER — OFFICE VISIT (OUTPATIENT)
Dept: ORTHOPEDIC SURGERY | Facility: CLINIC | Age: 63
End: 2018-07-25

## 2018-07-25 VITALS — BODY MASS INDEX: 28 KG/M2 | WEIGHT: 200 LBS | HEIGHT: 71 IN | TEMPERATURE: 98.3 F

## 2018-07-25 DIAGNOSIS — M65.28 CALCIFIC ACHILLES TENDONITIS: Primary | ICD-10-CM

## 2018-07-25 DIAGNOSIS — T81.30XA WOUND DEHISCENCE: ICD-10-CM

## 2018-07-25 PROCEDURE — 73650 X-RAY EXAM OF HEEL: CPT | Performed by: ORTHOPAEDIC SURGERY

## 2018-07-25 PROCEDURE — 99024 POSTOP FOLLOW-UP VISIT: CPT | Performed by: ORTHOPAEDIC SURGERY

## 2018-07-30 RX ORDER — ASPIRIN 325 MG
325 TABLET, DELAYED RELEASE (ENTERIC COATED) ORAL DAILY
Qty: 30 TABLET | Refills: 0 | Status: SHIPPED | OUTPATIENT
Start: 2018-07-30

## 2018-07-31 LAB
25(OH)D3+25(OH)D2 SERPL-MCNC: 38 NG/ML (ref 30–100)
ALBUMIN SERPL-MCNC: 4.3 G/DL (ref 3.6–4.8)
ALBUMIN/GLOB SERPL: 1.6 {RATIO} (ref 1.2–2.2)
ALP SERPL-CCNC: 63 IU/L (ref 39–117)
ALT SERPL-CCNC: 11 IU/L (ref 0–44)
AST SERPL-CCNC: 14 IU/L (ref 0–40)
BASOPHILS # BLD AUTO: 0.1 X10E3/UL (ref 0–0.2)
BASOPHILS NFR BLD AUTO: 2 %
BILIRUB SERPL-MCNC: 0.4 MG/DL (ref 0–1.2)
BUN SERPL-MCNC: 14 MG/DL (ref 8–27)
BUN/CREAT SERPL: 13 (ref 10–24)
CALCIUM SERPL-MCNC: 9.7 MG/DL (ref 8.6–10.2)
CHLORIDE SERPL-SCNC: 101 MMOL/L (ref 96–106)
CHOLEST SERPL-MCNC: 147 MG/DL (ref 100–199)
CO2 SERPL-SCNC: 21 MMOL/L (ref 20–29)
CREAT SERPL-MCNC: 1.04 MG/DL (ref 0.76–1.27)
EOSINOPHIL # BLD AUTO: 0.2 X10E3/UL (ref 0–0.4)
EOSINOPHIL NFR BLD AUTO: 3 %
ERYTHROCYTE [DISTWIDTH] IN BLOOD BY AUTOMATED COUNT: 14.1 % (ref 12.3–15.4)
FOLATE SERPL-MCNC: 14.9 NG/ML
GLOBULIN SER CALC-MCNC: 2.7 G/DL (ref 1.5–4.5)
GLUCOSE SERPL-MCNC: 92 MG/DL (ref 65–99)
HBA1C MFR BLD: 5.6 % (ref 4.8–5.6)
HCT VFR BLD AUTO: 35.2 % (ref 37.5–51)
HDLC SERPL-MCNC: 47 MG/DL
HGB BLD-MCNC: 11.8 G/DL (ref 13–17.7)
IMM GRANULOCYTES # BLD: 0 X10E3/UL (ref 0–0.1)
IMM GRANULOCYTES NFR BLD: 0 %
LDLC SERPL CALC-MCNC: 89 MG/DL (ref 0–99)
LYMPHOCYTES # BLD AUTO: 1.9 X10E3/UL (ref 0.7–3.1)
LYMPHOCYTES NFR BLD AUTO: 32 %
MCH RBC QN AUTO: 29 PG (ref 26.6–33)
MCHC RBC AUTO-ENTMCNC: 33.5 G/DL (ref 31.5–35.7)
MCV RBC AUTO: 87 FL (ref 79–97)
MONOCYTES # BLD AUTO: 0.5 X10E3/UL (ref 0.1–0.9)
MONOCYTES NFR BLD AUTO: 7 %
NEUTROPHILS # BLD AUTO: 3.5 X10E3/UL (ref 1.4–7)
NEUTROPHILS NFR BLD AUTO: 56 %
PLATELET # BLD AUTO: 323 X10E3/UL (ref 150–379)
POTASSIUM SERPL-SCNC: 4.9 MMOL/L (ref 3.5–5.2)
PROT SERPL-MCNC: 7 G/DL (ref 6–8.5)
PSA SERPL-MCNC: 2.3 NG/ML (ref 0–4)
RBC # BLD AUTO: 4.07 X10E6/UL (ref 4.14–5.8)
SODIUM SERPL-SCNC: 136 MMOL/L (ref 134–144)
T4 FREE SERPL-MCNC: 0.93 NG/DL (ref 0.82–1.77)
TRIGL SERPL-MCNC: 57 MG/DL (ref 0–149)
TSH SERPL DL<=0.005 MIU/L-ACNC: 0.65 UIU/ML (ref 0.45–4.5)
VIT B12 SERPL-MCNC: 569 PG/ML (ref 232–1245)
VLDLC SERPL CALC-MCNC: 11 MG/DL (ref 5–40)
WBC # BLD AUTO: 6.1 X10E3/UL (ref 3.4–10.8)

## 2018-08-06 ENCOUNTER — TELEPHONE (OUTPATIENT)
Dept: ORTHOPEDIC SURGERY | Facility: CLINIC | Age: 63
End: 2018-08-06

## 2018-08-09 NOTE — TELEPHONE ENCOUNTER
Lm to return call  wkt [id number] 08/09/18  
PATIENT WANTS TO KNOW IF IT'S OK TO RESUME TAKING HIS CELEBREX & MELOXICAM MEDICATIONS.  
Yes that is fine from my standpoint. thanks  
I have seen and evaluated this patient with the resident.   I agree with the findings  unless other wise stated.  I have made appropriate changes in documentations where needed, After my face to face bedside evaluation, I am further  noting: Level 2 trauma for mechanical fall and head/right shoulder injury.  CT head, c-spine for age and anticoagulation, xray R arm, clean and dress wounds, no lacerations,  On CT abdomen revealed abdominal aortic dissection of unknown duration in light of poorly controlled BP and this finding will admit for BP control and further monitoring --Mendez

## 2018-08-10 ENCOUNTER — OFFICE VISIT (OUTPATIENT)
Dept: ORTHOPEDIC SURGERY | Facility: CLINIC | Age: 63
End: 2018-08-10

## 2018-08-10 VITALS — HEIGHT: 71 IN | TEMPERATURE: 98.2 F | WEIGHT: 200 LBS | BODY MASS INDEX: 28 KG/M2

## 2018-08-10 DIAGNOSIS — M65.28 CALCIFIC ACHILLES TENDONITIS: ICD-10-CM

## 2018-08-10 DIAGNOSIS — T81.30XA WOUND DEHISCENCE: Primary | ICD-10-CM

## 2018-08-10 PROCEDURE — 99024 POSTOP FOLLOW-UP VISIT: CPT | Performed by: ORTHOPAEDIC SURGERY

## 2018-08-10 NOTE — PROGRESS NOTES
Ankle Follow Up      Patient: Prem Thrasher    YOB: 1955 63 y.o. male    Chief Complaints: Ankle feels fine    History of Present Illness:had left Achilles debridement and secondary repair with resection of Allegra deformity and gastroc recession on 7/3/18.  His postoperative period he was admitted with a perirectal abscess and underwent incision and drainage of that and was discharged on oral antibiotics.  I saw him was in the hospital and had some mild maceration to his heel wound and his splint was changed.  He was seen in the office on 7/18/18 with some mild maceration and wound dehiscence but no sign of infection.  Instructions were given to continue nonweightbearing and wound care with Betadine.      He was seen last in the office on 7/25/18 and I debrided a small area of macerated skin and he is been since painting the area with Betadine.  He's been nonweightbearing.  He has no complaints of pain at the ankle or heel  HPI    ROS: No ankle pain, no fevers chills chest pain or shortness of breath  Past Medical History:   Diagnosis Date   • Achilles tendon pain     LEFT   • Anxiety disorder    • COPD (chronic obstructive pulmonary disease) (CMS/HCC)    • CTS (carpal tunnel syndrome)    • Diverticulitis    • Diverticulitis of colon    • Diverticulosis    • Encounter for eye exam 10/2014    normal   • Fissure, anal    • History of bone density study 03/2014    Dr. Maria Antonia Lopez; normal   • History of chest x-ray 02/15/2011    also 3-6-15; normal chest   • History of colon polyps    • History of nuclear stress test 03/09/2015    cardiolite; Dr. Greenberg; negative   • History of pulmonary function tests 03/2015    COPD   • Hyperlipidemia    • Hypertension    • IFG (impaired fasting glucose)    • Low back pain    • Lumbosacral disc disease    • Nerve damage     KAYLYNN ELBOWS   • Osteoarthritis, multiple sites    • Postoperative nausea and vomiting 8/1/2017   • Postoperative wound infection    • Rectal  "bleeding    • Rheumatoid arthritis (CMS/HCC)    • Sciatica    • Staph infection     WITH BACK SURGERY 2017  ON LONG TERM ANTIBIOTICS       Physical Exam:   Vitals:    08/10/18 1445   Temp: 98.2 °F (36.8 °C)   Weight: 90.7 kg (200 lb)   Height: 180.3 cm (71\")     Well developed with normal mood.  Left calf is nontender without sign of DVT.  The majority of his incision is well-healed.  However the distal one half centimeters has an area of breakdown now that measures 1.5 cm x 5 mm and is about 3 mm thick.  I probed this area and it did not seem to be full-thickness there was no sign of infection.  5 out of 5 plantarflexion strength      Radiology: None performed      Assessment/Plan:  Status post left Achilles surgery with superficial distal wound breakdown.    I do not see any sign of infection at this time he'll continue to paint this area with Betadine and keep a clean dressing on it may progress with weightbearing in his boot and I'm going to send him over to wound care for further evaluation and treatment.  His wife already goes there.    I will see him back in 2 weeks no x-rays  "

## 2018-08-16 ENCOUNTER — OFFICE VISIT (OUTPATIENT)
Dept: SURGERY | Facility: CLINIC | Age: 63
End: 2018-08-16

## 2018-08-16 DIAGNOSIS — K61.1 PERIRECTAL ABSCESS: Primary | ICD-10-CM

## 2018-08-16 PROCEDURE — 99024 POSTOP FOLLOW-UP VISIT: CPT | Performed by: SURGERY

## 2018-08-16 RX ORDER — CELECOXIB 200 MG/1
200 CAPSULE ORAL DAILY
COMMUNITY
End: 2020-03-08 | Stop reason: HOSPADM

## 2018-08-20 RX ORDER — CYCLOBENZAPRINE HCL 10 MG
TABLET ORAL
Qty: 30 TABLET | Refills: 0 | Status: SHIPPED | OUTPATIENT
Start: 2018-08-20 | End: 2018-09-17 | Stop reason: SDUPTHER

## 2018-08-23 ENCOUNTER — OFFICE VISIT (OUTPATIENT)
Dept: ORTHOPEDIC SURGERY | Facility: CLINIC | Age: 63
End: 2018-08-23

## 2018-08-23 VITALS — TEMPERATURE: 98.2 F | HEIGHT: 71 IN | BODY MASS INDEX: 27.72 KG/M2 | WEIGHT: 198 LBS

## 2018-08-23 DIAGNOSIS — T81.30XA WOUND DEHISCENCE: Primary | ICD-10-CM

## 2018-08-23 DIAGNOSIS — M92.62 HAGLUND'S DEFORMITY OF LEFT HEEL: ICD-10-CM

## 2018-08-23 DIAGNOSIS — M65.28 CALCIFIC ACHILLES TENDONITIS: ICD-10-CM

## 2018-08-23 PROCEDURE — 99024 POSTOP FOLLOW-UP VISIT: CPT | Performed by: ORTHOPAEDIC SURGERY

## 2018-08-23 NOTE — PROGRESS NOTES
Ankle Follow Up      Patient: Prem Thrasher    YOB: 1955 63 y.o. male    Chief Complaints: Ankle pain    History of Present Illness:left Achilles debridement and secondary repair with resection of Allegra deformity and gastroc recession on 7/3/18. During his postoperative period he was admitted with a perirectal abscess and underwent incision and drainage of that and was discharged on oral antibiotics.  When he was seen in the hospital he had some mild maceration of his heel wound.  When seen in the office on 7/25/18 and debridement a small area of macerated skin with instructions to paint with Betadine.  He was last seen on 8/10/18 with some partial-thickness breakdown of the skin around his distal wound.  Instruction sheet given to continue with Betadine dressing and progress to weightbearing with his boot and he was sent for wound care evaluation.    Is not yet seen wound care but has not had any drainage and really no complaints of pain.  He has started coming out of his boot around the house had not recommended for him to do this.  HPI    ROS: ankle pain, no fevers chills chest pain or shortness of breath  Past Medical History:   Diagnosis Date   • Achilles tendon pain     LEFT   • Anxiety disorder    • COPD (chronic obstructive pulmonary disease) (CMS/HCC)    • CTS (carpal tunnel syndrome)    • Diverticulitis    • Diverticulitis of colon    • Diverticulosis    • Encounter for eye exam 10/2014    normal   • Fissure, anal    • History of bone density study 03/2014    Dr. Maria Antonia Lopez; normal   • History of chest x-ray 02/15/2011    also 3-6-15; normal chest   • History of colon polyps    • History of nuclear stress test 03/09/2015    cardiolite; Dr. Greenberg; negative   • History of pulmonary function tests 03/2015    COPD   • Hyperlipidemia    • Hypertension    • IFG (impaired fasting glucose)    • Low back pain    • Lumbosacral disc disease    • Nerve damage     KAYLYNN ELBOWS   • Osteoarthritis,  "multiple sites    • Postoperative nausea and vomiting 8/1/2017   • Postoperative wound infection    • Rectal bleeding    • Rheumatoid arthritis (CMS/HCC)    • Sciatica    • Staph infection     WITH BACK SURGERY 2017  ON LONG TERM ANTIBIOTICS       Physical Exam:   Vitals:    08/23/18 1357   Temp: 98.2 °F (36.8 °C)   Weight: 89.8 kg (198 lb)   Height: 180.3 cm (71\")     Well developed with normal mood.  He is with his wife.  He had 5 out of 5 plantarflexion strength.  No sign of infection.  No warmth erythema or drainage or fluctuance.  There still some dehiscence of his distal wound but it appears superficial measures about 4 mm x 1.5 cm.      Radiology: None performed      Assessment/Plan:   Status post left Achilles surgery with superficial distal wound breakdown.    He will continue with Betadine dressings and is due to see wound care on 8/28/18.  He may continue out of his boot around the house but will continue use of his cane but recommended use of boot out of the house.    We had a pleasant visit and he and his wife told me how much they appreciate my care.  I will see him back in 2 weeks.  If he continues to improve that time we may wean him out of his boot and start physical therapy  "

## 2018-08-27 NOTE — PROGRESS NOTES
CHIEF COMPLAINT:   Chief Complaint   Patient presents with   • Post-op Follow-up       HISTORY OF PRESENT ILLNESS:  This is a 63 y.o. male who presents for a second post-operative visit after undergoing incision and drainage of large perirectal abscess on 7/10/2018.  The micro-biology returned positive for Streptococcus and Prevotella. He was initially discharged on Augmentin but I switched him to Clindamycin based on the microbiology sensitivities. When I saw him 4 weeks ago, he still had a healing abscess cavity with persistent drainage from the perirectal wound.  This drainage has completely ceased and the abscess is nearly healed at this time.  He has been having soft bowel movements with no blood in his stool and no persistent rectal pain.    ROS: Denies fevers or chills    Microbiology:   Streptococcus pluranimalium and prevotella    PHYSICAL EXAM:  Lungs: Clear  Heart: RRR  ABD: soft, nontender  RECTUM: Perianal incision nearly healed, with scant residual granulation tissue along a shallow trough    A/P:  This is a 63 y.o. male patient who is S/P incision and drainage of a large perirectal abscess    He has healed beautifully.  He can follow-up with me as needed and knows to call the future with any recurrent signs or symptoms of anal or rectal pain.    Porsha Arcos MD  General and Endoscopic Surgery  [facility] [place]    [address], [address]  [place], [place], [address]  P: [phone]  F: [phone]

## 2018-08-28 ENCOUNTER — OFFICE VISIT (OUTPATIENT)
Dept: WOUND CARE | Facility: HOSPITAL | Age: 63
End: 2018-08-28
Attending: SURGERY

## 2018-09-04 ENCOUNTER — OFFICE VISIT (OUTPATIENT)
Dept: WOUND CARE | Facility: HOSPITAL | Age: 63
End: 2018-09-04
Attending: SURGERY

## 2018-09-06 ENCOUNTER — OFFICE VISIT (OUTPATIENT)
Dept: ORTHOPEDIC SURGERY | Facility: CLINIC | Age: 63
End: 2018-09-06

## 2018-09-06 VITALS — WEIGHT: 198 LBS | HEIGHT: 71 IN | BODY MASS INDEX: 27.72 KG/M2 | TEMPERATURE: 98.4 F

## 2018-09-06 DIAGNOSIS — M92.62 HAGLUND'S DEFORMITY OF LEFT HEEL: Primary | ICD-10-CM

## 2018-09-06 DIAGNOSIS — M65.28 CALCIFIC ACHILLES TENDONITIS: ICD-10-CM

## 2018-09-06 DIAGNOSIS — T81.30XA WOUND DEHISCENCE: ICD-10-CM

## 2018-09-06 PROCEDURE — 99024 POSTOP FOLLOW-UP VISIT: CPT | Performed by: ORTHOPAEDIC SURGERY

## 2018-09-06 NOTE — PROGRESS NOTES
Ankle Follow Up      Patient: Prem Thrasher    YOB: 1955 63 y.o. male    Chief Complaints: Ankle doing okay    History of Present Illness:left Achilles debridement and secondary repair with resection of Allegra deformity and gastroc recession on 7/3/18. During his postoperative period he was admitted with a perirectal abscess and underwent incision and drainage of that and was discharged on oral antibiotics.  During his postoperative.  He's had some maceration around his heel with subsequent breakdown and wound dehiscence.  He was last seen on 8/23/18 any significant complaints of pain.  He had not yet seen wound care and instructions were given to continue with Betadine dressings.  Allow to continue out of his boot around the house recommend he continue use of his cane and use of boot and cane out of the house.    He has now been followed by  at the wound care center we debrided the wound and is having him place solutions and dressings on this.  He gets an occasional slight stabbing pain in the heel.  He's been getting around the house without his boot using his cane.   HPI    ROS: ankle painNo fevers chills chest pain or shortness of breath   Past Medical History:   Diagnosis Date   • Achilles tendon pain     LEFT   • Anxiety disorder    • COPD (chronic obstructive pulmonary disease) (CMS/HCC)    • CTS (carpal tunnel syndrome)    • Diverticulitis    • Diverticulitis of colon    • Diverticulosis    • Encounter for eye exam 10/2014    normal   • Fissure, anal    • History of bone density study 03/2014    Dr. Maria Antonia Lopez; normal   • History of chest x-ray 02/15/2011    also 3-6-15; normal chest   • History of colon polyps    • History of nuclear stress test 03/09/2015    cardiolite; Dr. Greenberg; negative   • History of pulmonary function tests 03/2015    COPD   • Hyperlipidemia    • Hypertension    • IFG (impaired fasting glucose)    • Low back pain    • Lumbosacral disc disease    • Nerve  "damage     KAYLYNN ELBOWS   • Osteoarthritis, multiple sites    • Postoperative nausea and vomiting 8/1/2017   • Postoperative wound infection    • Rectal bleeding    • Rheumatoid arthritis (CMS/HCC)    • Sciatica    • Staph infection     WITH BACK SURGERY 2017  ON LONG TERM ANTIBIOTICS       Physical Exam:   Vitals:    09/06/18 1447   Temp: 98.4 °F (36.9 °C)   Weight: 89.8 kg (198 lb)   Height: 180.3 cm (71\")     Well developed with normal mood.Nonantalgic gait.  Left hindfoot wound shows no warmth erythema or drainage.  Overall dimensions measured about 1.8 cm x 1 cm by about 8 mm.  There was granulation tissue in the base of this.  He demonstrates 5 out of 5 plantarflexion strength and calf is nontender without sign of DVT      Radiology:None performed      Assessment/Plan:  Status post left Achilles surgery with  distal wound breakdown.    I like him to continue with wound care and I don't see any sign of infection or anything different to do from a surgical standpoint at this time.  He may wean out of his boot to the \"sandal\" that they provided him with that wound care and he will continue with wound care per their instructions.  We will hold on therapy until he has his wound healed.  I will see him back in 3 weeks.  We had a pleasant visit      "

## 2018-09-11 ENCOUNTER — OFFICE VISIT (OUTPATIENT)
Dept: WOUND CARE | Facility: HOSPITAL | Age: 63
End: 2018-09-11
Attending: SURGERY

## 2018-09-17 RX ORDER — INCONTINENCE PAD,LINER,DISP
EACH MISCELLANEOUS
Qty: 60 TABLET | Refills: 1 | Status: SHIPPED | OUTPATIENT
Start: 2018-09-17 | End: 2020-05-07

## 2018-09-17 RX ORDER — CYCLOBENZAPRINE HCL 10 MG
TABLET ORAL
Qty: 30 TABLET | Refills: 0 | Status: SHIPPED | OUTPATIENT
Start: 2018-09-17 | End: 2018-10-15 | Stop reason: SDUPTHER

## 2018-09-18 ENCOUNTER — OFFICE VISIT (OUTPATIENT)
Dept: WOUND CARE | Facility: HOSPITAL | Age: 63
End: 2018-09-18
Attending: SURGERY

## 2018-09-25 ENCOUNTER — OFFICE VISIT (OUTPATIENT)
Dept: WOUND CARE | Facility: HOSPITAL | Age: 63
End: 2018-09-25
Attending: SURGERY

## 2018-09-27 ENCOUNTER — OFFICE VISIT (OUTPATIENT)
Dept: ORTHOPEDIC SURGERY | Facility: CLINIC | Age: 63
End: 2018-09-27

## 2018-09-27 VITALS — WEIGHT: 201 LBS | TEMPERATURE: 98.4 F | BODY MASS INDEX: 28.14 KG/M2 | HEIGHT: 71 IN

## 2018-09-27 DIAGNOSIS — T81.30XA WOUND DEHISCENCE: ICD-10-CM

## 2018-09-27 DIAGNOSIS — M21.862 GASTROCNEMIUS EQUINUS, LEFT: Primary | ICD-10-CM

## 2018-09-27 DIAGNOSIS — M65.28 CALCIFIC ACHILLES TENDONITIS: ICD-10-CM

## 2018-09-27 DIAGNOSIS — M92.62 HAGLUND'S DEFORMITY OF LEFT HEEL: ICD-10-CM

## 2018-09-27 PROCEDURE — 99024 POSTOP FOLLOW-UP VISIT: CPT | Performed by: ORTHOPAEDIC SURGERY

## 2018-09-27 NOTE — PROGRESS NOTES
"Ankle Follow Up      Patient: Prem Thrasher    YOB: 1955 63 y.o. male    Chief Complaints: Heel hurts a little    History of Present Illness:left Achilles debridement and secondary repair with resection of Allegra deformity and gastroc recession on 7/3/18. During his postoperative period he was admitted with a perirectal abscess and underwent incision and drainage of that and was discharged on oral antibiotics.  During his postoperative.  He's had some maceration around his heel with subsequent breakdown and wound dehiscence.     He was last seen on 9/6/18 and is now being followed by Dr. Aguilar at the wound care center and that time I did not see any sign of infection.  He was allowed to wean out of his boot to a \"sandal\" and continue with wound care.  We decided to hold on therapy until his wound is healed.    Saw wound care 2 days ago and they did some additional debridement and applied silver nitrate.  They're doing some other type of dressings now.  Complains of some mild aching pain that occasionally sharp stabbing at the heel.  HPI    ROS: ankle pain, no fevers or chills  Past Medical History:   Diagnosis Date   • Achilles tendon pain     LEFT   • Anxiety disorder    • COPD (chronic obstructive pulmonary disease) (CMS/HCC)    • CTS (carpal tunnel syndrome)    • Diverticulitis    • Diverticulitis of colon    • Diverticulosis    • Encounter for eye exam 10/2014    normal   • Fissure, anal    • History of bone density study 03/2014    Dr. Maria Antonia Lopez; normal   • History of chest x-ray 02/15/2011    also 3-6-15; normal chest   • History of colon polyps    • History of nuclear stress test 03/09/2015    cardiolite; Dr. Greenberg; negative   • History of pulmonary function tests 03/2015    COPD   • Hyperlipidemia    • Hypertension    • IFG (impaired fasting glucose)    • Low back pain    • Lumbosacral disc disease    • Nerve damage     KAYLYNN ELBOWS   • Osteoarthritis, multiple sites    • Postoperative " "nausea and vomiting 8/1/2017   • Postoperative wound infection    • Rectal bleeding    • Rheumatoid arthritis (CMS/HCC)    • Sciatica    • Staph infection     WITH BACK SURGERY 2017  ON LONG TERM ANTIBIOTICS       Physical Exam:   Vitals:    09/27/18 1348   Temp: 98.4 °F (36.9 °C)   Weight: 91.2 kg (201 lb)   Height: 180.3 cm (71\")     Well developed with normal mood.  Left heel wound measures about 1.5 cm x 2.5 cm with a depth of about 8 mm.  The base did not show any purulence and appeared to been treated with silver nitrate..  There was no surrounding warmth erythema or fluctuance.  He had 5 out of 5 plantarflexion strength with minimal if any discomfort      Radiology: None performed      Assessment/Plan:  Status post left Achilles surgery with  distal wound breakdown.    Continue with the postop shoe and with dressing changes and wound care he is due to see him again next week.  I reviewed with him that I'll see anything different I would do from an operative standpoint and that he is in good hands with the plastic surgeon in order to help get this wound eventually healed.    I will see him back in about 3 weeks.  "

## 2018-10-01 RX ORDER — AMLODIPINE BESYLATE 10 MG/1
TABLET ORAL
Qty: 90 TABLET | Refills: 0 | Status: SHIPPED | OUTPATIENT
Start: 2018-10-01 | End: 2018-10-23 | Stop reason: SDUPTHER

## 2018-10-01 RX ORDER — IRBESARTAN 300 MG/1
300 TABLET ORAL DAILY
Qty: 90 TABLET | Refills: 0 | Status: SHIPPED | OUTPATIENT
Start: 2018-10-01 | End: 2018-10-23 | Stop reason: SDUPTHER

## 2018-10-02 ENCOUNTER — OFFICE VISIT (OUTPATIENT)
Dept: WOUND CARE | Facility: HOSPITAL | Age: 63
End: 2018-10-02
Attending: SURGERY

## 2018-10-02 DIAGNOSIS — T81.31XA DEHISCENCE OF OPERATIVE WOUND, INITIAL ENCOUNTER: Primary | ICD-10-CM

## 2018-10-02 PROCEDURE — 87205 SMEAR GRAM STAIN: CPT | Performed by: SURGERY

## 2018-10-02 PROCEDURE — 87070 CULTURE OTHR SPECIMN AEROBIC: CPT | Performed by: SURGERY

## 2018-10-02 PROCEDURE — 87075 CULTR BACTERIA EXCEPT BLOOD: CPT | Performed by: SURGERY

## 2018-10-05 LAB
BACTERIA SPEC AEROBE CULT: NORMAL
GRAM STN SPEC: NORMAL
GRAM STN SPEC: NORMAL

## 2018-10-07 LAB — BACTERIA SPEC ANAEROBE CULT: NORMAL

## 2018-10-09 ENCOUNTER — OFFICE VISIT (OUTPATIENT)
Dept: WOUND CARE | Facility: HOSPITAL | Age: 63
End: 2018-10-09
Attending: SURGERY

## 2018-10-09 RX ORDER — CARVEDILOL 12.5 MG/1
12.5 TABLET ORAL 2 TIMES DAILY WITH MEALS
Qty: 180 TABLET | Refills: 0 | Status: SHIPPED | OUTPATIENT
Start: 2018-10-09 | End: 2018-10-23 | Stop reason: SDUPTHER

## 2018-10-16 ENCOUNTER — OFFICE VISIT (OUTPATIENT)
Dept: WOUND CARE | Facility: HOSPITAL | Age: 63
End: 2018-10-16
Attending: SURGERY

## 2018-10-16 RX ORDER — CYCLOBENZAPRINE HCL 10 MG
TABLET ORAL
Qty: 30 TABLET | Refills: 0 | Status: SHIPPED | OUTPATIENT
Start: 2018-10-16 | End: 2018-10-23 | Stop reason: SDUPTHER

## 2018-10-18 ENCOUNTER — OFFICE VISIT (OUTPATIENT)
Dept: ORTHOPEDIC SURGERY | Facility: CLINIC | Age: 63
End: 2018-10-18

## 2018-10-18 VITALS — TEMPERATURE: 98.3 F | HEIGHT: 71 IN | WEIGHT: 201 LBS | BODY MASS INDEX: 28.14 KG/M2

## 2018-10-18 DIAGNOSIS — M65.28 CALCIFIC ACHILLES TENDONITIS: ICD-10-CM

## 2018-10-18 DIAGNOSIS — M92.62 HAGLUND'S DEFORMITY OF LEFT HEEL: ICD-10-CM

## 2018-10-18 DIAGNOSIS — T81.30XA WOUND DEHISCENCE: Primary | ICD-10-CM

## 2018-10-18 PROCEDURE — 99213 OFFICE O/P EST LOW 20 MIN: CPT | Performed by: ORTHOPAEDIC SURGERY

## 2018-10-18 NOTE — PROGRESS NOTES
"Ankle Follow Up      Patient: Prem Thrasher    YOB: 1955 63 y.o. male    Chief Complaints: Ankle doing okay    History of Present Illness:left Achilles debridement and secondary repair with resection of Allegra deformity and gastroc recession on 7/3/18. During his postoperative period he was admitted with a perirectal abscess and underwent incision and drainage of that and was discharged on oral antibiotics.  During his postoperative period he had some maceration around his heel with subsequent breakdown and wound dehiscence.     He has been followed by Dr. Aguilar for this at the wound care clinic and is had serial debridements and was allowed to wean out of his boot to a \"sandal.\".  He was last seen on 9/27/18 with complaints of mild aching pain occasionally stabbing at the heel.    his pain has improved and is really not having any significant pain in the hindfoot today  HPI    ROS: No ankle pain  Past Medical History:   Diagnosis Date   • Achilles tendon pain     LEFT   • Anxiety disorder    • COPD (chronic obstructive pulmonary disease) (CMS/HCC)    • CTS (carpal tunnel syndrome)    • Diverticulitis    • Diverticulitis of colon    • Diverticulosis    • Encounter for eye exam 10/2014    normal   • Fissure, anal    • History of bone density study 03/2014    Dr. Maria Antonia Lopez; normal   • History of chest x-ray 02/15/2011    also 3-6-15; normal chest   • History of colon polyps    • History of nuclear stress test 03/09/2015    cardiolite; Dr. Greenberg; negative   • History of pulmonary function tests 03/2015    COPD   • Hyperlipidemia    • Hypertension    • IFG (impaired fasting glucose)    • Low back pain    • Lumbosacral disc disease    • Nerve damage     KAYLYNN ELBOWS   • Osteoarthritis, multiple sites    • Postoperative nausea and vomiting 8/1/2017   • Postoperative wound infection    • Rectal bleeding    • Rheumatoid arthritis (CMS/HCC)    • Sciatica    • Staph infection     WITH BACK SURGERY 2017  " "ON LONG TERM ANTIBIOTICS       Physical Exam:   Vitals:    10/18/18 1328   Temp: 98.3 °F (36.8 °C)   Weight: 91.2 kg (201 lb)   Height: 180.3 cm (71\")     Well developed with normal mood.  Nonantalgic gait without assistive device.  The majority of his left Achilles incision is well-healed.  The distal wound is markedly improved compared with her last visit.  It measures 1.8 x 1.4 cm with a depth of about 2 mm with a nice granulation base of tissue.  He had 5 out of 5 plantarflexion strength without discomfort.  There was no exposed suture or bone.      Radiology: None Performed      Assessment/Plan:  Status post left Achilles surgery with distal wound breakdown.    I discussed the case personally with Dr Aguilar several days ago and he felt that the wound was improving albeit slowly and felt that the patient's persistent smoking is contributing to his slow healing and prevents him from qualifying for any type of graft tissue placement at this time.  He states that he is getting on smoking cessation.    Overall he seems to be in improving albeit slowly he'll continue with wound care and used of a postoperative shoe.  We had a pleasant visit and I will see him back in 4 weeks  "

## 2018-10-23 ENCOUNTER — OFFICE VISIT (OUTPATIENT)
Dept: FAMILY MEDICINE CLINIC | Facility: CLINIC | Age: 63
End: 2018-10-23

## 2018-10-23 VITALS
HEIGHT: 71 IN | BODY MASS INDEX: 28.56 KG/M2 | HEART RATE: 79 BPM | OXYGEN SATURATION: 97 % | SYSTOLIC BLOOD PRESSURE: 122 MMHG | TEMPERATURE: 97.6 F | WEIGHT: 204 LBS | DIASTOLIC BLOOD PRESSURE: 80 MMHG | RESPIRATION RATE: 16 BRPM

## 2018-10-23 DIAGNOSIS — Z87.39 HISTORY OF RHEUMATOID ARTHRITIS: ICD-10-CM

## 2018-10-23 DIAGNOSIS — R73.01 IFG (IMPAIRED FASTING GLUCOSE): ICD-10-CM

## 2018-10-23 DIAGNOSIS — E78.2 MIXED HYPERLIPIDEMIA: ICD-10-CM

## 2018-10-23 DIAGNOSIS — J43.8 OTHER EMPHYSEMA (HCC): ICD-10-CM

## 2018-10-23 DIAGNOSIS — Z72.0 TOBACCO ABUSE: ICD-10-CM

## 2018-10-23 DIAGNOSIS — I10 ESSENTIAL HYPERTENSION: Primary | ICD-10-CM

## 2018-10-23 DIAGNOSIS — F41.1 GENERALIZED ANXIETY DISORDER: ICD-10-CM

## 2018-10-23 PROBLEM — L72.3 SEBACEOUS CYST: Status: RESOLVED | Noted: 2018-04-23 | Resolved: 2018-10-23

## 2018-10-23 PROBLEM — E87.20 METABOLIC ACIDOSIS: Status: RESOLVED | Noted: 2018-07-10 | Resolved: 2018-10-23

## 2018-10-23 PROCEDURE — 90732 PPSV23 VACC 2 YRS+ SUBQ/IM: CPT | Performed by: PHYSICIAN ASSISTANT

## 2018-10-23 PROCEDURE — 99214 OFFICE O/P EST MOD 30 MIN: CPT | Performed by: PHYSICIAN ASSISTANT

## 2018-10-23 PROCEDURE — G0009 ADMIN PNEUMOCOCCAL VACCINE: HCPCS | Performed by: PHYSICIAN ASSISTANT

## 2018-10-23 RX ORDER — AMLODIPINE BESYLATE 10 MG/1
10 TABLET ORAL DAILY
Qty: 90 TABLET | Refills: 3 | Status: SHIPPED | OUTPATIENT
Start: 2018-10-23 | End: 2019-03-08 | Stop reason: SDUPTHER

## 2018-10-23 RX ORDER — IRBESARTAN 300 MG/1
300 TABLET ORAL DAILY
Qty: 90 TABLET | Refills: 1 | Status: SHIPPED | OUTPATIENT
Start: 2018-10-23 | End: 2019-03-08 | Stop reason: SDUPTHER

## 2018-10-23 RX ORDER — CARVEDILOL 12.5 MG/1
12.5 TABLET ORAL 2 TIMES DAILY WITH MEALS
Qty: 180 TABLET | Refills: 3 | Status: SHIPPED | OUTPATIENT
Start: 2018-10-23 | End: 2019-03-08 | Stop reason: SDUPTHER

## 2018-10-23 RX ORDER — CYCLOBENZAPRINE HCL 10 MG
10 TABLET ORAL 3 TIMES DAILY PRN
Qty: 270 TABLET | Refills: 3 | Status: SHIPPED | OUTPATIENT
Start: 2018-10-23 | End: 2019-03-08 | Stop reason: SDUPTHER

## 2018-10-23 NOTE — PROGRESS NOTES
Subjective   Prem Thrasher is a 63 y.o. male.     History of Present Illness   Prem Thrasher 63 y.o. male who presents today for routine follow up check and medication refills.  he has a history of   Patient Active Problem List   Diagnosis   • Allergy   • COPD (chronic obstructive pulmonary disease) (CMS/[facility])   • Hyperlipidemia   • IFG (impaired fasting glucose)   • Rheumatoid arthritis (CMS/[facility])   • Hypertension   • Anxiety disorder   • Recurrent major depressive disorder, in full remission (CMS/[facility])   • Lumbar radiculopathy   • Spondylolisthesis of lumbar region   • Sepsis (CMS/[facility])   • Infection of lumbar spine (CMS/[facility])   • Sebaceous cyst   • Allegra's deformity of left heel   • Calcific Achilles tendonitis s/p OR 7/18   • Gastrocnemius equinus, left   • Abscess and cellulitis of gluteal region   • Tobacco abuse   • Perirectal abscess   • Anemia   • Wound dehiscence   .  Since the last visit, he has overall felt tired.  He has Hypertenision and is well controlled on medication, Impaired fasting glucose and will continue close lab follow up to watch for DMII and Hyperlipidemia and is well controlled on medication.  he has been compliant with current medications have reviewed them.  The patient denies medication side effects.    Results for orders placed or performed in visit on 10/02/18   Anaerobic Culture - Swab, Foot, Left   Result Value Ref Range    Culture No anaerobes isolated at 5 days    Wound Culture - Wound, Foot, Left   Result Value Ref Range    Wound Culture Scant growth (1+) Normal Skin Paige     Gram Stain Result No organisms seen     Gram Stain Result No WBCs per low power field    intol to all statins and is on Zetia; PSA is lower--labs in July    Need Flexeril for lumbar spasms and will send mail order  Dr Lopez is for RA  Seeing wound care for left wound in achilles; DR. Cuevas-----ortho was Dr. Smith for wound dehiscence   I will refill his Spiriva for COPD today    Prem Thrasher male  63 y.o. who presents today for follow up of Depression and Anxiety.  He reports medication does help and anxiety is so much better; overall depression controlled and recent life stresses with health.  Does not want to change Rx; it does help. Onset of symptoms was approximately several years ago.  He denies current suicidal and homicidal ideation. Risk factors are lifestyle of multiple roles, chronic illness and chronic pain.  Previous treatment includes current Rx.  He complains of the following medication side effects: none.  The patient declines to go to counseling..    I will do pneumovax today; declines flu shot    The following portions of the patient's history were reviewed and updated as appropriate: allergies, current medications, past family history, past medical history, past social history, past surgical history and problem list.    Review of Systems   Constitutional: Positive for activity change and unexpected weight change. Negative for appetite change.   HENT: Positive for congestion. Negative for nosebleeds and trouble swallowing.    Eyes: Negative for pain and visual disturbance.   Respiratory: Positive for cough. Negative for chest tightness, shortness of breath and wheezing.    Cardiovascular: Negative for chest pain and palpitations.   Gastrointestinal: Negative for abdominal pain and blood in stool.   Endocrine: Negative.    Genitourinary: Negative for difficulty urinating and hematuria.   Musculoskeletal: Negative for joint swelling.   Skin: Positive for wound. Negative for color change and rash.   Allergic/Immunologic: Negative.    Neurological: Positive for headaches. Negative for syncope and speech difficulty.   Hematological: Negative for adenopathy.   Psychiatric/Behavioral: Negative for agitation and confusion. The patient is nervous/anxious.    All other systems reviewed and are negative.      Objective   Physical Exam   Constitutional: He is oriented to person, place, and time. He  appears well-developed and well-nourished. No distress.   HENT:   Head: Normocephalic and atraumatic.   Eyes: Pupils are equal, round, and reactive to light. Conjunctivae and EOM are normal. Right eye exhibits no discharge. Left eye exhibits no discharge. No scleral icterus.   Neck: Normal range of motion. Neck supple. No tracheal deviation present. No thyromegaly present.   Cardiovascular: Normal rate, regular rhythm, normal heart sounds, intact distal pulses and normal pulses.  Exam reveals no gallop.    No murmur heard.  Pulmonary/Chest: Effort normal and breath sounds normal. No respiratory distress. He has no wheezes. He has no rales.   Musculoskeletal: Normal range of motion.   Neurological: He is alert and oriented to person, place, and time. He exhibits normal muscle tone. Coordination normal.   Skin: No rash noted. No pallor.   Boot left foot   Psychiatric: He has a normal mood and affect. His behavior is normal. Judgment and thought content normal.   Nursing note and vitals reviewed.      Assessment/Plan   Prem was seen today for hypertension.    Diagnoses and all orders for this visit:    Other emphysema (CMS/HCC)    Generalized anxiety disorder    Mixed hyperlipidemia    IFG (impaired fasting glucose)    Essential hypertension    Tobacco abuse    History of rheumatoid arthritis    Other orders  -     tiotropium (SPIRIVA) 18 MCG per inhalation capsule; Place 1 capsule into inhaler and inhale Daily.  -     Pneumococcal Polysaccharide Vaccine 23-Valent Greater Than or Equal To 1yo Subcutaneous / IM  -     irbesartan (AVAPRO) 300 MG tablet; Take 1 tablet by mouth Daily. for blood pressure  -     carvedilol (COREG) 12.5 MG tablet; Take 1 tablet by mouth 2 (Two) Times a Day With Meals for 90 days. For BP  -     amLODIPine (NORVASC) 10 MG tablet; Take 1 tablet by mouth Daily. for blood pressure  -     cyclobenzaprine (FLEXERIL) 10 MG tablet; Take 1 tablet by mouth 3 (Three) Times a Day As Needed for Muscle  Spasms.

## 2018-10-23 NOTE — PATIENT INSTRUCTIONS
Low glycemic index diet  Exercise 30 minutes most days of the week  Make sure you get results on any labs or tests we ordered today  We discussed medications and how to take them as prescribed  Sleep 6-8 hours each night if possible  If you have not signed up for [employer], please activate your code ASAP from your After Visit Summary today    LDL goal <100  LDL goal if heart disease <70  HDL goal >60  Triglyceride goal <150  BP goal =<130/80  Fasting glucose <100

## 2018-10-30 ENCOUNTER — OFFICE VISIT (OUTPATIENT)
Dept: WOUND CARE | Facility: HOSPITAL | Age: 63
End: 2018-10-30
Attending: SURGERY

## 2018-11-13 ENCOUNTER — OFFICE VISIT (OUTPATIENT)
Dept: WOUND CARE | Facility: HOSPITAL | Age: 63
End: 2018-11-13
Attending: SURGERY

## 2018-11-26 ENCOUNTER — OFFICE VISIT (OUTPATIENT)
Dept: ORTHOPEDIC SURGERY | Facility: CLINIC | Age: 63
End: 2018-11-26

## 2018-11-26 VITALS — WEIGHT: 209.6 LBS | TEMPERATURE: 97.7 F | HEIGHT: 69 IN | BODY MASS INDEX: 31.04 KG/M2

## 2018-11-26 DIAGNOSIS — T81.30XA WOUND DEHISCENCE: ICD-10-CM

## 2018-11-26 DIAGNOSIS — M21.862 GASTROCNEMIUS EQUINUS, LEFT: Primary | ICD-10-CM

## 2018-11-26 DIAGNOSIS — M92.62 HAGLUND'S DEFORMITY OF LEFT HEEL: ICD-10-CM

## 2018-11-26 DIAGNOSIS — M65.28 CALCIFIC ACHILLES TENDONITIS: ICD-10-CM

## 2018-11-26 PROCEDURE — 99213 OFFICE O/P EST LOW 20 MIN: CPT | Performed by: ORTHOPAEDIC SURGERY

## 2018-11-27 ENCOUNTER — OFFICE VISIT (OUTPATIENT)
Dept: WOUND CARE | Facility: HOSPITAL | Age: 63
End: 2018-11-27
Attending: SURGERY

## 2018-11-27 PROCEDURE — G0463 HOSPITAL OUTPT CLINIC VISIT: HCPCS

## 2018-11-27 NOTE — PROGRESS NOTES
Ankle Follow Up      Patient: Prem Thrasher    YOB: 1955 63 y.o. male    Chief Complaints: Heel getting better    History of Present Illness:left Achilles debridement and secondary repair with resection of Allegra deformity and gastroc recession on 7/3/18. During his postoperative period he was admitted with a perirectal abscess and underwent incision and drainage of that and was discharged on oral antibiotics.  During his postoperative period he had some maceration around his heel with subsequent breakdown and wound dehiscence.     He has been followed by Dr. Aguilar at wound care for this and his wound has now continued to improve.  He really has no focal pain along the Achilles or at the posterior aspect of the heel.  HPI    ROS: No ankle pain  Past Medical History:   Diagnosis Date   • Achilles tendon pain     LEFT   • Anxiety disorder    • COPD (chronic obstructive pulmonary disease) (CMS/HCC)    • CTS (carpal tunnel syndrome)    • Diverticulitis    • Diverticulitis of colon    • Diverticulosis    • Encounter for eye exam 10/2014    normal   • Fissure, anal    • History of bone density study 03/2014    Dr. Maria Antonia Lopez; normal   • History of chest x-ray 02/15/2011    also 3-6-15; normal chest   • History of colon polyps    • History of nuclear stress test 03/09/2015    cardiolite; Dr. Greenberg; negative   • History of pulmonary function tests 03/2015    COPD   • Hyperlipidemia    • Hypertension    • IFG (impaired fasting glucose)    • Low back pain    • Lumbosacral disc disease    • Nerve damage     KAYLYNN ELBOWS   • Osteoarthritis, multiple sites    • Postoperative nausea and vomiting 8/1/2017   • Postoperative wound infection    • Rectal bleeding    • Rheumatoid arthritis (CMS/HCC)    • Sciatica    • Staph infection     WITH BACK SURGERY 2017  ON LONG TERM ANTIBIOTICS       Physical Exam:   Vitals:    11/26/18 0941   Temp: 97.7 °F (36.5 °C)   TempSrc: Temporal   Weight: 95.1 kg (209 lb 9.6 oz)  "  Height: 175.3 cm (69\")     Well developed with normal mood.  Residual wound around his hindfoot appears to have significantly epithelialized with only one small central area measuring 5 mm x 5 mm and maybe 1-2 mm in depth.  There was granulation material within this but no warmth erythema or sign of infection.  He was nontender along the Achilles and 5 out of 5 plantarflexion strength without discomfort      Radiology: None performed      Assessment/Plan:  Status post left Achilles surgery with distal wound breakdown    Overall he continues to improve and will continue with wound care.  I will see him back in 6 weeks  "

## 2018-12-11 ENCOUNTER — OFFICE VISIT (OUTPATIENT)
Dept: WOUND CARE | Facility: HOSPITAL | Age: 63
End: 2018-12-11
Attending: SURGERY

## 2018-12-26 ENCOUNTER — OFFICE VISIT (OUTPATIENT)
Dept: FAMILY MEDICINE CLINIC | Facility: CLINIC | Age: 63
End: 2018-12-26

## 2018-12-26 VITALS
RESPIRATION RATE: 16 BRPM | DIASTOLIC BLOOD PRESSURE: 72 MMHG | SYSTOLIC BLOOD PRESSURE: 114 MMHG | TEMPERATURE: 98.2 F | WEIGHT: 214 LBS | BODY MASS INDEX: 31.7 KG/M2 | OXYGEN SATURATION: 97 % | HEIGHT: 69 IN | HEART RATE: 93 BPM

## 2018-12-26 DIAGNOSIS — J01.90 ACUTE SINUSITIS, RECURRENCE NOT SPECIFIED, UNSPECIFIED LOCATION: Primary | ICD-10-CM

## 2018-12-26 PROCEDURE — 99213 OFFICE O/P EST LOW 20 MIN: CPT | Performed by: PHYSICIAN ASSISTANT

## 2018-12-26 RX ORDER — AMOXICILLIN AND CLAVULANATE POTASSIUM 875; 125 MG/1; MG/1
1 TABLET, FILM COATED ORAL EVERY 12 HOURS SCHEDULED
Qty: 20 TABLET | Refills: 0 | Status: SHIPPED | OUTPATIENT
Start: 2018-12-26 | End: 2018-12-26 | Stop reason: SDUPTHER

## 2018-12-26 RX ORDER — AMOXICILLIN AND CLAVULANATE POTASSIUM 875; 125 MG/1; MG/1
1 TABLET, FILM COATED ORAL EVERY 12 HOURS SCHEDULED
Qty: 20 TABLET | Refills: 1 | Status: SHIPPED | OUTPATIENT
Start: 2018-12-26 | End: 2019-08-01

## 2018-12-26 NOTE — PROGRESS NOTES
Subjective   Prem Thrasher is a 63 y.o. male.     History of Present Illness   Prem Thrasher 63 y.o. male who presents for evaluation of upper respiratory congestion. Symptoms include congestion, sneezing, sore throat, nasal blockage, post nasal drip, cough described as productive of blood streaked sputum and hoarseness.  Onset of symptoms was 4 days ago, gradually worsening since that time. Patient denies shortness of breath, wheezing, fever.   Evaluation to date: none Treatment to date:  OTC cough suppressant.     Does get sore in left nostril but comes and goes; using artem in nose; ? Ingrown hairs; will Rx PRN Bactroban    Check with DR Lopez about holding Arava while ill  He also has COPD  The following portions of the patient's history were reviewed and updated as appropriate: allergies, current medications, past family history, past medical history, past social history, past surgical history and problem list.    Review of Systems   Constitutional: Positive for fatigue. Negative for activity change and unexpected weight change.   HENT: Positive for congestion, postnasal drip, sinus pain and sore throat. Negative for ear pain and rhinorrhea.    Eyes: Negative for pain and discharge.   Respiratory: Positive for cough. Negative for chest tightness, shortness of breath and wheezing.    Cardiovascular: Negative for chest pain and palpitations.   Gastrointestinal: Negative for abdominal pain, diarrhea and vomiting.   Endocrine: Negative.    Genitourinary: Negative.    Musculoskeletal: Positive for arthralgias, back pain and myalgias. Negative for joint swelling.   Skin: Negative for color change, rash and wound.   Allergic/Immunologic: Negative.    Neurological: Positive for dizziness, weakness and headaches. Negative for seizures and syncope.   Psychiatric/Behavioral: Negative.        Objective   Physical Exam   Constitutional: He is oriented to person, place, and time. He appears well-developed and  well-nourished.  Non-toxic appearance. No distress.   HENT:   Head: Normocephalic and atraumatic. Hair is normal.   Right Ear: External ear normal. No drainage, swelling or tenderness. Tympanic membrane is retracted.   Left Ear: External ear normal. No drainage, swelling or tenderness. Tympanic membrane is retracted.   Nose: Mucosal edema present. No epistaxis.   Mouth/Throat: Uvula is midline and mucous membranes are normal. No oral lesions. No uvula swelling. Posterior oropharyngeal erythema present. No oropharyngeal exudate.   Eyes: Conjunctivae and EOM are normal. Pupils are equal, round, and reactive to light. Right eye exhibits no discharge. Left eye exhibits no discharge. No scleral icterus.   Neck: Normal range of motion. Neck supple.   Cardiovascular: Normal rate, regular rhythm and normal heart sounds. Exam reveals no gallop.   No murmur heard.  Pulmonary/Chest: Breath sounds normal. No stridor. No respiratory distress. He has no wheezes. He has no rales. He exhibits no tenderness.   Abdominal: Soft. There is no tenderness.   Lymphadenopathy:     He has cervical adenopathy.   Neurological: He is alert and oriented to person, place, and time. He exhibits normal muscle tone.   Skin: Skin is warm and dry. No rash noted. He is not diaphoretic.   Psychiatric: He has a normal mood and affect. His behavior is normal. Judgment and thought content normal.   Nursing note and vitals reviewed.      Assessment/Plan   There are no diagnoses linked to this encounter.

## 2019-01-07 ENCOUNTER — OFFICE VISIT (OUTPATIENT)
Dept: ORTHOPEDIC SURGERY | Facility: CLINIC | Age: 64
End: 2019-01-07

## 2019-01-07 VITALS — HEIGHT: 69 IN | WEIGHT: 207 LBS | TEMPERATURE: 97.8 F | BODY MASS INDEX: 30.66 KG/M2

## 2019-01-07 DIAGNOSIS — T81.30XA WOUND DEHISCENCE: ICD-10-CM

## 2019-01-07 DIAGNOSIS — M65.28 CALCIFIC ACHILLES TENDONITIS: Primary | ICD-10-CM

## 2019-01-07 PROCEDURE — 99213 OFFICE O/P EST LOW 20 MIN: CPT | Performed by: ORTHOPAEDIC SURGERY

## 2019-01-07 NOTE — PROGRESS NOTES
"Ankle Follow Up      Patient: Prem Thrasher    YOB: 1955 63 y.o. male    Chief Complaints: Ankle pain    History of Present Illness: Patient follows up left Achilles debridement with secondary repair and resection of Allegra deformity with gastroc recession on 7/3/18.  He had postoperative wound dehiscence and has been followed in wound care for this with continued improvement.  He has no pain to the Achilles or posterior aspect of the heel  HPI    ROS: No ankle pain  Past Medical History:   Diagnosis Date   • Achilles tendon pain     LEFT   • Anxiety disorder    • COPD (chronic obstructive pulmonary disease) (CMS/HCC)    • CTS (carpal tunnel syndrome)    • Diverticulitis    • Diverticulitis of colon    • Diverticulosis    • Encounter for eye exam 10/2014    normal   • Fissure, anal    • History of bone density study 03/2014    Dr. Maria Antonia Lopez; normal   • History of chest x-ray 02/15/2011    also 3-6-15; normal chest   • History of colon polyps    • History of nuclear stress test 03/09/2015    cardiolite; Dr. Greenberg; negative   • History of pulmonary function tests 03/2015    COPD   • Hyperlipidemia    • Hypertension    • IFG (impaired fasting glucose)    • Low back pain    • Lumbosacral disc disease    • Nerve damage     KAYLYNN ELBOWS   • Osteoarthritis, multiple sites    • Postoperative nausea and vomiting 8/1/2017   • Postoperative wound infection    • Rectal bleeding    • Rheumatoid arthritis (CMS/HCC)    • Sciatica    • Staph infection     WITH BACK SURGERY 2017  ON LONG TERM ANTIBIOTICS       Physical Exam:   Vitals:    01/07/19 1108   Temp: 97.8 °F (36.6 °C)   Weight: 93.9 kg (207 lb)   Height: 175.3 cm (69\")     Well developed with normal mood.  Nonantalgic gait.  Incision is well-healed other than some residual wound over the distal aspect that is superficial in appearance and only measures around 5 mm.  There was no warmth erythema or tenderness to palpation.  5 out of 5 plantarflexion " strength without discomfort      Radiology: None performed      Assessment/Plan:  Status post Achilles surgery with distal wound breakdown.    Overall he continues to improve and I don't see any sign of infection.  He declined any formal therapy and will continue with wound care.  I will see him back in 6 weeks

## 2019-01-08 ENCOUNTER — OFFICE VISIT (OUTPATIENT)
Dept: WOUND CARE | Facility: HOSPITAL | Age: 64
End: 2019-01-08
Attending: SURGERY

## 2019-01-11 RX ORDER — CARVEDILOL 12.5 MG/1
12.5 TABLET ORAL 2 TIMES DAILY WITH MEALS
Qty: 180 TABLET | Refills: 0 | Status: SHIPPED | OUTPATIENT
Start: 2019-01-11 | End: 2019-03-08 | Stop reason: SDUPTHER

## 2019-01-22 ENCOUNTER — OFFICE VISIT (OUTPATIENT)
Dept: WOUND CARE | Facility: HOSPITAL | Age: 64
End: 2019-01-22
Attending: SURGERY

## 2019-02-12 ENCOUNTER — APPOINTMENT (OUTPATIENT)
Dept: WOUND CARE | Facility: HOSPITAL | Age: 64
End: 2019-02-12
Attending: SURGERY

## 2019-02-25 ENCOUNTER — OFFICE VISIT (OUTPATIENT)
Dept: ORTHOPEDIC SURGERY | Facility: CLINIC | Age: 64
End: 2019-02-25

## 2019-02-25 VITALS — BODY MASS INDEX: 30.66 KG/M2 | TEMPERATURE: 97.8 F | HEIGHT: 69 IN | WEIGHT: 207 LBS

## 2019-02-25 DIAGNOSIS — M92.62 HAGLUND'S DEFORMITY OF LEFT HEEL: ICD-10-CM

## 2019-02-25 DIAGNOSIS — T81.30XA WOUND DEHISCENCE: ICD-10-CM

## 2019-02-25 DIAGNOSIS — M65.28 CALCIFIC ACHILLES TENDONITIS: Primary | ICD-10-CM

## 2019-02-25 PROCEDURE — 99213 OFFICE O/P EST LOW 20 MIN: CPT | Performed by: ORTHOPAEDIC SURGERY

## 2019-02-26 NOTE — PROGRESS NOTES
"Ankle Follow Up      Patient: Prem Thrasher    YOB: 1955 64 y.o. male    Chief Complaints: Ankle doing fine    History of Present Illness:Patient follows up left Achilles debridement with secondary repair and resection of Allegra deformity with gastroc recession on 7/3/18.  He had postoperative wound dehiscence and has been followed in wound care for this with continued improvement.    Stopped going to wound care and that his wound has been fine with no drainage or further breakdown.  He states he stopped going to wound care because every time the he went he said they \"cut into it and made it worse.  He has no complaints of pain at the Achilles insertion or heel at this time  HPI    ROS: No ankle pain  Past Medical History:   Diagnosis Date   • Achilles tendon pain     LEFT   • Anxiety disorder    • COPD (chronic obstructive pulmonary disease) (CMS/HCC)    • CTS (carpal tunnel syndrome)    • Diverticulitis    • Diverticulitis of colon    • Diverticulosis    • Encounter for eye exam 10/2014    normal   • Fissure, anal    • History of bone density study 03/2014    Dr. Maria Antonia Lopez; normal   • History of chest x-ray 02/15/2011    also 3-6-15; normal chest   • History of colon polyps    • History of nuclear stress test 03/09/2015    cardiolite; Dr. Greenberg; negative   • History of pulmonary function tests 03/2015    COPD   • Hyperlipidemia    • Hypertension    • IFG (impaired fasting glucose)    • Low back pain    • Lumbosacral disc disease    • Nerve damage     KAYLYNN ELBOWS   • Osteoarthritis, multiple sites    • Postoperative nausea and vomiting 8/1/2017   • Postoperative wound infection    • Rectal bleeding    • Rheumatoid arthritis (CMS/HCC)    • Sciatica    • Staph infection     WITH BACK SURGERY 2017  ON LONG TERM ANTIBIOTICS       Physical Exam:   Vitals:    02/25/19 1150   Temp: 97.8 °F (36.6 °C)   Weight: 93.9 kg (207 lb)   Height: 175.3 cm (69\")     Well developed with normal mood.  Nonantalgic " gait.  Left Achilles wound appears to be healed now there is no warmth or erythema and it appeared to be epithelialized.  He had no pain over the insertion of the Achilles he has 5 out of 5 plantarflexion strength      Radiology: None performed      Assessment/Plan: 1.  Status post Achilles surgery as outlined above  2.  Left Achilles wound breakdown now resolved    Patient will continue with stretching and strengthening exercises and he will keep a close eye on his heel if he has any residual breakdown to get back into wound care otherwise he seems to be doing fine I will see him back as needed

## 2019-03-08 ENCOUNTER — TELEPHONE (OUTPATIENT)
Dept: FAMILY MEDICINE CLINIC | Facility: CLINIC | Age: 64
End: 2019-03-08

## 2019-03-08 RX ORDER — AMLODIPINE BESYLATE 10 MG/1
10 TABLET ORAL DAILY
Qty: 90 TABLET | Refills: 3 | Status: SHIPPED | OUTPATIENT
Start: 2019-03-08 | End: 2019-12-04 | Stop reason: SDUPTHER

## 2019-03-08 RX ORDER — EZETIMIBE 10 MG/1
10 TABLET ORAL EVERY EVENING
Qty: 90 TABLET | Refills: 3 | Status: SHIPPED | OUTPATIENT
Start: 2019-03-08 | End: 2019-12-04 | Stop reason: SDUPTHER

## 2019-03-08 RX ORDER — CYCLOBENZAPRINE HCL 10 MG
10 TABLET ORAL 3 TIMES DAILY PRN
Qty: 270 TABLET | Refills: 3 | Status: SHIPPED | OUTPATIENT
Start: 2019-03-08 | End: 2019-12-04 | Stop reason: SDUPTHER

## 2019-03-08 RX ORDER — ESCITALOPRAM OXALATE 20 MG/1
20 TABLET ORAL NIGHTLY
Qty: 90 TABLET | Refills: 3 | Status: SHIPPED | OUTPATIENT
Start: 2019-03-08 | End: 2019-12-04 | Stop reason: SDUPTHER

## 2019-03-08 RX ORDER — IRBESARTAN 300 MG/1
300 TABLET ORAL DAILY
Qty: 90 TABLET | Refills: 1 | Status: SHIPPED | OUTPATIENT
Start: 2019-03-08 | End: 2019-09-10 | Stop reason: SDUPTHER

## 2019-03-08 RX ORDER — CARVEDILOL 12.5 MG/1
12.5 TABLET ORAL 2 TIMES DAILY WITH MEALS
Qty: 180 TABLET | Refills: 3 | Status: SHIPPED | OUTPATIENT
Start: 2019-03-08 | End: 2019-04-03

## 2019-04-03 RX ORDER — CARVEDILOL 12.5 MG/1
12.5 TABLET ORAL 2 TIMES DAILY WITH MEALS
Qty: 180 TABLET | Refills: 0 | Status: SHIPPED | OUTPATIENT
Start: 2019-04-03 | End: 2019-12-04 | Stop reason: SDUPTHER

## 2019-08-01 ENCOUNTER — OFFICE VISIT (OUTPATIENT)
Dept: ORTHOPEDIC SURGERY | Facility: CLINIC | Age: 64
End: 2019-08-01

## 2019-08-01 VITALS — BODY MASS INDEX: 30.66 KG/M2 | WEIGHT: 207 LBS | TEMPERATURE: 98.8 F | HEIGHT: 69 IN

## 2019-08-01 DIAGNOSIS — M79.672 CHRONIC HEEL PAIN, LEFT: ICD-10-CM

## 2019-08-01 DIAGNOSIS — S86.112A GASTROCNEMIUS STRAIN, LEFT, INITIAL ENCOUNTER: Primary | ICD-10-CM

## 2019-08-01 DIAGNOSIS — G89.29 CHRONIC HEEL PAIN, LEFT: ICD-10-CM

## 2019-08-01 DIAGNOSIS — M21.862 GASTROCNEMIUS EQUINUS, LEFT: ICD-10-CM

## 2019-08-01 DIAGNOSIS — M92.62 HAGLUND'S DEFORMITY OF LEFT HEEL: ICD-10-CM

## 2019-08-01 DIAGNOSIS — M65.28 CALCIFIC ACHILLES TENDONITIS: ICD-10-CM

## 2019-08-01 PROCEDURE — 99213 OFFICE O/P EST LOW 20 MIN: CPT | Performed by: ORTHOPAEDIC SURGERY

## 2019-08-01 PROCEDURE — 73650 X-RAY EXAM OF HEEL: CPT | Performed by: ORTHOPAEDIC SURGERY

## 2019-08-01 NOTE — PROGRESS NOTES
"Ankle Follow Up      Patient: Prem Thrasher    YOB: 1955 64 y.o. 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     He had wound healing complications and was followed in wound care and this finally healed.  Was last seen on 2/25/2019 had no complaints of pain at the Achilles insertion or the heel at that time.    He is noticed over the last 2 months progressive discomfort along his Achilles more proximally where we did the gastroc recession where he said he \"always feels tight\".  He has pain with walking in that area and also complains less so of some stabbing pain at the plantar aspect of the heel.  HPI    ROS: ankle pain, no fevers chills chest pain or shortness of breath  Past Medical History:   Diagnosis Date   • Achilles tendon pain     LEFT   • Anxiety disorder    • COPD (chronic obstructive pulmonary disease) (CMS/HCC)    • CTS (carpal tunnel syndrome)    • Diverticulitis    • Diverticulitis of colon    • Diverticulosis    • Encounter for eye exam 10/2014    normal   • Fissure, anal    • History of bone density study 03/2014    Dr. Maria Antonia Lopez; normal   • History of chest x-ray 02/15/2011    also 3-6-15; normal chest   • History of colon polyps    • History of nuclear stress test 03/09/2015    cardiolite; Dr. Greenberg; negative   • History of pulmonary function tests 03/2015    COPD   • Hyperlipidemia    • Hypertension    • IFG (impaired fasting glucose)    • Low back pain    • Lumbosacral disc disease    • Nerve damage     KAYLYNN ELBOWS   • Osteoarthritis, multiple sites    • Postoperative nausea and vomiting 8/1/2017   • Postoperative wound infection    • Rectal bleeding    • Rheumatoid arthritis (CMS/HCC)    • Sciatica    • Staph infection     WITH BACK SURGERY 2017  ON LONG TERM ANTIBIOTICS       Physical Exam:   Vitals:    08/01/19 " "1521   Temp: 98.8 °F (37.1 °C)   TempSrc: Temporal   Weight: 93.9 kg (207 lb)   Height: 175.3 cm (69\")   PainSc: 10-Worst pain ever   PainLoc: Leg  Comment: & foot     Well developed with normal mood.  On exam the left calf was nontender without sign of DVT.  There is some mild fullness to the midportion of the leg near the area of his gastroc recession but no clearly palpable defect.  In the supine position he had symmetric resting length and there was good heel motion with calf compression.  There was some mild discomfort over the plantar aspect of the heel but no exacerbation of pain with medial lateral compression.  No palpable defects of the Achilles distally.    He was able to actively plantarflex with 5 out of 5 strength but with discomfort at the more proximal aspect of the Achilles his wound appears well-healed with no sign of infection      Radiology: 2 views of the left calcaneus ordered to evaluate pain including the distal half of the tib-fib on the lateral view.  These were reviewed and compared with x-rays from July of last year.  There remains adequate decompression no clear sign of fracture at the calcaneus or calcifications along the course of the Achilles.      Assessment/Plan: 1.  Status post left Achilles surgery as outlined above    It is difficult to say.  He may have had some gastroc strain at the area of the resection.  He has been doing some heel cord stretching exercises which seems to bother this.    We will have him get back in his boot with a heel lift for now and apply Voltaren gel to the area twice daily.  He may do some gentle heel cord stretching exercises and going to get an MRI of his left tib-fib rather than just at the hindfoot so we can image the area of his gastroc recession more to make sure there is not any fluid collection or major problem there.    At this point his heel is less bothersome will so we will hold off on further imaging there    He understands to call to " schedule follow-up appointment after MRI has been scheduled in order to review results in the office

## 2019-08-05 ENCOUNTER — HOSPITAL ENCOUNTER (OUTPATIENT)
Dept: MRI IMAGING | Facility: HOSPITAL | Age: 64
Discharge: HOME | End: 2019-08-05
Admitting: ORTHOPAEDIC SURGERY

## 2019-08-05 DIAGNOSIS — M21.862 GASTROCNEMIUS EQUINUS, LEFT: ICD-10-CM

## 2019-08-05 DIAGNOSIS — S86.112A GASTROCNEMIUS STRAIN, LEFT, INITIAL ENCOUNTER: ICD-10-CM

## 2019-08-05 PROCEDURE — 73718 MRI LOWER EXTREMITY W/O DYE: CPT

## 2019-08-07 ENCOUNTER — APPOINTMENT (OUTPATIENT)
Dept: MRI IMAGING | Facility: HOSPITAL | Age: 64
End: 2019-08-07

## 2019-08-19 ENCOUNTER — TELEPHONE (OUTPATIENT)
Dept: ORTHOPEDIC SURGERY | Facility: CLINIC | Age: 64
End: 2019-08-19

## 2019-08-19 DIAGNOSIS — M21.862 GASTROCNEMIUS EQUINUS, LEFT: Primary | ICD-10-CM

## 2019-08-20 NOTE — TELEPHONE ENCOUNTER
I spoke with patient and reviewed with him his MRI results and that there was some thickening of the gastroc in the area to be done his gastroc recession with some mild atrophy of the soleus muscle and mainly look like scarring to this area but no hematoma or soft tissue mass.  He said he had some days where it really did not bother him too much and then some days when it does and that it just feels tight through that area.  Reviewed with him to resume some heel cord stretching exercises and I think it would be a good idea for him to get into formal therapy including possible modalities for this.  He appreciated the call and I will check him back in about a month.

## 2019-08-29 ENCOUNTER — TREATMENT (OUTPATIENT)
Dept: PHYSICAL THERAPY | Facility: CLINIC | Age: 64
End: 2019-08-29

## 2019-08-29 DIAGNOSIS — R26.2 DIFFICULTY WALKING: ICD-10-CM

## 2019-08-29 DIAGNOSIS — M62.89 TIGHTNESS OF LEFT GASTROCNEMIUS MUSCLE: Primary | ICD-10-CM

## 2019-08-29 PROCEDURE — 97035 APP MDLTY 1+ULTRASOUND EA 15: CPT | Performed by: PHYSICAL THERAPIST

## 2019-08-29 PROCEDURE — 97162 PT EVAL MOD COMPLEX 30 MIN: CPT | Performed by: PHYSICAL THERAPIST

## 2019-08-29 NOTE — PROGRESS NOTES
Physical Therapy Initial Evaluation and Plan of Care    Patient: Prem Thrasher   : 1955  Diagnosis/ICD-10 Code:  Tightness of left gastrocnemius muscle [M62.89]  Referring practitioner: Vinay Smith*  Past Medical History Reviewed: 2019    PLOF: Independent and does yardwork    Subjective Evaluation    History of Present Illness  Date of surgery: 2018  Mechanism of injury: I had bone spur removal and Achilles surgery. I am having tightness and pain in the back and side of my left lower leg. I had spinal fusion in the past as well. I did have some wound care issues after the surgery.   I have had a hard time getting out of bed because I am very stiff. I have not tried heat or ice.   My whole left foot is numb and  It was numb a little before surgery too.   I have not been doing any exercise.         Patient Occupation: retired and works in yardwork Pain  Current pain ratin  At best pain ratin  At worst pain rating: 10  Location: (L) lateral leg  Relieving factors: rest and change in position  Aggravating factors: ambulation, stairs, standing, sleeping and prolonged positioning  Progression: worsening    Diagnostic Tests  Abnormal MRI: see imaging              Objective       Observations   Left Ankle/Foot   Positive for atrophy and incision.     Palpation   Left   Tenderness of the lateral gastrocnemius, medial gastrocnemius, peroneus and soleus.     Neurological Testing     Sensation     Ankle/Foot   Left Ankle/Foot   Diminished: light touch    Right Ankle/Foot   Intact: light touch     Active Range of Motion   Left Ankle/Foot   Dorsiflexion (ke): 5 degrees   Plantar flexion: 60 degrees   Inversion: 35 degrees   Eversion: 10 degrees     Right Ankle/Foot   Dorsiflexion (ke): 7 degrees   Inversion: 39 degrees   Eversion: 18 degrees     Strength/Myotome Testing     Left Ankle/Foot   Dorsiflexion: 4+  Inversion: 4+  Eversion: 4+    Right Ankle/Foot   Dorsiflexion: 5  Inversion:  5  Eversion: 5    Ambulation     Observational Gait   Left foot contact pattern: heel to toe  Right foot contact pattern: heel to toe    Functional Assessment     Single Leg Stance   Left: 3 seconds  Right: 7 seconds         Assessment & Plan     Assessment  Impairments: activity intolerance, impaired balance, impaired physical strength, lacks appropriate home exercise program and pain with function  Assessment details: Pt has significant tightness in left gastroc musculature and Achilles tendon resulting in decreased ability to ambulate, climb stairs and perform ADL's comfortable. Pt would benefit from PT services to address these deficits  Barriers to therapy: multiple joint issues, back pain, increased scar tisse  Prognosis: fair  Prognosis details: SHORT TERM GOALS: 4-6 visits  1. Pt will be compliant with HEP  2. Pt will report pain as 5/10 or less  3. Pt will be able to sleep through night without pain from ankle waking him up    LONG TERM GOALS: 8-10 visits  1. Pt will score 46/80 on LEFS  2. Pt will have minimal tenderness through gastroc and peroneal muscles  3. Pt will report pain of 3/10 or less in L lower leg  Functional Limitations: sleeping, walking, uncomfortable because of pain, sitting and standing  Plan  Therapy options: will be seen for skilled physical therapy services  Planned modality interventions: cryotherapy, electrical stimulation/Russian stimulation, TENS, thermotherapy (hydrocollator packs) and ultrasound  Planned therapy interventions: abdominal trunk stabilization, ADL retraining, balance/weight-bearing training, body mechanics training, flexibility, functional ROM exercises, gait training, home exercise program, joint mobilization, manual therapy, neuromuscular re-education, soft tissue mobilization, spinal/joint mobilization, stretching, strengthening and therapeutic activities  Duration in visits: 12  Treatment plan discussed with: patient        Manual Therapy:    -     mins   09203;  Therapeutic Exercise:    5     mins  64454;     Neuromuscular Emmy:    -    mins  32517;    Therapeutic Activity:     -     mins  38510;     Gait Training:      -     mins  92646;     Ultrasound:     10     mins  44849;    Electrical Stimulation:    -     mins  58622 ( );  Dry Needling     -     mins self-pay    Timed Treatment:   15   mins   Total Treatment:     60   mins      PT SIGNATURE: Tatyana Woodard, PT   DATE TREATMENT INITIATED: 8/29/2019    Medicare Initial Certification  Certification Period: 11/27/2019  I certify that the therapy services are furnished while this patient is under my care.  The services outlined above are required by this patient, and will be reviewed every 90 days.     PHYSICIAN: Vinay Smith MD      DATE:     Please sign and return via fax to [phone].. Thank you, [facility].

## 2019-09-10 RX ORDER — IRBESARTAN 300 MG/1
300 TABLET ORAL DAILY
Qty: 90 TABLET | Refills: 0 | Status: SHIPPED | OUTPATIENT
Start: 2019-09-10 | End: 2019-11-18 | Stop reason: SDUPTHER

## 2019-09-11 ENCOUNTER — OFFICE VISIT (OUTPATIENT)
Dept: PHYSICAL THERAPY | Facility: CLINIC | Age: 64
End: 2019-09-11

## 2019-09-11 DIAGNOSIS — M62.89 TIGHTNESS OF LEFT GASTROCNEMIUS MUSCLE: Primary | ICD-10-CM

## 2019-09-11 DIAGNOSIS — R26.2 DIFFICULTY WALKING: ICD-10-CM

## 2019-09-11 PROCEDURE — 97035 APP MDLTY 1+ULTRASOUND EA 15: CPT | Performed by: PHYSICAL THERAPIST

## 2019-09-11 PROCEDURE — 97110 THERAPEUTIC EXERCISES: CPT | Performed by: PHYSICAL THERAPIST

## 2019-09-11 NOTE — PROGRESS NOTES
Physical Therapy Daily Progress Note  Visit: 2    Prem Price reports: I had a bad week with the burning in my back and in my leg. I think the rolling might be helping some. I have not been doing the stretches    Subjective     Objective   See Exercise, Manual, and Modality Logs for complete treatment.       Assessment & Plan     Assessment  Assessment details: Pt had increased spasms and discomfort with single leg balance. Educated to perform exercises even on bad days, just back off on the intensity.     Plan  Plan details: Progress per POC        Manual Therapy:    5     mins  85062;  Therapeutic Exercise:    26     mins  77375;     Neuromuscular Emmy:    -    mins  34521;    Therapeutic Activity:     -     mins  56886;     Gait Training:      -     mins  13003;     Ultrasound:     10     mins  12159;    Electrical Stimulation:    -     mins  10696 ( );  Dry Needling     -     mins self-pay    Timed Treatment:   41   mins   Total Treatment:     43   mins    Tatyana Woodard PT  [place] License #: [id number]    Physical Therapist

## 2019-09-13 ENCOUNTER — OFFICE VISIT (OUTPATIENT)
Dept: PHYSICAL THERAPY | Facility: CLINIC | Age: 64
End: 2019-09-13

## 2019-09-13 DIAGNOSIS — M62.89 TIGHTNESS OF LEFT GASTROCNEMIUS MUSCLE: Primary | ICD-10-CM

## 2019-09-13 DIAGNOSIS — R26.2 DIFFICULTY WALKING: ICD-10-CM

## 2019-09-13 PROCEDURE — 97110 THERAPEUTIC EXERCISES: CPT | Performed by: PHYSICAL THERAPIST

## 2019-09-13 PROCEDURE — 97140 MANUAL THERAPY 1/> REGIONS: CPT | Performed by: PHYSICAL THERAPIST

## 2019-09-13 PROCEDURE — 97112 NEUROMUSCULAR REEDUCATION: CPT | Performed by: PHYSICAL THERAPIST

## 2019-09-13 NOTE — PROGRESS NOTES
Physical Therapy Daily Progress Note  Visit: 3    Prem Thrasher reports: I was sore after last visit, more than normal    Subjective     Objective   See Exercise, Manual, and Modality Logs for complete treatment.       Assessment & Plan     Assessment  Assessment details: Pt tolerated treat ment better today. Educated to use heat for stiffness in the morning and ice for after activity for pain    Plan  Plan details: Progress per POC        Manual Therapy:    7     mins  48555;  Therapeutic Exercise:    15     mins  95304;     Neuromuscular Emmy:    9    mins  64526;    Therapeutic Activity:     -     mins  88805;     Gait Training:      -     mins  08808;     Ultrasound:     10     mins  02047;    Electrical Stimulation:    -     mins  14336 ( );  Dry Needling     -     mins self-pay    Timed Treatment:   42   mins   Total Treatment:     43   mins    Tatyana Woodard PT  [place] License #: [id number]    Physical Therapist

## 2019-09-17 ENCOUNTER — OFFICE VISIT (OUTPATIENT)
Dept: PHYSICAL THERAPY | Facility: CLINIC | Age: 64
End: 2019-09-17

## 2019-09-17 DIAGNOSIS — M62.89 TIGHTNESS OF LEFT GASTROCNEMIUS MUSCLE: Primary | ICD-10-CM

## 2019-09-17 DIAGNOSIS — R26.2 DIFFICULTY WALKING: ICD-10-CM

## 2019-09-17 PROCEDURE — 97112 NEUROMUSCULAR REEDUCATION: CPT | Performed by: PHYSICAL THERAPIST

## 2019-09-17 PROCEDURE — 97110 THERAPEUTIC EXERCISES: CPT | Performed by: PHYSICAL THERAPIST

## 2019-09-17 PROCEDURE — 97035 APP MDLTY 1+ULTRASOUND EA 15: CPT | Performed by: PHYSICAL THERAPIST

## 2019-09-17 NOTE — PROGRESS NOTES
Physical Therapy Daily Progress Note  Visit: 4    Prem Thrasher reports: The leg is still tight    Subjective     Objective   See Exercise, Manual, and Modality Logs for complete treatment.       Assessment & Plan     Assessment  Assessment details: Pt continues to have significant tightness through mid gastroc. Reports of fatigue and tightness after therapy. Discussed benefit of possible night splint to help with pain in the morning and also discuss possible trial of dry needling to help relieve tightness    Plan  Plan details: Pt going to MD tomorrow. Will reassess next week        Manual Therapy:    5     mins  23099;  Therapeutic Exercise:    20     mins  91212;     Neuromuscular Emmy:    10    mins  25347;    Therapeutic Activity:     -     mins  80555;     Gait Training:      -     mins  64242;     Ultrasound:     10     mins  76442;    Electrical Stimulation:    -     mins  32808 ( );  Dry Needling     -     mins self-pay    Timed Treatment:   45   mins   Total Treatment:     50   mins    Tatyana Woodard PT  [place] License #: [id number]    Physical Therapist

## 2019-09-18 ENCOUNTER — OFFICE VISIT (OUTPATIENT)
Dept: ORTHOPEDIC SURGERY | Facility: CLINIC | Age: 64
End: 2019-09-18

## 2019-09-18 VITALS — WEIGHT: 210 LBS | TEMPERATURE: 97.4 F | BODY MASS INDEX: 29.4 KG/M2 | HEIGHT: 71 IN

## 2019-09-18 DIAGNOSIS — S86.112A GASTROCNEMIUS STRAIN, LEFT, INITIAL ENCOUNTER: Primary | ICD-10-CM

## 2019-09-18 PROCEDURE — 99213 OFFICE O/P EST LOW 20 MIN: CPT | Performed by: ORTHOPAEDIC SURGERY

## 2019-09-18 NOTE — PROGRESS NOTES
"Ankle Follow Up      Patient: Prem Thrasher    YOB: 1955 64 y.o. 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    He had wound healing complications and was followed in wound care and this finally healed.  Was last seen on 2/25/2019 had no complaints of pain at the Achilles insertion or the heel at that time.     He was seen on 8/1/2019 and had noticed a 2-month history of progressive progressive discomfort along his Achilles more proximally where we did the gastroc recession where he said he \"always feels tight\".  He had pain with walking in that area and also complained less so of some stabbing pain at the plantar aspect of the heel.    He was instructed to get back in his boot which he did for a brief period and started some gentle heel cord stretching exercises and sent for MRI.  He is also been doing some physical therapy and using Voltaren gel but has persistent complaints of some aching pain on the midportion of the leg and laterally but no complaints of numbness or tingling.        HPI    ROS: Leg pain  Past Medical History:   Diagnosis Date   • Achilles tendon pain     LEFT   • Anxiety disorder    • COPD (chronic obstructive pulmonary disease) (CMS/HCC)    • CTS (carpal tunnel syndrome)    • Diverticulitis    • Diverticulitis of colon    • Diverticulosis    • Encounter for eye exam 10/2014    normal   • Fissure, anal    • History of bone density study 03/2014    Dr. Maria Antonia Lopez; normal   • History of chest x-ray 02/15/2011    also 3-6-15; normal chest   • History of colon polyps    • History of nuclear stress test 03/09/2015    cardiolite; Dr. Greenberg; negative   • History of pulmonary function tests 03/2015    COPD   • Hyperlipidemia    • Hypertension    • IFG (impaired fasting glucose)    • Low back pain    • Lumbosacral disc " "disease    • Nerve damage     KAYLYNN ELBOWS   • Osteoarthritis, multiple sites    • Postoperative nausea and vomiting 8/1/2017   • Postoperative wound infection    • Rectal bleeding    • Rheumatoid arthritis (CMS/HCC)    • Sciatica    • Staph infection     WITH BACK SURGERY 2017  ON LONG TERM ANTIBIOTICS       Physical Exam:   Vitals:    09/18/19 1106   Temp: 97.4 °F (36.3 °C)   TempSrc: Temporal   Weight: 95.3 kg (210 lb)   Height: 180.3 cm (71\")   PainSc:   7   PainLoc: Leg     Well developed with normal mood.  On exam he is ambling without assistive device and his wounds are now completely healed.  There is some mild discomfort along the midportion of the musculotendinous junction of the leg but no focal fluid collections palpable.  He had neutral dorsiflexion of the heel cord and minimal discomfort at the insertion of the plantar fascia      Radiology: MRI films and report of the left tib-fib were reviewed which shows thickening at the gastroc tendon along the gastrocsoleus junction which involves the medial and lateral bands measuring up to 6 cm in thickness with mild atrophy of the soleus.  Mild edema of the gastroc.  There is less segment of the distal Achilles tendon exhibits thickening but no evidence of hematoma formation.      Assessment/Plan: 1.  Status post left Achilles surgery as outlined above  2.  Plantar fasciitis    Reviewed with him that I think this is mainly some scar tissue and thickening from the area of his gastroc recession I do not see any evidence of tear or fluid collection or anything that I could do from a surgical standpoint at this time.    We will have him use a heel lift and continue with Voltaren gel and increase his stretching exercises to at least 4 5 times per day.  I again demonstrated this for him and he will continue with physical therapy.    I will check him back in 6 weeks  "

## 2019-09-20 ENCOUNTER — TREATMENT (OUTPATIENT)
Dept: PHYSICAL THERAPY | Facility: CLINIC | Age: 64
End: 2019-09-20

## 2019-09-20 DIAGNOSIS — R26.2 DIFFICULTY WALKING: ICD-10-CM

## 2019-09-20 DIAGNOSIS — M62.89 TIGHTNESS OF LEFT GASTROCNEMIUS MUSCLE: Primary | ICD-10-CM

## 2019-09-20 PROCEDURE — 97112 NEUROMUSCULAR REEDUCATION: CPT | Performed by: PHYSICAL THERAPIST

## 2019-09-20 PROCEDURE — 97035 APP MDLTY 1+ULTRASOUND EA 15: CPT | Performed by: PHYSICAL THERAPIST

## 2019-09-20 PROCEDURE — 97110 THERAPEUTIC EXERCISES: CPT | Performed by: PHYSICAL THERAPIST

## 2019-09-20 NOTE — PROGRESS NOTES
Physical Therapy Daily Progress Note  Visit: 5    Prem Price reports: There is minimal improvement in therapy. Went to MD and he said that there is not much he can do with the remaining scar tissue    Subjective     Objective   See Exercise, Manual, and Modality Logs for complete treatment.       Assessment & Plan     Assessment  Assessment details: Pt continues to have tightness, but was able to progress with single leg balance activities. Continue to work on reduce scar tissue    Plan  Plan details: Progress as able        Manual Therapy:    5     mins  13044;  Therapeutic Exercise:    20     mins  23221;     Neuromuscular Emmy:    10    mins  78448;    Therapeutic Activity:     -     mins  46190;     Gait Training:      -     mins  57445;     Ultrasound:     10     mins  03578;    Electrical Stimulation:    -     mins  54678 ( );  Dry Needling     -     mins self-pay    Timed Treatment:   45   mins   Total Treatment:     46   mins    Tatyana Woodard PT  [place] License #: [id number]    Physical Therapist

## 2019-09-24 ENCOUNTER — TREATMENT (OUTPATIENT)
Dept: PHYSICAL THERAPY | Facility: CLINIC | Age: 64
End: 2019-09-24

## 2019-09-24 DIAGNOSIS — M62.89 TIGHTNESS OF LEFT GASTROCNEMIUS MUSCLE: Primary | ICD-10-CM

## 2019-09-24 DIAGNOSIS — R26.2 DIFFICULTY WALKING: ICD-10-CM

## 2019-09-24 PROCEDURE — 97110 THERAPEUTIC EXERCISES: CPT | Performed by: PHYSICAL THERAPIST

## 2019-09-24 PROCEDURE — 97112 NEUROMUSCULAR REEDUCATION: CPT | Performed by: PHYSICAL THERAPIST

## 2019-09-24 PROCEDURE — 97035 APP MDLTY 1+ULTRASOUND EA 15: CPT | Performed by: PHYSICAL THERAPIST

## 2019-09-24 NOTE — PROGRESS NOTES
Physical Therapy Daily Progress Note  Visit: 6    Premdylan Thrasher reports: I have been sore all weekend    Subjective     Objective   See Exercise, Manual, and Modality Logs for complete treatment.       Assessment & Plan     Assessment  Assessment details: Pt continues to have pain with most activities Educated to continue stretching and massage leg as needed    Plan  Plan details: Reassess next session        Manual Therapy:    5     mins  26714;  Therapeutic Exercise:    17     mins  17992;     Neuromuscular Emmy:    10    mins  16632;    Therapeutic Activity:     -     mins  09185;     Gait Training:      -     mins  04202;     Ultrasound:     10     mins  77964;    Electrical Stimulation:    -     mins  52710 ( );  Dry Needling     -     mins self-pay    Timed Treatment:   42   mins   Total Treatment:     50   mins    Tatyana Woodard PT  [place] License #: [id number]    Physical Therapist

## 2019-09-26 ENCOUNTER — TREATMENT (OUTPATIENT)
Dept: PHYSICAL THERAPY | Facility: CLINIC | Age: 64
End: 2019-09-26

## 2019-09-26 DIAGNOSIS — M62.89 TIGHTNESS OF LEFT GASTROCNEMIUS MUSCLE: Primary | ICD-10-CM

## 2019-09-26 DIAGNOSIS — R26.2 DIFFICULTY WALKING: ICD-10-CM

## 2019-09-26 PROCEDURE — 97035 APP MDLTY 1+ULTRASOUND EA 15: CPT | Performed by: PHYSICAL THERAPIST

## 2019-09-26 PROCEDURE — 97110 THERAPEUTIC EXERCISES: CPT | Performed by: PHYSICAL THERAPIST

## 2019-09-26 NOTE — PROGRESS NOTES
Physical Therapy Daily Progress Note  Visit: 7    Prem Thrasher reports: It still hurts like hell. It feels like a charley horse to the extreme.     Subjective     Objective   See Exercise, Manual, and Modality Logs for complete treatment.       Assessment & Plan     Assessment  Assessment details: Pt has made minimal progress in therapy. Pt reports minimal relief with ultrasound and soft tissue massage. Educated to keep trying heat, tiger tail rolling and massage to help with scar tissue. Pt will be DC'd due to plateau    Plan  Plan details: DC at this time        Manual Therapy:    5     mins  19068;  Therapeutic Exercise:    30     mins  42377;     Neuromuscular Emmy:    -    mins  28028;    Therapeutic Activity:     -     mins  22632;     Gait Training:      -     mins  00257;     Ultrasound:     10     mins  09161;    Electrical Stimulation:    -     mins  43416 ( );  Dry Needling     -     mins self-pay    Timed Treatment:   45   mins   Total Treatment:     45   mins    Tatyana Woodard PT  [place] License #: [id number]    Physical Therapist

## 2019-11-13 RX ORDER — IRBESARTAN 300 MG/1
TABLET ORAL
Qty: 90 TABLET | Refills: 0 | OUTPATIENT
Start: 2019-11-13

## 2019-11-15 NOTE — PATIENT INSTRUCTIONS
"Upper Respiratory Infection, Adult  Most upper respiratory infections (URIs) are a viral infection of the air passages leading to the lungs. A URI affects the nose, throat, and upper air passages. The most common type of URI is nasopharyngitis and is typically referred to as \"the common cold.\"  URIs run their course and usually go away on their own. Most of the time, a URI does not require medical attention, but sometimes a bacterial infection in the upper airways can follow a viral infection. This is called a secondary infection. Sinus and middle ear infections are common types of secondary upper respiratory infections.  Bacterial pneumonia can also complicate a URI. A URI can worsen asthma and chronic obstructive pulmonary disease (COPD). Sometimes, these complications can require emergency medical care and may be life threatening.   CAUSES  Almost all URIs are caused by viruses. A virus is a type of germ and can spread from one person to another.   RISKS FACTORS  You may be at risk for a URI if:   · You smoke.    · You have chronic heart or lung disease.  · You have a weakened defense (immune) system.    · You are very young or very old.    · You have nasal allergies or asthma.  · You work in crowded or poorly ventilated areas.  · You work in health care facilities or schools.  SIGNS AND SYMPTOMS   Symptoms typically develop 2-3 days after you come in contact with a cold virus. Most viral URIs last 7-10 days. However, viral URIs from the influenza virus (flu virus) can last 14-18 days and are typically more severe. Symptoms may include:   · Runny or stuffy (congested) nose.    · Sneezing.    · Cough.    · Sore throat.    · Headache.    · Fatigue.    · Fever.    · Loss of appetite.    · Pain in your forehead, behind your eyes, and over your cheekbones (sinus pain).  · Muscle aches.    DIAGNOSIS   Your health care provider may diagnose a URI by:  · Physical exam.  · Tests to check that your symptoms are not due to " another condition such as:  ¨ Strep throat.  ¨ Sinusitis.  ¨ Pneumonia.  ¨ Asthma.  TREATMENT   A URI goes away on its own with time. It cannot be cured with medicines, but medicines may be prescribed or recommended to relieve symptoms. Medicines may help:  · Reduce your fever.  · Reduce your cough.  · Relieve nasal congestion.  HOME CARE INSTRUCTIONS   · Take medicines only as directed by your health care provider.    · Gargle warm saltwater or take cough drops to comfort your throat as directed by your health care provider.  · Use a warm mist humidifier or inhale steam from a shower to increase air moisture. This may make it easier to breathe.  · Drink enough fluid to keep your urine clear or pale yellow.    · Eat soups and other clear broths and maintain good nutrition.    · Rest as needed.    · Return to work when your temperature has returned to normal or as your health care provider advises. You may need to stay home longer to avoid infecting others. You can also use a face mask and careful hand washing to prevent spread of the virus.  · Increase the usage of your inhaler if you have asthma.    · Do not use any tobacco products, including cigarettes, chewing tobacco, or electronic cigarettes. If you need help quitting, ask your health care provider.  PREVENTION   The best way to protect yourself from getting a cold is to practice good hygiene.   · Avoid oral or hand contact with people with cold symptoms.    · Wash your hands often if contact occurs.    There is no clear evidence that vitamin C, vitamin E, echinacea, or exercise reduces the chance of developing a cold. However, it is always recommended to get plenty of rest, exercise, and practice good nutrition.   SEEK MEDICAL CARE IF:   · You are getting worse rather than better.    · Your symptoms are not controlled by medicine.    · You have chills.  · You have worsening shortness of breath.  · You have brown or red mucus.  · You have yellow or brown nasal  discharge.  · You have pain in your face, especially when you bend forward.  · You have a fever.  · You have swollen neck glands.  · You have pain while swallowing.  · You have white areas in the back of your throat.  SEEK IMMEDIATE MEDICAL CARE IF:   · You have severe or persistent:    Headache.    Ear pain.    Sinus pain.    Chest pain.  · You have chronic lung disease and any of the following:    Wheezing.    Prolonged cough.    Coughing up blood.    A change in your usual mucus.  · You have a stiff neck.  · You have changes in your:    Vision.    Hearing.    Thinking.    Mood.  MAKE SURE YOU:   · Understand these instructions.  · Will watch your condition.  · Will get help right away if you are not doing well or get worse.     This information is not intended to replace advice given to you by your health care provider. Make sure you discuss any questions you have with your health care provider.     Document Released: 06/13/2002 Document Revised: 05/03/2016 Document Reviewed: 03/25/2015  [employer] Interactive Patient Education ©2016 [employer]     Admit Diagnosis) Urinary tract infection  (PMH) Left ventricular outflow obstruction  (PMH) GI bleed  (PMH) History of subdural hematoma

## 2019-11-18 RX ORDER — IRBESARTAN 300 MG/1
300 TABLET ORAL DAILY
Qty: 30 TABLET | Refills: 0 | Status: SHIPPED | OUTPATIENT
Start: 2019-11-18 | End: 2019-12-04 | Stop reason: SDUPTHER

## 2019-12-04 ENCOUNTER — OFFICE VISIT (OUTPATIENT)
Dept: FAMILY MEDICINE CLINIC | Facility: CLINIC | Age: 64
End: 2019-12-04

## 2019-12-04 VITALS
BODY MASS INDEX: 29.4 KG/M2 | SYSTOLIC BLOOD PRESSURE: 120 MMHG | TEMPERATURE: 97.4 F | HEART RATE: 91 BPM | OXYGEN SATURATION: 96 % | RESPIRATION RATE: 16 BRPM | DIASTOLIC BLOOD PRESSURE: 80 MMHG | HEIGHT: 71 IN | WEIGHT: 210 LBS

## 2019-12-04 DIAGNOSIS — J43.8 OTHER EMPHYSEMA (HCC): ICD-10-CM

## 2019-12-04 DIAGNOSIS — E55.9 VITAMIN D DEFICIENCY: ICD-10-CM

## 2019-12-04 DIAGNOSIS — E78.2 MIXED HYPERLIPIDEMIA: ICD-10-CM

## 2019-12-04 DIAGNOSIS — I10 ESSENTIAL HYPERTENSION: Primary | ICD-10-CM

## 2019-12-04 DIAGNOSIS — F41.1 GENERALIZED ANXIETY DISORDER: ICD-10-CM

## 2019-12-04 DIAGNOSIS — R73.01 IFG (IMPAIRED FASTING GLUCOSE): ICD-10-CM

## 2019-12-04 DIAGNOSIS — E53.8 LOW FOLIC ACID: ICD-10-CM

## 2019-12-04 DIAGNOSIS — E53.8 LOW SERUM VITAMIN B12: ICD-10-CM

## 2019-12-04 DIAGNOSIS — F33.42 RECURRENT MAJOR DEPRESSIVE DISORDER, IN FULL REMISSION (HCC): ICD-10-CM

## 2019-12-04 DIAGNOSIS — M05.79 RHEUMATOID ARTHRITIS INVOLVING MULTIPLE SITES WITH POSITIVE RHEUMATOID FACTOR (HCC): ICD-10-CM

## 2019-12-04 PROCEDURE — 99214 OFFICE O/P EST MOD 30 MIN: CPT | Performed by: PHYSICIAN ASSISTANT

## 2019-12-04 PROCEDURE — G0439 PPPS, SUBSEQ VISIT: HCPCS | Performed by: PHYSICIAN ASSISTANT

## 2019-12-04 RX ORDER — AMLODIPINE BESYLATE 10 MG/1
10 TABLET ORAL DAILY
Qty: 90 TABLET | Refills: 3 | Status: SHIPPED | OUTPATIENT
Start: 2019-12-04 | End: 2020-11-25

## 2019-12-04 RX ORDER — CARVEDILOL 12.5 MG/1
12.5 TABLET ORAL 2 TIMES DAILY WITH MEALS
Qty: 180 TABLET | Refills: 1 | Status: SHIPPED | OUTPATIENT
Start: 2019-12-04 | End: 2020-07-14

## 2019-12-04 RX ORDER — ESCITALOPRAM OXALATE 20 MG/1
20 TABLET ORAL NIGHTLY
Qty: 90 TABLET | Refills: 3 | Status: SHIPPED | OUTPATIENT
Start: 2019-12-04 | End: 2020-11-25

## 2019-12-04 RX ORDER — CYCLOBENZAPRINE HCL 10 MG
10 TABLET ORAL 3 TIMES DAILY PRN
Qty: 270 TABLET | Refills: 3 | Status: SHIPPED | OUTPATIENT
Start: 2019-12-04 | End: 2020-02-29

## 2019-12-04 RX ORDER — IRBESARTAN 300 MG/1
300 TABLET ORAL DAILY
Qty: 90 TABLET | Refills: 1 | Status: SHIPPED | OUTPATIENT
Start: 2019-12-04 | End: 2020-06-04 | Stop reason: SDUPTHER

## 2019-12-04 RX ORDER — EZETIMIBE 10 MG/1
10 TABLET ORAL EVERY EVENING
Qty: 90 TABLET | Refills: 3 | Status: SHIPPED | OUTPATIENT
Start: 2019-12-04 | End: 2020-09-28

## 2019-12-04 NOTE — PROGRESS NOTES
"Subjective   Prem Thrasher is a 64 y.o. male.     History of Present Illness    Since the last visit, he has overall felt fairly well.  He has Essential Hypertension and well controlled on current medication, Impaired fasting glucose and will monitor labs to watch for DMII, Hyperlipidemia with goals met with current Rx and Vitamin D deficiency and will update labs for continued management.  he has been compliant with current medications have reviewed them.  The patient denies medication side effects.  Will refill medications. /80 (BP Location: Left arm, Patient Position: Sitting, Cuff Size: Large Adult)   Pulse 91   Temp 97.4 °F (36.3 °C) (Oral)   Resp 16   Ht 180.3 cm (71\")   Wt 95.3 kg (210 lb)   SpO2 96%   BMI 29.29 kg/m²     Results for orders placed or performed in visit on 10/02/18   Anaerobic Culture - Swab, Foot, Left   Result Value Ref Range    Anaerobic Culture No anaerobes isolated at 5 days    Wound Culture - Wound, Foot, Left   Result Value Ref Range    Wound Culture Scant growth (1+) Normal Skin Paige     Gram Stain No organisms seen     Gram Stain No WBCs per low power field    I will refill his Spiriva for COPD today     intol to all statins and is on Zetia  Dr Lopez is for RA     declines low dose CT chest  Prem Thrasher male 63 y.o. who presents today for follow up of Depression and Anxiety.  He reports medication does help and anxiety is so much better; overall depression controlled and recent life stresses with health.  Does not want to change Rx; it does help. Onset of symptoms was approximately several years ago.  He denies current suicidal and homicidal ideation. Risk factors are lifestyle of multiple roles, chronic illness and chronic pain.  Previous treatment includes current Rx.  He complains of the following medication side effects: none.  The patient declines to go to counseling..     The following portions of the patient's history were reviewed and updated as " appropriate: allergies, current medications, past family history, past medical history, past social history, past surgical history and problem list.    Review of Systems   Constitutional: Positive for fatigue. Negative for activity change, appetite change and unexpected weight change.   HENT: Positive for sinus pressure and tinnitus. Negative for nosebleeds and trouble swallowing.    Eyes: Negative for pain and visual disturbance.   Respiratory: Positive for cough. Negative for chest tightness, shortness of breath and wheezing.    Cardiovascular: Negative for chest pain and palpitations.   Gastrointestinal: Negative for abdominal pain and blood in stool.   Endocrine: Negative.    Genitourinary: Negative for difficulty urinating and hematuria.   Musculoskeletal: Positive for arthralgias, joint swelling and myalgias.   Skin: Negative for color change and rash.   Allergic/Immunologic: Negative.    Neurological: Negative for syncope and speech difficulty.   Hematological: Negative for adenopathy.   Psychiatric/Behavioral: Negative for agitation and confusion. The patient is nervous/anxious and is hyperactive.    All other systems reviewed and are negative.      Objective   Physical Exam   Constitutional: He is oriented to person, place, and time. He appears well-developed and well-nourished. No distress.   HENT:   Head: Normocephalic and atraumatic.   Eyes: Conjunctivae and EOM are normal. Pupils are equal, round, and reactive to light. Right eye exhibits no discharge. Left eye exhibits no discharge. No scleral icterus.   Neck: Normal range of motion. Neck supple. No tracheal deviation present. No thyromegaly present.   Cardiovascular: Normal rate, regular rhythm, normal heart sounds, intact distal pulses and normal pulses. Exam reveals no gallop.   No murmur heard.  Pulmonary/Chest: Effort normal and breath sounds normal. No respiratory distress. He has no wheezes. He has no rales.   Musculoskeletal: Normal range of  motion.   Neurological: He is alert and oriented to person, place, and time. He exhibits normal muscle tone. Coordination normal.   Skin: Skin is warm. No rash noted. No erythema. No pallor.   Psychiatric: He has a normal mood and affect. His behavior is normal. Judgment and thought content normal.   Nursing note and vitals reviewed.      Assessment/Plan   Prem was seen today for hypertension.    Diagnoses and all orders for this visit:    Essential hypertension    Generalized anxiety disorder    Rheumatoid arthritis involving multiple sites with positive rheumatoid factor (CMS/HCC)    Recurrent major depressive disorder, in full remission (CMS/[facility])    IFG (impaired fasting glucose)    Mixed hyperlipidemia    Other emphysema (CMS/[facility])    Other orders  -     tiotropium (SPIRIVA) 18 MCG per inhalation capsule; Place 1 capsule into inhaler and inhale Daily. For COPD      Plan, Prem Thrasher, was seen today.  he was seen for HTN and continue medication, Imparied fasting glucose and plan follow up labs, diet, and exercise, Hyperlipidemia and will continue current medication and Vitamin D deficiency and will update labs .  Refill Lexapro for anxiety  COPD Rx on file  Flexeril PRN spasms  Labs  F/u Dr Lopez  PSA per urologist

## 2019-12-04 NOTE — PROGRESS NOTES
The ABCs of the Annual Wellness Visit  Subsequent Medicare Wellness Visit    Chief Complaint   Patient presents with   • Hypertension       Subjective   History of Present Illness:  Prem Thrasher is a 64 y.o. male who presents for a Subsequent Medicare Wellness Visit.    HEALTH RISK ASSESSMENT    Recent Hospitalizations:  No hospitalization(s) within the last year.    Current Medical Providers:  Patient Care Team:  Montse Cain PA-C as PCP - General (Family Medicine)  Maria Antonia Lopez MD as Consulting Physician (Rheumatology)  Sawyer Rick MD as Surgeon (Neurosurgery)  Pradip Mcmillan MD as Consulting Physician (Pulmonary Disease)  Jacky Perez MD as Surgeon (Orthopedic Surgery)  Charli Sen MD as Consulting Physician (Infectious Diseases)  Tray Moody MD as Consulting Physician (Urology)  Suman Richards DPM as Consulting Physician (Podiatry)  Porsha Arcos MD as Consulting Physician (General Surgery)  Vinay Smith MD as Surgeon (Orthopedic Surgery)  Eitan Cuevas MD as Consulting Physician (Plastic Surgery)    Smoking Status:  Social History     Tobacco Use   Smoking Status Current Every Day Smoker   • Packs/day: 1.00   • Years: 45.00   • Pack years: 45.00   • Types: Cigarettes   Smokeless Tobacco Never Used       Alcohol Consumption:  Social History     Substance and Sexual Activity   Alcohol Use No       Depression Screen:   PHQ-2/PHQ-9 Depression Screening 12/4/2019   Little interest or pleasure in doing things 0   Feeling down, depressed, or hopeless 0   Trouble falling or staying asleep, or sleeping too much -   Feeling tired or having little energy -   Poor appetite or overeating -   Feeling bad about yourself - or that you are a failure or have let yourself or your family down -   Trouble concentrating on things, such as reading the newspaper or watching television -   Moving or speaking so slowly that other people could have noticed. Or the  opposite - being so fidgety or restless that you have been moving around a lot more than usual -   Thoughts that you would be better off dead, or of hurting yourself in some way -   Total Score 0       Fall Risk Screen:  JOSE MARIA Fall Risk Assessment was completed, and patient is at MODERATE risk for falls. Assessment completed on:12/4/2019    Health Habits and Functional and Cognitive Screening:  Functional & Cognitive Status 12/4/2019   Do you have difficulty preparing food and eating? No   Do you have difficulty bathing yourself, getting dressed or grooming yourself? Yes   Do you have difficulty using the toilet? No   Do you have difficulty moving around from place to place? Yes   Do you have trouble with steps or getting out of a bed or a chair? No   Current Diet Limited Junk Food   Dental Exam Not up to date   Eye Exam Up to date   Exercise (times per week) 0 times per week   Current Exercise Activities Include None   Do you need help using the phone?  No   Are you deaf or do you have serious difficulty hearing?  No   Do you need help with transportation? No   Do you need help shopping? Yes   Do you need help preparing meals?  No   Do you need help with housework?  Yes   Do you need help with laundry? Yes   Do you need help taking your medications? No   Do you need help managing money? No   Do you ever drive or ride in a car without wearing a seat belt? No   Have you felt unusual stress, anger or loneliness in the last month? Yes   Who do you live with? Spouse   If you need help, do you have trouble finding someone available to you? No   Have you been bothered in the last four weeks by sexual problems? Yes   Do you have difficulty concentrating, remembering or making decisions? No         Does the patient have evidence of cognitive impairment? No    Asprin use counseling:Taking ASA appropriately as indicated    Age-appropriate Screening Schedule:  Refer to the list below for future screening recommendations based  on patient's age, sex and/or medical conditions. Orders for these recommended tests are listed in the plan section. The patient has been provided with a written plan.    Health Maintenance   Topic Date Due   • LIPID PANEL  07/30/2019   • PNEUMOCOCCAL VACCINES (65+ LOW/MEDIUM RISK) (1 of 2 - PCV13) 10/23/2019   • TDAP/TD VACCINES (1 - Tdap) 12/04/2020 (Originally 7/13/2003)   • ZOSTER VACCINE (1 of 2) 12/12/2020 (Originally 2/14/2004)   • COLONOSCOPY  02/02/2021   • INFLUENZA VACCINE  Discontinued          The following portions of the patient's history were reviewed and updated as appropriate: allergies, current medications, past family history, past medical history, past social history, past surgical history and problem list.    Outpatient Medications Prior to Visit   Medication Sig Dispense Refill   • amLODIPine (NORVASC) 10 MG tablet Take 1 tablet by mouth Daily. for blood pressure 90 tablet 3   • aspirin  MG tablet Take 1 tablet by mouth Daily. 30 tablet 0   • carvedilol (COREG) 12.5 MG tablet TAKE 1 TABLET BY MOUTH 2 (TWO) TIMES A DAY WITH MEALS FOR 90 DAYS. FOR BP AND NEW DOSE 180 tablet 0   • celecoxib (CeleBREX) 200 MG capsule Take 200 mg by mouth Daily.     • Cholecalciferol (VITAMIN D) 2000 UNITS tablet Take 2,000 Units by mouth Every Evening.     • [employer] SENNA PLUS 8.6-50 MG per tablet TAKE 1 TABLET BY MOUTH 2 (TWO) TIMES A DAY. 60 tablet 1   • cyclobenzaprine (FLEXERIL) 10 MG tablet Take 1 tablet by mouth 3 (Three) Times a Day As Needed for Muscle Spasms. 270 tablet 3   • diclofenac (VOLTAREN) 1 % gel gel Apply 4 g topically to the appropriate area as directed 2 (Two) Times a Day. Use per pkg insert 100 g 2   • escitalopram (LEXAPRO) 20 MG tablet Take 1 tablet by mouth Every Night. For anxiety 90 tablet 3   • ezetimibe (ZETIA) 10 MG tablet Take 1 tablet by mouth Every Evening. For cholesterol 90 tablet 3   • irbesartan (AVAPRO) 300 MG tablet Take 1 tablet by mouth Daily. for blood pressure; need appt  "30 tablet 0   • leflunomide (ARAVA) 20 MG tablet Take 20 mg by mouth Every Morning.     • Probiotic Product (PROBIOTIC PO) Take 1 tablet by mouth Every Morning.     • tiotropium (SPIRIVA) 18 MCG per inhalation capsule Place 1 capsule into inhaler and inhale Daily. 90 capsule 3     No facility-administered medications prior to visit.        Patient Active Problem List   Diagnosis   • Allergy   • COPD (chronic obstructive pulmonary disease) (CMS/[facility])   • Hyperlipidemia   • IFG (impaired fasting glucose)   • Rheumatoid arthritis (CMS/[facility])   • Hypertension   • Anxiety disorder   • Recurrent major depressive disorder, in full remission (CMS/[facility])   • Lumbar radiculopathy   • Spondylolisthesis of lumbar region   • Sepsis (CMS/HCC)   • Infection of lumbar spine (CMS/[facility])   • Allegra's deformity of left heel   • Calcific Achilles tendonitis s/p OR 7/18   • Gastrocnemius equinus, left   • Abscess and cellulitis of gluteal region   • Tobacco abuse   • Perirectal abscess   • Anemia   • Wound dehiscence   • Gastrocnemius strain, left, initial encounter       Advanced Care Planning:  Patient has an advance directive - a copy has not been provided. Have asked the patient to send this to us to add to record    Review of Systems    Compared to one year ago, the patient feels his physical health is better.  Compared to one year ago, the patient feels his mental health is the same.    Reviewed chart for potential of high risk medication in the elderly: yes  Reviewed chart for potential of harmful drug interactions in the elderly:yes    Objective         Vitals:    12/04/19 0946   BP: 120/80   BP Location: Left arm   Patient Position: Sitting   Cuff Size: Large Adult   Pulse: 91   Resp: 16   Temp: 97.4 °F (36.3 °C)   TempSrc: Oral   SpO2: 96%   Weight: 95.3 kg (210 lb)   Height: 180.3 cm (71\")   PainSc:   7   PainLoc: Leg       Body mass index is 29.29 kg/m².  Discussed the patient's BMI with him. The BMI is above average; BMI " management plan is completed.    Physical Exam          Assessment/Plan   Medicare Risks and Personalized Health Plan  CMS Preventative Services Quick Reference  Lung Cancer Risk  Tobacco Use/Dependance (use dotphrase .tobaccocessation for documentation)    The above risks/problems have been discussed with the patient.  Pertinent information has been shared with the patient in the After Visit Summary.  Follow up plans and orders are seen below in the Assessment/Plan Section.    There are no diagnoses linked to this encounter.  Follow Up:  No Follow-up on file.     An After Visit Summary and PPPS were given to the patient.

## 2019-12-05 PROBLEM — E53.8 LOW SERUM VITAMIN B12: Status: ACTIVE | Noted: 2019-12-05

## 2019-12-05 PROBLEM — E53.8 LOW FOLIC ACID: Status: ACTIVE | Noted: 2019-12-05

## 2019-12-05 LAB
25(OH)D3+25(OH)D2 SERPL-MCNC: 37.5 NG/ML (ref 30–100)
ALBUMIN SERPL-MCNC: 4.6 G/DL (ref 3.6–4.8)
ALBUMIN/GLOB SERPL: 2.2 {RATIO} (ref 1.2–2.2)
ALP SERPL-CCNC: 63 IU/L (ref 39–117)
ALT SERPL-CCNC: 17 IU/L (ref 0–44)
AST SERPL-CCNC: 17 IU/L (ref 0–40)
BASOPHILS # BLD AUTO: 0.1 X10E3/UL (ref 0–0.2)
BASOPHILS NFR BLD AUTO: 2 %
BILIRUB SERPL-MCNC: 0.4 MG/DL (ref 0–1.2)
BUN SERPL-MCNC: 17 MG/DL (ref 8–27)
BUN/CREAT SERPL: 15 (ref 10–24)
CALCIUM SERPL-MCNC: 9.7 MG/DL (ref 8.6–10.2)
CHLORIDE SERPL-SCNC: 104 MMOL/L (ref 96–106)
CHOLEST SERPL-MCNC: 181 MG/DL (ref 100–199)
CO2 SERPL-SCNC: 21 MMOL/L (ref 20–29)
CREAT SERPL-MCNC: 1.11 MG/DL (ref 0.76–1.27)
EOSINOPHIL # BLD AUTO: 0.2 X10E3/UL (ref 0–0.4)
EOSINOPHIL NFR BLD AUTO: 2 %
ERYTHROCYTE [DISTWIDTH] IN BLOOD BY AUTOMATED COUNT: 13.8 % (ref 12.3–15.4)
FOLATE SERPL-MCNC: 6.4 NG/ML
GLOBULIN SER CALC-MCNC: 2.1 G/DL (ref 1.5–4.5)
GLUCOSE SERPL-MCNC: 83 MG/DL (ref 65–99)
HBA1C MFR BLD: 5.8 % (ref 4.8–5.6)
HCT VFR BLD AUTO: 42.3 % (ref 37.5–51)
HDLC SERPL-MCNC: 41 MG/DL
HGB BLD-MCNC: 14 G/DL (ref 13–17.7)
IMM GRANULOCYTES # BLD AUTO: 0 X10E3/UL (ref 0–0.1)
IMM GRANULOCYTES NFR BLD AUTO: 0 %
LDLC SERPL CALC-MCNC: 120 MG/DL (ref 0–99)
LYMPHOCYTES # BLD AUTO: 2.2 X10E3/UL (ref 0.7–3.1)
LYMPHOCYTES NFR BLD AUTO: 24 %
MCH RBC QN AUTO: 29.2 PG (ref 26.6–33)
MCHC RBC AUTO-ENTMCNC: 33.1 G/DL (ref 31.5–35.7)
MCV RBC AUTO: 88 FL (ref 79–97)
MONOCYTES # BLD AUTO: 0.8 X10E3/UL (ref 0.1–0.9)
MONOCYTES NFR BLD AUTO: 8 %
NEUTROPHILS # BLD AUTO: 5.7 X10E3/UL (ref 1.4–7)
NEUTROPHILS NFR BLD AUTO: 64 %
PLATELET # BLD AUTO: 344 X10E3/UL (ref 150–450)
POTASSIUM SERPL-SCNC: 4.4 MMOL/L (ref 3.5–5.2)
PROT SERPL-MCNC: 6.7 G/DL (ref 6–8.5)
RBC # BLD AUTO: 4.8 X10E6/UL (ref 4.14–5.8)
SODIUM SERPL-SCNC: 138 MMOL/L (ref 134–144)
T3FREE SERPL-MCNC: 2.9 PG/ML (ref 2–4.4)
T4 FREE SERPL-MCNC: 0.9 NG/DL (ref 0.82–1.77)
TRIGL SERPL-MCNC: 100 MG/DL (ref 0–149)
TSH SERPL DL<=0.005 MIU/L-ACNC: 0.89 UIU/ML (ref 0.45–4.5)
VIT B12 SERPL-MCNC: 267 PG/ML (ref 232–1245)
VLDLC SERPL CALC-MCNC: 20 MG/DL (ref 5–40)
WBC # BLD AUTO: 9 X10E3/UL (ref 3.4–10.8)

## 2019-12-05 RX ORDER — MAGNESIUM 200 MG
TABLET ORAL
Qty: 90 EACH | Refills: 3 | Status: SHIPPED | OUTPATIENT
Start: 2019-12-05 | End: 2020-09-28

## 2019-12-05 RX ORDER — FOLIC ACID 1 MG/1
1 TABLET ORAL DAILY
Qty: 90 TABLET | Refills: 1 | Status: SHIPPED | OUTPATIENT
Start: 2019-12-05 | End: 2020-06-04 | Stop reason: SDUPTHER

## 2020-02-29 RX ORDER — CYCLOBENZAPRINE HCL 10 MG
TABLET ORAL
Qty: 270 TABLET | Refills: 3 | Status: SHIPPED | OUTPATIENT
Start: 2020-02-29 | End: 2021-02-01 | Stop reason: SDUPTHER

## 2020-03-07 ENCOUNTER — APPOINTMENT (OUTPATIENT)
Dept: GENERAL RADIOLOGY | Facility: HOSPITAL | Age: 65
End: 2020-03-07

## 2020-03-07 ENCOUNTER — HOSPITAL ENCOUNTER (OUTPATIENT)
Facility: HOSPITAL | Age: 65
Discharge: HOME | End: 2020-03-08
Attending: EMERGENCY MEDICINE | Admitting: INTERNAL MEDICINE

## 2020-03-07 ENCOUNTER — APPOINTMENT (OUTPATIENT)
Dept: CARDIOLOGY | Facility: HOSPITAL | Age: 65
End: 2020-03-07

## 2020-03-07 DIAGNOSIS — R07.9 EXERTIONAL CHEST PAIN: Primary | ICD-10-CM

## 2020-03-07 LAB
ALBUMIN SERPL-MCNC: 3.9 G/DL (ref 3.5–5.2)
ALBUMIN/GLOB SERPL: 1.7 G/DL
ALP SERPL-CCNC: 54 U/L (ref 39–117)
ALT SERPL W P-5'-P-CCNC: 17 U/L (ref 1–41)
ANION GAP SERPL CALCULATED.3IONS-SCNC: 12.3 MMOL/L (ref 5–15)
AORTIC DIMENSIONLESS INDEX: 0.7 (DI)
AST SERPL-CCNC: 17 U/L (ref 1–40)
BASOPHILS # BLD AUTO: 0.12 10*3/MM3 (ref 0–0.2)
BASOPHILS NFR BLD AUTO: 1.3 % (ref 0–1.5)
BH CV ECHO MEAS - ACS: 1.9 CM
BH CV ECHO MEAS - AO MAX PG: 4 MMHG
BH CV ECHO MEAS - AO MEAN PG (FULL): 1 MMHG
BH CV ECHO MEAS - AO MEAN PG: 2 MMHG
BH CV ECHO MEAS - AO V2 MAX: 103 CM/SEC
BH CV ECHO MEAS - AO V2 MEAN: 73.1 CM/SEC
BH CV ECHO MEAS - AO V2 VTI: 23.7 CM
BH CV ECHO MEAS - AVA(I,A): 2.5 CM^2
BH CV ECHO MEAS - AVA(I,D): 2.5 CM^2
BH CV ECHO MEAS - BSA(HAYCOCK): 2.2 M^2
BH CV ECHO MEAS - BSA: 2.1 M^2
BH CV ECHO MEAS - BZI_BMI: 29 KILOGRAMS/M^2
BH CV ECHO MEAS - BZI_METRIC_HEIGHT: 180.3 CM
BH CV ECHO MEAS - BZI_METRIC_WEIGHT: 94.3 KG
BH CV ECHO MEAS - EDV(CUBED): 85.2 ML
BH CV ECHO MEAS - EDV(MOD-SP2): 61 ML
BH CV ECHO MEAS - EDV(MOD-SP4): 75 ML
BH CV ECHO MEAS - EDV(TEICH): 87.7 ML
BH CV ECHO MEAS - EF(CUBED): 76.9 %
BH CV ECHO MEAS - EF(MOD-BP): 59 %
BH CV ECHO MEAS - EF(MOD-SP2): 63.9 %
BH CV ECHO MEAS - EF(MOD-SP4): 56 %
BH CV ECHO MEAS - EF(TEICH): 69.2 %
BH CV ECHO MEAS - ESV(CUBED): 19.7 ML
BH CV ECHO MEAS - ESV(MOD-SP2): 22 ML
BH CV ECHO MEAS - ESV(MOD-SP4): 33 ML
BH CV ECHO MEAS - ESV(TEICH): 27 ML
BH CV ECHO MEAS - FS: 38.6 %
BH CV ECHO MEAS - IVS/LVPW: 0.86
BH CV ECHO MEAS - IVSD: 1.2 CM
BH CV ECHO MEAS - LAT PEAK E' VEL: 9 CM/SEC
BH CV ECHO MEAS - LV DIASTOLIC VOL/BSA (35-75): 35 ML/M^2
BH CV ECHO MEAS - LV MASS(C)D: 215.1 GRAMS
BH CV ECHO MEAS - LV MASS(C)DI: 100.3 GRAMS/M^2
BH CV ECHO MEAS - LV MAX PG: 3 MMHG
BH CV ECHO MEAS - LV MEAN PG: 1 MMHG
BH CV ECHO MEAS - LV SYSTOLIC VOL/BSA (12-30): 15.4 ML/M^2
BH CV ECHO MEAS - LV V1 MAX: 89 CM/SEC
BH CV ECHO MEAS - LV V1 MEAN: 55.8 CM/SEC
BH CV ECHO MEAS - LV V1 VTI: 16.9 CM
BH CV ECHO MEAS - LVIDD: 4.4 CM
BH CV ECHO MEAS - LVIDS: 2.7 CM
BH CV ECHO MEAS - LVLD AP2: 7.4 CM
BH CV ECHO MEAS - LVLD AP4: 7.7 CM
BH CV ECHO MEAS - LVLS AP2: 7.3 CM
BH CV ECHO MEAS - LVLS AP4: 6.7 CM
BH CV ECHO MEAS - LVOT AREA (M): 3.5 CM^2
BH CV ECHO MEAS - LVOT AREA: 3.5 CM^2
BH CV ECHO MEAS - LVOT DIAM: 2.1 CM
BH CV ECHO MEAS - LVPWD: 1.4 CM
BH CV ECHO MEAS - MED PEAK E' VEL: 5 CM/SEC
BH CV ECHO MEAS - MV A DUR: 0.1 SEC
BH CV ECHO MEAS - MV A MAX VEL: 86.9 CM/SEC
BH CV ECHO MEAS - MV DEC SLOPE: 207 CM/SEC^2
BH CV ECHO MEAS - MV DEC TIME: 210 SEC
BH CV ECHO MEAS - MV E MAX VEL: 83.4 CM/SEC
BH CV ECHO MEAS - MV E/A: 0.96
BH CV ECHO MEAS - MV MEAN PG: 1 MMHG
BH CV ECHO MEAS - MV P1/2T MAX VEL: 61.8 CM/SEC
BH CV ECHO MEAS - MV P1/2T: 87.4 MSEC
BH CV ECHO MEAS - MV V2 MEAN: 47.6 CM/SEC
BH CV ECHO MEAS - MV V2 VTI: 17 CM
BH CV ECHO MEAS - MVA P1/2T LCG: 3.6 CM^2
BH CV ECHO MEAS - MVA(P1/2T): 2.5 CM^2
BH CV ECHO MEAS - MVA(VTI): 3.4 CM^2
BH CV ECHO MEAS - PA ACC SLOPE: 1043 CM/SEC^2
BH CV ECHO MEAS - PA ACC TIME: 0.09 SEC
BH CV ECHO MEAS - PA MAX PG: 3.4 MMHG
BH CV ECHO MEAS - PA PR(ACCEL): 38.5 MMHG
BH CV ECHO MEAS - PA V2 MAX: 92.3 CM/SEC
BH CV ECHO MEAS - QP/QS: 0.8
BH CV ECHO MEAS - RAP SYSTOLE: 3 MMHG
BH CV ECHO MEAS - RV MEAN PG: 0 MMHG
BH CV ECHO MEAS - RV V1 MEAN: 30.5 CM/SEC
BH CV ECHO MEAS - RV V1 VTI: 10.4 CM
BH CV ECHO MEAS - RVOT AREA: 4.5 CM^2
BH CV ECHO MEAS - RVOT DIAM: 2.4 CM
BH CV ECHO MEAS - SI(CUBED): 30.5 ML/M^2
BH CV ECHO MEAS - SI(LVOT): 27.3 ML/M^2
BH CV ECHO MEAS - SI(MOD-SP2): 18.2 ML/M^2
BH CV ECHO MEAS - SI(MOD-SP4): 19.6 ML/M^2
BH CV ECHO MEAS - SI(TEICH): 28.3 ML/M^2
BH CV ECHO MEAS - SV(CUBED): 65.5 ML
BH CV ECHO MEAS - SV(LVOT): 58.5 ML
BH CV ECHO MEAS - SV(MOD-SP2): 39 ML
BH CV ECHO MEAS - SV(MOD-SP4): 42 ML
BH CV ECHO MEAS - SV(RVOT): 47 ML
BH CV ECHO MEAS - SV(TEICH): 60.7 ML
BH CV ECHO MEAS - TAPSE (>1.6): 2.3 CM2
BH CV ECHO MEASUREMENTS AVERAGE E/E' RATIO: 11.91
BH CV VAS BP RIGHT ARM: NORMAL MMHG
BH CV XLRA - RV BASE: 3.5 CM
BH CV XLRA - TDI S': 9 CM/SEC
BILIRUB SERPL-MCNC: 0.2 MG/DL (ref 0.2–1.2)
BUN BLD-MCNC: 14 MG/DL (ref 8–23)
BUN/CREAT SERPL: 12.8 (ref 7–25)
CALCIUM SPEC-SCNC: 9.2 MG/DL (ref 8.6–10.5)
CHLORIDE SERPL-SCNC: 98 MMOL/L (ref 98–107)
CO2 SERPL-SCNC: 26.7 MMOL/L (ref 22–29)
CREAT BLD-MCNC: 1.09 MG/DL (ref 0.76–1.27)
DEPRECATED RDW RBC AUTO: 43.2 FL (ref 37–54)
EOSINOPHIL # BLD AUTO: 0.25 10*3/MM3 (ref 0–0.4)
EOSINOPHIL NFR BLD AUTO: 2.8 % (ref 0.3–6.2)
ERYTHROCYTE [DISTWIDTH] IN BLOOD BY AUTOMATED COUNT: 13.2 % (ref 12.3–15.4)
GFR SERPL CREATININE-BSD FRML MDRD: 68 ML/MIN/1.73
GLOBULIN UR ELPH-MCNC: 2.3 GM/DL
GLUCOSE BLD-MCNC: 115 MG/DL (ref 65–99)
HCT VFR BLD AUTO: 38.3 % (ref 37.5–51)
HGB BLD-MCNC: 13.1 G/DL (ref 13–17.7)
IMM GRANULOCYTES # BLD AUTO: 0.03 10*3/MM3 (ref 0–0.05)
IMM GRANULOCYTES NFR BLD AUTO: 0.3 % (ref 0–0.5)
LEFT ATRIUM VOLUME INDEX: 12 ML/M2
LYMPHOCYTES # BLD AUTO: 2.29 10*3/MM3 (ref 0.7–3.1)
LYMPHOCYTES NFR BLD AUTO: 25.4 % (ref 19.6–45.3)
MAGNESIUM SERPL-MCNC: 2 MG/DL (ref 1.6–2.4)
MAXIMAL PREDICTED HEART RATE: 154 BPM
MCH RBC QN AUTO: 30.5 PG (ref 26.6–33)
MCHC RBC AUTO-ENTMCNC: 34.2 G/DL (ref 31.5–35.7)
MCV RBC AUTO: 89.1 FL (ref 79–97)
MONOCYTES # BLD AUTO: 0.85 10*3/MM3 (ref 0.1–0.9)
MONOCYTES NFR BLD AUTO: 9.4 % (ref 5–12)
NEUTROPHILS # BLD AUTO: 5.47 10*3/MM3 (ref 1.7–7)
NEUTROPHILS NFR BLD AUTO: 60.8 % (ref 42.7–76)
NRBC BLD AUTO-RTO: 0 /100 WBC (ref 0–0.2)
PLATELET # BLD AUTO: 286 10*3/MM3 (ref 140–450)
PMV BLD AUTO: 10 FL (ref 6–12)
POTASSIUM BLD-SCNC: 4.2 MMOL/L (ref 3.5–5.2)
PROT SERPL-MCNC: 6.2 G/DL (ref 6–8.5)
RBC # BLD AUTO: 4.3 10*6/MM3 (ref 4.14–5.8)
SODIUM BLD-SCNC: 137 MMOL/L (ref 136–145)
STRESS TARGET HR: 131 BPM
TROPONIN T SERPL-MCNC: <0.01 NG/ML (ref 0–0.03)
TROPONIN T SERPL-MCNC: <0.01 NG/ML (ref 0–0.03)
WBC NRBC COR # BLD: 9.01 10*3/MM3 (ref 3.4–10.8)

## 2020-03-07 PROCEDURE — 25010000002 MIDAZOLAM PER 1 MG: Performed by: INTERNAL MEDICINE

## 2020-03-07 PROCEDURE — 93005 ELECTROCARDIOGRAM TRACING: CPT | Performed by: EMERGENCY MEDICINE

## 2020-03-07 PROCEDURE — G0378 HOSPITAL OBSERVATION PER HR: HCPCS

## 2020-03-07 PROCEDURE — C1725 CATH, TRANSLUMIN NON-LASER: HCPCS | Performed by: INTERNAL MEDICINE

## 2020-03-07 PROCEDURE — 84484 ASSAY OF TROPONIN QUANT: CPT | Performed by: PHYSICIAN ASSISTANT

## 2020-03-07 PROCEDURE — 93458 L HRT ARTERY/VENTRICLE ANGIO: CPT | Performed by: INTERNAL MEDICINE

## 2020-03-07 PROCEDURE — 93010 ELECTROCARDIOGRAM REPORT: CPT | Performed by: INTERNAL MEDICINE

## 2020-03-07 PROCEDURE — 0 IOPAMIDOL PER 1 ML: Performed by: INTERNAL MEDICINE

## 2020-03-07 PROCEDURE — C9600 PERC DRUG-EL COR STENT SING: HCPCS | Performed by: INTERNAL MEDICINE

## 2020-03-07 PROCEDURE — 93005 ELECTROCARDIOGRAM TRACING: CPT | Performed by: INTERNAL MEDICINE

## 2020-03-07 PROCEDURE — 93306 TTE W/DOPPLER COMPLETE: CPT | Performed by: INTERNAL MEDICINE

## 2020-03-07 PROCEDURE — C1874 STENT, COATED/COV W/DEL SYS: HCPCS | Performed by: INTERNAL MEDICINE

## 2020-03-07 PROCEDURE — 83735 ASSAY OF MAGNESIUM: CPT | Performed by: PHYSICIAN ASSISTANT

## 2020-03-07 PROCEDURE — 25010000002 EPTIFIBATIDE PER 5 MG: Performed by: INTERNAL MEDICINE

## 2020-03-07 PROCEDURE — C1887 CATHETER, GUIDING: HCPCS | Performed by: INTERNAL MEDICINE

## 2020-03-07 PROCEDURE — 85347 COAGULATION TIME ACTIVATED: CPT

## 2020-03-07 PROCEDURE — 99153 MOD SED SAME PHYS/QHP EA: CPT | Performed by: INTERNAL MEDICINE

## 2020-03-07 PROCEDURE — 99152 MOD SED SAME PHYS/QHP 5/>YRS: CPT | Performed by: INTERNAL MEDICINE

## 2020-03-07 PROCEDURE — 85025 COMPLETE CBC W/AUTO DIFF WBC: CPT | Performed by: PHYSICIAN ASSISTANT

## 2020-03-07 PROCEDURE — 80053 COMPREHEN METABOLIC PANEL: CPT | Performed by: PHYSICIAN ASSISTANT

## 2020-03-07 PROCEDURE — 25010000002 HEPARIN (PORCINE) PER 1000 UNITS: Performed by: INTERNAL MEDICINE

## 2020-03-07 PROCEDURE — C1769 GUIDE WIRE: HCPCS | Performed by: INTERNAL MEDICINE

## 2020-03-07 PROCEDURE — C1894 INTRO/SHEATH, NON-LASER: HCPCS | Performed by: INTERNAL MEDICINE

## 2020-03-07 PROCEDURE — 71046 X-RAY EXAM CHEST 2 VIEWS: CPT

## 2020-03-07 PROCEDURE — 36415 COLL VENOUS BLD VENIPUNCTURE: CPT

## 2020-03-07 PROCEDURE — 25010000002 EPTIFIBATIDE 20 MG/10ML SOLUTION: Performed by: INTERNAL MEDICINE

## 2020-03-07 PROCEDURE — 93306 TTE W/DOPPLER COMPLETE: CPT

## 2020-03-07 PROCEDURE — 25010000002 FENTANYL CITRATE (PF) 100 MCG/2ML SOLUTION: Performed by: INTERNAL MEDICINE

## 2020-03-07 PROCEDURE — 99220 PR INITIAL OBSERVATION CARE/DAY 70 MINUTES: CPT | Performed by: INTERNAL MEDICINE

## 2020-03-07 PROCEDURE — 92928 PRQ TCAT PLMT NTRAC ST 1 LES: CPT | Performed by: INTERNAL MEDICINE

## 2020-03-07 PROCEDURE — 99284 EMERGENCY DEPT VISIT MOD MDM: CPT

## 2020-03-07 DEVICE — XIENCE SIERRA™ EVEROLIMUS ELUTING CORONARY STENT SYSTEM 3.25 MM X 23 MM / RAPID-EXCHANGE
Type: IMPLANTABLE DEVICE | Status: FUNCTIONAL
Brand: XIENCE SIERRA™

## 2020-03-07 DEVICE — XIENCE SIERRA™ EVEROLIMUS ELUTING CORONARY STENT SYSTEM 2.25 MM X 15 MM / RAPID-EXCHANGE
Type: IMPLANTABLE DEVICE | Status: FUNCTIONAL
Brand: XIENCE SIERRA™

## 2020-03-07 RX ORDER — ACETAMINOPHEN 325 MG/1
650 TABLET ORAL EVERY 4 HOURS PRN
Status: DISCONTINUED | OUTPATIENT
Start: 2020-03-07 | End: 2020-03-08 | Stop reason: HOSPADM

## 2020-03-07 RX ORDER — CARVEDILOL 12.5 MG/1
12.5 TABLET ORAL 2 TIMES DAILY WITH MEALS
Status: DISCONTINUED | OUTPATIENT
Start: 2020-03-07 | End: 2020-03-08 | Stop reason: HOSPADM

## 2020-03-07 RX ORDER — LEFLUNOMIDE 20 MG/1
20 TABLET ORAL EVERY MORNING
Status: DISCONTINUED | OUTPATIENT
Start: 2020-03-08 | End: 2020-03-08 | Stop reason: HOSPADM

## 2020-03-07 RX ORDER — EPTIFIBATIDE 20 MG/10ML
180 INJECTION INTRAVENOUS ONCE
Status: COMPLETED | OUTPATIENT
Start: 2020-03-07 | End: 2020-03-07

## 2020-03-07 RX ORDER — ACETAMINOPHEN 650 MG/1
650 SUPPOSITORY RECTAL EVERY 4 HOURS PRN
Status: DISCONTINUED | OUTPATIENT
Start: 2020-03-07 | End: 2020-03-08 | Stop reason: HOSPADM

## 2020-03-07 RX ORDER — CLOPIDOGREL BISULFATE 75 MG/1
600 TABLET ORAL ONCE
Status: DISCONTINUED | OUTPATIENT
Start: 2020-03-07 | End: 2020-03-08 | Stop reason: HOSPADM

## 2020-03-07 RX ORDER — EPTIFIBATIDE 0.75 MG/ML
2 INJECTION, SOLUTION INTRAVENOUS CONTINUOUS
Status: DISPENSED | OUTPATIENT
Start: 2020-03-07 | End: 2020-03-08

## 2020-03-07 RX ORDER — SODIUM CHLORIDE 0.9 % (FLUSH) 0.9 %
10 SYRINGE (ML) INJECTION EVERY 12 HOURS SCHEDULED
Status: DISCONTINUED | OUTPATIENT
Start: 2020-03-07 | End: 2020-03-08 | Stop reason: HOSPADM

## 2020-03-07 RX ORDER — SODIUM CHLORIDE 0.9 % (FLUSH) 0.9 %
10 SYRINGE (ML) INJECTION AS NEEDED
Status: DISCONTINUED | OUTPATIENT
Start: 2020-03-07 | End: 2020-03-08 | Stop reason: HOSPADM

## 2020-03-07 RX ORDER — EPTIFIBATIDE 0.75 MG/ML
INJECTION, SOLUTION INTRAVENOUS CONTINUOUS PRN
Status: COMPLETED | OUTPATIENT
Start: 2020-03-07 | End: 2020-03-07

## 2020-03-07 RX ORDER — HEPARIN SODIUM 1000 [USP'U]/ML
INJECTION, SOLUTION INTRAVENOUS; SUBCUTANEOUS AS NEEDED
Status: DISCONTINUED | OUTPATIENT
Start: 2020-03-07 | End: 2020-03-07 | Stop reason: HOSPADM

## 2020-03-07 RX ORDER — FOLIC ACID 1 MG/1
1 TABLET ORAL DAILY
Status: DISCONTINUED | OUTPATIENT
Start: 2020-03-07 | End: 2020-03-08 | Stop reason: HOSPADM

## 2020-03-07 RX ORDER — MORPHINE SULFATE 2 MG/ML
2 INJECTION, SOLUTION INTRAMUSCULAR; INTRAVENOUS
Status: DISCONTINUED | OUTPATIENT
Start: 2020-03-07 | End: 2020-03-08 | Stop reason: HOSPADM

## 2020-03-07 RX ORDER — CYCLOBENZAPRINE HCL 10 MG
10 TABLET ORAL 3 TIMES DAILY PRN
Status: DISCONTINUED | OUTPATIENT
Start: 2020-03-07 | End: 2020-03-08 | Stop reason: HOSPADM

## 2020-03-07 RX ORDER — NITROGLYCERIN 0.4 MG/1
0.4 TABLET SUBLINGUAL
Status: DISCONTINUED | OUTPATIENT
Start: 2020-03-07 | End: 2020-03-08 | Stop reason: HOSPADM

## 2020-03-07 RX ORDER — CLOPIDOGREL BISULFATE 75 MG/1
75 TABLET ORAL DAILY
Status: DISCONTINUED | OUTPATIENT
Start: 2020-03-08 | End: 2020-03-08 | Stop reason: HOSPADM

## 2020-03-07 RX ORDER — ASPIRIN 325 MG
325 TABLET, DELAYED RELEASE (ENTERIC COATED) ORAL DAILY
Status: DISCONTINUED | OUTPATIENT
Start: 2020-03-07 | End: 2020-03-08 | Stop reason: HOSPADM

## 2020-03-07 RX ORDER — ESCITALOPRAM OXALATE 20 MG/1
20 TABLET ORAL NIGHTLY
Status: DISCONTINUED | OUTPATIENT
Start: 2020-03-07 | End: 2020-03-08 | Stop reason: HOSPADM

## 2020-03-07 RX ORDER — ACETAMINOPHEN 160 MG/5ML
650 SOLUTION ORAL EVERY 4 HOURS PRN
Status: DISCONTINUED | OUTPATIENT
Start: 2020-03-07 | End: 2020-03-08 | Stop reason: HOSPADM

## 2020-03-07 RX ORDER — LOSARTAN POTASSIUM 100 MG/1
100 TABLET ORAL
Status: DISCONTINUED | OUTPATIENT
Start: 2020-03-07 | End: 2020-03-08 | Stop reason: HOSPADM

## 2020-03-07 RX ORDER — MORPHINE SULFATE 2 MG/ML
1 INJECTION, SOLUTION INTRAMUSCULAR; INTRAVENOUS
Status: DISCONTINUED | OUTPATIENT
Start: 2020-03-07 | End: 2020-03-08 | Stop reason: HOSPADM

## 2020-03-07 RX ORDER — FENTANYL CITRATE 50 UG/ML
INJECTION, SOLUTION INTRAMUSCULAR; INTRAVENOUS AS NEEDED
Status: DISCONTINUED | OUTPATIENT
Start: 2020-03-07 | End: 2020-03-07 | Stop reason: HOSPADM

## 2020-03-07 RX ORDER — AMLODIPINE BESYLATE 10 MG/1
10 TABLET ORAL DAILY
Status: DISCONTINUED | OUTPATIENT
Start: 2020-03-07 | End: 2020-03-08 | Stop reason: HOSPADM

## 2020-03-07 RX ORDER — MIDAZOLAM HYDROCHLORIDE 1 MG/ML
INJECTION INTRAMUSCULAR; INTRAVENOUS AS NEEDED
Status: DISCONTINUED | OUTPATIENT
Start: 2020-03-07 | End: 2020-03-07 | Stop reason: HOSPADM

## 2020-03-07 RX ORDER — LIDOCAINE HYDROCHLORIDE 20 MG/ML
INJECTION, SOLUTION INFILTRATION; PERINEURAL AS NEEDED
Status: DISCONTINUED | OUTPATIENT
Start: 2020-03-07 | End: 2020-03-07 | Stop reason: HOSPADM

## 2020-03-07 RX ORDER — SODIUM CHLORIDE 9 MG/ML
INJECTION, SOLUTION INTRAVENOUS CONTINUOUS PRN
Status: COMPLETED | OUTPATIENT
Start: 2020-03-07 | End: 2020-03-07

## 2020-03-07 RX ADMIN — ESCITALOPRAM 20 MG: 20 TABLET, FILM COATED ORAL at 21:01

## 2020-03-07 RX ADMIN — ACETAMINOPHEN 650 MG: 325 TABLET, FILM COATED ORAL at 17:36

## 2020-03-07 RX ADMIN — EPTIFIBATIDE 2 MCG/KG/MIN: 0.75 INJECTION INTRAVENOUS at 21:01

## 2020-03-07 RX ADMIN — AMLODIPINE BESYLATE 10 MG: 10 TABLET ORAL at 17:26

## 2020-03-07 RX ADMIN — ACETAMINOPHEN 650 MG: 325 TABLET, FILM COATED ORAL at 17:38

## 2020-03-07 RX ADMIN — CYCLOBENZAPRINE 10 MG: 10 TABLET, FILM COATED ORAL at 21:03

## 2020-03-07 RX ADMIN — LOSARTAN POTASSIUM 100 MG: 100 TABLET, FILM COATED ORAL at 17:25

## 2020-03-07 RX ADMIN — CYCLOBENZAPRINE 10 MG: 10 TABLET, FILM COATED ORAL at 14:56

## 2020-03-07 RX ADMIN — FOLIC ACID 1 MG: 1 TABLET ORAL at 17:26

## 2020-03-07 RX ADMIN — CARVEDILOL 12.5 MG: 12.5 TABLET, FILM COATED ORAL at 17:26

## 2020-03-07 RX ADMIN — EPTIFIBATIDE 17040 MCG: 2 INJECTION, SOLUTION INTRAVENOUS at 17:21

## 2020-03-07 NOTE — ED PROVIDER NOTES
EMERGENCY DEPARTMENT ENCOUNTER    Room Number:  22/22  Date of encounter:  3/7/2020  PCP: Montse Cain, YANCY  Historian: patient, family      HPI:  Chief Complaint: chest pain  A complete HPI/ROS/PMH/PSH/SH/FH are unobtainable due to: nothing    Context: Prem Thrasher is a 65 y.o. male who presents to the ED c/o episodic chest pain described as 'pressure' that started gradually five days ago. Pain radiates into his neck and left arm. Episodes usually last about 5 minutes. Chest pain is worsened with exertion and relieved with rest. Pt also c/o nausea and vomiting. Patient is on medication for HLD. Pt has been coughing and reports diffuse abdominal pain but denies diaphoresis, shortness of air, fever, chills, diarrhea, dysuria, hemoptysis, LE edema and all other complaints at this time. Patient denies recent travel or surgeries.       PAST MEDICAL HISTORY  Active Ambulatory Problems     Diagnosis Date Noted   • Allergy    • COPD (chronic obstructive pulmonary disease) (CMS/[facility])    • Hyperlipidemia    • IFG (impaired fasting glucose)    • Rheumatoid arthritis (CMS/[facility])    • Hypertension    • Anxiety disorder 06/27/2016   • Recurrent major depressive disorder, in full remission (CMS/[facility]) 06/27/2016   • Lumbar radiculopathy 06/27/2016   • Spondylolisthesis of lumbar region 07/31/2017   • Sepsis (CMS/[facility]) 09/04/2017   • Infection of lumbar spine (CMS/[facility]) 09/06/2017   • 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/08/2018   • Calcific Achilles tendonitis s/p OR 7/18 06/08/2018   • Gastrocnemius equinus, left 06/27/2018   • Abscess and cellulitis of gluteal region 07/09/2018   • Tobacco abuse 07/09/2018   • Perirectal abscess 07/09/2018   • Anemia 07/10/2018   • Wound dehiscence 07/18/2018   • Gastrocnemius strain, left, initial encounter 08/01/2019   • Low folic acid 12/05/2019   • Low serum vitamin B12 12/05/2019     Resolved Ambulatory Problems     Diagnosis Date Noted   • Surgery follow-up examination 10/24/2016   •  Postoperative nausea and vomiting 08/01/2017   • Postoperative ileus (CMS/[facility]) 08/01/2017   • Sebaceous cyst 04/23/2018   • Metabolic acidosis 07/10/2018     Past Medical History:   Diagnosis Date   • Achilles tendon pain    • CTS (carpal tunnel syndrome)    • Diverticulitis    • Diverticulitis of colon    • Diverticulosis    • Encounter for eye exam 10/2014   • Fissure, anal    • History of bone density study 03/2014   • History of chest x-ray 02/15/2011   • History of colon polyps    • History of nuclear stress test 03/09/2015   • History of pulmonary function tests 03/2015   • Low back pain    • Lumbosacral disc disease    • Nerve damage    • Osteoarthritis, multiple sites    • Postoperative wound infection    • Rectal bleeding    • Sciatica    • Staph infection          PAST SURGICAL HISTORY  Past Surgical History:   Procedure Laterality Date   • ACHILLES TENDON SURGERY Left 7/3/2018    Procedure: Left Achilles debridement and secondary repair with partial excision of calcaneus. Possible left Achilles lengthening at the calf;  Surgeon: Vinay Smith MD;  Location: [facility];  Service: Orthopedics   • BACK SURGERY     • COLONOSCOPY N/A 02/02/2011    Normal colon except scattered diverticulosis-Dr. Guero Blunt   • CYST REMOVAL  03/2016    x2  FROM FACE AND EAR   • DENTAL PROCEDURE  2008    teeth removed; dental implants   • EPIDURAL BLOCK  1995    L/S spine   • FINGER DEBRIDEMENT Right 07/12/2003    Debridement and full thickness skin grafting of the ring and small fingers of the right hand-Dr. Rayray Long   • INCISION AND DRAINAGE PERIRECTAL ABSCESS N/A 7/10/2018    Procedure: INCISION AND DRAINAGE OF PERIRECTAL ABSCESS;  Surgeon: Porsha Arcos MD;  Location: [facility];  Service: General   • LUMBAR DISCECTOMY FUSION INSTRUMENTATION Left 10/10/2016    Procedure: LEFT L2-L3, L3-L4 LUMBAR LAMINECTOMY/ DISCECTOMY WITH METRIX ;  Surgeon: Sawyer Rick MD;  Location: [facility];   Service:    • LUMBAR DISCECTOMY FUSION INSTRUMENTATION N/A 7/31/2017    Procedure: LUMBAR FUSION DECOMPRESSON WITH PEDICLE SCREWS with LAURA L2-3,L3-4;  Surgeon: Jcaky Perez MD;  Location: [facility];  Service:    • LUMBAR LAMINECTOMY DISCECTOMY DECOMPRESSION N/A 7/31/2017    Procedure: L2 to L4 laminectomy with neural lysis and a fusion by orthopedics;  Surgeon: Sawyer Rick MD;  Location: [facility];  Service:    • LUMBAR LAMINECTOMY DISCECTOMY DECOMPRESSION N/A 9/5/2017    Procedure: I&D LUMBAR SPINE ;  Surgeon: Jacky Perez MD;  Location: [facility];  Service:    • SHOULDER SURGERY Right 02/23/2011    Dr. Navarro   • SINUS SURGERY  2003    Dr. Barrera         FAMILY HISTORY  Family History   Problem Relation Age of Onset   • Diabetes Mother    • Heart disease Mother    • Hyperlipidemia Mother    • Stroke Mother    • Heart disease Father    • Rheum arthritis Father    • Alcohol abuse Father    • Hyperlipidemia Father    • Depression Brother    • Cancer Brother         prostate   • Depression Brother    • Heart disease Brother    • Hyperlipidemia Brother    • Cancer Brother    • Thyroid disease Sister    • Malig Hyperthermia Neg Hx          SOCIAL HISTORY  Social History     Socioeconomic History   • Marital status:      Spouse name: Not on file   • Number of children: Not on file   • Years of education: Not on file   • Highest education level: Not on file   Tobacco Use   • Smoking status: Current Every Day Smoker     Packs/day: 1.00     Years: 45.00     Pack years: 45.00     Types: Cigarettes   • Smokeless tobacco: Never Used   Substance and Sexual Activity   • Alcohol use: No   • Drug use: No   • Sexual activity: Not Currently     Partners: Female     Birth control/protection: None         ALLERGIES  Atorvastatin; Ceclor [cefaclor]; and Methotrexate derivatives        REVIEW OF SYSTEMS  Review of Systems   Constitutional: Negative for activity change, appetite change and fever.    HENT: Negative for congestion and sore throat.    Eyes: Negative.    Respiratory: Positive for cough. Negative for shortness of breath.    Cardiovascular: Positive for chest pain (pressure, radiates into neck and left arm). Negative for leg swelling.   Gastrointestinal: Positive for abdominal pain (diffuse), nausea and vomiting. Negative for diarrhea.   Endocrine: Negative.    Genitourinary: Negative for decreased urine volume and dysuria.   Musculoskeletal: Negative for neck pain.   Skin: Negative for rash and wound.   Allergic/Immunologic: Negative.    Neurological: Negative for weakness, numbness and headaches.   Hematological: Negative.    Psychiatric/Behavioral: Negative.    All other systems reviewed and are negative.       All other ROS negative except as documented in HPI      PHYSICAL EXAM    I have reviewed the triage vital signs and nursing notes.    ED Triage Vitals [03/07/20 0247]   Temp Heart Rate Resp BP SpO2   97.5 °F (36.4 °C) 87 16 165/93 97 %       GENERAL: awake, alert, not distressed  HENT: nares patent, normocephalic, atraumatic  EYES: no scleral icterus  CV: regular rhythm, regular rate, no murmur  RESPIRATORY: normal effort, CTAB  ABDOMEN: soft, no tenderness, no distention, no mass  MUSCULOSKELETAL: no deformity, no edema  NEURO: alert, oriented, moves all extremities, follows commands  SKIN: warm, dry        LAB RESULTS  Recent Results (from the past 24 hour(s))   Comprehensive Metabolic Panel    Collection Time: 03/07/20  3:14 AM   Result Value Ref Range    Glucose 115 (H) 65 - 99 mg/dL    BUN 14 8 - 23 mg/dL    Creatinine 1.09 0.76 - 1.27 mg/dL    Sodium 137 136 - 145 mmol/L    Potassium 4.2 3.5 - 5.2 mmol/L    Chloride 98 98 - 107 mmol/L    CO2 26.7 22.0 - 29.0 mmol/L    Calcium 9.2 8.6 - 10.5 mg/dL    Total Protein 6.2 6.0 - 8.5 g/dL    Albumin 3.90 3.50 - 5.20 g/dL    ALT (SGPT) 17 1 - 41 U/L    AST (SGOT) 17 1 - 40 U/L    Alkaline Phosphatase 54 39 - 117 U/L    Total Bilirubin 0.2  0.2 - 1.2 mg/dL    eGFR Non African Amer 68 >60 mL/min/1.73    Globulin 2.3 gm/dL    A/G Ratio 1.7 g/dL    BUN/Creatinine Ratio 12.8 7.0 - 25.0    Anion Gap 12.3 5.0 - 15.0 mmol/L   Troponin    Collection Time: 03/07/20  3:14 AM   Result Value Ref Range    Troponin T <0.010 0.000 - 0.030 ng/mL   Magnesium    Collection Time: 03/07/20  3:14 AM   Result Value Ref Range    Magnesium 2.0 1.6 - 2.4 mg/dL   CBC Auto Differential    Collection Time: 03/07/20  3:14 AM   Result Value Ref Range    WBC 9.01 3.40 - 10.80 10*3/mm3    RBC 4.30 4.14 - 5.80 10*6/mm3    Hemoglobin 13.1 13.0 - 17.7 g/dL    Hematocrit 38.3 37.5 - 51.0 %    MCV 89.1 79.0 - 97.0 fL    MCH 30.5 26.6 - 33.0 pg    MCHC 34.2 31.5 - 35.7 g/dL    RDW 13.2 12.3 - 15.4 %    RDW-SD 43.2 37.0 - 54.0 fl    MPV 10.0 6.0 - 12.0 fL    Platelets 286 140 - 450 10*3/mm3    Neutrophil % 60.8 42.7 - 76.0 %    Lymphocyte % 25.4 19.6 - 45.3 %    Monocyte % 9.4 5.0 - 12.0 %    Eosinophil % 2.8 0.3 - 6.2 %    Basophil % 1.3 0.0 - 1.5 %    Immature Grans % 0.3 0.0 - 0.5 %    Neutrophils, Absolute 5.47 1.70 - 7.00 10*3/mm3    Lymphocytes, Absolute 2.29 0.70 - 3.10 10*3/mm3    Monocytes, Absolute 0.85 0.10 - 0.90 10*3/mm3    Eosinophils, Absolute 0.25 0.00 - 0.40 10*3/mm3    Basophils, Absolute 0.12 0.00 - 0.20 10*3/mm3    Immature Grans, Absolute 0.03 0.00 - 0.05 10*3/mm3    nRBC 0.0 0.0 - 0.2 /100 WBC       Ordered the above labs and independently reviewed the results.        RADIOLOGY  Xr Chest 2 View    Result Date: 3/7/2020  PA AND LATERAL CHEST RADIOGRAPH  HISTORY: Chest pain  COMPARISON: 09/07/2017  FINDINGS: Heart size is within normal limits. Lungs appear clear. No pneumothorax, pleural effusion, or acute infiltrate is seen.      No acute findings.  This report was finalized on 3/7/2020 3:41 AM by Dr. Kassidy Tyler M.D.        I ordered the above noted radiological studies. Independently reviewed by me and discussed with radiologist.  See dictation for official  radiology interpretation.      PROCEDURES    Procedures     EKG        Interpreted by Dr Velez. See note.         MEDICATIONS GIVEN IN ER    Medications   sodium chloride 0.9 % flush 10 mL (has no administration in time range)         PROGRESS, DATA ANALYSIS, CONSULTS, AND MEDICAL DECISION MAKING    All labs have been independently reviewed by me.  All radiology studies have been reviewed by me and discussed with radiologist dictating report.   EKG's independently reviewed by me.  Discussion below represents my analysis of pertinent findings related to patient's condition, differential diagnosis, treatment plan and final disposition.    ED Course as of Mar 07 0551   Sat Mar 07, 2020   0338 EKG          EKG time: 0301  Rhythm/Rate: 72/Sinus  P waves and ME: Prolonged pr interval  QRS, axis: Nl Axis, Low voltage extremity leads   ST and T waves: Non-specific t-wave cgs ant and lat leads.     Interpreted Contemporaneously by me, independently viewed  When compared to 6/29/2018 the t wave cgs in ant/lat leads are more pronounced today.      [RC]      ED Course User Index  [RC] Gilbert Villar III, PA     --  HEART SCORE    History Highly suspicious (2)  ECG Nonspecific repol disturbance (1)  Age > or = 65 (2)  Risk factors > or = to 3 RF for atherosclerotic dx (2)  Troponin < or = Normal limit (0)    This patient's HEART score is 7      --  0300. Labs, CXR and EKG ordered.      0400. Discussed case with Dr Philly Velez. After a bedside evaluation, she agrees with the plan of care.    0444. Rechecked patient, vitals stable, patient is resting. No chest pain. Informed patient of non-specific EKG, negative CXR and negative troponin. Discussed plan of admission. Pt understands and agrees with the plan, all questions answered.    0500:  Discussed with patient case with Dr Velez, who agrees with plan of care.  To call Cardiology to discuss observation and ACS rule out.      --  AS OF 5:51 AM VITALS:    BP - 152/99  HR -  72  TEMP - 97.5 °F (36.4 °C) (Tympanic)  02 SATS - 95%      DIAGNOSIS  Final diagnoses:   Exertional chest pain       DISPOSITION  ADMISSION    Discussed treatment plan and reason for admission with pt/family and admitting physician.  Pt/family voiced understanding of the plan for admission for further testing/treatment as needed.     --  Documentation assistance provided by rubina Nolen for Gilbert Villar PA-C. Information recorded by the scribe was done at my direction and has been verified and validated by me.     Penelope Nolen  03/07/20 0458       Gilbert Villar III, PA  03/07/20 0554

## 2020-03-07 NOTE — ED NOTES
Pt ambulatory to and from bathroom without incident c steady gait.      Hernan Michelle, RN  03/07/20 [id number]

## 2020-03-07 NOTE — ED PROVIDER NOTES
"The SHREYA and I have discussed this patient's history, physical exam, and treatment plan. I have reviewed the documentation and personally had a face to face interaction with the patient  I affirm the documentation and agree with the treatment and plan.  The following describes my personal findings.    The patient presents complaining of intermittent L anterior CP, described as a \"pressure\", which started about 5 days ago. The pain will radiate into his neck and his LUE. Episodes of pain usually last about 5 minutes. The episodes of pain are usually initiated by exertion and resolve after the pt sits down and rests for about 15 minutes. Confirms Hx of HTN, HLD, and DM. Current smoker. Also confirms family Hx of heart disease, with his brother have a CABG. Also c/o nausea and vomiting. Denies SOA, fever, and chills. No recent travel or surgeries.     Limited physical exam:  Patient is nontoxic appearing in NAD.   Lungs/cardiovascular: RRR without murmur, good peripheral pulses, good airway movement bilaterally with occasional expiratory wheeze  Abdomen: soft, positive bowel sounds, mild tenderness in the LLQ without rebound or guarding  Back/extremities: no deformity, no BLE edema, no calf tenderness, PT pulses palp B    Anticipate observation for further testing/treatment as deemed needed by cardiology.      Documentation assistance provided by rubina Sr for Dr. Rosie MD.  Information recorded by the scribe was done at my direction and has been verified and validated by me.         Nicko Sr  03/07/20 7315       Philly Velez MD  03/10/20 1215    "

## 2020-03-07 NOTE — H&P
Patient Name: Prem Thrasher  Age/Sex: 65 y.o. male  : 1955  MRN: [id number]    Date of Admission: 3/7/2020  Date of Encounter Visit: 20  Encounter Provider: Dioni Salazar MD  Place of Service: [facility] CARDIOLOGY      Referring Provider: Micky Cabrera MD  Patient Care Team:  Montse Cain PA-C as PCP - General (Family Medicine)  Maria Antonia Lopez MD as Consulting Physician (Rheumatology)  Sawyer Rick MD as Surgeon (Neurosurgery)  Pradip Mcmillan MD as Consulting Physician (Pulmonary Disease)  Jacky Perez MD as Surgeon (Orthopedic Surgery)  Charli Sen MD as Consulting Physician (Infectious Diseases)  Tray Moody MD as Consulting Physician (Urology)  Suman Richards DPM as Consulting Physician (Podiatry)  Porsha Arcos MD as Consulting Physician (General Surgery)  Vinay Smith MD as Surgeon (Orthopedic Surgery)  Eitan Cuevas MD as Consulting Physician (Plastic Surgery)    Subjective:     Chief Complaint: Chest pain    History of Present Illness:  Prem Thrasher is a 65 y.o. male with a significant history of hypertension, hyperlipidemia and tobacco use.  Over the past week the patient has developed exertional substernal discomfort.  He describes as an aching sensation over his left precordium.  The episodes last for about 15 minutes.  When he stops doing a physical activity his symptoms is side.  He is also had some associated shortness of breath.  His initial troponins have been normal.  His electrocardiogram has been abnormal.  There is an electrocardiogram from several years ago that also shows ST-T wave abnormalities.        Past Medical History:  Past Medical History:   Diagnosis Date   • Achilles tendon pain     LEFT   • Anxiety disorder    • COPD (chronic obstructive pulmonary disease) (CMS/[facility])    • CTS (carpal tunnel syndrome)    • Diverticulitis    • Diverticulitis of colon    •  Diverticulosis    • Encounter for eye exam 10/2014    normal   • Fissure, anal    • History of bone density study 03/2014    Dr. Maria Antonia Lopez; normal   • History of chest x-ray 02/15/2011    also 3-6-15; normal chest   • History of colon polyps    • History of nuclear stress test 03/09/2015    cardiolite; Dr. Greenberg; negative   • History of pulmonary function tests 03/2015    COPD   • Hyperlipidemia    • Hypertension    • IFG (impaired fasting glucose)    • Low back pain    • Lumbosacral disc disease    • Nerve damage     KAYLYNN ELBOWS   • Osteoarthritis, multiple sites    • Postoperative nausea and vomiting 8/1/2017   • Postoperative wound infection    • Rectal bleeding    • Rheumatoid arthritis (CMS/HCC)    • Sciatica    • Staph infection     WITH BACK SURGERY 2017  ON LONG TERM ANTIBIOTICS       Past Surgical History:   Procedure Laterality Date   • ACHILLES TENDON SURGERY Left 7/3/2018    Procedure: Left Achilles debridement and secondary repair with partial excision of calcaneus. Possible left Achilles lengthening at the calf;  Surgeon: Vinay Smith MD;  [place] [facility], [place] by [facility];  Service: Orthopedics   • BACK SURGERY     • COLONOSCOPY N/A 02/02/2011    Normal colon except scattered diverticulosis-Dr. Guero Blunt   • CYST REMOVAL  03/2016    x2  FROM FACE AND EAR   • DENTAL PROCEDURE  2008    teeth removed; dental implants   • EPIDURAL BLOCK  1995    L/S spine   • FINGER DEBRIDEMENT Right 07/12/2003    Debridement and full thickness skin grafting of the ring and small fingers of the right hand-Dr. Rayray Long   • INCISION AND DRAINAGE PERIRECTAL ABSCESS N/A 7/10/2018    Procedure: INCISION AND DRAINAGE OF PERIRECTAL ABSCESS;  Surgeon: Porsha Arcos MD;  Location: [facility] OR;  Service: General   • LUMBAR DISCECTOMY FUSION INSTRUMENTATION Left 10/10/2016    Procedure: LEFT L2-L3, L3-L4 LUMBAR LAMINECTOMY/ DISCECTOMY WITH METRIX ;  Surgeon: Sawyer Rick MD;  Location: [facility] OR;   Service:    • LUMBAR DISCECTOMY FUSION INSTRUMENTATION N/A 7/31/2017    Procedure: LUMBAR FUSION DECOMPRESSON WITH PEDICLE SCREWS with LAURA L2-3,L3-4;  Surgeon: Jacky Perez MD;  Location: [facility] OR;  Service:    • LUMBAR LAMINECTOMY DISCECTOMY DECOMPRESSION N/A 7/31/2017    Procedure: L2 to L4 laminectomy with neural lysis and a fusion by orthopedics;  Surgeon: Sawyer Rick MD;  Location: [facility] OR;  Service:    • LUMBAR LAMINECTOMY DISCECTOMY DECOMPRESSION N/A 9/5/2017    Procedure: I&D LUMBAR SPINE ;  Surgeon: Jacky Perez MD;  Location: [facility] OR;  Service:    • SHOULDER SURGERY Right 02/23/2011    Dr. Navarro   • SINUS SURGERY  2003    Dr. Barrera       [place] Medications:   Medications Prior to Admission   Medication Sig Dispense Refill Last Dose   • amLODIPine (NORVASC) 10 MG tablet Take 1 tablet by mouth Daily. for blood pressure 90 tablet 3 3/6/2020 at Unknown time   • aspirin  MG tablet Take 1 tablet by mouth Daily. (Patient taking differently: Take 81 mg by mouth Daily.) 30 tablet 0 3/6/2020 at Unknown time   • carvedilol (COREG) 12.5 MG tablet Take 1 tablet by mouth 2 (Two) Times a Day With Meals for 90 days. For  tablet 1 3/6/2020 at Unknown time   • celecoxib (CeleBREX) 200 MG capsule Take 200 mg by mouth Daily.   3/6/2020 at Unknown time   • Cholecalciferol (VITAMIN D) 2000 UNITS tablet Take 2,000 Units by mouth Every Evening.   3/6/2020 at Unknown time   • CVS SENNA PLUS 8.6-50 MG per tablet TAKE 1 TABLET BY MOUTH 2 (TWO) TIMES A DAY. 60 tablet 1 3/6/2020 at Unknown time   • Cyanocobalamin (VITAMIN B-12) 1000 MCG sublingual tablet One SL daily 90 each 3 3/6/2020 at Unknown time   • cyclobenzaprine (FLEXERIL) 10 MG tablet TAKE 1 TABLET THREE TIMES DAILY AS NEEDED  FOR  MUSCLE  SPASMS 270 tablet 3 3/6/2020 at Unknown time   • escitalopram (LEXAPRO) 20 MG tablet Take 1 tablet by mouth Every Night. For anxiety 90 tablet 3 3/6/2020 at Unknown time   • ezetimibe (ZETIA) 10  "MG tablet Take 1 tablet by mouth Every Evening. For cholesterol 90 tablet 3 3/6/2020 at Unknown time   • folic acid (FOLVITE) 1 MG tablet Take 1 tablet by mouth Daily. 90 tablet 1 3/6/2020 at Unknown time   • irbesartan (AVAPRO) 300 MG tablet Take 1 tablet by mouth Daily. for blood pressure; 90 tablet 1 3/6/2020 at Unknown time   • leflunomide (ARAVA) 20 MG tablet Take 20 mg by mouth Every Morning.   3/6/2020 at Unknown time   • Probiotic Product (PROBIOTIC PO) Take 1 tablet by mouth Every Morning.   3/6/2020 at Unknown time   • tiotropium (SPIRIVA) 18 MCG per inhalation capsule Place 1 capsule into inhaler and inhale Daily. For COPD 90 capsule 3 3/6/2020 at Unknown time   • diclofenac (VOLTAREN) 1 % gel gel Apply 4 g topically to the appropriate area as directed 2 (Two) Times a Day. Use per pkg insert 100 g 2 Taking       Allergies:  Allergies   Allergen Reactions   • Atorvastatin Myalgia     Leg cramps   • Ceclor [Cefaclor] Rash     Patient tolerated nafcillin during 9/2017 admission and has tolerated Augmentin in past outpatient.    • Methotrexate Derivatives Rash     \"Blisters\"       Past Social History:  Social History     Socioeconomic History   • Marital status:      Spouse name: Not on file   • Number of children: Not on file   • Years of education: Not on file   • Highest education level: Not on file   Tobacco Use   • Smoking status: Current Every Day Smoker     Packs/day: 1.00     Years: 45.00     Pack years: 45.00     Types: Cigarettes   • Smokeless tobacco: Never Used   Substance and Sexual Activity   • Alcohol use: No   • Drug use: No   • Sexual activity: Not Currently     Partners: Female     Birth control/protection: None        Past Family History:  Family History   Problem Relation Age of Onset   • Diabetes Mother    • Heart disease Mother    • Hyperlipidemia Mother    • Stroke Mother    • Heart disease Father    • Rheum arthritis Father    • Alcohol abuse Father    • Hyperlipidemia Father    "   • Depression Brother    • Cancer Brother         prostate   • Depression Brother    • Heart disease Brother    • Hyperlipidemia Brother    • Cancer Brother    • Thyroid disease Sister    • Malig Hyperthermia Neg Hx        Review of Systems: All systems reviewed. Pertinent positives identified in HPI. All other systems are negative.     REVIEW OF SYSTEMS:   CONSTITUTIONAL: No weight loss, fever, chills, weakness or fatigue.   HEENT: Eyes: No visual loss, blurred vision, double vision or yellow sclerae. Ears, Nose, Throat: No hearing loss, sneezing, congestion, runny nose or sore throat.   SKIN: No rash or itching.     RESPIRATORY: No shortness of breath, hemoptysis, cough or sputum.   GASTROINTESTINAL: No anorexia, nausea, vomiting or diarrhea. No abdominal pain, bright red blood per rectum or melena.  GENITOURINARY: No burning on urination, hematuria or increased frequency.  NEUROLOGICAL: No headache, dizziness, syncope, paralysis, ataxia, numbness or tingling in the extremities. No change in bowel or bladder control.   MUSCULOSKELETAL: No muscle, back pain, joint pain or stiffness.   HEMATOLOGIC: No anemia, bleeding or bruising.   LYMPHATICS: No enlarged nodes. No history of splenectomy.   PSYCHIATRIC: No history of depression, anxiety, hallucinations.   ENDOCRINOLOGIC: No reports of sweating, cold or heat intolerance. No polyuria or polydipsia.       Objective:     Objective:  Temp:  [97.5 °F (36.4 °C)-97.8 °F (36.6 °C)] 97.8 °F (36.6 °C)  Heart Rate:  [72-87] 74  Resp:  [16] 16  BP: (137-165)/(93-99) 150/98  No intake or output data in the 24 hours ending 03/07/20 1104  Body mass index is 29.11 kg/m².      03/07/20  0304 03/07/20  0656   Weight: 101 kg (223 lb) 94.7 kg (208 lb 11.2 oz)           Physical Exam:   Physical Exam   Constitutional: He is oriented to person, place, and time. He appears well-developed and well-nourished.   HENT:   Head: Normocephalic.   Eyes: Pupils are equal, round, and reactive to  light.   Neck: Normal range of motion. No JVD present. Carotid bruit is not present. No thyromegaly present.   Cardiovascular: Normal rate, regular rhythm, S1 normal, S2 normal, normal heart sounds and intact distal pulses. Exam reveals no gallop and no friction rub.   No murmur heard.  Pulmonary/Chest: Effort normal and breath sounds normal.   Abdominal: Soft. Bowel sounds are normal.   Musculoskeletal: He exhibits no edema.   Neurological: He is alert and oriented to person, place, and time.   Skin: Skin is warm, dry and intact. No erythema.   Psychiatric: He has a normal mood and affect.   Vitals reviewed.        Lab Review:     Results from last 7 days   Lab Units 03/07/20  0314   SODIUM mmol/L 137   POTASSIUM mmol/L 4.2   CHLORIDE mmol/L 98   CO2 mmol/L 26.7   BUN mg/dL 14   CREATININE mg/dL 1.09   GLUCOSE mg/dL 115*   CALCIUM mg/dL 9.2       Results from last 7 days   Lab Units 03/07/20  0604 03/07/20  0314   TROPONIN T ng/mL <0.010 <0.010     Results from last 7 days   Lab Units 03/07/20  0314   WBC 10*3/mm3 9.01   HEMOGLOBIN g/dL 13.1   HEMATOCRIT % 38.3   PLATELETS 10*3/mm3 286             Results from last 7 days   Lab Units 03/07/20  0314   MAGNESIUM mg/dL 2.0                       Imaging:      Imaging Results (Most Recent)     Procedure Component Value Units Date/Time    XR Chest 2 View [[id number]] Collected:  03/07/20 0340     Updated:  03/07/20 0344    Narrative:       PA AND LATERAL CHEST RADIOGRAPH     HISTORY: Chest pain     COMPARISON: 09/07/2017     FINDINGS:  Heart size is within normal limits. Lungs appear clear. No pneumothorax,  pleural effusion, or acute infiltrate is seen.       Impression:       No acute findings.     This report was finalized on 3/7/2020 3:41 AM by Dr. Kassidy Tyler M.D.             EKG: Reviewed        Baseline:     I personally viewed and interpreted the patient's EKG/Telemetry data.    Assessment:   1.  Unstable angina  2.  Hypertension  3.  Dyslipidemia  4.   Tobacco abuse: We will continue to  patient.        Plan:   I have discussed the findings of his examination and testing so far.  I believe the patient requires a cardiac catheterization for definitive diagnosis.  I have explained the procedure in detail.  He is agreeable to proceed.    Thank you for allowing me to participate in the care of Prem GARY Thrasher. Feel free to contact me directly with any further questions or concerns.    Dioni Salazar MD  [facility]  03/07/20  11:04 AM

## 2020-03-07 NOTE — NURSING NOTE
Chest pain at 7/10 upon arrival from cath lab, flexoril and tylenol given, additional pain medicine noted and passed on in report,  patient states pain is now 5/10, Integrilin running as ordered to continue until tonight per order, receiving nurse aware, patient verbalized understanding of s/s to notify nurse about regarding radial site, 2cc removed with hemostasis, new meds given as ordered, bp 134/95 cycling at 1/2 incriments, patient tolerated dinner no n/v noted, next nurse aware and to monitor.

## 2020-03-08 VITALS
HEIGHT: 71 IN | WEIGHT: 208 LBS | SYSTOLIC BLOOD PRESSURE: 159 MMHG | BODY MASS INDEX: 29.12 KG/M2 | TEMPERATURE: 98.3 F | DIASTOLIC BLOOD PRESSURE: 91 MMHG | OXYGEN SATURATION: 92 % | HEART RATE: 76 BPM | RESPIRATION RATE: 18 BRPM

## 2020-03-08 PROBLEM — I25.110 CORONARY ARTERY DISEASE INVOLVING NATIVE CORONARY ARTERY OF NATIVE HEART WITH UNSTABLE ANGINA PECTORIS (HCC): Status: ACTIVE | Noted: 2020-03-08

## 2020-03-08 PROBLEM — I20.0 UNSTABLE ANGINA (HCC): Status: ACTIVE | Noted: 2020-03-08

## 2020-03-08 LAB
ANION GAP SERPL CALCULATED.3IONS-SCNC: 13.7 MMOL/L (ref 5–15)
BUN BLD-MCNC: 13 MG/DL (ref 8–23)
BUN/CREAT SERPL: 12.1 (ref 7–25)
CALCIUM SPEC-SCNC: 8.8 MG/DL (ref 8.6–10.5)
CHLORIDE SERPL-SCNC: 102 MMOL/L (ref 98–107)
CHOLEST SERPL-MCNC: 156 MG/DL (ref 0–200)
CO2 SERPL-SCNC: 20.3 MMOL/L (ref 22–29)
CREAT BLD-MCNC: 1.07 MG/DL (ref 0.76–1.27)
GFR SERPL CREATININE-BSD FRML MDRD: 69 ML/MIN/1.73
GLUCOSE BLD-MCNC: 84 MG/DL (ref 65–99)
HDLC SERPL-MCNC: 38 MG/DL (ref 40–60)
LDLC SERPL CALC-MCNC: 104 MG/DL (ref 0–100)
LDLC/HDLC SERPL: 2.73 {RATIO}
POTASSIUM BLD-SCNC: 4.2 MMOL/L (ref 3.5–5.2)
SODIUM BLD-SCNC: 136 MMOL/L (ref 136–145)
TRIGL SERPL-MCNC: 72 MG/DL (ref 0–150)
TROPONIN T SERPL-MCNC: 0.11 NG/ML (ref 0–0.03)
VLDLC SERPL-MCNC: 14.4 MG/DL (ref 5–40)

## 2020-03-08 PROCEDURE — 94640 AIRWAY INHALATION TREATMENT: CPT

## 2020-03-08 PROCEDURE — G0378 HOSPITAL OBSERVATION PER HR: HCPCS

## 2020-03-08 PROCEDURE — 99217 PR OBSERVATION CARE DISCHARGE MANAGEMENT: CPT | Performed by: INTERNAL MEDICINE

## 2020-03-08 PROCEDURE — 25010000002 EPTIFIBATIDE PER 5 MG: Performed by: INTERNAL MEDICINE

## 2020-03-08 PROCEDURE — 93010 ELECTROCARDIOGRAM REPORT: CPT | Performed by: INTERNAL MEDICINE

## 2020-03-08 PROCEDURE — 84484 ASSAY OF TROPONIN QUANT: CPT | Performed by: INTERNAL MEDICINE

## 2020-03-08 PROCEDURE — 80061 LIPID PANEL: CPT | Performed by: INTERNAL MEDICINE

## 2020-03-08 PROCEDURE — 80048 BASIC METABOLIC PNL TOTAL CA: CPT | Performed by: INTERNAL MEDICINE

## 2020-03-08 PROCEDURE — 93005 ELECTROCARDIOGRAM TRACING: CPT | Performed by: INTERNAL MEDICINE

## 2020-03-08 RX ORDER — CLOPIDOGREL BISULFATE 75 MG/1
75 TABLET ORAL DAILY
Qty: 90 TABLET | Refills: 2 | Status: SHIPPED | OUTPATIENT
Start: 2020-03-09 | End: 2020-05-29 | Stop reason: SDUPTHER

## 2020-03-08 RX ORDER — NITROGLYCERIN 0.4 MG/1
0.4 TABLET SUBLINGUAL
Qty: 25 TABLET | Refills: 1 | Status: SHIPPED | OUTPATIENT
Start: 2020-03-08

## 2020-03-08 RX ADMIN — LEFLUNOMIDE 20 MG: 20 TABLET ORAL at 06:47

## 2020-03-08 RX ADMIN — TIOTROPIUM BROMIDE INHALATION SPRAY 2 PUFF: 3.12 SPRAY, METERED RESPIRATORY (INHALATION) at 08:19

## 2020-03-08 RX ADMIN — CLOPIDOGREL 75 MG: 75 TABLET, FILM COATED ORAL at 10:03

## 2020-03-08 RX ADMIN — CARVEDILOL 12.5 MG: 12.5 TABLET, FILM COATED ORAL at 10:03

## 2020-03-08 RX ADMIN — FOLIC ACID 1 MG: 1 TABLET ORAL at 10:02

## 2020-03-08 RX ADMIN — Medication 10 ML: at 10:05

## 2020-03-08 RX ADMIN — AMLODIPINE BESYLATE 10 MG: 10 TABLET ORAL at 10:03

## 2020-03-08 RX ADMIN — ASPIRIN 325 MG: 325 TABLET, COATED ORAL at 10:03

## 2020-03-08 RX ADMIN — LOSARTAN POTASSIUM 100 MG: 100 TABLET, FILM COATED ORAL at 10:02

## 2020-03-08 RX ADMIN — EPTIFIBATIDE 2 MCG/KG/MIN: 0.75 INJECTION INTRAVENOUS at 01:41

## 2020-03-08 NOTE — PLAN OF CARE
Pt is s/p cath with PCI via right radial, site soft, drsg CDI, rested well throughout night, denies pain or discomfort this am, rested well throughout night, should D/C home today.

## 2020-03-08 NOTE — DISCHARGE INSTR - DIET
Department of Anesthesiology  Postprocedure Note    Patient: Karol Haines  MRN: [id number]  YOB: 1956  Date of evaluation: 7/2/2018  Time:  9:55 AM     Procedure Summary     Date:  07/02/18 Room / Location:  [facility] OR  / [facility] OR    Anesthesia Start:  1727 Anesthesia Stop:  5199    Procedure:  ARM RADICAL/ CEPHALIC AV FISTULA (Left ) Diagnosis:  (N18.4)    Surgeon:  Ara Garcia MD Responsible Provider:  CALIXTO Paz CRNA    Anesthesia Type:  general, regional ASA Status:  4          Anesthesia Type: general, regional    Kay Phase I: Kay Score: 5    Kay Phase II:      Last vitals: Reviewed and per EMR flowsheets.        Anesthesia Post Evaluation    Patient location during evaluation: PACU  Patient participation: complete - patient participated  Level of consciousness: sleepy but conscious  Pain score: 0  Airway patency: patent  Nausea & Vomiting: no nausea and no vomiting  Complications: no  Cardiovascular status: blood pressure returned to baseline  Respiratory status: acceptable, spontaneous ventilation and nasal cannula  Hydration status: stable Heart healthy diet

## 2020-03-08 NOTE — DISCHARGE INSTR - ACTIVITY
Discharge Instructions    Routine post cardiac catheterization/PCI discharge home care instructions:    1. No submerging procedure site below water for 7-10 days.  2. No lifting objects greater than 1 lbs for 3 days.  3. If groin site used, avoid climbing several flights of stairs or sitting for longer than 2 hours at a time for the next 24 hours.   4. Monitor puncture site for bleeding and/or knots;. If bleeding should occur at the groin site: lie flat, apply pressure and return to the ER. If bleeding should occur at the wrist site, apply pressure and return to the ER.  5.  You may apply a DRY Band-Aid over the puncture site if needed. Do not apply any lotions, salves or ointments to site.  6. No driving for 3 days.  7. Return to ER for recurrent symptoms.  8. No smoking.  9. Take all medications as prescribed.  10.  Start Plavix 75 mg daily do not stop without speaking to Dr. Salazar  11.  Follow-up with PCP within 1 week of discharge.  12.  Follow-up with cardiology nurse practitioner within 1 week of discharge.  13.  Follow-up Dr. Saucedo within 6 weeks of discharge.  14.  If you experience chest pain he may need use nitroglycerin 1 tablet under your tongue up to 3 times.  Doses are to be 5 minutes apart.  If 5 minutes after your third dose you are still having chest pain please go to the emergency department.

## 2020-03-08 NOTE — DISCHARGE SUMMARY
HOSPITAL DISCHARGE SUMMARY    Patient Name: Prem Thrasher  Age/Sex: 65 y.o. male  : 1955  MRN: [id number]    Encounter Provider: Dioni Salazar MD  Referring Provider: Micky Cabrera MD  Place of Service: [facility] CARDIOLOGY  Patient Care Team:  Montse Cain PA-C as PCP - General (Family Medicine)  Maria Antonia Lopez MD as Consulting Physician (Rheumatology)  Sawyer Rick MD as Surgeon (Neurosurgery)  Pradip Mcmillan MD as Consulting Physician (Pulmonary Disease)  Jacky Perez MD as Surgeon (Orthopedic Surgery)  Charli Sen MD as Consulting Physician (Infectious Diseases)  Tray Moody MD as Consulting Physician (Urology)  Suman Richards DPM as Consulting Physician (Podiatry)  Porsha Arcos MD as Consulting Physician (General Surgery)  Vinay Smith MD as Surgeon (Orthopedic Surgery)  Eitan Cuevas MD as Consulting Physician (Plastic Surgery)         Date of Discharge:  3/8/2020     Date of Admit: 3/7/2020        Discharge Diagnosis:   Tobacco abuse    Exertional chest pain    Unstable angina (CMS/HCC)    Coronary artery disease involving native coronary artery of native heart with unstable angina pectoris (CMS/HCC)      Hospital Course:The patient is a 65-year-old white male with a history significant for hypertension, hyperlipidemia and tobacco abuse.  He presented to the hospital with complaints of exertional discomfort in the week prior to admission.  His electrocardiogram showed ST-T wave abnormalities.    The patient was taken to the catheterization lab and found to have critical disease of the left anterior descending in the midsegment in the apical portion.  2 drug-eluting stents were placed to the vessel in the mid and distal segments.  In the post intervention.  He did have a slight rise in his troponin but his EKG remained  unchanged.  This morning he is asymptomatic and ready for discharge.      Procedures Performed:  Procedure(s):  Left Heart Cath  Coronary angiography  Left ventriculography           Consults:  Consults     Date and Time Order Name Status Description    3/7/2020 0518 Cardiology (on-call MD unless specified) Completed           Pertinent Test Results:    Results from last 7 days   Lab Units 03/08/20  0413 03/07/20  0314   SODIUM mmol/L 136 137   POTASSIUM mmol/L 4.2 4.2   CHLORIDE mmol/L 102 98   CO2 mmol/L 20.3* 26.7   BUN mg/dL 13 14   CREATININE mg/dL 1.07 1.09   GLUCOSE mg/dL 84 115*   CALCIUM mg/dL 8.8 9.2       Results from last 7 days   Lab Units 03/08/20  0912 03/07/20  0604 03/07/20  0314   TROPONIN T ng/mL 0.107* <0.010 <0.010     Results from last 7 days   Lab Units 03/07/20  0314   WBC 10*3/mm3 9.01   HEMOGLOBIN g/dL 13.1   HEMATOCRIT % 38.3   PLATELETS 10*3/mm3 286         Results from last 7 days   Lab Units 03/07/20  0314   MAGNESIUM mg/dL 2.0     Results from last 7 days   Lab Units 03/08/20  0413   CHOLESTEROL mg/dL 156   TRIGLYCERIDES mg/dL 72   HDL CHOL mg/dL 38*   LDL CHOL mg/dL 104*                   Discharge Medications     Discharge Medications      New Medications      Instructions Start Date   clopidogrel 75 MG tablet  Commonly known as:  PLAVIX   75 mg, Oral, Daily   Start Date:  March 9, 2020     nitroglycerin 0.4 MG SL tablet  Commonly known as:  NITROSTAT   0.4 mg, Sublingual, Every 5 Minutes PRN, Take no more than 3 doses in 15 minutes.         Changes to Medications      Instructions Start Date   aspirin  MG tablet  What changed:  how much to take   325 mg, Oral, Daily         Continue These Medications      Instructions Start Date   amLODIPine 10 MG tablet  Commonly known as:  NORVASC   10 mg, Oral, Daily, for blood pressure      carvedilol 12.5 MG tablet  Commonly known as:  COREG   12.5 mg, Oral, 2 Times Daily With Meals, For BP      CVS SENNA PLUS 8.6-50 MG per  tablet  Generic drug:  sennosides-docusate   TAKE 1 TABLET BY MOUTH 2 (TWO) TIMES A DAY.      cyclobenzaprine 10 MG tablet  Commonly known as:  FLEXERIL   TAKE 1 TABLET THREE TIMES DAILY AS NEEDED  FOR  MUSCLE  SPASMS      diclofenac 1 % gel gel  Commonly known as:  VOLTAREN   4 g, Topical, 2 Times Daily, Use per pkg insert      escitalopram 20 MG tablet  Commonly known as:  LEXAPRO   20 mg, Oral, Nightly, For anxiety      ezetimibe 10 MG tablet  Commonly known as:  ZETIA   10 mg, Oral, Every Evening, For cholesterol      folic acid 1 MG tablet  Commonly known as:  FOLVITE   1 mg, Oral, Daily      irbesartan 300 MG tablet  Commonly known as:  AVAPRO   300 mg, Oral, Daily, for blood pressure;      leflunomide 20 MG tablet  Commonly known as:  ARAVA   20 mg, Oral, Every Morning      PROBIOTIC PO   1 tablet, Oral, Every Morning      tiotropium 18 MCG per inhalation capsule  Commonly known as:  SPIRIVA   1 capsule, Inhalation, Daily, For COPD      Vitamin B-12 1000 MCG sublingual tablet   One SL daily      Vitamin D 50 MCG (2000 UT) tablet   2,000 Units, Oral, Every Evening         Stop These Medications    celecoxib 200 MG capsule  Commonly known as:  CeleBREX              Discharge Diet: Heart Healthy      Activity at Discharge:  Personal care    Discharge disposition: Home      Follow-up Appointments  Future Appointments   Date Time Provider Department Center   6/4/2020 10:15 AM Montse Cain PA-C [id number] None     Follow-up Information     Montse Cain PA-C .    Specialty:  Family Medicine  Contact information:  [address] [facility].[address] [place] [id number] [phone]                 Follow-up with TEN Angeles in 1 week to 10 days.  Follow-up with me in 6 weeks.       Dioni Salazar MD  03/08/20  10:51 AM

## 2020-03-09 ENCOUNTER — TRANSITIONAL CARE MANAGEMENT TELEPHONE ENCOUNTER (OUTPATIENT)
Dept: FAMILY MEDICINE CLINIC | Facility: CLINIC | Age: 65
End: 2020-03-09

## 2020-03-09 LAB — ACT BLD: 246 SECONDS (ref 82–152)

## 2020-03-09 NOTE — OUTREACH NOTE
"Spoke with pt, states he is feeling better, but still very 'groggy and tired\". No chest pain or SOB, no fever, chills. Appetite is normal. No pain from stent placement. Confirms receipt and understanding of d/c orders and medications. Pt has started Plavix, and Nitroglycerin to have on hand will be p/u today as pharmacy had to order. I have sched pt for [facility] with Montse Cain on 03/16/20.  "

## 2020-03-16 ENCOUNTER — OFFICE VISIT (OUTPATIENT)
Dept: FAMILY MEDICINE CLINIC | Facility: CLINIC | Age: 65
End: 2020-03-16

## 2020-03-16 VITALS
BODY MASS INDEX: 29.12 KG/M2 | OXYGEN SATURATION: 97 % | TEMPERATURE: 97.8 F | HEIGHT: 71 IN | RESPIRATION RATE: 20 BRPM | HEART RATE: 88 BPM | DIASTOLIC BLOOD PRESSURE: 72 MMHG | SYSTOLIC BLOOD PRESSURE: 120 MMHG | WEIGHT: 208 LBS

## 2020-03-16 DIAGNOSIS — Z09 HOSPITAL DISCHARGE FOLLOW-UP: Primary | ICD-10-CM

## 2020-03-16 DIAGNOSIS — J43.8 OTHER EMPHYSEMA (HCC): ICD-10-CM

## 2020-03-16 DIAGNOSIS — F33.42 RECURRENT MAJOR DEPRESSIVE DISORDER, IN FULL REMISSION (HCC): ICD-10-CM

## 2020-03-16 DIAGNOSIS — M05.79 RHEUMATOID ARTHRITIS INVOLVING MULTIPLE SITES WITH POSITIVE RHEUMATOID FACTOR (HCC): ICD-10-CM

## 2020-03-16 DIAGNOSIS — J01.90 ACUTE SINUSITIS, RECURRENCE NOT SPECIFIED, UNSPECIFIED LOCATION: ICD-10-CM

## 2020-03-16 PROBLEM — M46.26 INFECTION OF LUMBAR SPINE: Status: RESOLVED | Noted: 2017-09-06 | Resolved: 2020-03-16

## 2020-03-16 PROCEDURE — 99495 TRANSJ CARE MGMT MOD F2F 14D: CPT | Performed by: PHYSICIAN ASSISTANT

## 2020-03-16 RX ORDER — VARENICLINE TARTRATE 1 MG/1
1 TABLET, FILM COATED ORAL 2 TIMES DAILY
Qty: 180 TABLET | Refills: 1 | Status: SHIPPED | OUTPATIENT
Start: 2020-03-16 | End: 2020-05-02 | Stop reason: HOSPADM

## 2020-03-16 RX ORDER — AMOXICILLIN AND CLAVULANATE POTASSIUM 875; 125 MG/1; MG/1
1 TABLET, FILM COATED ORAL EVERY 12 HOURS SCHEDULED
Qty: 20 TABLET | Refills: 1 | Status: SHIPPED | OUTPATIENT
Start: 2020-03-16 | End: 2020-05-07

## 2020-03-16 NOTE — PATIENT INSTRUCTIONS
Dyslipidemia  Dyslipidemia is an imbalance of waxy, fat-like substances (lipids) in the blood. The body needs lipids in small amounts. Dyslipidemia often involves a high level of cholesterol or triglycerides, which are types of lipids.  Common forms of dyslipidemia include:  · High levels of LDL cholesterol. LDL is the type of cholesterol that causes fatty deposits (plaques) to build up in the blood vessels that carry blood away from your heart (arteries).  · Low levels of HDL cholesterol. HDL cholesterol is the type of cholesterol that protects against heart disease. High levels of HDL remove the LDL buildup from arteries.  · High levels of triglycerides. Triglycerides are a fatty substance in the blood that is linked to a buildup of plaques in the arteries.  What are the causes?  Primary dyslipidemia is caused by changes (mutations) in genes that are passed down through families (inherited). These mutations cause several types of dyslipidemia.  Secondary dyslipidemia is caused by lifestyle choices and diseases that lead to dyslipidemia, such as:  · Eating a diet that is high in animal fat.  · Not getting enough exercise.  · Having diabetes, kidney disease, liver disease, or thyroid disease.  · Drinking large amounts of alcohol.  · Using certain medicines.  What increases the risk?  You are more likely to develop this condition if you are an older man or if you are a woman who has gone through menopause. Other risk factors include:  · Having a family history of dyslipidemia.  · Taking certain medicines, including birth control pills, steroids, some diuretics, and beta-blockers.  · Smoking cigarettes.  · Eating a high-fat diet.  · Having certain medical conditions such as diabetes, polycystic ovary syndrome (PCOS), kidney disease, liver disease, or hypothyroidism.  · Not exercising regularly.  · Being overweight or obese with too much belly fat.  What are the signs or symptoms?  In most cases, dyslipidemia does not  usually cause any symptoms.  In severe cases, very high lipid levels can cause:  · Fatty bumps under the skin (xanthomas).  · White or gray ring around the black center (pupil) of the eye.  Very high triglyceride levels can cause inflammation of the pancreas (pancreatitis).  How is this diagnosed?  Your health care provider may diagnose dyslipidemia based on a routine blood test (fasting blood test). Because most people do not have symptoms of the condition, this blood testing (lipid profile) is done on adults age 20 and older and is repeated every 5 years. This test checks:  · Total cholesterol. This measures the total amount of cholesterol in your blood, including LDL cholesterol, HDL cholesterol, and triglycerides. A healthy number is below 200.  · LDL cholesterol. The target number for LDL cholesterol is different for each person, depending on individual risk factors. Ask your health care provider what your LDL cholesterol should be.  · HDL cholesterol. An HDL level of 60 or higher is best because it helps to protect against heart disease. A number below 40 for men or below 50 for women increases the risk for heart disease.  · Triglycerides. A healthy triglyceride number is below 150.  If your lipid profile is abnormal, your health care provider may do other blood tests.  How is this treated?  Treatment depends on the type of dyslipidemia that you have and your other risk factors for heart disease and stroke. Your health care provider will have a target range for your lipid levels based on this information.  For many people, this condition may be treated by lifestyle changes, such as diet and exercise. Your health care provider may recommend that you:  · Get regular exercise.  · Make changes to your diet.  · Quit smoking if you smoke.  If diet changes and exercise do not help you reach your goals, your health care provider may also prescribe medicine to lower lipids. The most commonly prescribed type of medicine  lowers your LDL cholesterol (statin drug). If you have a high triglyceride level, your provider may prescribe another type of drug (fibrate) or an omega-3 fish oil supplement, or both.  Follow these instructions at home:    Eating and drinking  · Follow instructions from your health care provider or dietitian about eating or drinking restrictions.  · Eat a healthy diet as told by your health care provider. This can help you reach and maintain a healthy weight, lower your LDL cholesterol, and raise your HDL cholesterol. This may include:  ? Limiting your calories, if you are overweight.  ? Eating more fruits, vegetables, whole grains, fish, and lean meats.  ? Limiting saturated fat, trans fat, and cholesterol.  · If you drink alcohol:  ? Limit how much you use.  ? Be aware of how much alcohol is in your drink. In the [place] one drink equals one 12 oz bottle of beer (355 mL), one 5 oz glass of wine (148 mL), or one 1½ oz glass of hard liquor (44 mL).  · Do not drink alcohol if:  ? Your health care provider tells you not to drink.  ? You are pregnant, may be pregnant, or are planning to become pregnant.  Activity  · Get regular exercise. Start an exercise and strength training program as told by your health care provider. Ask your health care provider what activities are safe for you. Your health care provider may recommend:  ? 30 minutes of aerobic activity 4-6 days a week. Brisk walking is an example of aerobic activity.  ? Strength training 2 days a week.  General instructions  · Do not use any products that contain nicotine or tobacco, such as cigarettes, e-cigarettes, and chewing tobacco. If you need help quitting, ask your health care provider.  · Take over-the-counter and prescription medicines only as told by your health care provider. This includes supplements.  · Keep all follow-up visits as told by your health care provider.  Contact a health care provider if:  · You are:  ? Having trouble sticking to your  exercise or diet plan.  ? Struggling to quit smoking or control your use of alcohol.  Summary  · Dyslipidemia often involves a high level of cholesterol or triglycerides, which are types of lipids.  · Treatment depends on the type of dyslipidemia that you have and your other risk factors for heart disease and stroke.  · For many people, treatment starts with lifestyle changes, such as diet and exercise.  · Your health care provider may prescribe medicine to lower lipids.  This information is not intended to replace advice given to you by your health care provider. Make sure you discuss any questions you have with your health care provider.  Document Released: 12/23/2014 Document Revised: 08/12/2019 Document Reviewed: 07/19/2019  [employer] Interactive Patient Education © 2020 [employer]

## 2020-03-16 NOTE — PROGRESS NOTES
Transitional Care Follow Up Visit  Subjective     Prem Thrasher is a 65 y.o. male who presents for a transitional care management visit.    Within 48 business hours after discharge our office contacted him via telephone to coordinate his care and needs.      I reviewed and discussed the details of that call along with the discharge summary, hospital problems, inpatient lab results, inpatient diagnostic studies, and consultation reports with Prem.     Current outpatient and discharge medications have been reconciled for the patient.  Reviewed by: Montse Cain PA-C      Date of TCM Phone Call 3/9/2020   [facility]   Date of Admission 3/7/2020   Date of Discharge 3/8/2020   Discharge Disposition Home or Self Care     Risk for Readmission (LACE) Score: 6 (3/8/2020  6:00 AM)      History of Present Illness   Course During Hospital Stay:  3-7 to 3-8-20    Prem Thrasher 65 y.o. male presents today for hosptial follow up.  he was treated 3-7 to 3-8-20 for chest pressure and SOA  .  I reviewed all of the labs and diagnostic testing and noted:  Cardiac cath, CXR    The patient's medications were changed:  Stopped Celebrex, on ASA and Plavix---has nitro PRN  Current outpatient and discharge medications have been reconciled for the patient.  Reviewed by: Montse Cain PA-C    he does have a follow up appointment with a specialist:  Want LDL <70     intol to all statins and is on Zetia  Dr Lopez is for RA     declines low dose CT chest   Sent message to [facility] to inquire if there is a alternative to Spiriva due to cost?  Sees urologist    He is taking B12 and folic acid;   Stay off Celebrex  He wants to stop smoking; try Chantix  The following portions of the patient's history were reviewed and updated as appropriate: allergies, current medications, past family history, past medical history, past social history, past surgical history and problem list.    Review of Systems   Constitutional:  Negative for activity change, appetite change and unexpected weight change.   HENT: Positive for congestion, sinus pain and tinnitus. Negative for nosebleeds and trouble swallowing.    Eyes: Negative for pain and visual disturbance.   Respiratory: Positive for cough and chest tightness. Negative for shortness of breath and wheezing.    Cardiovascular: Negative for chest pain and palpitations.   Gastrointestinal: Negative for abdominal pain and blood in stool.   Endocrine: Negative.    Genitourinary: Negative for difficulty urinating and hematuria.   Musculoskeletal: Positive for arthralgias, back pain and myalgias. Negative for joint swelling.   Skin: Negative for color change and rash.   Allergic/Immunologic: Negative.    Neurological: Positive for headaches. Negative for syncope and speech difficulty.   Hematological: Negative for adenopathy.   Psychiatric/Behavioral: Positive for dysphoric mood. Negative for agitation and confusion. The patient is nervous/anxious and is hyperactive.    All other systems reviewed and are negative.      Objective   Physical Exam   Constitutional: He is oriented to person, place, and time. He appears well-developed and well-nourished.  Non-toxic appearance. No distress.   HENT:   Head: Normocephalic and atraumatic. Hair is normal.   Right Ear: External ear normal. No drainage, swelling or tenderness. Tympanic membrane is retracted.   Left Ear: External ear normal. No drainage, swelling or tenderness. Tympanic membrane is retracted.   Nose: Mucosal edema present. No epistaxis.   Mouth/Throat: Uvula is midline and mucous membranes are normal. No oral lesions. No uvula swelling. Posterior oropharyngeal erythema present. No oropharyngeal exudate.   Eyes: Pupils are equal, round, and reactive to light. Conjunctivae and EOM are normal. Right eye exhibits no discharge. Left eye exhibits no discharge. No scleral icterus.   Neck: Normal range of motion. Neck supple.   Cardiovascular: Normal  rate, regular rhythm and normal heart sounds. Exam reveals no gallop.   No murmur heard.  Pulmonary/Chest: Breath sounds normal. No stridor. No respiratory distress. He has no wheezes. He has no rales. He exhibits no tenderness.   Abdominal: Soft. There is no tenderness.   Lymphadenopathy:     He has cervical adenopathy.   Neurological: He is alert and oriented to person, place, and time. He exhibits normal muscle tone.   Skin: Skin is warm and dry. No rash noted. He is not diaphoretic.   Psychiatric: He has a normal mood and affect. His behavior is normal. Judgment and thought content normal.   Nursing note and vitals reviewed.      Assessment/Plan   Problems Addressed this Visit        Respiratory    COPD (chronic obstructive pulmonary disease) (CMS/[facility])    Relevant Medications    tiotropium (SPIRIVA) 18 MCG per inhalation capsule       Musculoskeletal and Integument    Rheumatoid arthritis (CMS/[facility])       Other    Recurrent major depressive disorder, in full remission (CMS/[facility])      Other Visit Diagnoses     Hospital discharge follow-up    -  Primary    Acute sinusitis, recurrence not specified, unspecified location            Trying Chantix; quit date week 2   Follow-up with cardiology and has appointment  I am aware he is off Celebrex and now on 325 mg aspirin and Plavix; has nitro  Will treat his sinus infection with Augmentin  Sent a note to pharmacy to inquire of an alternative to Spiriva for his COPD  Will continue to take B12 and folic acid  Following up with rheumatologist

## 2020-03-20 ENCOUNTER — TELEPHONE (OUTPATIENT)
Dept: CARDIOLOGY | Facility: CLINIC | Age: 65
End: 2020-03-20

## 2020-03-20 NOTE — TELEPHONE ENCOUNTER
I spent approximately 9 minutes on the phone with the patient and his wife.  He would like to reschedule his appointment.  He feels fine from a cardiac standpoint.  He is not having significant dyspnea or further chest pain/pressure/tightness, edema or dizziness/lightheadedness.  He is tolerating his dual antiplatelet therapy without difficulty.  He was educated on risk factor modification and the importance of medication compliance especially with dual antiplatelet therapy with no missed doses.  He was encouraged to quit smoking.  He denies any issues with his right wrist cath site.  He had some mild bruising that is improving but no drainage, redness, warmth, numbness/tingling, motor dysfunction.  He tells me he checked his blood pressure a few times since he has been home and gets readings 120s/70s.  He cannot recall his heart rate does not report rapid heartbeat or palpitations.    Scheduling-please cancel his appointment with me on Monday.  He will keep his 5/7/2020 appointment with Dr. Salazar at the current time.

## 2020-04-30 ENCOUNTER — HOSPITAL ENCOUNTER (OUTPATIENT)
Facility: HOSPITAL | Age: 65
Setting detail: OBSERVATION
LOS: 1 days | Discharge: HOME | End: 2020-05-02
Attending: EMERGENCY MEDICINE | Admitting: INTERNAL MEDICINE

## 2020-04-30 ENCOUNTER — APPOINTMENT (OUTPATIENT)
Dept: GENERAL RADIOLOGY | Facility: HOSPITAL | Age: 65
End: 2020-04-30

## 2020-04-30 ENCOUNTER — NURSE TRIAGE (OUTPATIENT)
Dept: CALL CENTER | Facility: HOSPITAL | Age: 65
End: 2020-04-30

## 2020-04-30 DIAGNOSIS — I20.0 UNSTABLE ANGINA PECTORIS (HCC): Primary | ICD-10-CM

## 2020-04-30 LAB
ALBUMIN SERPL-MCNC: 4.2 G/DL (ref 3.5–5.2)
ALBUMIN/GLOB SERPL: 1.4 G/DL
ALP SERPL-CCNC: 57 U/L (ref 39–117)
ALT SERPL W P-5'-P-CCNC: 14 U/L (ref 1–41)
ANION GAP SERPL CALCULATED.3IONS-SCNC: 12.6 MMOL/L (ref 5–15)
AST SERPL-CCNC: 19 U/L (ref 1–40)
BASOPHILS # BLD AUTO: 0.12 10*3/MM3 (ref 0–0.2)
BASOPHILS NFR BLD AUTO: 1.5 % (ref 0–1.5)
BILIRUB SERPL-MCNC: 0.3 MG/DL (ref 0.2–1.2)
BUN BLD-MCNC: 15 MG/DL (ref 8–23)
BUN/CREAT SERPL: 13.9 (ref 7–25)
CALCIUM SPEC-SCNC: 9 MG/DL (ref 8.6–10.5)
CHLORIDE SERPL-SCNC: 98 MMOL/L (ref 98–107)
CO2 SERPL-SCNC: 24.4 MMOL/L (ref 22–29)
CREAT BLD-MCNC: 1.08 MG/DL (ref 0.76–1.27)
DEPRECATED RDW RBC AUTO: 41.7 FL (ref 37–54)
EOSINOPHIL # BLD AUTO: 0.26 10*3/MM3 (ref 0–0.4)
EOSINOPHIL NFR BLD AUTO: 3.3 % (ref 0.3–6.2)
ERYTHROCYTE [DISTWIDTH] IN BLOOD BY AUTOMATED COUNT: 12.8 % (ref 12.3–15.4)
GFR SERPL CREATININE-BSD FRML MDRD: 68 ML/MIN/1.73
GLOBULIN UR ELPH-MCNC: 3.1 GM/DL
GLUCOSE BLD-MCNC: 118 MG/DL (ref 65–99)
HCT VFR BLD AUTO: 37.7 % (ref 37.5–51)
HGB BLD-MCNC: 12.7 G/DL (ref 13–17.7)
HOLD SPECIMEN: NORMAL
HOLD SPECIMEN: NORMAL
IMM GRANULOCYTES # BLD AUTO: 0.02 10*3/MM3 (ref 0–0.05)
IMM GRANULOCYTES NFR BLD AUTO: 0.3 % (ref 0–0.5)
INR PPP: 1.13 (ref 0.9–1.1)
LYMPHOCYTES # BLD AUTO: 2.23 10*3/MM3 (ref 0.7–3.1)
LYMPHOCYTES NFR BLD AUTO: 28.5 % (ref 19.6–45.3)
MCH RBC QN AUTO: 30 PG (ref 26.6–33)
MCHC RBC AUTO-ENTMCNC: 33.7 G/DL (ref 31.5–35.7)
MCV RBC AUTO: 89.1 FL (ref 79–97)
MONOCYTES # BLD AUTO: 0.72 10*3/MM3 (ref 0.1–0.9)
MONOCYTES NFR BLD AUTO: 9.2 % (ref 5–12)
NEUTROPHILS # BLD AUTO: 4.47 10*3/MM3 (ref 1.7–7)
NEUTROPHILS NFR BLD AUTO: 57.2 % (ref 42.7–76)
NRBC BLD AUTO-RTO: 0 /100 WBC (ref 0–0.2)
PLATELET # BLD AUTO: 291 10*3/MM3 (ref 140–450)
PMV BLD AUTO: 9.8 FL (ref 6–12)
POTASSIUM BLD-SCNC: 3.7 MMOL/L (ref 3.5–5.2)
PROT SERPL-MCNC: 7.3 G/DL (ref 6–8.5)
PROTHROMBIN TIME: 14.2 SECONDS (ref 11.7–14.2)
RBC # BLD AUTO: 4.23 10*6/MM3 (ref 4.14–5.8)
SODIUM BLD-SCNC: 135 MMOL/L (ref 136–145)
TROPONIN T SERPL-MCNC: <0.01 NG/ML (ref 0–0.03)
WBC NRBC COR # BLD: 7.82 10*3/MM3 (ref 3.4–10.8)
WHOLE BLOOD HOLD SPECIMEN: NORMAL
WHOLE BLOOD HOLD SPECIMEN: NORMAL

## 2020-04-30 PROCEDURE — 84484 ASSAY OF TROPONIN QUANT: CPT | Performed by: NURSE PRACTITIONER

## 2020-04-30 PROCEDURE — 84484 ASSAY OF TROPONIN QUANT: CPT | Performed by: INTERNAL MEDICINE

## 2020-04-30 PROCEDURE — 71045 X-RAY EXAM CHEST 1 VIEW: CPT

## 2020-04-30 PROCEDURE — 85610 PROTHROMBIN TIME: CPT | Performed by: NURSE PRACTITIONER

## 2020-04-30 PROCEDURE — 93005 ELECTROCARDIOGRAM TRACING: CPT | Performed by: EMERGENCY MEDICINE

## 2020-04-30 PROCEDURE — 85025 COMPLETE CBC W/AUTO DIFF WBC: CPT | Performed by: NURSE PRACTITIONER

## 2020-04-30 PROCEDURE — 99284 EMERGENCY DEPT VISIT MOD MDM: CPT

## 2020-04-30 PROCEDURE — 93010 ELECTROCARDIOGRAM REPORT: CPT | Performed by: INTERNAL MEDICINE

## 2020-04-30 PROCEDURE — 93005 ELECTROCARDIOGRAM TRACING: CPT | Performed by: NURSE PRACTITIONER

## 2020-04-30 PROCEDURE — 93005 ELECTROCARDIOGRAM TRACING: CPT

## 2020-04-30 PROCEDURE — 80053 COMPREHEN METABOLIC PANEL: CPT | Performed by: NURSE PRACTITIONER

## 2020-04-30 RX ORDER — CHOLECALCIFEROL (VITAMIN D3) 125 MCG
1000 CAPSULE ORAL DAILY
Status: DISCONTINUED | OUTPATIENT
Start: 2020-05-01 | End: 2020-05-02 | Stop reason: HOSPADM

## 2020-04-30 RX ORDER — ACETAMINOPHEN 325 MG/1
650 TABLET ORAL EVERY 6 HOURS PRN
Status: DISCONTINUED | OUTPATIENT
Start: 2020-04-30 | End: 2020-05-02 | Stop reason: HOSPADM

## 2020-04-30 RX ORDER — CLOPIDOGREL BISULFATE 75 MG/1
75 TABLET ORAL DAILY
Status: DISCONTINUED | OUTPATIENT
Start: 2020-05-01 | End: 2020-05-02 | Stop reason: HOSPADM

## 2020-04-30 RX ORDER — NITROGLYCERIN 0.4 MG/1
0.4 TABLET SUBLINGUAL
Status: DISCONTINUED | OUTPATIENT
Start: 2020-04-30 | End: 2020-05-02 | Stop reason: HOSPADM

## 2020-04-30 RX ORDER — DOCUSATE SODIUM 100 MG/1
100 CAPSULE, LIQUID FILLED ORAL DAILY
Status: DISCONTINUED | OUTPATIENT
Start: 2020-05-01 | End: 2020-05-02 | Stop reason: HOSPADM

## 2020-04-30 RX ORDER — ZOLPIDEM TARTRATE 5 MG/1
5 TABLET ORAL NIGHTLY PRN
Status: DISCONTINUED | OUTPATIENT
Start: 2020-04-30 | End: 2020-05-02 | Stop reason: HOSPADM

## 2020-04-30 RX ORDER — AMLODIPINE BESYLATE 10 MG/1
10 TABLET ORAL
Status: DISCONTINUED | OUTPATIENT
Start: 2020-05-01 | End: 2020-05-02 | Stop reason: HOSPADM

## 2020-04-30 RX ORDER — SODIUM CHLORIDE 0.9 % (FLUSH) 0.9 %
10 SYRINGE (ML) INJECTION AS NEEDED
Status: DISCONTINUED | OUTPATIENT
Start: 2020-04-30 | End: 2020-05-02 | Stop reason: HOSPADM

## 2020-04-30 RX ORDER — DOCUSATE SODIUM 100 MG/1
100 CAPSULE, LIQUID FILLED ORAL NIGHTLY
COMMUNITY
End: 2022-04-26 | Stop reason: HOSPADM

## 2020-04-30 RX ORDER — CARVEDILOL 12.5 MG/1
12.5 TABLET ORAL 2 TIMES DAILY WITH MEALS
Status: DISCONTINUED | OUTPATIENT
Start: 2020-04-30 | End: 2020-05-02 | Stop reason: HOSPADM

## 2020-04-30 RX ORDER — ASPIRIN 81 MG/1
324 TABLET, CHEWABLE ORAL ONCE
Status: DISCONTINUED | OUTPATIENT
Start: 2020-04-30 | End: 2020-05-02 | Stop reason: HOSPADM

## 2020-04-30 RX ORDER — MELATONIN
2000 DAILY
Status: DISCONTINUED | OUTPATIENT
Start: 2020-05-01 | End: 2020-05-02 | Stop reason: HOSPADM

## 2020-04-30 RX ORDER — ASPIRIN 325 MG
325 TABLET ORAL DAILY
Status: DISCONTINUED | OUTPATIENT
Start: 2020-05-01 | End: 2020-05-02 | Stop reason: HOSPADM

## 2020-04-30 RX ORDER — ESCITALOPRAM OXALATE 20 MG/1
20 TABLET ORAL NIGHTLY
Status: DISCONTINUED | OUTPATIENT
Start: 2020-04-30 | End: 2020-05-02 | Stop reason: HOSPADM

## 2020-04-30 RX ORDER — CYCLOBENZAPRINE HCL 10 MG
10 TABLET ORAL 3 TIMES DAILY PRN
Status: DISCONTINUED | OUTPATIENT
Start: 2020-04-30 | End: 2020-05-02 | Stop reason: HOSPADM

## 2020-04-30 RX ORDER — FOLIC ACID 1 MG/1
1 TABLET ORAL DAILY
Status: DISCONTINUED | OUTPATIENT
Start: 2020-05-01 | End: 2020-05-02 | Stop reason: HOSPADM

## 2020-04-30 RX ORDER — LEFLUNOMIDE 20 MG/1
20 TABLET ORAL DAILY
Status: DISCONTINUED | OUTPATIENT
Start: 2020-05-01 | End: 2020-05-02 | Stop reason: HOSPADM

## 2020-04-30 RX ADMIN — ESCITALOPRAM 20 MG: 20 TABLET, FILM COATED ORAL at 22:41

## 2020-04-30 RX ADMIN — NITROGLYCERIN 1 INCH: 20 OINTMENT TOPICAL at 17:57

## 2020-04-30 RX ADMIN — CARVEDILOL 12.5 MG: 12.5 TABLET, FILM COATED ORAL at 22:41

## 2020-04-30 NOTE — TELEPHONE ENCOUNTER
"Cp for 3 days , left sided, goes to left arm. Worse with activity, better with rest. Recently had 2 stents placed. Pain is similar to the pain he has had before. Has not take any NTG, suggesed to try. Will be coming to the ED    Reason for Disposition  • Pain also present in shoulder(s) or arm(s) or jaw  (Exception: pain is clearly made worse by movement)    Additional Information  • Negative: Severe difficulty breathing (e.g., struggling for each breath, speaks in single words)  • Negative: Difficult to awaken or acting confused (e.g., disoriented, slurred speech)  • Negative: Shock suspected (e.g., cold/pale/clammy skin, too weak to stand, low BP, rapid pulse)  • Negative: [1] Chest pain lasts > 5 minutes AND [2] history of heart disease  (i.e., heart attack, bypass surgery, angina, angioplasty, CHF; not just a heart murmur)  • Negative: [1] Chest pain lasts > 5 minutes AND [2] described as crushing, pressure-like, or heavy  • Negative: [1] Chest pain lasts > 5 minutes AND [2] age > 50  • Negative: [1] Chest pain lasts > 5 minutes AND [2] age > 30 AND [3] at least one cardiac risk factor (i.e., hypertension, diabetes, obesity, smoker or strong family history of heart disease)  • Negative: [1] Chest pain lasts > 5 minutes AND [2] not relieved with nitroglycerin  • Negative: Passed out (i.e., lost consciousness, collapsed and was not responding)  • Negative: Heart beating < 50 beats per minute OR > 140 beats per minute  • Negative: Visible sweat on face or sweat dripping down face  • Negative: Sounds like a life-threatening emergency to the triager  • Negative: Followed a chest injury  • Negative: SEVERE chest pain  • Negative: [1] Intermittent  chest pain or \"angina\" AND [2] increasing in severity or frequency  (Exception: pains lasting a few seconds)    Answer Assessment - Initial Assessment Questions  1. LOCATION: \"Where does it hurt?\"        Left sided chest pain and soreness down left arem  2. RADIATION: \"Does " "the pain go anywhere else?\" (e.g., into neck, jaw, arms, back)      Left arm  3. ONSET: \"When did the chest pain begin?\" (Minutes, hours or days)       3 days  4. PATTERN \"Does the pain come and go, or has it been constant since it started?\"  \"Does it get worse with exertion?\"        Gets better it lays down, worse with activity  5. DURATION: \"How long does it last\" (e.g., seconds, minutes, hours)       Have it all the [place]  6. SEVERITY: \"How bad is the pain?\"  (e.g., Scale 1-10; mild, moderate, or severe)     - MILD (1-3): doesn't interfere with normal activities      - MODERATE (4-7): interferes with normal activities or awakens from sleep     - SEVERE (8-10): excruciating pain, unable to do any normal activities        7  7. CARDIAC RISK FACTORS: \"Do you have any history of heart problems or risk factors for heart disease?\" (e.g., prior heart attack, angina; high blood pressure, diabetes, being overweight, high cholesterol, smoking, or strong family history of heart disease)      Recently had  A stent  8. PULMONARY RISK FACTORS: \"Do you have any history of lung disease?\"  (e.g., blood clots in lung, asthma, emphysema, birth control pills)      no  9. CAUSE: \"What do you think is causing the chest pain?\"      stress  10. OTHER SYMPTOMS: \"Do you have any other symptoms?\" (e.g., dizziness, nausea, vomiting, sweating, fever, difficulty breathing, cough)        no  11. PREGNANCY: \"Is there any chance you are pregnant?\" \"When was your last menstrual period?\"        no    Protocols used: CHEST PAIN-ADULT-AH      "

## 2020-05-01 ENCOUNTER — APPOINTMENT (OUTPATIENT)
Dept: NUCLEAR MEDICINE | Facility: HOSPITAL | Age: 65
End: 2020-05-01

## 2020-05-01 LAB
BH CV STRESS BP STAGE 1: NORMAL
BH CV STRESS BP STAGE 2: NORMAL
BH CV STRESS DURATION MIN STAGE 1: 3
BH CV STRESS DURATION MIN STAGE 2: 0
BH CV STRESS DURATION SEC STAGE 1: 0
BH CV STRESS DURATION SEC STAGE 2: 56
BH CV STRESS GRADE STAGE 1: 10
BH CV STRESS GRADE STAGE 2: 12
BH CV STRESS HR STAGE 1: 117
BH CV STRESS HR STAGE 2: 121
BH CV STRESS METS STAGE 1: 5
BH CV STRESS METS STAGE 2: 7.5
BH CV STRESS PROTOCOL 1: NORMAL
BH CV STRESS PROTOCOL 2 COMMENTS STAGE 1: NORMAL
BH CV STRESS PROTOCOL 2 DOSE REGADENOSON STAGE 1: 0.4
BH CV STRESS PROTOCOL 2 DURATION MIN STAGE 1: 0
BH CV STRESS PROTOCOL 2 DURATION SEC STAGE 1: 10
BH CV STRESS PROTOCOL 2 STAGE 1: 1
BH CV STRESS PROTOCOL 2: NORMAL
BH CV STRESS RECOVERY BP: NORMAL MMHG
BH CV STRESS RECOVERY HR: 85 BPM
BH CV STRESS SPEED STAGE 1: 1.7
BH CV STRESS SPEED STAGE 2: 2.5
BH CV STRESS STAGE 1: 1
BH CV STRESS STAGE 2: 2
LV EF NUC BP: 65 %
MAXIMAL PREDICTED HEART RATE: 154 BPM
PERCENT MAX PREDICTED HR: 64.29 %
STRESS BASELINE BP: NORMAL MMHG
STRESS BASELINE HR: 71 BPM
STRESS PERCENT HR: 76 %
STRESS POST EXERCISE DUR MIN: 3 MIN
STRESS POST EXERCISE DUR SEC: 56 SEC
STRESS POST PEAK BP: NORMAL MMHG
STRESS POST PEAK HR: 99 BPM
STRESS TARGET HR: 131 BPM

## 2020-05-01 PROCEDURE — 25010000002 REGADENOSON 0.4 MG/5ML SOLUTION: Performed by: INTERNAL MEDICINE

## 2020-05-01 PROCEDURE — 99219 PR INITIAL OBSERVATION CARE/DAY 50 MINUTES: CPT | Performed by: INTERNAL MEDICINE

## 2020-05-01 PROCEDURE — 0 TECHNETIUM SESTAMIBI: Performed by: INTERNAL MEDICINE

## 2020-05-01 PROCEDURE — G0378 HOSPITAL OBSERVATION PER HR: HCPCS

## 2020-05-01 PROCEDURE — 78452 HT MUSCLE IMAGE SPECT MULT: CPT | Performed by: INTERNAL MEDICINE

## 2020-05-01 PROCEDURE — A9500 TC99M SESTAMIBI: HCPCS | Performed by: INTERNAL MEDICINE

## 2020-05-01 PROCEDURE — 93018 CV STRESS TEST I&R ONLY: CPT | Performed by: INTERNAL MEDICINE

## 2020-05-01 PROCEDURE — 93017 CV STRESS TEST TRACING ONLY: CPT

## 2020-05-01 PROCEDURE — 93016 CV STRESS TEST SUPVJ ONLY: CPT | Performed by: INTERNAL MEDICINE

## 2020-05-01 PROCEDURE — 78452 HT MUSCLE IMAGE SPECT MULT: CPT

## 2020-05-01 RX ORDER — ISOSORBIDE MONONITRATE 30 MG/1
30 TABLET, EXTENDED RELEASE ORAL
Status: DISCONTINUED | OUTPATIENT
Start: 2020-05-01 | End: 2020-05-02 | Stop reason: HOSPADM

## 2020-05-01 RX ADMIN — CLOPIDOGREL 75 MG: 75 TABLET, FILM COATED ORAL at 09:07

## 2020-05-01 RX ADMIN — ASPIRIN 325 MG: 325 TABLET ORAL at 09:07

## 2020-05-01 RX ADMIN — Medication 1000 MCG: at 15:45

## 2020-05-01 RX ADMIN — TECHNETIUM TC 99M SESTAMIBI 1 DOSE: 1 INJECTION INTRAVENOUS at 11:05

## 2020-05-01 RX ADMIN — LEFLUNOMIDE 20 MG: 20 TABLET ORAL at 15:45

## 2020-05-01 RX ADMIN — ISOSORBIDE MONONITRATE 30 MG: 30 TABLET ORAL at 20:30

## 2020-05-01 RX ADMIN — TECHNETIUM TC 99M SESTAMIBI 1 DOSE: 1 INJECTION INTRAVENOUS at 12:30

## 2020-05-01 RX ADMIN — VITAMIN D, TAB 1000IU (100/BT) 2000 UNITS: 25 TAB at 15:45

## 2020-05-01 RX ADMIN — ESCITALOPRAM 20 MG: 20 TABLET, FILM COATED ORAL at 20:30

## 2020-05-01 RX ADMIN — CARVEDILOL 12.5 MG: 12.5 TABLET, FILM COATED ORAL at 15:47

## 2020-05-01 RX ADMIN — AMLODIPINE BESYLATE 10 MG: 10 TABLET ORAL at 15:45

## 2020-05-01 RX ADMIN — REGADENOSON 0.4 MG: 0.08 INJECTION, SOLUTION INTRAVENOUS at 12:30

## 2020-05-01 RX ADMIN — FOLIC ACID 1 MG: 1 TABLET ORAL at 15:45

## 2020-05-01 NOTE — H&P
"      Patient Name: Prem Thrasher  Age/Sex: 65 y.o. male  : 1955  MRN: [id number]    Date of Admission: 2020  Date of Encounter Visit: 20  Encounter Provider: Dioni Salazar MD  Place of Service: [facility] CARDIOLOGY      Referring Provider: Debbie Pacheco MD  Patient Care Team:  Montse Cain PA-C as PCP - General (Family Medicine)  Maria Antonia Lopez MD as Consulting Physician (Rheumatology)  Sawyer Rick MD as Surgeon (Neurosurgery)  Pradip Mcmillan MD as Consulting Physician (Pulmonary Disease)  Jacky Perez MD as Surgeon (Orthopedic Surgery)  Charli Sen MD as Consulting Physician (Infectious Diseases)  Tray Moody MD as Consulting Physician (Urology)  Suman Richards DPM as Consulting Physician (Podiatry)  Porsha Arcos MD as Consulting Physician (General Surgery)  Vinay Smith MD as Surgeon (Orthopedic Surgery)  Eitan Cuevas MD as Consulting Physician (Plastic Surgery)    Subjective:   Admitted/Consulted for: chest pain    Chief Complaint: chest pain    History of Present Illness:  Prem Thrasher is a 65 y.o. male patient known to me with history significant for hypertension, hyperlipidemia and tobacco abuse. He was recently admitted 3/7/20 after reports of exertional substernal chest discomfort and associated shortness of breath. He was taken for cardiac catheterization demonstrating 95% mid LAD stenosis and a subtotal occluded apical LAD, both successful treated with Xience RAZA (3.94H83pt, 2.77X65wx). A post-procedural echocardiogram completed noted normal LVSF with EF 59%, and no significant valvular disease. He was ultimatly discharged the following day on DAPT of ASA/Plavix.    He presented back to [facility] ED 20 with reports of intermittet exertional chest pain/heaviness, and associated left arm \"heaviness,\" ongoing for the past 2 days. He stated he took an additional " dose of CBD oil the night prior to help alleviate his pain, and SL NTG x2 prior to arrival to the ED. CXR completed upon arrival to the ED was negative acutely. Troponin x3 have remained negative. NitroPaste was applied and he was admitted.   The patient states that his chest discomfort has been off and on over the past 3 days generally lasting about an hour with each episode.  Finally he took nitroglycerin which did help ease.  Patient also reports that this time his symptoms were not associated with shortness of breath that were so significant during his last visit.    I have reviewed his catheterization.  He does have diffuse disease in the left anterior descending particularly in the distal segment.      Previous Cardiac Testing:    Cardiac Catheterization 3/7/20  Hemodynamics:  1.  Left ventricle: 140/7  2.  Aorta: 140/90, mean 114     Cineangiography:  1.  Left main: The left main coronary artery appeared to be normal.  2.  LAD: There is moderate calcification noted in the proximal and mid segments.  The mid segment of the artery is critically narrowed at 95%.  The remainder of the mid segment shows luminal irregularities with a diffuse 30 to 40% stenosis.  The apical portion of the LAD is subtotally occluded.  The first diagonal branch arising from the proximal segment is normal.  The second diagonal arising from the mid segment is large and normal.  3.  LCx: The left circumflex coronary artery is a large codominant system.  The proximal and mid segments were normal.  The first marginal branch arising from the mid segment is a medium caliber and normal.  The second marginal branch arising from the mid segment is now 50% proximally but otherwise normal.  The terminal marginal branch is narrowed 20% at its origin otherwise normal and large.  4.  Ramus intermedius: There is a large branching ramus system that is almost like a second LAD.  It is normal.  There is a large septal  arising from the  proximal portion that is normal as well.  5.  RCA: The right coronary artery is a codominant vessel.  The proximal segment is narrowed 20%.  The mid and distal segments are tortuous but normal.  The PDA is narrowed 30 to 40% in its proximal portion.     Intervention:  1.  Mid LAD: Lesion type B2, initial MICHELLE flow 1.  Residual flow 3.  Initial stenosis 95%.  Residual narrowing 0.  2.  Apical LAD: Lesion type B2, initial MICHELLE flow 1.  Residual flow 3.  Initial stenosis 99%.  Residual narrowing 0.     Left ventriculography: Overall size of the ventricle appears to be normal.  The global contractility of the ventricle is within normal limits.  Estimated ejection fraction is 55 to 60%.  The a sending aorta appears to be at the upper limits of normal.     Assessment:  1.  Ischemic heart disease:  Etiology: Coronary atherosclerosis  Anatomy: Critical LAD mid and apical stenoses  Physiology: Unstable angina  Recommendations: Dual antiplatelet therapy for at least 6 months, aggressive risk factor modification.    Echocardiogram 3/7/20  · Left ventricular systolic function is normal.Calculated EF = 59.0%. Normal left ventricular cavity size and wall thickness noted. All left ventricular wall segments contract normally.  · Normal right ventricular cavity size, wall thickness, systolic function  · No significant valvular stenosis or regurgitation noted.    Past Medical History:  Past Medical History:   Diagnosis Date   • Achilles tendon pain     LEFT   • Anxiety disorder    • CAD (coronary artery disease)    • COPD (chronic obstructive pulmonary disease) (CMS/[facility])    • CTS (carpal tunnel syndrome)    • Diverticulitis    • Diverticulitis of colon    • Diverticulosis    • Encounter for eye exam 10/2014    normal   • Exertional chest pain    • Fissure, anal    • History of bone density study 03/2014    Dr. Maria Antonia Lopez; normal   • History of chest x-ray 02/15/2011    also 3-6-15; normal chest   • History of colon polyps    •  History of nuclear stress test 03/09/2015    cardiolite; Dr. Greenberg; negative   • History of pulmonary function tests 03/2015    COPD   • Hyperlipidemia    • Hypertension    • IFG (impaired fasting glucose)    • Low back pain    • Lumbosacral disc disease    • Nerve damage     KAYLYNN ELBOWS   • Osteoarthritis, multiple sites    • Postoperative nausea and vomiting 8/1/2017   • Postoperative wound infection    • Rectal bleeding    • Rheumatoid arthritis (CMS/HCC)    • Sciatica    • Staph infection     WITH BACK SURGERY 2017  ON LONG TERM ANTIBIOTICS   • Unstable angina (CMS/HCC)        Past Surgical History:   Procedure Laterality Date   • ACHILLES TENDON SURGERY Left 7/3/2018    Procedure: Left Achilles debridement and secondary repair with partial excision of calcaneus. Possible left Achilles lengthening at the calf;  Surgeon: Vinay Smith MD;  [place] [facility] [place] [facility]  Service: Orthopedics   • BACK SURGERY     • CARDIAC CATHETERIZATION N/A 3/7/2020    Procedure: Left Heart Cath;  Surgeon: Dioni Salazar MD;  Location: [facility] CATH INVASIVE LOCATION;  Service: Cardiology;  Laterality: N/A;   • CARDIAC CATHETERIZATION N/A 3/7/2020    Procedure: Coronary angiography;  Surgeon: Dioni Salazar MD;  Location: [facility] CATH INVASIVE LOCATION;  Service: Cardiology;  Laterality: N/A;   • CARDIAC CATHETERIZATION N/A 3/7/2020    Procedure: Left ventriculography;  Surgeon: Dioni Salazar MD;  Location: [facility] CATH INVASIVE LOCATION;  Service: Cardiology;  Laterality: N/A;   • CARDIAC CATHETERIZATION N/A 3/7/2020    Procedure: Stent RAZA coronary;  Surgeon: Dioni Salazar MD;  Location: [facility] CATH INVASIVE LOCATION;  Service: Cardiology;  Laterality: N/A;   • COLONOSCOPY N/A 02/02/2011    Normal colon except scattered diverticulosis-Dr. Guero Blunt   • CYST REMOVAL  03/2016    x2  FROM FACE AND EAR   • DENTAL PROCEDURE  2008    teeth removed; dental implants   • EPIDURAL BLOCK  1995    L/S  spine   • FINGER DEBRIDEMENT Right 07/12/2003    Debridement and full thickness skin grafting of the ring and small fingers of the right hand-Dr. Rayray Long   • INCISION AND DRAINAGE PERIRECTAL ABSCESS N/A 7/10/2018    Procedure: INCISION AND DRAINAGE OF PERIRECTAL ABSCESS;  Surgeon: Porsha Arcos MD;  Location: [facility];  Service: General   • LUMBAR DISCECTOMY FUSION INSTRUMENTATION Left 10/10/2016    Procedure: LEFT L2-L3, L3-L4 LUMBAR LAMINECTOMY/ DISCECTOMY WITH METRIX ;  Surgeon: Sawyer Rick MD;  Location: [facility] OR;  Service:    • LUMBAR DISCECTOMY FUSION INSTRUMENTATION N/A 7/31/2017    Procedure: LUMBAR FUSION DECOMPRESSON WITH PEDICLE SCREWS with LAURA L2-3,L3-4;  Surgeon: Jacky Perez MD;  Location: [facility] OR;  Service:    • LUMBAR LAMINECTOMY DISCECTOMY DECOMPRESSION N/A 7/31/2017    Procedure: L2 to L4 laminectomy with neural lysis and a fusion by orthopedics;  Surgeon: Sawyer Rick MD;  Location: [facility] OR;  Service:    • LUMBAR LAMINECTOMY DISCECTOMY DECOMPRESSION N/A 9/5/2017    Procedure: I&D LUMBAR SPINE ;  Surgeon: Jacky Perez MD;  Location: [facility] OR;  Service:    • SHOULDER SURGERY Right 02/23/2011    Dr. Navarro   • SINUS SURGERY  2003    Dr. Barrera       Lyle Medications:   Medications Prior to Admission   Medication Sig Dispense Refill Last Dose   • amLODIPine (NORVASC) 10 MG tablet Take 1 tablet by mouth Daily. for blood pressure 90 tablet 3 Taking   • aspirin  MG tablet Take 1 tablet by mouth Daily. 30 tablet 0 Taking   • carvedilol (COREG) 12.5 MG tablet Take 1 tablet by mouth 2 (Two) Times a Day With Meals for 90 days. For  tablet 1 3/6/2020 at Unknown time   • Cholecalciferol (VITAMIN D) 2000 UNITS tablet Take 2,000 Units by mouth Every Evening.   Taking   • clopidogrel (PLAVIX) 75 MG tablet Take 1 tablet by mouth Daily. 90 tablet 2 Taking   • Cyanocobalamin (VITAMIN B-12) 1000 MCG sublingual tablet One SL daily 90 each 3  Taking   • cyclobenzaprine (FLEXERIL) 10 MG tablet TAKE 1 TABLET THREE TIMES DAILY AS NEEDED  FOR  MUSCLE  SPASMS 270 tablet 3 Taking   • docusate sodium (COLACE) 100 MG capsule Take 100 mg by mouth Daily.      • escitalopram (LEXAPRO) 20 MG tablet Take 1 tablet by mouth Every Night. For anxiety 90 tablet 3 Taking   • ezetimibe (ZETIA) 10 MG tablet Take 1 tablet by mouth Every Evening. For cholesterol 90 tablet 3 Taking   • folic acid (FOLVITE) 1 MG tablet Take 1 tablet by mouth Daily. 90 tablet 1 Taking   • irbesartan (AVAPRO) 300 MG tablet Take 1 tablet by mouth Daily. for blood pressure; 90 tablet 1 Taking   • leflunomide (ARAVA) 20 MG tablet Take 20 mg by mouth Every Morning.   Taking   • nitroglycerin (NITROSTAT) 0.4 MG SL tablet Place 1 tablet under the tongue Every 5 (Five) Minutes As Needed for Chest Pain. Take no more than 3 doses in 15 minutes. 25 tablet 1 Taking   • tiotropium (SPIRIVA) 18 MCG per inhalation capsule Place 1 capsule into inhaler and inhale Daily. For COPD 90 capsule 3    • amoxicillin-clavulanate (AUGMENTIN) 875-125 MG per tablet Take 1 tablet by mouth Every 12 (Twelve) Hours. One PO BID for infection with food 20 tablet 1    • [employer] SENNA PLUS 8.6-50 MG per tablet TAKE 1 TABLET BY MOUTH 2 (TWO) TIMES A DAY. 60 tablet 1 Taking   • diclofenac (VOLTAREN) 1 % gel gel Apply 4 g topically to the appropriate area as directed 2 (Two) Times a Day. Use per pkg insert 100 g 2 Taking   • Probiotic Product (PROBIOTIC PO) Take 1 tablet by mouth Every Morning.   Taking   • varenicline (CHANTIX CONTINUING MONTH PAK) 1 MG tablet Take 1 tablet by mouth 2 (Two) Times a Day. 180 tablet 1    • varenicline (CHANTIX STARTING MONTH MINA) 0.5 MG X 11 & 1 MG X 42 tablet Per starter mina directions 53 tablet 0        Allergies:  Allergies   Allergen Reactions   • Atorvastatin Myalgia     Leg cramps   • Ceclor [Cefaclor] Rash     Patient tolerated nafcillin during 9/2017 admission and has tolerated Augmentin in past  "outpatient.    • Methotrexate Derivatives Rash     \"Blisters\"       Past Social History:  Social History     Socioeconomic History   • Marital status:      Spouse name: Not on file   • Number of children: Not on file   • Years of education: Not on file   • Highest education level: Not on file   Tobacco Use   • Smoking status: Current Every Day Smoker     Packs/day: 1.00     Years: 45.00     Pack years: 45.00     Types: Cigarettes   • Smokeless tobacco: Never Used   Substance and Sexual Activity   • Alcohol use: No   • Drug use: No   • Sexual activity: Not Currently     Partners: Female     Birth control/protection: None        Past Family History:  Family History   Problem Relation Age of Onset   • Diabetes Mother    • Heart disease Mother    • Hyperlipidemia Mother    • Stroke Mother    • Heart disease Father    • Rheum arthritis Father    • Alcohol abuse Father    • Hyperlipidemia Father    • Depression Brother    • Cancer Brother         prostate   • Depression Brother    • Heart disease Brother    • Hyperlipidemia Brother    • Cancer Brother    • Thyroid disease Sister    • Malig Hyperthermia Neg Hx        Review of Systems: All systems reviewed. Pertinent positives identified in HPI. All other systems are negative.     REVIEW OF SYSTEMS:   CONSTITUTIONAL: No weight loss, fever, chills, weakness or fatigue.   HEENT: Eyes: No visual loss, blurred vision, double vision or yellow sclerae. Ears, Nose, Throat: No hearing loss, sneezing, congestion, runny nose or sore throat.   SKIN: No rash or itching.     RESPIRATORY: No shortness of breath, hemoptysis, cough or sputum.   GASTROINTESTINAL: No anorexia, nausea, vomiting or diarrhea. No abdominal pain, bright red blood per rectum or melena.  GENITOURINARY: No burning on urination, hematuria or increased frequency.  NEUROLOGICAL: No headache, dizziness, syncope, paralysis, ataxia, numbness or tingling in the extremities. No change in bowel or bladder control. "   MUSCULOSKELETAL: No muscle, back pain, joint pain or stiffness.   HEMATOLOGIC: No anemia, bleeding or bruising.   LYMPHATICS: No enlarged nodes. No history of splenectomy.   PSYCHIATRIC: No history of depression, anxiety, hallucinations.   ENDOCRINOLOGIC: No reports of sweating, cold or heat intolerance. No polyuria or polydipsia.       Objective:     Objective:  Temp:  [97.4 °F (36.3 °C)-98.4 °F (36.9 °C)] 97.7 °F (36.5 °C)  Heart Rate:  [69-97] 70  Resp:  [14-18] 18  BP: ()/(67-93) 122/88    Intake/Output Summary (Last 24 hours) at 5/1/2020 0954  Last data filed at 4/30/2020 2322  Gross per 24 hour   Intake 240 ml   Output --   Net 240 ml     Body mass index is 29.3 kg/m².      04/30/20  1542 04/30/20  1955   Weight: 95.3 kg (210 lb) 95.3 kg (210 lb)           Physical Exam:   Physical Exam   Constitutional: He is oriented to person, place, and time. He appears well-developed and well-nourished.   HENT:   Head: Normocephalic.   Eyes: Pupils are equal, round, and reactive to light.   Neck: Normal range of motion. No JVD present. Carotid bruit is not present. No thyromegaly present.   Cardiovascular: Normal rate, regular rhythm, S1 normal, S2 normal, normal heart sounds and intact distal pulses. Exam reveals no gallop and no friction rub.   No murmur heard.  Pulmonary/Chest: Effort normal and breath sounds normal.   Abdominal: Soft. Bowel sounds are normal.   Musculoskeletal: He exhibits no edema.   Neurological: He is alert and oriented to person, place, and time.   Skin: Skin is warm, dry and intact. No erythema.   Psychiatric: He has a normal mood and affect.   Vitals reviewed.        Lab Review:     Results from last 7 days   Lab Units 04/30/20  1551   SODIUM mmol/L 135*   POTASSIUM mmol/L 3.7   CHLORIDE mmol/L 98   CO2 mmol/L 24.4   BUN mg/dL 15   CREATININE mg/dL 1.08   GLUCOSE mg/dL 118*   CALCIUM mg/dL 9.0       Results from last 7 days   Lab Units 04/30/20  2247 04/30/20  1744 04/30/20  1554    TROPONIN T ng/mL <0.010 <0.010 <0.010     Results from last 7 days   Lab Units 04/30/20  1551   WBC 10*3/mm3 7.82   HEMOGLOBIN g/dL 12.7*   HEMATOCRIT % 37.7   PLATELETS 10*3/mm3 291     Results from last 7 days   Lab Units 04/30/20  1551   INR  1.13*                               Imaging:      Imaging Results (Most Recent)     Procedure Component Value Units Date/Time    XR Chest 1 View [[id number]] Collected:  04/30/20 1626     Updated:  04/30/20 1723    Narrative:       EXAMINATION: SINGLE VIEW CHEST RADIOGRAPH     HISTORY: 65-year-old male with a history of chest pain.     FINDINGS: Upright AP portable chest radiograph was obtained. Comparison  is made to chest radiograph dated 03/07/2020. The lungs are normal in  volume and are clear. The cardiomediastinal silhouette and pulmonary  vasculature appear normal. No bony abnormality of the chest is  appreciated.       Impression:       Negative chest radiograph.     This report was finalized on 4/30/2020 5:19 PM by Dr. Luke Aguilar M.D.             EKG 4/30/20      Baseline 3/8/20        I personally viewed and interpreted the patient's EKG/Telemetry data.    Assessment:   Assessment/Plan   1.  Chest pain: No change in the patient's electrocardiogram.  His troponins have been negative.  We will plan on stress Cardiolite study.    2.  Coronary artery disease: Status post previous intervention to LAD  3.  Hypertension  4.  Hyperlipidemia: On medical therapy      Thank you for allowing me to participate in the care of Prem Thrasher. Feel free to contact me directly with any further questions or concerns.    Dioni Salazar MD  [facility]  05/01/20  09:54

## 2020-05-01 NOTE — PROGRESS NOTES
Discharge Planning Assessment  [facility]     Patient Name: Prem Thrasher  MRN: [id number]  Today's Date: 5/1/2020    Admit Date: 4/30/2020    Discharge Needs Assessment     Row Name 05/01/20 1047       Living Environment    Lives With  spouse    Name(s) of Who Lives With Patient  Linda Thrasher [phone]    Current Living Arrangements  home/apartment/condo    Potentially Unsafe Housing Conditions  other (see comments) no concerns    Primary Care Provided by  self    Provides Primary Care For  no one    Family Caregiver if Needed  spouse    Family Caregiver Names  Linda Thrasher [phone]    Quality of Family Relationships  helpful;involved;supportive    Able to Return to Prior Arrangements  yes       Resource/Environmental Concerns    Resource/Environmental Concerns  home accessibility    Home Accessibility Concerns  stairs to enter home       Transition Planning    Patient/Family Anticipates Transition to  home    Patient/Family Anticipated Services at Transition  none    Transportation Anticipated  family or friend will provide       Discharge Needs Assessment    Readmission Within the Last 30 Days  no previous admission in last 30 days    Concerns to be Addressed  no discharge needs identified;denies needs/concerns at this time    Equipment Currently Used at Home  none    Anticipated Changes Related to Illness  none    Equipment Needed After Discharge  none    Provided Post Acute Provider List?  Yes    Post Acute Provider List  Home Health;Nursing Home    Delivered To  Patient    Method of Delivery  In person        Discharge Plan     Row Name 05/01/20 1047       Plan    Plan  Plans to return home with spouse, denies needs    Patient/Family in Agreement with Plan  yes    Plan Comments  CCP met with patient at bedside. CCP role explained and discharge planning discussed. Face sheet verified and IMM noted. Patient's PCP is Montse Cain PA-C. Patient lives with his spouse (Linda Thrasher [phone]) in a  single level home with three stairs at the entrance. Patient does not use DME at home and is independent with his ADLs. Patient denies needing assistance with food. Patient has used home health in the past but is unsure which agency was used. Patient denies past subacute rehab history. Patient was provided HH/SNF list; patient declined referrals at this time and stated he does not anticipate any needs. Patient's pharmacy is [facility]. Patient denies any difficulty affording or remembering to take his medications. Patient's spouse will provide transportation home at discharge. Patient denies any discharge needs. CCP will follow to assist with discharge home and any needs that may arise. Esther Galvez [facility]        Destination      Coordination has not been started for this encounter.      Durable Medical Equipment      Coordination has not been started for this encounter.      Dialysis/Infusion      Coordination has not been started for this encounter.      Home Medical Care      Coordination has not been started for this encounter.      Therapy      Coordination has not been started for this encounter.      Community Resources      Coordination has not been started for this encounter.          Demographic Summary     Row Name 05/01/20 1044       General Information    Admission Type  inpatient    Arrived From  emergency department    Required Notices Provided  Important Message from Medicare    Referral Source  admission list    Reason for Consult  discharge planning    Preferred Language  English     Used During This Interaction  no        Functional Status     Row Name 05/01/20 1046       Functional Status    Usual Activity Tolerance  good    Current Activity Tolerance  good       Functional Status, IADL    Medications  independent    Meal Preparation  independent    Housekeeping  independent    Laundry  independent    Shopping  independent       Mental Status    General Appearance WDL  WDL        Mental Status Summary    Recent Changes in Mental Status/Cognitive Functioning  no changes       Employment/    Employment Status  retired        Psychosocial    No documentation.       Abuse/Neglect    No documentation.       Legal    No documentation.       Substance Abuse    No documentation.       Patient Forms    No documentation.           ROGELIO Mayorga

## 2020-05-01 NOTE — PLAN OF CARE
Patient went for nuclear study this morning which shows some abnormality but did not indicate emergent cath. VSS. Aox4. No CP while on floor but did have some during ST. Imdur started.

## 2020-05-01 NOTE — PLAN OF CARE
Patient admitted from ER for chest pain. Patient has reported no chest pain since admitted to floor. Patient did report some heartburn this shift but went away on own. Patients vitals stable but requires oxygen when sleeping. Patient alert and oriented x4. Patient has no skin issues and is independent with ambulation. Patient on regular cardiac diet but became NPO at midnight for testing today. Will continue to monitor.

## 2020-05-02 ENCOUNTER — READMISSION MANAGEMENT (OUTPATIENT)
Dept: CALL CENTER | Facility: HOSPITAL | Age: 65
End: 2020-05-02

## 2020-05-02 VITALS
DIASTOLIC BLOOD PRESSURE: 81 MMHG | TEMPERATURE: 98 F | SYSTOLIC BLOOD PRESSURE: 111 MMHG | OXYGEN SATURATION: 94 % | WEIGHT: 210 LBS | BODY MASS INDEX: 29.4 KG/M2 | HEART RATE: 87 BPM | HEIGHT: 71 IN | RESPIRATION RATE: 16 BRPM

## 2020-05-02 PROBLEM — I25.119 CORONARY ARTERY DISEASE INVOLVING NATIVE HEART WITH ANGINA PECTORIS (HCC): Status: ACTIVE | Noted: 2020-05-02

## 2020-05-02 PROCEDURE — G0378 HOSPITAL OBSERVATION PER HR: HCPCS

## 2020-05-02 PROCEDURE — 93005 ELECTROCARDIOGRAM TRACING: CPT | Performed by: INTERNAL MEDICINE

## 2020-05-02 PROCEDURE — 93010 ELECTROCARDIOGRAM REPORT: CPT | Performed by: INTERNAL MEDICINE

## 2020-05-02 PROCEDURE — 99217 PR OBSERVATION CARE DISCHARGE MANAGEMENT: CPT | Performed by: INTERNAL MEDICINE

## 2020-05-02 RX ORDER — ISOSORBIDE MONONITRATE 30 MG/1
30 TABLET, EXTENDED RELEASE ORAL
Qty: 90 TABLET | Refills: 1 | Status: SHIPPED | OUTPATIENT
Start: 2020-05-02 | End: 2020-05-29 | Stop reason: SDUPTHER

## 2020-05-02 RX ADMIN — CLOPIDOGREL 75 MG: 75 TABLET, FILM COATED ORAL at 09:06

## 2020-05-02 RX ADMIN — FOLIC ACID 1 MG: 1 TABLET ORAL at 09:05

## 2020-05-02 RX ADMIN — DOCUSATE SODIUM 100 MG: 100 CAPSULE, LIQUID FILLED ORAL at 10:37

## 2020-05-02 RX ADMIN — VITAMIN D, TAB 1000IU (100/BT) 2000 UNITS: 25 TAB at 09:06

## 2020-05-02 RX ADMIN — ASPIRIN 325 MG: 325 TABLET ORAL at 09:05

## 2020-05-02 RX ADMIN — Medication 1000 MCG: at 09:06

## 2020-05-02 RX ADMIN — AMLODIPINE BESYLATE 10 MG: 10 TABLET ORAL at 09:05

## 2020-05-02 RX ADMIN — ISOSORBIDE MONONITRATE 30 MG: 30 TABLET ORAL at 09:05

## 2020-05-02 RX ADMIN — CARVEDILOL 12.5 MG: 12.5 TABLET, FILM COATED ORAL at 09:06

## 2020-05-02 RX ADMIN — LEFLUNOMIDE 20 MG: 20 TABLET ORAL at 09:06

## 2020-05-02 NOTE — PLAN OF CARE
Patients vitals stable. Patient denies pain and chest pain this shift. Patient asleep most of shift with no complaints. Will continue to monitor.

## 2020-05-02 NOTE — DISCHARGE SUMMARY
HOSPITAL DISCHARGE SUMMARY    Patient Name: Prem Thrasher  Age/Sex: 65 y.o. male  : 1955  MRN: [id number]    Encounter Provider: Dioni Salazar MD  Referring Provider: Debbie Pacheco MD  Place of Service: [facility] CARDIOLOGY  Patient Care Team:  Montse Cain PA-C as PCP - General (Family Medicine)  Maria Antonia Lopez MD as Consulting Physician (Rheumatology)  Sawyer Rick MD as Surgeon (Neurosurgery)  Pradip Mcmillan MD as Consulting Physician (Pulmonary Disease)  Jacky Perez MD as Surgeon (Orthopedic Surgery)  Charli Sen MD as Consulting Physician (Infectious Diseases)  Tray Moody MD as Consulting Physician (Urology)  Suman Richards DPM as Consulting Physician (Podiatry)  Porsha Arcos MD as Consulting Physician (General Surgery)  Vinay Smith MD as Surgeon (Orthopedic Surgery)  Eitan Cuevas MD as Consulting Physician (Plastic Surgery)         Date of Discharge:  2020     Date of Admit: 2020        Discharge Diagnosis:     Unstable angina pectoris (CMS/HCC)    Coronary artery disease involving native heart with angina pectoris (CMS/HCC)      Hospital Course: The patient is a 65-year-old white male with a history of coronary artery disease who recently underwent intervention of a complex left anterior descending artery.  He had a stent placed to the midportion and also the distal segment of the LAD.  He presented back with substernal chest discomfort.  There is no evidence of myocardial infarction based on enzymes or EKG changes.  He underwent a stress Cardiolite study which was limited due to poor exercise tolerance.  The patient did develop some exertional discomfort but his Cardiolite study did not show a big deficit in the anterior wall.  There was some at the apex which is probably due to his apical lesion.  His  chest discomfort resolved.  He was placed on isosorbide which seemed to help significantly.  In the last 24 hours he has not had any recurrent discomfort.    The decision not to repeat his catheterization was made based on the fact that his Cardiolite study did not show a big anterior wall deficit which I would have expected if his proximal stent had failed.  We are going to try him on medical therapy at this point and see how he does.  If he comes back with recurrent discomfort we may not have any other choice other than to repeat his catheterization.    Pertinent Test Results:    Results from last 7 days   Lab Units 04/30/20  1551   SODIUM mmol/L 135*   POTASSIUM mmol/L 3.7   CHLORIDE mmol/L 98   CO2 mmol/L 24.4   BUN mg/dL 15   CREATININE mg/dL 1.08   GLUCOSE mg/dL 118*   CALCIUM mg/dL 9.0       Results from last 7 days   Lab Units 04/30/20  2247 04/30/20  1744 04/30/20  1551   TROPONIN T ng/mL <0.010 <0.010 <0.010     Results from last 7 days   Lab Units 04/30/20  1551   WBC 10*3/mm3 7.82   HEMOGLOBIN g/dL 12.7*   HEMATOCRIT % 37.7   PLATELETS 10*3/mm3 291     Results from last 7 days   Lab Units 04/30/20  1551   INR  1.13*                           Discharge Medications     Discharge Medications      New Medications      Instructions Start Date   isosorbide mononitrate 30 MG 24 hr tablet  Commonly known as:  IMDUR   30 mg, Oral, Every 24 Hours Scheduled         Changes to Medications      Instructions Start Date   varenicline 0.5 MG X 11 & 1 MG X 42 tablet  Commonly known as:  Chantix Starting Month Jonas  What changed:  Another medication with the same name was removed. Continue taking this medication, and follow the directions you see here.   Per starter jonas directions         Continue These Medications      Instructions Start Date   amLODIPine 10 MG tablet  Commonly known as:  NORVASC   10 mg, Oral, Daily, for blood pressure      amoxicillin-clavulanate 875-125 MG per tablet  Commonly known as:  Augmentin   1  tablet, Oral, Every 12 Hours Scheduled, One PO BID for infection with food      aspirin  MG tablet   325 mg, Oral, Daily      carvedilol 12.5 MG tablet  Commonly known as:  COREG   12.5 mg, Oral, 2 Times Daily With Meals, For BP      clopidogrel 75 MG tablet  Commonly known as:  PLAVIX   75 mg, Oral, Daily      [employer] Senna Plus 8.6-50 MG per tablet  Generic drug:  sennosides-docusate   TAKE 1 TABLET BY MOUTH 2 (TWO) TIMES A DAY.      cyclobenzaprine 10 MG tablet  Commonly known as:  FLEXERIL   TAKE 1 TABLET THREE TIMES DAILY AS NEEDED  FOR  MUSCLE  SPASMS      diclofenac 1 % gel gel  Commonly known as:  VOLTAREN   4 g, Topical, 2 Times Daily, Use per pkg insert      docusate sodium 100 MG capsule  Commonly known as:  COLACE   100 mg, Oral, Daily      escitalopram 20 MG tablet  Commonly known as:  Lexapro   20 mg, Oral, Nightly, For anxiety      ezetimibe 10 MG tablet  Commonly known as:  Zetia   10 mg, Oral, Every Evening, For cholesterol      folic acid 1 MG tablet  Commonly known as:  FOLVITE   1 mg, Oral, Daily      irbesartan 300 MG tablet  Commonly known as:  AVAPRO   300 mg, Oral, Daily, for blood pressure;      leflunomide 20 MG tablet  Commonly known as:  ARAVA   20 mg, Oral, Every Morning      nitroglycerin 0.4 MG SL tablet  Commonly known as:  Nitrostat   0.4 mg, Sublingual, Every 5 Minutes PRN, Take no more than 3 doses in 15 minutes.      PROBIOTIC PO   1 tablet, Oral, Every Morning      tiotropium 18 MCG per inhalation capsule  Commonly known as:  SPIRIVA   1 capsule, Inhalation, Daily, For COPD      Vitamin B-12 1000 MCG sublingual tablet   One SL daily      Vitamin D 50 MCG (2000 UT) tablet   2,000 Units, Oral, Every Evening               Discharge Diet: Heart Healthy      Activity at Discharge:  Personal care      Discharge disposition: Home      Follow-up Appointments  Future Appointments   Date Time Provider Department Center   5/7/2020  9:30 AM Dioni Salazar MD [id number] None    6/4/2020 10:15 AM Montse Cain PA-C [id number] None     Follow-up Information     Montse Cain PA-C .    Specialty:  Family Medicine  Contact information:  [id number] [id number] [phone]                        Dioni Salazar MD  05/02/20  08:27

## 2020-05-02 NOTE — OUTREACH NOTE
Prep Survey      Responses   [facility] [place] patient discharged from?  [place]   Is LACE score < 7 ?  No   Eligibility  [facility]   Date of Admission  04/30/20   Date of Discharge  05/02/20   Discharge Disposition  Home or Self Care   Discharge diagnosis   Coronary artery disease involving native heart with angina pectoris    COVID-19 Test Status  Not tested   Does the patient have one of the following disease processes/diagnoses(primary or secondary)?  Other   Does the patient have Home health ordered?  No   Is there a DME ordered?  No   Prep survey completed?  Yes          Melida Higgins RN

## 2020-05-02 NOTE — NURSING NOTE
Discharge instructions provided to and discussed with patient. Patient educated on returning to the ER if he has new chest pain or cardiac symptoms. Patient verbalizes understanding and has no further questions at this time. He is discharging by private vehicle.

## 2020-05-04 ENCOUNTER — TRANSITIONAL CARE MANAGEMENT TELEPHONE ENCOUNTER (OUTPATIENT)
Dept: CALL CENTER | Facility: HOSPITAL | Age: 65
End: 2020-05-04

## 2020-05-04 NOTE — OUTREACH NOTE
Call Center TCM Note      Responses   [facility] patient discharged from?  [place]   COVID-19 Test Status  Not tested   Does the patient have one of the following disease processes/diagnoses(primary or secondary)?  Other   TCM attempt successful?  No   Unsuccessful attempts  Attempt 2   Call Status  Left message          Aleida Mohan RN    5/4/2020, 14:25

## 2020-05-04 NOTE — PROGRESS NOTES
Case Management Discharge Note      Final Note: Home no additional dc orders noted for CCP. Eleazar rn/ccp    Provided Post Acute Provider List?: Yes  Post Acute Provider List: Home Health, Nursing Home  Delivered To: Patient  Method of Delivery: In person    Destination      No service has been selected for the patient.      Durable Medical Equipment      No service has been selected for the patient.      Dialysis/Infusion      No service has been selected for the patient.      Home Medical Care      No service has been selected for the patient.      Therapy      No service has been selected for the patient.      Community Resources      No service has been selected for the patient.        Transportation Services  Private: Car    Final Discharge Disposition Code: 01 - home or self-care

## 2020-05-04 NOTE — OUTREACH NOTE
Call Center TCM Note      Responses   [facility] patient discharged from?  [place]   COVID-19 Test Status  Not tested   Does the patient have one of the following disease processes/diagnoses(primary or secondary)?  Other   TCM attempt successful?  No   Unsuccessful attempts  Attempt 1   Call Status  Left message          Aleida Mohan RN    5/4/2020, 11:22

## 2020-05-05 ENCOUNTER — TRANSITIONAL CARE MANAGEMENT TELEPHONE ENCOUNTER (OUTPATIENT)
Dept: CALL CENTER | Facility: HOSPITAL | Age: 65
End: 2020-05-05

## 2020-05-05 NOTE — OUTREACH NOTE
Call Center TCM Note      Responses   [facility] patient discharged from?  [place]   COVID-19 Test Status  Not tested   Does the patient have one of the following disease processes/diagnoses(primary or secondary)?  Other   TCM attempt successful?  Yes   Call start time  1102   Call end time  1110   Discharge diagnosis   Coronary artery disease involving native heart with angina pectoris    Meds reviewed with patient/caregiver?  Yes   Is the patient having any side effects they believe may be caused by any medication additions or changes?  No   Does the patient have all medications ordered at discharge?  No   What is keeping the patient from filling the prescriptions?  Financial   Prescription comments  Patient states that chantix was 300 dollars he would like a less expensive medication to stop smoking.  I will notify pcp per epic TCM message.   Is the patient taking all medications as directed (includes completed medication regime)?  Yes   Comments regarding appointments  patient declined to see pcp before 6/4/20   Does the patient have a primary care provider?   Yes   Does the patient have an appointment with their PCP within 7 days of discharge?  Greater than 7 days   What is preventing the patient from scheduling follow up appointments within 7 days of discharge?  Earlier appointment not available   Nursing Interventions  Verified appointment date/time/provider   Has the patient kept scheduled appointments due by today?  N/A   Has home health visited the patient within 72 hours of discharge?  N/A   Pulse Ox monitoring  None   Did the patient receive a copy of their discharge instructions?  Yes   Nursing interventions  Reviewed instructions with patient   What is the patient's perception of their health status since discharge?  New symptoms unrelated to diagnosis [patient reports that he is having very bad back and BLE pain from sciatica.  He rates it 7/10 on pain scale.]   Is the patient/caregiver able  to teach back signs and symptoms related to disease process for when to call PCP?  Yes   Is the patient/caregiver able to teach back signs and symptoms related to disease process for when to call 911?  Yes   Is the patient/caregiver able to teach back the hierarchy of who to call/visit for symptoms/problems? PCP, Specialist, Home health nurse, Urgent Care, ED, 911  Yes   If the patient is a current smoker, are they able to teach back resources for cessation?  Smoking cessation medications   TCM call completed?  Yes        Patient reports that Chantix was 3400 dollars and he is not able to afford this, he would like a less expensive medication to help with smoking cessation.  Thank you.  Dalton Lopez LPN    5/5/2020, 11:10

## 2020-05-07 ENCOUNTER — OFFICE VISIT (OUTPATIENT)
Dept: CARDIOLOGY | Facility: CLINIC | Age: 65
End: 2020-05-07

## 2020-05-07 VITALS
SYSTOLIC BLOOD PRESSURE: 130 MMHG | DIASTOLIC BLOOD PRESSURE: 95 MMHG | BODY MASS INDEX: 29.4 KG/M2 | HEIGHT: 71 IN | WEIGHT: 210 LBS | HEART RATE: 91 BPM

## 2020-05-07 DIAGNOSIS — Z72.0 TOBACCO ABUSE: ICD-10-CM

## 2020-05-07 DIAGNOSIS — I25.119 CORONARY ARTERY DISEASE INVOLVING NATIVE CORONARY ARTERY OF NATIVE HEART WITH ANGINA PECTORIS (HCC): Primary | ICD-10-CM

## 2020-05-07 DIAGNOSIS — I10 ESSENTIAL HYPERTENSION: ICD-10-CM

## 2020-05-07 PROCEDURE — 99442 PR PHYS/QHP TELEPHONE EVALUATION 11-20 MIN: CPT | Performed by: INTERNAL MEDICINE

## 2020-05-07 NOTE — PROGRESS NOTES
Date of Office Visit: 2020    Patient Name: Prem Thrasher  : 1955    Encounter Provider: Dioni Salazar MD  Referring Provider: No ref. provider found  Place of Service: [facility] CARDIOLOGY  Patient Care Team:  Montse Cain PA-C as PCP - General (Family Medicine)  Maria Antonia Lopez MD as Consulting Physician (Rheumatology)  Sawyer Rick MD as Surgeon (Neurosurgery)  Pradip Mcmillan MD as Consulting Physician (Pulmonary Disease)  Jacky Perez MD as Surgeon (Orthopedic Surgery)  Charli Sen MD as Consulting Physician (Infectious Diseases)  Tray Moody MD as Consulting Physician (Urology)  Suman Richards DPM as Consulting Physician (Podiatry)  Porsha Arcos MD as Consulting Physician (General Surgery)  Vinay Smith MD as Surgeon (Orthopedic Surgery)  Eitan Cuevas MD as Consulting Physician (Plastic Surgery)    The patient agreed to a telephone tell up phone visit today.    Chief Complaint   Patient presents with   • Follow-up     BHE for CP-no new complaints   • Coronary Artery Disease     History of Present Illness  The patient is a 65-year-old white male with a history coronary artery disease.  In early March he underwent intervention to a complex left anterior descending.  A stent was placed to the midportion of the vessel as well as the distal segment.  The artery was diffusely diseased.    The patient presented back to the hospital on 2020.  At that time he was complaining of substernal chest discomfort.  There were no EKG changes.  The troponins remain normal.  A stress Cardiolite was limited due to poor exercise tolerance.  He did develop exertional discomfort but his Cardiolite study did not show any significant visit in the anterior wall.  There was some apical abnormality that was probably due to his distal LAD disease.  The patient was subsequently placed on isosorbide and he has  remained angina free since.  He states he is back to his regular routine at home which is fairly sedentary but is not had any of the substernal chest pressure.    The patient still continues to smoke cigarettes on a regular basis.  We discussed in detail the risks of his continued smoking.      Past Medical History:   Diagnosis Date   • Achilles tendon pain     LEFT   • Anxiety disorder    • CAD (coronary artery disease)    • COPD (chronic obstructive pulmonary disease) (CMS/HCC)    • CTS (carpal tunnel syndrome)    • Diverticulitis    • Diverticulitis of colon    • Diverticulosis    • Encounter for eye exam 10/2014    normal   • Exertional chest pain    • Fissure, anal    • History of bone density study 03/2014    Dr. Maria Antonia Lopez; normal   • History of chest x-ray 02/15/2011    also 3-6-15; normal chest   • History of colon polyps    • History of nuclear stress test 03/09/2015    cardiolite; Dr. Greenberg; negative   • History of pulmonary function tests 03/2015    COPD   • Hyperlipidemia    • Hypertension    • IFG (impaired fasting glucose)    • Low back pain    • Lumbosacral disc disease    • Nerve damage     KAYLYNN ELBOWS   • Osteoarthritis, multiple sites    • Postoperative nausea and vomiting 8/1/2017   • Postoperative wound infection    • Rectal bleeding    • Rheumatoid arthritis (CMS/HCC)    • Sciatica    • Staph infection     WITH BACK SURGERY 2017  ON LONG TERM ANTIBIOTICS   • Unstable angina (CMS/HCC)          Past Surgical History:   Procedure Laterality Date   • ACHILLES TENDON SURGERY Left 7/3/2018    Procedure: Left Achilles debridement and secondary repair with partial excision of calcaneus. Possible left Achilles lengthening at the calf;  Surgeon: Vinay Smith MD;  Location: [facility];  Service: Orthopedics   • BACK SURGERY     • CARDIAC CATHETERIZATION N/A 3/7/2020    Procedure: Left Heart Cath;  Surgeon: Dioni Salazar MD;  Location: [facility] CATH INVASIVE LOCATION;  Service:  Cardiology;  Laterality: N/A;   • CARDIAC CATHETERIZATION N/A 3/7/2020    Procedure: Coronary angiography;  Surgeon: Dioni Salazar MD;  Location: [facility] CATH INVASIVE LOCATION;  Service: Cardiology;  Laterality: N/A;   • CARDIAC CATHETERIZATION N/A 3/7/2020    Procedure: Left ventriculography;  Surgeon: Dioni Salazar MD;  Location: [facility] CATH INVASIVE LOCATION;  Service: Cardiology;  Laterality: N/A;   • CARDIAC CATHETERIZATION N/A 3/7/2020    Procedure: Stent RAZA coronary;  Surgeon: Dioni Salazar MD;  Location: [facility] CATH INVASIVE LOCATION;  Service: Cardiology;  Laterality: N/A;   • COLONOSCOPY N/A 02/02/2011    Normal colon except scattered diverticulosis-Dr. Guero Blunt   • CYST REMOVAL  03/2016    x2  FROM FACE AND EAR   • DENTAL PROCEDURE  2008    teeth removed; dental implants   • EPIDURAL BLOCK  1995    L/S spine   • FINGER DEBRIDEMENT Right 07/12/2003    Debridement and full thickness skin grafting of the ring and small fingers of the right hand-Dr. Rayray Long   • INCISION AND DRAINAGE PERIRECTAL ABSCESS N/A 7/10/2018    Procedure: INCISION AND DRAINAGE OF PERIRECTAL ABSCESS;  Surgeon: Porsha Arcos MD;  Location: [facility] OR;  Service: General   • LUMBAR DISCECTOMY FUSION INSTRUMENTATION Left 10/10/2016    Procedure: LEFT L2-L3, L3-L4 LUMBAR LAMINECTOMY/ DISCECTOMY WITH METRIX ;  Surgeon: Sawyer Rick MD;  Location: [facility] OR;  Service:    • LUMBAR DISCECTOMY FUSION INSTRUMENTATION N/A 7/31/2017    Procedure: LUMBAR FUSION DECOMPRESSON WITH PEDICLE SCREWS with LAURA L2-3,L3-4;  Surgeon: Jacky Perez MD;  Location: [facility] OR;  Service:    • LUMBAR LAMINECTOMY DISCECTOMY DECOMPRESSION N/A 7/31/2017    Procedure: L2 to L4 laminectomy with neural lysis and a fusion by orthopedics;  Surgeon: Sawyer Rick MD;  Location: [facility] OR;  Service:    • LUMBAR LAMINECTOMY DISCECTOMY DECOMPRESSION N/A 9/5/2017    Procedure: I&D LUMBAR SPINE ;  Surgeon:  Jacky Perez MD;  Location: [facility] OR;  Service:    • SHOULDER SURGERY Right 02/23/2011    Dr. Navarro   • SINUS SURGERY  2003    Dr. Barrera           Current Outpatient Medications:   •  amLODIPine (NORVASC) 10 MG tablet, Take 1 tablet by mouth Daily. for blood pressure, Disp: 90 tablet, Rfl: 3  •  aspirin  MG tablet, Take 1 tablet by mouth Daily., Disp: 30 tablet, Rfl: 0  •  carvedilol (COREG) 12.5 MG tablet, Take 1 tablet by mouth 2 (Two) Times a Day With Meals for 90 days. For BP, Disp: 180 tablet, Rfl: 1  •  Cholecalciferol (VITAMIN D) 2000 UNITS tablet, Take 2,000 Units by mouth Every Evening., Disp: , Rfl:   •  clopidogrel (PLAVIX) 75 MG tablet, Take 1 tablet by mouth Daily., Disp: 90 tablet, Rfl: 2  •  Cyanocobalamin (VITAMIN B-12) 1000 MCG sublingual tablet, One SL daily, Disp: 90 each, Rfl: 3  •  cyclobenzaprine (FLEXERIL) 10 MG tablet, TAKE 1 TABLET THREE TIMES DAILY AS NEEDED  FOR  MUSCLE  SPASMS, Disp: 270 tablet, Rfl: 3  •  docusate sodium (COLACE) 100 MG capsule, Take 100 mg by mouth Daily., Disp: , Rfl:   •  escitalopram (LEXAPRO) 20 MG tablet, Take 1 tablet by mouth Every Night. For anxiety, Disp: 90 tablet, Rfl: 3  •  ezetimibe (ZETIA) 10 MG tablet, Take 1 tablet by mouth Every Evening. For cholesterol, Disp: 90 tablet, Rfl: 3  •  folic acid (FOLVITE) 1 MG tablet, Take 1 tablet by mouth Daily., Disp: 90 tablet, Rfl: 1  •  irbesartan (AVAPRO) 300 MG tablet, Take 1 tablet by mouth Daily. for blood pressure;, Disp: 90 tablet, Rfl: 1  •  isosorbide mononitrate (IMDUR) 30 MG 24 hr tablet, Take 1 tablet by mouth Daily., Disp: 90 tablet, Rfl: 1  •  leflunomide (ARAVA) 20 MG tablet, Take 20 mg by mouth Every Morning., Disp: , Rfl:   •  nitroglycerin (NITROSTAT) 0.4 MG SL tablet, Place 1 tablet under the tongue Every 5 (Five) Minutes As Needed for Chest Pain. Take no more than 3 doses in 15 minutes., Disp: 25 tablet, Rfl: 1  •  tiotropium (SPIRIVA) 18 MCG per inhalation capsule, Place 1 capsule  "into inhaler and inhale Daily. For COPD, Disp: 90 capsule, Rfl: 3      Social History     Socioeconomic History   • Marital status:      Spouse name: Not on file   • Number of children: Not on file   • Years of education: Not on file   • Highest education level: Not on file   Tobacco Use   • Smoking status: Current Every Day Smoker     Packs/day: 1.00     Years: 45.00     Pack years: 45.00     Types: Cigarettes   • Smokeless tobacco: Never Used   • Tobacco comment: no caffeine    Substance and Sexual Activity   • Alcohol use: No   • Drug use: No   • Sexual activity: Not Currently     Partners: Female     Birth control/protection: None         Review of Systems   Constitution: Negative.   HENT: Negative.    Eyes: Negative.    Cardiovascular: Negative.    Respiratory: Negative.    Endocrine: Negative.    Skin: Negative.    Musculoskeletal: Negative.    Gastrointestinal: Negative.    Neurological: Negative.    Psychiatric/Behavioral: Negative.        Procedures    Procedures        Objective:    /95   Pulse 91   Ht 180.3 cm (71\")   Wt 95.3 kg (210 lb)   BMI 29.29 kg/m²         Physical Exam  No physical examination performed.      Assessment:       Diagnosis Plan   1. Coronary artery disease involving native coronary artery of native heart with angina pectoris (CMS/[facility])     2. Essential hypertension     3. Tobacco abuse       1.  Coronary artery disease: Status post intervention of LAD March 2020.  Recurrent angina now successfully treated with the addition of isosorbide.  2.  Hypertension: Borderline control.  Need to monitor  3.  Tobacco abuse: Discussed in detail risks of tobacco.  Encouraged to discontinue all tobacco products at this time.       Plan:       1.  Continue isosorbide as ordered  2.  Follow-up in the office in 2 months  3.  Report any additional anginal episodes.  We will more than likely need to repeat his catheterization if he has recurrent symptoms.    The total encounter including " direct patient contact: 11 minutes

## 2020-05-11 ENCOUNTER — READMISSION MANAGEMENT (OUTPATIENT)
Dept: CALL CENTER | Facility: HOSPITAL | Age: 65
End: 2020-05-11

## 2020-05-11 NOTE — OUTREACH NOTE
Medical Week 2 Survey      Responses   [facility]   COVID-19 Test Status  Not tested   Does the patient have one of the following disease processes/diagnoses(primary or secondary)?  Other   Week 2 attempt successful?  Yes   Call start time  1531   Discharge diagnosis   Coronary artery disease involving native heart with angina pectoris    Call end time  1533   Meds reviewed with patient/caregiver?  Yes   Is the patient having any side effects they believe may be caused by any medication additions or changes?  No   Does the patient have all medications ordered at discharge?  Yes   Is the patient taking all medications as directed (includes completed medication regime)?  Yes   Does the patient have a primary care provider?   Yes   Does the patient have an appointment with their PCP within 7 days of discharge?  Yes   Has the patient kept scheduled appointments due by today?  Yes   Has home health visited the patient within 72 hours of discharge?  N/A   Pulse Ox monitoring  None   Psychosocial issues?  No   Did the patient receive a copy of their discharge instructions?  Yes   Nursing interventions  Reviewed instructions with patient   What is the patient's perception of their health status since discharge?  Improving   Is the patient/caregiver able to teach back signs and symptoms related to disease process for when to call PCP?  Yes   Is the patient/caregiver able to teach back signs and symptoms related to disease process for when to call 911?  Yes   Is the patient/caregiver able to teach back the hierarchy of who to call/visit for symptoms/problems? PCP, Specialist, Home health nurse, Urgent Care, ED, 911  Yes   Week 2 Call Completed?  Yes          Rory Wheatley RN

## 2020-05-18 ENCOUNTER — READMISSION MANAGEMENT (OUTPATIENT)
Dept: CALL CENTER | Facility: HOSPITAL | Age: 65
End: 2020-05-18

## 2020-05-18 NOTE — OUTREACH NOTE
"Medical Week 3 Survey      Responses   [facility] patient discharged from?  [place]   COVID-19 Test Status  Not tested   Does the patient have one of the following disease processes/diagnoses(primary or secondary)?  Other   Week 3 attempt successful?  Yes   Call start time  1612   Call end time  1614   Discharge diagnosis   Coronary artery disease involving native heart with angina pectoris    Meds reviewed with patient/caregiver?  Yes   Is the patient taking all medications as directed (includes completed medication regime)?  Yes   Comments regarding appointments  Cardiology appt 5/7/20   Comments regarding PCP  6/4/20   Has the patient kept scheduled appointments due by today?  Yes   Pulse Ox monitoring  None   What is the patient's perception of their health status since discharge?  Improving   Week 3 Call Completed?  Yes   Revoked  No further contact(revokes)-requires comment   Graduated/Revoked comments  Pt reports, \"I think I'm a little past that\" when asked if he minded a call back next week to check on him. No further calls needed.          Siomara Jorgensen RN  "

## 2020-05-29 RX ORDER — CLOPIDOGREL BISULFATE 75 MG/1
75 TABLET ORAL DAILY
Qty: 90 TABLET | Refills: 2 | Status: SHIPPED | OUTPATIENT
Start: 2020-05-29 | End: 2021-03-25

## 2020-05-29 RX ORDER — ISOSORBIDE MONONITRATE 30 MG/1
30 TABLET, EXTENDED RELEASE ORAL
Qty: 90 TABLET | Refills: 1 | Status: SHIPPED | OUTPATIENT
Start: 2020-05-29 | End: 2020-07-23

## 2020-06-04 ENCOUNTER — OFFICE VISIT (OUTPATIENT)
Dept: FAMILY MEDICINE CLINIC | Facility: CLINIC | Age: 65
End: 2020-06-04

## 2020-06-04 VITALS
HEIGHT: 71 IN | TEMPERATURE: 97.3 F | WEIGHT: 204 LBS | RESPIRATION RATE: 16 BRPM | OXYGEN SATURATION: 96 % | HEART RATE: 90 BPM | SYSTOLIC BLOOD PRESSURE: 110 MMHG | DIASTOLIC BLOOD PRESSURE: 70 MMHG | BODY MASS INDEX: 28.56 KG/M2

## 2020-06-04 DIAGNOSIS — E78.2 MIXED HYPERLIPIDEMIA: ICD-10-CM

## 2020-06-04 DIAGNOSIS — M05.79 RHEUMATOID ARTHRITIS INVOLVING MULTIPLE SITES WITH POSITIVE RHEUMATOID FACTOR (HCC): ICD-10-CM

## 2020-06-04 DIAGNOSIS — E53.8 LOW SERUM VITAMIN B12: ICD-10-CM

## 2020-06-04 DIAGNOSIS — I10 ESSENTIAL HYPERTENSION: Primary | ICD-10-CM

## 2020-06-04 DIAGNOSIS — R73.01 IFG (IMPAIRED FASTING GLUCOSE): ICD-10-CM

## 2020-06-04 DIAGNOSIS — F41.1 GENERALIZED ANXIETY DISORDER: ICD-10-CM

## 2020-06-04 DIAGNOSIS — F33.42 RECURRENT MAJOR DEPRESSIVE DISORDER, IN FULL REMISSION (HCC): ICD-10-CM

## 2020-06-04 DIAGNOSIS — M54.16 LUMBAR RADICULOPATHY: ICD-10-CM

## 2020-06-04 DIAGNOSIS — Z12.5 SCREENING PSA (PROSTATE SPECIFIC ANTIGEN): ICD-10-CM

## 2020-06-04 DIAGNOSIS — I25.119 CORONARY ARTERY DISEASE INVOLVING NATIVE CORONARY ARTERY OF NATIVE HEART WITH ANGINA PECTORIS (HCC): ICD-10-CM

## 2020-06-04 DIAGNOSIS — J43.8 OTHER EMPHYSEMA (HCC): ICD-10-CM

## 2020-06-04 DIAGNOSIS — E53.8 LOW FOLIC ACID: ICD-10-CM

## 2020-06-04 DIAGNOSIS — Z72.0 TOBACCO ABUSE: ICD-10-CM

## 2020-06-04 DIAGNOSIS — E55.9 VITAMIN D DEFICIENCY: ICD-10-CM

## 2020-06-04 PROBLEM — M54.50 LOW BACK PAIN: Status: ACTIVE | Noted: 2020-06-04

## 2020-06-04 PROCEDURE — 99214 OFFICE O/P EST MOD 30 MIN: CPT | Performed by: PHYSICIAN ASSISTANT

## 2020-06-04 RX ORDER — FOLIC ACID 1 MG/1
1 TABLET ORAL DAILY
Qty: 90 TABLET | Refills: 1 | Status: SHIPPED | OUTPATIENT
Start: 2020-06-04 | End: 2020-08-28

## 2020-06-04 RX ORDER — IRBESARTAN 300 MG/1
300 TABLET ORAL DAILY
Qty: 90 TABLET | Refills: 1 | Status: SHIPPED | OUTPATIENT
Start: 2020-06-04 | End: 2020-08-28

## 2020-06-04 NOTE — PROGRESS NOTES
"Subjective   Prem Thrasher is a 65 y.o. male.     History of Present Illness    Since the last visit, he has overall felt fairly well.  He has Essential Hypertension and well controlled on current medication, Impaired fasting glucose and will monitor labs to watch for DMII, CAD and remains under care of their Cardiologist for mangement, Vitamin D deficiency and will update labs for continued management and mixed hyperlipidemia----on Zetia.  he has been compliant with current medications have reviewed them.  The patient denies medication side effects.  Will refill medications. /70 (BP Location: Left arm, Patient Position: Sitting, Cuff Size: Large Adult)   Pulse 90   Temp 97.3 °F (36.3 °C) (Infrared)   Resp 16   Ht 180.3 cm (71\")   Wt 92.5 kg (204 lb)   SpO2 96%   BMI 28.45 kg/m²     Results for orders placed or performed during the hospital encounter of 04/30/20   Comprehensive Metabolic Panel   Result Value Ref Range    Glucose 118 (H) 65 - 99 mg/dL    BUN 15 8 - 23 mg/dL    Creatinine 1.08 0.76 - 1.27 mg/dL    Sodium 135 (L) 136 - 145 mmol/L    Potassium 3.7 3.5 - 5.2 mmol/L    Chloride 98 98 - 107 mmol/L    CO2 24.4 22.0 - 29.0 mmol/L    Calcium 9.0 8.6 - 10.5 mg/dL    Total Protein 7.3 6.0 - 8.5 g/dL    Albumin 4.20 3.50 - 5.20 g/dL    ALT (SGPT) 14 1 - 41 U/L    AST (SGOT) 19 1 - 40 U/L    Alkaline Phosphatase 57 39 - 117 U/L    Total Bilirubin 0.3 0.2 - 1.2 mg/dL    eGFR Non African Amer 68 >60 mL/min/1.73    Globulin 3.1 gm/dL    A/G Ratio 1.4 g/dL    BUN/Creatinine Ratio 13.9 7.0 - 25.0    Anion Gap 12.6 5.0 - 15.0 mmol/L   Troponin   Result Value Ref Range    Troponin T <0.010 0.000 - 0.030 ng/mL   Troponin   Result Value Ref Range    Troponin T <0.010 0.000 - 0.030 ng/mL   Protime-INR   Result Value Ref Range    Protime 14.2 11.7 - 14.2 Seconds    INR 1.13 (H) 0.90 - 1.10   CBC Auto Differential   Result Value Ref Range    WBC 7.82 3.40 - 10.80 10*3/mm3    RBC 4.23 4.14 - 5.80 10*6/mm3    " Hemoglobin 12.7 (L) 13.0 - 17.7 g/dL    Hematocrit 37.7 37.5 - 51.0 %    MCV 89.1 79.0 - 97.0 fL    MCH 30.0 26.6 - 33.0 pg    MCHC 33.7 31.5 - 35.7 g/dL    RDW 12.8 12.3 - 15.4 %    RDW-SD 41.7 37.0 - 54.0 fl    MPV 9.8 6.0 - 12.0 fL    Platelets 291 140 - 450 10*3/mm3    Neutrophil % 57.2 42.7 - 76.0 %    Lymphocyte % 28.5 19.6 - 45.3 %    Monocyte % 9.2 5.0 - 12.0 %    Eosinophil % 3.3 0.3 - 6.2 %    Basophil % 1.5 0.0 - 1.5 %    Immature Grans % 0.3 0.0 - 0.5 %    Neutrophils, Absolute 4.47 1.70 - 7.00 10*3/mm3    Lymphocytes, Absolute 2.23 0.70 - 3.10 10*3/mm3    Monocytes, Absolute 0.72 0.10 - 0.90 10*3/mm3    Eosinophils, Absolute 0.26 0.00 - 0.40 10*3/mm3    Basophils, Absolute 0.12 0.00 - 0.20 10*3/mm3    Immature Grans, Absolute 0.02 0.00 - 0.05 10*3/mm3    nRBC 0.0 0.0 - 0.2 /100 WBC   Troponin   Result Value Ref Range    Troponin T <0.010 0.000 - 0.030 ng/mL   Stress Test With Myocardial Perfusion One Day   Result Value Ref Range    BH CV STRESS PROTOCOL 1 Blaise     Stage 1 1     HR Stage 1 117     BP Stage 1 139/78     Duration Min Stage 1 3     Duration Sec Stage 1 0     Grade Stage 1 10     Speed Stage 1 1.7     BH CV STRESS METS STAGE 1 5     Stage 2 2     HR Stage 2 121     BP Stage 2 139/78     Duration Min Stage 2 0     Duration Sec Stage 2 56     Grade Stage 2 12     Speed Stage 2 2.5     BH CV STRESS METS STAGE 2 7.5     Target HR (85%) 131 bpm    Max. Pred. HR (100%) 154 bpm    Baseline HR 71 bpm    Baseline /91 mmHg    Peak HR 99 bpm    Percent Max Pred HR 64.29 %    Percent Target HR 76 %    Peak /78 mmHg    Recovery HR 85 bpm    Recovery /76 mmHg    Exercise duration (min) 3 min    Exercise duration (sec) 56 sec    BH CV STRESS PROTOCOL 2 Pharmacologic     BH CV STRESS PROTOCOL 2 STAGE 1 1     BH CV STRESS PROTOCOL 2 DURATION MIN STAGE 1 0     BH CV STRESS PROTOCOL 2 DURATION SEC STAGE 1 10     BH CV STRESS PROTOCOL 2 DOSE REGADENOSON STAGE 1 0.4     BH CV STRESS PROTOCOL 2  COMMENTS STAGE 1 10 sec bolus injection     Nuc Stress EF 65 %   Light Blue Top   Result Value Ref Range    Extra Tube hold for add-on    Green Top (Gel)   Result Value Ref Range    Extra Tube Hold for add-ons.    Lavender Top   Result Value Ref Range    Extra Tube hold for add-on    Gold Top - SST   Result Value Ref Range    Extra Tube Hold for add-ons.    intol to all statins and is on Zetia  Dr Salazar--- for heart disease and has a history of 2 stents LAD  Dr Lopez is for RA   declines low dose CT chest  Still has back pain and had prior surgery also want a note he had to stop the Celebrex due to cardiac med--Plavix and has rheumatoid arthritis and pain in joints--- referring to pain management  Prem Thrasher male 63 y.o. who presents today for follow up of Depression and Anxiety.  He reports medication does help and anxiety is so much better; overall depression controlled and recent life stresses with health and now the pandemic.  Does not want to change Rx; it does help. Onset of symptoms was approximately several years ago.  He denies current suicidal and homicidal ideation. Risk factors are lifestyle of multiple roles, chronic illness and chronic pain.  Previous treatment includes current Rx.  He complains of the following medication side effects: none.  The patient declines to go to counseling..  I do have him on folic acid and B12 and want follow-up labs  Also due for his PSA screening will update thyroid labs  Lab Results   Component Value Date    CHOL 156 03/08/2020    CHLPL 181 12/04/2019    TRIG 72 03/08/2020    HDL 38 (L) 03/08/2020     (H) 03/08/2020       The following portions of the patient's history were reviewed and updated as appropriate: allergies, current medications, past family history, past medical history, past social history, past surgical history and problem list.    Review of Systems   Constitutional: Positive for fatigue. Negative for activity change, appetite change and  unexpected weight change.   HENT: Negative for nosebleeds and trouble swallowing.    Eyes: Negative for pain and visual disturbance.   Respiratory: Negative for chest tightness, shortness of breath and wheezing.    Cardiovascular: Negative for chest pain and palpitations.   Gastrointestinal: Negative for abdominal pain and blood in stool.   Endocrine: Negative.    Genitourinary: Negative for difficulty urinating and hematuria.   Musculoskeletal: Positive for arthralgias, back pain and myalgias. Negative for joint swelling.   Skin: Negative for color change and rash.   Allergic/Immunologic: Negative.    Neurological: Positive for weakness. Negative for syncope and speech difficulty.   Hematological: Negative for adenopathy.   Psychiatric/Behavioral: Positive for sleep disturbance. Negative for agitation and confusion. The patient is nervous/anxious and is hyperactive.    All other systems reviewed and are negative.      Objective   Physical Exam   Constitutional: He is oriented to person, place, and time. He appears well-developed and well-nourished. No distress.   HENT:   Head: Normocephalic and atraumatic.   Eyes: Pupils are equal, round, and reactive to light. Conjunctivae and EOM are normal. Right eye exhibits no discharge. Left eye exhibits no discharge. No scleral icterus.   Neck: Normal range of motion. Neck supple. No tracheal deviation present. No thyromegaly present.   Cardiovascular: Normal rate, regular rhythm, normal heart sounds, intact distal pulses and normal pulses. Exam reveals no gallop.   No murmur heard.  Pulmonary/Chest: Effort normal and breath sounds normal. No respiratory distress. He has no wheezes. He has no rales.   Musculoskeletal: Normal range of motion.   Neurological: He is alert and oriented to person, place, and time. He exhibits normal muscle tone. Coordination normal.   Skin: Skin is warm. No rash noted. No erythema. No pallor.   Psychiatric: He has a normal mood and affect. His  behavior is normal. Judgment and thought content normal.   Nursing note and vitals reviewed.      Assessment/Plan   Prem was seen today for hypertension.    Diagnoses and all orders for this visit:    Essential hypertension  -     Comprehensive Metabolic Panel  -     CBC & Differential  -     T4, Free  -     T3, Free  -     TSH  -     Vitamin B12  -     Folate  -     Vitamin D 25 Hydroxy  -     PSA Screen  -     Hemoglobin A1c    IFG (impaired fasting glucose)  -     Comprehensive Metabolic Panel  -     CBC & Differential  -     T4, Free  -     T3, Free  -     TSH  -     Vitamin B12  -     Folate  -     Vitamin D 25 Hydroxy  -     PSA Screen  -     Hemoglobin A1c    Mixed hyperlipidemia  -     Comprehensive Metabolic Panel  -     CBC & Differential  -     T4, Free  -     T3, Free  -     TSH  -     Vitamin B12  -     Folate  -     Vitamin D 25 Hydroxy  -     PSA Screen  -     Hemoglobin A1c    Other emphysema (CMS/HCC)  -     Comprehensive Metabolic Panel  -     CBC & Differential  -     T4, Free  -     T3, Free  -     TSH  -     Vitamin B12  -     Folate  -     Vitamin D 25 Hydroxy  -     PSA Screen  -     Hemoglobin A1c    Coronary artery disease involving native coronary artery of native heart with angina pectoris (CMS/[facility])  -     Comprehensive Metabolic Panel  -     CBC & Differential  -     T4, Free  -     T3, Free  -     TSH  -     Vitamin B12  -     Folate  -     Vitamin D 25 Hydroxy  -     PSA Screen  -     Hemoglobin A1c    Recurrent major depressive disorder, in full remission (CMS/[facility])  -     Comprehensive Metabolic Panel  -     CBC & Differential  -     T4, Free  -     T3, Free  -     TSH  -     Vitamin B12  -     Folate  -     Vitamin D 25 Hydroxy  -     PSA Screen  -     Hemoglobin A1c    Generalized anxiety disorder  -     Comprehensive Metabolic Panel  -     CBC & Differential  -     T4, Free  -     T3, Free  -     TSH  -     Vitamin B12  -     Folate  -     Vitamin D 25 Hydroxy  -     PSA Screen  -      Hemoglobin A1c    Rheumatoid arthritis involving multiple sites with positive rheumatoid factor (CMS/[facility])  -     Ambulatory Referral to Pain Management  -     Comprehensive Metabolic Panel  -     CBC & Differential  -     T4, Free  -     T3, Free  -     TSH  -     Vitamin B12  -     Folate  -     Vitamin D 25 Hydroxy  -     PSA Screen  -     Hemoglobin A1c    Low folic acid  -     Comprehensive Metabolic Panel  -     CBC & Differential  -     T4, Free  -     T3, Free  -     TSH  -     Vitamin B12  -     Folate  -     Vitamin D 25 Hydroxy  -     PSA Screen  -     Hemoglobin A1c    Low serum vitamin B12  -     Comprehensive Metabolic Panel  -     CBC & Differential  -     T4, Free  -     T3, Free  -     TSH  -     Vitamin B12  -     Folate  -     Vitamin D 25 Hydroxy  -     PSA Screen  -     Hemoglobin A1c    Lumbar radiculopathy  -     Ambulatory Referral to Pain Management  -     Comprehensive Metabolic Panel  -     CBC & Differential  -     T4, Free  -     T3, Free  -     TSH  -     Vitamin B12  -     Folate  -     Vitamin D 25 Hydroxy  -     PSA Screen  -     Hemoglobin A1c    Tobacco abuse  -     Comprehensive Metabolic Panel  -     CBC & Differential  -     T4, Free  -     T3, Free  -     TSH  -     Vitamin B12  -     Folate  -     Vitamin D 25 Hydroxy  -     PSA Screen  -     Hemoglobin A1c    Screening PSA (prostate specific antigen)  -     Comprehensive Metabolic Panel  -     CBC & Differential  -     T4, Free  -     T3, Free  -     TSH  -     Vitamin B12  -     Folate  -     Vitamin D 25 Hydroxy  -     PSA Screen  -     Hemoglobin A1c    Vitamin D deficiency  -     Comprehensive Metabolic Panel  -     CBC & Differential  -     T4, Free  -     T3, Free  -     TSH  -     Vitamin B12  -     Folate  -     Vitamin D 25 Hydroxy  -     PSA Screen  -     Hemoglobin A1c    Other orders  -     folic acid (FOLVITE) 1 MG tablet; Take 1 tablet by mouth Daily.  -     irbesartan (AVAPRO) 300 MG tablet; Take 1 tablet by  mouth Daily. for blood pressure;      Plan, Prem Thrasher, was seen today.  he was seen for HTN and continue medication, Imparied fasting glucose and plan follow up labs, diet, and exercise, Hyperlipidemia and will continue current medication, CAD and is under care of their Cardiologist for medical management and Vitamin D deficiency and will update labs .  Stop smoking  Cont Lexapro --helping anxiety and depression  F/u RA--Dr Lopez  Refer Pain management  Use COPD inhaler  Follow-up labs for folic acid and B12 taking daily orally

## 2020-06-05 LAB
25(OH)D3+25(OH)D2 SERPL-MCNC: 51.6 NG/ML (ref 30–100)
ALBUMIN SERPL-MCNC: 4.2 G/DL (ref 3.8–4.8)
ALBUMIN/GLOB SERPL: 1.4 {RATIO} (ref 1.2–2.2)
ALP SERPL-CCNC: 59 IU/L (ref 39–117)
ALT SERPL-CCNC: 19 IU/L (ref 0–44)
AST SERPL-CCNC: 22 IU/L (ref 0–40)
BASOPHILS # BLD AUTO: 0.1 X10E3/UL (ref 0–0.2)
BASOPHILS NFR BLD AUTO: 2 %
BILIRUB SERPL-MCNC: 0.5 MG/DL (ref 0–1.2)
BUN SERPL-MCNC: 15 MG/DL (ref 8–27)
BUN/CREAT SERPL: 13 (ref 10–24)
CALCIUM SERPL-MCNC: 9.6 MG/DL (ref 8.6–10.2)
CHLORIDE SERPL-SCNC: 99 MMOL/L (ref 96–106)
CO2 SERPL-SCNC: 21 MMOL/L (ref 20–29)
CREAT SERPL-MCNC: 1.15 MG/DL (ref 0.76–1.27)
EOSINOPHIL # BLD AUTO: 0.2 X10E3/UL (ref 0–0.4)
EOSINOPHIL NFR BLD AUTO: 2 %
ERYTHROCYTE [DISTWIDTH] IN BLOOD BY AUTOMATED COUNT: 12.9 % (ref 11.6–15.4)
FOLATE SERPL-MCNC: >20 NG/ML
GLOBULIN SER CALC-MCNC: 2.9 G/DL (ref 1.5–4.5)
GLUCOSE SERPL-MCNC: 99 MG/DL (ref 65–99)
HBA1C MFR BLD: 5.8 % (ref 4.8–5.6)
HCT VFR BLD AUTO: 41.1 % (ref 37.5–51)
HGB BLD-MCNC: 13.5 G/DL (ref 13–17.7)
IMM GRANULOCYTES # BLD AUTO: 0 X10E3/UL (ref 0–0.1)
IMM GRANULOCYTES NFR BLD AUTO: 0 %
LYMPHOCYTES # BLD AUTO: 1.9 X10E3/UL (ref 0.7–3.1)
LYMPHOCYTES NFR BLD AUTO: 25 %
MCH RBC QN AUTO: 29.4 PG (ref 26.6–33)
MCHC RBC AUTO-ENTMCNC: 32.8 G/DL (ref 31.5–35.7)
MCV RBC AUTO: 90 FL (ref 79–97)
MONOCYTES # BLD AUTO: 0.8 X10E3/UL (ref 0.1–0.9)
MONOCYTES NFR BLD AUTO: 11 %
NEUTROPHILS # BLD AUTO: 4.6 X10E3/UL (ref 1.4–7)
NEUTROPHILS NFR BLD AUTO: 60 %
PLATELET # BLD AUTO: 305 X10E3/UL (ref 150–450)
POTASSIUM SERPL-SCNC: 4.9 MMOL/L (ref 3.5–5.2)
PROT SERPL-MCNC: 7.1 G/DL (ref 6–8.5)
PSA SERPL-MCNC: 2 NG/ML (ref 0–4)
RBC # BLD AUTO: 4.59 X10E6/UL (ref 4.14–5.8)
SODIUM SERPL-SCNC: 134 MMOL/L (ref 134–144)
T3FREE SERPL-MCNC: 2.6 PG/ML (ref 2–4.4)
T4 FREE SERPL-MCNC: 0.95 NG/DL (ref 0.82–1.77)
TSH SERPL DL<=0.005 MIU/L-ACNC: 1 UIU/ML (ref 0.45–4.5)
VIT B12 SERPL-MCNC: 1237 PG/ML (ref 232–1245)
WBC # BLD AUTO: 7.6 X10E3/UL (ref 3.4–10.8)

## 2020-07-14 RX ORDER — CARVEDILOL 12.5 MG/1
TABLET ORAL
Qty: 180 TABLET | Refills: 1 | Status: SHIPPED | OUTPATIENT
Start: 2020-07-14 | End: 2020-11-25

## 2020-07-23 ENCOUNTER — OFFICE VISIT (OUTPATIENT)
Dept: CARDIOLOGY | Facility: CLINIC | Age: 65
End: 2020-07-23

## 2020-07-23 VITALS
HEART RATE: 84 BPM | HEIGHT: 71 IN | SYSTOLIC BLOOD PRESSURE: 124 MMHG | DIASTOLIC BLOOD PRESSURE: 76 MMHG | BODY MASS INDEX: 29.12 KG/M2 | WEIGHT: 208 LBS

## 2020-07-23 DIAGNOSIS — I10 ESSENTIAL HYPERTENSION: ICD-10-CM

## 2020-07-23 DIAGNOSIS — E78.2 MIXED HYPERLIPIDEMIA: ICD-10-CM

## 2020-07-23 DIAGNOSIS — Z72.0 TOBACCO ABUSE: ICD-10-CM

## 2020-07-23 DIAGNOSIS — I25.10 CORONARY ARTERY DISEASE INVOLVING NATIVE CORONARY ARTERY OF NATIVE HEART WITHOUT ANGINA PECTORIS: Primary | ICD-10-CM

## 2020-07-23 PROCEDURE — 99214 OFFICE O/P EST MOD 30 MIN: CPT | Performed by: INTERNAL MEDICINE

## 2020-07-23 PROCEDURE — 93000 ELECTROCARDIOGRAM COMPLETE: CPT | Performed by: INTERNAL MEDICINE

## 2020-07-23 NOTE — PROGRESS NOTES
Date of Office Visit: 2020    Patient Name: Prem Thrasher  : 1955    Encounter Provider: Dioni Salazar MD  Referring Provider: No ref. provider found  Place of Service: [facility] CARDIOLOGY  Patient Care Team:  Montse Cain PA-C as PCP - General (Family Medicine)  Maria Antonia Lopez MD as Consulting Physician (Rheumatology)  Sawyer Rick MD as Surgeon (Neurosurgery)  Pradip Mcmillan MD as Consulting Physician (Pulmonary Disease)  Jacky Perez MD as Surgeon (Orthopedic Surgery)  Charli Sen MD as Consulting Physician (Infectious Diseases)  Tray Moody MD as Consulting Physician (Urology)  Suman Richards DPM as Consulting Physician (Podiatry)  Porsha Arcos MD as Consulting Physician (General Surgery)  Vinay Smith MD as Surgeon (Orthopedic Surgery)  Eitan Cuevas MD as Consulting Physician (Plastic Surgery)  Dioni Salazar MD as Consulting Physician (Cardiology)      Chief Complaint   Patient presents with   • Coronary Artery Disease     History of Present Illness    Patient is a 65-year-old white male with a history of coronary artery disease, COPD, tobacco abuse who returns to the office today for follow-up.  In 2020 the patient underwent intervention complex left anterior descending stenosis.  A stent was placed to the midportion of the vessel and a second stent placed to the distal portion.  The artery has diffuse coronary artery disease.  In April of this year he presented back with substernal chest discomfort.  His Cardiolite study did not show any ischemia.  He has been placed on isosorbide and has remained angina free.  Unfortunately the patient still continues to smoke on a regular basis despite having underlying COPD and now coronary artery disease.    Past Medical History:   Diagnosis Date   • Achilles tendon pain     LEFT   • Anxiety disorder    • CAD (coronary artery  disease)    • COPD (chronic obstructive pulmonary disease) (CMS/HCC)    • CTS (carpal tunnel syndrome)    • Diverticulitis    • Diverticulitis of colon    • Diverticulosis    • Encounter for eye exam 10/2014    normal   • Exertional chest pain    • Fissure, anal    • History of bone density study 03/2014    Dr. Maria Antonia Lopez; normal   • History of chest x-ray 02/15/2011    also 3-6-15; normal chest   • History of colon polyps    • History of nuclear stress test 03/09/2015    cardiolite; Dr. Greenberg; negative   • History of pulmonary function tests 03/2015    COPD   • Hyperlipidemia    • Hypertension    • IFG (impaired fasting glucose)    • Low back pain    • Lumbosacral disc disease    • Nerve damage     KAYLYNN ELBOWS   • Osteoarthritis, multiple sites    • Postoperative nausea and vomiting 8/1/2017   • Postoperative wound infection    • Rectal bleeding    • Rheumatoid arthritis (CMS/HCC)    • Sciatica    • Staph infection     WITH BACK SURGERY 2017  ON LONG TERM ANTIBIOTICS   • Unstable angina (CMS/HCC)          Past Surgical History:   Procedure Laterality Date   • ACHILLES TENDON SURGERY Left 7/3/2018    Procedure: Left Achilles debridement and secondary repair with partial excision of calcaneus. Possible left Achilles lengthening at the calf;  Surgeon: Vinay Smith MD;  Location[place] [facility] [place] [facility] [place];  Service: Orthopedics   • BACK SURGERY     • CARDIAC CATHETERIZATION N/A 3/7/2020    Procedure: Left Heart Cath;  Surgeon: Dioni Salazar MD;  Location: [facility] INVASIVE LOCATION;  Service: Cardiology;  Laterality: N/A;   • CARDIAC CATHETERIZATION N/A 3/7/2020    Procedure: Coronary angiography;  Surgeon: Dioni Salazar MD;  Location: [facility] CATH INVASIVE LOCATION;  Service: Cardiology;  Laterality: N/A;   • CARDIAC CATHETERIZATION N/A 3/7/2020    Procedure: Left ventriculography;  Surgeon: Dioni Salazar MD;  Location: [facility] CATH INVASIVE LOCATION;  Service: Cardiology;  Laterality: N/A;   •  CARDIAC CATHETERIZATION N/A 3/7/2020    Procedure: Stent RAZA coronary;  Surgeon: Dioni Salazar MD;  Location: [facility] CATH INVASIVE LOCATION;  Service: Cardiology;  Laterality: N/A;   • COLONOSCOPY N/A 02/02/2011    Normal colon except scattered diverticulosis-Dr. Guero Blunt   • CYST REMOVAL  03/2016    x2  FROM FACE AND EAR   • DENTAL PROCEDURE  2008    teeth removed; dental implants   • EPIDURAL BLOCK  1995    L/S spine   • FINGER DEBRIDEMENT Right 07/12/2003    Debridement and full thickness skin grafting of the ring and small fingers of the right hand-Dr. Rayray Long   • INCISION AND DRAINAGE PERIRECTAL ABSCESS N/A 7/10/2018    Procedure: INCISION AND DRAINAGE OF PERIRECTAL ABSCESS;  Surgeon: Porsha Arcos MD;  Location: [facility] OR;  Service: General   • LUMBAR DISCECTOMY FUSION INSTRUMENTATION Left 10/10/2016    Procedure: LEFT L2-L3, L3-L4 LUMBAR LAMINECTOMY/ DISCECTOMY WITH METRIX ;  Surgeon: Sawyer Rick MD;  Location: [facility] OR;  Service:    • LUMBAR DISCECTOMY FUSION INSTRUMENTATION N/A 7/31/2017    Procedure: LUMBAR FUSION DECOMPRESSON WITH PEDICLE SCREWS with LAURA L2-3,L3-4;  Surgeon: Jacky Perez MD;  Location: [facility] OR;  Service:    • LUMBAR LAMINECTOMY DISCECTOMY DECOMPRESSION N/A 7/31/2017    Procedure: L2 to L4 laminectomy with neural lysis and a fusion by orthopedics;  Surgeon: Sawyer Rick MD;  Location: [facility] OR;  Service:    • LUMBAR LAMINECTOMY DISCECTOMY DECOMPRESSION N/A 9/5/2017    Procedure: I&D LUMBAR SPINE ;  Surgeon: Jacky Perez MD;  Location: [facility] OR;  Service:    • SHOULDER SURGERY Right 02/23/2011    Dr. Navaror   • SINUS SURGERY  2003    Dr. Barrera           Current Outpatient Medications:   •  amLODIPine (NORVASC) 10 MG tablet, Take 1 tablet by mouth Daily. for blood pressure, Disp: 90 tablet, Rfl: 3  •  aspirin  MG tablet, Take 1 tablet by mouth Daily., Disp: 30 tablet, Rfl: 0  •  carvedilol (COREG) 12.5 MG tablet,  TAKE 1 TABLET BY MOUTH 2  TIMES A DAY WITH MEALS FOR BLOOD PRESSURE , Disp: 180 tablet, Rfl: 1  •  Cholecalciferol (VITAMIN D) 2000 UNITS tablet, Take 2,000 Units by mouth Every Evening., Disp: , Rfl:   •  clopidogrel (PLAVIX) 75 MG tablet, Take 1 tablet by mouth Daily., Disp: 90 tablet, Rfl: 2  •  Cyanocobalamin (VITAMIN B-12) 1000 MCG sublingual tablet, One SL daily, Disp: 90 each, Rfl: 3  •  cyclobenzaprine (FLEXERIL) 10 MG tablet, TAKE 1 TABLET THREE TIMES DAILY AS NEEDED  FOR  MUSCLE  SPASMS, Disp: 270 tablet, Rfl: 3  •  docusate sodium (COLACE) 100 MG capsule, Take 100 mg by mouth Daily., Disp: , Rfl:   •  escitalopram (LEXAPRO) 20 MG tablet, Take 1 tablet by mouth Every Night. For anxiety, Disp: 90 tablet, Rfl: 3  •  ezetimibe (ZETIA) 10 MG tablet, Take 1 tablet by mouth Every Evening. For cholesterol, Disp: 90 tablet, Rfl: 3  •  folic acid (FOLVITE) 1 MG tablet, Take 1 tablet by mouth Daily., Disp: 90 tablet, Rfl: 1  •  irbesartan (AVAPRO) 300 MG tablet, Take 1 tablet by mouth Daily. for blood pressure;, Disp: 90 tablet, Rfl: 1  •  leflunomide (ARAVA) 20 MG tablet, Take 20 mg by mouth Every Morning., Disp: , Rfl:   •  nitroglycerin (NITROSTAT) 0.4 MG SL tablet, Place 1 tablet under the tongue Every 5 (Five) Minutes As Needed for Chest Pain. Take no more than 3 doses in 15 minutes., Disp: 25 tablet, Rfl: 1  •  tiotropium (SPIRIVA) 18 MCG per inhalation capsule, Place 1 capsule into inhaler and inhale Daily. For COPD, Disp: 90 capsule, Rfl: 3      Social History     Socioeconomic History   • Marital status:      Spouse name: Not on file   • Number of children: Not on file   • Years of education: Not on file   • Highest education level: Not on file   Tobacco Use   • Smoking status: Current Every Day Smoker     Packs/day: 1.00     Years: 45.00     Pack years: 45.00     Types: Cigarettes   • Smokeless tobacco: Never Used   • Tobacco comment: no caffeine    Substance and Sexual Activity   • Alcohol use: No   "  • Drug use: No   • Sexual activity: Not Currently     Partners: Female     Birth control/protection: None         Review of Systems   Constitution: Negative.   HENT: Negative.    Eyes: Negative.    Cardiovascular: Negative.    Respiratory: Negative.    Endocrine: Negative.    Skin: Negative.    Musculoskeletal: Negative.    Gastrointestinal: Negative.    Neurological: Negative.    Psychiatric/Behavioral: Negative.        Procedures      ECG 12 Lead  Date/Time: 7/23/2020 11:45 AM  Performed by: Dioni Salazar MD  Authorized by: Dioni Salazar MD   Comparison: compared with previous ECG from 7/23/2020  Similar to previous ECG  Rhythm: sinus rhythm  Rate: normal  Conduction: conduction normal  QRS axis: normal  Other findings: low voltage                Objective:    /76   Pulse 84   Ht 180.3 cm (71\")   Wt 94.3 kg (208 lb)   BMI 29.01 kg/m²         Physical Exam   Constitutional: He is oriented to person, place, and time. He appears well-developed and well-nourished.   HENT:   Head: Normocephalic.   Eyes: Pupils are equal, round, and reactive to light.   Neck: Normal range of motion. No JVD present. Carotid bruit is not present. No thyromegaly present.   Cardiovascular: Normal rate, regular rhythm, S1 normal, S2 normal, normal heart sounds and intact distal pulses. Exam reveals no gallop and no friction rub.   No murmur heard.  Pulmonary/Chest: Effort normal and breath sounds normal.   Abdominal: Soft. Bowel sounds are normal.   Musculoskeletal: He exhibits no edema.   Neurological: He is alert and oriented to person, place, and time.   Skin: Skin is warm, dry and intact. No erythema.   Psychiatric: He has a normal mood and affect.   Vitals reviewed.          Assessment:       Diagnosis Plan   1. Coronary artery disease involving native coronary artery of native heart without angina pectoris     2. Tobacco abuse     3. Essential hypertension     4. Mixed hyperlipidemia       1. Coronary Artery " Disease  Assessment  • The patient has no angina    Plan  • Lifestyle modifications discussed include adhering to a heart healthy diet, avoidance of tobacco products, regular exercise and regular monitoring of cholesterol and blood pressure    Subjective - Objective  • There has been a previous stent procedure using RAZA Mid and distal LAD, March 2020  • Current antiplatelet therapy includes aspirin 81 mg and clopidogrel 75 mg    2.  Tobacco abuse: Patient again advised to discontinue all tobacco products.  3.  Dyslipidemia: Last , HDL 38.  He remains on Zetia.  4.  Hypertension  5.  COPD       Plan:

## 2020-08-28 RX ORDER — FOLIC ACID 1 MG/1
TABLET ORAL
Qty: 90 TABLET | Refills: 1 | Status: SHIPPED | OUTPATIENT
Start: 2020-08-28 | End: 2020-12-03

## 2020-08-28 RX ORDER — IRBESARTAN 300 MG/1
TABLET ORAL
Qty: 90 TABLET | Refills: 1 | Status: SHIPPED | OUTPATIENT
Start: 2020-08-28 | End: 2021-02-01 | Stop reason: SDUPTHER

## 2020-09-28 RX ORDER — EZETIMIBE 10 MG/1
10 TABLET ORAL EVERY EVENING
Qty: 90 TABLET | Refills: 3 | Status: SHIPPED | OUTPATIENT
Start: 2020-09-28 | End: 2021-02-01 | Stop reason: SDUPTHER

## 2020-09-28 RX ORDER — MAGNESIUM 200 MG
TABLET ORAL
Qty: 90 EACH | Refills: 1 | Status: SHIPPED | OUTPATIENT
Start: 2020-09-28 | End: 2020-12-15

## 2020-11-25 RX ORDER — ESCITALOPRAM OXALATE 20 MG/1
20 TABLET ORAL NIGHTLY
Qty: 90 TABLET | Refills: 3 | Status: SHIPPED | OUTPATIENT
Start: 2020-11-25 | End: 2021-10-27 | Stop reason: SDUPTHER

## 2020-11-25 RX ORDER — AMLODIPINE BESYLATE 10 MG/1
10 TABLET ORAL DAILY
Qty: 90 TABLET | Refills: 0 | Status: SHIPPED | OUTPATIENT
Start: 2020-11-25 | End: 2020-12-10

## 2020-11-25 RX ORDER — CARVEDILOL 12.5 MG/1
TABLET ORAL
Qty: 180 TABLET | Refills: 0 | Status: SHIPPED | OUTPATIENT
Start: 2020-11-25 | End: 2020-12-10

## 2020-11-30 RX ORDER — ISOSORBIDE MONONITRATE 30 MG/1
TABLET, EXTENDED RELEASE ORAL
Qty: 90 TABLET | Refills: 3 | Status: SHIPPED | OUTPATIENT
Start: 2020-11-30 | End: 2021-10-18

## 2020-12-03 RX ORDER — FOLIC ACID 1 MG/1
TABLET ORAL
Qty: 90 TABLET | Refills: 0 | Status: SHIPPED | OUTPATIENT
Start: 2020-12-03 | End: 2020-12-10

## 2020-12-10 RX ORDER — FOLIC ACID 1 MG/1
TABLET ORAL
Qty: 90 TABLET | Refills: 0 | Status: SHIPPED | OUTPATIENT
Start: 2020-12-10 | End: 2020-12-15

## 2020-12-10 RX ORDER — CARVEDILOL 12.5 MG/1
TABLET ORAL
Qty: 180 TABLET | Refills: 0 | Status: SHIPPED | OUTPATIENT
Start: 2020-12-10 | End: 2021-04-27

## 2020-12-10 RX ORDER — AMLODIPINE BESYLATE 10 MG/1
10 TABLET ORAL DAILY
Qty: 90 TABLET | Refills: 0 | Status: SHIPPED | OUTPATIENT
Start: 2020-12-10 | End: 2021-02-01 | Stop reason: SDUPTHER

## 2020-12-15 RX ORDER — MAGNESIUM 200 MG
TABLET ORAL
Qty: 90 EACH | Refills: 0 | Status: SHIPPED | OUTPATIENT
Start: 2020-12-15 | End: 2021-02-01 | Stop reason: SDUPTHER

## 2020-12-15 RX ORDER — FOLIC ACID 1 MG/1
TABLET ORAL
Qty: 90 TABLET | Refills: 0 | Status: SHIPPED | OUTPATIENT
Start: 2020-12-15 | End: 2021-02-01 | Stop reason: SDUPTHER

## 2021-01-21 ENCOUNTER — HOSPITAL ENCOUNTER (INPATIENT)
Facility: HOSPITAL | Age: 66
LOS: 2 days | Discharge: HOME | End: 2021-01-23
Attending: EMERGENCY MEDICINE | Admitting: INTERNAL MEDICINE

## 2021-01-21 ENCOUNTER — APPOINTMENT (OUTPATIENT)
Dept: GENERAL RADIOLOGY | Facility: HOSPITAL | Age: 66
End: 2021-01-21

## 2021-01-21 DIAGNOSIS — I21.4 NON-STEMI (NON-ST ELEVATED MYOCARDIAL INFARCTION) (HCC): Primary | ICD-10-CM

## 2021-01-21 LAB
ALBUMIN SERPL-MCNC: 4.4 G/DL (ref 3.5–5.2)
ALBUMIN/GLOB SERPL: 1.8 G/DL
ALP SERPL-CCNC: 61 U/L (ref 39–117)
ALT SERPL W P-5'-P-CCNC: 18 U/L (ref 1–41)
ANION GAP SERPL CALCULATED.3IONS-SCNC: 11.6 MMOL/L (ref 5–15)
AST SERPL-CCNC: 19 U/L (ref 1–40)
BASOPHILS # BLD AUTO: 0.13 10*3/MM3 (ref 0–0.2)
BASOPHILS NFR BLD AUTO: 1.3 % (ref 0–1.5)
BILIRUB SERPL-MCNC: 0.4 MG/DL (ref 0–1.2)
BUN SERPL-MCNC: 17 MG/DL (ref 8–23)
BUN/CREAT SERPL: 14.3 (ref 7–25)
CALCIUM SPEC-SCNC: 9.4 MG/DL (ref 8.6–10.5)
CHLORIDE SERPL-SCNC: 100 MMOL/L (ref 98–107)
CO2 SERPL-SCNC: 26.4 MMOL/L (ref 22–29)
CREAT SERPL-MCNC: 1.19 MG/DL (ref 0.76–1.27)
DEPRECATED RDW RBC AUTO: 41.9 FL (ref 37–54)
EOSINOPHIL # BLD AUTO: 0.27 10*3/MM3 (ref 0–0.4)
EOSINOPHIL NFR BLD AUTO: 2.7 % (ref 0.3–6.2)
ERYTHROCYTE [DISTWIDTH] IN BLOOD BY AUTOMATED COUNT: 12.9 % (ref 12.3–15.4)
GFR SERPL CREATININE-BSD FRML MDRD: 61 ML/MIN/1.73
GLOBULIN UR ELPH-MCNC: 2.5 GM/DL
GLUCOSE SERPL-MCNC: 108 MG/DL (ref 65–99)
HCT VFR BLD AUTO: 42.5 % (ref 37.5–51)
HGB BLD-MCNC: 13.9 G/DL (ref 13–17.7)
HOLD SPECIMEN: NORMAL
HOLD SPECIMEN: NORMAL
IMM GRANULOCYTES # BLD AUTO: 0.05 10*3/MM3 (ref 0–0.05)
IMM GRANULOCYTES NFR BLD AUTO: 0.5 % (ref 0–0.5)
LYMPHOCYTES # BLD AUTO: 1.65 10*3/MM3 (ref 0.7–3.1)
LYMPHOCYTES NFR BLD AUTO: 16.8 % (ref 19.6–45.3)
MCH RBC QN AUTO: 29.6 PG (ref 26.6–33)
MCHC RBC AUTO-ENTMCNC: 32.7 G/DL (ref 31.5–35.7)
MCV RBC AUTO: 90.6 FL (ref 79–97)
MONOCYTES # BLD AUTO: 0.91 10*3/MM3 (ref 0.1–0.9)
MONOCYTES NFR BLD AUTO: 9.2 % (ref 5–12)
NEUTROPHILS NFR BLD AUTO: 6.83 10*3/MM3 (ref 1.7–7)
NEUTROPHILS NFR BLD AUTO: 69.5 % (ref 42.7–76)
NRBC BLD AUTO-RTO: 0 /100 WBC (ref 0–0.2)
PLATELET # BLD AUTO: 278 10*3/MM3 (ref 140–450)
PMV BLD AUTO: 9.9 FL (ref 6–12)
POTASSIUM SERPL-SCNC: 4 MMOL/L (ref 3.5–5.2)
PROT SERPL-MCNC: 6.9 G/DL (ref 6–8.5)
QT INTERVAL: 374 MS
RBC # BLD AUTO: 4.69 10*6/MM3 (ref 4.14–5.8)
SARS-COV-2 ORF1AB RESP QL NAA+PROBE: NOT DETECTED
SODIUM SERPL-SCNC: 138 MMOL/L (ref 136–145)
TROPONIN T SERPL-MCNC: 0.03 NG/ML (ref 0–0.03)
TROPONIN T SERPL-MCNC: 0.03 NG/ML (ref 0–0.03)
WBC # BLD AUTO: 9.84 10*3/MM3 (ref 3.4–10.8)
WHOLE BLOOD HOLD SPECIMEN: NORMAL
WHOLE BLOOD HOLD SPECIMEN: NORMAL

## 2021-01-21 PROCEDURE — 71045 X-RAY EXAM CHEST 1 VIEW: CPT

## 2021-01-21 PROCEDURE — 99223 1ST HOSP IP/OBS HIGH 75: CPT | Performed by: NURSE PRACTITIONER

## 2021-01-21 PROCEDURE — 84484 ASSAY OF TROPONIN QUANT: CPT

## 2021-01-21 PROCEDURE — 84484 ASSAY OF TROPONIN QUANT: CPT | Performed by: EMERGENCY MEDICINE

## 2021-01-21 PROCEDURE — 80053 COMPREHEN METABOLIC PANEL: CPT

## 2021-01-21 PROCEDURE — 93005 ELECTROCARDIOGRAM TRACING: CPT

## 2021-01-21 PROCEDURE — 99284 EMERGENCY DEPT VISIT MOD MDM: CPT

## 2021-01-21 PROCEDURE — 85025 COMPLETE CBC W/AUTO DIFF WBC: CPT

## 2021-01-21 PROCEDURE — U0004 COV-19 TEST NON-CDC HGH THRU: HCPCS | Performed by: INTERNAL MEDICINE

## 2021-01-21 PROCEDURE — 25010000002 ENOXAPARIN PER 10 MG: Performed by: EMERGENCY MEDICINE

## 2021-01-21 PROCEDURE — 93010 ELECTROCARDIOGRAM REPORT: CPT | Performed by: INTERNAL MEDICINE

## 2021-01-21 RX ORDER — SODIUM CHLORIDE 0.9 % (FLUSH) 0.9 %
10 SYRINGE (ML) INJECTION AS NEEDED
Status: DISCONTINUED | OUTPATIENT
Start: 2021-01-21 | End: 2021-01-23 | Stop reason: HOSPADM

## 2021-01-21 RX ORDER — ASPIRIN 325 MG
325 TABLET, DELAYED RELEASE (ENTERIC COATED) ORAL DAILY
Status: DISCONTINUED | OUTPATIENT
Start: 2021-01-22 | End: 2021-01-23 | Stop reason: HOSPADM

## 2021-01-21 RX ORDER — NITROGLYCERIN 0.4 MG/1
0.4 TABLET SUBLINGUAL
Status: DISCONTINUED | OUTPATIENT
Start: 2021-01-21 | End: 2021-01-23 | Stop reason: HOSPADM

## 2021-01-21 RX ORDER — CLOPIDOGREL BISULFATE 75 MG/1
75 TABLET ORAL DAILY
Status: DISCONTINUED | OUTPATIENT
Start: 2021-01-21 | End: 2021-01-23 | Stop reason: HOSPADM

## 2021-01-21 RX ORDER — ESCITALOPRAM OXALATE 20 MG/1
20 TABLET ORAL NIGHTLY
Status: DISCONTINUED | OUTPATIENT
Start: 2021-01-21 | End: 2021-01-23 | Stop reason: HOSPADM

## 2021-01-21 RX ORDER — CYCLOBENZAPRINE HCL 10 MG
10 TABLET ORAL 3 TIMES DAILY PRN
Status: DISCONTINUED | OUTPATIENT
Start: 2021-01-21 | End: 2021-01-23 | Stop reason: HOSPADM

## 2021-01-21 RX ORDER — FOLIC ACID 1 MG/1
1000 TABLET ORAL DAILY
Status: DISCONTINUED | OUTPATIENT
Start: 2021-01-21 | End: 2021-01-23 | Stop reason: HOSPADM

## 2021-01-21 RX ORDER — LEFLUNOMIDE 20 MG/1
20 TABLET ORAL EVERY MORNING
Status: DISCONTINUED | OUTPATIENT
Start: 2021-01-22 | End: 2021-01-23 | Stop reason: HOSPADM

## 2021-01-21 RX ORDER — NITROGLYCERIN 0.4 MG/1
0.4 TABLET SUBLINGUAL
Status: DISCONTINUED | OUTPATIENT
Start: 2021-01-21 | End: 2021-01-21 | Stop reason: SDUPTHER

## 2021-01-21 RX ORDER — DOCUSATE SODIUM 100 MG/1
100 CAPSULE, LIQUID FILLED ORAL DAILY
Status: DISCONTINUED | OUTPATIENT
Start: 2021-01-21 | End: 2021-01-23 | Stop reason: HOSPADM

## 2021-01-21 RX ORDER — SODIUM CHLORIDE 0.9 % (FLUSH) 0.9 %
10 SYRINGE (ML) INJECTION EVERY 12 HOURS SCHEDULED
Status: DISCONTINUED | OUTPATIENT
Start: 2021-01-21 | End: 2021-01-23 | Stop reason: HOSPADM

## 2021-01-21 RX ORDER — AMLODIPINE BESYLATE 10 MG/1
10 TABLET ORAL DAILY
Status: DISCONTINUED | OUTPATIENT
Start: 2021-01-22 | End: 2021-01-23 | Stop reason: HOSPADM

## 2021-01-21 RX ORDER — CARVEDILOL 12.5 MG/1
12.5 TABLET ORAL 2 TIMES DAILY WITH MEALS
Status: DISCONTINUED | OUTPATIENT
Start: 2021-01-21 | End: 2021-01-23 | Stop reason: HOSPADM

## 2021-01-21 RX ORDER — ASPIRIN 325 MG
325 TABLET ORAL ONCE
Status: COMPLETED | OUTPATIENT
Start: 2021-01-21 | End: 2021-01-21

## 2021-01-21 RX ORDER — LOSARTAN POTASSIUM 100 MG/1
100 TABLET ORAL
Status: DISCONTINUED | OUTPATIENT
Start: 2021-01-21 | End: 2021-01-23 | Stop reason: HOSPADM

## 2021-01-21 RX ADMIN — ASPIRIN 325 MG: 325 TABLET ORAL at 12:45

## 2021-01-21 RX ADMIN — NITROGLYCERIN 1 INCH: 20 OINTMENT TOPICAL at 18:11

## 2021-01-21 RX ADMIN — NITROGLYCERIN 0.4 MG: 0.4 TABLET SUBLINGUAL at 13:30

## 2021-01-21 RX ADMIN — LOSARTAN POTASSIUM 100 MG: 100 TABLET, FILM COATED ORAL at 22:01

## 2021-01-21 RX ADMIN — SODIUM CHLORIDE, PRESERVATIVE FREE 10 ML: 5 INJECTION INTRAVENOUS at 21:19

## 2021-01-21 RX ADMIN — NITROGLYCERIN 1 INCH: 20 OINTMENT TOPICAL at 13:30

## 2021-01-21 RX ADMIN — FOLIC ACID 1000 MCG: 1 TABLET ORAL at 22:01

## 2021-01-21 RX ADMIN — CLOPIDOGREL 75 MG: 75 TABLET, FILM COATED ORAL at 18:07

## 2021-01-21 RX ADMIN — DOCUSATE SODIUM 100 MG: 100 CAPSULE, LIQUID FILLED ORAL at 18:06

## 2021-01-21 RX ADMIN — CARVEDILOL 12.5 MG: 12.5 TABLET, FILM COATED ORAL at 22:01

## 2021-01-21 RX ADMIN — ESCITALOPRAM 20 MG: 20 TABLET, FILM COATED ORAL at 22:00

## 2021-01-21 RX ADMIN — ENOXAPARIN SODIUM 90 MG: 100 INJECTION SUBCUTANEOUS at 13:29

## 2021-01-21 NOTE — ED TRIAGE NOTES
Pt to ER via PV. Pt states left anterior cp that goes down his left arm. Started x2 days ago.     Pt took 2 nitro x30 mins ago.    Hx of cardiac stents. Martin is pt's cardiologist.     Patient in mask. This RN in appropriate PPE - including mask, goggles, and gloves during all of patient care.

## 2021-01-21 NOTE — H&P
Patient Name: Prem Thrasher  :1955  65 y.o.    Date of Admission: 2021  Date of Consultation:  21  Encounter Provider: TEN Clark  Place of Service: [facility] CARDIOLOGY  Referring Provider: Dioni Salazar MD  Patient Care Team:  Montse Cain PA-C as PCP - General (Family Medicine)  Maria Antonia Lopez MD as Consulting Physician (Rheumatology)  Sawyer Rick MD as Surgeon (Neurosurgery)  Pradip Mcmillna MD as Consulting Physician (Pulmonary Disease)  Jacky Perez MD as Surgeon (Orthopedic Surgery)  Charli Sen MD as Consulting Physician (Infectious Diseases)  Tray Moody MD as Consulting Physician (Urology)  Suman Richards DPM as Consulting Physician (Podiatry)  Porsha Arcos MD as Consulting Physician (General Surgery)  Vinay Smith MD as Surgeon (Orthopedic Surgery)  Eitan Cuevas MD as Consulting Physician (Plastic Surgery)  Dioni Salazar MD as Consulting Physician (Cardiology)      Chief complaint: chest pain    History of Present Illness: Mr Thrasher is a 65 year old male patient of Dr. Salazar's with history of hypertension, tobacco abuse and COPD. He has known coronary artery disease with underwent stenting in 2020 due to complex left anterior descending stenosis. A stent was placed to the midportion of the vessel and a second stent was placed to the distal portion. An echocardiogram performed at that time demonstrated normal LVSF with EF 59%. He later presented back in April with substernal chest discomfort and stress testing did no show any evidence of ischemia. He was placed on Imdur at that time and had remained chest pain free.     He presented to [facility] ED 21 with reports of intermittent chest pain, ongoing since Monday, that is relieved with SLNTG. He describes the pain as a substernal pressure-like sensation with radiation to arm and jaw that  occurs at rest and usually last 15 minutes in length. He has been taking several doses of SL nitro per day and decided to coime to the emergency room for evaluation.      CXR completed upon arrival to the ED was negative acutely. Troponin elevated at 0.032/0.025. All remaining labs unremarkable. He was successfully treated with SLNTG and NitroPaste was applied prior to admission. He was given a therapeutic dose of Lovenox. He is currently resting in bed and chest pain free. He is not very active. He does not really exert himself. He is limited by chronic back pain. He is retired. He continues to smoke but states he has cut back to less than 1 pack per day.     He has had no changes in his medications.     CXR 1/21/21  Two AP views of the chest demonstrate the heart to be within  normal limits in size. There is no evidence of focal infiltrate,  effusion or of congestive failure.    Previous Cardiac Testing:    Stress Test 5/1/20  · Baseline ECG of normal sinus rhythm noted. Non-specific ST-T wave changes noted. Prolonged QT interval and borderline AK interval.  · This study was initiated as a Blaise protocol and the patient developed chest pain and shortness of air at 3 minutes. There were no acute ischemic changes on EKG. Study was changed to a regadenoson protocol.  · Overall, the patient's exercise capacity was severely impaired.  · Stress EKG shows tachycardia which is difficult to discern between SVT, atrial arrhythmia or sinus rhythm given the long QT and borderline prolonged AK interval as well as low amplitude P waves. Cannot exclude atrial arrhythmia..  · Findings consistent with an indeterminate ECG stress test.  · Myocardial perfusion imaging indicates a small-sized infarct located in the distal inferior wall and apex with small amount of sangeeta-infarct ischemia which is of mild to moderate severity. Impressions are consistent with an intermediate risk study.  · Left ventricular ejection fraction is normal  (Calculated EF = 65%). Normal LV cavity size. Normal LV wall motion noted.    Cardiac Catheterization 3/7/20  Cineangiography:  1.  Left main: The left main coronary artery appeared to be normal.  2.  LAD: There is moderate calcification noted in the proximal and mid segments.  The mid segment of the artery is critically narrowed at 95%.  The remainder of the mid segment shows luminal irregularities with a diffuse 30 to 40% stenosis.  The apical portion of the LAD is subtotally occluded.  The first diagonal branch arising from the proximal segment is normal.  The second diagonal arising from the mid segment is large and normal.  3.  LCx: The left circumflex coronary artery is a large codominant system.  The proximal and mid segments were normal.  The first marginal branch arising from the mid segment is a medium caliber and normal.  The second marginal branch arising from the mid segment is now 50% proximally but otherwise normal.  The terminal marginal branch is narrowed 20% at its origin otherwise normal and large.  4.  Ramus intermedius: There is a large branching ramus system that is almost like a second LAD.  It is normal.  There is a large septal  arising from the proximal portion that is normal as well.  5.  RCA: The right coronary artery is a codominant vessel.  The proximal segment is narrowed 20%.  The mid and distal segments are tortuous but normal.  The PDA is narrowed 30 to 40% in its proximal portion.  Intervention:  1.  Mid LAD: Lesion type B2, initial MICHELLE flow 1.  Residual flow 3.  Initial stenosis 95%.  Residual narrowing 0.  2.  Apical LAD: Lesion type B2, initial MICHELLE flow 1.  Residual flow 3.  Initial stenosis 99%.  Residual narrowing 0.  Left ventriculography: Overall size of the ventricle appears to be normal.  The global contractility of the ventricle is within normal limits.  Estimated ejection fraction is 55 to 60%.  The a sending aorta appears to be at the upper limits of  normal.  Assessment:  1.  Ischemic heart disease:  Etiology: Coronary atherosclerosis  Anatomy: Critical LAD mid and apical stenoses  Physiology: Unstable angina  Recommendations: Dual antiplatelet therapy for at least 6 months, aggressive risk factor modification.    Echocardiogram 3/7/20  · Left ventricular systolic function is normal.Calculated EF = 59.0%. Normal left ventricular cavity size and wall thickness noted. All left ventricular wall segments contract normally.  · Normal right ventricular cavity size, wall thickness, systolic function  · No significant valvular stenosis or regurgitation noted.    Past Medical History:   Diagnosis Date   • Achilles tendon pain     LEFT   • Anxiety disorder    • CAD (coronary artery disease)    • COPD (chronic obstructive pulmonary disease) (CMS/HCC)    • CTS (carpal tunnel syndrome)    • Diverticulitis    • Diverticulitis of colon    • Diverticulosis    • Encounter for eye exam 10/2014    normal   • Exertional chest pain    • Fissure, anal    • History of bone density study 03/2014    Dr. Maria Antonia Lopez; normal   • History of chest x-ray 02/15/2011    also 3-6-15; normal chest   • History of colon polyps    • History of nuclear stress test 03/09/2015    cardiolite; Dr. Greenberg; negative   • History of pulmonary function tests 03/2015    COPD   • Hyperlipidemia    • Hypertension    • IFG (impaired fasting glucose)    • Low back pain    • Lumbosacral disc disease    • Nerve damage     KAYLYNN ELBOWS   • Osteoarthritis, multiple sites    • Postoperative nausea and vomiting 8/1/2017   • Postoperative wound infection    • Rectal bleeding    • Rheumatoid arthritis (CMS/HCC)    • Sciatica    • Staph infection     WITH BACK SURGERY 2017  ON LONG TERM ANTIBIOTICS   • Unstable angina (CMS/HCC)        Past Surgical History:   Procedure Laterality Date   • ACHILLES TENDON SURGERY Left 7/3/2018    Procedure: Left Achilles debridement and secondary repair with partial excision of calcaneus.  Possible left Achilles lengthening at the calf;  Surgeon: Vinay Smith MD;  Location: [facility];  Service: Orthopedics   • BACK SURGERY     • CARDIAC CATHETERIZATION N/A 3/7/2020    Procedure: Left Heart Cath;  Surgeon: Dioni Salazar MD;  Location: [facility] CATH INVASIVE LOCATION;  Service: Cardiology;  Laterality: N/A;   • CARDIAC CATHETERIZATION N/A 3/7/2020    Procedure: Coronary angiography;  Surgeon: Dioni Salazar MD;  Location: [facility] CATH INVASIVE LOCATION;  Service: Cardiology;  Laterality: N/A;   • CARDIAC CATHETERIZATION N/A 3/7/2020    Procedure: Left ventriculography;  Surgeon: Dioni Salazar MD;  Location: [facility] CATH INVASIVE LOCATION;  Service: Cardiology;  Laterality: N/A;   • CARDIAC CATHETERIZATION N/A 3/7/2020    Procedure: Stent RAZA coronary;  Surgeon: Dioni Salazar MD;  Location: [facility] CATH INVASIVE LOCATION;  Service: Cardiology;  Laterality: N/A;   • COLONOSCOPY N/A 02/02/2011    Normal colon except scattered diverticulosis-Dr. Guero Blunt   • CYST REMOVAL  03/2016    x2  FROM FACE AND EAR   • DENTAL PROCEDURE  2008    teeth removed; dental implants   • EPIDURAL BLOCK  1995    L/S spine   • FINGER DEBRIDEMENT Right 07/12/2003    Debridement and full thickness skin grafting of the ring and small fingers of the right hand-Dr. Rayray Long   • INCISION AND DRAINAGE PERIRECTAL ABSCESS N/A 7/10/2018    Procedure: INCISION AND DRAINAGE OF PERIRECTAL ABSCESS;  Surgeon: Porsha Arcos MD;  Location: [facility] MAIN OR;  Service: General   • LUMBAR DISCECTOMY FUSION INSTRUMENTATION Left 10/10/2016    Procedure: LEFT L2-L3, L3-L4 LUMBAR LAMINECTOMY/ DISCECTOMY WITH METRIX ;  Surgeon: Sawyer Rick MD;  Location: [facility] MAIN OR;  Service:    • LUMBAR DISCECTOMY FUSION INSTRUMENTATION N/A 7/31/2017    Procedure: LUMBAR FUSION DECOMPRESSON WITH PEDICLE SCREWS with LAURA L2-3,L3-4;  Surgeon: Jacky Perez MD;  Location: [facility] MAIN OR;  Service:    •  LUMBAR LAMINECTOMY DISCECTOMY DECOMPRESSION N/A 7/31/2017    Procedure: L2 to L4 laminectomy with neural lysis and a fusion by orthopedics;  Surgeon: Sawyer Rick MD;  Location: [facility];  Service:    • LUMBAR LAMINECTOMY DISCECTOMY DECOMPRESSION N/A 9/5/2017    Procedure: I&D LUMBAR SPINE ;  Surgeon: Jacky Perez MD;  Location: [facility];  Service:    • SHOULDER SURGERY Right 02/23/2011    Dr. Navarro   • SINUS SURGERY  2003    Dr. Barrera         Prior to Admission medications    Medication Sig Start Date End Date Taking? Authorizing Provider   amLODIPine (NORVASC) 10 MG tablet TAKE 1 TABLET BY MOUTH DAILY FOR BLOOD PRESSURE 12/10/20   Montse Cain PA-C   aspirin  MG tablet Take 1 tablet by mouth Daily. 7/30/18   Vinay Smith MD   carvedilol (COREG) 12.5 MG tablet TAKE 1 TABLET 2  TIMES A DAY WITH MEALS FOR BLOOD PRESSURE  12/10/20   Montse Cain PA-C   Cholecalciferol (VITAMIN D) 2000 UNITS tablet Take 2,000 Units by mouth Every Evening.    Provider, MD Angela   clopidogrel (PLAVIX) 75 MG tablet Take 1 tablet by mouth Daily. 5/29/20   Dioni Salazar MD   Cyanocobalamin (Vitamin B-12) 1000 MCG sublingual tablet DISSOLVE 1 TABLET UNDER TONGUE DAILY 12/15/20   Montse Cain PA-C   cyclobenzaprine (FLEXERIL) 10 MG tablet TAKE 1 TABLET THREE TIMES DAILY AS NEEDED  FOR  MUSCLE  SPASMS 2/29/20   Motnse Cain PA-C   docusate sodium (COLACE) 100 MG capsule Take 100 mg by mouth Daily.    Provider, MD Angela   escitalopram (LEXAPRO) 20 MG tablet TAKE 1 TABLET BY MOUTH EVERY NIGHT FOR ANXIETY 11/25/20   Montse Cain PA-C   ezetimibe (ZETIA) 10 MG tablet TAKE 1 TABLET BY MOUTH EVERY EVENING FOR CHOLESTEROL 9/28/20   Montse Cain PA-C   folic acid (FOLVITE) 1 MG tablet TAKE 1 TABLET EVERY DAY 12/15/20   Montse Cain PA-C   irbesartan (AVAPRO) 300 MG tablet TAKE 1 TABLET EVERY DAY FOR BLOOD PRESSURE  (NEED  APPOINMENT) 8/28/20   Montse Cain, YANCY   isosorbide  "mononitrate (IMDUR) 30 MG 24 hr tablet TAKE 1 TABLET EVERY DAY 11/30/20   Dioni Salazar MD   leflunomide (ARAVA) 20 MG tablet Take 20 mg by mouth Every Morning.    Provider, MD Angela   nitroglycerin (NITROSTAT) 0.4 MG SL tablet Place 1 tablet under the tongue Every 5 (Five) Minutes As Needed for Chest Pain. Take no more than 3 doses in 15 minutes. 3/8/20   Dioni Salazar MD   tiotropium (SPIRIVA) 18 MCG per inhalation capsule Place 1 capsule into inhaler and inhale Daily. For COPD 3/16/20   Montse Cain PA-C       Allergies   Allergen Reactions   • Atorvastatin Myalgia     Leg cramps   • Ceclor [Cefaclor] Rash     Patient tolerated nafcillin during 9/2017 admission and has tolerated Augmentin in past outpatient.    • Methotrexate Derivatives Rash     \"Blisters\"       Social History     Socioeconomic History   • Marital status:      Spouse name: Not on file   • Number of children: Not on file   • Years of education: Not on file   • Highest education level: Not on file   Tobacco Use   • Smoking status: Current Every Day Smoker     Packs/day: 1.00     Years: 45.00     Pack years: 45.00     Types: Cigarettes   • Smokeless tobacco: Never Used   • Tobacco comment: no caffeine    Substance and Sexual Activity   • Alcohol use: No   • Drug use: No   • Sexual activity: Not Currently     Partners: Female     Birth control/protection: None       Family History   Problem Relation Age of Onset   • Diabetes Mother    • Heart disease Mother    • Hyperlipidemia Mother    • Stroke Mother    • Heart disease Father    • Rheum arthritis Father    • Alcohol abuse Father    • Hyperlipidemia Father    • Depression Brother    • Cancer Brother         prostate   • Depression Brother    • Heart disease Brother    • Hyperlipidemia Brother    • Cancer Brother    • Thyroid disease Sister    • Malig Hyperthermia Neg Hx        REVIEW OF SYSTEMS:   All systems reviewed.  Pertinent positives identified in HPI.  All other " systems are negative.      Objective:     Vitals:    01/21/21 1450 01/21/21 1534 01/21/21 1558 01/21/21 1621   BP: 119/89 131/88  142/95   BP Location:  Left arm  Left arm   Patient Position:  Lying  Lying   Pulse: 75 70  70   Resp:  20  18   Temp:  97.8 °F (36.6 °C)  97.7 °F (36.5 °C)   TempSrc:  Oral  Oral   SpO2:    96%   Weight:   96.5 kg (212 lb 12.8 oz)    Height:         Body mass index is 29.68 kg/m².    General Appearance:    Alert, cooperative, in no acute distress   Head:    Normocephalic, without obvious abnormality, atraumatic   Eyes:            Lids and lashes normal, conjunctivae and sclerae normal, no icterus, no pallor, corneas clear, PERRLA   Ears:    Ears appear intact with no abnormalities noted   Throat:   No oral lesions, no thrush, oral mucosa moist   Neck:   No adenopathy, supple, trachea midline, no thyromegaly, no carotid bruit, no JVD   Back:     No kyphosis present, no scoliosis present, no skin lesions, erythema or scars, no tenderness to percussion or palpation, range of motion normal   Lungs:     Clear to auscultation, respirations regular, even and unlabored    Heart:    Regular rhythm and normal rate, normal S1 and S2, no murmur, no gallop, no rub, no click   Chest Wall:    No abnormalities observed   Abdomen:     Normal bowel sounds, no masses, no organomegaly, soft, nontender, nondistended, no guarding, no rebound  tenderness   Extremities:   Moves all extremities well, no edema, no cyanosis, no redness   Pulses:   Pulses palpable and equal bilaterally. Normal radial, carotid, femoral, dorsalis pedis and posterior tibial pulses bilaterally. Normal abdominal aorta   Skin:  Psychiatric:   No bleeding, bruising or rash    Alert and oriented x 3, normal mood and affect   Lab Review:     Results from last 7 days   Lab Units 01/21/21  1233   SODIUM mmol/L 138   POTASSIUM mmol/L 4.0   CHLORIDE mmol/L 100   CO2 mmol/L 26.4   BUN mg/dL 17   CREATININE mg/dL 1.19   CALCIUM mg/dL 9.4    BILIRUBIN mg/dL 0.4   ALK PHOS U/L 61   ALT (SGPT) U/L 18   AST (SGOT) U/L 19   GLUCOSE mg/dL 108*     Results from last 7 days   Lab Units 01/21/21  1431 01/21/21  1233   TROPONIN T ng/mL 0.025 0.032*     Results from last 7 days   Lab Units 01/21/21  1233   WBC 10*3/mm3 9.84   HEMOGLOBIN g/dL 13.9   HEMATOCRIT % 42.5   PLATELETS 10*3/mm3 278                         EKG 1/21/21      Previous EKG 7/23/20        I personally viewed and interpreted the patient's EKG/Telemetry data.        Assessment and Plan:       1. NSTEMI - treated with nitro paste, aspirin, and lovenox in the emergency room. Currently pain free. Will continue nitro paste and plan for coronary angiography in the AM. Serial EKG and cardiac enzymes.     2. Coronary artery disease with prior complex stenting of mid and distal LAD. He had moderate disease of the left circ and RCA systems at that time. Continue aspirin/plavix/statin/beta blocker/ISMN.    3. Ongoing tobacco abuse - cessation strongly encouraged.     4. COPD - continue home inhalers. No evidence of acute exacerbation.     5. Hypertension - well controlled. Continue current regimen.     Uyen Castaneda, APREDDIE  01/21/21  18:58 EST

## 2021-01-21 NOTE — PLAN OF CARE
Goal Outcome Evaluation:        Received from ER, resting quietly. Denies any chest pain or discomfort. VSS, Cardiac monitor NSR rate 77. Plan for cardiac catheterization tomorrow.

## 2021-01-21 NOTE — ED PROVIDER NOTES
EMERGENCY DEPARTMENT ENCOUNTER    Room Number:  27/27  Date of encounter:  1/21/2021  PCP: Montse Cain, PAFigueroaC  Historian: Patient     I used full protective equipment while examining this patient.  This includes face mask, gloves and protective eyewear.  I washed my hands before entering the room and immediately upon leaving the room      HPI:  Chief Complaint: Chest pain  A complete HPI/ROS/PMH/PSH/SH/FH are unobtainable due to: None    Context: Prem Thrasher is a 65 y.o. male who presents to the ED c/o chest pain.  Symptoms began on Monday and have been intermittent.  Pain lasts for several minutes and is doing relieved after nitroglycerin sublingual.  Pain is described as pressure in the mid chest.  It can happen at rest and is generally worsened by nothing.  It is often improved by taking nitroglycerin.  Patient stated he had pain this morning and it resolved after nitroglycerin and he was doing well until about 15 minutes ago when his pain started to come back.  Currently his pain is moderate.  Patient states this is similar to prior coronary disease.      PAST MEDICAL HISTORY  Active Ambulatory Problems     Diagnosis Date Noted   • Allergy    • COPD (chronic obstructive pulmonary disease) (CMS/[facility])    • Hyperlipidemia    • IFG (impaired fasting glucose)    • Rheumatoid arthritis (CMS/[facility])    • Hypertension    • Anxiety disorder 06/27/2016   • Recurrent major depressive disorder, in full remission (CMS/[facility]) 06/27/2016   • Lumbar radiculopathy 06/27/2016   • Spondylolisthesis of lumbar region 07/31/2017   • Sepsis (CMS/[facility]) 09/04/2017   • 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/08/2018   • Calcific Achilles tendonitis s/p OR 7/18 06/08/2018   • Gastrocnemius equinus, left 06/27/2018   • Abscess and cellulitis of gluteal region 07/09/2018   • Tobacco abuse 07/09/2018   • Perirectal abscess 07/09/2018   • Anemia 07/10/2018   • Wound dehiscence 07/18/2018   • Gastrocnemius strain, left, initial encounter 08/01/2019    • Low folic acid 12/05/2019   • Low serum vitamin B12 12/05/2019   • Exertional chest pain 03/07/2020   • Unstable angina (CMS/HCC) 03/08/2020   • Coronary artery disease involving native coronary artery of native heart with unstable angina pectoris (CMS/HCC) 03/08/2020   • Unstable angina pectoris (CMS/HCC) 04/30/2020   • Coronary artery disease involving native heart with angina pectoris (CMS/[facility]) 05/02/2020   • Low back pain 06/04/2020     Resolved Ambulatory Problems     Diagnosis Date Noted   • Surgery follow-up examination 10/24/2016   • Postoperative nausea and vomiting 08/01/2017   • Postoperative ileus (CMS/[facility]) 08/01/2017   • Infection of lumbar spine (CMS/[facility]) 09/06/2017   • Sebaceous cyst 04/23/2018   • Metabolic acidosis 07/10/2018     Past Medical History:   Diagnosis Date   • Achilles tendon pain    • CAD (coronary artery disease)    • CTS (carpal tunnel syndrome)    • Diverticulitis    • Diverticulitis of colon    • Diverticulosis    • Encounter for eye exam 10/2014   • Fissure, anal    • History of bone density study 03/2014   • History of chest x-ray 02/15/2011   • History of colon polyps    • History of nuclear stress test 03/09/2015   • History of pulmonary function tests 03/2015   • Lumbosacral disc disease    • Nerve damage    • Osteoarthritis, multiple sites    • Postoperative wound infection    • Rectal bleeding    • Sciatica    • Staph infection          PAST SURGICAL HISTORY  Past Surgical History:   Procedure Laterality Date   • ACHILLES TENDON SURGERY Left 7/3/2018    Procedure: Left Achilles debridement and secondary repair with partial excision of calcaneus. Possible left Achilles lengthening at the calf;  Surgeon: Vinay Smith MD;  [place] [facility] [place] [facility] [place]  Service: [place]opedics   • BACK SURGERY     • CARDIAC CATHETERIZATION N/A 3/7/2020    Procedure: Left Heart Cath;  Surgeon: Dioni Salazar MD;  Location: [facility] CATH INVASIVE LOCATION;  Service: Cardiology;   Laterality: N/A;   • CARDIAC CATHETERIZATION N/A 3/7/2020    Procedure: Coronary angiography;  Surgeon: Dioni Salazar MD;  Location: [facility] CATH INVASIVE LOCATION;  Service: Cardiology;  Laterality: N/A;   • CARDIAC CATHETERIZATION N/A 3/7/2020    Procedure: Left ventriculography;  Surgeon: Dioni Salazar MD;  Location: [facility] CATH INVASIVE LOCATION;  Service: Cardiology;  Laterality: N/A;   • CARDIAC CATHETERIZATION N/A 3/7/2020    Procedure: Stent RAZA coronary;  Surgeon: Dioni Salazar MD;  Location: [facility] CATH INVASIVE LOCATION;  Service: Cardiology;  Laterality: N/A;   • COLONOSCOPY N/A 02/02/2011    Normal colon except scattered diverticulosis-Dr. Guero Blunt   • CYST REMOVAL  03/2016    x2  FROM FACE AND EAR   • DENTAL PROCEDURE  2008    teeth removed; dental implants   • EPIDURAL BLOCK  1995    L/S spine   • FINGER DEBRIDEMENT Right 07/12/2003    Debridement and full thickness skin grafting of the ring and small fingers of the right hand-Dr. Rayray Long   • INCISION AND DRAINAGE PERIRECTAL ABSCESS N/A 7/10/2018    Procedure: INCISION AND DRAINAGE OF PERIRECTAL ABSCESS;  Surgeon: Porsha Arcos MD;  Location: [facility] OR;  Service: General   • LUMBAR DISCECTOMY FUSION INSTRUMENTATION Left 10/10/2016    Procedure: LEFT L2-L3, L3-L4 LUMBAR LAMINECTOMY/ DISCECTOMY WITH METRIX ;  Surgeon: Sawyer Rick MD;  Location: [facility] OR;  Service:    • LUMBAR DISCECTOMY FUSION INSTRUMENTATION N/A 7/31/2017    Procedure: LUMBAR FUSION DECOMPRESSON WITH PEDICLE SCREWS with LAURA L2-3,L3-4;  Surgeon: Jacky Perez MD;  Location: [facility] OR;  Service:    • LUMBAR LAMINECTOMY DISCECTOMY DECOMPRESSION N/A 7/31/2017    Procedure: L2 to L4 laminectomy with neural lysis and a fusion by orthopedics;  Surgeon: Sawyer Rick MD;  Location: [facility] OR;  Service:    • LUMBAR LAMINECTOMY DISCECTOMY DECOMPRESSION N/A 9/5/2017    Procedure: I&D LUMBAR SPINE ;  Surgeon: Jacky Perez  MD;  Location: [facility] OR;  Service:    • SHOULDER SURGERY Right 02/23/2011    Dr. Navarro   • SINUS SURGERY  2003    Dr. Barrera         FAMILY HISTORY  Family History   Problem Relation Age of Onset   • Diabetes Mother    • Heart disease Mother    • Hyperlipidemia Mother    • Stroke Mother    • Heart disease Father    • Rheum arthritis Father    • Alcohol abuse Father    • Hyperlipidemia Father    • Depression Brother    • Cancer Brother         prostate   • Depression Brother    • Heart disease Brother    • Hyperlipidemia Brother    • Cancer Brother    • Thyroid disease Sister    • Malig Hyperthermia Neg Hx          SOCIAL HISTORY  Social History     Socioeconomic History   • Marital status:      Spouse name: Not on file   • Number of children: Not on file   • Years of education: Not on file   • Highest education level: Not on file   Tobacco Use   • Smoking status: Current Every Day Smoker     Packs/day: 1.00     Years: 45.00     Pack years: 45.00     Types: Cigarettes   • Smokeless tobacco: Never Used   • Tobacco comment: no caffeine    Substance and Sexual Activity   • Alcohol use: No   • Drug use: No   • Sexual activity: Not Currently     Partners: Female     Birth control/protection: None         ALLERGIES  Atorvastatin, Ceclor [cefaclor], and Methotrexate derivatives       REVIEW OF SYSTEMS  Review of Systems   Constitutional: Negative.  Negative for fever.   HENT: Negative.  Negative for sore throat.    Eyes: Negative.    Respiratory: Positive for cough (Chronic smoker's cough unchanged from baseline) and choking.    Cardiovascular: Positive for chest pain (As per HPI).   Gastrointestinal: Negative.    Genitourinary: Negative.  Negative for dysuria.   Musculoskeletal: Negative.  Negative for back pain.        Chronic leg pain, unchanged from baseline   Skin: Negative.  Negative for rash.   Neurological: Negative.  Negative for headaches.   All other systems reviewed and are negative.           PHYSICAL EXAM    I have reviewed the triage vital signs and nursing notes.    ED Triage Vitals   Temp Heart Rate Resp BP SpO2   01/21/21 1226 01/21/21 1226 01/21/21 1226 01/21/21 1235 01/21/21 1226   96.3 °F (35.7 °C) 80 18 131/98 94 %      Temp src Heart Rate Source Patient Position BP Location FiO2 (%)   01/21/21 1226 -- 01/21/21 1238 01/21/21 1238 --   Tympanic  Lying Right arm        Physical Exam  GENERAL: Alert male in no obvious distress, oriented x3.  Vitals reviewed and fairly unremarkable although he is somewhat hypertensive with a diastolic of 98.  HENT: nares patent  EYES: no scleral icterus  CV: regular rhythm, regular rate-no murmur  RESPIRATORY: normal effort, slightly decreased breath sounds bilateral-O2 saturation 94% on room air  ABDOMEN: soft, nontender to palpation-no masses  MUSCULOSKELETAL: no deformity-no segment swelling or tenderness to palpation  NEURO: Strength, sensation, and coordination are grossly intact.  Speech and mentation are unremarkable  SKIN: warm, dry      LAB RESULTS  Recent Results (from the past 24 hour(s))   ECG 12 Lead    Collection Time: 01/21/21 12:32 PM   Result Value Ref Range    QT Interval 374 ms   Comprehensive Metabolic Panel    Collection Time: 01/21/21 12:33 PM    Specimen: Blood   Result Value Ref Range    Glucose 108 (H) 65 - 99 mg/dL    BUN 17 8 - 23 mg/dL    Creatinine 1.19 0.76 - 1.27 mg/dL    Sodium 138 136 - 145 mmol/L    Potassium 4.0 3.5 - 5.2 mmol/L    Chloride 100 98 - 107 mmol/L    CO2 26.4 22.0 - 29.0 mmol/L    Calcium 9.4 8.6 - 10.5 mg/dL    Total Protein 6.9 6.0 - 8.5 g/dL    Albumin 4.40 3.50 - 5.20 g/dL    ALT (SGPT) 18 1 - 41 U/L    AST (SGOT) 19 1 - 40 U/L    Alkaline Phosphatase 61 39 - 117 U/L    Total Bilirubin 0.4 0.0 - 1.2 mg/dL    eGFR Non African Amer 61 >60 mL/min/1.73    Globulin 2.5 gm/dL    A/G Ratio 1.8 g/dL    BUN/Creatinine Ratio 14.3 7.0 - 25.0    Anion Gap 11.6 5.0 - 15.0 mmol/L   Troponin    Collection Time: 01/21/21 12:33  PM    Specimen: Blood   Result Value Ref Range    Troponin T 0.032 (C) 0.000 - 0.030 ng/mL   Light Blue Top    Collection Time: 01/21/21 12:33 PM   Result Value Ref Range    Extra Tube hold for add-on    Green Top (Gel)    Collection Time: 01/21/21 12:33 PM   Result Value Ref Range    Extra Tube Hold for add-ons.    Lavender Top    Collection Time: 01/21/21 12:33 PM   Result Value Ref Range    Extra Tube hold for add-on    Gold Top - SST    Collection Time: 01/21/21 12:33 PM   Result Value Ref Range    Extra Tube Hold for add-ons.    CBC Auto Differential    Collection Time: 01/21/21 12:33 PM    Specimen: Blood   Result Value Ref Range    WBC 9.84 3.40 - 10.80 10*3/mm3    RBC 4.69 4.14 - 5.80 10*6/mm3    Hemoglobin 13.9 13.0 - 17.7 g/dL    Hematocrit 42.5 37.5 - 51.0 %    MCV 90.6 79.0 - 97.0 fL    MCH 29.6 26.6 - 33.0 pg    MCHC 32.7 31.5 - 35.7 g/dL    RDW 12.9 12.3 - 15.4 %    RDW-SD 41.9 37.0 - 54.0 fl    MPV 9.9 6.0 - 12.0 fL    Platelets 278 140 - 450 10*3/mm3    Neutrophil % 69.5 42.7 - 76.0 %    Lymphocyte % 16.8 (L) 19.6 - 45.3 %    Monocyte % 9.2 5.0 - 12.0 %    Eosinophil % 2.7 0.3 - 6.2 %    Basophil % 1.3 0.0 - 1.5 %    Immature Grans % 0.5 0.0 - 0.5 %    Neutrophils, Absolute 6.83 1.70 - 7.00 10*3/mm3    Lymphocytes, Absolute 1.65 0.70 - 3.10 10*3/mm3    Monocytes, Absolute 0.91 (H) 0.10 - 0.90 10*3/mm3    Eosinophils, Absolute 0.27 0.00 - 0.40 10*3/mm3    Basophils, Absolute 0.13 0.00 - 0.20 10*3/mm3    Immature Grans, Absolute 0.05 0.00 - 0.05 10*3/mm3    nRBC 0.0 0.0 - 0.2 /100 WBC   Troponin    Collection Time: 01/21/21  2:31 PM    Specimen: Blood   Result Value Ref Range    Troponin T 0.025 0.000 - 0.030 ng/mL       Ordered the above labs and independently reviewed the results.      RADIOLOGY  Xr Chest 1 View    Result Date: 1/21/2021  PORTABLE CHEST  HISTORY: Chest pain.  FINDINGS: Two AP views of the chest demonstrate the heart to be within normal limits in size. There is no evidence of focal  infiltrate, effusion or of congestive failure.        I ordered the above noted radiological studies. Reviewed by me and discussed with radiologist.  See dictation for official radiology interpretation.      PROCEDURES  Critical Care  Performed by: Rory Esquivel MD  Authorized by: Rory Esquivel MD     Critical care provider statement:     Critical care time (minutes):  30    Critical care time was exclusive of:  Separately billable procedures and treating other patients    Critical care was necessary to treat or prevent imminent or life-threatening deterioration of the following conditions:  Circulatory failure and CNS failure or compromise    Critical care was time spent personally by me on the following activities:  Review of old charts, re-evaluation of patient's condition, pulse oximetry, ordering and review of laboratory studies, ordering and performing treatments and interventions, discussions with consultants, obtaining history from patient or surrogate, examination of patient and evaluation of patient's response to treatment          MEDICATIONS GIVEN IN ER    Medications   sodium chloride 0.9 % flush 10 mL (has no administration in time range)   nitroglycerin (NITROSTAT) SL tablet 0.4 mg (0.4 mg Sublingual Given 1/21/21 1330)   aspirin tablet 325 mg (325 mg Oral Given 1/21/21 1245)   nitroglycerin (NITROSTAT) ointment 1 inch (1 inch Topical Given 1/21/21 1330)   enoxaparin (LOVENOX) syringe 90 mg (90 mg Subcutaneous Given 1/21/21 1329)         PROGRESS, DATA ANALYSIS, CONSULTS, AND MEDICAL DECISION MAKING    All labs have been independently reviewed by me.  All radiology studies have been reviewed by me and discussed with radiologist dictating the report.   EKG's independently viewed and interpreted by me.  Discussion below represents my analysis of pertinent findings related to patient's condition, differential diagnosis, treatment plan and final disposition.      ED Course as of Jan 21 1503   Thu  Jan 21, 2021   1314 EKG          EKG time: 1232  Rhythm/Rate: Sinus 86 with occasional PAC  P waves and MO: Normal P waves, prolonged MO interval  QRS, axis: Normal axis, normal QRS  ST and T waves: Unremarkable ST and T wave    Interpreted Contemporaneously by me, independently viewed  Not significantly changed compared to prior 5/2020      [DB]   [age] [id number] male with history of known coronary artery disease status post coronary artery stenting presents with fairly typical chest pain which sounds unlikely to be unstable angina.  Labs are reviewed notable for elevated troponin of 0.032 suggesting non-STEMI.  We will go ahead and give nitroglycerin sublingual, followed by paste.  Will give Lovenox and contact Dr. Salazar about likely admission and possible heart catheterization.    [DB]   1438 Patient's pain is improved after nitroglycerin sublingual.  Nitropaste applied.  Chest x-ray reviewed with reading radiologist shows no acute disease.  At this point still waiting for callback from cardiology for likely admission.    [DB]   1506 I discussed evaluation of patient with Dr. Lane Salazar who knows this patient from prior visitations.  He will admit for likely catheterization in the morning.    [DB]      ED Course User Index  [DB] Rory Esquivel MD       AS OF 15:03 EST VITALS:    BP - 124/85  HR - 75  TEMP - 96.3 °F (35.7 °C) (Tympanic)  O2 SATS - 95%      DIAGNOSIS  Final diagnoses:   Non-STEMI (non-ST elevated myocardial infarction) (CMS/[facility])         DISPOSITION  Admission         Rory Esquivel MD  01/21/21 [id number]

## 2021-01-22 LAB
ANION GAP SERPL CALCULATED.3IONS-SCNC: 7.9 MMOL/L (ref 5–15)
BUN SERPL-MCNC: 22 MG/DL (ref 8–23)
BUN/CREAT SERPL: 20.2 (ref 7–25)
CALCIUM SPEC-SCNC: 8.9 MG/DL (ref 8.6–10.5)
CHLORIDE SERPL-SCNC: 106 MMOL/L (ref 98–107)
CHOLEST SERPL-MCNC: 161 MG/DL (ref 0–200)
CO2 SERPL-SCNC: 24.1 MMOL/L (ref 22–29)
CREAT SERPL-MCNC: 1.09 MG/DL (ref 0.76–1.27)
DEPRECATED RDW RBC AUTO: 40.1 FL (ref 37–54)
ERYTHROCYTE [DISTWIDTH] IN BLOOD BY AUTOMATED COUNT: 12.4 % (ref 12.3–15.4)
GFR SERPL CREATININE-BSD FRML MDRD: 68 ML/MIN/1.73
GLUCOSE SERPL-MCNC: 82 MG/DL (ref 65–99)
HBA1C MFR BLD: 5.8 % (ref 4.8–5.6)
HCT VFR BLD AUTO: 39.8 % (ref 37.5–51)
HDLC SERPL-MCNC: 34 MG/DL (ref 40–60)
HGB BLD-MCNC: 13.4 G/DL (ref 13–17.7)
LDLC SERPL CALC-MCNC: 109 MG/DL (ref 0–100)
LDLC/HDLC SERPL: 3.17 {RATIO}
MCH RBC QN AUTO: 29.8 PG (ref 26.6–33)
MCHC RBC AUTO-ENTMCNC: 33.7 G/DL (ref 31.5–35.7)
MCV RBC AUTO: 88.4 FL (ref 79–97)
PLATELET # BLD AUTO: 268 10*3/MM3 (ref 140–450)
PMV BLD AUTO: 10.1 FL (ref 6–12)
POTASSIUM SERPL-SCNC: 4 MMOL/L (ref 3.5–5.2)
QT INTERVAL: 406 MS
RBC # BLD AUTO: 4.5 10*6/MM3 (ref 4.14–5.8)
SODIUM SERPL-SCNC: 138 MMOL/L (ref 136–145)
TRIGL SERPL-MCNC: 96 MG/DL (ref 0–150)
TROPONIN T SERPL-MCNC: 0.04 NG/ML (ref 0–0.03)
VLDLC SERPL-MCNC: 18 MG/DL (ref 5–40)
WBC # BLD AUTO: 8.47 10*3/MM3 (ref 3.4–10.8)

## 2021-01-22 PROCEDURE — 25010000002 MIDAZOLAM PER 1 MG: Performed by: INTERNAL MEDICINE

## 2021-01-22 PROCEDURE — 92928 PRQ TCAT PLMT NTRAC ST 1 LES: CPT | Performed by: INTERNAL MEDICINE

## 2021-01-22 PROCEDURE — 4A023N7 MEASUREMENT OF CARDIAC SAMPLING AND PRESSURE, LEFT HEART, PERCUTANEOUS APPROACH: ICD-10-PCS | Performed by: INTERNAL MEDICINE

## 2021-01-22 PROCEDURE — 25010000002 MORPHINE PER 10 MG: Performed by: INTERNAL MEDICINE

## 2021-01-22 PROCEDURE — 02703ZZ DILATION OF CORONARY ARTERY, ONE ARTERY, PERCUTANEOUS APPROACH: ICD-10-PCS | Performed by: INTERNAL MEDICINE

## 2021-01-22 PROCEDURE — C1769 GUIDE WIRE: HCPCS | Performed by: INTERNAL MEDICINE

## 2021-01-22 PROCEDURE — 93458 L HRT ARTERY/VENTRICLE ANGIO: CPT | Performed by: INTERNAL MEDICINE

## 2021-01-22 PROCEDURE — 85027 COMPLETE CBC AUTOMATED: CPT | Performed by: NURSE PRACTITIONER

## 2021-01-22 PROCEDURE — C1894 INTRO/SHEATH, NON-LASER: HCPCS | Performed by: INTERNAL MEDICINE

## 2021-01-22 PROCEDURE — 93005 ELECTROCARDIOGRAM TRACING: CPT | Performed by: NURSE PRACTITIONER

## 2021-01-22 PROCEDURE — B2111ZZ FLUOROSCOPY OF MULTIPLE CORONARY ARTERIES USING LOW OSMOLAR CONTRAST: ICD-10-PCS | Performed by: INTERNAL MEDICINE

## 2021-01-22 PROCEDURE — 93010 ELECTROCARDIOGRAM REPORT: CPT | Performed by: INTERNAL MEDICINE

## 2021-01-22 PROCEDURE — 92920 PRQ TRLUML C ANGIOP 1ART&/BR: CPT | Performed by: INTERNAL MEDICINE

## 2021-01-22 PROCEDURE — B2151ZZ FLUOROSCOPY OF LEFT HEART USING LOW OSMOLAR CONTRAST: ICD-10-PCS | Performed by: INTERNAL MEDICINE

## 2021-01-22 PROCEDURE — C9600 PERC DRUG-EL COR STENT SING: HCPCS | Performed by: INTERNAL MEDICINE

## 2021-01-22 PROCEDURE — C1874 STENT, COATED/COV W/DEL SYS: HCPCS | Performed by: INTERNAL MEDICINE

## 2021-01-22 PROCEDURE — 99152 MOD SED SAME PHYS/QHP 5/>YRS: CPT | Performed by: INTERNAL MEDICINE

## 2021-01-22 PROCEDURE — 0 IOPAMIDOL PER 1 ML: Performed by: INTERNAL MEDICINE

## 2021-01-22 PROCEDURE — 85347 COAGULATION TIME ACTIVATED: CPT

## 2021-01-22 PROCEDURE — C1725 CATH, TRANSLUMIN NON-LASER: HCPCS | Performed by: INTERNAL MEDICINE

## 2021-01-22 PROCEDURE — 83036 HEMOGLOBIN GLYCOSYLATED A1C: CPT | Performed by: NURSE PRACTITIONER

## 2021-01-22 PROCEDURE — 80061 LIPID PANEL: CPT | Performed by: NURSE PRACTITIONER

## 2021-01-22 PROCEDURE — 25010000002 FENTANYL CITRATE (PF) 100 MCG/2ML SOLUTION: Performed by: INTERNAL MEDICINE

## 2021-01-22 PROCEDURE — 84484 ASSAY OF TROPONIN QUANT: CPT | Performed by: NURSE PRACTITIONER

## 2021-01-22 PROCEDURE — 99153 MOD SED SAME PHYS/QHP EA: CPT | Performed by: INTERNAL MEDICINE

## 2021-01-22 PROCEDURE — 25010000002 HEPARIN (PORCINE) PER 1000 UNITS: Performed by: INTERNAL MEDICINE

## 2021-01-22 PROCEDURE — 80048 BASIC METABOLIC PNL TOTAL CA: CPT | Performed by: NURSE PRACTITIONER

## 2021-01-22 PROCEDURE — 36415 COLL VENOUS BLD VENIPUNCTURE: CPT | Performed by: NURSE PRACTITIONER

## 2021-01-22 PROCEDURE — C1887 CATHETER, GUIDING: HCPCS | Performed by: INTERNAL MEDICINE

## 2021-01-22 PROCEDURE — 027034Z DILATION OF CORONARY ARTERY, ONE ARTERY WITH DRUG-ELUTING INTRALUMINAL DEVICE, PERCUTANEOUS APPROACH: ICD-10-PCS | Performed by: INTERNAL MEDICINE

## 2021-01-22 DEVICE — XIENCE SIERRA™ EVEROLIMUS ELUTING CORONARY STENT SYSTEM 3.25 MM X 15 MM / RAPID-EXCHANGE
Type: IMPLANTABLE DEVICE | Status: FUNCTIONAL
Brand: XIENCE SIERRA™

## 2021-01-22 RX ORDER — ACETAMINOPHEN 325 MG/1
650 TABLET ORAL EVERY 4 HOURS PRN
Status: DISCONTINUED | OUTPATIENT
Start: 2021-01-22 | End: 2021-01-23 | Stop reason: HOSPADM

## 2021-01-22 RX ORDER — NALOXONE HCL 0.4 MG/ML
0.4 VIAL (ML) INJECTION
Status: DISCONTINUED | OUTPATIENT
Start: 2021-01-22 | End: 2021-01-23 | Stop reason: HOSPADM

## 2021-01-22 RX ORDER — LIDOCAINE HYDROCHLORIDE 20 MG/ML
INJECTION, SOLUTION INFILTRATION; PERINEURAL AS NEEDED
Status: DISCONTINUED | OUTPATIENT
Start: 2021-01-22 | End: 2021-01-22 | Stop reason: HOSPADM

## 2021-01-22 RX ORDER — MORPHINE SULFATE 2 MG/ML
1 INJECTION, SOLUTION INTRAMUSCULAR; INTRAVENOUS
Status: DISCONTINUED | OUTPATIENT
Start: 2021-01-22 | End: 2021-01-23 | Stop reason: HOSPADM

## 2021-01-22 RX ORDER — CLOPIDOGREL BISULFATE 75 MG/1
TABLET ORAL AS NEEDED
Status: DISCONTINUED | OUTPATIENT
Start: 2021-01-22 | End: 2021-01-22 | Stop reason: HOSPADM

## 2021-01-22 RX ORDER — MIDAZOLAM HYDROCHLORIDE 1 MG/ML
INJECTION INTRAMUSCULAR; INTRAVENOUS AS NEEDED
Status: DISCONTINUED | OUTPATIENT
Start: 2021-01-22 | End: 2021-01-22 | Stop reason: HOSPADM

## 2021-01-22 RX ORDER — SODIUM CHLORIDE 9 MG/ML
INJECTION, SOLUTION INTRAVENOUS CONTINUOUS PRN
Status: COMPLETED | OUTPATIENT
Start: 2021-01-22 | End: 2021-01-22

## 2021-01-22 RX ORDER — ACETAMINOPHEN 325 MG/1
650 TABLET ORAL EVERY 6 HOURS PRN
Status: DISCONTINUED | OUTPATIENT
Start: 2021-01-21 | End: 2021-01-23 | Stop reason: HOSPADM

## 2021-01-22 RX ORDER — NITROGLYCERIN 5 MG/ML
INJECTION, SOLUTION INTRAVENOUS AS NEEDED
Status: DISCONTINUED | OUTPATIENT
Start: 2021-01-22 | End: 2021-01-22 | Stop reason: HOSPADM

## 2021-01-22 RX ORDER — FENTANYL CITRATE 50 UG/ML
INJECTION, SOLUTION INTRAMUSCULAR; INTRAVENOUS AS NEEDED
Status: DISCONTINUED | OUTPATIENT
Start: 2021-01-22 | End: 2021-01-22 | Stop reason: HOSPADM

## 2021-01-22 RX ORDER — HEPARIN SODIUM 1000 [USP'U]/ML
INJECTION, SOLUTION INTRAVENOUS; SUBCUTANEOUS AS NEEDED
Status: DISCONTINUED | OUTPATIENT
Start: 2021-01-22 | End: 2021-01-22 | Stop reason: HOSPADM

## 2021-01-22 RX ADMIN — LOSARTAN POTASSIUM 100 MG: 100 TABLET, FILM COATED ORAL at 09:00

## 2021-01-22 RX ADMIN — AMLODIPINE BESYLATE 10 MG: 10 TABLET ORAL at 09:00

## 2021-01-22 RX ADMIN — ESCITALOPRAM 20 MG: 20 TABLET, FILM COATED ORAL at 20:27

## 2021-01-22 RX ADMIN — FOLIC ACID 1000 MCG: 1 TABLET ORAL at 09:00

## 2021-01-22 RX ADMIN — CARVEDILOL 12.5 MG: 12.5 TABLET, FILM COATED ORAL at 17:51

## 2021-01-22 RX ADMIN — SODIUM CHLORIDE, PRESERVATIVE FREE 10 ML: 5 INJECTION INTRAVENOUS at 20:27

## 2021-01-22 RX ADMIN — ACETAMINOPHEN 650 MG: 325 TABLET, FILM COATED ORAL at 20:27

## 2021-01-22 RX ADMIN — CARVEDILOL 12.5 MG: 12.5 TABLET, FILM COATED ORAL at 09:00

## 2021-01-22 RX ADMIN — CLOPIDOGREL 75 MG: 75 TABLET, FILM COATED ORAL at 09:00

## 2021-01-22 RX ADMIN — DOCUSATE SODIUM 100 MG: 100 CAPSULE, LIQUID FILLED ORAL at 20:27

## 2021-01-22 RX ADMIN — LEFLUNOMIDE 20 MG: 20 TABLET ORAL at 09:00

## 2021-01-22 RX ADMIN — SODIUM CHLORIDE, PRESERVATIVE FREE 10 ML: 5 INJECTION INTRAVENOUS at 09:02

## 2021-01-22 RX ADMIN — MORPHINE SULFATE 1 MG: 2 INJECTION, SOLUTION INTRAMUSCULAR; INTRAVENOUS at 13:27

## 2021-01-22 RX ADMIN — ASPIRIN 325 MG: 325 TABLET, COATED ORAL at 09:00

## 2021-01-22 NOTE — PLAN OF CARE
Goal Outcome Evaluation:    VSS. Pt with no CP this shift, NTP in place to chest wall. Plan for heart cath today, consents on chart and pt NPO. WCTM closely.

## 2021-01-23 ENCOUNTER — READMISSION MANAGEMENT (OUTPATIENT)
Dept: CALL CENTER | Facility: HOSPITAL | Age: 66
End: 2021-01-23

## 2021-01-23 VITALS
HEIGHT: 71 IN | BODY MASS INDEX: 29.79 KG/M2 | TEMPERATURE: 98.5 F | WEIGHT: 212.8 LBS | OXYGEN SATURATION: 93 % | HEART RATE: 79 BPM | RESPIRATION RATE: 16 BRPM | DIASTOLIC BLOOD PRESSURE: 85 MMHG | SYSTOLIC BLOOD PRESSURE: 131 MMHG

## 2021-01-23 LAB
ANION GAP SERPL CALCULATED.3IONS-SCNC: 11.9 MMOL/L (ref 5–15)
BUN SERPL-MCNC: 18 MG/DL (ref 8–23)
BUN/CREAT SERPL: 16.1 (ref 7–25)
CALCIUM SPEC-SCNC: 9.2 MG/DL (ref 8.6–10.5)
CHLORIDE SERPL-SCNC: 105 MMOL/L (ref 98–107)
CO2 SERPL-SCNC: 22.1 MMOL/L (ref 22–29)
CREAT SERPL-MCNC: 1.12 MG/DL (ref 0.76–1.27)
GFR SERPL CREATININE-BSD FRML MDRD: 66 ML/MIN/1.73
GLUCOSE SERPL-MCNC: 82 MG/DL (ref 65–99)
POTASSIUM SERPL-SCNC: 3.9 MMOL/L (ref 3.5–5.2)
QT INTERVAL: 421 MS
SODIUM SERPL-SCNC: 139 MMOL/L (ref 136–145)
TROPONIN T SERPL-MCNC: 0.09 NG/ML (ref 0–0.03)

## 2021-01-23 PROCEDURE — 93010 ELECTROCARDIOGRAM REPORT: CPT | Performed by: INTERNAL MEDICINE

## 2021-01-23 PROCEDURE — 36415 COLL VENOUS BLD VENIPUNCTURE: CPT | Performed by: INTERNAL MEDICINE

## 2021-01-23 PROCEDURE — 93005 ELECTROCARDIOGRAM TRACING: CPT | Performed by: INTERNAL MEDICINE

## 2021-01-23 PROCEDURE — 99238 HOSP IP/OBS DSCHRG MGMT 30/<: CPT | Performed by: NURSE PRACTITIONER

## 2021-01-23 PROCEDURE — 84484 ASSAY OF TROPONIN QUANT: CPT | Performed by: INTERNAL MEDICINE

## 2021-01-23 PROCEDURE — 80048 BASIC METABOLIC PNL TOTAL CA: CPT | Performed by: INTERNAL MEDICINE

## 2021-01-23 RX ADMIN — ASPIRIN 325 MG: 325 TABLET, COATED ORAL at 08:15

## 2021-01-23 RX ADMIN — LEFLUNOMIDE 20 MG: 20 TABLET ORAL at 08:15

## 2021-01-23 RX ADMIN — SODIUM CHLORIDE, PRESERVATIVE FREE 10 ML: 5 INJECTION INTRAVENOUS at 08:15

## 2021-01-23 RX ADMIN — CARVEDILOL 12.5 MG: 12.5 TABLET, FILM COATED ORAL at 08:15

## 2021-01-23 RX ADMIN — DOCUSATE SODIUM 100 MG: 100 CAPSULE, LIQUID FILLED ORAL at 08:15

## 2021-01-23 RX ADMIN — CLOPIDOGREL 75 MG: 75 TABLET, FILM COATED ORAL at 08:15

## 2021-01-23 RX ADMIN — LOSARTAN POTASSIUM 100 MG: 100 TABLET, FILM COATED ORAL at 08:15

## 2021-01-23 RX ADMIN — AMLODIPINE BESYLATE 10 MG: 10 TABLET ORAL at 08:15

## 2021-01-23 RX ADMIN — FOLIC ACID 1000 MCG: 1 TABLET ORAL at 08:15

## 2021-01-23 NOTE — OUTREACH NOTE
Prep Survey      Responses   [facility] patient discharged from?  [place]   Is LACE score < 7 ?  No   Emergency Room discharge w/ pulse ox?  No   Eligibility  [facility]   Date of Admission  01/21/21   Date of Discharge  01/23/21   Discharge Disposition  Home or Self Care   Discharge diagnosis    Non-STEMI,    underwent drug-eluting stenting of the distal left circumflex and PTCA of the distal LAD stent   Does the patient have one of the following disease processes/diagnoses(primary or secondary)?  Acute MI (STEMI,NSTEMI)   Does the patient have Home health ordered?  No   Is there a DME ordered?  No   Prep survey completed?  Yes          Melida Higgins RN

## 2021-01-23 NOTE — DISCHARGE SUMMARY
Hospital Discharge    Patient Name: Prem Thrasher  Age/Sex: 65 y.o. male  : 1955  MRN: [id number]    Encounter Provider: TEN Desai  Referring Provider: Dioni Salazar MD  Place of Service: [facility] CARDIOLOGY  Patient Care Team:  Montse Cain PA-C as PCP - General (Family Medicine)  Maria Antonia Lopez MD as Consulting Physician (Rheumatology)  Sawyer Rick MD as Surgeon (Neurosurgery)  Pradip Mcmillan MD as Consulting Physician (Pulmonary Disease)  Jacky Perez MD as Surgeon (Orthopedic Surgery)  Charli Sen MD as Consulting Physician (Infectious Diseases)  Tray Moody MD as Consulting Physician (Urology)  Suman Richards DPM as Consulting Physician (Podiatry)  Porsha Arcos MD as Consulting Physician (General Surgery)  Vinay Smith MD as Surgeon (Orthopedic Surgery)  Eitan Cuevas MD as Consulting Physician (Plastic Surgery)  Dioni Salazar MD as Consulting Physician (Cardiology)         Date of Discharge:  2021   Date of Admit: 2021    Discharge Condition: Stable  Discharge Diagnosis:    Non-STEMI (non-ST elevated myocardial infarction) (CMS/[facility])      Hospital Course:   Prem Thrasher is a 65 y.o. male who is a patient of Dr. Estevez.  He has a history of hypertension, tobacco abuse, COPD, and coronary artery disease.  In 2020, he underwent complex LAD stenting.  On 2021, he presented with reports of intermittent chest pain.  He ruled in for a non-STEMI and was taken to the cardiac catheterization lab where he was found to have a 90% distal circumflex lesion along with a 100% stenosed distal LAD.  He underwent drug-eluting stenting of the distal left circumflex and PTCA of the distal LAD stent.  He had normal LV function.  Dual antiplatelet therapy was recommended indefinitely along with aggressive risk factor modification including tobacco cessation.  He has a  history of statin intolerance and has been maintained on Zetia.  I think he would benefit from a PCSK9 inhibitor, and this can be discussed outpatient.  This morning he is resting comfortably with no complaints of chest pain or shortness of breath.  His EKG is stable.  He is stable and ready for discharge.  He will follow-up with Jayda Balbuena in 1 week and with Dr. Salazar in 1 month.    Objective:  Temp:  [97.3 °F (36.3 °C)-98.5 °F (36.9 °C)] 98.5 °F (36.9 °C)  Heart Rate:  [72-97] 79  Resp:  [16-18] 16  BP: ()/(81-94) 131/85    Intake/Output Summary (Last 24 hours) at 1/23/2021 1024  Last data filed at 1/22/2021 1713  Gross per 24 hour   Intake 610 ml   Output --   Net 610 ml     Body mass index is 29.68 kg/m².      01/21/21  1234 01/21/21  1558   Weight: 94.3 kg (208 lb) 96.5 kg (212 lb 12.8 oz)     Weight change:     Physical Exam:  Constitutional:       General: Not in acute distress.     Appearance: Well-developed. Not diaphoretic.   Eyes:      Pupils: Pupils are equal, round, and reactive to light.   HENT:      Head: Normocephalic and atraumatic.   Neck:      Thyroid: No thyromegaly.      Vascular: No JVD.   Pulmonary:      Effort: Pulmonary effort is normal. No respiratory distress.      Breath sounds: Normal breath sounds.   Cardiovascular:      Normal rate. Regular rhythm.   Pulses:     Intact distal pulses.   Edema:     Peripheral edema absent.   Abdominal:      General: Bowel sounds are normal. There is no distension.      Palpations: Abdomen is soft. There is no hepatomegaly or splenomegaly.      Tenderness: There is no abdominal tenderness.   Musculoskeletal: Normal range of motion.   Skin:     General: Skin is warm and dry.      Findings: No erythema.      Comments: Radial site well healed without erythema or edema. Proximal and distal pulses palpable. Good capillary refill.   Neurological:      Mental Status: Alert and oriented to person, place, and time.   Psychiatric:         Behavior:  Behavior normal.         Judgment: Judgment normal.           Procedures Performed  Procedure(s):  Left Heart Cath  Coronary angiography  Left ventriculography  Percutaneous Coronary Intervention  Stent RAZA coronary     Conclusion       · The ejection fraction was 50-55% by visual estimate.  · Normal systolic function.  · Dist Cx lesion is 90% stenosed.  · Dist LAD lesion is 100% stenosed.  · 5/ 1.  Ischemic heart disease:  Etiology: Coronary atherosclerosis  Anatomy: Three-vessel coronary disease.  Occlusion of distal LAD stent, high-grade distal LCx stenosis, moderate RCA disease  Physiology: NSTEMI, unstable angina, normal left ventricular function  Procedure: RAZA distal LCx, PTCA distal LAD stent  Functional status: Mildly compromised  Recommendations: Dual antiplatelet therapy indefinitely, aggressive risk factor modification including tobacco cessation.       Consults:  Consults     Date and Time Order Name Status Description    1/21/2021 1322 LCG (on-call MD unless specified) Completed           Pertinent Test Results:  Results from last 7 days   Lab Units 01/23/21  0010 01/22/21  0412 01/21/21  1233   SODIUM mmol/L 139 138 138   POTASSIUM mmol/L 3.9 4.0 4.0   CHLORIDE mmol/L 105 106 100   CO2 mmol/L 22.1 24.1 26.4   BUN mg/dL 18 22 17   CREATININE mg/dL 1.12 1.09 1.19   GLUCOSE mg/dL 82 82 108*   CALCIUM mg/dL 9.2 8.9 9.4   AST (SGOT) U/L  --   --  19   ALT (SGPT) U/L  --   --  18     Results from last 7 days   Lab Units 01/23/21  0010 01/22/21  0412 01/21/21  1431 01/21/21  1233   TROPONIN T ng/mL 0.092* 0.038* 0.025 0.032*     Results from last 7 days   Lab Units 01/22/21  0412 01/21/21  1233   WBC 10*3/mm3 8.47 9.84   HEMOGLOBIN g/dL 13.4 13.9   HEMATOCRIT % 39.8 42.5   PLATELETS 10*3/mm3 268 278             Results from last 7 days   Lab Units 01/22/21  0412   CHOLESTEROL mg/dL 161   TRIGLYCERIDES mg/dL 96   HDL CHOL mg/dL 34*               Discharge Medications     Discharge Medications         Continue These Medications      Instructions Start Date   amLODIPine 10 MG tablet  Commonly known as: NORVASC   10 mg, Oral, Daily, for blood pressure      aspirin  MG tablet   325 mg, Oral, Daily      carvedilol 12.5 MG tablet  Commonly known as: COREG   TAKE 1 TABLET 2  TIMES A DAY WITH MEALS FOR BLOOD PRESSURE       clopidogrel 75 MG tablet  Commonly known as: PLAVIX   75 mg, Oral, Daily      cyclobenzaprine 10 MG tablet  Commonly known as: FLEXERIL   TAKE 1 TABLET THREE TIMES DAILY AS NEEDED  FOR  MUSCLE  SPASMS      docusate sodium 100 MG capsule  Commonly known as: COLACE   100 mg, Oral, Daily      escitalopram 20 MG tablet  Commonly known as: LEXAPRO   20 mg, Oral, Nightly, For anxiety      ezetimibe 10 MG tablet  Commonly known as: ZETIA   10 mg, Oral, Every Evening, For cholesterol      folic acid 1 MG tablet  Commonly known as: FOLVITE   TAKE 1 TABLET EVERY DAY      irbesartan 300 MG tablet  Commonly known as: AVAPRO   TAKE 1 TABLET EVERY DAY FOR BLOOD PRESSURE  (NEED  APPOINMENT)      isosorbide mononitrate 30 MG 24 hr tablet  Commonly known as: IMDUR   TAKE 1 TABLET EVERY DAY      leflunomide 20 MG tablet  Commonly known as: ARAVA   20 mg, Oral, Every Morning      nitroglycerin 0.4 MG SL tablet  Commonly known as: Nitrostat   0.4 mg, Sublingual, Every 5 Minutes PRN, Take no more than 3 doses in 15 minutes.      tiotropium 18 MCG per inhalation capsule  Commonly known as: SPIRIVA   1 capsule, Inhalation, Daily, For COPD      Vitamin B-12 1000 MCG sublingual tablet   DISSOLVE 1 TABLET UNDER TONGUE DAILY      Vitamin D 50 MCG (2000 UT) tablet   2,000 Units, Oral, Every Evening             Discharge Diet:    Dietary Orders (From admission, onward)     Start     Ordered    01/22/21 1204  Diet Regular; Consistent Carbohydrate, Cardiac  Diet Effective Now     Question Answer Comment   Diet Texture / Consistency Regular    Common Modifiers Consistent Carbohydrate    Common Modifiers Cardiac         01/22/21 1203                Activity at Discharge:   As tolerated    Discharge disposition: home     Discharge Instructions and Follow ups:  Future Appointments   Date Time Provider Department Center   1/27/2021 11:15 AM Dioni Saalzar MD [facility] None     Additional Instructions for the Follow-ups that You Need to Schedule     Ambulatory Referral to Cardiac Rehab   As directed        Follow-up Information     Montse Cain, PAFigueroaC .    Specialty: Family Medicine  Contact information:  [address]  [facility] [id number] [phone]             Dioni Salazar MD Follow up in 1 month(s).    Specialty: Cardiology  Why: NP in 1 week  Contact information:  [address]  [facility] [id number] [phone]             [facility] .    Specialty: Cardiac Rehabilitation  Contact information:  [address] [facility] [address] [phone]                 Test Results Pending at Discharge:      Maria Antonia Mello, TEN  01/23/21  10:24 EST    Time: Discharge < 30 min

## 2021-01-23 NOTE — PROGRESS NOTES
[facility] Clinical Pharmacy Services: National Cardiology Data Registry (NCDR) Medication Review    Prem Thrasher is s/p PCI with drug-eluting stent placement for NSTEMI. Pharmacy to review discharge medications to make sure appropriate medications have been prescribed.    Patient has been discharged on the following:  · P2Y12 Inhibitor: Clopidogrel 75 mg daily  · Aspirin  mg daily  · High Intensity Statin: cannot tolerate  · Beta-blocker: Carvedilol 12.5 mg BID    Patient cannot take statins due to myalgias, but will continue to Zetia 10 mg daily for cholesterol. Provider is aware that patient is intolerant of all statins.    These medications meet the requirements for NCDR discharge medication for chest pain and MI.    Susana Parker, Pharm.D., [facility]  Clinical Staff Pharmacist  Phone Extension #[phone]

## 2021-01-23 NOTE — PLAN OF CARE
Goal Outcome Evaluation:    VSS. Pt is s/p PCI, RAZA x1 to cx. Rt radial site is C/D/I. Pt with no c/o this shift.

## 2021-01-25 ENCOUNTER — TRANSITIONAL CARE MANAGEMENT TELEPHONE ENCOUNTER (OUTPATIENT)
Dept: CALL CENTER | Facility: HOSPITAL | Age: 66
End: 2021-01-25

## 2021-01-25 LAB
ACT BLD: 268 SECONDS (ref 82–152)
ACT BLD: 632 SECONDS (ref 82–152)

## 2021-01-25 NOTE — OUTREACH NOTE
Call Center TCM Note      Responses   [facility] patient discharged from?  [place]   Does the patient have one of the following disease processes/diagnoses(primary or secondary)?  Acute MI (STEMI,NSTEMI)   TCM attempt successful?  Yes   Call start time  1230   Call end time  1246   Discharge diagnosis    Non-STEMI,    underwent drug-eluting stenting of the distal left circumflex and PTCA of the distal LAD stent   Is patient permission given to speak with other caregiver?  No   Meds reviewed with patient/caregiver?  Yes   Is the patient having any side effects they believe may be caused by any medication additions or changes?  No   Does the patient have all prescriptions related to this admission filled (includes statins,anticoagulants,HTN meds,anti-arrhythmia meds)  Yes   Is the patient taking all medications as directed (includes completed medication regime)?  Yes   Does the patient have a primary care provider?   Yes   Does the patient have an appointment with their PCP,cardiologist,or clinic within 7 days of discharge?  Yes [Cardiology NP appt 1/29/21]   Comments regarding PCP  PCP Montse PATHAK. Hospital follow up scheduled for 2/1  130pm   Has the patient kept scheduled appointments due by today?  N/A   Has home health visited the patient within 72 hours of discharge?  N/A   Psychosocial issues?  No   Did the patient receive a copy of their discharge instructions?  Yes   Nursing interventions  Reviewed instructions with patient   What is the patient's perception of their health status since discharge?  Improving   Nursing interventions  Nurse provided patient education   Is the patient/caregiver able to teach back signs and symptoms of when to call for help immediately:  Sudden chest discomfort, Sudden discomfort in arms, back, neck or jaw, Shortness of breath at any time, Sudden sweating or clammy skin, Nausea or vomiting, Dizziness or lightheadedness, Irregular or rapid heart rate   Nursing  interventions  Nurse provided patient education   Is the pateint /caregiver able to teach back the importance of cardiac rehab?  Yes   Nursing interventions  Provided education on importance of cardiac rehab   Is the patient/caregiver able to teach back lifestyle changes to help prevent MIs  Quit smoking   Is the patient/caregiver able to teach back ways to prevent a second heart attack:  Take medications, Follow up with MD, Participate in Cardiac Rehab, Manage risk factors   If the patient is a current smoker, are they able to teach back resources for cessation?  Smoking cessation medications [Patient states that PCP had orderd chantix, but insurance would not pay for and it was >$400. ]   Is the patient/caregiver able to teach back the hierarchy of who to call/visit for symptoms/problems? PCP, Specialist, Home health nurse, Urgent Care, ED, 911  Yes   TCM call completed?  Yes          Aleida Rainey RN    1/25/2021, 12:46 EST

## 2021-01-29 ENCOUNTER — OFFICE VISIT (OUTPATIENT)
Dept: CARDIOLOGY | Facility: CLINIC | Age: 66
End: 2021-01-29

## 2021-01-29 VITALS
HEIGHT: 71 IN | DIASTOLIC BLOOD PRESSURE: 63 MMHG | OXYGEN SATURATION: 96 % | WEIGHT: 218.2 LBS | SYSTOLIC BLOOD PRESSURE: 124 MMHG | BODY MASS INDEX: 30.55 KG/M2 | HEART RATE: 84 BPM

## 2021-01-29 DIAGNOSIS — I21.4 NON-STEMI (NON-ST ELEVATED MYOCARDIAL INFARCTION) (HCC): ICD-10-CM

## 2021-01-29 DIAGNOSIS — Z98.61 CAD S/P PERCUTANEOUS CORONARY ANGIOPLASTY: ICD-10-CM

## 2021-01-29 DIAGNOSIS — R73.03 PREDIABETES: ICD-10-CM

## 2021-01-29 DIAGNOSIS — E78.2 MIXED HYPERLIPIDEMIA: ICD-10-CM

## 2021-01-29 DIAGNOSIS — I10 ESSENTIAL HYPERTENSION: ICD-10-CM

## 2021-01-29 DIAGNOSIS — J43.8 OTHER EMPHYSEMA (HCC): ICD-10-CM

## 2021-01-29 DIAGNOSIS — Z95.5 S/P DRUG ELUTING CORONARY STENT PLACEMENT: ICD-10-CM

## 2021-01-29 DIAGNOSIS — Z72.0 TOBACCO ABUSE: ICD-10-CM

## 2021-01-29 DIAGNOSIS — I25.10 CAD S/P PERCUTANEOUS CORONARY ANGIOPLASTY: ICD-10-CM

## 2021-01-29 DIAGNOSIS — I25.110 CORONARY ARTERY DISEASE INVOLVING NATIVE CORONARY ARTERY OF NATIVE HEART WITH UNSTABLE ANGINA PECTORIS (HCC): Primary | ICD-10-CM

## 2021-01-29 DIAGNOSIS — Z71.6 TOBACCO ABUSE COUNSELING: ICD-10-CM

## 2021-01-29 PROCEDURE — 93000 ELECTROCARDIOGRAM COMPLETE: CPT | Performed by: NURSE PRACTITIONER

## 2021-01-29 PROCEDURE — 99214 OFFICE O/P EST MOD 30 MIN: CPT | Performed by: NURSE PRACTITIONER

## 2021-01-29 RX ORDER — ROSUVASTATIN CALCIUM 10 MG/1
10 TABLET, COATED ORAL NIGHTLY
Qty: 30 TABLET | Refills: 2 | Status: SHIPPED | OUTPATIENT
Start: 2021-01-29 | End: 2021-04-22

## 2021-01-29 NOTE — PROGRESS NOTES
Date of Office Visit: 21  Encounter Provider: TEN Sweeney  Primary Cardiologist: Dr. Salazar  Place of Service: [facility] CARDIOLOGY  Patient Name: Prem Thrasher  :1955      Subjective:     Chief Complaint:  Hospital follow up NSTEMI s/p PTCA and RAZA for CAD    History of Present Illness:  Prem Thrasher is a pleasant 65 y.o. male who is new to me .  Outside records have been requested and reviewed by me if available.     This is a patient of Dr. Salazar with coronary artery disease prior stenting x 2 to mid and distal/apical LAD 2020, COPD, tobacco abuse, HTN, HLD, questionable Rheumatoid arthritis, first-degree AV block.    2020 patient presented to [facility] with unstable angina.  His LAD was diffusely diseased and he received stent to the mid LAD and apical LAD.  The remainder of his vessels showed mild to moderate nonobstructive disease.  His echo was normal at the time with an EF of 59%.  He had a slight rise in troponin at discharge but EKG remained unchanged and he was symptom-free.    He canceled his hospital follow-up after that due to Covid and was to follow back up in May in office.  Unfortunately 2020 patient presented back to [facility] ED with intermittent exertional chest pain heaviness and left arm heaviness ongoing for few days.  He took some additional CBD oil the night prior.  Troponins were negative x3 he had Nitropaste applied.  Chest x-ray was negative acute.  Apparently did have some improvement with his hour-long episodes with nitroglycerin at home.  2020 patient had a stress test that was abnormal but it was felt his defect was not significant enough to go back for cardiac catheterization at the time and medical management was recommended first.  He was placed on isosorbide which helped.    In follow-up he did well symptomatically but continued to smoke.  It looks like he did not tolerate  statins and was only maintained on Zetia.    1/21/2020 patient presented to [facility] ED with intermittent chest pain ongoing for a few days he was requiring several sublingual nitroglycerin it was a substernal pressure sensation with radiation to the arm and jaw typically lasting 15 minutes.  His troponins were elevated chest x-ray was negative for acute changes.  He was treated with Lovenox.  1/22/2021 patient went back for cardiac catheterization via right radial approach.  He had normal left main, 20% proximal LAD stenosis, widely patent mid LAD stent tortuous distal LAD with previous apical LAD stent that was occluded with some collateralization beyond the occlusion he received PTCA a distal LAD stent with slow MICHELLE II flow post intervention, ramus without significant disease normal proximal and mid left circumflex with 80 to 90% narrowing of the terminal marginal branch at the origin treated with drug-eluting stent placement there is some 20% narrowing of the mid OM 3 in the proximal portion but the other marginal branches are medium to small caliber did not have critical narrowing, tortuous RCA with plaquing in the proximal mid segments and a long 50% narrowing of the distal segment the PDA and JOSEF branch is small but normal.  It is felt the culprit lesion was the distal circumflex.  He had some spasm post procedure.  His EF was normal at 50 to 55% on LV gram without noted wall motion abnormality.      He is presenting today for hospital follow up NSTEMI s/p PTCA and RAZA for CAD.  Fortunately since his MI and recent stents he has had no more chest pain.  His breathing is a bit better but is always somewhat limited due to his chronic back and joint pain.  He does not believe he will ever quit smoking though he understands the risks.  He denies dizziness, lightheadedness, swelling.  His blood pressure is stable today.  He does have an appointment next week to start cardiac rehab believes his joint and  muscle pain will limit him.  He has been compliant with his medicines and denies bleeding issues on dual antiplatelet therapy.    Past Medical History:   Diagnosis Date   • Achilles tendon pain     LEFT   • Anxiety disorder    • CAD (coronary artery disease)    • COPD (chronic obstructive pulmonary disease) (CMS/HCC)    • CTS (carpal tunnel syndrome)    • Diverticulitis    • Diverticulitis of colon    • Diverticulosis    • Encounter for eye exam 10/2014    normal   • Exertional chest pain    • Fissure, anal    • History of bone density study 03/2014    Dr. Maria Antonia Lopez; normal   • History of chest x-ray 02/15/2011    also 3-6-15; normal chest   • History of colon polyps    • History of nuclear stress test 03/09/2015    cardiolite; Dr. Greenberg; negative   • History of pulmonary function tests 03/2015    COPD   • Hyperlipidemia    • Hypertension    • IFG (impaired fasting glucose)    • Low back pain    • Lumbosacral disc disease    • Nerve damage     KAYLYNN ELBOWS   • NSTEMI (non-ST elevated myocardial infarction) (CMS/[facility]) 01/2021   • Osteoarthritis, multiple sites    • Postoperative nausea and vomiting 8/1/2017   • Postoperative wound infection    • Rectal bleeding    • Rheumatoid arthritis (CMS/[facility])    • Sciatica    • Staph infection     WITH BACK SURGERY 2017  ON LONG TERM ANTIBIOTICS   • Unstable angina (CMS/[facility])      Past Surgical History:   Procedure Laterality Date   • ACHILLES TENDON SURGERY Left 7/3/2018    Procedure: Left Achilles debridement and secondary repair with partial excision of calcaneus. Possible left Achilles lengthening at the calf;  Surgeon: Vinay Smith MD;  Location: [facility] OR [facility];  Service: Orthopedics   • BACK SURGERY     • CARDIAC CATHETERIZATION N/A 3/7/2020    Procedure: Left Heart Cath;  Surgeon: Dioni Salazar MD;  Location: [facility] CATH INVASIVE LOCATION;  Service: Cardiology;  Laterality: N/A;   • CARDIAC CATHETERIZATION N/A 3/7/2020    Procedure: Coronary angiography;   Surgeon: Dioni Salazar MD;  Location:  [facility] CATH INVASIVE LOCATION;  Service: Cardiology;  Laterality: N/A;   • CARDIAC CATHETERIZATION N/A 3/7/2020    Procedure: Left ventriculography;  Surgeon: Dioni Salazar MD;  Location:  [facility] CATH INVASIVE LOCATION;  Service: Cardiology;  Laterality: N/A;   • CARDIAC CATHETERIZATION N/A 3/7/2020    Procedure: Stent RAZA coronary;  Surgeon: Dioni Salazar MD;  Location:  [facility] CATH INVASIVE LOCATION;  Service: Cardiology;  Laterality: N/A;   • CARDIAC CATHETERIZATION N/A 1/22/2021    Procedure: Left Heart Cath;  Surgeon: Dioni Salazar MD;  Location: [facility] CATH INVASIVE LOC[facility];  Service: Cardiology;  Laterality: N/A;   • CARDIAC CATHETERIZATION N/A 1/22/2021    Procedure: Coronary angiography;  Surgeon: Dioni Salazar MD;  Location[facility];  Service: Cardiology;  Laterality: N/A;   • CARDIAC CATHETERIZATION N/A 1/22/2021    Procedure: Left ventriculography;  Surgeon: Dioni Salazar MD;  Location: [facility] LOC[facility];  Service: Cardiology;  Laterality: N/A;   • CARDIAC CATHETERIZATION N/A 1/22/2021    Procedure: Percutaneous Coronary Intervention;  Surgeon: Dioni Salazar MD;  [facility];  Service: Cardiology;  Laterality: N/A;   • CARDIAC CATHETERIZATION N/A 1/22/2021    Procedure: Stent RAZA coronary;  Surgeon: Dioni Salazar MD;  Location: [facility] CATH INVASIVE LOCATION;  Service: Cardiology;  Laterality: N/A;   • COLONOSCOPY N/A 02/02/2011    Normal colon except scattered diverticulosis-Dr. Guero Blunt   • CYST REMOVAL  03/2016    x2  FROM FACE AND EAR   • DENTAL PROCEDURE  2008    teeth removed; dental implants   • EPIDURAL BLOCK  1995    L/S spine   • FINGER DEBRIDEMENT Right 07/12/2003    Debridement and full thickness skin grafting of the ring and small fingers of the right hand-Dr. Rayray Long   • INCISION AND DRAINAGE PERIRECTAL ABSCESS N/A 7/10/2018      Procedure: INCISION AND DRAINAGE OF PERIRECTAL ABSCESS;  Surgeon: Porsha Arcos MD;  Location: [facility] MAIN OR;  Service: General   • LUMBAR DISCECTOMY FUSION INSTRUMENTATION Left 10/10/2016    Procedure: LEFT L2-L3, L3-L4 LUMBAR LAMINECTOMY/ DISCECTOMY WITH METRIX ;  Surgeon: Sawyer Rick MD;  Location: [facility] OR;  Service:    • LUMBAR DISCECTOMY FUSION INSTRUMENTATION N/A 7/31/2017    Procedure: LUMBAR FUSION DECOMPRESSON WITH PEDICLE SCREWS with LAURA L2-3,L3-4;  Surgeon: Jacky Perez MD;  Location: [facility] OR;  Service:    • LUMBAR LAMINECTOMY DISCECTOMY DECOMPRESSION N/A 7/31/2017    Procedure: L2 to L4 laminectomy with neural lysis and a fusion by orthopedics;  Surgeon: Sawyer Rick MD;  Location: [facility] OR;  Service:    • LUMBAR LAMINECTOMY DISCECTOMY DECOMPRESSION N/A 9/5/2017    Procedure: I&D LUMBAR SPINE ;  Surgeon: Jacky Perez MD;  Location: [facility] OR;  Service:    • SHOULDER SURGERY Right 02/23/2011    Dr. Navarro   • SINUS SURGERY  2003    Dr. Barrera     Outpatient Medications Prior to Visit   Medication Sig Dispense Refill   • amLODIPine (NORVASC) 10 MG tablet TAKE 1 TABLET BY MOUTH DAILY FOR BLOOD PRESSURE 90 tablet 0   • aspirin  MG tablet Take 1 tablet by mouth Daily. 30 tablet 0   • carvedilol (COREG) 12.5 MG tablet TAKE 1 TABLET 2  TIMES A DAY WITH MEALS FOR BLOOD PRESSURE  180 tablet 0   • CBD (cannabidiol) oral oil Take  by mouth Daily.     • Cholecalciferol (VITAMIN D) 2000 UNITS tablet Take 2,000 Units by mouth Every Evening.     • clopidogrel (PLAVIX) 75 MG tablet Take 1 tablet by mouth Daily. 90 tablet 2   • Cyanocobalamin (Vitamin B-12) 1000 MCG sublingual tablet DISSOLVE 1 TABLET UNDER TONGUE DAILY 90 each 0   • cyclobenzaprine (FLEXERIL) 10 MG tablet TAKE 1 TABLET THREE TIMES DAILY AS NEEDED  FOR  MUSCLE  SPASMS (Patient taking differently: Take 10 mg by mouth 2 (Two) Times a Day As Needed for Muscle Spasms.) 270 tablet 3   • docusate sodium  (COLACE) 100 MG capsule Take 100 mg by mouth Daily.     • escitalopram (LEXAPRO) 20 MG tablet TAKE 1 TABLET BY MOUTH EVERY NIGHT FOR ANXIETY 90 tablet 3   • ezetimibe (ZETIA) 10 MG tablet TAKE 1 TABLET BY MOUTH EVERY EVENING FOR CHOLESTEROL 90 tablet 3   • folic acid (FOLVITE) 1 MG tablet TAKE 1 TABLET EVERY DAY 90 tablet 0   • irbesartan (AVAPRO) 300 MG tablet TAKE 1 TABLET EVERY DAY FOR BLOOD PRESSURE  (NEED  APPOINMENT) 90 tablet 1   • isosorbide mononitrate (IMDUR) 30 MG 24 hr tablet TAKE 1 TABLET EVERY DAY 90 tablet 3   • leflunomide (ARAVA) 20 MG tablet Take 20 mg by mouth Every Morning.     • nitroglycerin (NITROSTAT) 0.4 MG SL tablet Place 1 tablet under the tongue Every 5 (Five) Minutes As Needed for Chest Pain. Take no more than 3 doses in 15 minutes. 25 tablet 1   • tiotropium (SPIRIVA) 18 MCG per inhalation capsule Place 1 capsule into inhaler and inhale Daily. For COPD 90 capsule 3     No facility-administered medications prior to visit.        Allergies as of 01/29/2021 - Reviewed 01/29/2021   Allergen Reaction Noted   • Atorvastatin Myalgia 12/17/2015   • Ceclor [cefaclor] Rash 12/17/2015   • Methotrexate derivatives Rash 09/05/2017     Social History     Socioeconomic History   • Marital status:      Spouse name: Not on file   • Number of children: Not on file   • Years of education: Not on file   • Highest education level: Not on file   Tobacco Use   • Smoking status: Current Every Day Smoker     Packs/day: 1.00     Years: 45.00     Pack years: 45.00     Types: Cigarettes   • Smokeless tobacco: Never Used   • Tobacco comment: no caffeine    Substance and Sexual Activity   • Alcohol use: No   • Drug use: No   • Sexual activity: Not Currently     Partners: Female     Birth control/protection: None     Family History   Problem Relation Age of Onset   • Diabetes Mother    • Heart disease Mother    • Hyperlipidemia Mother    • Stroke Mother    • Heart disease Father    • Rheum arthritis Father     "  • Alcohol abuse Father    • Hyperlipidemia Father    • Depression Brother    • Cancer Brother         prostate   • Depression Brother    • Heart disease Brother    • Hyperlipidemia Brother    • Cancer Brother    • Thyroid disease Sister    • Malig Hyperthermia Neg Hx      Review of Systems   Constitution: Positive for malaise/fatigue. Negative for chills and fever.   HENT: Negative for ear pain, hearing loss, nosebleeds and sore throat.    Eyes: Negative for double vision, pain, vision loss in left eye and vision loss in right eye.   Cardiovascular: Positive for dyspnea on exertion. Negative for chest pain, irregular heartbeat, leg swelling, near-syncope, orthopnea, palpitations, paroxysmal nocturnal dyspnea and syncope.   Respiratory: Positive for shortness of breath. Negative for cough, snoring and wheezing.    Endocrine: Negative for cold intolerance and heat intolerance.   Hematologic/Lymphatic: Negative for bleeding problem.   Skin: Negative for color change, itching, rash and unusual hair distribution.   Musculoskeletal: Positive for arthritis, back pain, joint pain, joint swelling and myalgias.   Gastrointestinal: Negative for abdominal pain, diarrhea, hematochezia, melena, nausea and vomiting.   Genitourinary: Positive for frequency. Negative for decreased libido, hematuria, hesitancy and incomplete emptying.   Neurological: Positive for numbness (left leg post back surgery) and paresthesias (left leg post back surgery). Negative for excessive daytime sleepiness, dizziness, headaches, light-headedness, loss of balance and seizures.   Psychiatric/Behavioral: Negative for depression. The patient is nervous/anxious.           Objective:     Vitals:    01/29/21 1126 01/29/21 1211   BP: 124/63    BP Location: Right arm    Patient Position: Sitting    Pulse: 84    SpO2:  96%   Weight: 99 kg (218 lb 3.2 oz)    Height: 180.3 cm (71\")      Body mass index is 30.43 kg/m².    PHYSICAL EXAM:  Vitals signs and nursing " note reviewed.   Constitutional:       General: Not in acute distress.     Appearance: Well-developed and not in distress. Not diaphoretic.   Eyes:      Comments: Wearing glasses   HENT:      Head: Normocephalic and atraumatic.   Neck:      Vascular: No carotid bruit or JVD.   Pulmonary:      Effort: No respiratory distress.      Breath sounds: Decreased breath sounds (slightly) present. No rhonchi. No rales.      Comments: Patient gets dyspneic with significant exertion such as bending or turning relates this to pain  Cardiovascular:      Normal rate. Regular rhythm. Normal S1. Normal S2.      Murmurs: There is no murmur.      Comments: Right radial cath site well healed without erythema or ecchymosis, palpable proximal and distal pulses, good capillary refill, good motor function of hand, no thrill or bruit detected, site is soft and non-tender with no hematoma, no sensory deficits noted.  Feet are warm to touch    Pulses:     Radial: 2+ bilaterally.     Dorsalis pedis: 2+ bilaterally.  Edema:     Peripheral edema absent.   Abdominal:      General: Bowel sounds are normal. There is no distension.      Palpations: Abdomen is soft.      Tenderness: There is no abdominal tenderness.   Musculoskeletal:         General: Tenderness present.   Skin:     General: Skin is warm and dry.      Findings: No erythema.   Neurological:      Mental Status: Alert and oriented to person, place, and time.   Psychiatric:         Attention and Perception: Attention normal.         Mood and Affect: Mood normal.         Speech: Speech normal.         Behavior: Behavior normal.         Thought Content: Thought content normal.         Cognition and Memory: Cognition normal.         Judgment: Judgment normal.           ECG 12 Lead    Date/Time: 1/29/2021 11:29 AM  Performed by: Jayda Balbuena APRN  Authorized by: Jayda Balbuena APRN   Comparison: compared with previous ECG from 1/23/2021  Rhythm: sinus rhythm  Rate: normal  BPM:  84  Conduction: 1st degree AV block  QRS axis: normal  Other findings: low voltage    Clinical impression: abnormal EKG  Comments: Indication: CAD, recent MI            Most recent lab review:  1/23/2020 BMP normal, 1/22/2021 CBC normal, lipid panel , HDL 34, hemoglobin A1c 5.80%  1/21/2020 CMP normal except for glucose 128, COVID-19 testing negative  6/4/2020 normal TSH, free T4 3 and free T4    Assessment:       Diagnosis Plan   1. Coronary artery disease involving native coronary artery of native heart with unstable angina pectoris (CMS/[facility])  ECG 12 Lead   2. Non-STEMI (non-ST elevated myocardial infarction) (CMS/[facility])  ECG 12 Lead   3. CAD S/P percutaneous coronary angioplasty  ECG 12 Lead   4. S/P drug eluting coronary stent placement  ECG 12 Lead   5. Mixed hyperlipidemia  ECG 12 Lead   6. Essential hypertension  ECG 12 Lead   7. Prediabetes  ECG 12 Lead   8. Tobacco abuse  ECG 12 Lead   9. Tobacco abuse counseling  ECG 12 Lead   10. Other emphysema (CMS/[facility])  ECG 12 Lead       Plan:     1.  Coronary artery disease: previous stent procedure using RAZA Mid and distal LAD, March 2020, 1/2021 had non-STEMI was found to have a 90% distal circumflex lesion along with a 100% stenosed distal LAD stent.  He underwent drug-eluting stenting of the distal left circumflex and PTCA of the distal LAD stent.  DAPT recommended indefinitely. Continue BB, imdur, zetia.  2. Hyperlipidemia: labs not at goal on Zetia.  Goal LDL less than 70.  He had an intolerance to Lipitor but denies ever trying any other statins.  I do not think we will get a PCSK9 inhibitor approved for him until we try another statin.  I will place him on rosuvastatin 10 mg a day but he will start by taking 1/2 pill daily for a week and if he tolerates it without worsening joint and muscle pain from his baseline he will go up to a full pill a day.  3.  Hypertension: BP is stable today.  Encouraged him to monitor at home and reviewed with him blood  pressure goals.  4.  Prediabetes: Defer management to PCP.  Work on diet try to exercise.  5.  Tobacco abuse: He has been counseled.  He states Chantix was too expensive.  He is work on cutting back but he does not believe he will ever quit.  He will discuss possible Wellbutrin with his PCP.  6.  COPD: He thinks he sees a pulmonologist once a year but is not on any therapy.  Defer.  No apparent wheezes today he is not hypoxic.  7.  Chronic joint, back and muscle pain: Unfortunately this is very lifestyle limiting for him and many movements cause him a lot of pain and make him out of breath due to pain.  He takes over-the-counter Advil sometimes Tylenol.  I advised him against frequent use of NSAIDs with his cardiac meds and cardiac condition.  He verbalized understanding.  8.  First-degree AV block: ECG overall unchanged      I advised on the importance of medication compliance, good blood pressure, blood sugar and cholesterol control, as well as heart healthy diet, regular exercise and avoidance of tobacco for cardiovascular disease risk factor modification.   He has appointment to start cardiac rehab next week.  Continue current management but add rosuvastatin for risk factor modification.  He will call with any intolerances.  He was advised not to stop dual antiplatelet therapy for any reason unless he discusses with their office first.  He will call with recurrent symptoms.        Follow up with Dr. Salazar on 2/23/2021 as scheduled, unless otherwise needed sooner.  I advised the patient to contact our office with any questions or concerns.      It has been a pleasure to participate in this patient's care. Please feel free to contact me with any questions or concerns.     Jayda Balbuena, TEN  01/29/21             Your medication list          Accurate as of January 29, 2021 12:11 PM. If you have any questions, ask your nurse or doctor.            CHANGE how you take these medications      Instructions Last  Dose Given Next Dose Due   cyclobenzaprine 10 MG tablet  Commonly known as: FLEXERIL  What changed: when to take this      TAKE 1 TABLET THREE TIMES DAILY AS NEEDED  FOR  MUSCLE  SPASMS          CONTINUE taking these medications      Instructions Last Dose Given Next Dose Due   amLODIPine 10 MG tablet  Commonly known as: NORVASC      TAKE 1 TABLET BY MOUTH DAILY FOR BLOOD PRESSURE       aspirin  MG tablet      Take 1 tablet by mouth Daily.       carvedilol 12.5 MG tablet  Commonly known as: COREG      TAKE 1 TABLET 2  TIMES A DAY WITH MEALS FOR BLOOD PRESSURE       CBD oral oil  Commonly known as: cannabidiol      Take  by mouth Daily.       clopidogrel 75 MG tablet  Commonly known as: PLAVIX      Take 1 tablet by mouth Daily.       docusate sodium 100 MG capsule  Commonly known as: COLACE      Take 100 mg by mouth Daily.       escitalopram 20 MG tablet  Commonly known as: LEXAPRO      TAKE 1 TABLET BY MOUTH EVERY NIGHT FOR ANXIETY       ezetimibe 10 MG tablet  Commonly known as: ZETIA      TAKE 1 TABLET BY MOUTH EVERY EVENING FOR CHOLESTEROL       folic acid 1 MG tablet  Commonly known as: FOLVITE      TAKE 1 TABLET EVERY DAY       irbesartan 300 MG tablet  Commonly known as: AVAPRO      TAKE 1 TABLET EVERY DAY FOR BLOOD PRESSURE  (NEED  APPOINMENT)       isosorbide mononitrate 30 MG 24 hr tablet  Commonly known as: IMDUR      TAKE 1 TABLET EVERY DAY       leflunomide 20 MG tablet  Commonly known as: ARAVA      Take 20 mg by mouth Every Morning.       nitroglycerin 0.4 MG SL tablet  Commonly known as: Nitrostat      Place 1 tablet under the tongue Every 5 (Five) Minutes As Needed for Chest Pain. Take no more than 3 doses in 15 minutes.       Vitamin B-12 1000 MCG sublingual tablet      DISSOLVE 1 TABLET UNDER TONGUE DAILY       Vitamin D 50 MCG (2000 UT) tablet      Take 2,000 Units by mouth Every Evening.              The above medication changes may not have been made by this provider.  Medication list was  updated to reflect medications patient is currently taking including medication changes and discontinuations made by other healthcare providers or the patients themselves.     Dictated utilizing Dragon Dictation System.

## 2021-02-01 ENCOUNTER — READMISSION MANAGEMENT (OUTPATIENT)
Dept: CALL CENTER | Facility: HOSPITAL | Age: 66
End: 2021-02-01

## 2021-02-01 ENCOUNTER — OFFICE VISIT (OUTPATIENT)
Dept: FAMILY MEDICINE CLINIC | Facility: CLINIC | Age: 66
End: 2021-02-01

## 2021-02-01 VITALS
WEIGHT: 213 LBS | SYSTOLIC BLOOD PRESSURE: 117 MMHG | DIASTOLIC BLOOD PRESSURE: 60 MMHG | RESPIRATION RATE: 16 BRPM | HEIGHT: 71 IN | HEART RATE: 77 BPM | BODY MASS INDEX: 29.82 KG/M2 | TEMPERATURE: 97.5 F | OXYGEN SATURATION: 100 %

## 2021-02-01 DIAGNOSIS — Z12.5 SCREENING PSA (PROSTATE SPECIFIC ANTIGEN): ICD-10-CM

## 2021-02-01 DIAGNOSIS — Z12.11 SCREEN FOR COLON CANCER: ICD-10-CM

## 2021-02-01 DIAGNOSIS — E78.2 MIXED HYPERLIPIDEMIA: Chronic | ICD-10-CM

## 2021-02-01 DIAGNOSIS — Z72.0 TOBACCO ABUSE: ICD-10-CM

## 2021-02-01 DIAGNOSIS — I10 ESSENTIAL HYPERTENSION: Primary | Chronic | ICD-10-CM

## 2021-02-01 DIAGNOSIS — F33.42 RECURRENT MAJOR DEPRESSIVE DISORDER, IN FULL REMISSION (HCC): Chronic | ICD-10-CM

## 2021-02-01 DIAGNOSIS — E55.9 VITAMIN D DEFICIENCY, UNSPECIFIED: ICD-10-CM

## 2021-02-01 DIAGNOSIS — E53.8 LOW SERUM VITAMIN B12: Chronic | ICD-10-CM

## 2021-02-01 DIAGNOSIS — M54.16 LUMBAR RADICULOPATHY: Chronic | ICD-10-CM

## 2021-02-01 DIAGNOSIS — M05.79 RHEUMATOID ARTHRITIS INVOLVING MULTIPLE SITES WITH POSITIVE RHEUMATOID FACTOR (HCC): Chronic | ICD-10-CM

## 2021-02-01 DIAGNOSIS — R73.01 IFG (IMPAIRED FASTING GLUCOSE): Chronic | ICD-10-CM

## 2021-02-01 DIAGNOSIS — J43.8 OTHER EMPHYSEMA (HCC): Chronic | ICD-10-CM

## 2021-02-01 PROCEDURE — G0439 PPPS, SUBSEQ VISIT: HCPCS | Performed by: PHYSICIAN ASSISTANT

## 2021-02-01 PROCEDURE — 99214 OFFICE O/P EST MOD 30 MIN: CPT | Performed by: PHYSICIAN ASSISTANT

## 2021-02-01 RX ORDER — IRBESARTAN 300 MG/1
300 TABLET ORAL DAILY
Qty: 90 TABLET | Refills: 1 | Status: SHIPPED | OUTPATIENT
Start: 2021-02-01 | End: 2021-07-10

## 2021-02-01 RX ORDER — CYCLOBENZAPRINE HCL 10 MG
10 TABLET ORAL 3 TIMES DAILY PRN
Qty: 270 TABLET | Refills: 3 | Status: SHIPPED | OUTPATIENT
Start: 2021-02-01 | End: 2021-10-27 | Stop reason: SDUPTHER

## 2021-02-01 RX ORDER — FOLIC ACID 1 MG/1
1000 TABLET ORAL DAILY
Qty: 90 TABLET | Refills: 3 | Status: SHIPPED | OUTPATIENT
Start: 2021-02-01 | End: 2021-12-05

## 2021-02-01 RX ORDER — GABAPENTIN 300 MG/1
CAPSULE ORAL
Qty: 270 CAPSULE | Refills: 1 | Status: SHIPPED | OUTPATIENT
Start: 2021-02-01 | End: 2021-10-27

## 2021-02-01 RX ORDER — AMLODIPINE BESYLATE 10 MG/1
10 TABLET ORAL DAILY
Qty: 90 TABLET | Refills: 1 | Status: SHIPPED | OUTPATIENT
Start: 2021-02-01 | End: 2021-09-06

## 2021-02-01 RX ORDER — ALBUTEROL SULFATE 90 UG/1
2 AEROSOL, METERED RESPIRATORY (INHALATION) EVERY 4 HOURS PRN
Qty: 54 G | Refills: 3 | Status: SHIPPED | OUTPATIENT
Start: 2021-02-01

## 2021-02-01 RX ORDER — EZETIMIBE 10 MG/1
10 TABLET ORAL EVERY EVENING
Qty: 90 TABLET | Refills: 3 | Status: SHIPPED | OUTPATIENT
Start: 2021-02-01 | End: 2021-10-27 | Stop reason: SDUPTHER

## 2021-02-01 RX ORDER — MAGNESIUM 200 MG
TABLET ORAL
Qty: 90 EACH | Refills: 3 | Status: SHIPPED | OUTPATIENT
Start: 2021-02-01 | End: 2021-12-05

## 2021-02-01 NOTE — OUTREACH NOTE
AMI Week 2 Survey      Responses   [facility] patient discharged from?  [place]   Does the patient have one of the following disease processes/diagnoses(primary or secondary)?  Acute MI (STEMI,NSTEMI)   Week 2 attempt successful?  No   Unsuccessful attempts  Attempt 1          Bessie Fay LPN

## 2021-02-01 NOTE — PROGRESS NOTES
"Transitional Care Follow Up Visit  Subjective    Since the last visit, he has overall felt tired.  He has Essential Hypertension and well controlled on current medication, Impaired fasting glucose and will monitor labs to watch for DMII, CAD and remains under care of their Cardiologist for mangement, Vitamin D deficiency and will update labs for continued management and mixed hyperlipidemia---trying Crestor at 1/2 tab and so far doing ok.  he has been compliant with current medications have reviewed them.  The patient denies medication side effects.  Will refill medications. /60 (BP Location: Left arm, Patient Position: Sitting, Cuff Size: Adult)   Pulse 77   Temp 97.5 °F (36.4 °C)   Resp 16   Ht 180.3 cm (70.98\")   Wt 96.6 kg (213 lb)   SpO2 100%   BMI 29.72 kg/m²     Results for orders placed or performed during the hospital encounter of 01/21/21   COVID-19,[facility]A PANTHER,[facility] IN-HOUSE, NP/OP SWAB IN UTM/VTM/[facility] TRANSPORT MEDIA,24 HR TAT - Swab, Nasopharynx    Specimen: Nasopharynx; Swab   Result Value Ref Range    COVID19 Not Detected Not Detected - Ref. Range   Comprehensive Metabolic Panel    Specimen: Blood   Result Value Ref Range    Glucose 108 (H) 65 - 99 mg/dL    BUN 17 8 - 23 mg/dL    Creatinine 1.19 0.76 - 1.27 mg/dL    Sodium 138 136 - 145 mmol/L    Potassium 4.0 3.5 - 5.2 mmol/L    Chloride 100 98 - 107 mmol/L    CO2 26.4 22.0 - 29.0 mmol/L    Calcium 9.4 8.6 - 10.5 mg/dL    Total Protein 6.9 6.0 - 8.5 g/dL    Albumin 4.40 3.50 - 5.20 g/dL    ALT (SGPT) 18 1 - 41 U/L    AST (SGOT) 19 1 - 40 U/L    Alkaline Phosphatase 61 39 - 117 U/L    Total Bilirubin 0.4 0.0 - 1.2 mg/dL    eGFR Non African Amer 61 >60 mL/min/1.73    Globulin 2.5 gm/dL    A/G Ratio 1.8 g/dL    BUN/Creatinine Ratio 14.3 7.0 - 25.0    Anion Gap 11.6 5.0 - 15.0 mmol/L   Troponin    Specimen: Blood   Result Value Ref Range    Troponin T 0.032 (C) 0.000 - 0.030 ng/mL   Troponin    Specimen: Blood   Result Value Ref Range    " Troponin T 0.025 0.000 - 0.030 ng/mL   CBC Auto Differential    Specimen: Blood   Result Value Ref Range    WBC 9.84 3.40 - 10.80 10*3/mm3    RBC 4.69 4.14 - 5.80 10*6/mm3    Hemoglobin 13.9 13.0 - 17.7 g/dL    Hematocrit 42.5 37.5 - 51.0 %    MCV 90.6 79.0 - 97.0 fL    MCH 29.6 26.6 - 33.0 pg    MCHC 32.7 31.5 - 35.7 g/dL    RDW 12.9 12.3 - 15.4 %    RDW-SD 41.9 37.0 - 54.0 fl    MPV 9.9 6.0 - 12.0 fL    Platelets 278 140 - 450 10*3/mm3    Neutrophil % 69.5 42.7 - 76.0 %    Lymphocyte % 16.8 (L) 19.6 - 45.3 %    Monocyte % 9.2 5.0 - 12.0 %    Eosinophil % 2.7 0.3 - 6.2 %    Basophil % 1.3 0.0 - 1.5 %    Immature Grans % 0.5 0.0 - 0.5 %    Neutrophils, Absolute 6.83 1.70 - 7.00 10*3/mm3    Lymphocytes, Absolute 1.65 0.70 - 3.10 10*3/mm3    Monocytes, Absolute 0.91 (H) 0.10 - 0.90 10*3/mm3    Eosinophils, Absolute 0.27 0.00 - 0.40 10*3/mm3    Basophils, Absolute 0.13 0.00 - 0.20 10*3/mm3    Immature Grans, Absolute 0.05 0.00 - 0.05 10*3/mm3    nRBC 0.0 0.0 - 0.2 /100 WBC   Basic Metabolic Panel    Specimen: Blood   Result Value Ref Range    Glucose 82 65 - 99 mg/dL    BUN 22 8 - 23 mg/dL    Creatinine 1.09 0.76 - 1.27 mg/dL    Sodium 138 136 - 145 mmol/L    Potassium 4.0 3.5 - 5.2 mmol/L    Chloride 106 98 - 107 mmol/L    CO2 24.1 22.0 - 29.0 mmol/L    Calcium 8.9 8.6 - 10.5 mg/dL    eGFR Non African Amer 68 >60 mL/min/1.73    BUN/Creatinine Ratio 20.2 7.0 - 25.0    Anion Gap 7.9 5.0 - 15.0 mmol/L   Hemoglobin A1c    Specimen: Blood   Result Value Ref Range    Hemoglobin A1C 5.80 (H) 4.80 - 5.60 %   Lipid Panel    Specimen: Blood   Result Value Ref Range    Total Cholesterol 161 0 - 200 mg/dL    Triglycerides 96 0 - 150 mg/dL    HDL Cholesterol 34 (L) 40 - 60 mg/dL    LDL Cholesterol  109 (H) 0 - 100 mg/dL    VLDL Cholesterol 18 5 - 40 mg/dL    LDL/HDL Ratio 3.17    CBC (No Diff)    Specimen: Blood   Result Value Ref Range    WBC 8.47 3.40 - 10.80 10*3/mm3    RBC 4.50 4.14 - 5.80 10*6/mm3    Hemoglobin 13.4 13.0 -  17.7 g/dL    Hematocrit 39.8 37.5 - 51.0 %    MCV 88.4 79.0 - 97.0 fL    MCH 29.8 26.6 - 33.0 pg    MCHC 33.7 31.5 - 35.7 g/dL    RDW 12.4 12.3 - 15.4 %    RDW-SD 40.1 37.0 - 54.0 fl    MPV 10.1 6.0 - 12.0 fL    Platelets 268 140 - 450 10*3/mm3   Troponin    Specimen: Blood   Result Value Ref Range    Troponin T 0.038 (C) 0.000 - 0.030 ng/mL   Troponin    Specimen: Blood   Result Value Ref Range    Troponin T 0.092 (C) 0.000 - 0.030 ng/mL   Basic Metabolic Panel    Specimen: Blood   Result Value Ref Range    Glucose 82 65 - 99 mg/dL    BUN 18 8 - 23 mg/dL    Creatinine 1.12 0.76 - 1.27 mg/dL    Sodium 139 136 - 145 mmol/L    Potassium 3.9 3.5 - 5.2 mmol/L    Chloride 105 98 - 107 mmol/L    CO2 22.1 22.0 - 29.0 mmol/L    Calcium 9.2 8.6 - 10.5 mg/dL    eGFR Non African Amer 66 >60 mL/min/1.73    BUN/Creatinine Ratio 16.1 7.0 - 25.0    Anion Gap 11.9 5.0 - 15.0 mmol/L   POC Activated Clotting Time    Specimen: Blood   Result Value Ref Range    Activated Clotting Time  268 (H) 82 - 152 Seconds   POC Activated Clotting Time    Specimen: Blood   Result Value Ref Range    Activated Clotting Time  632 (H) 82 - 152 Seconds   ECG 12 Lead   Result Value Ref Range    QT Interval 374 ms   ECG 12 Lead   Result Value Ref Range    QT Interval 406 ms   ECG 12 Lead   Result Value Ref Range    QT Interval 421 ms   Light Blue Top   Result Value Ref Range    Extra Tube hold for add-on    Green Top (Gel)   Result Value Ref Range    Extra Tube Hold for add-ons.    Lavender Top   Result Value Ref Range    Extra Tube hold for add-on    Gold Top - SST   Result Value Ref Range    Extra Tube Hold for add-ons.      Chronic pain -----really a problem---start Gabapentin trial      Prem Thrasher is a 65 y.o. male who presents for a transitional care management visit.    Within 48 business hours after discharge our office contacted him via telephone to coordinate his care and needs.      I reviewed and discussed the details of that call along  with the discharge summary, hospital problems, inpatient lab results, inpatient diagnostic studies, and consultation reports with Prem.     Current outpatient and discharge medications have been reconciled for the patient.  Reviewed by: Montse Cain PA-C      Date of TCM Phone Call 1/23/2021   [facility]   Date of Admission 1/21/2021   Date of Discharge 1/23/2021   Discharge Disposition Home or Self Care     Risk for Readmission (LACE) Score: 11 (1/23/2021  6:00 AM)      History of Present Illness   Course During Hospital Stay:  1-21 to 1-23-21  NSTEMI  Had f/u cardio 1-29-21 and note CAC and prior stenting x 2 to mid and distal/apical LAD March 2020.  Went to ER 1-21-21 with chest pain and admitted. Heart cath 1-22-21:He had normal left main, 20% proximal LAD stenosis, widely patent mid LAD stent tortuous distal LAD with previous apical LAD stent that was occluded with some collateralization beyond the occlusion he received PTCA a distal LAD stent with slow MICHELLE II flow post intervention, ramus without significant disease normal proximal and mid left circumflex with 80 to 90% narrowing of the terminal marginal branch at the origin treated with drug-eluting stent placement there is some 20% narrowing of the mid OM 3 in the proximal portion but the other marginal branches are medium to small caliber did not have critical narrowing, tortuous RCA with plaquing in the proximal mid segments and a long 50% narrowing of the distal segment the PDA and JOSEF branch is small but normal.  It is felt the culprit lesion was the distal circumflex.  He had some spasm post procedure.  His EF was normal at 50 to 55% on LV gram without noted wall motion abnormality.  Had stent placed 1-21-21 distal LAD.   Agree--need PCSK9----had to fail another statin first and thus Rx Crestor 10mg daily---taking 1/2 tab    Has 3 stents now  Tolerating Crestor so far  He has RA---Dr Lopez  Stop smoking  Chronic back pain  "with radiation down left leg numb to sit;  Left foot stays numb.; had prior surgery    Sees pulm for --saw DR Mcdonald for COPD and moderate asthma; will send Albuterol; he wants to wait on Sprivia  I will want labs again in June    Prem Thrasher male 65 y.o., /60 (BP Location: Left arm, Patient Position: Sitting, Cuff Size: Adult)   Pulse 77   Temp 97.5 °F (36.4 °C)   Resp 16   Ht 180.3 cm (70.98\")   Wt 96.6 kg (213 lb)   SpO2 100%   BMI 29.72 kg/m²   who presents today for follow up of Anxiety.  He reports medication is working well, patient desires to continue on Rx, and needs refill. Onset of symptoms was approximately several years ago.  He denies current suicidal and homicidal ideation. Risk factors are family history of anxiety and or depression and lifestyle of multiple roles. Chronic pain and illness.  Previous treatment includes current Rx.  He complains of the following medication side effects: none. The patient has had no previous counseling..    Due for colonoscopy with DR Blunt    The patient has read and signed the [facility] Controlled Substance Contract.  I will continue to see patient for regular follow up appointments.  They are well controlled on their medication.  JESSICA has been reviewed by me and is updated every 3 months. The patient is aware of the potential for addiction and dependence.    The following portions of the patient's history were reviewed and updated as appropriate: allergies, current medications, past family history, past medical history, past social history, past surgical history and problem list.    Review of Systems   Constitutional: Positive for fatigue. Negative for activity change, appetite change and unexpected weight change.   HENT: Negative for nosebleeds and trouble swallowing.    Eyes: Negative for pain and visual disturbance.   Respiratory: Negative for chest tightness, shortness of breath and wheezing.    Cardiovascular: Negative for chest pain " and palpitations.   Gastrointestinal: Negative for abdominal pain and blood in stool.   Endocrine: Negative.    Genitourinary: Negative for difficulty urinating and hematuria.   Musculoskeletal: Positive for arthralgias, back pain and myalgias. Negative for joint swelling.   Skin: Negative for color change and rash.   Allergic/Immunologic: Negative.    Neurological: Positive for weakness. Negative for syncope and speech difficulty.   Hematological: Negative for adenopathy.   Psychiatric/Behavioral: Positive for sleep disturbance. Negative for agitation and confusion. The patient is nervous/anxious and is hyperactive.    All other systems reviewed and are negative.      Objective   Physical Exam  Vitals signs and nursing note reviewed.   Constitutional:       General: He is not in acute distress.     Appearance: He is well-developed. He is not diaphoretic.   HENT:      Head: Normocephalic.      Right Ear: Tympanic membrane normal.      Left Ear: Tympanic membrane normal.   Eyes:      General: No scleral icterus.     Conjunctiva/sclera: Conjunctivae normal.      Pupils: Pupils are equal, round, and reactive to light.   Neck:      Musculoskeletal: Normal range of motion and neck supple.      Vascular: No carotid bruit.   Cardiovascular:      Rate and Rhythm: Normal rate and regular rhythm.      Heart sounds: Normal heart sounds. No murmur.   Pulmonary:      Effort: Pulmonary effort is normal.      Breath sounds: Wheezing present. No rales.   Musculoskeletal: Normal range of motion.      Right lower leg: No edema.      Left lower leg: No edema.   Skin:     General: Skin is warm and dry.      Findings: No rash.   Neurological:      Mental Status: He is alert and oriented to person, place, and time.   Psychiatric:         Mood and Affect: Affect is not inappropriate.         Behavior: Behavior normal.         Thought Content: Thought content normal.         Judgment: Judgment normal.         Assessment/Plan   Diagnoses  and all orders for this visit:    1. Essential hypertension (Primary)    2. Rheumatoid arthritis involving multiple sites with positive rheumatoid factor (CMS/HCC)    3. Recurrent major depressive disorder, in full remission (CMS/[facility])    4. Mixed hyperlipidemia    5. IFG (impaired fasting glucose)    6. Low serum vitamin B12    7. Lumbar radiculopathy    8. Other emphysema (CMS/HCC)    Other orders  -     albuterol sulfate  (90 Base) MCG/ACT inhaler; Inhale 2 puffs Every 4 (Four) Hours As Needed for Wheezing or Shortness of Air.  Dispense: 54 g; Refill: 3    declines CT low dose chest  Plan, Prem Thrasher, was seen today.  he was seen for HTN and continue medication, Imparied fasting glucose and plan follow up labs, diet, and exercise, Hyperlipidemia with new medication plan, CAD and is under care of their Cardiologist for medical management and Vitamin D deficiency and will update labs .  Try Gabapentin for lumbar neuropathy---taper up to TID  Cont Lexapro for anxiety and helps  Albuterol MDI PRN; suggest Spiriva  Taking Crestor so far  F/u Rheumatologist for RA  Stop smoking  Labs for me June  Suggest Flonase  Update colon cancer screening

## 2021-02-01 NOTE — PROGRESS NOTES
The ABCs of the Annual Wellness Visit  Subsequent Medicare Wellness Visit    No chief complaint on file.      Subjective   History of Present Illness:  Prem Thrasher is a 65 y.o. male who presents for a Subsequent Medicare Wellness Visit.    HEALTH RISK ASSESSMENT    Recent Hospitalizations:  [facility] at the [facility]    Current Medical Providers:  Patient Care Team:  Montse Cain PA-C as PCP - General (Family Medicine)  Maria Antonia Lopez MD as Consulting Physician (Rheumatology)  Sawyer Rick MD as Surgeon (Neurosurgery)  Pradip Mcmillan MD as Consulting Physician (Pulmonary Disease)  Jacky Perez MD as Surgeon (Orthopedic Surgery)  Charli Sen MD as Consulting Physician (Infectious Diseases)  Tray Moody MD as Consulting Physician (Urology)  Suman Richards DPM as Consulting Physician (Podiatry)  Porsha Arcos MD as Consulting Physician (General Surgery)  Vinay Smith MD as Surgeon (Orthopedic Surgery)  Eitan Cuevas MD as Consulting Physician (Plastic Surgery)  Dioni Salazar MD as Consulting Physician (Cardiology)    Smoking Status:  Social History     Tobacco Use   Smoking Status Current Every Day Smoker   • Packs/day: 1.00   • Years: 45.00   • Pack years: 45.00   • Types: Cigarettes   Smokeless Tobacco Never Used   Tobacco Comment    no caffeine        Alcohol Consumption:  Social History     Substance and Sexual Activity   Alcohol Use No       Depression Screen:   PHQ-2/PHQ-9 Depression Screening 2/1/2021   Little interest or pleasure in doing things 1   Feeling down, depressed, or hopeless 1   Trouble falling or staying asleep, or sleeping too much 1   Feeling tired or having little energy 1   Poor appetite or overeating 1   Feeling bad about yourself - or that you are a failure or have let yourself or your family down 1   Trouble concentrating on things, such as reading the newspaper or watching television  1   Moving or speaking so slowly that other people could have noticed. Or the opposite - being so fidgety or restless that you have been moving around a lot more than usual 0   Thoughts that you would be better off dead, or of hurting yourself in some way 0   Total Score 7       Fall Risk Screen:  JOSE MARIA Fall Risk Assessment has not been completed.    Health Habits and Functional and Cognitive Screening:  Functional & Cognitive Status 2/1/2021   Do you have difficulty preparing food and eating? No   Do you have difficulty bathing yourself, getting dressed or grooming yourself? No   Do you have difficulty using the toilet? No   Do you have difficulty moving around from place to place? No   Do you have trouble with steps or getting out of a bed or a chair? No   Current Diet Unhealthy Diet   Dental Exam Up to date   Eye Exam Up to date   Exercise (times per week) 0 times per week   Current Exercises Include No Regular Exercise   Current Exercise Activities Include -   Do you need help using the phone?  No   Are you deaf or do you have serious difficulty hearing?  No   Do you need help with transportation? No   Do you need help shopping? No   Do you need help preparing meals?  No   Do you need help with housework?  No   Do you need help with laundry? No   Do you need help taking your medications? No   Do you need help managing money? No   Do you ever drive or ride in a car without wearing a seat belt? No   Have you felt unusual stress, anger or loneliness in the last month? No   Who do you live with? Spouse   If you need help, do you have trouble finding someone available to you? No   Have you been bothered in the last four weeks by sexual problems? No   Do you have difficulty concentrating, remembering or making decisions? No         Does the patient have evidence of cognitive impairment? No    Asprin use counseling:Taking ASA appropriately as indicated    Age-appropriate Screening Schedule:  Refer to the list below  for future screening recommendations based on patient's age, sex and/or medical conditions. Orders for these recommended tests are listed in the plan section. The patient has been provided with a written plan.    Health Maintenance   Topic Date Due   • TDAP/TD VACCINES (1 - Tdap) 02/14/1973   • ZOSTER VACCINE (1 of 2) 02/14/2004   • COLONOSCOPY  02/02/2021   • LIPID PANEL  01/22/2022   • INFLUENZA VACCINE  Discontinued          The following portions of the patient's history were reviewed and updated as appropriate: allergies, current medications, past family history, past medical history, past social history, past surgical history and problem list.    Outpatient Medications Prior to Visit   Medication Sig Dispense Refill   • amLODIPine (NORVASC) 10 MG tablet TAKE 1 TABLET BY MOUTH DAILY FOR BLOOD PRESSURE 90 tablet 0   • aspirin  MG tablet Take 1 tablet by mouth Daily. 30 tablet 0   • carvedilol (COREG) 12.5 MG tablet TAKE 1 TABLET 2  TIMES A DAY WITH MEALS FOR BLOOD PRESSURE  180 tablet 0   • CBD (cannabidiol) oral oil Take  by mouth Daily.     • Cholecalciferol (VITAMIN D) 2000 UNITS tablet Take 2,000 Units by mouth Every Evening.     • clopidogrel (PLAVIX) 75 MG tablet Take 1 tablet by mouth Daily. 90 tablet 2   • Cyanocobalamin (Vitamin B-12) 1000 MCG sublingual tablet DISSOLVE 1 TABLET UNDER TONGUE DAILY 90 each 0   • cyclobenzaprine (FLEXERIL) 10 MG tablet TAKE 1 TABLET THREE TIMES DAILY AS NEEDED  FOR  MUSCLE  SPASMS (Patient taking differently: Take 10 mg by mouth 2 (Two) Times a Day As Needed for Muscle Spasms.) 270 tablet 3   • docusate sodium (COLACE) 100 MG capsule Take 100 mg by mouth Daily.     • escitalopram (LEXAPRO) 20 MG tablet TAKE 1 TABLET BY MOUTH EVERY NIGHT FOR ANXIETY 90 tablet 3   • ezetimibe (ZETIA) 10 MG tablet TAKE 1 TABLET BY MOUTH EVERY EVENING FOR CHOLESTEROL 90 tablet 3   • folic acid (FOLVITE) 1 MG tablet TAKE 1 TABLET EVERY DAY 90 tablet 0   • irbesartan (AVAPRO) 300 MG  tablet TAKE 1 TABLET EVERY DAY FOR BLOOD PRESSURE  (NEED  APPOINMENT) 90 tablet 1   • isosorbide mononitrate (IMDUR) 30 MG 24 hr tablet TAKE 1 TABLET EVERY DAY 90 tablet 3   • leflunomide (ARAVA) 20 MG tablet Take 20 mg by mouth Every Morning.     • nitroglycerin (NITROSTAT) 0.4 MG SL tablet Place 1 tablet under the tongue Every 5 (Five) Minutes As Needed for Chest Pain. Take no more than 3 doses in 15 minutes. 25 tablet 1   • rosuvastatin (CRESTOR) 10 MG tablet Take 1 tablet by mouth Every Night. 30 tablet 2     No facility-administered medications prior to visit.        Patient Active Problem List   Diagnosis   • Allergy   • COPD (chronic obstructive pulmonary disease) (CMS/[facility])   • Hyperlipidemia   • IFG (impaired fasting glucose)   • Rheumatoid arthritis (CMS/[facility])   • Hypertension   • Anxiety disorder   • Recurrent major depressive disorder, in full remission (CMS/[facility])   • Lumbar radiculopathy   • Spondylolisthesis of lumbar region   • Sepsis (CMS/[facility])   • Allegra's deformity of left heel   • Calcific Achilles tendonitis s/p OR 7/18   • Gastrocnemius equinus, left   • Abscess and cellulitis of gluteal region   • Tobacco abuse   • Perirectal abscess   • Anemia   • Wound dehiscence   • Gastrocnemius strain, left, initial encounter   • Low folic acid   • Low serum vitamin B12   • Exertional chest pain   • Unstable angina (CMS/[facility])   • Coronary artery disease involving native coronary artery of native heart with unstable angina pectoris (CMS/[facility])   • Unstable angina pectoris (CMS/[facility])   • Coronary artery disease involving native heart with angina pectoris (CMS/[facility])   • Low back pain   • Non-STEMI (non-ST elevated myocardial infarction) (CMS/[facility])   • CAD S/P percutaneous coronary angioplasty   • S/P drug eluting coronary stent placement   • Prediabetes   • Tobacco abuse counseling       Advanced Care Planning:  ACP discussion was held with the patient during this visit. Patient has an advance directive in EMR which  "is still valid.     Review of Systems    Compared to one year ago, the patient feels his physical health is the same.  Compared to one year ago, the patient feels his mental health is the same.    Reviewed chart for potential of high risk medication in the elderly: yes  Reviewed chart for potential of harmful drug interactions in the elderly:yes    Objective         Vitals:    02/01/21 1401   BP: 117/60   BP Location: Left arm   Patient Position: Sitting   Cuff Size: Adult   Pulse: 77   Resp: 16   Temp: 97.5 °F (36.4 °C)   SpO2: 100%   Weight: 96.6 kg (213 lb)   Height: 180.3 cm (70.98\")       Body mass index is 29.72 kg/m².  Discussed the patient's BMI with him. The BMI is above average; BMI management plan is completed.    Physical Exam    Lab Results   Component Value Date    TRIG 96 01/22/2021    HDL 34 (L) 01/22/2021     (H) 01/22/2021    VLDL 18 01/22/2021    HGBA1C 5.80 (H) 01/22/2021        Assessment/Plan   Medicare Risks and Personalized Health Plan  CMS Preventative Services Quick Reference  Cardiovascular risk  Lung Cancer Risk    The above risks/problems have been discussed with the patient.  Pertinent information has been shared with the patient in the After Visit Summary.  Follow up plans and orders are seen below in the Assessment/Plan Section.    There are no diagnoses linked to this encounter.  Follow Up:  No follow-ups on file.     An After Visit Summary and PPPS were given to the patient.             "

## 2021-02-03 ENCOUNTER — OFFICE VISIT (OUTPATIENT)
Dept: CARDIAC REHAB | Facility: HOSPITAL | Age: 66
End: 2021-02-03

## 2021-02-03 DIAGNOSIS — I21.4 NON-STEMI (NON-ST ELEVATED MYOCARDIAL INFARCTION) (HCC): Primary | ICD-10-CM

## 2021-02-03 DIAGNOSIS — Z95.5 S/P DRUG ELUTING CORONARY STENT PLACEMENT: ICD-10-CM

## 2021-02-03 PROCEDURE — 93797 PHYS/QHP OP CAR RHAB WO ECG: CPT

## 2021-02-04 ENCOUNTER — READMISSION MANAGEMENT (OUTPATIENT)
Dept: CALL CENTER | Facility: HOSPITAL | Age: 66
End: 2021-02-04

## 2021-02-04 NOTE — OUTREACH NOTE
AMI Week 2 Survey      Responses   [facility] patient discharged from?  [place]   Does the patient have one of the following disease processes/diagnoses(primary or secondary)?  Acute MI (STEMI,NSTEMI)   Week 2 attempt successful?  Yes   Call start time  1532   Call end time  1601   Discharge diagnosis    Non-STEMI,    underwent drug-eluting stenting of the distal left circumflex and PTCA of the distal LAD stent   Meds reviewed with patient/caregiver?  Yes   Is the patient taking all medications as directed (includes completed medication regime)?  Yes   Medication comments  Neurontin started for chronic pain.   Has the patient kept scheduled appointments due by today?  Yes   Has home health visited the patient within 72 hours of discharge?  N/A   What is the patient's perception of their health status since discharge?  Improving [Chronic pain issues with back/legs. Had a spinal fusion and got an infection.]   If the patient is a current smoker, are they able to teach back resources for cessation?  Smoking cessation medications, Smoking cessation classes [Discussed not smoking. Says he has cut back.]   Additional teach back comments  Going to try cardiac rehab, first visit yesterday.   Week 2 call completed?  Yes          Tessa Soria RN

## 2021-02-05 ENCOUNTER — TREATMENT (OUTPATIENT)
Dept: CARDIAC REHAB | Facility: HOSPITAL | Age: 66
End: 2021-02-05

## 2021-02-05 DIAGNOSIS — I21.4 NON-STEMI (NON-ST ELEVATED MYOCARDIAL INFARCTION) (HCC): Primary | ICD-10-CM

## 2021-02-05 PROCEDURE — 93798 PHYS/QHP OP CAR RHAB W/ECG: CPT

## 2021-02-08 ENCOUNTER — TREATMENT (OUTPATIENT)
Dept: CARDIAC REHAB | Facility: HOSPITAL | Age: 66
End: 2021-02-08

## 2021-02-08 DIAGNOSIS — I21.4 NON-STEMI (NON-ST ELEVATED MYOCARDIAL INFARCTION) (HCC): Primary | ICD-10-CM

## 2021-02-08 PROCEDURE — 93797 PHYS/QHP OP CAR RHAB WO ECG: CPT

## 2021-02-10 ENCOUNTER — READMISSION MANAGEMENT (OUTPATIENT)
Dept: CALL CENTER | Facility: HOSPITAL | Age: 66
End: 2021-02-10

## 2021-02-10 NOTE — OUTREACH NOTE
AMI Week 3 Survey      Responses   [facility] patient discharged from?  [place]   Does the patient have one of the following disease processes/diagnoses(primary or secondary)?  Acute MI (STEMI,NSTEMI)   Week 3 attempt successful?  Yes   Call start time  1154   Call end time  1158   Discharge diagnosis    Non-STEMI,    underwent drug-eluting stenting of the distal left circumflex and PTCA of the distal LAD stent   Meds reviewed with patient/caregiver?  Yes   Is the patient having any side effects they believe may be caused by any medication additions or changes?  No   Does the patient have all prescriptions related to this admission filled (includes statins,anticoagulants,HTN meds,anti-arrhythmia meds)  Yes   Is the patient taking all medications as directed (includes completed medication regime)?  Yes   Does the patient have a primary care provider?   Yes   Does the patient have an appointment with their PCP,cardiologist,or clinic within 7 days of discharge?  Yes   Has the patient kept scheduled appointments due by today?  Yes   Has home health visited the patient within 72 hours of discharge?  N/A   Psychosocial issues?  No   Did the patient receive a copy of their discharge instructions?  Yes   Nursing interventions  Reviewed instructions with patient   What is the patient's perception of their health status since discharge?  Improving   Nursing interventions  Nurse provided patient education   Is the patient/caregiver able to teach back signs and symptoms of when to call for help immediately:  Sudden chest discomfort, Sudden discomfort in arms, back, neck or jaw, Shortness of breath at any time, Sudden sweating or clammy skin, Nausea or vomiting, Dizziness or lightheadedness, Irregular or rapid heart rate   Nursing interventions  Nurse provided patient education   Is the pateint /caregiver able to teach back the importance of cardiac rehab?  Yes   Nursing interventions  Provided education on importance  of cardiac rehab   Is the patient/caregiver able to teach back lifestyle changes to help prevent MIs  Quit smoking   Is the patient/caregiver able to teach back ways to prevent a second heart attack:  Take medications, Follow up with MD, Participate in Cardiac Rehab   If the patient is a current smoker, are they able to teach back resources for cessation?  Not a smoker, Smoking cessation medications   Is the patient/caregiver able to teach back the hierarchy of who to call/visit for symptoms/problems? PCP, Specialist, Home health nurse, Urgent Care, ED, 911  Yes   Additional teach back comments  Encouraged to warm legs after rehab.   Week 3 call completed?  Yes   Graduated/Revoked comments  He feels better, but is struggling with rehab.          Cherie Allen RN

## 2021-02-18 ENCOUNTER — READMISSION MANAGEMENT (OUTPATIENT)
Dept: CALL CENTER | Facility: HOSPITAL | Age: 66
End: 2021-02-18

## 2021-02-18 NOTE — OUTREACH NOTE
AMI Week 4 Survey      Responses   [facility] patient discharged from?  [place]   Does the patient have one of the following disease processes/diagnoses(primary or secondary)?  Acute MI (STEMI,NSTEMI)   Week 4 attempt successful?  Yes   Call start time  1529   Call end time  1536   Discharge diagnosis    Non-STEMI,    underwent drug-eluting stenting of the distal left circumflex and PTCA of the distal LAD stent   Meds reviewed with patient/caregiver?  Yes   Is the patient having any side effects they believe may be caused by any medication additions or changes?  No   Prescription comments  Gabapentin added per PCP over a week ago to help with pain.   Is the patient taking all medications as directed (includes completed medication regime)?  Yes   Medication comments  Neurontin started for chronic pain.   Has the patient kept scheduled appointments due by today?  Yes   Comments  Patient    Is the patient still receiving Home Health Services?  Yes   Psychosocial issues?  No   What is the patient's perception of their health status since discharge?  Improving   Is the pateint /caregiver able to teach back the importance of cardiac rehab?  Yes   Is the patient/caregiver able to teach back ways to prevent a second heart attack:  Take medications, Follow up with MD   If the patient is a current smoker, are they able to teach back resources for cessation?  Not a smoker   Is the patient/caregiver able to teach back the hierarchy of who to call/visit for symptoms/problems? PCP, Specialist, Home health nurse, Urgent Care, ED, 911  Yes   Additional teach back comments  Patient did 2-3 visits of rehab, weather has not permited visits this week.  He is continuing to have chronic pain in back and legs.     Week 4 call completed?  Yes   Would the patient like one additional call?  No   Graduated  Yes   Did the patient feel the follow up calls were helpful during their recovery period?  Yes   Was the number of calls  appropriate?  Yes   Wrap up additional comments  Patient reports he is dealing with chronic pain.  He continues to f/u with providers.            Sruthi Peck RN

## 2021-02-22 ENCOUNTER — TREATMENT (OUTPATIENT)
Dept: CARDIAC REHAB | Facility: HOSPITAL | Age: 66
End: 2021-02-22

## 2021-02-22 DIAGNOSIS — I21.4 NON-STEMI (NON-ST ELEVATED MYOCARDIAL INFARCTION) (HCC): Primary | ICD-10-CM

## 2021-02-22 PROCEDURE — 93798 PHYS/QHP OP CAR RHAB W/ECG: CPT

## 2021-02-24 ENCOUNTER — OFFICE VISIT (OUTPATIENT)
Dept: CARDIOLOGY | Facility: CLINIC | Age: 66
End: 2021-02-24

## 2021-02-24 VITALS
BODY MASS INDEX: 30.04 KG/M2 | OXYGEN SATURATION: 99 % | HEIGHT: 71 IN | HEART RATE: 88 BPM | WEIGHT: 214.6 LBS | DIASTOLIC BLOOD PRESSURE: 80 MMHG | SYSTOLIC BLOOD PRESSURE: 130 MMHG

## 2021-02-24 DIAGNOSIS — I10 ESSENTIAL HYPERTENSION: ICD-10-CM

## 2021-02-24 DIAGNOSIS — E78.2 MIXED HYPERLIPIDEMIA: ICD-10-CM

## 2021-02-24 DIAGNOSIS — Z72.0 TOBACCO ABUSE: ICD-10-CM

## 2021-02-24 DIAGNOSIS — I25.10 CORONARY ARTERY DISEASE INVOLVING NATIVE CORONARY ARTERY OF NATIVE HEART WITHOUT ANGINA PECTORIS: Primary | ICD-10-CM

## 2021-02-24 PROCEDURE — 93000 ELECTROCARDIOGRAM COMPLETE: CPT | Performed by: INTERNAL MEDICINE

## 2021-02-24 PROCEDURE — 99214 OFFICE O/P EST MOD 30 MIN: CPT | Performed by: INTERNAL MEDICINE

## 2021-02-24 NOTE — PROGRESS NOTES
Date of Office Visit: 2021    Patient Name: Prem Thrasher  : 1955    Encounter Provider: Dioni Salazar MD  Referring Provider: No ref. provider found  Place of Service: [facility] CARDIOLOGY  Patient Care Team:  Montse Cain PA-C as PCP - General (Family Medicine)  Maria Antonia Lopez MD as Consulting Physician (Rheumatology)  Sawyer Rick MD as Surgeon (Neurosurgery)  Pradip Mcmillan MD as Consulting Physician (Pulmonary Disease)  Jacky Perez MD as Surgeon (Orthopedic Surgery)  Charli Sen MD as Consulting Physician (Infectious Diseases)  Tray Moody MD as Consulting Physician (Urology)  Suman Richards DPM as Consulting Physician (Podiatry)  Porsha Arcos MD as Consulting Physician (General Surgery)  Vinay Smith MD as Surgeon (Orthopedic Surgery)  Eitan Cuevas MD as Consulting Physician (Plastic Surgery)  Dioni Salazar MD as Consulting Physician (Cardiology)  Guero Blunt MD as Surgeon (General Surgery)      Chief Complaint   Patient presents with   • Coronary Artery Disease     History of Present Illness    The patient is a 66-year-old white male with a history of coronary artery disease who returns to the office for follow-up.    In 2020 the patient presented with unstable angina pectoris.  His catheterization revealed diffuse disease of the left anterior descending for which he received stents to the midportion in the apical portion.  His other arteries showed moderate nonobstructive disease.  Left ventricular systolic ejection fraction was greater than 50%.  He had follow-up chest pain in April and has been treated with medication.  Since that time he states he has been feeling better without angina pectoris.  He has not had to use any sublingual nitroglycerin.  He does have chronic shortness of breath with a long-term smoking history.  In addition the patient has chronic  orthopedic issues.  His back and leg discomfort are really impairing his ability to ambulate.    Past Medical History:   Diagnosis Date   • Achilles tendon pain     LEFT   • Anxiety disorder    • CAD (coronary artery disease)    • COPD (chronic obstructive pulmonary disease) (CMS/HCC)    • CTS (carpal tunnel syndrome)    • Diverticulitis    • Diverticulitis of colon    • Diverticulosis    • Encounter for eye exam 10/2014    normal   • Exertional chest pain    • Fissure, anal    • History of bone density study 03/2014    Dr. Maria Antonia Lopez; normal   • History of chest x-ray 02/15/2011    also 3-6-15; normal chest   • History of colon polyps    • History of nuclear stress test 03/09/2015    cardiolite; Dr. Greenberg; negative   • History of pulmonary function tests 03/2015    COPD   • Hyperlipidemia    • Hypertension    • IFG (impaired fasting glucose)    • Low back pain    • Lumbosacral disc disease    • Nerve damage     KAYLYNN ELBOWS   • NSTEMI (non-ST elevated myocardial infarction) (CMS/HCC) 01/2021   • Osteoarthritis, multiple sites    • Postoperative nausea and vomiting 8/1/2017   • Postoperative wound infection    • Rectal bleeding    • Rheumatoid arthritis (CMS/HCC)    • Sciatica    • Staph infection     WITH BACK SURGERY 2017  ON LONG TERM ANTIBIOTICS   • Unstable angina (CMS/HCC)          Past Surgical History:   Procedure Laterality Date   • ACHILLES TENDON SURGERY Left 7/3/2018    Procedure: Left Achilles debridement and secondary repair with partial excision of calcaneus. Possible left Achilles lengthening at the calf;  Surgeon: Vinay Smith MD;  [place] [facility] [place] [facility];  Service: Orthopedics   • BACK SURGERY     • CARDIAC CATHETERIZATION N/A 3/7/2020    Procedure: Left Heart Cath;  Surgeon: Dioni Salazar MD;  Location: [facility] CATH INVASIVE LOCATION;  Service: Cardiology;  Laterality: N/A;   • CARDIAC CATHETERIZATION N/A 3/7/2020    Procedure: Coronary angiography;  Surgeon: Dioni Salazar  MD ENRRIQUE;  Location: [facility] CATH INVASIVE LOCATION;  Service: Cardiology;  Laterality: N/A;   • CARDIAC CATHETERIZATION N/A 3/7/2020    Procedure: Left ventriculography;  Surgeon: Dioni Salazar MD;  [facility];  Service: Cardiology;  Laterality: N/A;   • CARDIAC CATHETERIZATION N/A 3/7/2020    Procedure: Stent RAZA coronary;  Surgeon: Dioni Salazar MD;  [facility];  Service: Cardiology;  Laterality: N/A;   • CARDIAC CATHETERIZATION N/A 1/22/2021    Procedure: Left Heart Cath;  Surgeon: Dioni Salazar MD;  [facility];  Service: Cardiology;  Laterality: N/A;   • CARDIAC CATHETERIZATION N/A 1/22/2021    Procedure: Coronary angiography;  Surgeon: Dioni Saalzar MD;  Location: [facility] CATH INVASIVE LOCATION;  Service: Cardiology;  Laterality: N/A;   • CARDIAC CATHETERIZATION N/A 1/22/2021    Procedure: Left ventriculography;  Surgeon: Dioni Salazar MD;  Location: [facility] CATH INVASIVE LOCATION;  Service: Cardiology;  Laterality: N/A;   • CARDIAC CATHETERIZATION N/A 1/22/2021    Procedure: Percutaneous Coronary Intervention;  Surgeon: Dioni Salazar MD;  Location: [facility] CATH INVASIVE LOCATION;  Service: Cardiology;  Laterality: N/A;   • CARDIAC CATHETERIZATION N/A 1/22/2021    Procedure: Stent RAZA coronary;  Surgeon: Dioni Salazar MD;  Location: [facility] CATH INVASIVE LOCATION;  Service: Cardiology;  Laterality: N/A;   • COLONOSCOPY N/A 02/02/2011    Normal colon except scattered diverticulosis-Dr. Guero Blunt   • CYST REMOVAL  03/2016    x2  FROM FACE AND EAR   • DENTAL PROCEDURE  2008    teeth removed; dental implants   • EPIDURAL BLOCK  1995    L/S spine   • FINGER DEBRIDEMENT Right 07/12/2003    Debridement and full thickness skin grafting of the ring and small fingers of the right hand-Dr. Rayray oLng   • INCISION AND DRAINAGE PERIRECTAL ABSCESS N/A 7/10/2018    Procedure: INCISION AND  DRAINAGE OF PERIRECTAL ABSCESS;  Surgeon: Porsha Arcos MD;  Location: [facility] MAIN OR;  Service: General   • LUMBAR DISCECTOMY FUSION INSTRUMENTATION Left 10/10/2016    Procedure: LEFT L2-L3, L3-L4 LUMBAR LAMINECTOMY/ DISCECTOMY WITH METRIX ;  Surgeon: Sawyer Rick MD;  Location: [facility] MAIN OR;  Service:    • LUMBAR DISCECTOMY FUSION INSTRUMENTATION N/A 7/31/2017    Procedure: LUMBAR FUSION DECOMPRESSON WITH PEDICLE SCREWS with LAURA L2-3,L3-4;  Surgeon: Jacky Perez MD;  Location: [facility] MAIN OR;  Service:    • LUMBAR LAMINECTOMY DISCECTOMY DECOMPRESSION N/A 7/31/2017    Procedure: L2 to L4 laminectomy with neural lysis and a fusion by orthopedics;  Surgeon: Sawyer Rick MD;  Location: [facility] MAIN OR;  Service:    • LUMBAR LAMINECTOMY DISCECTOMY DECOMPRESSION N/A 9/5/2017    Procedure: I&D LUMBAR SPINE ;  Surgeon: Jacky Perez MD;  Location: [facility] OR;  Service:    • SHOULDER SURGERY Right 02/23/2011    Dr. Navarro   • SINUS SURGERY  2003    Dr. Barrera           Current Outpatient Medications:   •  albuterol sulfate  (90 Base) MCG/ACT inhaler, Inhale 2 puffs Every 4 (Four) Hours As Needed for Wheezing or Shortness of Air., Disp: 54 g, Rfl: 3  •  amLODIPine (NORVASC) 10 MG tablet, Take 1 tablet by mouth Daily. for blood pressure, Disp: 90 tablet, Rfl: 1  •  aspirin  MG tablet, Take 1 tablet by mouth Daily., Disp: 30 tablet, Rfl: 0  •  carvedilol (COREG) 12.5 MG tablet, TAKE 1 TABLET 2  TIMES A DAY WITH MEALS FOR BLOOD PRESSURE , Disp: 180 tablet, Rfl: 0  •  CBD (cannabidiol) oral oil, Take  by mouth Daily., Disp: , Rfl:   •  Cholecalciferol (VITAMIN D) 2000 UNITS tablet, Take 2,000 Units by mouth Every Evening., Disp: , Rfl:   •  clopidogrel (PLAVIX) 75 MG tablet, Take 1 tablet by mouth Daily., Disp: 90 tablet, Rfl: 2  •  Cyanocobalamin (Vitamin B-12) 1000 MCG sublingual tablet, One SL daily, Disp: 90 each, Rfl: 3  •  cyclobenzaprine (FLEXERIL) 10 MG tablet, Take 1 tablet by  mouth 3 (Three) Times a Day As Needed for Muscle Spasms., Disp: 270 tablet, Rfl: 3  •  docusate sodium (COLACE) 100 MG capsule, Take 100 mg by mouth Daily., Disp: , Rfl:   •  escitalopram (LEXAPRO) 20 MG tablet, TAKE 1 TABLET BY MOUTH EVERY NIGHT FOR ANXIETY, Disp: 90 tablet, Rfl: 3  •  ezetimibe (ZETIA) 10 MG tablet, Take 1 tablet by mouth Every Evening. For cholesterol, Disp: 90 tablet, Rfl: 3  •  folic acid (FOLVITE) 1 MG tablet, Take 1 tablet by mouth Daily., Disp: 90 tablet, Rfl: 3  •  gabapentin (NEURONTIN) 300 MG capsule, Start 1 PO daily X 4 days then 1 PO BID X 4 days then can increase to 1 PO TID routine for pain, Disp: 270 capsule, Rfl: 1  •  irbesartan (AVAPRO) 300 MG tablet, Take 1 tablet by mouth Daily. For BP, Disp: 90 tablet, Rfl: 1  •  isosorbide mononitrate (IMDUR) 30 MG 24 hr tablet, TAKE 1 TABLET EVERY DAY, Disp: 90 tablet, Rfl: 3  •  leflunomide (ARAVA) 20 MG tablet, Take 20 mg by mouth Every Morning., Disp: , Rfl:   •  nitroglycerin (NITROSTAT) 0.4 MG SL tablet, Place 1 tablet under the tongue Every 5 (Five) Minutes As Needed for Chest Pain. Take no more than 3 doses in 15 minutes., Disp: 25 tablet, Rfl: 1  •  rosuvastatin (CRESTOR) 10 MG tablet, Take 1 tablet by mouth Every Night., Disp: 30 tablet, Rfl: 2      Social History     Socioeconomic History   • Marital status:      Spouse name: Not on file   • Number of children: Not on file   • Years of education: Not on file   • Highest education level: Not on file   Tobacco Use   • Smoking status: Current Every Day Smoker     Packs/day: 1.00     Years: 45.00     Pack years: 45.00     Types: Cigarettes   • Smokeless tobacco: Never Used   • Tobacco comment: no caffeine    Substance and Sexual Activity   • Alcohol use: No   • Drug use: No   • Sexual activity: Not Currently     Partners: Female     Birth control/protection: None         Review of Systems   Constitution: Negative.   HENT: Negative.    Eyes: Negative.    Cardiovascular: Positive  "for dyspnea on exertion.   Respiratory: Negative.    Endocrine: Negative.    Skin: Negative.    Musculoskeletal: Positive for back pain.   Gastrointestinal: Negative.    Neurological: Negative.    Psychiatric/Behavioral: Negative.        Procedures      ECG 12 Lead    Date/Time: 2/24/2021 2:30 PM  Performed by: Dioni Salazar MD  Authorized by: Dioni Salazar MD   Comparison: compared with previous ECG from 1/29/2021  Rhythm: sinus rhythm  Rate: normal  Conduction: 1st degree AV block  QRS axis: normal  Other findings: low voltage                Objective:    /80 (BP Location: Left arm, Patient Position: Sitting, Cuff Size: Adult)   Pulse 88   Ht 180.3 cm (70.98\")   Wt 97.3 kg (214 lb 9.6 oz)   SpO2 99%   BMI 29.95 kg/m²         Vitals signs reviewed.   Constitutional:       Appearance: Well-developed.   Eyes:      Pupils: Pupils are equal, round, and reactive to light.   HENT:      Head: Normocephalic.   Neck:      Musculoskeletal: Normal range of motion.      Thyroid: No thyromegaly.      Vascular: No carotid bruit or JVD.   Pulmonary:      Effort: Pulmonary effort is normal.      Breath sounds: Normal breath sounds.   Cardiovascular:      Normal rate. Regular rhythm. Normal S1.      Murmurs: There is no murmur.      No gallop. No click. No rub.   Pulses:     Intact distal pulses.   Edema:     Peripheral edema absent.   Skin:     General: Skin is warm and dry.      Findings: No erythema.   Neurological:      Mental Status: Alert and oriented to person, place, and time.             Assessment:       Diagnosis Plan   1. Coronary artery disease involving native coronary artery of native heart without angina pectoris     2. Essential hypertension     3. Mixed hyperlipidemia     4. Tobacco abuse       1.  Coronary artery disease: Status post intervention to the LAD.  No angina pectoris.  Continue current medication  2.  Hypertension: Controlled  3.  Hyperlipidemia: Statin therapy.  Lipid panel " from January reviewed.   with an HDL of 34 and a total of 161.  He has scheduled follow-up labs in the near future.  Needs to work on his diet  4.  Tobacco abuse: Counseled again to discontinue all tobacco products       Plan:

## 2021-02-26 ENCOUNTER — TREATMENT (OUTPATIENT)
Dept: CARDIAC REHAB | Facility: HOSPITAL | Age: 66
End: 2021-02-26

## 2021-02-26 DIAGNOSIS — I21.4 NON-STEMI (NON-ST ELEVATED MYOCARDIAL INFARCTION) (HCC): Primary | ICD-10-CM

## 2021-02-26 PROCEDURE — 93798 PHYS/QHP OP CAR RHAB W/ECG: CPT

## 2021-03-01 ENCOUNTER — TREATMENT (OUTPATIENT)
Dept: CARDIAC REHAB | Facility: HOSPITAL | Age: 66
End: 2021-03-01

## 2021-03-01 DIAGNOSIS — I21.4 NON-STEMI (NON-ST ELEVATED MYOCARDIAL INFARCTION) (HCC): Primary | ICD-10-CM

## 2021-03-01 PROCEDURE — 93798 PHYS/QHP OP CAR RHAB W/ECG: CPT

## 2021-03-05 ENCOUNTER — TREATMENT (OUTPATIENT)
Dept: CARDIAC REHAB | Facility: HOSPITAL | Age: 66
End: 2021-03-05

## 2021-03-05 DIAGNOSIS — Z95.5 S/P DRUG ELUTING CORONARY STENT PLACEMENT: ICD-10-CM

## 2021-03-05 DIAGNOSIS — I21.4 NON-STEMI (NON-ST ELEVATED MYOCARDIAL INFARCTION) (HCC): Primary | ICD-10-CM

## 2021-03-05 PROCEDURE — 93798 PHYS/QHP OP CAR RHAB W/ECG: CPT

## 2021-03-08 ENCOUNTER — TREATMENT (OUTPATIENT)
Dept: CARDIAC REHAB | Facility: HOSPITAL | Age: 66
End: 2021-03-08

## 2021-03-08 DIAGNOSIS — I21.4 NON-STEMI (NON-ST ELEVATED MYOCARDIAL INFARCTION) (HCC): Primary | ICD-10-CM

## 2021-03-08 PROCEDURE — 93798 PHYS/QHP OP CAR RHAB W/ECG: CPT

## 2021-03-09 ENCOUNTER — PREP FOR SURGERY (OUTPATIENT)
Dept: OTHER | Facility: HOSPITAL | Age: 66
End: 2021-03-09

## 2021-03-09 DIAGNOSIS — Z12.11 SCREENING FOR COLON CANCER: Primary | ICD-10-CM

## 2021-03-12 ENCOUNTER — TREATMENT (OUTPATIENT)
Dept: CARDIAC REHAB | Facility: HOSPITAL | Age: 66
End: 2021-03-12

## 2021-03-12 DIAGNOSIS — I21.4 NON-STEMI (NON-ST ELEVATED MYOCARDIAL INFARCTION) (HCC): Primary | ICD-10-CM

## 2021-03-12 PROBLEM — Z12.11 SCREENING FOR COLON CANCER: Status: ACTIVE | Noted: 2021-03-12

## 2021-03-12 PROCEDURE — 93798 PHYS/QHP OP CAR RHAB W/ECG: CPT

## 2021-03-15 ENCOUNTER — TREATMENT (OUTPATIENT)
Dept: CARDIAC REHAB | Facility: HOSPITAL | Age: 66
End: 2021-03-15

## 2021-03-15 DIAGNOSIS — I21.4 NON-STEMI (NON-ST ELEVATED MYOCARDIAL INFARCTION) (HCC): Primary | ICD-10-CM

## 2021-03-15 PROCEDURE — 93798 PHYS/QHP OP CAR RHAB W/ECG: CPT

## 2021-03-19 ENCOUNTER — TREATMENT (OUTPATIENT)
Dept: CARDIAC REHAB | Facility: HOSPITAL | Age: 66
End: 2021-03-19

## 2021-03-19 DIAGNOSIS — I21.4 NON-STEMI (NON-ST ELEVATED MYOCARDIAL INFARCTION) (HCC): Primary | ICD-10-CM

## 2021-03-19 PROCEDURE — 93798 PHYS/QHP OP CAR RHAB W/ECG: CPT

## 2021-03-22 ENCOUNTER — IMMUNIZATION (OUTPATIENT)
Dept: VACCINE CLINIC | Facility: HOSPITAL | Age: 66
End: 2021-03-22

## 2021-03-22 ENCOUNTER — TELEPHONE (OUTPATIENT)
Dept: SURGERY | Facility: CLINIC | Age: 66
End: 2021-03-22

## 2021-03-22 ENCOUNTER — TREATMENT (OUTPATIENT)
Dept: CARDIAC REHAB | Facility: HOSPITAL | Age: 66
End: 2021-03-22

## 2021-03-22 DIAGNOSIS — I21.4 NON-STEMI (NON-ST ELEVATED MYOCARDIAL INFARCTION) (HCC): Primary | ICD-10-CM

## 2021-03-22 PROCEDURE — 91300 HC SARSCOV02 VAC 30MCG/0.3ML IM: CPT | Performed by: INTERNAL MEDICINE

## 2021-03-22 PROCEDURE — 0001A: CPT | Performed by: INTERNAL MEDICINE

## 2021-03-22 PROCEDURE — 93798 PHYS/QHP OP CAR RHAB W/ECG: CPT

## 2021-03-24 ENCOUNTER — TRANSCRIBE ORDERS (OUTPATIENT)
Dept: SLEEP MEDICINE | Facility: HOSPITAL | Age: 66
End: 2021-03-24

## 2021-03-24 DIAGNOSIS — Z01.818 OTHER SPECIFIED PRE-OPERATIVE EXAMINATION: Primary | ICD-10-CM

## 2021-03-25 RX ORDER — CLOPIDOGREL BISULFATE 75 MG/1
TABLET ORAL
Qty: 90 TABLET | Refills: 2 | Status: SHIPPED | OUTPATIENT
Start: 2021-03-25 | End: 2021-11-08

## 2021-03-26 ENCOUNTER — TREATMENT (OUTPATIENT)
Dept: CARDIAC REHAB | Facility: HOSPITAL | Age: 66
End: 2021-03-26

## 2021-03-26 DIAGNOSIS — I21.4 NON-STEMI (NON-ST ELEVATED MYOCARDIAL INFARCTION) (HCC): Primary | ICD-10-CM

## 2021-03-26 PROCEDURE — 93798 PHYS/QHP OP CAR RHAB W/ECG: CPT

## 2021-03-29 ENCOUNTER — TREATMENT (OUTPATIENT)
Dept: CARDIAC REHAB | Facility: HOSPITAL | Age: 66
End: 2021-03-29

## 2021-03-29 DIAGNOSIS — I21.4 NON-STEMI (NON-ST ELEVATED MYOCARDIAL INFARCTION) (HCC): Primary | ICD-10-CM

## 2021-03-29 PROCEDURE — 93798 PHYS/QHP OP CAR RHAB W/ECG: CPT

## 2021-04-02 ENCOUNTER — TREATMENT (OUTPATIENT)
Dept: CARDIAC REHAB | Facility: HOSPITAL | Age: 66
End: 2021-04-02

## 2021-04-02 DIAGNOSIS — I21.4 NON-STEMI (NON-ST ELEVATED MYOCARDIAL INFARCTION) (HCC): Primary | ICD-10-CM

## 2021-04-02 DIAGNOSIS — Z95.5 S/P DRUG ELUTING CORONARY STENT PLACEMENT: ICD-10-CM

## 2021-04-02 PROCEDURE — 93798 PHYS/QHP OP CAR RHAB W/ECG: CPT

## 2021-04-05 ENCOUNTER — TREATMENT (OUTPATIENT)
Dept: CARDIAC REHAB | Facility: HOSPITAL | Age: 66
End: 2021-04-05

## 2021-04-05 DIAGNOSIS — I21.4 NON-STEMI (NON-ST ELEVATED MYOCARDIAL INFARCTION) (HCC): Primary | ICD-10-CM

## 2021-04-05 PROCEDURE — 93798 PHYS/QHP OP CAR RHAB W/ECG: CPT

## 2021-04-06 ENCOUNTER — LAB (OUTPATIENT)
Dept: LAB | Facility: HOSPITAL | Age: 66
End: 2021-04-06

## 2021-04-06 DIAGNOSIS — Z01.818 OTHER SPECIFIED PRE-OPERATIVE EXAMINATION: ICD-10-CM

## 2021-04-06 PROCEDURE — U0005 INFEC AGEN DETEC AMPLI PROBE: HCPCS

## 2021-04-06 PROCEDURE — C9803 HOPD COVID-19 SPEC COLLECT: HCPCS

## 2021-04-06 PROCEDURE — U0004 COV-19 TEST NON-CDC HGH THRU: HCPCS

## 2021-04-07 LAB — SARS-COV-2 RNA RESP QL NAA+PROBE: NOT DETECTED

## 2021-04-08 ENCOUNTER — ANESTHESIA (OUTPATIENT)
Dept: GASTROENTEROLOGY | Facility: HOSPITAL | Age: 66
End: 2021-04-08

## 2021-04-08 ENCOUNTER — ANESTHESIA EVENT (OUTPATIENT)
Dept: GASTROENTEROLOGY | Facility: HOSPITAL | Age: 66
End: 2021-04-08

## 2021-04-08 ENCOUNTER — HOSPITAL ENCOUNTER (OUTPATIENT)
Facility: HOSPITAL | Age: 66
Setting detail: HOSPITAL OUTPATIENT SURGERY
Discharge: HOME | End: 2021-04-08
Attending: SURGERY | Admitting: SURGERY

## 2021-04-08 VITALS
TEMPERATURE: 98.6 F | BODY MASS INDEX: 29.54 KG/M2 | SYSTOLIC BLOOD PRESSURE: 121 MMHG | OXYGEN SATURATION: 94 % | DIASTOLIC BLOOD PRESSURE: 87 MMHG | RESPIRATION RATE: 16 BRPM | HEART RATE: 77 BPM | HEIGHT: 71 IN | WEIGHT: 211 LBS

## 2021-04-08 DIAGNOSIS — Z12.11 SCREENING FOR COLON CANCER: ICD-10-CM

## 2021-04-08 PROBLEM — K63.5 COLON POLYPS: Status: ACTIVE | Noted: 2021-04-08

## 2021-04-08 PROBLEM — K57.90 DIVERTICULOSIS: Status: ACTIVE | Noted: 2021-04-08

## 2021-04-08 PROCEDURE — 45380 COLONOSCOPY AND BIOPSY: CPT | Performed by: SURGERY

## 2021-04-08 PROCEDURE — S0260 H&P FOR SURGERY: HCPCS | Performed by: SURGERY

## 2021-04-08 PROCEDURE — 25010000002 PHENYLEPHRINE PER 1 ML: Performed by: ANESTHESIOLOGY

## 2021-04-08 PROCEDURE — 88305 TISSUE EXAM BY PATHOLOGIST: CPT | Performed by: SURGERY

## 2021-04-08 PROCEDURE — 25010000002 PROPOFOL 10 MG/ML EMULSION: Performed by: ANESTHESIOLOGY

## 2021-04-08 RX ORDER — SODIUM CHLORIDE 0.9 % (FLUSH) 0.9 %
10 SYRINGE (ML) INJECTION AS NEEDED
Status: DISCONTINUED | OUTPATIENT
Start: 2021-04-08 | End: 2021-04-08 | Stop reason: HOSPADM

## 2021-04-08 RX ORDER — LIDOCAINE HYDROCHLORIDE 20 MG/ML
INJECTION, SOLUTION INFILTRATION; PERINEURAL AS NEEDED
Status: DISCONTINUED | OUTPATIENT
Start: 2021-04-08 | End: 2021-04-08 | Stop reason: SURG

## 2021-04-08 RX ORDER — SODIUM CHLORIDE 0.9 % (FLUSH) 0.9 %
3 SYRINGE (ML) INJECTION EVERY 12 HOURS SCHEDULED
Status: DISCONTINUED | OUTPATIENT
Start: 2021-04-08 | End: 2021-04-08 | Stop reason: HOSPADM

## 2021-04-08 RX ORDER — PROPOFOL 10 MG/ML
VIAL (ML) INTRAVENOUS CONTINUOUS PRN
Status: DISCONTINUED | OUTPATIENT
Start: 2021-04-08 | End: 2021-04-08 | Stop reason: SURG

## 2021-04-08 RX ORDER — SODIUM CHLORIDE, SODIUM LACTATE, POTASSIUM CHLORIDE, CALCIUM CHLORIDE 600; 310; 30; 20 MG/100ML; MG/100ML; MG/100ML; MG/100ML
30 INJECTION, SOLUTION INTRAVENOUS CONTINUOUS PRN
Status: DISCONTINUED | OUTPATIENT
Start: 2021-04-08 | End: 2021-04-08 | Stop reason: HOSPADM

## 2021-04-08 RX ADMIN — LIDOCAINE HYDROCHLORIDE 60 MG: 20 INJECTION, SOLUTION INFILTRATION; PERINEURAL at 07:59

## 2021-04-08 RX ADMIN — PHENYLEPHRINE HYDROCHLORIDE 200 MCG: 10 INJECTION INTRAVENOUS at 08:36

## 2021-04-08 RX ADMIN — PHENYLEPHRINE HYDROCHLORIDE 200 MCG: 10 INJECTION INTRAVENOUS at 08:45

## 2021-04-08 RX ADMIN — PHENYLEPHRINE HYDROCHLORIDE 200 MCG: 10 INJECTION INTRAVENOUS at 08:26

## 2021-04-08 RX ADMIN — PROPOFOL 200 MCG/KG/MIN: 10 INJECTION, EMULSION INTRAVENOUS at 07:59

## 2021-04-08 RX ADMIN — SODIUM CHLORIDE, POTASSIUM CHLORIDE, SODIUM LACTATE AND CALCIUM CHLORIDE 30 ML/HR: 600; 310; 30; 20 INJECTION, SOLUTION INTRAVENOUS at 07:46

## 2021-04-08 NOTE — H&P
General Surgery  History and Physical    CC: Screening for colon cancer, history of colon polyps    HPI: The patient is a pleasant 66 y.o. year-old gentleman who presents today for a repeat screening colonoscopy.  His last colonoscopy was reportedly done in 2011 to the best of his memory.  He thinks there were 4 or 5 benign polyps removed.  He denies any melena or hematochezia and has had no unintentional weight loss.    Past Medical History:  Hypertension  Hyperlipidemia  COPD  Rheumatoid arthritis  Anxiety with depression  Coronary artery disease     Past Surgical History:  I&D perirectal abscess (July 2018 by me)  Achilles tendon repair (left)  Colonoscopy (2011)  Lumbar discectomy (L2-L4)  Right shoulder surgery  Sinus surgery    Medications:   Medications Prior to Admission   Medication Sig Dispense Refill Last Dose   • amLODIPine (NORVASC) 10 MG tablet Take 1 tablet by mouth Daily. for blood pressure 90 tablet 1 4/7/2021 at Unknown time   • carvedilol (COREG) 12.5 MG tablet TAKE 1 TABLET 2  TIMES A DAY WITH MEALS FOR BLOOD PRESSURE  180 tablet 0 4/7/2021 at Unknown time   • Cholecalciferol (VITAMIN D) 2000 UNITS tablet Take 2,000 Units by mouth Every Evening.   4/7/2021 at Unknown time   • clopidogrel (PLAVIX) 75 MG tablet TAKE 1 TABLET EVERY DAY 90 tablet 2 Past Week at Unknown time   • Cyanocobalamin (Vitamin B-12) 1000 MCG sublingual tablet One SL daily 90 each 3 4/7/2021 at Unknown time   • cyclobenzaprine (FLEXERIL) 10 MG tablet Take 1 tablet by mouth 3 (Three) Times a Day As Needed for Muscle Spasms. 270 tablet 3 4/7/2021 at Unknown time   • docusate sodium (COLACE) 100 MG capsule Take 100 mg by mouth Daily.      • escitalopram (LEXAPRO) 20 MG tablet TAKE 1 TABLET BY MOUTH EVERY NIGHT FOR ANXIETY 90 tablet 3 4/7/2021 at Unknown time   • ezetimibe (ZETIA) 10 MG tablet Take 1 tablet by mouth Every Evening. For cholesterol 90 tablet 3 4/7/2021 at Unknown time   • folic acid (FOLVITE) 1 MG tablet Take 1  tablet by mouth Daily. 90 tablet 3 4/7/2021 at Unknown time   • gabapentin (NEURONTIN) 300 MG capsule Start 1 PO daily X 4 days then 1 PO BID X 4 days then can increase to 1 PO TID routine for pain 270 capsule 1 4/7/2021 at Unknown time   • irbesartan (AVAPRO) 300 MG tablet Take 1 tablet by mouth Daily. For BP 90 tablet 1 4/7/2021 at Unknown time   • isosorbide mononitrate (IMDUR) 30 MG 24 hr tablet TAKE 1 TABLET EVERY DAY 90 tablet 3 4/7/2021 at Unknown time   • leflunomide (ARAVA) 20 MG tablet Take 20 mg by mouth Every Morning.   4/7/2021 at Unknown time   • rosuvastatin (CRESTOR) 10 MG tablet Take 1 tablet by mouth Every Night. 30 tablet 2 4/7/2021 at Unknown time   • albuterol sulfate  (90 Base) MCG/ACT inhaler Inhale 2 puffs Every 4 (Four) Hours As Needed for Wheezing or Shortness of Air. 54 g 3 Unknown at Unknown time   • aspirin  MG tablet Take 1 tablet by mouth Daily. 30 tablet 0 3/31/2021   • CBD (cannabidiol) oral oil Take  by mouth Daily.   More than a month at Unknown time   • nitroglycerin (NITROSTAT) 0.4 MG SL tablet Place 1 tablet under the tongue Every 5 (Five) Minutes As Needed for Chest Pain. Take no more than 3 doses in 15 minutes. 25 tablet 1        Allergies: Atorvastatin (myalgia), cefaclor (rash), methotrexate (blisters/rash)    Family History: Brother with prostate cancer, father with alcohol abuse    Social History: , retired from [employer], smokes 1/4 ppd cigarettes daily, no regular alcohol use    ROS: A comprehensive review of systems was conducted and significant only for the following positives: Weakness, headaches, sinus pressure, cough, nervousness, depression, light sensitivity, and recent activity change  All other systems reviewed and negative    Physical Exam:  Vitals:    04/08/21 0728   BP: 136/90   Pulse: 86   Resp: 16   Temp: 98.6 °F (37 °C)   SpO2: 93%     Height: 180 cm  Weight: 95 kg  BMI: 29  General: No acute distress, well-nourished & well-developed  HEAD:  normocephalic, atraumatic  EYES: normal conjunctiva, sclera anicteric  EARS: grossly normal hearing  NECK: supple, no thyromegaly  CARDIOVASCULAR: regular rate and rhythm  RESPIRATORY: clear to auscultation bilaterally  GASTROINTESTINAL: soft, nontender, non-distended  PSYCHIATRIC: oriented x3, normal mood and affect    ASSESSMENT & PLAN  Mr. Thrasher is a 66 year-old gentleman here for a repeat screening colonoscopy.  He has a reported history of colon polyps but I have no access to these pathology results.  He has been counseled on the risks of a repeat screening colonoscopy to include bleeding, possible colon perforation, and possible missed pathology.  Despite these risks, he has consented to proceed.    Porsha Arcos MD  General, Robotic, and Endoscopic Surgery  [facility]    [address], [address]  [place] [address]  P: [phone]  F: [phone]

## 2021-04-08 NOTE — ANESTHESIA PREPROCEDURE EVALUATION
Anesthesia Evaluation     Patient summary reviewed and Nursing notes reviewed   history of anesthetic complications: PONV  NPO Solid Status: > 8 hours  NPO Liquid Status: > 2 hours           Airway   Mallampati: I  TM distance: >3 FB  Neck ROM: full  No difficulty expected  Dental - normal exam     Pulmonary - normal exam   (+) a smoker Current, COPD, sleep apnea,   Cardiovascular - normal exam    ECG reviewed    (+) hypertension, past MI , CAD, cardiac stents Drug eluting stent within the past 12 months angina, hyperlipidemia,       Neuro/Psych  (+) numbness, psychiatric history,     GI/Hepatic/Renal/Endo    (+)  GERD, GI bleeding ,     Musculoskeletal     Abdominal  - normal exam    Bowel sounds: normal.   Substance History - negative use     OB/GYN negative ob/gyn ROS         Other   arthritis,                    Anesthesia Plan    ASA 4     MAC       Anesthetic plan, all risks, benefits, and alternatives have been provided, discussed and informed consent has been obtained with: patient.

## 2021-04-08 NOTE — OP NOTE
Operative Note :  Porsha Arcos MD      Prem VEGA Thrasher  1955    Procedure Date: 04/08/21    Pre-op Diagnosis:  Screening for colon cancer [Z12.11]   History of colon polyps    Post-Operative Diagnosis:  Colon polyps  Sigmoid diverticulosis    Procedure:   Flexible colonoscopy to the cecum with cold forcep polypectomy x4    Surgeon: Porsha Arcos MD    Assistant: None    Anesthesia:  MAC (monitored anesthetic care)    Estimated Blood Loss: Minimal    Specimens:   Ascending clon polyps x2  Descending colon polyp  Sigmoid colon polyp    Complications: None    Indications:  Mr. Thrasher is a 66 year-old gentleman here for a repeat screening colonoscopy.  He has a reported history of colon polyps but I have no access to these pathology results.  He has been counseled on the risks of a repeat screening colonoscopy to include bleeding, possible colon perforation, and possible missed pathology.  Despite these risks, he has consented to proceed.    Findings: 4 sessile colon polyps removed, sigmoid diverticulosis    Description of procedure:  The patient was brought to the endoscopy suite and chrissy in the left lateral decubitus position.  Continuous propofol anesthesia was administered.  A surgical timeout was completed.  A digital rectal exam was performed, revealing no abnormalities.  An adult colonoscope was then inserted through the anus and passed under direct visualization to the level of the cecum.  The cecum was identified via the ileocecal valve as well as the appendiceal orifice.  The scope was then slowly withdrawn, examining all circumferential walls of the ascending, transverse, descending, and sigmoid colon.  There was a 2 mm ascending colon polyp and 3 mm ascending colon polyps that were each removed using the cold biopsy forceps.  Within the descending colon there was a 4 mm colon polyp also removed using the cold biopsy forceps.  In the lower sigmoid colon there was a 2 mm flat sessile area that  appeared to be a possible polyp which I removed using the cold biopsy forceps.  In the rectum the scope was retroflexed showing no signs of hemorrhoidal disease.  There was minimal diverticulosis of the sigmoid colon.  The diverticular showed no signs of bleeding or fecal impaction.  The scope was then withdrawn and the colon desufflated.  The patient had a very good bowel prep and was transferred to the recovery area in stable condition.     Recommendations:  I will call the patient in 1 week or less with the pathology results of the 4 polyps removed as this will determine when the next screening colonoscopy will be due.    Porsha Arcos MD  General, Robotic, and Endoscopic Surgery  [facility]    [address], [address]  [place] [address]  P: [phone]  F: [phone]

## 2021-04-08 NOTE — DISCHARGE INSTRUCTIONS
For the next 24 hours patient needs to be with a responsible adult.    For 24 hours DO NOT drive, operate machinery, appliances, drink alcohol, make important decisions or sign legal documents.    Start with a light or bland diet if you are feeling sick to your stomach otherwise advance to regular diet as tolerated.    Follow recommendations on procedure report if provided by your doctor.    Call Dr Arcos for problems [phone]    Problems may include but not limited to: large amounts of bleeding, trouble breathing, repeated vomiting, severe unrelieved pain, fever or chills.

## 2021-04-08 NOTE — ANESTHESIA POSTPROCEDURE EVALUATION
"Patient: Prem Thrasher    Procedure Summary     Date: 04/08/21 Room / Location:  [facility]    Anesthesia Start: 0757 Anesthesia Stop: 0841    Procedure: COLONOSCOPY TO CECUM WITH POLYPECTOMY (COLD BX) (N/A ) Diagnosis:       Screening for colon cancer      (Screening for colon cancer [Z12.11])    Surgeons: Porsha Arcos MD Provider: Rory Stuart MD    Anesthesia Type: MAC ASA Status: 4          Anesthesia Type: MAC    Vitals  Vitals Value Taken Time   /87 04/08/21 0904   Temp     Pulse 77 04/08/21 0904   Resp 16 04/08/21 0904   SpO2 94 % 04/08/21 0904           Post Anesthesia Care and Evaluation    Patient location during evaluation: PACU  Patient participation: complete - patient participated  Level of consciousness: awake  Pain score: 0  Pain management: adequate  Airway patency: patent  Anesthetic complications: No anesthetic complications  PONV Status: none  Cardiovascular status: acceptable  Respiratory status: acceptable  Hydration status: acceptable    Comments: /87 (BP Location: Right arm, Patient Position: Sitting)   Pulse 77   Temp 37 °C (98.6 °F) (Oral)   Resp 16   Ht 180.3 cm (71\")   Wt 95.7 kg (211 lb)   SpO2 94%   BMI 29.43 kg/m²       "

## 2021-04-09 ENCOUNTER — TELEPHONE (OUTPATIENT)
Dept: SURGERY | Facility: CLINIC | Age: 66
End: 2021-04-09

## 2021-04-09 LAB
LAB AP CASE REPORT: NORMAL
PATH REPORT.FINAL DX SPEC: NORMAL
PATH REPORT.GROSS SPEC: NORMAL

## 2021-04-09 NOTE — TELEPHONE ENCOUNTER
I called Greg and relayed the benign pathology findings of a few adenomatous colon polyps and one hyperplastic polyp.  I would recommend we repeat another screening colonoscopy in 5 years to ensure no adenomatous polyps have regrown.  Alejandra, can you please update his health maintenance tab and recall pool to reflect a 5-year interval?    Thanks,  DARRIUS

## 2021-04-12 ENCOUNTER — TREATMENT (OUTPATIENT)
Dept: CARDIAC REHAB | Facility: HOSPITAL | Age: 66
End: 2021-04-12

## 2021-04-12 ENCOUNTER — IMMUNIZATION (OUTPATIENT)
Dept: VACCINE CLINIC | Facility: HOSPITAL | Age: 66
End: 2021-04-12

## 2021-04-12 DIAGNOSIS — I21.4 NON-STEMI (NON-ST ELEVATED MYOCARDIAL INFARCTION) (HCC): Primary | ICD-10-CM

## 2021-04-12 DIAGNOSIS — Z95.5 S/P DRUG ELUTING CORONARY STENT PLACEMENT: ICD-10-CM

## 2021-04-12 PROCEDURE — 91300 HC SARSCOV02 VAC 30MCG/0.3ML IM: CPT | Performed by: INTERNAL MEDICINE

## 2021-04-12 PROCEDURE — 0002A: CPT | Performed by: INTERNAL MEDICINE

## 2021-04-12 PROCEDURE — 93798 PHYS/QHP OP CAR RHAB W/ECG: CPT

## 2021-04-16 ENCOUNTER — APPOINTMENT (OUTPATIENT)
Dept: CARDIAC REHAB | Facility: HOSPITAL | Age: 66
End: 2021-04-16

## 2021-04-19 ENCOUNTER — APPOINTMENT (OUTPATIENT)
Dept: CARDIAC REHAB | Facility: HOSPITAL | Age: 66
End: 2021-04-19

## 2021-04-21 ENCOUNTER — APPOINTMENT (OUTPATIENT)
Dept: CARDIAC REHAB | Facility: HOSPITAL | Age: 66
End: 2021-04-21

## 2021-04-22 ENCOUNTER — TELEPHONE (OUTPATIENT)
Dept: CARDIOLOGY | Facility: CLINIC | Age: 66
End: 2021-04-22

## 2021-04-22 RX ORDER — ROSUVASTATIN CALCIUM 10 MG/1
TABLET, COATED ORAL
Qty: 90 TABLET | Refills: 0 | Status: SHIPPED | OUTPATIENT
Start: 2021-04-22 | End: 2021-04-23 | Stop reason: SDUPTHER

## 2021-04-22 NOTE — TELEPHONE ENCOUNTER
Mr. Thrasher called stating the prescription for Rosuvastatin should have gone to [facility] mail order.    Can you please resend to [facility]?    Please advise

## 2021-04-23 ENCOUNTER — APPOINTMENT (OUTPATIENT)
Dept: CARDIAC REHAB | Facility: HOSPITAL | Age: 66
End: 2021-04-23

## 2021-04-23 RX ORDER — ROSUVASTATIN CALCIUM 10 MG/1
10 TABLET, COATED ORAL
Qty: 90 TABLET | Refills: 0 | Status: SHIPPED | OUTPATIENT
Start: 2021-04-23 | End: 2021-07-02

## 2021-04-26 ENCOUNTER — APPOINTMENT (OUTPATIENT)
Dept: CARDIAC REHAB | Facility: HOSPITAL | Age: 66
End: 2021-04-26

## 2021-04-27 RX ORDER — CARVEDILOL 12.5 MG/1
TABLET ORAL
Qty: 180 TABLET | Refills: 0 | Status: SHIPPED | OUTPATIENT
Start: 2021-04-27 | End: 2021-07-10

## 2021-04-28 ENCOUNTER — APPOINTMENT (OUTPATIENT)
Dept: CARDIAC REHAB | Facility: HOSPITAL | Age: 66
End: 2021-04-28

## 2021-04-30 ENCOUNTER — APPOINTMENT (OUTPATIENT)
Dept: CARDIAC REHAB | Facility: HOSPITAL | Age: 66
End: 2021-04-30

## 2021-05-03 ENCOUNTER — APPOINTMENT (OUTPATIENT)
Dept: CARDIAC REHAB | Facility: HOSPITAL | Age: 66
End: 2021-05-03

## 2021-07-02 RX ORDER — ROSUVASTATIN CALCIUM 10 MG/1
TABLET, COATED ORAL
Qty: 90 TABLET | Refills: 0 | Status: SHIPPED | OUTPATIENT
Start: 2021-07-02 | End: 2021-07-20

## 2021-07-10 RX ORDER — CARVEDILOL 12.5 MG/1
TABLET ORAL
Qty: 180 TABLET | Refills: 0 | Status: SHIPPED | OUTPATIENT
Start: 2021-07-10 | End: 2021-09-23

## 2021-07-10 RX ORDER — IRBESARTAN 300 MG/1
TABLET ORAL
Qty: 90 TABLET | Refills: 1 | Status: SHIPPED | OUTPATIENT
Start: 2021-07-10 | End: 2021-12-05

## 2021-07-20 RX ORDER — ROSUVASTATIN CALCIUM 10 MG/1
TABLET, COATED ORAL
Qty: 90 TABLET | Refills: 0 | Status: SHIPPED | OUTPATIENT
Start: 2021-07-20 | End: 2021-11-10

## 2021-09-06 RX ORDER — AMLODIPINE BESYLATE 10 MG/1
TABLET ORAL
Qty: 90 TABLET | Refills: 1 | Status: SHIPPED | OUTPATIENT
Start: 2021-09-06 | End: 2022-02-07

## 2021-09-14 ENCOUNTER — TELEPHONE (OUTPATIENT)
Dept: ORTHOPEDIC SURGERY | Facility: CLINIC | Age: 66
End: 2021-09-14

## 2021-09-14 ENCOUNTER — OFFICE VISIT (OUTPATIENT)
Dept: ORTHOPEDIC SURGERY | Facility: CLINIC | Age: 66
End: 2021-09-14

## 2021-09-14 VITALS — BODY MASS INDEX: 30.52 KG/M2 | WEIGHT: 218 LBS | HEIGHT: 71 IN | TEMPERATURE: 98.4 F

## 2021-09-14 DIAGNOSIS — M54.50 LUMBAR PAIN: Primary | ICD-10-CM

## 2021-09-14 PROCEDURE — 72100 X-RAY EXAM L-S SPINE 2/3 VWS: CPT | Performed by: ORTHOPAEDIC SURGERY

## 2021-09-14 PROCEDURE — 99214 OFFICE O/P EST MOD 30 MIN: CPT | Performed by: ORTHOPAEDIC SURGERY

## 2021-09-14 NOTE — TELEPHONE ENCOUNTER
Caller: USMAN    Relationship: [facility]    Best call back number:     What form or medical record are you requesting: OFFICE NOTES FROM TODAY'S VISIT    Who is requesting this form or medical record from you: [facility]    How would you like to receive the form or medical records (pick-up, mail, fax): FAX  If fax, what is the fax number:       Timeframe paperwork needed: AFTER TODAY'S APPOINTMENT    Additional notes: ATTN DR JACOME

## 2021-09-14 NOTE — PROGRESS NOTES
New patient or new problem visit    CC: Left hip and leg pain    HPI: Long history of left hip and leg pain.  He has failed to improve with physical therapy and foot surgery.  His heel pain did prove to be intrinsic and that improved with surgery by Dr. Smith.  I did a lumbar laminectomy and fusion with instrumentation a few years back but he never quit smoking and developed a nonunion.    PFSH: See attached.  Continues to smoke.  Pulmonary and cardiac issues.    ROS: No fever chills or weight loss.    PE: On exam good strength in the legs bilaterally.  Back is tender.    XRAY: Plain film x-rays show instrumented fusion L3-5 which I suspect is solid certainly there is lucencies around the cephalad screws which are unchanged from before but I also think I see some posterior lateral bone there is degenerative change and kyphosis through the 2 3 disc above.  Not much change from prior films.    Other: n/a    Impression: Probably has some adjacent level stenosis, there is a possibility of nonunion at the index level L3-5.    Plan: At this point I am recommending MRI scan of the lumbar spine with and without contrast.

## 2021-09-20 ENCOUNTER — OFFICE VISIT (OUTPATIENT)
Dept: CARDIOLOGY | Facility: CLINIC | Age: 66
End: 2021-09-20

## 2021-09-20 VITALS
BODY MASS INDEX: 30.57 KG/M2 | DIASTOLIC BLOOD PRESSURE: 88 MMHG | HEART RATE: 78 BPM | HEIGHT: 71 IN | WEIGHT: 218.4 LBS | SYSTOLIC BLOOD PRESSURE: 120 MMHG | OXYGEN SATURATION: 95 %

## 2021-09-20 DIAGNOSIS — Z72.0 TOBACCO ABUSE: ICD-10-CM

## 2021-09-20 DIAGNOSIS — I10 ESSENTIAL HYPERTENSION: ICD-10-CM

## 2021-09-20 DIAGNOSIS — I25.10 CORONARY ARTERY DISEASE INVOLVING NATIVE CORONARY ARTERY OF NATIVE HEART WITHOUT ANGINA PECTORIS: Primary | ICD-10-CM

## 2021-09-20 DIAGNOSIS — E78.2 MIXED HYPERLIPIDEMIA: ICD-10-CM

## 2021-09-20 PROCEDURE — 93000 ELECTROCARDIOGRAM COMPLETE: CPT | Performed by: INTERNAL MEDICINE

## 2021-09-20 PROCEDURE — 99214 OFFICE O/P EST MOD 30 MIN: CPT | Performed by: INTERNAL MEDICINE

## 2021-09-20 NOTE — PROGRESS NOTES
OFFICE VISIT      Date of Office Visit: 2021    Patient Name: Prem Thrasher  : 1955    Encounter Provider: Dioni Salazar MD  Referring Provider: No ref. provider found  Primary Care Provider: Montse Cain PA-C  Place of Service: [facility]        Chief Complaint   Patient presents with   • Coronary Artery Disease   • Follow-up     History of Present Illness    The patient is a 66-year-old white male with a history of coronary disease, hypertension and hyperlipidemia.  The patient received stents to the LAD in  when he presented with unstable angina pectoris.  He was noted to have diffuse disease.  There was moderate disease of the other arteries.  Left ventricular function remains normal.  The patient does not complain of angina pectoris at this time.  Unfortunately he still continues to smoke regularly at least a pack of cigarettes per day.  The major issue that he continues to complain of is back discomfort.  He is chronically short of breath from his long-term tobacco use.    Past Medical History:   Diagnosis Date   • Achilles tendon pain     LEFT   • Anxiety disorder    • CAD (coronary artery disease)    • COPD (chronic obstructive pulmonary disease) (CMS/[facility])    • CTS (carpal tunnel syndrome)    • Diverticulitis    • Diverticulitis of colon    • Diverticulosis    • Encounter for eye exam 10/2014    normal   • Exertional chest pain    • Fissure, anal    • GERD (gastroesophageal reflux disease)    • History of bone density study 2014    Dr. Maria Antonia Lopez; normal   • History of chest x-ray 02/15/2011    also 3-6-15; normal chest   • History of colon polyps    • History of nuclear stress test 2015    cardiolite; Dr. Greenberg; negative   • History of pulmonary function tests 2015    COPD   • Hyperlipidemia    • Hypertension    • IFG (impaired fasting glucose)    • Low back pain    • Lumbosacral disc disease    • Nerve damage      KAYLYNN ELBOWS   • NSTEMI (non-ST elevated myocardial infarction) (CMS/[facility]) 01/2021   • Osteoarthritis, multiple sites    • Postoperative nausea and vomiting 8/1/2017   • Postoperative wound infection    • Rectal bleeding    • Rheumatoid arthritis (CMS/[facility])    • Sciatica    • Sleep apnea     no cpap   • Staph infection     WITH BACK SURGERY 2017  ON LONG TERM ANTIBIOTICS   • Unstable angina (CMS/[facility])    • Wears glasses          Past Surgical History:   Procedure Laterality Date   • ACHILLES TENDON SURGERY Left 7/3/2018    Procedure: Left Achilles debridement and secondary repair with partial excision of calcaneus. Possible left Achilles lengthening at the calf;  Surgeon: Vinay Smith MD;  Location: [facility] OR [facility];  Service: Orthopedics   • BACK SURGERY     • CARDIAC CATHETERIZATION N/A 3/7/2020    Procedure: Left Heart Cath;  Surgeon: Dioni aSlazar MD;  Location: [facility] CATH INVASIVE LOCATION;  Service: Cardiology;  Laterality: N/A;   • CARDIAC CATHETERIZATION N/A 3/7/2020    Procedure: Coronary angiography;  Surgeon: Dioni Salazar MD;  Location: [facility] CATH INVASIVE LOCATION;  Service: Cardiology;  Laterality: N/A;   • CARDIAC CATHETERIZATION N/A 3/7/2020    Procedure: Left ventriculography;  Surgeon: Dioni Salazar MD;  Location: [facility] CATH INVASIVE LOCATION;  Service: Cardiology;  Laterality: N/A;   • CARDIAC CATHETERIZATION N/A 3/7/2020    Procedure: Stent RAZA coronary;  Surgeon: Dioni Salazar MD;  Location: [facility] CATH INVASIVE LOCATION;  Service: Cardiology;  Laterality: N/A;   • CARDIAC CATHETERIZATION N/A 1/22/2021    Procedure: Left Heart Cath;  Surgeon: Dioni Salazar MD;  Location: [facility] CATH INVASIVE LOCATION;  Service: Cardiology;  Laterality: N/A;   • CARDIAC CATHETERIZATION N/A 1/22/2021    Procedure: Coronary angiography;  Surgeon: Dioni Salazar MD;  Location: [facility] CATH INVASIVE LOCATION;  Service: Cardiology;  Laterality: N/A;   • CARDIAC  CATHETERIZATION N/A 1/22/2021    Procedure: Left ventriculography;  Surgeon: Dioni Salazar MD;  Location: [facility] CATH INVASIVE LOCATION;  Service: Cardiology;  Laterality: N/A;   • CARDIAC CATHETERIZATION N/A 1/22/2021    Procedure: Percutaneous Coronary Intervention;  Surgeon: Dioni Salazar MD;  Location: [facility] CATH INVASIVE LOCATION;  Service: Cardiology;  Laterality: N/A;   • CARDIAC CATHETERIZATION N/A 1/22/2021    Procedure: Stent RAZA coronary;  Surgeon: Dioni Salazar MD;  Location: [facility] CATH INVASIVE LOCATION;  Service: Cardiology;  Laterality: N/A;   • CARDIAC SURGERY      3 stents total   • COLONOSCOPY N/A 02/02/2011    Normal colon except scattered diverticulosis-Dr. Guero Blunt   • COLONOSCOPY N/A 4/8/2021    Procedure: COLONOSCOPY TO CECUM WITH POLYPECTOMY (COLD BX);  Surgeon: Porsha Arcos MD;  Location: [facility] ENDOSCOPY;  Service: General;  Laterality: N/A;  SCREENING; HX OF COLON POLYPS  POST:DIVERTICULOSIS; COLON POLYP   • CYST REMOVAL  03/2016    x2  FROM FACE AND EAR   • DENTAL PROCEDURE  2008    teeth removed; dental implants   • EPIDURAL BLOCK  1995    L/S spine   • FINGER DEBRIDEMENT Right 07/12/2003    Debridement and full thickness skin grafting of the ring and small fingers of the right hand-Dr. Rayray Long   • INCISION AND DRAINAGE PERIRECTAL ABSCESS N/A 7/10/2018    Procedure: INCISION AND DRAINAGE OF PERIRECTAL ABSCESS;  Surgeon: Porsha Arcos MD;  Location: [facility] MAIN OR;  Service: General   • LUMBAR DISCECTOMY FUSION INSTRUMENTATION Left 10/10/2016    Procedure: LEFT L2-L3, L3-L4 LUMBAR LAMINECTOMY/ DISCECTOMY WITH METRIX ;  Surgeon: Sawyer Rick MD;  Location: [facility] MAIN OR;  Service:    • LUMBAR DISCECTOMY FUSION INSTRUMENTATION N/A 7/31/2017    Procedure: LUMBAR FUSION DECOMPRESSON WITH PEDICLE SCREWS with LAURA L2-3,L3-4;  Surgeon: Jacky Perez MD;  Location: [facility] MAIN OR;  Service:    • LUMBAR LAMINECTOMY DISCECTOMY DECOMPRESSION  N/A 7/31/2017    Procedure: L2 to L4 laminectomy with neural lysis and a fusion by orthopedics;  Surgeon: Sawyer Rick MD;  Location: [facility] OR;  Service:    • LUMBAR LAMINECTOMY DISCECTOMY DECOMPRESSION N/A 9/5/2017    Procedure: I&D LUMBAR SPINE ;  Surgeon: Jacky Perez MD;  Location: [facility] OR;  Service:    • SHOULDER SURGERY Right 02/23/2011    Dr. Navarro   • SINUS SURGERY  2003    Dr. Barrera           Current Outpatient Medications:   •  albuterol sulfate  (90 Base) MCG/ACT inhaler, Inhale 2 puffs Every 4 (Four) Hours As Needed for Wheezing or Shortness of Air., Disp: 54 g, Rfl: 3  •  amLODIPine (NORVASC) 10 MG tablet, TAKE 1 TABLET EVERY DAY FOR BLOOD PRESSURE, Disp: 90 tablet, Rfl: 1  •  aspirin  MG tablet, Take 1 tablet by mouth Daily., Disp: 30 tablet, Rfl: 0  •  carvedilol (COREG) 12.5 MG tablet, TAKE 1 TABLET 2  TIMES A DAY WITH MEALS FOR BLOOD PRESSURE , Disp: 180 tablet, Rfl: 0  •  Cholecalciferol (VITAMIN D) 2000 UNITS tablet, Take 2,000 Units by mouth Every Evening., Disp: , Rfl:   •  clopidogrel (PLAVIX) 75 MG tablet, TAKE 1 TABLET EVERY DAY, Disp: 90 tablet, Rfl: 2  •  Coenzyme Q10 (CO Q 10 PO), Take  by mouth., Disp: , Rfl:   •  Cyanocobalamin (Vitamin B-12) 1000 MCG sublingual tablet, One SL daily, Disp: 90 each, Rfl: 3  •  cyclobenzaprine (FLEXERIL) 10 MG tablet, Take 1 tablet by mouth 3 (Three) Times a Day As Needed for Muscle Spasms., Disp: 270 tablet, Rfl: 3  •  docusate sodium (COLACE) 100 MG capsule, Take 100 mg by mouth Daily., Disp: , Rfl:   •  escitalopram (LEXAPRO) 20 MG tablet, TAKE 1 TABLET BY MOUTH EVERY NIGHT FOR ANXIETY, Disp: 90 tablet, Rfl: 3  •  ezetimibe (ZETIA) 10 MG tablet, Take 1 tablet by mouth Every Evening. For cholesterol, Disp: 90 tablet, Rfl: 3  •  folic acid (FOLVITE) 1 MG tablet, Take 1 tablet by mouth Daily., Disp: 90 tablet, Rfl: 3  •  irbesartan (AVAPRO) 300 MG tablet, TAKE 1 TABLET EVERY DAY FOR BLOOD PRESSURE, Disp: 90 tablet, Rfl:  1  •  isosorbide mononitrate (IMDUR) 30 MG 24 hr tablet, TAKE 1 TABLET EVERY DAY, Disp: 90 tablet, Rfl: 3  •  leflunomide (ARAVA) 20 MG tablet, Take 20 mg by mouth Every Morning., Disp: , Rfl:   •  nitroglycerin (NITROSTAT) 0.4 MG SL tablet, Place 1 tablet under the tongue Every 5 (Five) Minutes As Needed for Chest Pain. Take no more than 3 doses in 15 minutes., Disp: 25 tablet, Rfl: 1  •  Probiotic Product (PROBIOTIC PO), Take  by mouth., Disp: , Rfl:   •  rosuvastatin (CRESTOR) 10 MG tablet, TAKE 1 TABLET BY MOUTH EVERY DAY AT NIGHT, Disp: 90 tablet, Rfl: 0  •  Unable to find, 1 each 1 (One) Time. Med Name: Relief Factor, Disp: , Rfl:   •  CBD (cannabidiol) oral oil, Take  by mouth Daily., Disp: , Rfl:   •  gabapentin (NEURONTIN) 300 MG capsule, Start 1 PO daily X 4 days then 1 PO BID X 4 days then can increase to 1 PO TID routine for pain, Disp: 270 capsule, Rfl: 1      Social History     Socioeconomic History   • Marital status:      Spouse name: Not on file   • Number of children: Not on file   • Years of education: Not on file   • Highest education level: Not on file   Tobacco Use   • Smoking status: Current Every Day Smoker     Packs/day: 1.00     Years: 45.00     Pack years: 45.00     Types: Cigarettes   • Smokeless tobacco: Never Used   • Tobacco comment: no caffeine    Substance and Sexual Activity   • Alcohol use: No   • Drug use: No   • Sexual activity: Not Currently     Partners: Female     Birth control/protection: None         Review of Systems   Constitutional: Positive for malaise/fatigue.   HENT: Negative.    Eyes: Negative.    Cardiovascular: Positive for dyspnea on exertion and palpitations.   Respiratory: Negative.    Endocrine: Negative.    Skin: Negative.    Musculoskeletal: Negative.    Gastrointestinal: Negative.    Neurological: Negative.    Psychiatric/Behavioral: Negative.        Procedures      ECG 12 Lead    Date/Time: 9/20/2021 3:22 PM  Performed by: Dioni Salazra  "MD  Authorized by: Dioni Salazar MD   Comparison: compared with previous ECG from 2/24/2021  Rhythm: sinus rhythm  Rate: normal  Conduction: 1st degree AV block  QRS axis: normal  Other findings: low voltage                Objective:    /88 (BP Location: Left arm, Patient Position: Sitting)   Pulse 78   Ht 180.3 cm (71\")   Wt 99.1 kg (218 lb 6.4 oz)   SpO2 95%   BMI 30.46 kg/m²         Vitals reviewed.   Constitutional:       Appearance: Well-developed.   Eyes:      Pupils: Pupils are equal, round, and reactive to light.   HENT:      Head: Normocephalic.   Neck:      Thyroid: No thyromegaly.      Vascular: No carotid bruit or JVD.   Pulmonary:      Effort: Pulmonary effort is normal.      Breath sounds: Normal breath sounds.   Cardiovascular:      Normal rate. Regular rhythm. Normal S1. Normal S2.      No gallop.   Pulses:     Intact distal pulses.   Edema:     Peripheral edema absent.   Abdominal:      General: Bowel sounds are normal.      Palpations: Abdomen is soft.   Musculoskeletal:      Cervical back: Normal range of motion. Skin:     General: Skin is warm and dry.      Findings: No erythema.   Neurological:      Mental Status: Alert and oriented to person, place, and time.             Assessment:       Diagnosis Plan   1. Coronary artery disease involving native coronary artery of native heart without angina pectoris     2. Essential hypertension     3. Mixed hyperlipidemia     4. Tobacco abuse       1.  Coronary artery disease: Previous stents to the LAD in 2020.  Normal left ventricular systolic function.  No angina.  Remains on dual antiplatelet therapy.  2.  Hypertension: Remains on antihypertensive therapy with good control.  3.  Hyperlipidemia: Last cholesterol was done 8 months ago.  Total was 160 with an HDL of 34 and an LDL of 109.  He needs to have this repeated.  He has very primary care visit coming up.  He remains on statin therapy  4.  Tobacco abuse: Patient advised again to " discontinue all tobacco products       Plan:

## 2021-09-23 RX ORDER — CARVEDILOL 12.5 MG/1
TABLET ORAL
Qty: 180 TABLET | Refills: 0 | Status: SHIPPED | OUTPATIENT
Start: 2021-09-23 | End: 2021-10-27 | Stop reason: SDUPTHER

## 2021-10-07 ENCOUNTER — HOSPITAL ENCOUNTER (OUTPATIENT)
Dept: MRI IMAGING | Facility: HOSPITAL | Age: 66
Discharge: HOME | End: 2021-10-07
Admitting: ORTHOPAEDIC SURGERY

## 2021-10-07 DIAGNOSIS — M54.50 LUMBAR PAIN: ICD-10-CM

## 2021-10-07 LAB — CREAT BLDA-MCNC: 1.5 MG/DL (ref 0.6–1.3)

## 2021-10-07 PROCEDURE — A9577 INJ MULTIHANCE: HCPCS | Performed by: ORTHOPAEDIC SURGERY

## 2021-10-07 PROCEDURE — 72158 MRI LUMBAR SPINE W/O & W/DYE: CPT

## 2021-10-07 PROCEDURE — 82565 ASSAY OF CREATININE: CPT

## 2021-10-07 PROCEDURE — 0 GADOBENATE DIMEGLUMINE 529 MG/ML SOLUTION: Performed by: ORTHOPAEDIC SURGERY

## 2021-10-07 RX ADMIN — GADOBENATE DIMEGLUMINE 20 ML: 529 INJECTION, SOLUTION INTRAVENOUS at 17:02

## 2021-10-08 ENCOUNTER — TELEPHONE (OUTPATIENT)
Dept: FAMILY MEDICINE CLINIC | Facility: CLINIC | Age: 66
End: 2021-10-08

## 2021-10-08 NOTE — TELEPHONE ENCOUNTER
PATIENT CALLING STATING THAT HIS BACK  SPECIALIST CALLED STATING THAT HIS KIDNEY FUNCTION IS ABNORMAL HE WOULD LIKE TO KNOW IF HE NEEDS TO MAKE AN APPOINTMENT WITH MAREN BAUMANN HE WOULD ALSO LIKE TO KNOW IF SHE WILL BE NEEDING TO DRAW BLOOD PRIOR TO MAKING AN APPOINTMENT.    PLEASE ADVISE  [phone]

## 2021-10-15 ENCOUNTER — TELEPHONE (OUTPATIENT)
Dept: ORTHOPEDIC SURGERY | Facility: CLINIC | Age: 66
End: 2021-10-15

## 2021-10-15 NOTE — TELEPHONE ENCOUNTER
Call returned to the patient.  I did go over his MRI results with him I explained to him that he does have disc herniation at L1 to which would require laminectomy and fusion however his previous fusion from L2-4 does not look like it is completely solid most likely due to his smoking.  Patient continues to smoke.  We discussed that he will require surgery which would be a 1 level laminectomy with an L1-L4 fusion with instrumentation however Dr. Perez does not recommend surgery until the patient stop smoking.  Have asked him to get in touch with his PCP to see if there anything she can suggest to help him to quit smoking.  I have left it open for him to call if he has any other questions or concerns

## 2021-10-15 NOTE — TELEPHONE ENCOUNTER
----- Message from Jacky Perez MD sent at 10/14/2021  9:58 AM EDT -----  Tell him I do not think the previous level fused, probably because of smoking and that there is a new disc bulge at L1-2.  We did basically need to redo everything from L1 to L4:   L1-2 laminectomy L1-4 fusion with instrumentation and removal of implants L to 3 L3-4.  The neurologic compression at L1-2 is not that bad so I really do not want to do this operation unless he quit smoking.

## 2021-10-18 RX ORDER — ISOSORBIDE MONONITRATE 30 MG/1
TABLET, EXTENDED RELEASE ORAL
Qty: 90 TABLET | Refills: 3 | Status: SHIPPED | OUTPATIENT
Start: 2021-10-18 | End: 2022-08-10

## 2021-10-27 ENCOUNTER — OFFICE VISIT (OUTPATIENT)
Dept: FAMILY MEDICINE CLINIC | Facility: CLINIC | Age: 66
End: 2021-10-27

## 2021-10-27 VITALS
HEART RATE: 82 BPM | WEIGHT: 217 LBS | BODY MASS INDEX: 30.38 KG/M2 | OXYGEN SATURATION: 96 % | TEMPERATURE: 97.5 F | RESPIRATION RATE: 16 BRPM | DIASTOLIC BLOOD PRESSURE: 70 MMHG | HEIGHT: 71 IN | SYSTOLIC BLOOD PRESSURE: 120 MMHG

## 2021-10-27 DIAGNOSIS — R73.01 IFG (IMPAIRED FASTING GLUCOSE): ICD-10-CM

## 2021-10-27 DIAGNOSIS — E78.2 MIXED HYPERLIPIDEMIA: ICD-10-CM

## 2021-10-27 DIAGNOSIS — Z72.0 TOBACCO ABUSE: Primary | ICD-10-CM

## 2021-10-27 DIAGNOSIS — I25.10 CORONARY ARTERY DISEASE INVOLVING NATIVE CORONARY ARTERY OF NATIVE HEART WITHOUT ANGINA PECTORIS: ICD-10-CM

## 2021-10-27 DIAGNOSIS — E53.8 LOW SERUM VITAMIN B12: ICD-10-CM

## 2021-10-27 DIAGNOSIS — J43.8 OTHER EMPHYSEMA (HCC): ICD-10-CM

## 2021-10-27 DIAGNOSIS — M54.16 LUMBAR RADICULOPATHY: ICD-10-CM

## 2021-10-27 DIAGNOSIS — I10 PRIMARY HYPERTENSION: ICD-10-CM

## 2021-10-27 DIAGNOSIS — Z87.891 PERSONAL HISTORY OF NICOTINE DEPENDENCE: ICD-10-CM

## 2021-10-27 DIAGNOSIS — M05.79 RHEUMATOID ARTHRITIS INVOLVING MULTIPLE SITES WITH POSITIVE RHEUMATOID FACTOR (HCC): ICD-10-CM

## 2021-10-27 DIAGNOSIS — F41.1 GENERALIZED ANXIETY DISORDER: ICD-10-CM

## 2021-10-27 PROCEDURE — 99214 OFFICE O/P EST MOD 30 MIN: CPT | Performed by: PHYSICIAN ASSISTANT

## 2021-10-27 RX ORDER — CARVEDILOL 12.5 MG/1
12.5 TABLET ORAL 2 TIMES DAILY WITH MEALS
Qty: 180 TABLET | Refills: 1 | Status: SHIPPED | OUTPATIENT
Start: 2021-10-27 | End: 2022-04-27

## 2021-10-27 RX ORDER — CYCLOBENZAPRINE HCL 10 MG
10 TABLET ORAL 3 TIMES DAILY PRN
Qty: 270 TABLET | Refills: 3 | Status: SHIPPED | OUTPATIENT
Start: 2021-10-27 | End: 2022-02-15 | Stop reason: ALTCHOICE

## 2021-10-27 RX ORDER — ESCITALOPRAM OXALATE 20 MG/1
20 TABLET ORAL NIGHTLY
Qty: 90 TABLET | Refills: 3 | Status: SHIPPED | OUTPATIENT
Start: 2021-10-27 | End: 2021-12-05

## 2021-10-27 RX ORDER — EZETIMIBE 10 MG/1
10 TABLET ORAL EVERY EVENING
Qty: 90 TABLET | Refills: 3 | Status: SHIPPED | OUTPATIENT
Start: 2021-10-27 | End: 2022-09-01

## 2021-10-27 NOTE — PROGRESS NOTES
"Subjective   Prem Thrasher is a 66 y.o. male.     History of Present Illness    Since the last visit, he has overall felt tired.  He has Essential Hypertension and well controlled on current medication, Impaired fasting glucose and will monitor labs to watch for DMII, CAD and remains under care of their Cardiologist for mangement, CAD and remains on medication regimen and Vitamin D deficiency and will update labs for continued management.  he has been compliant with current medications have reviewed them.  The patient denies medication side effects.  Will refill medications. /62 (BP Location: Left arm, Patient Position: Sitting, Cuff Size: Adult)   Pulse 82   Temp 97.5 °F (36.4 °C)   Resp 16   Ht 180.3 cm (70.98\")   Wt 98.4 kg (217 lb)   SpO2 96%   BMI 30.28 kg/m²     Results for orders placed or performed during the hospital encounter of 10/07/21   POC Creatinine    Specimen: Blood   Result Value Ref Range    Creatinine 1.50 (H) 0.60 - 1.30 mg/dL     Dr Perez needs to redo lumbar surgery and stop smoking  He just quit 1 week ago.     Sees cardio for CAD---saw Dr Salazar ----  1.  Coronary artery disease: Previous stents to the LAD in 2020.  Normal left ventricular systolic function.  No angina.  Remains on dual antiplatelet therapy.  2.  Hypertension: Remains on antihypertensive therapy with good control.  3.  Hyperlipidemia: Last cholesterol was done 8 months ago.  Total was 160 with an HDL of 34 and an LDL of 109.  He needs to have this repeated.  He has very primary care visit coming up.  He remains on statin therapy  4.  Tobacco abuse: Patient advised again to discontinue all tobacco products    Sees Dr Lopez for RA    Sees pulm for --saw DR Mcmillan for COPD and moderate asthma; will send Albuterol; he wants to wait on Sprivia  Had colon cancer      Suggest low dose CT  52 pk yr hx  Flexeril for back spasms---works    He continues to take Lexapro for anxiety and depression and does help with " the 20 mg.  With his chronic rheumatoid arthritis and chronic back pain admits to still some breakthrough anxiety and depression on a daily basis does not want to change his medication we'll continue Lexapro again because it helps some.  Here with spouse and very supportive  Grimaces with pain every time he moves from his back pain  Failed epidurals and gabapentin  Continues on folic acid and sees Dr. Lopez.  He is on Arava  I am concerned about his COPD and suggested Spiriva and he wants to wait and he does have a albuterol inhaler at home to use as needed.  Has known COPD and does agree to low-dose CT screening of the chest due to his smoking history  The following portions of the patient's history were reviewed and updated as appropriate: allergies, current medications, past family history, past medical history, past social history, past surgical history and problem list.    Review of Systems   Constitutional: Negative for fatigue and fever.   HENT: Negative for nosebleeds and trouble swallowing.    Eyes: Negative for visual disturbance.   Respiratory: Negative for choking and stridor.    Cardiovascular: Negative for chest pain.   Gastrointestinal: Negative for blood in stool.   Endocrine: Negative for polydipsia.   Genitourinary: Negative for genital sores and hematuria.   Musculoskeletal: Positive for arthralgias, back pain, gait problem, joint swelling, myalgias, neck pain and neck stiffness.   Skin: Negative for color change and rash.   Allergic/Immunologic: Negative for immunocompromised state.   Neurological: Negative for seizures, facial asymmetry and speech difficulty.   Hematological: Negative for adenopathy.   Psychiatric/Behavioral: Positive for dysphoric mood. Negative for behavioral problems, self-injury and suicidal ideas. The patient is nervous/anxious.        Objective   Physical Exam  Vitals and nursing note reviewed.   Constitutional:       General: He is not in acute distress.     Appearance:  He is well-developed. He is obese. He is not diaphoretic.   HENT:      Head: Normocephalic.      Right Ear: Tympanic membrane normal.      Left Ear: Tympanic membrane normal.   Eyes:      General: No scleral icterus.     Conjunctiva/sclera: Conjunctivae normal.      Pupils: Pupils are equal, round, and reactive to light.   Neck:      Vascular: No carotid bruit.   Cardiovascular:      Rate and Rhythm: Normal rate and regular rhythm.      Heart sounds: Normal heart sounds. No murmur heard.      Pulmonary:      Effort: Pulmonary effort is normal.      Breath sounds: Wheezing present. No rales.   Musculoskeletal:         General: Normal range of motion.      Cervical back: Normal range of motion and neck supple.      Right lower leg: No edema.      Left lower leg: No edema.   Skin:     General: Skin is warm and dry.      Findings: No rash.   Neurological:      Mental Status: He is alert and oriented to person, place, and time.      Gait: Gait abnormal.   Psychiatric:         Mood and Affect: Affect is not inappropriate.         Behavior: Behavior normal.         Thought Content: Thought content normal.         Judgment: Judgment normal.         Assessment/Plan   Diagnoses and all orders for this visit:    1. Tobacco abuse (Primary)  -      CT Chest Low Dose Cancer Screening WO; Future    2. IFG (impaired fasting glucose)    3. Other emphysema (HCC)    4. Coronary artery disease involving native coronary artery of native heart without angina pectoris    5. Low serum vitamin B12    6. Generalized anxiety disorder    7. Primary hypertension    8. Mixed hyperlipidemia    9. Rheumatoid arthritis involving multiple sites with positive rheumatoid factor (HCC)    10. Lumbar radiculopathy  -     Ambulatory Referral to Pain Management    11. Personal history of nicotine dependence   -      CT Chest Low Dose Cancer Screening WO; Future    Also on B12 and will measure B12 level  He stopped smoking  Week ago  Low dose CT chest  See  pain management  Continue Lexapro does help for anxiety and depression  Continue albuterol as needed for COPD and does help  Followed by cardiology for CAD  Flexeril as needed muscle spasms works well  Dr. Lopez is rheumatologist and is on Arava for RA  Labs today and screening PSA to be done along with urine due to smoking history  Plan, Prem Thrasher, was seen today.  he was seen for HTN and continue medication, Imparied fasting glucose and plan follow up labs, diet, and exercise, Hyperlipidemia and will continue current medication, CAD and is currently stable on medication management and stable, CAD and is under care of their Cardiologist for medical management and stable and Vitamin D deficiency and will update labs .         Answers for HPI/ROS submitted by the patient on 10/20/2021  What is the primary reason for your visit?: Other  Please describe your symptoms.: Back pain is bad and leg pain cant do a whole lot bue to pain levels  Have you had these symptoms before?: No  How long have you been having these symptoms?: Greater than 2 weeks  Please list any medications you are currently taking for this condition.: Na  Please describe any probable cause for these symptoms. : Dont know had bloodwork done at [facility]  and my back dr said my numbers were off on my kidneys

## 2021-10-29 DIAGNOSIS — E03.9 HYPOTHYROIDISM, ACQUIRED: Primary | ICD-10-CM

## 2021-10-29 DIAGNOSIS — R73.01 IFG (IMPAIRED FASTING GLUCOSE): ICD-10-CM

## 2021-10-29 DIAGNOSIS — I10 PRIMARY HYPERTENSION: ICD-10-CM

## 2021-10-29 DIAGNOSIS — N28.9 RENAL IMPAIRMENT: ICD-10-CM

## 2021-10-29 RX ORDER — LEVOTHYROXINE SODIUM 0.03 MG/1
TABLET ORAL
Qty: 90 TABLET | Refills: 3 | Status: SHIPPED | OUTPATIENT
Start: 2021-10-29 | End: 2022-05-19

## 2021-11-08 RX ORDER — CLOPIDOGREL BISULFATE 75 MG/1
TABLET ORAL
Qty: 90 TABLET | Refills: 2 | Status: SHIPPED | OUTPATIENT
Start: 2021-11-08 | End: 2022-06-20

## 2021-11-10 RX ORDER — ROSUVASTATIN CALCIUM 10 MG/1
TABLET, COATED ORAL
Qty: 90 TABLET | Refills: 1 | Status: SHIPPED | OUTPATIENT
Start: 2021-11-10 | End: 2022-04-12

## 2021-11-26 ENCOUNTER — HOSPITAL ENCOUNTER (OUTPATIENT)
Dept: CT IMAGING | Facility: HOSPITAL | Age: 66
Discharge: HOME | End: 2021-11-26
Admitting: PHYSICIAN ASSISTANT

## 2021-11-26 DIAGNOSIS — Z72.0 TOBACCO ABUSE: ICD-10-CM

## 2021-11-26 DIAGNOSIS — Z87.891 PERSONAL HISTORY OF NICOTINE DEPENDENCE: ICD-10-CM

## 2021-11-26 PROCEDURE — 71271 CT THORAX LUNG CANCER SCR C-: CPT

## 2021-11-29 DIAGNOSIS — Z72.0 TOBACCO ABUSE: Primary | ICD-10-CM

## 2021-11-29 DIAGNOSIS — F17.210 NICOTINE DEPENDENCE, CIGARETTES, UNCOMPLICATED: ICD-10-CM

## 2021-12-05 RX ORDER — IRBESARTAN 300 MG/1
TABLET ORAL
Qty: 90 TABLET | Refills: 1 | Status: SHIPPED | OUTPATIENT
Start: 2021-12-05 | End: 2022-04-26 | Stop reason: HOSPADM

## 2021-12-05 RX ORDER — ESCITALOPRAM OXALATE 20 MG/1
20 TABLET ORAL NIGHTLY
Qty: 90 TABLET | Refills: 3 | Status: SHIPPED | OUTPATIENT
Start: 2021-12-05 | End: 2022-11-26

## 2021-12-05 RX ORDER — FOLIC ACID 1 MG/1
TABLET ORAL
Qty: 90 TABLET | Refills: 3 | Status: SHIPPED | OUTPATIENT
Start: 2021-12-05 | End: 2022-09-25

## 2021-12-05 RX ORDER — MAGNESIUM 200 MG
TABLET ORAL
Qty: 90 TABLET | Refills: 3 | Status: SHIPPED | OUTPATIENT
Start: 2021-12-05 | End: 2022-09-25

## 2021-12-13 NOTE — PROGRESS NOTES
The patient has a pain history of the following:  Chronic pain syndrome  Lumbar radiculopathy   Rheumatoid arthritis    Previous interventions that the patient has received include:   Epidurals    Pain medications include:  Flexeril BID     Previously: Gabapentin - no benefit , MANY topical medications, CBD oil, Hydrocodone (has at home but does not take)    Other conservative modalities which the patient reports using include:  Physical Therapy: yes  Chiropractor: yes  Massage Therapy: yes  TENS: yes  Neck or back surgery: yes (back surgery  x3)  Past pain management: yes ( 20-25 yrs ago)  Stretching    Past Significant Surgical History:  Lumbar laminectomy & fusion L3-5 - Dr. Perez   Decompression, then fusion, then infection cleanout    HPI:       CHIEF COMPLAINT: Back Pain    Prem Thrasher is a 66 y.o. male referred here by Montse Cain PA-C. Prem Thrasher presents to the office for evaluation and treatment of Back Pain      Onset:  Years ago   Inciting Event:  None  Location:  Low back   Pain: Pain described as numbness, ache, stabbing, pressure, tingling, throbbing, burning and cramping. Located in the low back and does radiate into the left lower extremity along the lateral/posterior aspect to the foot.  Severity:  Pain rated as a 7 /10.  Apportions pain as 50%  back pain and 50% extremity pain.  Symptoms have been constant.  Exacerbation:  Sitting for extended periods of time, standing for prolonged periods of time, lying down ..., walking.   Alleviation:  Changing positions.  Associated Symptoms:   He denies any new onset of bowel or bladder weakness, significant leg weakness or saddle anesthesia. Admits to balance problems or lower extremity incoordination.  Ambulates: Without assistive device     In the past he had epidurals to treat his pain, but he states he could walk in for the injection and then he required a wheelchair when he left due to the pain from the injection itself.  After one of his back  surgeries he had an infection and required antibiotics for 7 months.  He's seen Dr. Perez to discuss another surgery since Dr. Perez doesn't think his back healed after the previous fusion.  He was told to stop smoking and they would consider another surgery for his low back.  Mr. Thrasher stopped smoking in October and is willing to undergo another surgery to help with his back and leg issues.         PEG Assessment   What number best describes your pain on average in the past week?8  What number best describes how, during the past week, pain has interfered with your enjoyment of life?10  What number best describes how, during the past week, pain has interfered with your general activity?  7        Current Outpatient Medications:   •  albuterol sulfate  (90 Base) MCG/ACT inhaler, Inhale 2 puffs Every 4 (Four) Hours As Needed for Wheezing or Shortness of Air., Disp: 54 g, Rfl: 3  •  amLODIPine (NORVASC) 10 MG tablet, TAKE 1 TABLET EVERY DAY FOR BLOOD PRESSURE, Disp: 90 tablet, Rfl: 1  •  aspirin  MG tablet, Take 1 tablet by mouth Daily., Disp: 30 tablet, Rfl: 0  •  carvedilol (COREG) 12.5 MG tablet, Take 1 tablet by mouth 2 (Two) Times a Day With Meals. For heart, Disp: 180 tablet, Rfl: 1  •  Cholecalciferol (VITAMIN D) 2000 UNITS tablet, Take 2,000 Units by mouth Every Evening., Disp: , Rfl:   •  clopidogrel (PLAVIX) 75 MG tablet, TAKE 1 TABLET EVERY DAY, Disp: 90 tablet, Rfl: 2  •  Cyanocobalamin (Vitamin B-12) 1000 MCG sublingual tablet, DISSOLVE 1 TABLET UNDER THE TONGUE EVERY DAY, Disp: 90 tablet, Rfl: 3  •  cyclobenzaprine (FLEXERIL) 10 MG tablet, Take 1 tablet by mouth 3 (Three) Times a Day As Needed for Muscle Spasms., Disp: 270 tablet, Rfl: 3  •  docusate sodium (COLACE) 100 MG capsule, Take 100 mg by mouth Daily., Disp: , Rfl:   •  escitalopram (LEXAPRO) 20 MG tablet, TAKE 1 TABLET BY MOUTH EVERY NIGHT FOR ANXIETY, Disp: 90 tablet, Rfl: 3  •  ezetimibe (ZETIA) 10 MG tablet, Take 1 tablet by  mouth Every Evening. For cholesterol, Disp: 90 tablet, Rfl: 3  •  folic acid (FOLVITE) 1 MG tablet, TAKE 1 TABLET EVERY DAY, Disp: 90 tablet, Rfl: 3  •  irbesartan (AVAPRO) 300 MG tablet, TAKE 1 TABLET EVERY DAY FOR BLOOD PRESSURE, Disp: 90 tablet, Rfl: 1  •  isosorbide mononitrate (IMDUR) 30 MG 24 hr tablet, TAKE 1 TABLET EVERY DAY, Disp: 90 tablet, Rfl: 3  •  leflunomide (ARAVA) 20 MG tablet, Take 20 mg by mouth Every Morning., Disp: , Rfl:   •  levothyroxine (SYNTHROID, LEVOTHROID) 25 MCG tablet, One PO daily before breakfast for thyroid, Disp: 90 tablet, Rfl: 3  •  nitroglycerin (NITROSTAT) 0.4 MG SL tablet, Place 1 tablet under the tongue Every 5 (Five) Minutes As Needed for Chest Pain. Take no more than 3 doses in 15 minutes., Disp: 25 tablet, Rfl: 1  •  rosuvastatin (CRESTOR) 10 MG tablet, TAKE 1 TABLET EVERY NIGHT AT BEDTIME, Disp: 90 tablet, Rfl: 1  •  pregabalin (LYRICA) 50 MG capsule, Take 1 capsule PO nightly for a week.  If no side effects, increase to 1 capsule twice daily thereafter., Disp: 60 capsule, Rfl: 1    The following portions of the patient's history were reviewed and updated as appropriate: allergies, current medications, past family history, past medical history, past social history, past surgical history and problem list.      REVIEW OF PERTINENT MEDICAL DATA    10/7/21 MRI LUMBAR SPINE WITH AND WITHOUT CONTRAST     HISTORY: Low back pain with bilateral leg pain/radicular symptoms,  greater on the left.     TECHNIQUE: MRI lumbar spine includes sagittal T1, T2, STIR, PD as well  as axial T1, T2-weighted sequences. Gadolinium was administered  intravenously followed by axial and sagittal T1-weighted sequences.     COMPARISON: MRI lumbar spine with and without contrast 09/04/2017.     FINDINGS: There has been posterolateral instrumented fusion at L2-L3-L4  with placement of rods and pedicle screws. The distal thoracic cord and  conus medullaris appear normal and the tip of the conus  medullaris  terminates at the upper L2 body level which is low normal. Vertebral  body heights appear preserved.     At L1-2, there is a small left posterior disc protrusion that is new  when compared to the previous exam and narrows the left aspect of the  thecal sac and left lateral recess and appears to contact the ventral  margin of the traversing left L2 nerve. There is mild bilateral facet  arthritis without foraminal narrowing.     At L2-3, there has been posterolateral instrumented fusion and posterior  decompression. Disc space flattening is present and there is 4 mm  retrolisthesis of L2 on L3 without bony fusion of the vertebral bodies.  Central canal is patent. There is endplate overgrowth with mild right  foraminal narrowing. Left neural foramen is patent.     At L3-4, there has been posterolateral instrumented fusion with  placement of rods and pedicle screws as well as posterior decompression.  There is a disc bulge eccentric to the left with borderline small left  posterior disc protrusion and mild narrowing of the left lateral recess.  The central canal is patent. There is moderate bilateral foraminal  narrowing.     At L4-5, there is mild bilateral facet arthritis. The central canal and  neural foramina are patent.     At L5-S1, there is a broad disc bulge and there is bilateral facet  arthritis with facet spur formation and moderate bilateral foraminal  stenosis.     IMPRESSION:  1. At L1-2, there has developed a new small left posterior disc  protrusion with mild narrowing of the left aspect of the central canal  and left lateral recess.  2. At L2-L3-L4, there has been posterolateral instrumented fusion as  well as posterior decompression without recurrent canal or foraminal  narrowing. There is mild right foraminal narrowing at L2-3 and moderate  bilateral foraminal narrowing at L3-4.  3. At L5-S1, there is a broad disc bulge and there is facet arthritis  with moderate bilateral foraminal  "narrowing.     This report was finalized on 10/8/2021 2:59 PM by Dr. Gigi Burrows M.D.    10/27/21 Creatinine 1.36, Platelets 254 (10*3)    Review of Systems   Constitutional: Positive for fatigue.   HENT: Negative for congestion.    Eyes: Negative for visual disturbance.   Respiratory: Positive for shortness of breath. Negative for cough and wheezing.    Cardiovascular: Negative for chest pain.   Gastrointestinal: Negative for constipation and diarrhea.   Musculoskeletal: Positive for back pain.   Neurological: Positive for weakness and numbness (bilateral legs).   Psychiatric/Behavioral: Positive for sleep disturbance. Negative for suicidal ideas. The patient is nervous/anxious.      I have reviewed and confirmed the accuracy of the ROS as documented by the MA/LPN/RN Alexandra Cano MD      Vitals:    12/16/21 0950   BP: 109/76   Pulse: 89   Resp: 18   Temp: 96.5 °F (35.8 °C)   SpO2: 94%   Weight: 101 kg (223 lb 6.4 oz)   Height: 180.3 cm (70.98\")   PainSc:   7   PainLoc: Back         Objective   Physical Exam  Vitals reviewed.   Constitutional:       General: He is not in acute distress.  Pulmonary:      Effort: Pulmonary effort is normal. No respiratory distress.   Musculoskeletal:      Comments: Ambulation: Without assistive device  Lumbar Exam:  Appearance: Scoliotic curve absent and scarring present  Palpated over lumbosacral paravertebral regions and transverse processes with positive tenderness appreciated, Bilateral.   Sacroiliac joints are tender, Bilateral.  Trochanteric bursa are not tender, Bilateral.  Straight leg raise is positive radiculopathy, Left.  Facet loading is positive for pain, Bilateral.  Paraspinal/adjacent lumbar musculature are not tender to palpation, Bilateral.   Skin:     General: Skin is warm and dry.   Neurological:      General: No focal deficit present.      Mental Status: He is alert.      Deep Tendon Reflexes:      Reflex Scores:       Patellar reflexes are 0 on the right " side and 2+ on the left side.       Achilles reflexes are 0 on the right side and 0 on the left side.     Comments: Negative clonus bilaterally   Psychiatric:         Mood and Affect: Mood normal.         Thought Content: Thought content normal.         Assessment/Plan   Diagnoses and all orders for this visit:    1. Chronic pain syndrome (Primary)  -     pregabalin (LYRICA) 50 MG capsule; Take 1 capsule PO nightly for a week.  If no side effects, increase to 1 capsule twice daily thereafter.  Dispense: 60 capsule; Refill: 1  -     Urine Drug Screen Confirmation - Urine, Clean Catch; Future  -     POC Urine Drug Screen, Triage    2. History of lumbar surgery  -     pregabalin (LYRICA) 50 MG capsule; Take 1 capsule PO nightly for a week.  If no side effects, increase to 1 capsule twice daily thereafter.  Dispense: 60 capsule; Refill: 1  -     Urine Drug Screen Confirmation - Urine, Clean Catch; Future  -     POC Urine Drug Screen, Triage    3. Lumbar radiculopathy  -     pregabalin (LYRICA) 50 MG capsule; Take 1 capsule PO nightly for a week.  If no side effects, increase to 1 capsule twice daily thereafter.  Dispense: 60 capsule; Refill: 1  -     Urine Drug Screen Confirmation - Urine, Clean Catch; Future  -     POC Urine Drug Screen, Triage        - Baseline urine drug screen was obtained.  Routine UDS in office today as part of monitoring requirements for controlled substances.  The specimen was viewed and the immunoassay result reviewed and is appropriately negative.  This specimen will be sent to [facility] for confirmation.      - Pertinent labs reviewed.   - Pertinent imaging reviewed.    - Discussed treatment options:  I recommend avoiding any epidural interventions due to his history of infection after back surgery.  He cannot take NSAIDs and is already taking a muscle relaxant.  We discussed options of an anti-neuropathic medication like gabapentin or Lyrica as well as opioid options like  Tramadol (he had constipation with hydrocodone).    - Prescription provided for Lyrica 50mg qhs with instructions to increase to BID.  Discussed medication with the patient.  Included in this discussion was the potential for side effects and adverse events.  Patient verbalized understanding and wished to proceed.  Prescription will be sent to pharmacy.   - Prem Thrasher reports a pain score of 7.  Given his pain assessment as noted, treatment options were discussed and the following options were decided upon as a follow-up plan to address the patient's pain: prescription for non-opiod analgesics.  - Patient screened positive for depression based on a PHQ-9 score of 7 on 2/1/2021. Follow-up recommendations include: PCP managing depression.    --- Follow-up 1 month office visit            JESSICA REPORT    As part of the patient's treatment plan, I am prescribing controlled substances. The patient has been made aware of appropriate use of such medications, including potential risk of somnolence, limited ability to drive and/or work safely, and the potential for dependence or overdose. It has also bee made clear that these medications are for use by this patient only, without concomitant use of alcohol or other substances unless prescribed.     As the clinician, I personally reviewed the JESSICA from 12/16/21 while the patient was in the office today.    History and physical exam exhibit continued safe and appropriate use of controlled substances.         While examining this patient, I wore protective equipment including a mask, eye shield and gloves.  I washed my hands before and after this patient encounter.  The patient wore a mask throughout the visit as well.     Alexandra Cano MD  Pain Management

## 2021-12-16 ENCOUNTER — OFFICE VISIT (OUTPATIENT)
Dept: PAIN MEDICINE | Facility: CLINIC | Age: 66
End: 2021-12-16

## 2021-12-16 VITALS
BODY MASS INDEX: 31.27 KG/M2 | OXYGEN SATURATION: 94 % | HEART RATE: 89 BPM | SYSTOLIC BLOOD PRESSURE: 109 MMHG | RESPIRATION RATE: 18 BRPM | WEIGHT: 223.4 LBS | DIASTOLIC BLOOD PRESSURE: 76 MMHG | HEIGHT: 71 IN | TEMPERATURE: 96.5 F

## 2021-12-16 DIAGNOSIS — G89.4 CHRONIC PAIN SYNDROME: Primary | ICD-10-CM

## 2021-12-16 DIAGNOSIS — M54.16 LUMBAR RADICULOPATHY: ICD-10-CM

## 2021-12-16 DIAGNOSIS — Z98.890 HISTORY OF LUMBAR SURGERY: ICD-10-CM

## 2021-12-16 LAB
POC AMPHETAMINES: NEGATIVE
POC BARBITURATES: NEGATIVE
POC BENZODIAZEPHINES: NEGATIVE
POC COCAINE: NEGATIVE
POC METHADONE: NEGATIVE
POC METHAMPHETAMINE SCREEN URINE: NEGATIVE
POC OPIATES: NEGATIVE
POC OXYCODONE: NEGATIVE
POC PHENCYCLIDINE: NEGATIVE
POC PROPOXYPHENE: NEGATIVE
POC THC: NEGATIVE
POC TRICYCLIC ANTIDEPRESSANTS: NEGATIVE

## 2021-12-16 PROCEDURE — 80305 DRUG TEST PRSMV DIR OPT OBS: CPT | Performed by: ANESTHESIOLOGY

## 2021-12-16 PROCEDURE — 99204 OFFICE O/P NEW MOD 45 MIN: CPT | Performed by: ANESTHESIOLOGY

## 2021-12-16 RX ORDER — PREGABALIN 50 MG/1
CAPSULE ORAL
Qty: 60 CAPSULE | Refills: 1 | Status: SHIPPED | OUTPATIENT
Start: 2021-12-16 | End: 2022-01-14 | Stop reason: DRUGHIGH

## 2021-12-16 NOTE — PATIENT INSTRUCTIONS
Some common side effects of pregabalin include sleepiness, swelling in hands or feet, depression, blurred vision, and drowsiness.  Please contact the clinic immediately with any significant side effects so that the treatment plan may be adjusted in a safe manner.    It is best to start taking your Lyrica prescription on a night when you have nothing planned the next morning.  It is best to not make important decisions or to drive until you know how this medication will affect you.  If you have been instructed to slowly increase the dose, it is best to do this (again) on a day where you have nothing planned.    - If you have severe side effects, stop the medication.   - If you are experiencing strong side effects, do not increase the dose until those side effects resolve.    - If you have pain relief at any dose, stay at that dose and do not increase it.

## 2022-01-14 ENCOUNTER — OFFICE VISIT (OUTPATIENT)
Dept: PAIN MEDICINE | Facility: CLINIC | Age: 67
End: 2022-01-14

## 2022-01-14 VITALS
RESPIRATION RATE: 18 BRPM | WEIGHT: 227.6 LBS | OXYGEN SATURATION: 94 % | TEMPERATURE: 97.1 F | SYSTOLIC BLOOD PRESSURE: 120 MMHG | BODY MASS INDEX: 31.86 KG/M2 | DIASTOLIC BLOOD PRESSURE: 67 MMHG | HEART RATE: 88 BPM | HEIGHT: 71 IN

## 2022-01-14 DIAGNOSIS — Z98.890 HISTORY OF LUMBAR SURGERY: ICD-10-CM

## 2022-01-14 DIAGNOSIS — G89.4 CHRONIC PAIN SYNDROME: Primary | ICD-10-CM

## 2022-01-14 DIAGNOSIS — M54.16 LUMBAR RADICULOPATHY: ICD-10-CM

## 2022-01-14 PROCEDURE — 99214 OFFICE O/P EST MOD 30 MIN: CPT | Performed by: NURSE PRACTITIONER

## 2022-01-14 RX ORDER — PREGABALIN 100 MG/1
100 CAPSULE ORAL 2 TIMES DAILY
Qty: 60 CAPSULE | Refills: 0 | Status: SHIPPED | OUTPATIENT
Start: 2022-01-14 | End: 2022-02-15 | Stop reason: ALTCHOICE

## 2022-01-14 NOTE — PROGRESS NOTES
"CHIEF COMPLAINT  Follow-up for back pain.    Subjective   Prem Thrasher is a 66 y.o. male  who presents for follow-up.  He has a history of chronic back pain.    Patient was seen 12/16/2021 by Dr. Alexandra Cano.  This was his initial visit at this office.  He was referred here for chronic low back pain.  Had back pain for many years.  Has tried a variety of conservative treatment options including physical therapy, massage therapy, topical medications, gabapentin.  History of back surgeries X 3, Dr. Perez and Dr. Rick.  Remote history of epidurals which reportedly cause more pain.  Also history of infection following lumbar fusion requiring additional surgery.  Discussed potential for additional back surgery with Dr. Bower, patient would have to quit smoking which he has done.  Dr. Cano recommended avoiding epidural injections due to his history of infection after back surgery.  He is not a candidate for NSAIDs.  Opted to try Lyrica.  Also discussed options such as tramadol.    Lyrica 50 mg bid was started last visit.  He reports that it may be helping \"slightly\".  Denies any adverse reactions.  Overall pain is poorly controlled. He takes Flexeril 10 mg bid, tylenol PRN.      Patient remained masked during entire encounter. No cough present. I donned a mask and eye protection throughout entire visit. Prior to donning mask and eye protection, hand hygiene was performed, as well as when it was doffed.  I was closer than 6 feet, but not for an extended period of time. No obvious exposure to any bodily fluids.    Back Pain  This is a chronic problem. The current episode started more than 1 year ago. The problem occurs daily. The problem has been gradually worsening since onset. The pain is present in the lumbar spine. The quality of the pain is described as aching, cramping and burning. The pain radiates to the left thigh and right thigh. The pain is at a severity of 6/10. The symptoms are aggravated by position, " "sitting, standing, twisting and bending. Associated symptoms include numbness and weakness. Pertinent negatives include no chest pain. He has tried analgesics, muscle relaxant, heat, ice and home exercises for the symptoms. The treatment provided mild relief.     PEG Assessment   What number best describes your pain on average in the past week?6  What number best describes how, during the past week, pain has interfered with your enjoyment of life?10  What number best describes how, during the past week, pain has interfered with your general activity?  6    The following portions of the patient's history were reviewed and updated as appropriate: allergies, current medications, past family history, past medical history, past social history, past surgical history and problem list.    Review of Systems   Constitutional: Negative for fatigue.   HENT: Positive for congestion.    Eyes: Negative for visual disturbance.   Respiratory: Positive for shortness of breath. Negative for cough and wheezing.    Cardiovascular: Negative for chest pain.   Gastrointestinal: Negative for constipation and diarrhea.   Genitourinary: Positive for difficulty urinating.   Musculoskeletal: Positive for back pain.   Neurological: Positive for weakness and numbness.   Psychiatric/Behavioral: Positive for sleep disturbance. Negative for suicidal ideas. The patient is nervous/anxious.      I have reviewed and confirmed the accuracy of the ROS as documented by the MA/LPN/RN TEN Camp    Vitals:    01/14/22 1143   BP: 120/67   Pulse: 88   Resp: 18   Temp: 97.1 °F (36.2 °C)   SpO2: 94%   Weight: 103 kg (227 lb 9.6 oz)   Height: 180.3 cm (70.98\")   PainSc:   6   PainLoc: Back     Objective   Physical Exam  Vitals and nursing note reviewed.   Constitutional:       General: He is not in acute distress.     Appearance: Normal appearance. He is not ill-appearing.   Pulmonary:      Effort: Pulmonary effort is normal. No respiratory distress. "   Musculoskeletal:      Lumbar back: Tenderness and bony tenderness present. Positive left straight leg raise test.   Skin:     General: Skin is warm and dry.   Neurological:      Mental Status: He is alert and oriented to person, place, and time.      Gait: Gait abnormal (slowed).   Psychiatric:         Mood and Affect: Mood normal.         Behavior: Behavior normal.       Assessment/Plan   Diagnoses and all orders for this visit:    1. Chronic pain syndrome (Primary)    2. History of lumbar surgery    3. Lumbar radiculopathy      --- Increase Lyrica to 100 mg bid.    --- Follow-up 1 month          JESSICA REPORT    As the clinician, I personally reviewed the JESSICA from 1/14/2022 while the patient was in the office today.    This document is intended for medical expert use only. Reading of this document by patients and/or patient's family without participating medical staff guidance may result in misinterpretation and unintended morbidity.   Any interpretation of such data is the responsibility of the patient and/or family member responsible for the patient in concert with their primary or specialist providers, not to be left for sources of online searches such as [employer], [employer] or similar queries. Relying on these approaches to knowledge may result in misinterpretation, misguided goals of care and even death should patients or family members try recommendations outside of the realm of professional medical care in a supervised way.

## 2022-02-07 RX ORDER — AMLODIPINE BESYLATE 10 MG/1
TABLET ORAL
Qty: 90 TABLET | Refills: 1 | Status: SHIPPED | OUTPATIENT
Start: 2022-02-07 | End: 2022-04-26 | Stop reason: HOSPADM

## 2022-02-08 ENCOUNTER — TELEPHONE (OUTPATIENT)
Dept: ORTHOPEDIC SURGERY | Facility: CLINIC | Age: 67
End: 2022-02-08

## 2022-02-08 DIAGNOSIS — Z87.891 PERSONAL HISTORY OF NICOTINE DEPENDENCE: Primary | ICD-10-CM

## 2022-02-08 DIAGNOSIS — Z51.81 ENCOUNTER FOR THERAPEUTIC DRUG LEVEL MONITORING: ICD-10-CM

## 2022-02-08 NOTE — TELEPHONE ENCOUNTER
Provider: DR. NGUYỄN    Caller: PATIENT     Relationship to Patient: SELF      Phone Number:  [phone]    Reason for Call: PT. CALLING - STATES THAT DR. NGUYỄN AND HE TALKED ABOUT SURGERY FOR HIS BACK BECAUSE HE HAS MORE BULGING DISCS.   PT. STATES THAT  DR. NGUYỄN WANTED HIM TO QUIT SMOKING FIRST.   PT. STATES THAT HE QUIT SMOKING FULLY IN October 2021.  HE WAS TOLD TO CALL BACK AFTER 90 DAYS OF BEING SMOKE FREE. STATES THAT DR. NGUYỄN TOLD HIM TO CALL AND HE MAY ORDER BLOOD WORK AND/OR GO AHEAD AND SCHEDULE HIM FOR SURGERY.   PLEASE CALL TO ADVISE.     When was the patient last seen: 09/2021

## 2022-02-10 NOTE — TELEPHONE ENCOUNTER
Make an appointment for next available date for follow-up patient and do not overbook.  Order a blood test for nicotine level.  If the nicotine level is negative then surgery may be an option.  If there is any nicotine in his bloodstream then surgery is not an option and there is no point making the appointment.  He has to quit smoking.

## 2022-02-11 NOTE — TELEPHONE ENCOUNTER
Spoken with preadmission testing in the lab regarding his nicotine specimen.  Both the lab and ER states that the usual protocol is a urine sample rather than serum.  Nicotine specimen has been ordered

## 2022-02-15 ENCOUNTER — LAB (OUTPATIENT)
Dept: LAB | Facility: HOSPITAL | Age: 67
End: 2022-02-15

## 2022-02-15 ENCOUNTER — OFFICE VISIT (OUTPATIENT)
Dept: PAIN MEDICINE | Facility: CLINIC | Age: 67
End: 2022-02-15

## 2022-02-15 VITALS
OXYGEN SATURATION: 96 % | TEMPERATURE: 96.6 F | BODY MASS INDEX: 33.1 KG/M2 | HEART RATE: 81 BPM | HEIGHT: 71 IN | SYSTOLIC BLOOD PRESSURE: 104 MMHG | WEIGHT: 236.4 LBS | DIASTOLIC BLOOD PRESSURE: 66 MMHG

## 2022-02-15 DIAGNOSIS — Z51.81 ENCOUNTER FOR THERAPEUTIC DRUG LEVEL MONITORING: ICD-10-CM

## 2022-02-15 DIAGNOSIS — Z87.891 PERSONAL HISTORY OF NICOTINE DEPENDENCE: ICD-10-CM

## 2022-02-15 DIAGNOSIS — Z98.890 HISTORY OF LUMBAR SURGERY: ICD-10-CM

## 2022-02-15 DIAGNOSIS — M54.16 LUMBAR RADICULOPATHY: ICD-10-CM

## 2022-02-15 DIAGNOSIS — G89.4 CHRONIC PAIN SYNDROME: Primary | ICD-10-CM

## 2022-02-15 PROCEDURE — 99214 OFFICE O/P EST MOD 30 MIN: CPT | Performed by: NURSE PRACTITIONER

## 2022-02-15 PROCEDURE — 80305 DRUG TEST PRSMV DIR OPT OBS: CPT

## 2022-02-15 RX ORDER — PREGABALIN 150 MG/1
CAPSULE ORAL
Qty: 90 CAPSULE | Refills: 0 | Status: SHIPPED | OUTPATIENT
Start: 2022-02-15 | End: 2022-04-01

## 2022-02-15 RX ORDER — TIZANIDINE 4 MG/1
4 TABLET ORAL EVERY 8 HOURS PRN
Qty: 90 TABLET | Refills: 1 | Status: SHIPPED | OUTPATIENT
Start: 2022-02-15 | End: 2022-04-01

## 2022-02-15 NOTE — PROGRESS NOTES
CHIEF COMPLAINT  F/U back pain- patient states that his pain has worsened since his last visit.     Subjective   Prem Thrasher is a 67 y.o. male  who presents for follow-up.  He has a history of chronic back pain.    Had back pain for many years.  Has tried a variety of conservative treatment options including physical therapy, massage therapy, topical medications, gabapentin.  History of back surgeries X 3, Dr. Perez and Dr. Rick.  Remote history of epidurals which reportedly cause more pain.  Also history of infection following lumbar fusion requiring additional surgery. Discussed potential for additional back surgery with Dr. Bower, patient would have to quit smoking which he has done.  Dr. Cano recommended avoiding epidural injections due to his history of infection after back surgery.  He is not a candidate for NSAIDs.  Opted to try Lyrica.  Also discussed options such as tramadol.    Patient is waiting to proceed with urine nicotine test and schedule a f/u visit with Dr. Perez to discuss potential surgical options in more detail.      Pain today is 8/10 in severity VAS. He reports that it is quite severe. Increased Lyrica to 100 mg bid last visit. He reports no relief so far. He also continues with Flexeril and tylenol PRN. Overall pain remains poorly controlled.      Patient reporting SOA with exertion, occurs occasional, seems to be worsening. He understands that this could be serious and agrees to f/u with his PCP.      Patient remained masked during entire encounter. No cough present. I donned a mask and eye protection throughout entire visit. Prior to donning mask and eye protection, hand hygiene was performed, as well as when it was doffed.  I was closer than 6 feet, but not for an extended period of time. No obvious exposure to any bodily fluids.    Back Pain  This is a chronic problem. The current episode started more than 1 year ago. The problem occurs daily. The problem has been gradually  worsening since onset. The pain is present in the lumbar spine. The quality of the pain is described as aching, cramping and burning. The pain radiates to the left thigh and right thigh. The pain is at a severity of 8/10. The symptoms are aggravated by position, sitting, standing, twisting and bending. Associated symptoms include abdominal pain, numbness and weakness. Pertinent negatives include no chest pain, dysuria, fever or headaches. He has tried analgesics, muscle relaxant, heat, ice and home exercises for the symptoms. The treatment provided mild relief.     PEG Assessment   What number best describes your pain on average in the past week?8  What number best describes how, during the past week, pain has interfered with your enjoyment of life?9  What number best describes how, during the past week, pain has interfered with your general activity?  9    Review of Pertinent Medical Data ---  MRI LUMBAR [facility]      The following portions of the patient's history were reviewed and updated as appropriate: allergies, current medications, past family history, past medical history, past social history, past surgical history and problem list.    Review of Systems   Constitutional: Positive for fatigue. Negative for activity change, chills and fever.   HENT: Negative for congestion.    Eyes: Negative for visual disturbance.   Respiratory: Positive for shortness of breath. Negative for chest tightness.    Cardiovascular: Negative for chest pain.   Gastrointestinal: Positive for abdominal pain. Negative for constipation and diarrhea.   Genitourinary: Negative for difficulty urinating and dysuria.   Musculoskeletal: Positive for back pain.   Neurological: Positive for dizziness, weakness and numbness. Negative for light-headedness and headaches.   Psychiatric/Behavioral: Positive for agitation and sleep disturbance. The patient is not nervous/anxious.      I have reviewed and confirmed the accuracy of the ROS as  "documented by the MA/LPN/RN Sen[place]TEN [place]    Vitals:    02/15/22 1128   BP: 104/66   Pulse: 81   Temp: 96.6 °F (35.9 °C)   SpO2: 96%   Weight: 107 kg (236 lb 6.4 oz)   Height: 180.3 cm (71\")   PainSc:   8   PainLoc: Back       Objective   Physical Exam  Vitals and nursing note reviewed.   Constitutional:       General: He is not in acute distress.     Appearance: Normal appearance. He is not ill-appearing.   Pulmonary:      Effort: Pulmonary effort is normal. No respiratory distress.   Musculoskeletal:      Lumbar back: Tenderness and bony tenderness present. Positive left straight leg raise test.   Skin:     General: Skin is warm and dry.   Neurological:      Mental Status: He is alert and oriented to person, place, and time.      Gait: Gait abnormal (slowed).   Psychiatric:         Mood and Affect: Mood normal.         Behavior: Behavior normal.         Assessment/Plan   Diagnoses and all orders for this visit:    1. Chronic pain syndrome (Primary)    2. History of lumbar surgery    3. Lumbar radiculopathy    Other orders  -     tiZANidine (ZANAFLEX) 4 MG tablet; Take 1 tablet by mouth Every 8 (Eight) Hours As Needed for Muscle Spasms.  Dispense: 90 tablet; Refill: 1  -     pregabalin (LYRICA) 150 MG capsule; Bid x 1 week, then increase to tid thereafter  Dispense: 90 capsule; Refill: 0      --- Increase Lyrica to 150 mg bid, may increase to tid in 2 weeks if needed/tolerated  --- Trial a different muscle relaxant. D/c Flexeril. Start Tizanidine. Discussed medication with the patient.  Included in this discussion was the potential for side effects and adverse events.  Patient verbalized understanding and wished to proceed.  Prescription will be sent to pharmacy.  --- Follow-up 1 month/sooner if needed          JESSICA REPORT  As part of the patient's treatment plan, I am prescribing controlled substances. The patient has been made aware of appropriate use of such medications, including potential risk of " somnolence, limited ability to drive and/or work safely, and the potential for dependence or overdose. It has also bee made clear that these medications are for use by this patient only, without concomitant use of alcohol or other substances unless prescribed.     Patient has completed prescribing agreement detailing terms of continued prescribing of controlled substances, including monitoring JESSICA reports, urine drug screening, and pill counts if necessary. The patient is aware that inappropriate use will results in cessation of prescribing such medications.    As the clinician, I personally reviewed the JESSICA from 2/15/2022 while the patient was in the office today.    History and physical exam exhibit continued safe and appropriate use of controlled substances.     Dictated utilizing Dragon dictation.     This document is intended for medical expert use only. Reading of this document by patients and/or patient's family without participating medical staff guidance may result in misinterpretation and unintended morbidity.   Any interpretation of such data is the responsibility of the patient and/or family member responsible for the patient in concert with their primary or specialist providers, not to be left for sources of online searches such as [employer], [employer] or similar queries. Relying on these approaches to knowledge may result in misinterpretation, misguided goals of care and even death should patients or family members try recommendations outside of the realm of professional medical care in a supervised way.

## 2022-02-16 ENCOUNTER — TELEPHONE (OUTPATIENT)
Dept: ORTHOPEDIC SURGERY | Facility: CLINIC | Age: 67
End: 2022-02-16

## 2022-02-16 LAB
COTININE UR QL SCN: NEGATIVE NG/ML
Lab: NORMAL

## 2022-02-16 NOTE — TELEPHONE ENCOUNTER
Have spoke with the patient.  Urine nicotine test was negative.  Have made him an appointment to see TIFFANI VEGA next week at this point office to discuss surgery

## 2022-02-16 NOTE — TELEPHONE ENCOUNTER
----- Message from Jacky Perez MD sent at 2/16/2022  4:13 PM EST -----  Tell him good job.  Make a follow-up appointment for next available date to discuss surgery.

## 2022-02-22 ENCOUNTER — OFFICE VISIT (OUTPATIENT)
Dept: ORTHOPEDIC SURGERY | Facility: CLINIC | Age: 67
End: 2022-02-22

## 2022-02-22 ENCOUNTER — HOME HEALTH ADMISSION (OUTPATIENT)
Dept: HOME HEALTH SERVICES | Facility: HOME HEALTHCARE | Age: 67
End: 2022-02-22

## 2022-02-22 VITALS — HEIGHT: 71 IN | WEIGHT: 225.1 LBS | BODY MASS INDEX: 31.51 KG/M2 | TEMPERATURE: 97.5 F

## 2022-02-22 DIAGNOSIS — M96.0 PSEUDARTHROSIS AFTER FUSION OR ARTHRODESIS: ICD-10-CM

## 2022-02-22 DIAGNOSIS — M54.50 LUMBAR PAIN: Primary | ICD-10-CM

## 2022-02-22 PROCEDURE — 99214 OFFICE O/P EST MOD 30 MIN: CPT | Performed by: ORTHOPAEDIC SURGERY

## 2022-02-25 PROBLEM — M54.50 LUMBAR PAIN: Status: ACTIVE | Noted: 2022-02-25

## 2022-02-25 NOTE — PROGRESS NOTES
See my note from 9/14/2021.  He has ceased smoking cigarettes now over 4 months ago and his urine test for nicotine was completely negative.  He is having significant back pain but seems to have some lumbosacral as well as upper lumbar pain.  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  I am recommending myelogram and CT scan but I am going to go ahead and schedule him for T12-L4 fusion with instrumentation will probably need to go farther than that.  We will make final adjustments when the results are available but I want to go ahead and get a spot on the OR schedule.  I explained the risk and benefits of the myelogram as well.  I discussed the risks and benefits of posterior spinal fusion, including where applicable, laminectomy.  Risks include adverse anesthetic events such as death, stroke and myocardial infarction.  More specific surgical risks include infection, nonunion, hardware failure, spinal fluid leakage, nerve root injury or paralysis, visceral or vascular injury, persistent pain, deep venous thrombosis, and pulmonary embolism among others. There is a 70 to 90 % chance of success.   Alternatives have been discussed.  After careful consideration the patient wishes to proceed with surgery.

## 2022-02-28 NOTE — ADDENDUM NOTE
Addended by: SANDRINE GREENWOOD on: 2/28/2022 04:27 PM     Modules accepted: Orders, SmartSet     need for outpatient follow-up return to ED if symptoms worsen, persist or questions arise/need for outpatient follow-up

## 2022-03-03 NOTE — PROGRESS NOTES
03/09/22 0002   Pre-Procedure Phone Call   Procedure Time Verified Yes   Arrival Time 0800   Procedure Location Verified Yes   Medical History Reviewed No   NPO Status Reinforced Yes   Ride and Caregiver Arranged Yes   Patient Knows to Bring Current Medications   (no changes in medications since last reviewed)   Bring Outside Films Requested   (MRI L-Spine 10/7/21 in PACS)

## 2022-03-09 ENCOUNTER — HOSPITAL ENCOUNTER (OUTPATIENT)
Dept: GENERAL RADIOLOGY | Facility: HOSPITAL | Age: 67
Discharge: HOME | End: 2022-03-09

## 2022-03-09 ENCOUNTER — HOSPITAL ENCOUNTER (OUTPATIENT)
Dept: CT IMAGING | Facility: HOSPITAL | Age: 67
Discharge: HOME | End: 2022-03-09

## 2022-03-09 VITALS
HEART RATE: 71 BPM | RESPIRATION RATE: 16 BRPM | SYSTOLIC BLOOD PRESSURE: 125 MMHG | HEIGHT: 71 IN | BODY MASS INDEX: 31.92 KG/M2 | WEIGHT: 228 LBS | TEMPERATURE: 96.8 F | DIASTOLIC BLOOD PRESSURE: 81 MMHG | OXYGEN SATURATION: 95 %

## 2022-03-09 DIAGNOSIS — M54.50 LUMBAR PAIN: ICD-10-CM

## 2022-03-09 DIAGNOSIS — M96.0 PSEUDARTHROSIS AFTER FUSION OR ARTHRODESIS: ICD-10-CM

## 2022-03-09 PROCEDURE — 62284 INJECTION FOR MYELOGRAM: CPT

## 2022-03-09 PROCEDURE — 72240 MYELOGRAPHY NECK SPINE: CPT

## 2022-03-09 PROCEDURE — 62304 MYELOGRAPHY LUMBAR INJECTION: CPT

## 2022-03-09 PROCEDURE — 0 LIDOCAINE 1 % SOLUTION: Performed by: RADIOLOGY

## 2022-03-09 PROCEDURE — 72132 CT LUMBAR SPINE W/DYE: CPT

## 2022-03-09 PROCEDURE — 0 IOPAMIDOL 41 % SOLUTION: Performed by: RADIOLOGY

## 2022-03-09 RX ORDER — SODIUM CHLORIDE 0.9 % (FLUSH) 0.9 %
10 SYRINGE (ML) INJECTION AS NEEDED
Status: CANCELLED | OUTPATIENT
Start: 2022-03-09

## 2022-03-09 RX ORDER — LIDOCAINE HYDROCHLORIDE 10 MG/ML
10 INJECTION, SOLUTION INFILTRATION; PERINEURAL ONCE
Status: COMPLETED | OUTPATIENT
Start: 2022-03-09 | End: 2022-03-09

## 2022-03-09 RX ORDER — LIDOCAINE HYDROCHLORIDE 10 MG/ML
10 INJECTION, SOLUTION INFILTRATION; PERINEURAL ONCE
Status: DISCONTINUED | OUTPATIENT
Start: 2022-03-09 | End: 2022-03-09

## 2022-03-09 RX ORDER — LEFLUNOMIDE 20 MG/1
1 TABLET ORAL NIGHTLY
Status: ON HOLD | COMMUNITY
Start: 2022-02-21 | End: 2022-04-26 | Stop reason: SDUPTHER

## 2022-03-09 RX ORDER — SODIUM CHLORIDE 0.9 % (FLUSH) 0.9 %
3 SYRINGE (ML) INJECTION EVERY 12 HOURS SCHEDULED
Status: CANCELLED | OUTPATIENT
Start: 2022-03-09

## 2022-03-09 RX ADMIN — IOPAMIDOL 15 ML: 408 INJECTION, SOLUTION INTRATHECAL at 09:22

## 2022-03-09 RX ADMIN — LIDOCAINE HYDROCHLORIDE 2 ML: 10 INJECTION, SOLUTION INFILTRATION; PERINEURAL at 09:20

## 2022-03-09 NOTE — NURSING NOTE
Patient here for a lumbar myelogram. Patient wearing mask and this RN wearing mask and goggles at all times with pt interactions.

## 2022-03-09 NOTE — DISCHARGE INSTRUCTIONS
EDUCATION /DISCHARGE INSTRUCTIONS:  A myelogram is a special radiology procedure of the spinal cord, spinal nerves and other related structures.  You will be awake during the examination.  An area of your lower back will be cleansed with an antiseptic solution.  The physician will inject a numbing medication in your lower back.  While your back is numb, a needle will be placed in the lower back area.  A small amount of spinal fluid may be withdrawn and sent to the lab if ordered by your physician. While the needle is in the back, an injection of a contrast material (xray dye) will be given through the needle.  The contrast material will allow the physician to see the spinal cord and spinal nerves.  Once injected, the needle will be removed and a band aid will be placed over the injection site.  The table will be tilted during the process to allow the contrast material to flow to particular areas in the spine.  Following the injection and xrays, you will be taken to the CT scan where more pictures will be taken. After the procedure is finished, the contrast material will be absorbed by your body and eliminated through your kidneys.  The radiologist will study and interpret your myelogram and send the results to your physician.  Procedure risks of a myelogram include, but are not limited to:  *  Bleeding   *  Seizure  *  Infection   *  Headache, possibly severe requiring a blood patch  *  Contrast reaction  *  Nerve or cord injury  *  Paralysis and death    Benefits of the procedure:    Best examination for delineating pathology related to spinal cord compression from a disc and/or nerve root compression  Alternatives to the procedure:MRI - a non invasive procedure requiring intravenous contrast injection. Cannot be done on patients with certain pacemakers or metal in the body.  MRI risks include possible reaction to the contrast material, movement of metal located in the body.   Benefit to MRI:  Non-invasive and  usually painless procedure.  THIS EDUCATION INFORMATION WAS REVIEWED PRIOR TO THE PROCEDURE AND CONSENT. Patient initials __________________Time_________________      Important information following your myelogram:    *  When you get home, lie down with no more than 2 pillows under your head.  *  Sit up in the car going home. At home, sit up to eat and use the restroom only, then lie back down.   *  24 HOURS COMPLETE AT ________________________   *  Tomorrow, after 24 hours completed, take it easy and rest the next 2-3 days.  *  Do not drive for 24 hours following a myelogram  *  You may remove the bandage and shower in the morning  *  Increase your fluids for the next 24 hours.  Caffeinated drinks are encouraged.Increase your intake of fluids the next 2-3 days    Resume taking your blood thinner or Aspirin on _________________________    Resume taking your (Glucophage/Metformin) in 48 hrs. Your next dose will be on:___________________________      CALL YOUR PHYSICIAN FOR THE FOLLOWING:  * Pain at the injection site  * Redness, swelling, bruising or drainage at the injection site  * A fever by mouth of 101.0  * Any new symptoms  If you have problems with a headache that is not relieved with rest and medication, please call the Radiology Triage Nurse desk [phone]

## 2022-03-10 ENCOUNTER — TELEPHONE (OUTPATIENT)
Dept: INTERVENTIONAL RADIOLOGY/VASCULAR | Facility: HOSPITAL | Age: 67
End: 2022-03-10

## 2022-03-14 ENCOUNTER — PREP FOR SURGERY (OUTPATIENT)
Dept: OTHER | Facility: HOSPITAL | Age: 67
End: 2022-03-14

## 2022-03-16 ENCOUNTER — PREP FOR SURGERY (OUTPATIENT)
Dept: OTHER | Facility: HOSPITAL | Age: 67
End: 2022-03-16

## 2022-03-16 ENCOUNTER — TELEPHONE (OUTPATIENT)
Dept: ORTHOPEDIC SURGERY | Facility: CLINIC | Age: 67
End: 2022-03-16

## 2022-03-16 DIAGNOSIS — M96.0 PSEUDARTHROSIS AFTER FUSION OR ARTHRODESIS: Primary | ICD-10-CM

## 2022-03-16 NOTE — TELEPHONE ENCOUNTER
Spoke with the patient regarding scheduling his surgery.  He would like to schedule it at the next available time.  He is on Plavix for history of cardiac stents.  He is scheduled to see cardiology March 31.  Have asked him to check with the cardiologist to make sure that he is cleared for surgery but also if okay to stop his Plavix 5 days prior to surgery

## 2022-03-31 ENCOUNTER — OFFICE VISIT (OUTPATIENT)
Dept: CARDIOLOGY | Facility: CLINIC | Age: 67
End: 2022-03-31

## 2022-03-31 VITALS
OXYGEN SATURATION: 96 % | DIASTOLIC BLOOD PRESSURE: 72 MMHG | BODY MASS INDEX: 32.53 KG/M2 | HEIGHT: 71 IN | SYSTOLIC BLOOD PRESSURE: 124 MMHG | WEIGHT: 232.4 LBS | HEART RATE: 86 BPM

## 2022-03-31 DIAGNOSIS — E78.2 MIXED HYPERLIPIDEMIA: ICD-10-CM

## 2022-03-31 DIAGNOSIS — I25.10 CORONARY ARTERY DISEASE INVOLVING NATIVE CORONARY ARTERY OF NATIVE HEART WITHOUT ANGINA PECTORIS: Primary | ICD-10-CM

## 2022-03-31 DIAGNOSIS — I10 PRIMARY HYPERTENSION: ICD-10-CM

## 2022-03-31 PROCEDURE — 93000 ELECTROCARDIOGRAM COMPLETE: CPT | Performed by: INTERNAL MEDICINE

## 2022-03-31 PROCEDURE — 99214 OFFICE O/P EST MOD 30 MIN: CPT | Performed by: INTERNAL MEDICINE

## 2022-03-31 NOTE — PROGRESS NOTES
OFFICE VISIT      Date of Office Visit: 2022    Patient Name: Prem Thrasher  : 1955    Encounter Provider: Dioni Salazar MD  Referring Provider: Dioni Salazar MD  Primary Care Provider: Montse Cain PA-C  Place of Service: [facility] CARDIOLOGY        Chief Complaint   Patient presents with   • Coronary Artery Disease   • Follow-up   • Surgical Clearance     Back Surgery     History of Present Illness    The patient is a 67-year-old white male with coronary artery disease.  He is here for preoperative clearance and for routine follow-up.    The patient received stents to his left anterior descending coronary artery in .  He presented with unstable angina pectoris at the time.  Left ventricular function was normal.  The patient underwent repeat catheterization in 2020.  He was found to have three-vessel coronary disease with an occlusion of the distal LAD stent and high-grade stenosis of the left circumflex coronary artery.  There was moderate disease of the right coronary artery.  He underwent drug-eluting stent placed to the distal circumflex and balloon angioplasty of the distal LAD stent.  He has remained on dual antiplatelet therapy and will remain on indefinitely.  Fortunately in 2021 the patient quit tobacco products.    He is generally not complaining of angina pectoris.  He has severe debilitating back pain and is now scheduled to undergo back surgery by Dr. Bower.  When his back symptoms flareup he does become uncomfortable with some diaphoresis and shortness of breath.  He is not really having the anginal episodes that he has had previous.  Occasionally he will notice some palpitations.  He is chronically fatigued from his back pain    Past Medical History:   Diagnosis Date   • Achilles tendon pain     LEFT   • Anxiety disorder    • CAD (coronary artery disease)    • COPD (chronic obstructive pulmonary disease) (HCC)     • CTS (carpal tunnel syndrome)    • Disease of thyroid gland    • Diverticulitis    • Diverticulitis of colon    • Diverticulosis    • Encounter for eye exam 10/2014    normal   • Exertional chest pain    • Fissure, anal    • GERD (gastroesophageal reflux disease)    • History of bone density study 03/2014    Dr. Maria Antonia Lopez; normal   • History of chest x-ray 02/15/2011    also 3-6-15; normal chest   • History of colon polyps    • History of nuclear stress test 03/09/2015    cardiolite; Dr. Greenberg; negative   • History of pulmonary function tests 03/2015    COPD   • Hyperlipidemia    • Hypertension    • IFG (impaired fasting glucose)    • Low back pain    • Lumbosacral disc disease    • Nerve damage     KAYLYNN ELBOWS   • NSTEMI (non-ST elevated myocardial infarction) ([facility]) 01/2021   • Osteoarthritis, multiple sites    • Postoperative nausea and vomiting 08/01/2017   • Postoperative wound infection    • Rectal bleeding    • Rheumatoid arthritis ([facility])    • Sciatica    • Sleep apnea     no cpap   • Staph infection     WITH BACK SURGERY 2017  ON LONG TERM ANTIBIOTICS   • Unstable angina ([facility])    • Wears glasses          Past Surgical History:   Procedure Laterality Date   • ACHILLES TENDON SURGERY Left 7/3/2018    Procedure: Left Achilles debridement and secondary repair with partial excision of calcaneus. Possible left Achilles lengthening at the calf;  Surgeon: Vinay Smith MD;  Location: [facility] OR [facility];  Service: Orthopedics   • BACK SURGERY     • CARDIAC CATHETERIZATION N/A 3/7/2020    Procedure: Left Heart Cath;  Surgeon: Dioni Salazar MD;  Location: [facility] CATH INVASIVE LOCATION;  Service: Cardiology;  Laterality: N/A;   • CARDIAC CATHETERIZATION N/A 3/7/2020    Procedure: Coronary angiography;  Surgeon: Dioni Salazar MD;  Location: [facility] CATH INVASIVE LOCATION;  Service: Cardiology;  Laterality: N/A;   • CARDIAC CATHETERIZATION N/A 3/7/2020    Procedure: Left ventriculography;  Surgeon:  Dioni Salazar MD;  Location: [facility] CATH INVASIVE LOCATION;  Service: Cardiology;  Laterality: N/A;   • CARDIAC CATHETERIZATION N/A 3/7/2020    Procedure: Stent RAZA coronary;  Surgeon: Dioni Salazar MD;  [facility];  Service: Cardiology;  Laterality: N/A;   • CARDIAC CATHETERIZATION N/A 1/22/2021    Procedure: Left Heart Cath;  Surgeon: Dioni Saalzar MD;  Location: [facility] CATH INVASIVE LOCATION;  Service: Cardiology;  Laterality: N/A;   • CARDIAC CATHETERIZATION N/A 1/22/2021    Procedure: Coronary angiography;  Surgeon: Dioni Salazar MD;  Location: [facility] CATH INVASIVE LOCATION;  Service: Cardiology;  Laterality: N/A;   • CARDIAC CATHETERIZATION N/A 1/22/2021    Procedure: Left ventriculography;  Surgeon: Dioni Salazar MD;  Location: [facility] CATH INVASIVE LOCATION;  Service: Cardiology;  Laterality: N/A;   • CARDIAC CATHETERIZATION N/A 1/22/2021    Procedure: Percutaneous Coronary Intervention;  Surgeon: Dioni Salazar MD;  Location: [facility] CATH INVASIVE LOCATION;  Service: Cardiology;  Laterality: N/A;   • CARDIAC CATHETERIZATION N/A 1/22/2021    Procedure: Stent RAZA coronary;  Surgeon: Dioni Salazar MD;  Location: [facility] CATH INVASIVE LOCATION;  Service: Cardiology;  Laterality: N/A;   • CARDIAC SURGERY      3 stents total   • COLONOSCOPY N/A 02/02/2011    Normal colon except scattered diverticulosis-Dr. Guero Blunt   • COLONOSCOPY N/A 4/8/2021    Procedure: COLONOSCOPY TO CECUM WITH POLYPECTOMY (COLD BX);  Surgeon: Porsha Arcos MD;  Location: [facility] ENDOSCOPY;  Service: General;  Laterality: N/A;  SCREENING; HX OF COLON POLYPS  POST:DIVERTICULOSIS; COLON POLYP   • CYST REMOVAL  03/2016    x2  FROM FACE AND EAR   • DENTAL PROCEDURE  2008    teeth removed; dental implants   • EPIDURAL BLOCK  1995    L/S spine   • FINGER DEBRIDEMENT Right 07/12/2003    Debridement and full thickness skin grafting of the ring and small fingers of  the right hand-Dr. Rayray Long   • INCISION AND DRAINAGE PERIRECTAL ABSCESS N/A 7/10/2018    Procedure: INCISION AND DRAINAGE OF PERIRECTAL ABSCESS;  Surgeon: Porsha Arcos MD;  Location: [facility] MAIN OR;  Service: General   • LUMBAR DISCECTOMY FUSION INSTRUMENTATION Left 10/10/2016    Procedure: LEFT L2-L3, L3-L4 LUMBAR LAMINECTOMY/ DISCECTOMY WITH METRIX ;  Surgeon: Sawyer Rick MD;  Location: [facility] MAIN OR;  Service:    • LUMBAR DISCECTOMY FUSION INSTRUMENTATION N/A 7/31/2017    Procedure: LUMBAR FUSION DECOMPRESSON WITH PEDICLE SCREWS with LAURA L2-3,L3-4;  Surgeon: Jacky Perez MD;  Location: [facility] MAIN OR;  Service:    • LUMBAR LAMINECTOMY DISCECTOMY DECOMPRESSION N/A 7/31/2017    Procedure: L2 to L4 laminectomy with neural lysis and a fusion by orthopedics;  Surgeon: Sawyer Rick MD;  Location: [facility] MAIN OR;  Service:    • LUMBAR LAMINECTOMY DISCECTOMY DECOMPRESSION N/A 9/5/2017    Procedure: I&D LUMBAR SPINE ;  Surgeon: Jacky Perez MD;  Location: [facility] MAIN OR;  Service:    • SHOULDER SURGERY Right 02/23/2011    Dr. Navarro   • SINUS SURGERY  2003    Dr. Barrera           Current Outpatient Medications:   •  albuterol sulfate  (90 Base) MCG/ACT inhaler, Inhale 2 puffs Every 4 (Four) Hours As Needed for Wheezing or Shortness of Air., Disp: 54 g, Rfl: 3  •  amLODIPine (NORVASC) 10 MG tablet, TAKE 1 TABLET EVERY DAY FOR BLOOD PRESSURE, Disp: 90 tablet, Rfl: 1  •  aspirin  MG tablet, Take 1 tablet by mouth Daily., Disp: 30 tablet, Rfl: 0  •  carvedilol (COREG) 12.5 MG tablet, Take 1 tablet by mouth 2 (Two) Times a Day With Meals. For heart, Disp: 180 tablet, Rfl: 1  •  Cholecalciferol (VITAMIN D) 2000 UNITS tablet, Take 2,000 Units by mouth Every Evening., Disp: , Rfl:   •  clopidogrel (PLAVIX) 75 MG tablet, TAKE 1 TABLET EVERY DAY, Disp: 90 tablet, Rfl: 2  •  Cyanocobalamin (Vitamin B-12) 1000 MCG sublingual tablet, DISSOLVE 1 TABLET UNDER THE TONGUE EVERY DAY, Disp: 90  tablet, Rfl: 3  •  docusate sodium (COLACE) 100 MG capsule, Take 100 mg by mouth Daily., Disp: , Rfl:   •  escitalopram (LEXAPRO) 20 MG tablet, TAKE 1 TABLET BY MOUTH EVERY NIGHT FOR ANXIETY, Disp: 90 tablet, Rfl: 3  •  ezetimibe (ZETIA) 10 MG tablet, Take 1 tablet by mouth Every Evening. For cholesterol, Disp: 90 tablet, Rfl: 3  •  folic acid (FOLVITE) 1 MG tablet, TAKE 1 TABLET EVERY DAY, Disp: 90 tablet, Rfl: 3  •  irbesartan (AVAPRO) 300 MG tablet, TAKE 1 TABLET EVERY DAY FOR BLOOD PRESSURE, Disp: 90 tablet, Rfl: 1  •  isosorbide mononitrate (IMDUR) 30 MG 24 hr tablet, TAKE 1 TABLET EVERY DAY, Disp: 90 tablet, Rfl: 3  •  leflunomide (ARAVA) 20 MG tablet, Take 20 mg by mouth Every Morning., Disp: , Rfl:   •  leflunomide (ARAVA) 20 MG tablet, Take 1 tablet by mouth Daily., Disp: , Rfl:   •  levothyroxine (SYNTHROID, LEVOTHROID) 25 MCG tablet, One PO daily before breakfast for thyroid (Patient taking differently: Take 25 mcg by mouth 2 (Two) Times a Day.), Disp: 90 tablet, Rfl: 3  •  nitroglycerin (NITROSTAT) 0.4 MG SL tablet, Place 1 tablet under the tongue Every 5 (Five) Minutes As Needed for Chest Pain. Take no more than 3 doses in 15 minutes., Disp: 25 tablet, Rfl: 1  •  pregabalin (LYRICA) 150 MG capsule, Bid x 1 week, then increase to tid thereafter, Disp: 90 capsule, Rfl: 0  •  rosuvastatin (CRESTOR) 10 MG tablet, TAKE 1 TABLET EVERY NIGHT AT BEDTIME, Disp: 90 tablet, Rfl: 1  •  tiZANidine (ZANAFLEX) 4 MG tablet, Take 1 tablet by mouth Every 8 (Eight) Hours As Needed for Muscle Spasms., Disp: 90 tablet, Rfl: 1      Social History     Socioeconomic History   • Marital status:    Tobacco Use   • Smoking status: Former Smoker     Packs/day: 1.00     Years: 45.00     Pack years: 45.00     Types: Cigarettes     Quit date: 10/20/2021     Years since quittin.4   • Smokeless tobacco: Never Used   • Tobacco comment: no caffeine    Substance and Sexual Activity   • Alcohol use: No   • Drug use: No   •  "Sexual activity: Not Currently     Partners: Female     Birth control/protection: None         Review of Systems   Constitutional: Positive for malaise/fatigue.   HENT: Negative.    Eyes: Negative.    Cardiovascular: Positive for chest pain and dyspnea on exertion.   Respiratory: Negative.    Endocrine: Negative.    Skin: Negative.    Musculoskeletal: Positive for back pain.   Gastrointestinal: Negative.    Neurological: Negative.    Psychiatric/Behavioral: Negative.        Procedures      ECG 12 Lead    Date/Time: 3/31/2022 12:45 PM  Performed by: Dioni Salazar MD  Authorized by: Dioni Salazar MD   Comparison: compared with previous ECG from 9/20/2021  Similar to previous ECG  Rhythm: sinus rhythm  Rate: normal  Conduction: conduction normal  ST Segments: ST segments normal  T Waves: T waves normal  QRS axis: normal                  Objective:    /72 (BP Location: Right arm, Patient Position: Sitting)   Pulse 86   Ht 180.3 cm (71\")   Wt 105 kg (232 lb 6.4 oz)   SpO2 96%   BMI 32.41 kg/m²         Vitals reviewed.   Constitutional:       Appearance: Well-developed.   HENT:      Head: Normocephalic.   Neck:      Thyroid: No thyromegaly.      Vascular: No carotid bruit or JVD.   Pulmonary:      Effort: Pulmonary effort is normal.      Breath sounds: Normal breath sounds.   Cardiovascular:      Normal rate. Regular rhythm. Normal S1. Normal S2.      Murmurs: There is no murmur.      No gallop.   Pulses:     Intact distal pulses.   Edema:     Peripheral edema absent.   Musculoskeletal:      Cervical back: Normal range of motion. Skin:     General: Skin is warm and dry.      Findings: No erythema.   Neurological:      Mental Status: Alert and oriented to person, place, and time.             Assessment & Plan:       Diagnosis Plan   1. Coronary artery disease involving native coronary artery of native heart without angina pectoris     2. Primary hypertension     3. Mixed hyperlipidemia       1.  " Coronary artery disease status post previous non-ST FELICIA, intervention to both LAD and LCx.  Normal global left ventricular systolic function.  2.  Hypertension: Controlled  3.  Hyperlipidemia: On statin therapy  4.  Chronic back pain: Scheduled for surgery in 2 weeks.    The patient will come off his aspirin and Plavix for 7 days.  He will need to resume both of these postop.  I suggest he stay on aspirin and Plavix indefinitely.  There are no modifiable risk factors for his upcoming surgery.  He has remained without cardiac events for at least 1 year.

## 2022-04-01 ENCOUNTER — PRE-ADMISSION TESTING (OUTPATIENT)
Dept: PREADMISSION TESTING | Facility: HOSPITAL | Age: 67
End: 2022-04-01

## 2022-04-01 VITALS
BODY MASS INDEX: 32.9 KG/M2 | DIASTOLIC BLOOD PRESSURE: 65 MMHG | HEART RATE: 103 BPM | RESPIRATION RATE: 24 BRPM | HEIGHT: 71 IN | WEIGHT: 235 LBS | OXYGEN SATURATION: 98 % | TEMPERATURE: 97 F | SYSTOLIC BLOOD PRESSURE: 93 MMHG

## 2022-04-01 DIAGNOSIS — R73.9 HYPERGLYCEMIA: Primary | ICD-10-CM

## 2022-04-01 LAB
ANION GAP SERPL CALCULATED.3IONS-SCNC: 11 MMOL/L (ref 5–15)
BUN SERPL-MCNC: 18 MG/DL (ref 8–23)
BUN/CREAT SERPL: 13.7 (ref 7–25)
CALCIUM SPEC-SCNC: 8.9 MG/DL (ref 8.6–10.5)
CHLORIDE SERPL-SCNC: 101 MMOL/L (ref 98–107)
CO2 SERPL-SCNC: 21 MMOL/L (ref 22–29)
CREAT SERPL-MCNC: 1.31 MG/DL (ref 0.76–1.27)
DEPRECATED RDW RBC AUTO: 41.6 FL (ref 37–54)
EGFRCR SERPLBLD CKD-EPI 2021: 59.3 ML/MIN/1.73
ERYTHROCYTE [DISTWIDTH] IN BLOOD BY AUTOMATED COUNT: 12.8 % (ref 12.3–15.4)
GLUCOSE SERPL-MCNC: 110 MG/DL (ref 65–99)
HCT VFR BLD AUTO: 39.9 % (ref 37.5–51)
HGB BLD-MCNC: 13.2 G/DL (ref 13–17.7)
MCH RBC QN AUTO: 29.7 PG (ref 26.6–33)
MCHC RBC AUTO-ENTMCNC: 33.1 G/DL (ref 31.5–35.7)
MCV RBC AUTO: 89.7 FL (ref 79–97)
PLATELET # BLD AUTO: 275 10*3/MM3 (ref 140–450)
PMV BLD AUTO: 10.4 FL (ref 6–12)
POTASSIUM SERPL-SCNC: 4.6 MMOL/L (ref 3.5–5.2)
RBC # BLD AUTO: 4.45 10*6/MM3 (ref 4.14–5.8)
SODIUM SERPL-SCNC: 133 MMOL/L (ref 136–145)
WBC NRBC COR # BLD: 5.86 10*3/MM3 (ref 3.4–10.8)

## 2022-04-01 PROCEDURE — 80048 BASIC METABOLIC PNL TOTAL CA: CPT

## 2022-04-01 PROCEDURE — 85027 COMPLETE CBC AUTOMATED: CPT

## 2022-04-01 PROCEDURE — 36415 COLL VENOUS BLD VENIPUNCTURE: CPT

## 2022-04-01 RX ORDER — CYCLOBENZAPRINE HCL 10 MG
10 TABLET ORAL 2 TIMES DAILY
COMMUNITY

## 2022-04-01 RX ORDER — MULTIPLE VITAMINS W/ MINERALS TAB 9MG-400MCG
1 TAB ORAL DAILY
COMMUNITY

## 2022-04-12 ENCOUNTER — LAB (OUTPATIENT)
Dept: LAB | Facility: HOSPITAL | Age: 67
End: 2022-04-12

## 2022-04-12 ENCOUNTER — ANESTHESIA EVENT (OUTPATIENT)
Dept: PERIOP | Facility: HOSPITAL | Age: 67
End: 2022-04-12

## 2022-04-12 DIAGNOSIS — M96.0 PSEUDARTHROSIS AFTER FUSION OR ARTHRODESIS: ICD-10-CM

## 2022-04-12 LAB — SARS-COV-2 ORF1AB RESP QL NAA+PROBE: NOT DETECTED

## 2022-04-12 PROCEDURE — U0004 COV-19 TEST NON-CDC HGH THRU: HCPCS

## 2022-04-12 PROCEDURE — C9803 HOPD COVID-19 SPEC COLLECT: HCPCS

## 2022-04-12 PROCEDURE — U0005 INFEC AGEN DETEC AMPLI PROBE: HCPCS

## 2022-04-12 RX ORDER — ROSUVASTATIN CALCIUM 10 MG/1
TABLET, COATED ORAL
Qty: 90 TABLET | Refills: 1 | Status: SHIPPED | OUTPATIENT
Start: 2022-04-12 | End: 2022-08-16 | Stop reason: SDUPTHER

## 2022-04-13 ENCOUNTER — HOSPITAL ENCOUNTER (INPATIENT)
Facility: HOSPITAL | Age: 67
LOS: 13 days | Discharge: HOME HEALTH | End: 2022-04-26
Attending: ORTHOPAEDIC SURGERY | Admitting: ORTHOPAEDIC SURGERY

## 2022-04-13 ENCOUNTER — APPOINTMENT (OUTPATIENT)
Dept: GENERAL RADIOLOGY | Facility: HOSPITAL | Age: 67
End: 2022-04-13

## 2022-04-13 ENCOUNTER — ANESTHESIA (OUTPATIENT)
Dept: PERIOP | Facility: HOSPITAL | Age: 67
End: 2022-04-13

## 2022-04-13 DIAGNOSIS — M54.16 LUMBAR RADICULOPATHY: ICD-10-CM

## 2022-04-13 DIAGNOSIS — M96.0 PSEUDARTHROSIS AFTER FUSION OR ARTHRODESIS: Primary | ICD-10-CM

## 2022-04-13 DIAGNOSIS — T14.8XXA HEMATOMA: ICD-10-CM

## 2022-04-13 LAB
ABO GROUP BLD: NORMAL
BLD GP AB SCN SERPL QL: NEGATIVE
HCT VFR BLD AUTO: 33.3 % (ref 37.5–51)
HGB BLD-MCNC: 11.2 G/DL (ref 13–17.7)
RH BLD: POSITIVE
T&S EXPIRATION DATE: NORMAL

## 2022-04-13 PROCEDURE — 76000 FLUOROSCOPY <1 HR PHYS/QHP: CPT | Performed by: ORTHOPAEDIC SURGERY

## 2022-04-13 PROCEDURE — 86850 RBC ANTIBODY SCREEN: CPT | Performed by: ORTHOPAEDIC SURGERY

## 2022-04-13 PROCEDURE — C1713 ANCHOR/SCREW BN/BN,TIS/BN: HCPCS | Performed by: ORTHOPAEDIC SURGERY

## 2022-04-13 PROCEDURE — C1889 IMPLANT/INSERT DEVICE, NOC: HCPCS | Performed by: ORTHOPAEDIC SURGERY

## 2022-04-13 PROCEDURE — 86900 BLOOD TYPING SEROLOGIC ABO: CPT | Performed by: ORTHOPAEDIC SURGERY

## 2022-04-13 PROCEDURE — 87070 CULTURE OTHR SPECIMN AEROBIC: CPT | Performed by: ORTHOPAEDIC SURGERY

## 2022-04-13 PROCEDURE — 0SB20ZZ EXCISION OF LUMBAR VERTEBRAL DISC, OPEN APPROACH: ICD-10-PCS | Performed by: ORTHOPAEDIC SURGERY

## 2022-04-13 PROCEDURE — 25010000002 DEXAMETHASONE PER 1 MG: Performed by: NURSE ANESTHETIST, CERTIFIED REGISTERED

## 2022-04-13 PROCEDURE — 07DR0ZZ EXTRACTION OF ILIAC BONE MARROW, OPEN APPROACH: ICD-10-PCS | Performed by: ORTHOPAEDIC SURGERY

## 2022-04-13 PROCEDURE — 25010000002 FENTANYL CITRATE (PF) 50 MCG/ML SOLUTION: Performed by: ANESTHESIOLOGY

## 2022-04-13 PROCEDURE — 25010000002 PROPOFOL 10 MG/ML EMULSION: Performed by: NURSE ANESTHETIST, CERTIFIED REGISTERED

## 2022-04-13 PROCEDURE — 94664 DEMO&/EVAL PT USE INHALER: CPT

## 2022-04-13 PROCEDURE — 25010000002 MIDAZOLAM PER 1 MG: Performed by: ANESTHESIOLOGY

## 2022-04-13 PROCEDURE — 0RGA071 FUSION OF THORACOLUMBAR VERTEBRAL JOINT WITH AUTOLOGOUS TISSUE SUBSTITUTE, POSTERIOR APPROACH, POSTERIOR COLUMN, OPEN APPROACH: ICD-10-PCS | Performed by: ORTHOPAEDIC SURGERY

## 2022-04-13 PROCEDURE — 0SG10AJ FUSION OF 2 OR MORE LUMBAR VERTEBRAL JOINTS WITH INTERBODY FUSION DEVICE, POSTERIOR APPROACH, ANTERIOR COLUMN, OPEN APPROACH: ICD-10-PCS | Performed by: ORTHOPAEDIC SURGERY

## 2022-04-13 PROCEDURE — 25010000002 FENTANYL CITRATE (PF) 50 MCG/ML SOLUTION: Performed by: NURSE ANESTHETIST, CERTIFIED REGISTERED

## 2022-04-13 PROCEDURE — 20939 BONE MARROW ASPIR BONE GRFG: CPT | Performed by: ORTHOPAEDIC SURGERY

## 2022-04-13 PROCEDURE — 94640 AIRWAY INHALATION TREATMENT: CPT

## 2022-04-13 PROCEDURE — C1831: HCPCS | Performed by: ORTHOPAEDIC SURGERY

## 2022-04-13 PROCEDURE — 86901 BLOOD TYPING SEROLOGIC RH(D): CPT | Performed by: ORTHOPAEDIC SURGERY

## 2022-04-13 PROCEDURE — 22633 ARTHRD CMBN 1NTRSPC LUMBAR: CPT | Performed by: ORTHOPAEDIC SURGERY

## 2022-04-13 PROCEDURE — 25010000002 HYDROMORPHONE PER 4 MG: Performed by: NURSE ANESTHETIST, CERTIFIED REGISTERED

## 2022-04-13 PROCEDURE — 85018 HEMOGLOBIN: CPT | Performed by: ORTHOPAEDIC SURGERY

## 2022-04-13 PROCEDURE — 87075 CULTR BACTERIA EXCEPT BLOOD: CPT | Performed by: ORTHOPAEDIC SURGERY

## 2022-04-13 PROCEDURE — S0260 H&P FOR SURGERY: HCPCS | Performed by: ORTHOPAEDIC SURGERY

## 2022-04-13 PROCEDURE — 25010000002 ONDANSETRON PER 1 MG: Performed by: NURSE ANESTHETIST, CERTIFIED REGISTERED

## 2022-04-13 PROCEDURE — 72080 X-RAY EXAM THORACOLMB 2/> VW: CPT | Performed by: ORTHOPAEDIC SURGERY

## 2022-04-13 PROCEDURE — 94799 UNLISTED PULMONARY SVC/PX: CPT

## 2022-04-13 PROCEDURE — 01NB0ZZ RELEASE LUMBAR NERVE, OPEN APPROACH: ICD-10-PCS | Performed by: ORTHOPAEDIC SURGERY

## 2022-04-13 PROCEDURE — 25010000002 PHENYLEPHRINE 10 MG/ML SOLUTION 5 ML VIAL: Performed by: NURSE ANESTHETIST, CERTIFIED REGISTERED

## 2022-04-13 PROCEDURE — 0SP00JZ REMOVAL OF SYNTHETIC SUBSTITUTE FROM LUMBAR VERTEBRAL JOINT, OPEN APPROACH: ICD-10-PCS | Performed by: ORTHOPAEDIC SURGERY

## 2022-04-13 PROCEDURE — 22842 INSERT SPINE FIXATION DEVICE: CPT | Performed by: ORTHOPAEDIC SURGERY

## 2022-04-13 PROCEDURE — 25010000002 VANCOMYCIN 10 G RECONSTITUTED SOLUTION: Performed by: ORTHOPAEDIC SURGERY

## 2022-04-13 PROCEDURE — 0SG0071 FUSION OF LUMBAR VERTEBRAL JOINT WITH AUTOLOGOUS TISSUE SUBSTITUTE, POSTERIOR APPROACH, POSTERIOR COLUMN, OPEN APPROACH: ICD-10-PCS | Performed by: ORTHOPAEDIC SURGERY

## 2022-04-13 PROCEDURE — 22214 INCIS 1 VERTEBRAL SEG LUMBAR: CPT | Performed by: ORTHOPAEDIC SURGERY

## 2022-04-13 PROCEDURE — 25010000002 ALBUMIN HUMAN 5% PER 50 ML: Performed by: NURSE ANESTHETIST, CERTIFIED REGISTERED

## 2022-04-13 PROCEDURE — 22610 ARTHRD PST TQ 1NTRSPC THRC: CPT | Performed by: ORTHOPAEDIC SURGERY

## 2022-04-13 PROCEDURE — 25010000002 NEOSTIGMINE 5 MG/10ML SOLUTION: Performed by: NURSE ANESTHETIST, CERTIFIED REGISTERED

## 2022-04-13 PROCEDURE — P9041 ALBUMIN (HUMAN),5%, 50ML: HCPCS | Performed by: NURSE ANESTHETIST, CERTIFIED REGISTERED

## 2022-04-13 PROCEDURE — 22634 ARTHRD CMBN 1NTRSPC EA ADDL: CPT | Performed by: ORTHOPAEDIC SURGERY

## 2022-04-13 PROCEDURE — 25010000002 PHENYLEPHRINE 10 MG/ML SOLUTION: Performed by: NURSE ANESTHETIST, CERTIFIED REGISTERED

## 2022-04-13 PROCEDURE — 22614 ARTHRD PST TQ 1NTRSPC EA ADD: CPT | Performed by: ORTHOPAEDIC SURGERY

## 2022-04-13 PROCEDURE — 0RG7071 FUSION OF 2 TO 7 THORACIC VERTEBRAL JOINTS WITH AUTOLOGOUS TISSUE SUBSTITUTE, POSTERIOR APPROACH, POSTERIOR COLUMN, OPEN APPROACH: ICD-10-PCS | Performed by: ORTHOPAEDIC SURGERY

## 2022-04-13 PROCEDURE — 85014 HEMATOCRIT: CPT | Performed by: ORTHOPAEDIC SURGERY

## 2022-04-13 PROCEDURE — 25010000002 MAGNESIUM SULFATE PER 500 MG OF MAGNESIUM: Performed by: NURSE ANESTHETIST, CERTIFIED REGISTERED

## 2022-04-13 PROCEDURE — 87205 SMEAR GRAM STAIN: CPT | Performed by: ORTHOPAEDIC SURGERY

## 2022-04-13 PROCEDURE — 22853 INSJ BIOMECHANICAL DEVICE: CPT | Performed by: ORTHOPAEDIC SURGERY

## 2022-04-13 PROCEDURE — 0 HYDROMORPHONE HCL PF 50 MG/5ML SOLUTION: Performed by: ORTHOPAEDIC SURGERY

## 2022-04-13 PROCEDURE — 25010000002 CEFAZOLIN PER 500 MG: Performed by: ORTHOPAEDIC SURGERY

## 2022-04-13 DEVICE — SEALANT WND FIBRIN TISSEEL PREFIL/SYR/PRIMAFZ 4ML: Type: IMPLANTABLE DEVICE | Site: SPINE LUMBAR | Status: FUNCTIONAL

## 2022-04-13 DEVICE — SCRW EXPEDIUM PA TI 6X45MM: Type: IMPLANTABLE DEVICE | Site: SPINE LUMBAR | Status: FUNCTIONAL

## 2022-04-13 DEVICE — IMPLANTABLE DEVICE: Type: IMPLANTABLE DEVICE | Site: SPINE LUMBAR | Status: FUNCTIONAL

## 2022-04-13 DEVICE — FLOSEAL HEMOSTATIC MATRIX, 10ML
Type: IMPLANTABLE DEVICE | Site: SPINE LUMBAR | Status: FUNCTIONAL
Brand: FLOSEAL HEMOSTATIC MATRIX

## 2022-04-13 DEVICE — SCRW VIPER INNR ST: Type: IMPLANTABLE DEVICE | Site: SPINE LUMBAR | Status: FUNCTIONAL

## 2022-04-13 DEVICE — SCRW EXPEDIUM PA TI 7X45MM: Type: IMPLANTABLE DEVICE | Site: SPINE LUMBAR | Status: FUNCTIONAL

## 2022-04-13 DEVICE — MATRX STRIP PILAFX DBM 50X25X4MM: Type: IMPLANTABLE DEVICE | Site: SPINE LUMBAR | Status: FUNCTIONAL

## 2022-04-13 DEVICE — SCRW EXPEDIUM PA TI 7X40MM: Type: IMPLANTABLE DEVICE | Site: SPINE LUMBAR | Status: FUNCTIONAL

## 2022-04-13 DEVICE — ROD STR SPINE EXPEDIUM HEX/END TI 5.5X480MM: Type: IMPLANTABLE DEVICE | Site: SPINE LUMBAR | Status: FUNCTIONAL

## 2022-04-13 DEVICE — WAX BONE HEMO NAT 2.5G: Type: IMPLANTABLE DEVICE | Site: SPINE LUMBAR | Status: FUNCTIONAL

## 2022-04-13 DEVICE — DEV CONTRL TISS STRATAFIX PDS PLS OS6 REV SZ1 18IN 45CM: Type: IMPLANTABLE DEVICE | Site: SPINE LUMBAR | Status: FUNCTIONAL

## 2022-04-13 RX ORDER — NEOSTIGMINE METHYLSULFATE 0.5 MG/ML
INJECTION, SOLUTION INTRAVENOUS AS NEEDED
Status: DISCONTINUED | OUTPATIENT
Start: 2022-04-13 | End: 2022-04-13 | Stop reason: SURG

## 2022-04-13 RX ORDER — FENTANYL CITRATE 50 UG/ML
25 INJECTION, SOLUTION INTRAMUSCULAR; INTRAVENOUS
Status: DISCONTINUED | OUTPATIENT
Start: 2022-04-13 | End: 2022-04-13 | Stop reason: HOSPADM

## 2022-04-13 RX ORDER — ALBUTEROL SULFATE 90 UG/1
2 AEROSOL, METERED RESPIRATORY (INHALATION) EVERY 4 HOURS PRN
Status: DISCONTINUED | OUTPATIENT
Start: 2022-04-13 | End: 2022-04-26 | Stop reason: HOSPADM

## 2022-04-13 RX ORDER — AMOXICILLIN 250 MG
1 CAPSULE ORAL 2 TIMES DAILY
Status: DISCONTINUED | OUTPATIENT
Start: 2022-04-13 | End: 2022-04-18

## 2022-04-13 RX ORDER — IBUPROFEN 600 MG/1
600 TABLET ORAL ONCE AS NEEDED
Status: DISCONTINUED | OUTPATIENT
Start: 2022-04-13 | End: 2022-04-13 | Stop reason: HOSPADM

## 2022-04-13 RX ORDER — ESCITALOPRAM OXALATE 20 MG/1
20 TABLET ORAL NIGHTLY
Status: DISCONTINUED | OUTPATIENT
Start: 2022-04-14 | End: 2022-04-26 | Stop reason: HOSPADM

## 2022-04-13 RX ORDER — HYDRALAZINE HYDROCHLORIDE 20 MG/ML
5 INJECTION INTRAMUSCULAR; INTRAVENOUS
Status: DISCONTINUED | OUTPATIENT
Start: 2022-04-13 | End: 2022-04-13 | Stop reason: HOSPADM

## 2022-04-13 RX ORDER — CARVEDILOL 12.5 MG/1
12.5 TABLET ORAL 2 TIMES DAILY WITH MEALS
Status: DISCONTINUED | OUTPATIENT
Start: 2022-04-13 | End: 2022-04-15

## 2022-04-13 RX ORDER — OXYCODONE HYDROCHLORIDE AND ACETAMINOPHEN 5; 325 MG/1; MG/1
2 TABLET ORAL EVERY 4 HOURS PRN
Status: DISCONTINUED | OUTPATIENT
Start: 2022-04-13 | End: 2022-04-23

## 2022-04-13 RX ORDER — HYDROCODONE BITARTRATE AND ACETAMINOPHEN 7.5; 325 MG/1; MG/1
1 TABLET ORAL ONCE AS NEEDED
Status: DISCONTINUED | OUTPATIENT
Start: 2022-04-13 | End: 2022-04-13 | Stop reason: HOSPADM

## 2022-04-13 RX ORDER — SODIUM CHLORIDE 9 MG/ML
100 INJECTION, SOLUTION INTRAVENOUS CONTINUOUS
Status: DISCONTINUED | OUTPATIENT
Start: 2022-04-13 | End: 2022-04-19

## 2022-04-13 RX ORDER — ONDANSETRON 4 MG/1
4 TABLET, FILM COATED ORAL EVERY 6 HOURS PRN
Status: DISCONTINUED | OUTPATIENT
Start: 2022-04-13 | End: 2022-04-26 | Stop reason: HOSPADM

## 2022-04-13 RX ORDER — TEMAZEPAM 15 MG/1
15 CAPSULE ORAL NIGHTLY PRN
Status: DISCONTINUED | OUTPATIENT
Start: 2022-04-13 | End: 2022-04-23

## 2022-04-13 RX ORDER — CYCLOBENZAPRINE HCL 10 MG
10 TABLET ORAL 3 TIMES DAILY PRN
Status: DISCONTINUED | OUTPATIENT
Start: 2022-04-13 | End: 2022-04-26 | Stop reason: HOSPADM

## 2022-04-13 RX ORDER — FENTANYL CITRATE 50 UG/ML
50 INJECTION, SOLUTION INTRAMUSCULAR; INTRAVENOUS
Status: DISCONTINUED | OUTPATIENT
Start: 2022-04-13 | End: 2022-04-13 | Stop reason: HOSPADM

## 2022-04-13 RX ORDER — HYDROMORPHONE HCL 110MG/55ML
PATIENT CONTROLLED ANALGESIA SYRINGE INTRAVENOUS AS NEEDED
Status: DISCONTINUED | OUTPATIENT
Start: 2022-04-13 | End: 2022-04-13 | Stop reason: SURG

## 2022-04-13 RX ORDER — POLYETHYLENE GLYCOL 3350 17 G/17G
17 POWDER, FOR SOLUTION ORAL DAILY
Status: DISCONTINUED | OUTPATIENT
Start: 2022-04-13 | End: 2022-04-26 | Stop reason: HOSPADM

## 2022-04-13 RX ORDER — FLUMAZENIL 0.1 MG/ML
0.2 INJECTION INTRAVENOUS AS NEEDED
Status: DISCONTINUED | OUTPATIENT
Start: 2022-04-13 | End: 2022-04-13 | Stop reason: HOSPADM

## 2022-04-13 RX ORDER — NITROGLYCERIN 0.4 MG/1
0.4 TABLET SUBLINGUAL
Status: DISCONTINUED | OUTPATIENT
Start: 2022-04-13 | End: 2022-04-26 | Stop reason: HOSPADM

## 2022-04-13 RX ORDER — SODIUM CHLORIDE 0.9 % (FLUSH) 0.9 %
3-10 SYRINGE (ML) INJECTION AS NEEDED
Status: DISCONTINUED | OUTPATIENT
Start: 2022-04-13 | End: 2022-04-13 | Stop reason: HOSPADM

## 2022-04-13 RX ORDER — BISACODYL 10 MG
10 SUPPOSITORY, RECTAL RECTAL DAILY PRN
Status: DISCONTINUED | OUTPATIENT
Start: 2022-04-13 | End: 2022-04-26 | Stop reason: HOSPADM

## 2022-04-13 RX ORDER — NALOXONE HCL 0.4 MG/ML
0.1 VIAL (ML) INJECTION
Status: DISCONTINUED | OUTPATIENT
Start: 2022-04-13 | End: 2022-04-26 | Stop reason: HOSPADM

## 2022-04-13 RX ORDER — SODIUM CHLORIDE, SODIUM LACTATE, POTASSIUM CHLORIDE, CALCIUM CHLORIDE 600; 310; 30; 20 MG/100ML; MG/100ML; MG/100ML; MG/100ML
INJECTION, SOLUTION INTRAVENOUS CONTINUOUS PRN
Status: DISCONTINUED | OUTPATIENT
Start: 2022-04-13 | End: 2022-04-13 | Stop reason: SURG

## 2022-04-13 RX ORDER — LEVOTHYROXINE SODIUM 0.03 MG/1
25 TABLET ORAL DAILY
Status: DISCONTINUED | OUTPATIENT
Start: 2022-04-14 | End: 2022-04-26 | Stop reason: HOSPADM

## 2022-04-13 RX ORDER — ONDANSETRON 2 MG/ML
INJECTION INTRAMUSCULAR; INTRAVENOUS AS NEEDED
Status: DISCONTINUED | OUTPATIENT
Start: 2022-04-13 | End: 2022-04-13 | Stop reason: SURG

## 2022-04-13 RX ORDER — HYDROMORPHONE HYDROCHLORIDE 1 MG/ML
0.5 INJECTION, SOLUTION INTRAMUSCULAR; INTRAVENOUS; SUBCUTANEOUS
Status: DISCONTINUED | OUTPATIENT
Start: 2022-04-13 | End: 2022-04-13 | Stop reason: HOSPADM

## 2022-04-13 RX ORDER — LOSARTAN POTASSIUM 100 MG/1
100 TABLET ORAL
Status: DISCONTINUED | OUTPATIENT
Start: 2022-04-14 | End: 2022-04-14

## 2022-04-13 RX ORDER — SODIUM CHLORIDE, SODIUM LACTATE, POTASSIUM CHLORIDE, CALCIUM CHLORIDE 600; 310; 30; 20 MG/100ML; MG/100ML; MG/100ML; MG/100ML
9 INJECTION, SOLUTION INTRAVENOUS CONTINUOUS
Status: DISCONTINUED | OUTPATIENT
Start: 2022-04-13 | End: 2022-04-13

## 2022-04-13 RX ORDER — ONDANSETRON 2 MG/ML
4 INJECTION INTRAMUSCULAR; INTRAVENOUS EVERY 6 HOURS PRN
Status: DISCONTINUED | OUTPATIENT
Start: 2022-04-13 | End: 2022-04-26 | Stop reason: HOSPADM

## 2022-04-13 RX ORDER — DEXAMETHASONE SODIUM PHOSPHATE 10 MG/ML
INJECTION INTRAMUSCULAR; INTRAVENOUS AS NEEDED
Status: DISCONTINUED | OUTPATIENT
Start: 2022-04-13 | End: 2022-04-13 | Stop reason: SURG

## 2022-04-13 RX ORDER — LABETALOL HYDROCHLORIDE 5 MG/ML
5 INJECTION, SOLUTION INTRAVENOUS
Status: DISCONTINUED | OUTPATIENT
Start: 2022-04-13 | End: 2022-04-13 | Stop reason: HOSPADM

## 2022-04-13 RX ORDER — SODIUM CHLORIDE 9 MG/ML
INJECTION, SOLUTION INTRAVENOUS AS NEEDED
Status: DISCONTINUED | OUTPATIENT
Start: 2022-04-13 | End: 2022-04-13 | Stop reason: HOSPADM

## 2022-04-13 RX ORDER — PROPOFOL 10 MG/ML
VIAL (ML) INTRAVENOUS AS NEEDED
Status: DISCONTINUED | OUTPATIENT
Start: 2022-04-13 | End: 2022-04-13 | Stop reason: SURG

## 2022-04-13 RX ORDER — MIDAZOLAM HYDROCHLORIDE 1 MG/ML
INJECTION INTRAMUSCULAR; INTRAVENOUS
Status: COMPLETED | OUTPATIENT
Start: 2022-04-13 | End: 2022-04-13

## 2022-04-13 RX ORDER — EPHEDRINE SULFATE 50 MG/ML
5 INJECTION, SOLUTION INTRAVENOUS ONCE AS NEEDED
Status: DISCONTINUED | OUTPATIENT
Start: 2022-04-13 | End: 2022-04-13 | Stop reason: HOSPADM

## 2022-04-13 RX ORDER — HYDROMORPHONE HCL IN 0.9% NACL 10 MG/50ML
PATIENT CONTROLLED ANALGESIA SYRINGE INTRAVENOUS CONTINUOUS
Status: DISCONTINUED | OUTPATIENT
Start: 2022-04-13 | End: 2022-04-15

## 2022-04-13 RX ORDER — SODIUM CHLORIDE 0.9 % (FLUSH) 0.9 %
3 SYRINGE (ML) INJECTION EVERY 12 HOURS SCHEDULED
Status: DISCONTINUED | OUTPATIENT
Start: 2022-04-13 | End: 2022-04-26 | Stop reason: HOSPADM

## 2022-04-13 RX ORDER — PROMETHAZINE HYDROCHLORIDE 25 MG/1
25 TABLET ORAL ONCE AS NEEDED
Status: DISCONTINUED | OUTPATIENT
Start: 2022-04-13 | End: 2022-04-13 | Stop reason: HOSPADM

## 2022-04-13 RX ORDER — SODIUM CHLORIDE 0.9 % (FLUSH) 0.9 %
10 SYRINGE (ML) INJECTION AS NEEDED
Status: DISCONTINUED | OUTPATIENT
Start: 2022-04-13 | End: 2022-04-26 | Stop reason: HOSPADM

## 2022-04-13 RX ORDER — FAMOTIDINE 10 MG/ML
20 INJECTION, SOLUTION INTRAVENOUS ONCE
Status: COMPLETED | OUTPATIENT
Start: 2022-04-13 | End: 2022-04-13

## 2022-04-13 RX ORDER — LIDOCAINE HYDROCHLORIDE 10 MG/ML
0.5 INJECTION, SOLUTION EPIDURAL; INFILTRATION; INTRACAUDAL; PERINEURAL ONCE AS NEEDED
Status: DISCONTINUED | OUTPATIENT
Start: 2022-04-13 | End: 2022-04-13 | Stop reason: HOSPADM

## 2022-04-13 RX ORDER — NALOXONE HCL 0.4 MG/ML
0.2 VIAL (ML) INJECTION AS NEEDED
Status: DISCONTINUED | OUTPATIENT
Start: 2022-04-13 | End: 2022-04-13 | Stop reason: HOSPADM

## 2022-04-13 RX ORDER — DIPHENHYDRAMINE HYDROCHLORIDE 50 MG/ML
12.5 INJECTION INTRAMUSCULAR; INTRAVENOUS
Status: DISCONTINUED | OUTPATIENT
Start: 2022-04-13 | End: 2022-04-13 | Stop reason: HOSPADM

## 2022-04-13 RX ORDER — GLYCOPYRROLATE 0.2 MG/ML
INJECTION INTRAMUSCULAR; INTRAVENOUS AS NEEDED
Status: DISCONTINUED | OUTPATIENT
Start: 2022-04-13 | End: 2022-04-13 | Stop reason: SURG

## 2022-04-13 RX ORDER — MAGNESIUM HYDROXIDE 1200 MG/15ML
LIQUID ORAL AS NEEDED
Status: DISCONTINUED | OUTPATIENT
Start: 2022-04-13 | End: 2022-04-13 | Stop reason: HOSPADM

## 2022-04-13 RX ORDER — ISOSORBIDE MONONITRATE 30 MG/1
30 TABLET, EXTENDED RELEASE ORAL DAILY
Status: DISCONTINUED | OUTPATIENT
Start: 2022-04-14 | End: 2022-04-15

## 2022-04-13 RX ORDER — KETAMINE HYDROCHLORIDE 10 MG/ML
INJECTION INTRAMUSCULAR; INTRAVENOUS AS NEEDED
Status: DISCONTINUED | OUTPATIENT
Start: 2022-04-13 | End: 2022-04-13 | Stop reason: SURG

## 2022-04-13 RX ORDER — ALBUMIN, HUMAN INJ 5% 5 %
SOLUTION INTRAVENOUS CONTINUOUS PRN
Status: DISCONTINUED | OUTPATIENT
Start: 2022-04-13 | End: 2022-04-13 | Stop reason: SURG

## 2022-04-13 RX ORDER — DIPHENHYDRAMINE HCL 25 MG
25 CAPSULE ORAL
Status: DISCONTINUED | OUTPATIENT
Start: 2022-04-13 | End: 2022-04-13 | Stop reason: HOSPADM

## 2022-04-13 RX ORDER — ROCURONIUM BROMIDE 10 MG/ML
INJECTION, SOLUTION INTRAVENOUS AS NEEDED
Status: DISCONTINUED | OUTPATIENT
Start: 2022-04-13 | End: 2022-04-13 | Stop reason: SURG

## 2022-04-13 RX ORDER — MIDAZOLAM HYDROCHLORIDE 1 MG/ML
0.5 INJECTION INTRAMUSCULAR; INTRAVENOUS
Status: DISCONTINUED | OUTPATIENT
Start: 2022-04-13 | End: 2022-04-13 | Stop reason: HOSPADM

## 2022-04-13 RX ORDER — OXYCODONE AND ACETAMINOPHEN 7.5; 325 MG/1; MG/1
1 TABLET ORAL EVERY 4 HOURS PRN
Status: DISCONTINUED | OUTPATIENT
Start: 2022-04-13 | End: 2022-04-13 | Stop reason: HOSPADM

## 2022-04-13 RX ORDER — ACETAMINOPHEN 325 MG/1
650 TABLET ORAL EVERY 4 HOURS PRN
Status: DISCONTINUED | OUTPATIENT
Start: 2022-04-13 | End: 2022-04-26 | Stop reason: HOSPADM

## 2022-04-13 RX ORDER — FOLIC ACID 1 MG/1
1000 TABLET ORAL DAILY
Status: DISCONTINUED | OUTPATIENT
Start: 2022-04-13 | End: 2022-04-15

## 2022-04-13 RX ORDER — FENTANYL CITRATE 50 UG/ML
INJECTION, SOLUTION INTRAMUSCULAR; INTRAVENOUS AS NEEDED
Status: DISCONTINUED | OUTPATIENT
Start: 2022-04-13 | End: 2022-04-13 | Stop reason: SURG

## 2022-04-13 RX ORDER — LIDOCAINE HYDROCHLORIDE 20 MG/ML
INJECTION, SOLUTION INFILTRATION; PERINEURAL AS NEEDED
Status: DISCONTINUED | OUTPATIENT
Start: 2022-04-13 | End: 2022-04-13 | Stop reason: SURG

## 2022-04-13 RX ORDER — PHENYLEPHRINE HYDROCHLORIDE 10 MG/ML
INJECTION INTRAVENOUS AS NEEDED
Status: DISCONTINUED | OUTPATIENT
Start: 2022-04-13 | End: 2022-04-13 | Stop reason: SURG

## 2022-04-13 RX ORDER — FENTANYL CITRATE 50 UG/ML
INJECTION, SOLUTION INTRAMUSCULAR; INTRAVENOUS
Status: COMPLETED | OUTPATIENT
Start: 2022-04-13 | End: 2022-04-13

## 2022-04-13 RX ORDER — ROSUVASTATIN CALCIUM 10 MG/1
10 TABLET, COATED ORAL NIGHTLY
Status: DISCONTINUED | OUTPATIENT
Start: 2022-04-13 | End: 2022-04-15

## 2022-04-13 RX ORDER — IPRATROPIUM BROMIDE AND ALBUTEROL SULFATE 2.5; .5 MG/3ML; MG/3ML
3 SOLUTION RESPIRATORY (INHALATION) ONCE
Status: COMPLETED | OUTPATIENT
Start: 2022-04-13 | End: 2022-04-13

## 2022-04-13 RX ORDER — AMLODIPINE BESYLATE 10 MG/1
10 TABLET ORAL DAILY
Status: DISCONTINUED | OUTPATIENT
Start: 2022-04-13 | End: 2022-04-14

## 2022-04-13 RX ORDER — ONDANSETRON 2 MG/ML
4 INJECTION INTRAMUSCULAR; INTRAVENOUS ONCE AS NEEDED
Status: DISCONTINUED | OUTPATIENT
Start: 2022-04-13 | End: 2022-04-13 | Stop reason: HOSPADM

## 2022-04-13 RX ORDER — PROMETHAZINE HYDROCHLORIDE 25 MG/1
25 SUPPOSITORY RECTAL ONCE AS NEEDED
Status: DISCONTINUED | OUTPATIENT
Start: 2022-04-13 | End: 2022-04-13 | Stop reason: HOSPADM

## 2022-04-13 RX ORDER — MAGNESIUM SULFATE HEPTAHYDRATE 500 MG/ML
INJECTION, SOLUTION INTRAMUSCULAR; INTRAVENOUS AS NEEDED
Status: DISCONTINUED | OUTPATIENT
Start: 2022-04-13 | End: 2022-04-13 | Stop reason: SURG

## 2022-04-13 RX ORDER — SODIUM CHLORIDE 0.9 % (FLUSH) 0.9 %
3 SYRINGE (ML) INJECTION EVERY 12 HOURS SCHEDULED
Status: DISCONTINUED | OUTPATIENT
Start: 2022-04-13 | End: 2022-04-13 | Stop reason: HOSPADM

## 2022-04-13 RX ADMIN — FAMOTIDINE 20 MG: 10 INJECTION INTRAVENOUS at 06:34

## 2022-04-13 RX ADMIN — LIDOCAINE HYDROCHLORIDE 100 MG: 20 INJECTION, SOLUTION INFILTRATION; PERINEURAL at 07:26

## 2022-04-13 RX ADMIN — OXYCODONE AND ACETAMINOPHEN 2 TABLET: 5; 325 TABLET ORAL at 16:11

## 2022-04-13 RX ADMIN — ROCURONIUM BROMIDE 10 MG: 50 INJECTION INTRAVENOUS at 12:54

## 2022-04-13 RX ADMIN — PHENYLEPHRINE HYDROCHLORIDE 200 MCG: 10 INJECTION, SOLUTION INTRAVENOUS at 07:38

## 2022-04-13 RX ADMIN — FOLIC ACID 1000 MCG: 1 TABLET ORAL at 21:10

## 2022-04-13 RX ADMIN — SODIUM CHLORIDE, POTASSIUM CHLORIDE, SODIUM LACTATE AND CALCIUM CHLORIDE: 600; 310; 30; 20 INJECTION, SOLUTION INTRAVENOUS at 09:20

## 2022-04-13 RX ADMIN — HYDROMORPHONE HYDROCHLORIDE 0.5 MG: 2 INJECTION, SOLUTION INTRAMUSCULAR; INTRAVENOUS; SUBCUTANEOUS at 14:06

## 2022-04-13 RX ADMIN — ROCURONIUM BROMIDE 50 MG: 50 INJECTION INTRAVENOUS at 07:26

## 2022-04-13 RX ADMIN — VANCOMYCIN HYDROCHLORIDE 1500 MG: 10 INJECTION, POWDER, LYOPHILIZED, FOR SOLUTION INTRAVENOUS at 21:10

## 2022-04-13 RX ADMIN — KETAMINE HYDROCHLORIDE 10 MG: 10 INJECTION INTRAMUSCULAR; INTRAVENOUS at 09:11

## 2022-04-13 RX ADMIN — ROCURONIUM BROMIDE 20 MG: 50 INJECTION INTRAVENOUS at 10:18

## 2022-04-13 RX ADMIN — KETAMINE HYDROCHLORIDE 10 MG: 10 INJECTION INTRAMUSCULAR; INTRAVENOUS at 12:08

## 2022-04-13 RX ADMIN — VANCOMYCIN HYDROCHLORIDE 1500 MG: 10 INJECTION, POWDER, LYOPHILIZED, FOR SOLUTION INTRAVENOUS at 06:34

## 2022-04-13 RX ADMIN — KETAMINE HYDROCHLORIDE 10 MG: 10 INJECTION INTRAMUSCULAR; INTRAVENOUS at 10:09

## 2022-04-13 RX ADMIN — IPRATROPIUM BROMIDE AND ALBUTEROL SULFATE 3 ML: .5; 3 SOLUTION RESPIRATORY (INHALATION) at 06:41

## 2022-04-13 RX ADMIN — KETAMINE HYDROCHLORIDE 30 MG: 10 INJECTION INTRAMUSCULAR; INTRAVENOUS at 08:14

## 2022-04-13 RX ADMIN — FENTANYL CITRATE 25 MCG: 0.05 INJECTION, SOLUTION INTRAMUSCULAR; INTRAVENOUS at 06:54

## 2022-04-13 RX ADMIN — PHENYLEPHRINE HYDROCHLORIDE 1 MCG/KG/MIN: 10 INJECTION INTRAVENOUS at 07:40

## 2022-04-13 RX ADMIN — MIDAZOLAM 0.5 MG: 1 INJECTION INTRAMUSCULAR; INTRAVENOUS at 06:55

## 2022-04-13 RX ADMIN — OXYCODONE AND ACETAMINOPHEN 2 TABLET: 5; 325 TABLET ORAL at 20:02

## 2022-04-13 RX ADMIN — DEXAMETHASONE SODIUM PHOSPHATE 8 MG: 10 INJECTION INTRAMUSCULAR; INTRAVENOUS at 07:52

## 2022-04-13 RX ADMIN — ONDANSETRON 4 MG: 2 INJECTION INTRAMUSCULAR; INTRAVENOUS at 14:03

## 2022-04-13 RX ADMIN — FENTANYL CITRATE 50 MCG: 0.05 INJECTION, SOLUTION INTRAMUSCULAR; INTRAVENOUS at 07:23

## 2022-04-13 RX ADMIN — ROSUVASTATIN CALCIUM 10 MG: 10 TABLET, FILM COATED ORAL at 21:09

## 2022-04-13 RX ADMIN — FENTANYL CITRATE 50 MCG: 50 INJECTION INTRAMUSCULAR; INTRAVENOUS at 15:07

## 2022-04-13 RX ADMIN — HYDROMORPHONE HYDROCHLORIDE 0.5 MG: 1 INJECTION, SOLUTION INTRAMUSCULAR; INTRAVENOUS; SUBCUTANEOUS at 15:55

## 2022-04-13 RX ADMIN — ALBUMIN HUMAN: 0.05 INJECTION, SOLUTION INTRAVENOUS at 08:18

## 2022-04-13 RX ADMIN — HYDROMORPHONE HYDROCHLORIDE: 10 INJECTION, SOLUTION INTRAMUSCULAR; INTRAVENOUS; SUBCUTANEOUS at 15:25

## 2022-04-13 RX ADMIN — ROCURONIUM BROMIDE 20 MG: 50 INJECTION INTRAVENOUS at 11:40

## 2022-04-13 RX ADMIN — SODIUM CHLORIDE, POTASSIUM CHLORIDE, SODIUM LACTATE AND CALCIUM CHLORIDE: 600; 310; 30; 20 INJECTION, SOLUTION INTRAVENOUS at 07:21

## 2022-04-13 RX ADMIN — PHENYLEPHRINE HYDROCHLORIDE 200 MCG: 10 INJECTION, SOLUTION INTRAVENOUS at 07:30

## 2022-04-13 RX ADMIN — PROPOFOL 150 MG: 10 INJECTION, EMULSION INTRAVENOUS at 07:26

## 2022-04-13 RX ADMIN — ROCURONIUM BROMIDE 10 MG: 50 INJECTION INTRAVENOUS at 08:40

## 2022-04-13 RX ADMIN — CYCLOBENZAPRINE 10 MG: 10 TABLET, FILM COATED ORAL at 16:11

## 2022-04-13 RX ADMIN — GLYCOPYRROLATE 0.6 MG: 0.2 INJECTION INTRAMUSCULAR; INTRAVENOUS at 14:03

## 2022-04-13 RX ADMIN — ALBUMIN HUMAN: 0.05 INJECTION, SOLUTION INTRAVENOUS at 07:49

## 2022-04-13 RX ADMIN — SODIUM CHLORIDE, POTASSIUM CHLORIDE, SODIUM LACTATE AND CALCIUM CHLORIDE 9 ML/HR: 600; 310; 30; 20 INJECTION, SOLUTION INTRAVENOUS at 06:34

## 2022-04-13 RX ADMIN — PHENYLEPHRINE HYDROCHLORIDE 200 MCG: 10 INJECTION, SOLUTION INTRAVENOUS at 08:19

## 2022-04-13 RX ADMIN — CARVEDILOL 12.5 MG: 12.5 TABLET, FILM COATED ORAL at 21:10

## 2022-04-13 RX ADMIN — PHENYLEPHRINE HYDROCHLORIDE 200 MCG: 10 INJECTION, SOLUTION INTRAVENOUS at 07:32

## 2022-04-13 RX ADMIN — FENTANYL CITRATE 25 MCG: 0.05 INJECTION, SOLUTION INTRAMUSCULAR; INTRAVENOUS at 13:51

## 2022-04-13 RX ADMIN — KETAMINE HYDROCHLORIDE 20 MG: 10 INJECTION INTRAMUSCULAR; INTRAVENOUS at 13:51

## 2022-04-13 RX ADMIN — KETAMINE HYDROCHLORIDE 10 MG: 10 INJECTION INTRAMUSCULAR; INTRAVENOUS at 13:09

## 2022-04-13 RX ADMIN — FENTANYL CITRATE 25 MCG: 0.05 INJECTION, SOLUTION INTRAMUSCULAR; INTRAVENOUS at 09:36

## 2022-04-13 RX ADMIN — NEOSTIGMINE METHYLSULFATE 3 MG: 0.5 INJECTION INTRAVENOUS at 14:03

## 2022-04-13 RX ADMIN — ROCURONIUM BROMIDE 10 MG: 50 INJECTION INTRAVENOUS at 09:38

## 2022-04-13 RX ADMIN — MAGNESIUM SULFATE HEPTAHYDRATE 2 G: 500 INJECTION, SOLUTION INTRAMUSCULAR; INTRAVENOUS at 08:02

## 2022-04-13 RX ADMIN — HYDROMORPHONE HYDROCHLORIDE 0.5 MG: 2 INJECTION, SOLUTION INTRAMUSCULAR; INTRAVENOUS; SUBCUTANEOUS at 14:25

## 2022-04-13 RX ADMIN — SODIUM CHLORIDE, POTASSIUM CHLORIDE, SODIUM LACTATE AND CALCIUM CHLORIDE: 600; 310; 30; 20 INJECTION, SOLUTION INTRAVENOUS at 13:09

## 2022-04-13 RX ADMIN — PHENYLEPHRINE HYDROCHLORIDE 200 MCG: 10 INJECTION, SOLUTION INTRAVENOUS at 07:35

## 2022-04-13 RX ADMIN — ROCURONIUM BROMIDE 10 MG: 50 INJECTION INTRAVENOUS at 11:03

## 2022-04-13 RX ADMIN — KETAMINE HYDROCHLORIDE 10 MG: 10 INJECTION INTRAMUSCULAR; INTRAVENOUS at 11:04

## 2022-04-13 NOTE — ANESTHESIA PROCEDURE NOTES
Airway  Urgency: elective    Date/Time: 4/13/2022 7:29 AM  Airway not difficult    General Information and Staff    Patient location during procedure: OR  Anesthesiologist: Nikki Fong MD  CRNA: Finesse Austin CRNA    Indications and Patient Condition  Indications for airway management: airway protection    Preoxygenated: yes  MILS maintained throughout  Mask difficulty assessment: 2 - vent by mask + OA or adjuvant +/- NMBA    Final Airway Details  Final airway type: endotracheal airway      Successful airway: ETT  Cuffed: yes   Successful intubation technique: direct laryngoscopy  Facilitating devices/methods: intubating stylet  Endotracheal tube insertion site: oral  Blade: Bay  Blade size: 2  ETT size (mm): 8.0  Cormack-Lehane Classification: grade I - full view of glottis  Placement verified by: chest auscultation and capnometry   Cuff volume (mL): 8  Measured from: lips  ETT/EBT  to lips (cm): 21  Number of attempts at approach: 1  Assessment: lips, teeth, and gum same as pre-op and atraumatic intubation

## 2022-04-13 NOTE — ANESTHESIA PROCEDURE NOTES
Arterial Line      Patient reassessed immediately prior to procedure    Patient location during procedure: pre-op  Start time: 4/13/2022 6:59 AM  Stop Time:4/13/2022 7:03 AM       Line placed for respiratory failure, hemodynamic monitoring and MD/Surgeon request.  Performed By   Anesthesiologist: Nikki Fong MD  Preanesthetic Checklist  Completed: patient identified, IV checked, site marked, risks and benefits discussed, surgical consent, monitors and equipment checked, pre-op evaluation and timeout performed  Arterial Line Prep   Sterile Tech: cap, gloves, sterile barriers and mask  Prep: ChloraPrep  Patient monitoring: EKG, continuous pulse oximetry and blood pressure monitoring  Arterial Line Procedure   Laterality:left  Location:  radial artery  Catheter size: 20 G   Guidance: ultrasound guided  PROCEDURE NOTE/ULTRASOUND INTERPRETATION.  Using ultrasound guidance the potential vascular sites for insertion of the catheter were visualized to determine the patency of the vessel to be used for vascular access.  After selecting the appropriate site for insertion, the needle was visualized under ultrasound being inserted into the radial artery, followed by ultrasound confirmation of wire and catheter placement. There were no abnormalities seen on ultrasound; an image was taken; and the patient tolerated the procedure with no complications.   Number of attempts: 1  Successful placement: yes  Post Assessment   Dressing Type: wrist guard applied, secured with tape and occlusive dressing applied.   Complications no  Circ/Move/Sens Assessment: normal and unchanged.   Patient Tolerance: patient tolerated the procedure well with no apparent complications  Additional Notes  Using ultrasound guidance the potential vascular sites for insertion of the catheter were visualized to determine the patency of the vessel to be used for vascular access.  After selecting the appropriate site for insertion, the needle was  visualized under ultrasound being inserted into the artery, followed by ultrasound confirmation of wire and catheter placement.  There were no abnormalities seen on ultrasound; an image was taken/ and the patient tolerated the procedure with no complications.

## 2022-04-13 NOTE — ANESTHESIA POSTPROCEDURE EVALUATION
"Patient: Prem Thrasher    Procedure Summary     Date: 04/13/22 Room / Location: [facility] OR [address] [place] [address] MAIN OR    Anesthesia Start: 0719 Anesthesia Stop: 1429    Procedures:       Thoracic 10-thoracic 11, thoracic 11-thoracic 12, thoracic 12-lumbar 1, lumbar 1-lumbar 2, lumbar 2-lumbar 3, lumbar 3-lumbar 4 fusion with instrumentation with iliac crest bone graft, and removal of implants from lumbar 2-lumbar 3, lumbar 3-lumbar 4 (N/A Spine Lumbar)      LUMBAR ANTERIOR INTERBODY FUSION L4,L5 Osteotomy interbody fusion with cage L1,L2 (N/A ) Diagnosis:       Pseudarthrosis after fusion or arthrodesis      (Pseudarthrosis after fusion or arthrodesis [M96.0])    Surgeons: Jacky Perez MD Provider: Nikki Fong MD    Anesthesia Type: general ASA Status: 3          Anesthesia Type: general    Vitals  Vitals Value Taken Time   /74 04/13/22 1631   Temp 37.2 °C (98.9 °F) 04/13/22 1430   Pulse 81 04/13/22 1641   Resp 16 04/13/22 1630   SpO2 92 % 04/13/22 1641   Vitals shown include unvalidated device data.        Post Anesthesia Care and Evaluation    Patient location during evaluation: bedside  Patient participation: complete - patient participated  Level of consciousness: awake and alert  Pain management: adequate  Airway patency: patent  Anesthetic complications: No anesthetic complications    Cardiovascular status: acceptable  Respiratory status: acceptable  Hydration status: acceptable    Comments: /74   Pulse 76   Temp 37.2 °C (98.9 °F) (Oral)   Resp 16   Ht 180.3 cm (71\")   Wt 104 kg (230 lb 1.6 oz)   SpO2 96%   BMI 32.09 kg/m²     Patient is stable postoperatively and has adequately recovered from anesthesia as described above unless otherwise noted      "

## 2022-04-13 NOTE — ANESTHESIA PREPROCEDURE EVALUATION
Anesthesia Evaluation     Patient summary reviewed and Nursing notes reviewed   history of anesthetic complications: PONV  NPO Solid Status: > 8 hours  NPO Liquid Status: > 2 hours           Airway   Mallampati: II  TM distance: >3 FB  Neck ROM: full  No difficulty expected  Dental    (+) edentulous, lower dentures and upper dentures    Pulmonary    (+) a smoker (quit 2 years ago, 45PY history) Former, COPD moderate, shortness of breath, sleep apnea, decreased breath sounds,   Cardiovascular - normal exam  Exercise tolerance: poor (<4 METS)    ECG reviewed  PT is on anticoagulation therapy  Patient on routine beta blocker and Beta blocker given within 24 hours of surgery    (+) hypertension, past MI (NSTEMI) , CAD (stents/angioplasty in 1/2021, cardiology clearance in chart. Preserved EF), cardiac stents Drug eluting stent more than 12 months ago angina (prior to stents; since stents he has not used NTG often), hyperlipidemia,       Neuro/Psych  (+) numbness (Sciatica, lumbar radic, CTS), psychiatric history Anxiety and Depression,    GI/Hepatic/Renal/Endo    (+)  GERD well controlled,  renal disease (Cr 1.31) CRI, thyroid problem hypothyroidism    Musculoskeletal     (+) back pain, chronic pain,   Abdominal   (+) obese,    Substance History      OB/GYN          Other   arthritis (rhematoid arthritis),                      Anesthesia Plan    ASA 3     general   (Subhypnotic Propofol gtt for PONV. Preop Duoneb treatment  Arterial line for lab draws and BP control    I have reviewed the patient's history and chart with the patient, including all pertinent laboratory results and imaging. I have explained the risks of anesthesia including but not limited to dental damage, corneal abrasion, nerve injury, MI, stroke, aspiration, and death. Patient has agreed to proceed.       )  intravenous induction     Anesthetic plan, all risks, benefits, and alternatives have been provided, discussed and informed consent has been  obtained with: patient.    Plan discussed with CRNA.        CODE STATUS:

## 2022-04-13 NOTE — ANESTHESIA PROCEDURE NOTES
Peripheral IV    Patient location during procedure: pre-op  Start time: 4/13/2022 7:04 AM  End time: 4/13/2022 7:06 AM  Line placed for Fluids/Medication Admin, Difficult Access and Sedation.  Performed By   Anesthesiologist: Nikki Fong MD  Preanesthetic Checklist  Completed: patient identified, IV checked, site marked, risks and benefits discussed, surgical consent, monitors and equipment checked, pre-op evaluation and timeout performed  Peripheral IV Prep   Patient position: supine   Prep: ChloraPrep  Patient monitoring: cardiac monitor, continuous pulse ox and heart rate  Peripheral IV Procedure   Laterality:left  Location:  Forearm  Catheter size: 18 G  Guidance: ultrasound guided         Post Assessment   Dressing Type: tape and transparent.    IV Dressing/Site: clean, dry and intact  Additional Notes  Ultrasound Interpretation:  Using ultrasound guidance the potential vascular sites for insertion of the catheter were visualized to determine the patency of the vessel to be used for vascular access.  After selecting the appropriate site for insertion, the needle was visualized under ultrasound being inserted into the vessel, followed by ultrasound confirmation of catheter placement.  There were no abnormalities seen on ultrasound; an image was taken and the patient tolerated the procedure with no complications.

## 2022-04-14 PROBLEM — E87.1 HYPONATREMIA: Status: ACTIVE | Noted: 2022-04-14

## 2022-04-14 LAB
ANION GAP SERPL CALCULATED.3IONS-SCNC: 7.3 MMOL/L (ref 5–15)
BUN SERPL-MCNC: 20 MG/DL (ref 8–23)
BUN/CREAT SERPL: 18.7 (ref 7–25)
CALCIUM SPEC-SCNC: 7.8 MG/DL (ref 8.6–10.5)
CHLORIDE SERPL-SCNC: 100 MMOL/L (ref 98–107)
CO2 SERPL-SCNC: 22.7 MMOL/L (ref 22–29)
CREAT SERPL-MCNC: 1.07 MG/DL (ref 0.76–1.27)
EGFRCR SERPLBLD CKD-EPI 2021: 75.6 ML/MIN/1.73
GLUCOSE SERPL-MCNC: 132 MG/DL (ref 65–99)
HCT VFR BLD AUTO: 32.9 % (ref 37.5–51)
HGB BLD-MCNC: 10.6 G/DL (ref 13–17.7)
POTASSIUM SERPL-SCNC: 5.2 MMOL/L (ref 3.5–5.2)
SODIUM SERPL-SCNC: 130 MMOL/L (ref 136–145)

## 2022-04-14 PROCEDURE — 85018 HEMOGLOBIN: CPT | Performed by: ORTHOPAEDIC SURGERY

## 2022-04-14 PROCEDURE — 97530 THERAPEUTIC ACTIVITIES: CPT

## 2022-04-14 PROCEDURE — 97162 PT EVAL MOD COMPLEX 30 MIN: CPT

## 2022-04-14 PROCEDURE — 85014 HEMATOCRIT: CPT | Performed by: ORTHOPAEDIC SURGERY

## 2022-04-14 PROCEDURE — 25010000002 ONDANSETRON PER 1 MG: Performed by: ORTHOPAEDIC SURGERY

## 2022-04-14 PROCEDURE — 80048 BASIC METABOLIC PNL TOTAL CA: CPT | Performed by: ORTHOPAEDIC SURGERY

## 2022-04-14 RX ORDER — PROCHLORPERAZINE EDISYLATE 5 MG/ML
5 INJECTION INTRAMUSCULAR; INTRAVENOUS EVERY 6 HOURS PRN
Status: DISCONTINUED | OUTPATIENT
Start: 2022-04-14 | End: 2022-04-26 | Stop reason: HOSPADM

## 2022-04-14 RX ADMIN — CYCLOBENZAPRINE 10 MG: 10 TABLET, FILM COATED ORAL at 00:26

## 2022-04-14 RX ADMIN — OXYCODONE AND ACETAMINOPHEN 2 TABLET: 5; 325 TABLET ORAL at 09:01

## 2022-04-14 RX ADMIN — OXYCODONE AND ACETAMINOPHEN 2 TABLET: 5; 325 TABLET ORAL at 13:01

## 2022-04-14 RX ADMIN — OXYCODONE AND ACETAMINOPHEN 2 TABLET: 5; 325 TABLET ORAL at 04:28

## 2022-04-14 RX ADMIN — ONDANSETRON 4 MG: 2 INJECTION INTRAMUSCULAR; INTRAVENOUS at 21:31

## 2022-04-14 RX ADMIN — OXYCODONE AND ACETAMINOPHEN 2 TABLET: 5; 325 TABLET ORAL at 17:12

## 2022-04-14 RX ADMIN — OXYCODONE AND ACETAMINOPHEN 2 TABLET: 5; 325 TABLET ORAL at 00:26

## 2022-04-14 RX ADMIN — LEVOTHYROXINE SODIUM 25 MCG: 0.03 TABLET ORAL at 09:02

## 2022-04-14 RX ADMIN — CARVEDILOL 12.5 MG: 12.5 TABLET, FILM COATED ORAL at 09:01

## 2022-04-14 RX ADMIN — Medication 3 ML: at 09:02

## 2022-04-14 RX ADMIN — CARVEDILOL 12.5 MG: 12.5 TABLET, FILM COATED ORAL at 17:12

## 2022-04-14 RX ADMIN — SODIUM CHLORIDE 100 ML/HR: 9 INJECTION, SOLUTION INTRAVENOUS at 04:28

## 2022-04-14 RX ADMIN — FOLIC ACID 1000 MCG: 1 TABLET ORAL at 09:01

## 2022-04-14 RX ADMIN — ISOSORBIDE MONONITRATE 30 MG: 30 TABLET ORAL at 09:01

## 2022-04-14 RX ADMIN — POLYETHYLENE GLYCOL 3350 17 G: 17 POWDER, FOR SOLUTION ORAL at 09:01

## 2022-04-14 RX ADMIN — DOCUSATE SODIUM 50MG AND SENNOSIDES 8.6MG 1 TABLET: 8.6; 5 TABLET, FILM COATED ORAL at 09:01

## 2022-04-15 ENCOUNTER — APPOINTMENT (OUTPATIENT)
Dept: GENERAL RADIOLOGY | Facility: HOSPITAL | Age: 67
End: 2022-04-15

## 2022-04-15 LAB
ANION GAP SERPL CALCULATED.3IONS-SCNC: 9.1 MMOL/L (ref 5–15)
BUN SERPL-MCNC: 25 MG/DL (ref 8–23)
BUN/CREAT SERPL: 18.5 (ref 7–25)
CALCIUM SPEC-SCNC: 8.1 MG/DL (ref 8.6–10.5)
CHLORIDE SERPL-SCNC: 96 MMOL/L (ref 98–107)
CO2 SERPL-SCNC: 21.9 MMOL/L (ref 22–29)
CREAT SERPL-MCNC: 1.35 MG/DL (ref 0.76–1.27)
EGFRCR SERPLBLD CKD-EPI 2021: 57.2 ML/MIN/1.73
GLUCOSE SERPL-MCNC: 114 MG/DL (ref 65–99)
HCT VFR BLD AUTO: 30.1 % (ref 37.5–51)
HGB BLD-MCNC: 9.8 G/DL (ref 13–17.7)
POTASSIUM SERPL-SCNC: 4.8 MMOL/L (ref 3.5–5.2)
SODIUM SERPL-SCNC: 127 MMOL/L (ref 136–145)

## 2022-04-15 PROCEDURE — 99221 1ST HOSP IP/OBS SF/LOW 40: CPT | Performed by: STUDENT IN AN ORGANIZED HEALTH CARE EDUCATION/TRAINING PROGRAM

## 2022-04-15 PROCEDURE — 25010000002 ONDANSETRON PER 1 MG: Performed by: ORTHOPAEDIC SURGERY

## 2022-04-15 PROCEDURE — 97530 THERAPEUTIC ACTIVITIES: CPT

## 2022-04-15 PROCEDURE — 25010000002 PROCHLORPERAZINE 10 MG/2ML SOLUTION: Performed by: NURSE PRACTITIONER

## 2022-04-15 PROCEDURE — 99024 POSTOP FOLLOW-UP VISIT: CPT | Performed by: ORTHOPAEDIC SURGERY

## 2022-04-15 PROCEDURE — 85018 HEMOGLOBIN: CPT | Performed by: ORTHOPAEDIC SURGERY

## 2022-04-15 PROCEDURE — 80048 BASIC METABOLIC PNL TOTAL CA: CPT | Performed by: HOSPITALIST

## 2022-04-15 PROCEDURE — 85014 HEMATOCRIT: CPT | Performed by: ORTHOPAEDIC SURGERY

## 2022-04-15 PROCEDURE — 74018 RADEX ABDOMEN 1 VIEW: CPT

## 2022-04-15 RX ORDER — CARVEDILOL 6.25 MG/1
6.25 TABLET ORAL 2 TIMES DAILY WITH MEALS
Status: DISCONTINUED | OUTPATIENT
Start: 2022-04-15 | End: 2022-04-19

## 2022-04-15 RX ADMIN — ONDANSETRON 4 MG: 2 INJECTION INTRAMUSCULAR; INTRAVENOUS at 21:49

## 2022-04-15 RX ADMIN — CARVEDILOL 12.5 MG: 12.5 TABLET, FILM COATED ORAL at 09:55

## 2022-04-15 RX ADMIN — PROCHLORPERAZINE EDISYLATE 5 MG: 5 INJECTION INTRAMUSCULAR; INTRAVENOUS at 00:36

## 2022-04-15 RX ADMIN — SODIUM CHLORIDE 100 ML/HR: 9 INJECTION, SOLUTION INTRAVENOUS at 09:56

## 2022-04-15 RX ADMIN — POLYETHYLENE GLYCOL 3350 17 G: 17 POWDER, FOR SOLUTION ORAL at 09:55

## 2022-04-15 RX ADMIN — SODIUM CHLORIDE 100 ML/HR: 9 INJECTION, SOLUTION INTRAVENOUS at 17:01

## 2022-04-15 RX ADMIN — ISOSORBIDE MONONITRATE 30 MG: 30 TABLET ORAL at 09:55

## 2022-04-15 RX ADMIN — DOCUSATE SODIUM 50MG AND SENNOSIDES 8.6MG 1 TABLET: 8.6; 5 TABLET, FILM COATED ORAL at 09:55

## 2022-04-15 RX ADMIN — OXYCODONE AND ACETAMINOPHEN 2 TABLET: 5; 325 TABLET ORAL at 21:49

## 2022-04-15 RX ADMIN — ONDANSETRON 4 MG: 2 INJECTION INTRAMUSCULAR; INTRAVENOUS at 10:16

## 2022-04-15 RX ADMIN — Medication 3 ML: at 21:49

## 2022-04-15 RX ADMIN — ESCITALOPRAM 20 MG: 20 TABLET, FILM COATED ORAL at 21:50

## 2022-04-15 RX ADMIN — DOCUSATE SODIUM 50MG AND SENNOSIDES 8.6MG 1 TABLET: 8.6; 5 TABLET, FILM COATED ORAL at 21:49

## 2022-04-15 RX ADMIN — OXYCODONE AND ACETAMINOPHEN 2 TABLET: 5; 325 TABLET ORAL at 11:43

## 2022-04-15 RX ADMIN — LEVOTHYROXINE SODIUM 25 MCG: 0.03 TABLET ORAL at 09:55

## 2022-04-16 PROBLEM — K91.89 POSTOPERATIVE ILEUS: Status: ACTIVE | Noted: 2022-04-16

## 2022-04-16 PROBLEM — K56.7 POSTOPERATIVE ILEUS: Status: ACTIVE | Noted: 2022-04-16

## 2022-04-16 LAB
ANION GAP SERPL CALCULATED.3IONS-SCNC: 9.2 MMOL/L (ref 5–15)
BACTERIA SPEC AEROBE CULT: NORMAL
BUN SERPL-MCNC: 19 MG/DL (ref 8–23)
BUN/CREAT SERPL: 18.3 (ref 7–25)
CALCIUM SPEC-SCNC: 8.3 MG/DL (ref 8.6–10.5)
CHLORIDE SERPL-SCNC: 101 MMOL/L (ref 98–107)
CO2 SERPL-SCNC: 21.8 MMOL/L (ref 22–29)
CREAT SERPL-MCNC: 1.04 MG/DL (ref 0.76–1.27)
DEPRECATED RDW RBC AUTO: 42.4 FL (ref 37–54)
EGFRCR SERPLBLD CKD-EPI 2021: 78.2 ML/MIN/1.73
ERYTHROCYTE [DISTWIDTH] IN BLOOD BY AUTOMATED COUNT: 12.9 % (ref 12.3–15.4)
GLUCOSE SERPL-MCNC: 90 MG/DL (ref 65–99)
GRAM STN SPEC: NORMAL
HCT VFR BLD AUTO: 31 % (ref 37.5–51)
HGB BLD-MCNC: 10.1 G/DL (ref 13–17.7)
MCH RBC QN AUTO: 29.4 PG (ref 26.6–33)
MCHC RBC AUTO-ENTMCNC: 32.6 G/DL (ref 31.5–35.7)
MCV RBC AUTO: 90.4 FL (ref 79–97)
PLATELET # BLD AUTO: 202 10*3/MM3 (ref 140–450)
PMV BLD AUTO: 10.6 FL (ref 6–12)
POTASSIUM SERPL-SCNC: 4.4 MMOL/L (ref 3.5–5.2)
RBC # BLD AUTO: 3.43 10*6/MM3 (ref 4.14–5.8)
SODIUM SERPL-SCNC: 132 MMOL/L (ref 136–145)
WBC NRBC COR # BLD: 11.1 10*3/MM3 (ref 3.4–10.8)

## 2022-04-16 PROCEDURE — 99232 SBSQ HOSP IP/OBS MODERATE 35: CPT | Performed by: STUDENT IN AN ORGANIZED HEALTH CARE EDUCATION/TRAINING PROGRAM

## 2022-04-16 PROCEDURE — 80048 BASIC METABOLIC PNL TOTAL CA: CPT | Performed by: HOSPITALIST

## 2022-04-16 PROCEDURE — 85027 COMPLETE CBC AUTOMATED: CPT | Performed by: HOSPITALIST

## 2022-04-16 RX ADMIN — CYCLOBENZAPRINE 10 MG: 10 TABLET, FILM COATED ORAL at 20:28

## 2022-04-16 RX ADMIN — ACETAMINOPHEN 650 MG: 325 TABLET ORAL at 23:39

## 2022-04-16 RX ADMIN — POLYETHYLENE GLYCOL 3350 17 G: 17 POWDER, FOR SOLUTION ORAL at 09:56

## 2022-04-16 RX ADMIN — DOCUSATE SODIUM 50MG AND SENNOSIDES 8.6MG 1 TABLET: 8.6; 5 TABLET, FILM COATED ORAL at 09:56

## 2022-04-16 RX ADMIN — Medication 3 ML: at 20:28

## 2022-04-16 RX ADMIN — CARVEDILOL 6.25 MG: 6.25 TABLET, FILM COATED ORAL at 09:56

## 2022-04-16 RX ADMIN — OXYCODONE AND ACETAMINOPHEN 2 TABLET: 5; 325 TABLET ORAL at 19:32

## 2022-04-16 RX ADMIN — ESCITALOPRAM 20 MG: 20 TABLET, FILM COATED ORAL at 20:28

## 2022-04-16 RX ADMIN — OXYCODONE AND ACETAMINOPHEN 2 TABLET: 5; 325 TABLET ORAL at 09:56

## 2022-04-16 RX ADMIN — CARVEDILOL 6.25 MG: 6.25 TABLET, FILM COATED ORAL at 18:22

## 2022-04-16 RX ADMIN — LEVOTHYROXINE SODIUM 25 MCG: 0.03 TABLET ORAL at 09:56

## 2022-04-16 RX ADMIN — CYCLOBENZAPRINE 10 MG: 10 TABLET, FILM COATED ORAL at 09:56

## 2022-04-16 RX ADMIN — DOCUSATE SODIUM 50MG AND SENNOSIDES 8.6MG 1 TABLET: 8.6; 5 TABLET, FILM COATED ORAL at 20:28

## 2022-04-16 RX ADMIN — SODIUM CHLORIDE 100 ML/HR: 9 INJECTION, SOLUTION INTRAVENOUS at 03:23

## 2022-04-17 ENCOUNTER — APPOINTMENT (OUTPATIENT)
Dept: GENERAL RADIOLOGY | Facility: HOSPITAL | Age: 67
End: 2022-04-17

## 2022-04-17 LAB
ANION GAP SERPL CALCULATED.3IONS-SCNC: 13 MMOL/L (ref 5–15)
BUN SERPL-MCNC: 14 MG/DL (ref 8–23)
BUN/CREAT SERPL: 15.4 (ref 7–25)
CALCIUM SPEC-SCNC: 8.4 MG/DL (ref 8.6–10.5)
CHLORIDE SERPL-SCNC: 100 MMOL/L (ref 98–107)
CO2 SERPL-SCNC: 20 MMOL/L (ref 22–29)
CREAT SERPL-MCNC: 0.91 MG/DL (ref 0.76–1.27)
DEPRECATED RDW RBC AUTO: 42.2 FL (ref 37–54)
EGFRCR SERPLBLD CKD-EPI 2021: 91.8 ML/MIN/1.73
ERYTHROCYTE [DISTWIDTH] IN BLOOD BY AUTOMATED COUNT: 12.6 % (ref 12.3–15.4)
GLUCOSE SERPL-MCNC: 74 MG/DL (ref 65–99)
HCT VFR BLD AUTO: 30.4 % (ref 37.5–51)
HGB BLD-MCNC: 9.7 G/DL (ref 13–17.7)
MCH RBC QN AUTO: 29.3 PG (ref 26.6–33)
MCHC RBC AUTO-ENTMCNC: 31.9 G/DL (ref 31.5–35.7)
MCV RBC AUTO: 91.8 FL (ref 79–97)
PLATELET # BLD AUTO: 240 10*3/MM3 (ref 140–450)
PMV BLD AUTO: 9.9 FL (ref 6–12)
POTASSIUM SERPL-SCNC: 4 MMOL/L (ref 3.5–5.2)
RBC # BLD AUTO: 3.31 10*6/MM3 (ref 4.14–5.8)
SODIUM SERPL-SCNC: 133 MMOL/L (ref 136–145)
WBC NRBC COR # BLD: 11.14 10*3/MM3 (ref 3.4–10.8)

## 2022-04-17 PROCEDURE — 80048 BASIC METABOLIC PNL TOTAL CA: CPT | Performed by: HOSPITALIST

## 2022-04-17 PROCEDURE — 74018 RADEX ABDOMEN 1 VIEW: CPT

## 2022-04-17 PROCEDURE — 85027 COMPLETE CBC AUTOMATED: CPT | Performed by: HOSPITALIST

## 2022-04-17 PROCEDURE — 97530 THERAPEUTIC ACTIVITIES: CPT | Performed by: PHYSICAL THERAPIST

## 2022-04-17 PROCEDURE — 99232 SBSQ HOSP IP/OBS MODERATE 35: CPT | Performed by: STUDENT IN AN ORGANIZED HEALTH CARE EDUCATION/TRAINING PROGRAM

## 2022-04-17 RX ADMIN — OXYCODONE AND ACETAMINOPHEN 2 TABLET: 5; 325 TABLET ORAL at 14:29

## 2022-04-17 RX ADMIN — Medication 3 ML: at 09:39

## 2022-04-17 RX ADMIN — LEVOTHYROXINE SODIUM 25 MCG: 0.03 TABLET ORAL at 09:39

## 2022-04-17 RX ADMIN — OXYCODONE AND ACETAMINOPHEN 2 TABLET: 5; 325 TABLET ORAL at 09:39

## 2022-04-17 RX ADMIN — CYCLOBENZAPRINE 10 MG: 10 TABLET, FILM COATED ORAL at 20:48

## 2022-04-17 RX ADMIN — Medication 3 ML: at 22:23

## 2022-04-17 RX ADMIN — CARVEDILOL 6.25 MG: 6.25 TABLET, FILM COATED ORAL at 20:48

## 2022-04-17 RX ADMIN — DOCUSATE SODIUM 50MG AND SENNOSIDES 8.6MG 1 TABLET: 8.6; 5 TABLET, FILM COATED ORAL at 09:39

## 2022-04-17 RX ADMIN — DOCUSATE SODIUM 50MG AND SENNOSIDES 8.6MG 1 TABLET: 8.6; 5 TABLET, FILM COATED ORAL at 20:48

## 2022-04-17 RX ADMIN — OXYCODONE AND ACETAMINOPHEN 2 TABLET: 5; 325 TABLET ORAL at 18:29

## 2022-04-17 RX ADMIN — OXYCODONE AND ACETAMINOPHEN 2 TABLET: 5; 325 TABLET ORAL at 05:11

## 2022-04-17 RX ADMIN — ESCITALOPRAM 20 MG: 20 TABLET, FILM COATED ORAL at 20:48

## 2022-04-17 RX ADMIN — CYCLOBENZAPRINE 10 MG: 10 TABLET, FILM COATED ORAL at 05:11

## 2022-04-17 RX ADMIN — CARVEDILOL 6.25 MG: 6.25 TABLET, FILM COATED ORAL at 09:39

## 2022-04-17 RX ADMIN — OXYCODONE AND ACETAMINOPHEN 2 TABLET: 5; 325 TABLET ORAL at 22:23

## 2022-04-17 RX ADMIN — POLYETHYLENE GLYCOL 3350 17 G: 17 POWDER, FOR SOLUTION ORAL at 09:39

## 2022-04-18 PROBLEM — D62 POSTOPERATIVE ANEMIA DUE TO ACUTE BLOOD LOSS: Status: ACTIVE | Noted: 2022-04-18

## 2022-04-18 LAB
ANION GAP SERPL CALCULATED.3IONS-SCNC: 11 MMOL/L (ref 5–15)
BACTERIA SPEC ANAEROBE CULT: NORMAL
BUN SERPL-MCNC: 14 MG/DL (ref 8–23)
BUN/CREAT SERPL: 16.7 (ref 7–25)
CALCIUM SPEC-SCNC: 8.2 MG/DL (ref 8.6–10.5)
CHLORIDE SERPL-SCNC: 99 MMOL/L (ref 98–107)
CO2 SERPL-SCNC: 21 MMOL/L (ref 22–29)
CREAT SERPL-MCNC: 0.84 MG/DL (ref 0.76–1.27)
DEPRECATED RDW RBC AUTO: 40.8 FL (ref 37–54)
EGFRCR SERPLBLD CKD-EPI 2021: 95 ML/MIN/1.73
ERYTHROCYTE [DISTWIDTH] IN BLOOD BY AUTOMATED COUNT: 12.5 % (ref 12.3–15.4)
GLUCOSE SERPL-MCNC: 85 MG/DL (ref 65–99)
HCT VFR BLD AUTO: 25.9 % (ref 37.5–51)
HGB BLD-MCNC: 8.5 G/DL (ref 13–17.7)
MAGNESIUM SERPL-MCNC: 1.9 MG/DL (ref 1.6–2.4)
MCH RBC QN AUTO: 29.1 PG (ref 26.6–33)
MCHC RBC AUTO-ENTMCNC: 32.8 G/DL (ref 31.5–35.7)
MCV RBC AUTO: 88.7 FL (ref 79–97)
OSMOLALITY UR: 327 MOSM/KG
PLATELET # BLD AUTO: 242 10*3/MM3 (ref 140–450)
PMV BLD AUTO: 9.8 FL (ref 6–12)
POTASSIUM SERPL-SCNC: 4 MMOL/L (ref 3.5–5.2)
RBC # BLD AUTO: 2.92 10*6/MM3 (ref 4.14–5.8)
SODIUM SERPL-SCNC: 131 MMOL/L (ref 136–145)
TSH SERPL DL<=0.05 MIU/L-ACNC: 0.95 UIU/ML (ref 0.27–4.2)
URATE SERPL-MCNC: 4.2 MG/DL (ref 3.4–7)
WBC NRBC COR # BLD: 10.14 10*3/MM3 (ref 3.4–10.8)

## 2022-04-18 PROCEDURE — 84550 ASSAY OF BLOOD/URIC ACID: CPT | Performed by: STUDENT IN AN ORGANIZED HEALTH CARE EDUCATION/TRAINING PROGRAM

## 2022-04-18 PROCEDURE — 97530 THERAPEUTIC ACTIVITIES: CPT

## 2022-04-18 PROCEDURE — 84443 ASSAY THYROID STIM HORMONE: CPT | Performed by: STUDENT IN AN ORGANIZED HEALTH CARE EDUCATION/TRAINING PROGRAM

## 2022-04-18 PROCEDURE — 85027 COMPLETE CBC AUTOMATED: CPT | Performed by: HOSPITALIST

## 2022-04-18 PROCEDURE — 99232 SBSQ HOSP IP/OBS MODERATE 35: CPT | Performed by: STUDENT IN AN ORGANIZED HEALTH CARE EDUCATION/TRAINING PROGRAM

## 2022-04-18 PROCEDURE — 83935 ASSAY OF URINE OSMOLALITY: CPT | Performed by: STUDENT IN AN ORGANIZED HEALTH CARE EDUCATION/TRAINING PROGRAM

## 2022-04-18 PROCEDURE — 83735 ASSAY OF MAGNESIUM: CPT | Performed by: HOSPITALIST

## 2022-04-18 PROCEDURE — 80048 BASIC METABOLIC PNL TOTAL CA: CPT | Performed by: HOSPITALIST

## 2022-04-18 PROCEDURE — 99024 POSTOP FOLLOW-UP VISIT: CPT | Performed by: ORTHOPAEDIC SURGERY

## 2022-04-18 RX ORDER — AMOXICILLIN 250 MG
2 CAPSULE ORAL 2 TIMES DAILY
Status: DISCONTINUED | OUTPATIENT
Start: 2022-04-18 | End: 2022-04-26 | Stop reason: HOSPADM

## 2022-04-18 RX ORDER — OXYCODONE HYDROCHLORIDE AND ACETAMINOPHEN 5; 325 MG/1; MG/1
TABLET ORAL
Qty: 45 TABLET | Refills: 0 | Status: SHIPPED | OUTPATIENT
Start: 2022-04-18 | End: 2022-04-26 | Stop reason: SDUPTHER

## 2022-04-18 RX ADMIN — OXYCODONE AND ACETAMINOPHEN 2 TABLET: 5; 325 TABLET ORAL at 06:34

## 2022-04-18 RX ADMIN — CARVEDILOL 6.25 MG: 6.25 TABLET, FILM COATED ORAL at 18:25

## 2022-04-18 RX ADMIN — OXYCODONE AND ACETAMINOPHEN 2 TABLET: 5; 325 TABLET ORAL at 02:33

## 2022-04-18 RX ADMIN — Medication 3 ML: at 08:22

## 2022-04-18 RX ADMIN — POLYETHYLENE GLYCOL 3350 17 G: 17 POWDER, FOR SOLUTION ORAL at 08:20

## 2022-04-18 RX ADMIN — LEVOTHYROXINE SODIUM 25 MCG: 0.03 TABLET ORAL at 08:19

## 2022-04-18 RX ADMIN — MAGNESIUM HYDROXIDE 10 ML: 2400 SUSPENSION ORAL at 11:48

## 2022-04-18 RX ADMIN — SODIUM CHLORIDE 100 ML/HR: 9 INJECTION, SOLUTION INTRAVENOUS at 04:11

## 2022-04-18 RX ADMIN — OXYCODONE AND ACETAMINOPHEN 2 TABLET: 5; 325 TABLET ORAL at 10:51

## 2022-04-18 RX ADMIN — OXYCODONE AND ACETAMINOPHEN 2 TABLET: 5; 325 TABLET ORAL at 20:53

## 2022-04-18 RX ADMIN — Medication 3 ML: at 20:55

## 2022-04-18 RX ADMIN — ESCITALOPRAM 20 MG: 20 TABLET, FILM COATED ORAL at 20:53

## 2022-04-18 RX ADMIN — OXYCODONE AND ACETAMINOPHEN 2 TABLET: 5; 325 TABLET ORAL at 14:52

## 2022-04-18 RX ADMIN — CYCLOBENZAPRINE 10 MG: 10 TABLET, FILM COATED ORAL at 08:19

## 2022-04-18 RX ADMIN — CARVEDILOL 6.25 MG: 6.25 TABLET, FILM COATED ORAL at 08:19

## 2022-04-19 ENCOUNTER — APPOINTMENT (OUTPATIENT)
Dept: CARDIOLOGY | Facility: HOSPITAL | Age: 67
End: 2022-04-19

## 2022-04-19 LAB
BH CV LOWER VASCULAR LEFT COMMON FEMORAL AUGMENT: NORMAL
BH CV LOWER VASCULAR LEFT COMMON FEMORAL COMPETENT: NORMAL
BH CV LOWER VASCULAR LEFT COMMON FEMORAL COMPRESS: NORMAL
BH CV LOWER VASCULAR LEFT COMMON FEMORAL PHASIC: NORMAL
BH CV LOWER VASCULAR LEFT COMMON FEMORAL SPONT: NORMAL
BH CV LOWER VASCULAR RIGHT COMMON FEMORAL AUGMENT: NORMAL
BH CV LOWER VASCULAR RIGHT COMMON FEMORAL COMPETENT: NORMAL
BH CV LOWER VASCULAR RIGHT COMMON FEMORAL COMPRESS: NORMAL
BH CV LOWER VASCULAR RIGHT COMMON FEMORAL PHASIC: NORMAL
BH CV LOWER VASCULAR RIGHT COMMON FEMORAL SPONT: NORMAL
BH CV LOWER VASCULAR RIGHT DISTAL FEMORAL COMPRESS: NORMAL
BH CV LOWER VASCULAR RIGHT GASTRONEMIUS COMPRESS: NORMAL
BH CV LOWER VASCULAR RIGHT GREATER SAPH AK COMPRESS: NORMAL
BH CV LOWER VASCULAR RIGHT GREATER SAPH BK COMPRESS: NORMAL
BH CV LOWER VASCULAR RIGHT LESSER SAPH COMPRESS: NORMAL
BH CV LOWER VASCULAR RIGHT MID FEMORAL AUGMENT: NORMAL
BH CV LOWER VASCULAR RIGHT MID FEMORAL COMPETENT: NORMAL
BH CV LOWER VASCULAR RIGHT MID FEMORAL COMPRESS: NORMAL
BH CV LOWER VASCULAR RIGHT MID FEMORAL PHASIC: NORMAL
BH CV LOWER VASCULAR RIGHT MID FEMORAL SPONT: NORMAL
BH CV LOWER VASCULAR RIGHT PERONEAL COMPRESS: NORMAL
BH CV LOWER VASCULAR RIGHT POPLITEAL AUGMENT: NORMAL
BH CV LOWER VASCULAR RIGHT POPLITEAL COMPETENT: NORMAL
BH CV LOWER VASCULAR RIGHT POPLITEAL COMPRESS: NORMAL
BH CV LOWER VASCULAR RIGHT POPLITEAL PHASIC: NORMAL
BH CV LOWER VASCULAR RIGHT POPLITEAL SPONT: NORMAL
BH CV LOWER VASCULAR RIGHT POSTERIOR TIBIAL COMPRESS: NORMAL
BH CV LOWER VASCULAR RIGHT PROFUNDA FEMORAL COMPRESS: NORMAL
BH CV LOWER VASCULAR RIGHT PROXIMAL FEMORAL COMPRESS: NORMAL
BH CV LOWER VASCULAR RIGHT SAPHENOFEMORAL JUNCTION COMPRESS: NORMAL
MAXIMAL PREDICTED HEART RATE: 152 BPM
STRESS TARGET HR: 129 BPM

## 2022-04-19 PROCEDURE — 99232 SBSQ HOSP IP/OBS MODERATE 35: CPT | Performed by: STUDENT IN AN ORGANIZED HEALTH CARE EDUCATION/TRAINING PROGRAM

## 2022-04-19 PROCEDURE — 97530 THERAPEUTIC ACTIVITIES: CPT

## 2022-04-19 PROCEDURE — 93971 EXTREMITY STUDY: CPT

## 2022-04-19 RX ORDER — FUROSEMIDE 20 MG/1
20 TABLET ORAL NIGHTLY
Status: DISCONTINUED | OUTPATIENT
Start: 2022-04-19 | End: 2022-04-20

## 2022-04-19 RX ORDER — CARVEDILOL 6.25 MG/1
6.25 TABLET ORAL 2 TIMES DAILY WITH MEALS
Status: DISCONTINUED | OUTPATIENT
Start: 2022-04-19 | End: 2022-04-20

## 2022-04-19 RX ORDER — CARVEDILOL 12.5 MG/1
12.5 TABLET ORAL 2 TIMES DAILY WITH MEALS
Status: DISCONTINUED | OUTPATIENT
Start: 2022-04-19 | End: 2022-04-19

## 2022-04-19 RX ADMIN — ESCITALOPRAM 20 MG: 20 TABLET, FILM COATED ORAL at 21:15

## 2022-04-19 RX ADMIN — OXYCODONE AND ACETAMINOPHEN 2 TABLET: 5; 325 TABLET ORAL at 16:12

## 2022-04-19 RX ADMIN — CYCLOBENZAPRINE 10 MG: 10 TABLET, FILM COATED ORAL at 16:11

## 2022-04-19 RX ADMIN — CYCLOBENZAPRINE 10 MG: 10 TABLET, FILM COATED ORAL at 08:03

## 2022-04-19 RX ADMIN — Medication 10 ML: at 21:00

## 2022-04-19 RX ADMIN — OXYCODONE AND ACETAMINOPHEN 2 TABLET: 5; 325 TABLET ORAL at 12:16

## 2022-04-19 RX ADMIN — OXYCODONE AND ACETAMINOPHEN 2 TABLET: 5; 325 TABLET ORAL at 08:03

## 2022-04-19 RX ADMIN — Medication 3 ML: at 08:05

## 2022-04-19 RX ADMIN — OXYCODONE AND ACETAMINOPHEN 2 TABLET: 5; 325 TABLET ORAL at 03:22

## 2022-04-19 RX ADMIN — FUROSEMIDE 20 MG: 20 TABLET ORAL at 23:26

## 2022-04-19 RX ADMIN — CARVEDILOL 6.25 MG: 6.25 TABLET, FILM COATED ORAL at 08:04

## 2022-04-19 RX ADMIN — CARVEDILOL 6.25 MG: 6.25 TABLET, FILM COATED ORAL at 18:23

## 2022-04-19 RX ADMIN — OXYCODONE AND ACETAMINOPHEN 2 TABLET: 5; 325 TABLET ORAL at 21:15

## 2022-04-19 RX ADMIN — LEVOTHYROXINE SODIUM 25 MCG: 0.03 TABLET ORAL at 08:04

## 2022-04-20 LAB
ANION GAP SERPL CALCULATED.3IONS-SCNC: 11 MMOL/L (ref 5–15)
BUN SERPL-MCNC: 10 MG/DL (ref 8–23)
BUN/CREAT SERPL: 12 (ref 7–25)
CALCIUM SPEC-SCNC: 8.4 MG/DL (ref 8.6–10.5)
CHLORIDE SERPL-SCNC: 97 MMOL/L (ref 98–107)
CO2 SERPL-SCNC: 26 MMOL/L (ref 22–29)
CREAT SERPL-MCNC: 0.83 MG/DL (ref 0.76–1.27)
DEPRECATED RDW RBC AUTO: 43.6 FL (ref 37–54)
EGFRCR SERPLBLD CKD-EPI 2021: 95.3 ML/MIN/1.73
ERYTHROCYTE [DISTWIDTH] IN BLOOD BY AUTOMATED COUNT: 13.2 % (ref 12.3–15.4)
FERRITIN SERPL-MCNC: 29.2 NG/ML (ref 30–400)
GLUCOSE SERPL-MCNC: 119 MG/DL (ref 65–99)
HCT VFR BLD AUTO: 26.3 % (ref 37.5–51)
HGB BLD-MCNC: 8.6 G/DL (ref 13–17.7)
IRON 24H UR-MRATE: 17 MCG/DL (ref 59–158)
IRON SATN MFR SERPL: 9 % (ref 20–50)
MCH RBC QN AUTO: 29.3 PG (ref 26.6–33)
MCHC RBC AUTO-ENTMCNC: 32.7 G/DL (ref 31.5–35.7)
MCV RBC AUTO: 89.5 FL (ref 79–97)
PLATELET # BLD AUTO: 371 10*3/MM3 (ref 140–450)
PMV BLD AUTO: 8.9 FL (ref 6–12)
POTASSIUM SERPL-SCNC: 3.4 MMOL/L (ref 3.5–5.2)
RBC # BLD AUTO: 2.94 10*6/MM3 (ref 4.14–5.8)
SODIUM SERPL-SCNC: 134 MMOL/L (ref 136–145)
TIBC SERPL-MCNC: 192 MCG/DL (ref 298–536)
TRANSFERRIN SERPL-MCNC: 129 MG/DL (ref 200–360)
WBC NRBC COR # BLD: 9.09 10*3/MM3 (ref 3.4–10.8)

## 2022-04-20 PROCEDURE — 84466 ASSAY OF TRANSFERRIN: CPT | Performed by: STUDENT IN AN ORGANIZED HEALTH CARE EDUCATION/TRAINING PROGRAM

## 2022-04-20 PROCEDURE — 83540 ASSAY OF IRON: CPT | Performed by: STUDENT IN AN ORGANIZED HEALTH CARE EDUCATION/TRAINING PROGRAM

## 2022-04-20 PROCEDURE — 25010000002 ENOXAPARIN PER 10 MG: Performed by: STUDENT IN AN ORGANIZED HEALTH CARE EDUCATION/TRAINING PROGRAM

## 2022-04-20 PROCEDURE — 25010000002 NA FERRIC GLUC CPLX PER 12.5 MG: Performed by: STUDENT IN AN ORGANIZED HEALTH CARE EDUCATION/TRAINING PROGRAM

## 2022-04-20 PROCEDURE — 80048 BASIC METABOLIC PNL TOTAL CA: CPT | Performed by: STUDENT IN AN ORGANIZED HEALTH CARE EDUCATION/TRAINING PROGRAM

## 2022-04-20 PROCEDURE — 82728 ASSAY OF FERRITIN: CPT | Performed by: STUDENT IN AN ORGANIZED HEALTH CARE EDUCATION/TRAINING PROGRAM

## 2022-04-20 PROCEDURE — 85027 COMPLETE CBC AUTOMATED: CPT | Performed by: STUDENT IN AN ORGANIZED HEALTH CARE EDUCATION/TRAINING PROGRAM

## 2022-04-20 PROCEDURE — 97530 THERAPEUTIC ACTIVITIES: CPT

## 2022-04-20 RX ORDER — FUROSEMIDE 20 MG/1
20 TABLET ORAL ONCE
Status: COMPLETED | OUTPATIENT
Start: 2022-04-20 | End: 2022-04-20

## 2022-04-20 RX ORDER — POTASSIUM CHLORIDE 750 MG/1
40 TABLET, FILM COATED, EXTENDED RELEASE ORAL ONCE
Status: COMPLETED | OUTPATIENT
Start: 2022-04-20 | End: 2022-04-20

## 2022-04-20 RX ORDER — CARVEDILOL 12.5 MG/1
12.5 TABLET ORAL 2 TIMES DAILY WITH MEALS
Status: DISCONTINUED | OUTPATIENT
Start: 2022-04-20 | End: 2022-04-26 | Stop reason: HOSPADM

## 2022-04-20 RX ADMIN — OXYCODONE AND ACETAMINOPHEN 2 TABLET: 5; 325 TABLET ORAL at 13:04

## 2022-04-20 RX ADMIN — OXYCODONE AND ACETAMINOPHEN 2 TABLET: 5; 325 TABLET ORAL at 05:06

## 2022-04-20 RX ADMIN — CYCLOBENZAPRINE 10 MG: 10 TABLET, FILM COATED ORAL at 09:16

## 2022-04-20 RX ADMIN — ESCITALOPRAM 20 MG: 20 TABLET, FILM COATED ORAL at 20:40

## 2022-04-20 RX ADMIN — CYCLOBENZAPRINE 10 MG: 10 TABLET, FILM COATED ORAL at 17:22

## 2022-04-20 RX ADMIN — OXYCODONE AND ACETAMINOPHEN 2 TABLET: 5; 325 TABLET ORAL at 21:36

## 2022-04-20 RX ADMIN — CARVEDILOL 6.25 MG: 6.25 TABLET, FILM COATED ORAL at 09:17

## 2022-04-20 RX ADMIN — ENOXAPARIN SODIUM 40 MG: 100 INJECTION SUBCUTANEOUS at 13:05

## 2022-04-20 RX ADMIN — FUROSEMIDE 20 MG: 20 TABLET ORAL at 20:40

## 2022-04-20 RX ADMIN — SODIUM CHLORIDE 125 MG: 9 INJECTION, SOLUTION INTRAVENOUS at 19:23

## 2022-04-20 RX ADMIN — OXYCODONE AND ACETAMINOPHEN 2 TABLET: 5; 325 TABLET ORAL at 09:16

## 2022-04-20 RX ADMIN — LEVOTHYROXINE SODIUM 25 MCG: 0.03 TABLET ORAL at 09:17

## 2022-04-20 RX ADMIN — POTASSIUM CHLORIDE 40 MEQ: 10 TABLET, EXTENDED RELEASE ORAL at 20:40

## 2022-04-20 RX ADMIN — CARVEDILOL 12.5 MG: 12.5 TABLET, FILM COATED ORAL at 17:22

## 2022-04-20 RX ADMIN — Medication 3 ML: at 20:41

## 2022-04-20 RX ADMIN — DOCUSATE SODIUM 50MG AND SENNOSIDES 8.6MG 2 TABLET: 8.6; 5 TABLET, FILM COATED ORAL at 20:40

## 2022-04-20 RX ADMIN — OXYCODONE AND ACETAMINOPHEN 2 TABLET: 5; 325 TABLET ORAL at 17:22

## 2022-04-20 RX ADMIN — POTASSIUM CHLORIDE 40 MEQ: 10 TABLET, EXTENDED RELEASE ORAL at 13:04

## 2022-04-21 LAB
ANION GAP SERPL CALCULATED.3IONS-SCNC: 12.2 MMOL/L (ref 5–15)
BUN SERPL-MCNC: 10 MG/DL (ref 8–23)
BUN/CREAT SERPL: 12.2 (ref 7–25)
CALCIUM SPEC-SCNC: 9 MG/DL (ref 8.6–10.5)
CHLORIDE SERPL-SCNC: 97 MMOL/L (ref 98–107)
CO2 SERPL-SCNC: 25.8 MMOL/L (ref 22–29)
CREAT SERPL-MCNC: 0.82 MG/DL (ref 0.76–1.27)
DEPRECATED RDW RBC AUTO: 42.9 FL (ref 37–54)
EGFRCR SERPLBLD CKD-EPI 2021: 95.7 ML/MIN/1.73
ERYTHROCYTE [DISTWIDTH] IN BLOOD BY AUTOMATED COUNT: 13.1 % (ref 12.3–15.4)
FOLATE SERPL-MCNC: >20 NG/ML (ref 4.78–24.2)
GLUCOSE SERPL-MCNC: 95 MG/DL (ref 65–99)
HCT VFR BLD AUTO: 26.7 % (ref 37.5–51)
HGB BLD-MCNC: 8.6 G/DL (ref 13–17.7)
MCH RBC QN AUTO: 28.7 PG (ref 26.6–33)
MCHC RBC AUTO-ENTMCNC: 32.2 G/DL (ref 31.5–35.7)
MCV RBC AUTO: 89 FL (ref 79–97)
PLATELET # BLD AUTO: 400 10*3/MM3 (ref 140–450)
PMV BLD AUTO: 8.8 FL (ref 6–12)
POTASSIUM SERPL-SCNC: 3.9 MMOL/L (ref 3.5–5.2)
RBC # BLD AUTO: 3 10*6/MM3 (ref 4.14–5.8)
SODIUM SERPL-SCNC: 135 MMOL/L (ref 136–145)
VIT B12 BLD-MCNC: >2000 PG/ML (ref 211–946)
WBC NRBC COR # BLD: 9.21 10*3/MM3 (ref 3.4–10.8)

## 2022-04-21 PROCEDURE — 25010000002 VANCOMYCIN 10 G RECONSTITUTED SOLUTION: Performed by: ORTHOPAEDIC SURGERY

## 2022-04-21 PROCEDURE — 82746 ASSAY OF FOLIC ACID SERUM: CPT | Performed by: STUDENT IN AN ORGANIZED HEALTH CARE EDUCATION/TRAINING PROGRAM

## 2022-04-21 PROCEDURE — 85027 COMPLETE CBC AUTOMATED: CPT | Performed by: STUDENT IN AN ORGANIZED HEALTH CARE EDUCATION/TRAINING PROGRAM

## 2022-04-21 PROCEDURE — 97530 THERAPEUTIC ACTIVITIES: CPT

## 2022-04-21 PROCEDURE — 80048 BASIC METABOLIC PNL TOTAL CA: CPT | Performed by: STUDENT IN AN ORGANIZED HEALTH CARE EDUCATION/TRAINING PROGRAM

## 2022-04-21 PROCEDURE — 25010000002 ENOXAPARIN PER 10 MG: Performed by: STUDENT IN AN ORGANIZED HEALTH CARE EDUCATION/TRAINING PROGRAM

## 2022-04-21 PROCEDURE — 82607 VITAMIN B-12: CPT | Performed by: STUDENT IN AN ORGANIZED HEALTH CARE EDUCATION/TRAINING PROGRAM

## 2022-04-21 PROCEDURE — 25010000002 NA FERRIC GLUC CPLX PER 12.5 MG: Performed by: STUDENT IN AN ORGANIZED HEALTH CARE EDUCATION/TRAINING PROGRAM

## 2022-04-21 PROCEDURE — 97110 THERAPEUTIC EXERCISES: CPT

## 2022-04-21 RX ADMIN — DOCUSATE SODIUM 50MG AND SENNOSIDES 8.6MG 2 TABLET: 8.6; 5 TABLET, FILM COATED ORAL at 21:18

## 2022-04-21 RX ADMIN — Medication 3 ML: at 08:24

## 2022-04-21 RX ADMIN — OXYCODONE AND ACETAMINOPHEN 2 TABLET: 5; 325 TABLET ORAL at 21:18

## 2022-04-21 RX ADMIN — OXYCODONE AND ACETAMINOPHEN 2 TABLET: 5; 325 TABLET ORAL at 15:20

## 2022-04-21 RX ADMIN — ENOXAPARIN SODIUM 40 MG: 100 INJECTION SUBCUTANEOUS at 13:19

## 2022-04-21 RX ADMIN — CARVEDILOL 12.5 MG: 12.5 TABLET, FILM COATED ORAL at 08:22

## 2022-04-21 RX ADMIN — ESCITALOPRAM 20 MG: 20 TABLET, FILM COATED ORAL at 21:18

## 2022-04-21 RX ADMIN — CYCLOBENZAPRINE 10 MG: 10 TABLET, FILM COATED ORAL at 21:18

## 2022-04-21 RX ADMIN — DOCUSATE SODIUM 50MG AND SENNOSIDES 8.6MG 2 TABLET: 8.6; 5 TABLET, FILM COATED ORAL at 08:23

## 2022-04-21 RX ADMIN — Medication 3 ML: at 21:18

## 2022-04-21 RX ADMIN — CARVEDILOL 12.5 MG: 12.5 TABLET, FILM COATED ORAL at 17:41

## 2022-04-21 RX ADMIN — LEVOTHYROXINE SODIUM 25 MCG: 0.03 TABLET ORAL at 08:22

## 2022-04-21 RX ADMIN — OXYCODONE AND ACETAMINOPHEN 2 TABLET: 5; 325 TABLET ORAL at 08:22

## 2022-04-21 RX ADMIN — SODIUM CHLORIDE 125 MG: 9 INJECTION, SOLUTION INTRAVENOUS at 08:24

## 2022-04-21 RX ADMIN — VANCOMYCIN HYDROCHLORIDE 1250 MG: 10 INJECTION, POWDER, LYOPHILIZED, FOR SOLUTION INTRAVENOUS at 17:41

## 2022-04-22 ENCOUNTER — ANESTHESIA EVENT (OUTPATIENT)
Dept: PERIOP | Facility: HOSPITAL | Age: 67
End: 2022-04-22

## 2022-04-22 LAB
ANION GAP SERPL CALCULATED.3IONS-SCNC: 14 MMOL/L (ref 5–15)
BUN SERPL-MCNC: 11 MG/DL (ref 8–23)
BUN/CREAT SERPL: 11.7 (ref 7–25)
CALCIUM SPEC-SCNC: 8.8 MG/DL (ref 8.6–10.5)
CHLORIDE SERPL-SCNC: 95 MMOL/L (ref 98–107)
CO2 SERPL-SCNC: 25 MMOL/L (ref 22–29)
CREAT SERPL-MCNC: 0.94 MG/DL (ref 0.76–1.27)
DEPRECATED RDW RBC AUTO: 43.6 FL (ref 37–54)
EGFRCR SERPLBLD CKD-EPI 2021: 88.3 ML/MIN/1.73
ERYTHROCYTE [DISTWIDTH] IN BLOOD BY AUTOMATED COUNT: 13.1 % (ref 12.3–15.4)
GLUCOSE SERPL-MCNC: 92 MG/DL (ref 65–99)
HCT VFR BLD AUTO: 28.2 % (ref 37.5–51)
HGB BLD-MCNC: 8.9 G/DL (ref 13–17.7)
MCH RBC QN AUTO: 28.6 PG (ref 26.6–33)
MCHC RBC AUTO-ENTMCNC: 31.6 G/DL (ref 31.5–35.7)
MCV RBC AUTO: 90.7 FL (ref 79–97)
PLATELET # BLD AUTO: 454 10*3/MM3 (ref 140–450)
PMV BLD AUTO: 8.9 FL (ref 6–12)
POTASSIUM SERPL-SCNC: 4.3 MMOL/L (ref 3.5–5.2)
RBC # BLD AUTO: 3.11 10*6/MM3 (ref 4.14–5.8)
SARS-COV-2 RNA PNL SPEC NAA+PROBE: NOT DETECTED
SODIUM SERPL-SCNC: 134 MMOL/L (ref 136–145)
WBC NRBC COR # BLD: 10.06 10*3/MM3 (ref 3.4–10.8)

## 2022-04-22 PROCEDURE — 85027 COMPLETE CBC AUTOMATED: CPT | Performed by: STUDENT IN AN ORGANIZED HEALTH CARE EDUCATION/TRAINING PROGRAM

## 2022-04-22 PROCEDURE — 25010000002 NA FERRIC GLUC CPLX PER 12.5 MG: Performed by: STUDENT IN AN ORGANIZED HEALTH CARE EDUCATION/TRAINING PROGRAM

## 2022-04-22 PROCEDURE — 80048 BASIC METABOLIC PNL TOTAL CA: CPT | Performed by: STUDENT IN AN ORGANIZED HEALTH CARE EDUCATION/TRAINING PROGRAM

## 2022-04-22 PROCEDURE — 25010000002 VANCOMYCIN 10 G RECONSTITUTED SOLUTION: Performed by: ORTHOPAEDIC SURGERY

## 2022-04-22 PROCEDURE — 97530 THERAPEUTIC ACTIVITIES: CPT

## 2022-04-22 PROCEDURE — 87635 SARS-COV-2 COVID-19 AMP PRB: CPT | Performed by: ORTHOPAEDIC SURGERY

## 2022-04-22 RX ADMIN — Medication 3 ML: at 20:19

## 2022-04-22 RX ADMIN — DOCUSATE SODIUM 50MG AND SENNOSIDES 8.6MG 2 TABLET: 8.6; 5 TABLET, FILM COATED ORAL at 08:18

## 2022-04-22 RX ADMIN — Medication 3 ML: at 08:18

## 2022-04-22 RX ADMIN — ESCITALOPRAM 20 MG: 20 TABLET, FILM COATED ORAL at 20:19

## 2022-04-22 RX ADMIN — VANCOMYCIN HYDROCHLORIDE 1250 MG: 10 INJECTION, POWDER, LYOPHILIZED, FOR SOLUTION INTRAVENOUS at 18:32

## 2022-04-22 RX ADMIN — OXYCODONE AND ACETAMINOPHEN 2 TABLET: 5; 325 TABLET ORAL at 18:33

## 2022-04-22 RX ADMIN — CARVEDILOL 12.5 MG: 12.5 TABLET, FILM COATED ORAL at 08:18

## 2022-04-22 RX ADMIN — LEVOTHYROXINE SODIUM 25 MCG: 0.03 TABLET ORAL at 08:18

## 2022-04-22 RX ADMIN — MAGNESIUM HYDROXIDE 10 ML: 2400 SUSPENSION ORAL at 08:18

## 2022-04-22 RX ADMIN — POLYETHYLENE GLYCOL 3350 17 G: 17 POWDER, FOR SOLUTION ORAL at 08:17

## 2022-04-22 RX ADMIN — VANCOMYCIN HYDROCHLORIDE 1250 MG: 10 INJECTION, POWDER, LYOPHILIZED, FOR SOLUTION INTRAVENOUS at 05:29

## 2022-04-22 RX ADMIN — OXYCODONE AND ACETAMINOPHEN 2 TABLET: 5; 325 TABLET ORAL at 22:28

## 2022-04-22 RX ADMIN — OXYCODONE AND ACETAMINOPHEN 2 TABLET: 5; 325 TABLET ORAL at 05:29

## 2022-04-22 RX ADMIN — SODIUM CHLORIDE 125 MG: 9 INJECTION, SOLUTION INTRAVENOUS at 08:20

## 2022-04-22 RX ADMIN — VANCOMYCIN HYDROCHLORIDE 1250 MG: 10 INJECTION, POWDER, LYOPHILIZED, FOR SOLUTION INTRAVENOUS at 16:28

## 2022-04-22 RX ADMIN — CARVEDILOL 12.5 MG: 12.5 TABLET, FILM COATED ORAL at 18:32

## 2022-04-23 ENCOUNTER — ANESTHESIA (OUTPATIENT)
Dept: PERIOP | Facility: HOSPITAL | Age: 67
End: 2022-04-23

## 2022-04-23 LAB
ANION GAP SERPL CALCULATED.3IONS-SCNC: 7.7 MMOL/L (ref 5–15)
BUN SERPL-MCNC: 11 MG/DL (ref 8–23)
BUN/CREAT SERPL: 11.3 (ref 7–25)
CALCIUM SPEC-SCNC: 9 MG/DL (ref 8.6–10.5)
CHLORIDE SERPL-SCNC: 100 MMOL/L (ref 98–107)
CO2 SERPL-SCNC: 26.3 MMOL/L (ref 22–29)
CREAT SERPL-MCNC: 0.97 MG/DL (ref 0.76–1.27)
DEPRECATED RDW RBC AUTO: 41.5 FL (ref 37–54)
EGFRCR SERPLBLD CKD-EPI 2021: 85 ML/MIN/1.73
ERYTHROCYTE [DISTWIDTH] IN BLOOD BY AUTOMATED COUNT: 12.9 % (ref 12.3–15.4)
GLUCOSE BLDC GLUCOMTR-MCNC: 215 MG/DL (ref 70–130)
GLUCOSE SERPL-MCNC: 95 MG/DL (ref 65–99)
HCT VFR BLD AUTO: 27.9 % (ref 37.5–51)
HGB BLD-MCNC: 9.1 G/DL (ref 13–17.7)
MCH RBC QN AUTO: 29.1 PG (ref 26.6–33)
MCHC RBC AUTO-ENTMCNC: 32.6 G/DL (ref 31.5–35.7)
MCV RBC AUTO: 89.1 FL (ref 79–97)
PLATELET # BLD AUTO: 448 10*3/MM3 (ref 140–450)
PMV BLD AUTO: 8.6 FL (ref 6–12)
POTASSIUM SERPL-SCNC: 4.3 MMOL/L (ref 3.5–5.2)
RBC # BLD AUTO: 3.13 10*6/MM3 (ref 4.14–5.8)
SODIUM SERPL-SCNC: 134 MMOL/L (ref 136–145)
WBC NRBC COR # BLD: 11.01 10*3/MM3 (ref 3.4–10.8)

## 2022-04-23 PROCEDURE — 25010000002 PROPOFOL 10 MG/ML EMULSION: Performed by: NURSE ANESTHETIST, CERTIFIED REGISTERED

## 2022-04-23 PROCEDURE — 25010000002 PHENYLEPHRINE 10 MG/ML SOLUTION: Performed by: NURSE ANESTHETIST, CERTIFIED REGISTERED

## 2022-04-23 PROCEDURE — 87205 SMEAR GRAM STAIN: CPT | Performed by: ORTHOPAEDIC SURGERY

## 2022-04-23 PROCEDURE — 85027 COMPLETE CBC AUTOMATED: CPT | Performed by: STUDENT IN AN ORGANIZED HEALTH CARE EDUCATION/TRAINING PROGRAM

## 2022-04-23 PROCEDURE — 82962 GLUCOSE BLOOD TEST: CPT

## 2022-04-23 PROCEDURE — 25010000002 HYDROMORPHONE PER 4 MG: Performed by: NURSE ANESTHETIST, CERTIFIED REGISTERED

## 2022-04-23 PROCEDURE — 80048 BASIC METABOLIC PNL TOTAL CA: CPT | Performed by: STUDENT IN AN ORGANIZED HEALTH CARE EDUCATION/TRAINING PROGRAM

## 2022-04-23 PROCEDURE — 87070 CULTURE OTHR SPECIMN AEROBIC: CPT | Performed by: ORTHOPAEDIC SURGERY

## 2022-04-23 PROCEDURE — C1889 IMPLANT/INSERT DEVICE, NOC: HCPCS | Performed by: ORTHOPAEDIC SURGERY

## 2022-04-23 PROCEDURE — 87075 CULTR BACTERIA EXCEPT BLOOD: CPT | Performed by: ORTHOPAEDIC SURGERY

## 2022-04-23 PROCEDURE — 25010000002 FENTANYL CITRATE (PF) 50 MCG/ML SOLUTION: Performed by: NURSE ANESTHETIST, CERTIFIED REGISTERED

## 2022-04-23 PROCEDURE — 25010000002 VANCOMYCIN 10 G RECONSTITUTED SOLUTION: Performed by: ORTHOPAEDIC SURGERY

## 2022-04-23 PROCEDURE — 25010000002 VANCOMYCIN 1 G RECONSTITUTED SOLUTION 1 EACH VIAL: Performed by: ORTHOPAEDIC SURGERY

## 2022-04-23 PROCEDURE — 0JC70ZZ EXTIRPATION OF MATTER FROM BACK SUBCUTANEOUS TISSUE AND FASCIA, OPEN APPROACH: ICD-10-PCS | Performed by: ORTHOPAEDIC SURGERY

## 2022-04-23 PROCEDURE — 10140 I&D HMTMA SEROMA/FLUID COLLJ: CPT | Performed by: ORTHOPAEDIC SURGERY

## 2022-04-23 DEVICE — FLOSEAL HEMOSTATIC MATRIX, 5ML
Type: IMPLANTABLE DEVICE | Site: BACK | Status: FUNCTIONAL
Brand: FLOSEAL HEMOSTATIC MATRIX

## 2022-04-23 RX ORDER — CALCIUM CHLORIDE 100 MG/ML
INJECTION INTRAVENOUS; INTRAVENTRICULAR AS NEEDED
Status: DISCONTINUED | OUTPATIENT
Start: 2022-04-23 | End: 2022-04-23 | Stop reason: SURG

## 2022-04-23 RX ORDER — FAMOTIDINE 10 MG/ML
20 INJECTION, SOLUTION INTRAVENOUS ONCE
Status: DISCONTINUED | OUTPATIENT
Start: 2022-04-23 | End: 2022-04-23 | Stop reason: HOSPADM

## 2022-04-23 RX ORDER — DIPHENHYDRAMINE HYDROCHLORIDE 50 MG/ML
12.5 INJECTION INTRAMUSCULAR; INTRAVENOUS
Status: DISCONTINUED | OUTPATIENT
Start: 2022-04-23 | End: 2022-04-26 | Stop reason: HOSPADM

## 2022-04-23 RX ORDER — FAMOTIDINE 20 MG/1
20 TABLET, FILM COATED ORAL ONCE
Status: COMPLETED | OUTPATIENT
Start: 2022-04-23 | End: 2022-04-23

## 2022-04-23 RX ORDER — PROMETHAZINE HYDROCHLORIDE 25 MG/1
25 TABLET ORAL ONCE AS NEEDED
Status: DISCONTINUED | OUTPATIENT
Start: 2022-04-23 | End: 2022-04-26 | Stop reason: HOSPADM

## 2022-04-23 RX ORDER — OXYCODONE HYDROCHLORIDE AND ACETAMINOPHEN 5; 325 MG/1; MG/1
2 TABLET ORAL EVERY 4 HOURS PRN
Status: DISPENSED | OUTPATIENT
Start: 2022-04-23 | End: 2022-04-23

## 2022-04-23 RX ORDER — LABETALOL HYDROCHLORIDE 5 MG/ML
5 INJECTION, SOLUTION INTRAVENOUS
Status: DISCONTINUED | OUTPATIENT
Start: 2022-04-23 | End: 2022-04-26 | Stop reason: HOSPADM

## 2022-04-23 RX ORDER — SODIUM CHLORIDE 0.9 % (FLUSH) 0.9 %
3-10 SYRINGE (ML) INJECTION AS NEEDED
Status: DISCONTINUED | OUTPATIENT
Start: 2022-04-23 | End: 2022-04-23 | Stop reason: HOSPADM

## 2022-04-23 RX ORDER — EPHEDRINE SULFATE 50 MG/ML
5 INJECTION, SOLUTION INTRAVENOUS ONCE AS NEEDED
Status: DISCONTINUED | OUTPATIENT
Start: 2022-04-23 | End: 2022-04-26 | Stop reason: HOSPADM

## 2022-04-23 RX ORDER — MIDAZOLAM HYDROCHLORIDE 1 MG/ML
0.5 INJECTION INTRAMUSCULAR; INTRAVENOUS
Status: DISCONTINUED | OUTPATIENT
Start: 2022-04-23 | End: 2022-04-23 | Stop reason: HOSPADM

## 2022-04-23 RX ORDER — FENTANYL CITRATE 50 UG/ML
50 INJECTION, SOLUTION INTRAMUSCULAR; INTRAVENOUS
Status: DISCONTINUED | OUTPATIENT
Start: 2022-04-23 | End: 2022-04-23 | Stop reason: HOSPADM

## 2022-04-23 RX ORDER — ONDANSETRON 2 MG/ML
4 INJECTION INTRAMUSCULAR; INTRAVENOUS ONCE AS NEEDED
Status: DISCONTINUED | OUTPATIENT
Start: 2022-04-23 | End: 2022-04-26 | Stop reason: HOSPADM

## 2022-04-23 RX ORDER — ROCURONIUM BROMIDE 10 MG/ML
INJECTION, SOLUTION INTRAVENOUS AS NEEDED
Status: DISCONTINUED | OUTPATIENT
Start: 2022-04-23 | End: 2022-04-23 | Stop reason: SURG

## 2022-04-23 RX ORDER — FENTANYL CITRATE 50 UG/ML
INJECTION, SOLUTION INTRAMUSCULAR; INTRAVENOUS AS NEEDED
Status: DISCONTINUED | OUTPATIENT
Start: 2022-04-23 | End: 2022-04-23 | Stop reason: SURG

## 2022-04-23 RX ORDER — HYDROMORPHONE HYDROCHLORIDE 1 MG/ML
0.5 INJECTION, SOLUTION INTRAMUSCULAR; INTRAVENOUS; SUBCUTANEOUS
Status: DISCONTINUED | OUTPATIENT
Start: 2022-04-23 | End: 2022-04-26 | Stop reason: HOSPADM

## 2022-04-23 RX ORDER — SODIUM CHLORIDE 0.9 % (FLUSH) 0.9 %
3 SYRINGE (ML) INJECTION EVERY 12 HOURS SCHEDULED
Status: DISCONTINUED | OUTPATIENT
Start: 2022-04-23 | End: 2022-04-23 | Stop reason: HOSPADM

## 2022-04-23 RX ORDER — LIDOCAINE HYDROCHLORIDE 20 MG/ML
INJECTION, SOLUTION INFILTRATION; PERINEURAL AS NEEDED
Status: DISCONTINUED | OUTPATIENT
Start: 2022-04-23 | End: 2022-04-23 | Stop reason: SURG

## 2022-04-23 RX ORDER — PROMETHAZINE HYDROCHLORIDE 25 MG/1
25 SUPPOSITORY RECTAL ONCE AS NEEDED
Status: DISCONTINUED | OUTPATIENT
Start: 2022-04-23 | End: 2022-04-26 | Stop reason: HOSPADM

## 2022-04-23 RX ORDER — NALOXONE HCL 0.4 MG/ML
0.2 VIAL (ML) INJECTION AS NEEDED
Status: DISCONTINUED | OUTPATIENT
Start: 2022-04-23 | End: 2022-04-26 | Stop reason: HOSPADM

## 2022-04-23 RX ORDER — IBUPROFEN 600 MG/1
600 TABLET ORAL ONCE AS NEEDED
Status: DISCONTINUED | OUTPATIENT
Start: 2022-04-23 | End: 2022-04-26 | Stop reason: HOSPADM

## 2022-04-23 RX ORDER — PHENYLEPHRINE HYDROCHLORIDE 10 MG/ML
INJECTION INTRAVENOUS AS NEEDED
Status: DISCONTINUED | OUTPATIENT
Start: 2022-04-23 | End: 2022-04-23 | Stop reason: SURG

## 2022-04-23 RX ORDER — LIDOCAINE HYDROCHLORIDE 10 MG/ML
0.5 INJECTION, SOLUTION EPIDURAL; INFILTRATION; INTRACAUDAL; PERINEURAL ONCE AS NEEDED
Status: DISCONTINUED | OUTPATIENT
Start: 2022-04-23 | End: 2022-04-23 | Stop reason: HOSPADM

## 2022-04-23 RX ORDER — PROPOFOL 10 MG/ML
VIAL (ML) INTRAVENOUS AS NEEDED
Status: DISCONTINUED | OUTPATIENT
Start: 2022-04-23 | End: 2022-04-23 | Stop reason: SURG

## 2022-04-23 RX ORDER — HYDRALAZINE HYDROCHLORIDE 20 MG/ML
5 INJECTION INTRAMUSCULAR; INTRAVENOUS
Status: DISCONTINUED | OUTPATIENT
Start: 2022-04-23 | End: 2022-04-26 | Stop reason: HOSPADM

## 2022-04-23 RX ORDER — HYDROMORPHONE HCL 110MG/55ML
PATIENT CONTROLLED ANALGESIA SYRINGE INTRAVENOUS AS NEEDED
Status: DISCONTINUED | OUTPATIENT
Start: 2022-04-23 | End: 2022-04-23 | Stop reason: SURG

## 2022-04-23 RX ORDER — SODIUM CHLORIDE, SODIUM LACTATE, POTASSIUM CHLORIDE, CALCIUM CHLORIDE 600; 310; 30; 20 MG/100ML; MG/100ML; MG/100ML; MG/100ML
9 INJECTION, SOLUTION INTRAVENOUS CONTINUOUS
Status: DISCONTINUED | OUTPATIENT
Start: 2022-04-23 | End: 2022-04-26 | Stop reason: HOSPADM

## 2022-04-23 RX ORDER — OXYCODONE AND ACETAMINOPHEN 7.5; 325 MG/1; MG/1
1 TABLET ORAL EVERY 4 HOURS PRN
Status: DISCONTINUED | OUTPATIENT
Start: 2022-04-23 | End: 2022-04-26 | Stop reason: HOSPADM

## 2022-04-23 RX ORDER — HYDROCODONE BITARTRATE AND ACETAMINOPHEN 7.5; 325 MG/1; MG/1
1 TABLET ORAL ONCE AS NEEDED
Status: COMPLETED | OUTPATIENT
Start: 2022-04-23 | End: 2022-04-26

## 2022-04-23 RX ORDER — DIPHENHYDRAMINE HCL 25 MG
25 CAPSULE ORAL
Status: DISCONTINUED | OUTPATIENT
Start: 2022-04-23 | End: 2022-04-26 | Stop reason: HOSPADM

## 2022-04-23 RX ORDER — FLUMAZENIL 0.1 MG/ML
0.2 INJECTION INTRAVENOUS AS NEEDED
Status: DISCONTINUED | OUTPATIENT
Start: 2022-04-23 | End: 2022-04-26 | Stop reason: HOSPADM

## 2022-04-23 RX ORDER — FENTANYL CITRATE 50 UG/ML
50 INJECTION, SOLUTION INTRAMUSCULAR; INTRAVENOUS
Status: DISCONTINUED | OUTPATIENT
Start: 2022-04-23 | End: 2022-04-26 | Stop reason: HOSPADM

## 2022-04-23 RX ORDER — EPHEDRINE SULFATE 50 MG/ML
INJECTION, SOLUTION INTRAVENOUS AS NEEDED
Status: DISCONTINUED | OUTPATIENT
Start: 2022-04-23 | End: 2022-04-23 | Stop reason: SURG

## 2022-04-23 RX ADMIN — SODIUM CHLORIDE, POTASSIUM CHLORIDE, SODIUM LACTATE AND CALCIUM CHLORIDE: 600; 310; 30; 20 INJECTION, SOLUTION INTRAVENOUS at 09:30

## 2022-04-23 RX ADMIN — PHENYLEPHRINE HYDROCHLORIDE 100 MCG: 10 INJECTION, SOLUTION INTRAVENOUS at 10:43

## 2022-04-23 RX ADMIN — OXYCODONE HYDROCHLORIDE AND ACETAMINOPHEN 1 TABLET: 7.5; 325 TABLET ORAL at 22:26

## 2022-04-23 RX ADMIN — FAMOTIDINE 20 MG: 20 TABLET ORAL at 22:26

## 2022-04-23 RX ADMIN — PHENYLEPHRINE HYDROCHLORIDE 200 MCG: 10 INJECTION, SOLUTION INTRAVENOUS at 10:51

## 2022-04-23 RX ADMIN — HYDROMORPHONE HYDROCHLORIDE 0.5 MG: 2 INJECTION, SOLUTION INTRAMUSCULAR; INTRAVENOUS; SUBCUTANEOUS at 11:49

## 2022-04-23 RX ADMIN — PROPOFOL 150 MG: 10 INJECTION, EMULSION INTRAVENOUS at 10:21

## 2022-04-23 RX ADMIN — CARVEDILOL 12.5 MG: 12.5 TABLET, FILM COATED ORAL at 18:02

## 2022-04-23 RX ADMIN — CALCIUM CHLORIDE 0.5 G: 100 INJECTION, SOLUTION INTRAVENOUS at 11:11

## 2022-04-23 RX ADMIN — ESCITALOPRAM 20 MG: 20 TABLET, FILM COATED ORAL at 20:12

## 2022-04-23 RX ADMIN — FENTANYL CITRATE 50 MCG: 50 INJECTION, SOLUTION INTRAMUSCULAR; INTRAVENOUS at 12:32

## 2022-04-23 RX ADMIN — EPHEDRINE SULFATE 10 MG: 50 INJECTION INTRAVENOUS at 10:43

## 2022-04-23 RX ADMIN — FENTANYL CITRATE 50 MCG: 0.05 INJECTION, SOLUTION INTRAMUSCULAR; INTRAVENOUS at 10:20

## 2022-04-23 RX ADMIN — VANCOMYCIN HYDROCHLORIDE 1250 MG: 10 INJECTION, POWDER, LYOPHILIZED, FOR SOLUTION INTRAVENOUS at 03:34

## 2022-04-23 RX ADMIN — NOREPINEPHRINE BITARTRATE 0.02 MCG/KG/MIN: 1 INJECTION, SOLUTION, CONCENTRATE INTRAVENOUS at 10:52

## 2022-04-23 RX ADMIN — OXYCODONE HYDROCHLORIDE AND ACETAMINOPHEN 2 TABLET: 5; 325 TABLET ORAL at 17:54

## 2022-04-23 RX ADMIN — CALCIUM CHLORIDE 0.5 G: 100 INJECTION, SOLUTION INTRAVENOUS at 10:58

## 2022-04-23 RX ADMIN — ROCURONIUM BROMIDE 50 MG: 50 INJECTION INTRAVENOUS at 10:21

## 2022-04-23 RX ADMIN — FENTANYL CITRATE 50 MCG: 0.05 INJECTION, SOLUTION INTRAMUSCULAR; INTRAVENOUS at 10:33

## 2022-04-23 RX ADMIN — LIDOCAINE HYDROCHLORIDE 60 MG: 20 INJECTION, SOLUTION INFILTRATION; PERINEURAL at 10:21

## 2022-04-23 RX ADMIN — Medication 3 ML: at 20:12

## 2022-04-23 RX ADMIN — OXYCODONE HYDROCHLORIDE AND ACETAMINOPHEN 1 TABLET: 7.5; 325 TABLET ORAL at 13:47

## 2022-04-23 RX ADMIN — VANCOMYCIN HYDROCHLORIDE 1250 MG: 10 INJECTION, POWDER, LYOPHILIZED, FOR SOLUTION INTRAVENOUS at 17:53

## 2022-04-23 RX ADMIN — CARVEDILOL 12.5 MG: 12.5 TABLET, FILM COATED ORAL at 08:15

## 2022-04-23 NOTE — ANESTHESIA POSTPROCEDURE EVALUATION
"Patient: Prem Thrasher    Procedure Summary     Date: 04/23/22 Room / Location: [facility] OR  / [facility] MAIN OR    Anesthesia Start: 1013 Anesthesia Stop: 1200    Procedure: Evacuation lumbar hematoma (N/A Spine Lumbar) Diagnosis:     Surgeons: Jacky Perez MD Provider: Joseph Mendoza MD    Anesthesia Type: general ASA Status: 3 - Emergent          Anesthesia Type: general    Vitals  Vitals Value Taken Time   /84 04/23/22 1234   Temp     Pulse 71 04/23/22 1236   Resp 16 04/23/22 1215   SpO2 94 % 04/23/22 1236   Vitals shown include unvalidated device data.        Post Anesthesia Care and Evaluation    Patient location during evaluation: bedside  Patient participation: complete - patient participated  Level of consciousness: awake and alert  Pain management: adequate  Airway patency: patent  Anesthetic complications: No anesthetic complications    Cardiovascular status: acceptable  Respiratory status: acceptable  Hydration status: acceptable    Comments: /65   Pulse 77   Temp 36.9 °C (98.4 °F) (Oral)   Resp 16   Ht 180.3 cm (71\")   Wt 104 kg (230 lb 1.6 oz)   SpO2 96%   BMI 32.09 kg/m²     Patient is stable postoperatively and has adequately recovered from anesthesia as described above unless otherwise noted      "

## 2022-04-23 NOTE — ADDENDUM NOTE
Addendum  created 04/23/22 1240 by Joseph Mendoza MD    Attestation recorded in Intraprocedure, Intraprocedure Attestations filed

## 2022-04-23 NOTE — ANESTHESIA PROCEDURE NOTES
Airway  Urgency: elective    Date/Time: 4/23/2022 10:29 AM  Airway not difficult    General Information and Staff    Patient location during procedure: OR  Anesthesiologist: Joseph Mendoza MD  CRNA: Micky Duque CRNA    Indications and Patient Condition  Indications for airway management: airway protection    Preoxygenated: yes  MILS maintained throughout  Mask difficulty assessment: 2 - vent by mask + OA or adjuvant +/- NMBA    Final Airway Details  Final airway type: endotracheal airway      Successful airway: ETT  Cuffed: yes   Successful intubation technique: direct laryngoscopy  Endotracheal tube insertion site: oral  Blade: Yvonne  Blade size: 4  ETT size (mm): 7.5  Cormack-Lehane Classification: grade I - full view of glottis  Placement verified by: chest auscultation and capnometry   Measured from: gums  ETT/EBT to gums (cm): 22  Number of attempts at approach: 1  Assessment: lips, teeth, and gum same as pre-op and atraumatic intubation

## 2022-04-23 NOTE — ANESTHESIA PREPROCEDURE EVALUATION
Anesthesia Evaluation     Patient summary reviewed and Nursing notes reviewed   history of anesthetic complications: PONV  NPO Solid Status: > 8 hours  NPO Liquid Status: > 2 hours           Airway   Mallampati: II  TM distance: >3 FB  Neck ROM: full  No difficulty expected  Dental    (+) edentulous, lower dentures and upper dentures    Pulmonary    (+) a smoker (quit 2 years ago, 45PY history) Former, COPD moderate, shortness of breath, sleep apnea, decreased breath sounds,   Cardiovascular - normal exam  Exercise tolerance: poor (<4 METS)    ECG reviewed  PT is on anticoagulation therapy  Patient on routine beta blocker and Beta blocker given within 24 hours of surgery    (+) hypertension, past MI (NSTEMI) , CAD (stents/angioplasty in 1/2021, cardiology clearance in chart. Preserved EF), cardiac stents Drug eluting stent more than 12 months ago angina (prior to stents; since stents he has not used NTG often), hyperlipidemia,       Neuro/Psych  (+) numbness (Sciatica, lumbar radic, CTS), psychiatric history Anxiety and Depression,    GI/Hepatic/Renal/Endo    (+)  GERD well controlled,  renal disease (Cr 1.31) CRI, thyroid problem hypothyroidism    Musculoskeletal     (+) back pain, chronic pain,   Abdominal   (+) obese,    Substance History      OB/GYN          Other   arthritis (rhematoid arthritis),                        Anesthesia Plan    ASA 3 - emergent     general   (I have reviewed the patient's history and chart with the patient, including all pertinent laboratory results and imaging. I have explained the risks of anesthesia including but not limited to dental damage, corneal abrasion, nerve injury, MI, stroke, aspiration, and death. Patient has agreed to proceed.       )  intravenous induction     Anesthetic plan, all risks, benefits, and alternatives have been provided, discussed and informed consent has been obtained with: patient.    Plan discussed with CRNA.        CODE STATUS:

## 2022-04-24 LAB
ANION GAP SERPL CALCULATED.3IONS-SCNC: 11.6 MMOL/L (ref 5–15)
BUN SERPL-MCNC: 14 MG/DL (ref 8–23)
BUN/CREAT SERPL: 15.1 (ref 7–25)
CALCIUM SPEC-SCNC: 9.1 MG/DL (ref 8.6–10.5)
CHLORIDE SERPL-SCNC: 98 MMOL/L (ref 98–107)
CO2 SERPL-SCNC: 21.4 MMOL/L (ref 22–29)
CREAT SERPL-MCNC: 0.93 MG/DL (ref 0.76–1.27)
DEPRECATED RDW RBC AUTO: 41.3 FL (ref 37–54)
EGFRCR SERPLBLD CKD-EPI 2021: 89.4 ML/MIN/1.73
ERYTHROCYTE [DISTWIDTH] IN BLOOD BY AUTOMATED COUNT: 13 % (ref 12.3–15.4)
GLUCOSE BLDC GLUCOMTR-MCNC: 109 MG/DL (ref 70–130)
GLUCOSE BLDC GLUCOMTR-MCNC: 128 MG/DL (ref 70–130)
GLUCOSE BLDC GLUCOMTR-MCNC: 132 MG/DL (ref 70–130)
GLUCOSE SERPL-MCNC: 122 MG/DL (ref 65–99)
HCT VFR BLD AUTO: 26.8 % (ref 37.5–51)
HGB BLD-MCNC: 8.8 G/DL (ref 13–17.7)
MCH RBC QN AUTO: 28.9 PG (ref 26.6–33)
MCHC RBC AUTO-ENTMCNC: 32.8 G/DL (ref 31.5–35.7)
MCV RBC AUTO: 88.2 FL (ref 79–97)
PLATELET # BLD AUTO: 480 10*3/MM3 (ref 140–450)
PMV BLD AUTO: 8.4 FL (ref 6–12)
POTASSIUM SERPL-SCNC: 4.4 MMOL/L (ref 3.5–5.2)
RBC # BLD AUTO: 3.04 10*6/MM3 (ref 4.14–5.8)
SODIUM SERPL-SCNC: 131 MMOL/L (ref 136–145)
WBC NRBC COR # BLD: 15.84 10*3/MM3 (ref 3.4–10.8)

## 2022-04-24 PROCEDURE — 97530 THERAPEUTIC ACTIVITIES: CPT

## 2022-04-24 PROCEDURE — 85027 COMPLETE CBC AUTOMATED: CPT | Performed by: ORTHOPAEDIC SURGERY

## 2022-04-24 PROCEDURE — 80048 BASIC METABOLIC PNL TOTAL CA: CPT | Performed by: ORTHOPAEDIC SURGERY

## 2022-04-24 PROCEDURE — 82962 GLUCOSE BLOOD TEST: CPT

## 2022-04-24 RX ORDER — DOCUSATE SODIUM 100 MG/1
100 CAPSULE, LIQUID FILLED ORAL 2 TIMES DAILY
Status: DISCONTINUED | OUTPATIENT
Start: 2022-04-24 | End: 2022-04-26 | Stop reason: HOSPADM

## 2022-04-24 RX ORDER — FAMOTIDINE 20 MG/1
20 TABLET, FILM COATED ORAL DAILY
Status: DISCONTINUED | OUTPATIENT
Start: 2022-04-24 | End: 2022-04-26 | Stop reason: HOSPADM

## 2022-04-24 RX ORDER — FAMOTIDINE 20 MG/1
20 TABLET, FILM COATED ORAL
Status: DISCONTINUED | OUTPATIENT
Start: 2022-04-24 | End: 2022-04-24

## 2022-04-24 RX ADMIN — DOCUSATE SODIUM 100 MG: 100 CAPSULE, LIQUID FILLED ORAL at 10:55

## 2022-04-24 RX ADMIN — Medication 3 ML: at 21:53

## 2022-04-24 RX ADMIN — CARVEDILOL 12.5 MG: 12.5 TABLET, FILM COATED ORAL at 09:33

## 2022-04-24 RX ADMIN — CARVEDILOL 12.5 MG: 12.5 TABLET, FILM COATED ORAL at 18:04

## 2022-04-24 RX ADMIN — ESCITALOPRAM 20 MG: 20 TABLET, FILM COATED ORAL at 21:51

## 2022-04-24 RX ADMIN — OXYCODONE HYDROCHLORIDE AND ACETAMINOPHEN 1 TABLET: 7.5; 325 TABLET ORAL at 13:35

## 2022-04-24 RX ADMIN — OXYCODONE HYDROCHLORIDE AND ACETAMINOPHEN 1 TABLET: 7.5; 325 TABLET ORAL at 03:34

## 2022-04-24 RX ADMIN — LEVOTHYROXINE SODIUM 25 MCG: 0.03 TABLET ORAL at 09:33

## 2022-04-24 RX ADMIN — Medication 3 ML: at 09:30

## 2022-04-25 LAB
ANION GAP SERPL CALCULATED.3IONS-SCNC: 12.8 MMOL/L (ref 5–15)
BUN SERPL-MCNC: 13 MG/DL (ref 8–23)
BUN/CREAT SERPL: 13.5 (ref 7–25)
CALCIUM SPEC-SCNC: 8.9 MG/DL (ref 8.6–10.5)
CHLORIDE SERPL-SCNC: 97 MMOL/L (ref 98–107)
CO2 SERPL-SCNC: 23.2 MMOL/L (ref 22–29)
CREAT SERPL-MCNC: 0.96 MG/DL (ref 0.76–1.27)
DEPRECATED RDW RBC AUTO: 45.4 FL (ref 37–54)
EGFRCR SERPLBLD CKD-EPI 2021: 86.1 ML/MIN/1.73
ERYTHROCYTE [DISTWIDTH] IN BLOOD BY AUTOMATED COUNT: 13.8 % (ref 12.3–15.4)
GLUCOSE SERPL-MCNC: 100 MG/DL (ref 65–99)
HCT VFR BLD AUTO: 28.4 % (ref 37.5–51)
HGB BLD-MCNC: 9.1 G/DL (ref 13–17.7)
MCH RBC QN AUTO: 28.8 PG (ref 26.6–33)
MCHC RBC AUTO-ENTMCNC: 32 G/DL (ref 31.5–35.7)
MCV RBC AUTO: 89.9 FL (ref 79–97)
PLATELET # BLD AUTO: 507 10*3/MM3 (ref 140–450)
PMV BLD AUTO: 8.5 FL (ref 6–12)
POTASSIUM SERPL-SCNC: 4 MMOL/L (ref 3.5–5.2)
RBC # BLD AUTO: 3.16 10*6/MM3 (ref 4.14–5.8)
SODIUM SERPL-SCNC: 133 MMOL/L (ref 136–145)
WBC NRBC COR # BLD: 10.9 10*3/MM3 (ref 3.4–10.8)

## 2022-04-25 PROCEDURE — 80048 BASIC METABOLIC PNL TOTAL CA: CPT | Performed by: ORTHOPAEDIC SURGERY

## 2022-04-25 PROCEDURE — 97530 THERAPEUTIC ACTIVITIES: CPT

## 2022-04-25 PROCEDURE — 85027 COMPLETE CBC AUTOMATED: CPT | Performed by: ORTHOPAEDIC SURGERY

## 2022-04-25 RX ADMIN — Medication 3 ML: at 21:06

## 2022-04-25 RX ADMIN — OXYCODONE HYDROCHLORIDE AND ACETAMINOPHEN 1 TABLET: 7.5; 325 TABLET ORAL at 17:03

## 2022-04-25 RX ADMIN — ESCITALOPRAM 20 MG: 20 TABLET, FILM COATED ORAL at 21:05

## 2022-04-25 RX ADMIN — DOCUSATE SODIUM 50MG AND SENNOSIDES 8.6MG 2 TABLET: 8.6; 5 TABLET, FILM COATED ORAL at 21:05

## 2022-04-25 RX ADMIN — OXYCODONE HYDROCHLORIDE AND ACETAMINOPHEN 1 TABLET: 7.5; 325 TABLET ORAL at 08:39

## 2022-04-25 RX ADMIN — Medication 3 ML: at 08:00

## 2022-04-25 RX ADMIN — CYCLOBENZAPRINE 10 MG: 10 TABLET, FILM COATED ORAL at 21:05

## 2022-04-25 RX ADMIN — CYCLOBENZAPRINE 10 MG: 10 TABLET, FILM COATED ORAL at 03:21

## 2022-04-25 RX ADMIN — DOCUSATE SODIUM 100 MG: 100 CAPSULE, LIQUID FILLED ORAL at 21:05

## 2022-04-25 RX ADMIN — FAMOTIDINE 20 MG: 20 TABLET ORAL at 08:39

## 2022-04-25 RX ADMIN — OXYCODONE HYDROCHLORIDE AND ACETAMINOPHEN 1 TABLET: 7.5; 325 TABLET ORAL at 21:05

## 2022-04-25 RX ADMIN — OXYCODONE HYDROCHLORIDE AND ACETAMINOPHEN 1 TABLET: 7.5; 325 TABLET ORAL at 03:21

## 2022-04-25 RX ADMIN — CARVEDILOL 12.5 MG: 12.5 TABLET, FILM COATED ORAL at 08:39

## 2022-04-25 RX ADMIN — OXYCODONE HYDROCHLORIDE AND ACETAMINOPHEN 1 TABLET: 7.5; 325 TABLET ORAL at 12:42

## 2022-04-25 RX ADMIN — CARVEDILOL 12.5 MG: 12.5 TABLET, FILM COATED ORAL at 18:15

## 2022-04-25 RX ADMIN — CYCLOBENZAPRINE 10 MG: 10 TABLET, FILM COATED ORAL at 12:42

## 2022-04-25 RX ADMIN — LEVOTHYROXINE SODIUM 25 MCG: 0.03 TABLET ORAL at 08:40

## 2022-04-26 ENCOUNTER — READMISSION MANAGEMENT (OUTPATIENT)
Dept: CALL CENTER | Facility: HOSPITAL | Age: 67
End: 2022-04-26

## 2022-04-26 ENCOUNTER — HOME HEALTH ADMISSION (OUTPATIENT)
Dept: HOME HEALTH SERVICES | Facility: HOME HEALTHCARE | Age: 67
End: 2022-04-26

## 2022-04-26 VITALS
HEART RATE: 84 BPM | SYSTOLIC BLOOD PRESSURE: 140 MMHG | OXYGEN SATURATION: 96 % | DIASTOLIC BLOOD PRESSURE: 88 MMHG | WEIGHT: 230.1 LBS | TEMPERATURE: 97.4 F | HEIGHT: 71 IN | BODY MASS INDEX: 32.21 KG/M2 | RESPIRATION RATE: 16 BRPM

## 2022-04-26 LAB
ANION GAP SERPL CALCULATED.3IONS-SCNC: 12 MMOL/L (ref 5–15)
BACTERIA SPEC AEROBE CULT: NORMAL
BUN SERPL-MCNC: 12 MG/DL (ref 8–23)
BUN/CREAT SERPL: 11.3 (ref 7–25)
CALCIUM SPEC-SCNC: 8.9 MG/DL (ref 8.6–10.5)
CHLORIDE SERPL-SCNC: 97 MMOL/L (ref 98–107)
CO2 SERPL-SCNC: 24 MMOL/L (ref 22–29)
CREAT SERPL-MCNC: 1.06 MG/DL (ref 0.76–1.27)
DEPRECATED RDW RBC AUTO: 41.8 FL (ref 37–54)
EGFRCR SERPLBLD CKD-EPI 2021: 76.4 ML/MIN/1.73
ERYTHROCYTE [DISTWIDTH] IN BLOOD BY AUTOMATED COUNT: 13.4 % (ref 12.3–15.4)
GLUCOSE SERPL-MCNC: 87 MG/DL (ref 65–99)
GRAM STN SPEC: NORMAL
GRAM STN SPEC: NORMAL
HCT VFR BLD AUTO: 29.7 % (ref 37.5–51)
HGB BLD-MCNC: 9.8 G/DL (ref 13–17.7)
MCH RBC QN AUTO: 28.8 PG (ref 26.6–33)
MCHC RBC AUTO-ENTMCNC: 33 G/DL (ref 31.5–35.7)
MCV RBC AUTO: 87.4 FL (ref 79–97)
PLATELET # BLD AUTO: 518 10*3/MM3 (ref 140–450)
PMV BLD AUTO: 8.6 FL (ref 6–12)
POTASSIUM SERPL-SCNC: 4.1 MMOL/L (ref 3.5–5.2)
RBC # BLD AUTO: 3.4 10*6/MM3 (ref 4.14–5.8)
SODIUM SERPL-SCNC: 133 MMOL/L (ref 136–145)
WBC NRBC COR # BLD: 10.24 10*3/MM3 (ref 3.4–10.8)

## 2022-04-26 PROCEDURE — 85027 COMPLETE CBC AUTOMATED: CPT | Performed by: ORTHOPAEDIC SURGERY

## 2022-04-26 PROCEDURE — 97116 GAIT TRAINING THERAPY: CPT

## 2022-04-26 PROCEDURE — 80048 BASIC METABOLIC PNL TOTAL CA: CPT | Performed by: ORTHOPAEDIC SURGERY

## 2022-04-26 PROCEDURE — 99024 POSTOP FOLLOW-UP VISIT: CPT | Performed by: ORTHOPAEDIC SURGERY

## 2022-04-26 RX ORDER — POLYETHYLENE GLYCOL 3350 17 G/17G
17 POWDER, FOR SOLUTION ORAL DAILY
Qty: 10 PACKET | Refills: 0 | Status: SHIPPED | OUTPATIENT
Start: 2022-04-27 | End: 2022-05-07

## 2022-04-26 RX ORDER — OXYCODONE HYDROCHLORIDE AND ACETAMINOPHEN 5; 325 MG/1; MG/1
TABLET ORAL
Qty: 45 TABLET | Refills: 0 | Status: SHIPPED | OUTPATIENT
Start: 2022-04-26 | End: 2022-05-11 | Stop reason: SDUPTHER

## 2022-04-26 RX ORDER — LEFLUNOMIDE 20 MG/1
20 TABLET ORAL NIGHTLY
Start: 2022-04-26 | End: 2022-12-01

## 2022-04-26 RX ORDER — DOXYCYCLINE HYCLATE 100 MG/1
100 CAPSULE ORAL 2 TIMES DAILY
Qty: 28 CAPSULE | Refills: 0 | Status: SHIPPED | OUTPATIENT
Start: 2022-04-26 | End: 2022-05-10

## 2022-04-26 RX ORDER — PSEUDOEPHEDRINE HCL 30 MG
100 TABLET ORAL 2 TIMES DAILY
Start: 2022-04-26

## 2022-04-26 RX ADMIN — DOCUSATE SODIUM 100 MG: 100 CAPSULE, LIQUID FILLED ORAL at 08:17

## 2022-04-26 RX ADMIN — LEVOTHYROXINE SODIUM 25 MCG: 0.03 TABLET ORAL at 08:16

## 2022-04-26 RX ADMIN — CARVEDILOL 12.5 MG: 12.5 TABLET, FILM COATED ORAL at 08:16

## 2022-04-26 RX ADMIN — OXYCODONE HYDROCHLORIDE AND ACETAMINOPHEN 1 TABLET: 7.5; 325 TABLET ORAL at 05:15

## 2022-04-26 RX ADMIN — FAMOTIDINE 20 MG: 20 TABLET ORAL at 08:17

## 2022-04-26 RX ADMIN — HYDROCODONE BITARTRATE AND ACETAMINOPHEN 1 TABLET: 7.5; 325 TABLET ORAL at 10:38

## 2022-04-26 RX ADMIN — Medication 3 ML: at 08:17

## 2022-04-26 NOTE — OUTREACH NOTE
Prep Survey    Flowsheet Row Responses   [facility] facility patient discharged from? [place]   Is LACE score < 7 ? No   Emergency Room discharge w/ pulse ox? No   Eligibility [facility]   Date of Admission 04/13/22   Date of Discharge 04/26/22   Discharge Disposition Home or Self Care   Discharge diagnosis Lumbar fusion   Does the patient have one of the following disease processes/diagnoses(primary or secondary)? General Surgery   Does the patient have Home health ordered? Yes   What is the Home health agency?  [facility]   Is there a DME ordered? No   Prep survey completed? Yes          [place] DIMAS - Registered Nurse

## 2022-04-27 ENCOUNTER — TRANSITIONAL CARE MANAGEMENT TELEPHONE ENCOUNTER (OUTPATIENT)
Dept: CALL CENTER | Facility: HOSPITAL | Age: 67
End: 2022-04-27

## 2022-04-27 ENCOUNTER — HOME CARE VISIT (OUTPATIENT)
Dept: HOME HEALTH SERVICES | Facility: HOME HEALTHCARE | Age: 67
End: 2022-04-27

## 2022-04-27 VITALS
DIASTOLIC BLOOD PRESSURE: 66 MMHG | HEART RATE: 107 BPM | OXYGEN SATURATION: 95 % | SYSTOLIC BLOOD PRESSURE: 110 MMHG | TEMPERATURE: 97.4 F

## 2022-04-27 PROCEDURE — G0151 HHCP-SERV OF PT,EA 15 MIN: HCPCS

## 2022-04-27 RX ORDER — CARVEDILOL 12.5 MG/1
TABLET ORAL
Qty: 180 TABLET | Refills: 1 | Status: SHIPPED | OUTPATIENT
Start: 2022-04-27 | End: 2022-10-03 | Stop reason: SDUPTHER

## 2022-04-27 NOTE — OUTREACH NOTE
Call Center TCM Note    Flowsheet Row Responses   [facility] patient discharged from? [place]   Does the patient have one of the following disease processes/diagnoses(primary or secondary)? General Surgery   TCM attempt successful? No   Unsuccessful attempts Attempt 1          Ada Byers RN    4/27/2022, 16:01 EDT

## 2022-04-27 NOTE — OUTREACH NOTE
Call Center TCM Note    Flowsheet Row Responses   [facility] patient discharged from? [place]   Does the patient have one of the following disease processes/diagnoses(primary or secondary)? General Surgery   TCM attempt successful? No   Unsuccessful attempts Attempt 2   Call Status Voice mail issues          Ada Byers RN    4/27/2022, 16:50 EDT

## 2022-04-28 ENCOUNTER — TELEPHONE (OUTPATIENT)
Dept: ORTHOPEDIC SURGERY | Facility: CLINIC | Age: 67
End: 2022-04-28

## 2022-04-28 ENCOUNTER — TRANSITIONAL CARE MANAGEMENT TELEPHONE ENCOUNTER (OUTPATIENT)
Dept: CALL CENTER | Facility: HOSPITAL | Age: 67
End: 2022-04-28

## 2022-04-28 LAB — BACTERIA SPEC ANAEROBE CULT: NORMAL

## 2022-04-28 NOTE — HOME HEALTH
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     Home social environment Patient resides with spouse, patient has 4 steps to enter home with rail.     Next MD appointment 5/11/22

## 2022-04-28 NOTE — TELEPHONE ENCOUNTER
Patient states he is having a lot of drainage bandage was changed this morning and has now soaked through his shirt, pants and underwear Says it is draining so much it was dripping in the floor of the bathroom

## 2022-04-28 NOTE — CASE COMMUNICATION
Patient is s/p T10 to L4 fusion. Patient is having back and leg pain. He is not compliant with taking pain medication or using ice so pain is not controlled. He requires supervision to SBA x 1 for transfers. He is ambulating with rolling walker with brace, but becomes SOB quickly, then rushes to sit, becomes unsteady with ambulation. Patient is having weakness in LE, right greater than left. Instructed patient in supine and sitting LE H EP and instructed  patient to ambulate every 1-2 hours with rolling walker. Instructed patient in the need for ECS for easier transfers   Will see patient for 2x/wk x 3 weeks

## 2022-04-28 NOTE — OUTREACH NOTE
Call Center TCM Note    Flowsheet Row Responses   [facility] patient discharged from? [place]   Does the patient have one of the following disease processes/diagnoses(primary or secondary)? General Surgery   TCM attempt successful? Yes   Call start time 1614   Call end time 1617   Discharge diagnosis Lumbar fusion   Meds reviewed with patient/caregiver? Yes   Is the patient having any side effects they believe may be caused by any medication additions or changes? No   Does the patient have all medications related to this admission filled (includes all antibiotics, pain medications, etc.) Yes   Is the patient taking all medications as directed (includes completed medication regime)? Yes   Does the patient have a follow up appointment scheduled with their surgeon? Yes   Has the patient kept scheduled appointments due by today? N/A   Comments PCP APPOINTMENT MADE FOR FIRST AVAILABLE DATE OF 5/17/22   What is the Home health agency?  [facility]   Has home health visited the patient within 72 hours of discharge? Call prior to 72 hours   Home health comments PATIENT STATES HH IS COMING OUT TOMORROW   Psychosocial issues? No   Did the patient receive a copy of their discharge instructions? Yes   Nursing interventions Reviewed instructions with patient   What is the patient's perception of their health status since discharge? Improving   Nursing interventions Nurse provided patient education   Is the patient /caregiver able to teach back basic post-op care? Drive as instructed by MD in discharge instructions, No tub bath, swimming, or hot tub until instructed by MD, Take showers only when approved by MD-sponge bathe until then, Keep incision areas clean,dry and protected, Do not remove steri-strips, Lifting as instructed by MD in discharge instructions   Is the patient/caregiver able to teach back signs and symptoms of incisional infection? Increased redness, swelling or pain at the incisonal site, Increased drainage or  bleeding, Incisional warmth, Pus or odor from incision, Fever   Is the patient/caregiver able to teach back steps to recovery at home? Set small, achievable goals for return to baseline health, Rest and rebuild strength, gradually increase activity, Eat a well-balance diet, Make a list of questions for surgeon's appointment   If the patient is a current smoker, are they able to teach back resources for cessation? Not a smoker   Is the patient/caregiver able to teach back the hierarchy of who to call/visit for symptoms/problems? PCP, Specialist, Home health nurse, Urgent Care, ED, 911 Yes   TCM call completed? Yes          Bessie Fay LPN    4/28/2022, 16:19 EDT

## 2022-04-28 NOTE — TELEPHONE ENCOUNTER
Have spoken with patient and his wife.  Apparently he is having a moderate amount of serosanguineous drainage from the distal end of the incision.  Prior to his discharge he was having some small amount of drainage from the drain site however the wife states that the drain site has dried up.  He has no erythema and remains afebrile.  Having no increased pain and denies headache.  Patient is on doxycycline 100 mg p.o. twice daily.  Have given him instructions on how to apply a bulky dressing.  I have also contacted home health and made arrangements for them to go out to teach the wife how to paint the incision with Betadine twice a day and apply bulky dressing.  I have instructed the patient that if he continues to have drainage and it does not slow down or stop over the next few days he will need to return to the ER for further evaluation

## 2022-04-29 ENCOUNTER — HOME CARE VISIT (OUTPATIENT)
Dept: HOME HEALTH SERVICES | Facility: HOME HEALTHCARE | Age: 67
End: 2022-04-29

## 2022-04-29 VITALS
OXYGEN SATURATION: 96 % | DIASTOLIC BLOOD PRESSURE: 66 MMHG | TEMPERATURE: 97.5 F | SYSTOLIC BLOOD PRESSURE: 118 MMHG | HEART RATE: 79 BPM

## 2022-04-29 PROCEDURE — G0157 HHC PT ASSISTANT EA 15: HCPCS

## 2022-04-29 NOTE — HOME HEALTH
"Subjective: \" I think it stopped draining in my back finally\"    Wound-back with glue and 9 sutures- Pictures loaded in EPIC. no drainage today noted upon arrival  Edema- minimal amount    Assessment: Caregiver education with spouse on wound care and how to apply betadine, guaze and ABD bandage with paper tape. She states she did it previous date and was present to watch therapist today. He is able to manuever around home and on outside steps and advised to stay on walker at this time for safety and support to reduce pressure on back area. He was able to review HEP and transfers with cues.    Plan for next visit/Communication  gait training  review and progress HEP  balance  transfers  wound care"

## 2022-04-29 NOTE — CASE COMMUNICATION
Patient has no drainage today however education on wound care per instructions and covered with betadine, gauze and papertape. Pictures loaded in EPIC. spouse present for education

## 2022-05-01 RX ORDER — IRBESARTAN 300 MG/1
TABLET ORAL
Qty: 90 TABLET | Refills: 0 | Status: SHIPPED | OUTPATIENT
Start: 2022-05-01 | End: 2022-05-18

## 2022-05-03 ENCOUNTER — HOME CARE VISIT (OUTPATIENT)
Dept: HOME HEALTH SERVICES | Facility: HOME HEALTHCARE | Age: 67
End: 2022-05-03

## 2022-05-03 VITALS
OXYGEN SATURATION: 96 % | DIASTOLIC BLOOD PRESSURE: 68 MMHG | SYSTOLIC BLOOD PRESSURE: 120 MMHG | HEART RATE: 103 BPM | TEMPERATURE: 97.2 F

## 2022-05-03 PROCEDURE — G0157 HHC PT ASSISTANT EA 15: HCPCS

## 2022-05-03 NOTE — HOME HEALTH
Subjective: I am doing ok    Wound- back covered with bandages, spouse is changing bandage and states no concerns and states only small amount of drainage on bandage  Edema- back minimal amount    Assessment: Patient is doing well with gait training with walker and prefers to stay on walker versus cane because of shoulder discomfort. He was able to review seated HEP and started on standing . Spouse appears knowledgeable with wound care.    Plan for next visit/Communication  Gait training   review HEP  balance  transfers  wound education

## 2022-05-05 ENCOUNTER — READMISSION MANAGEMENT (OUTPATIENT)
Dept: CALL CENTER | Facility: HOSPITAL | Age: 67
End: 2022-05-05

## 2022-05-05 ENCOUNTER — HOME CARE VISIT (OUTPATIENT)
Dept: HOME HEALTH SERVICES | Facility: HOME HEALTHCARE | Age: 67
End: 2022-05-05

## 2022-05-05 VITALS
SYSTOLIC BLOOD PRESSURE: 136 MMHG | TEMPERATURE: 97.4 F | HEART RATE: 94 BPM | OXYGEN SATURATION: 97 % | DIASTOLIC BLOOD PRESSURE: 82 MMHG

## 2022-05-05 PROCEDURE — G0157 HHC PT ASSISTANT EA 15: HCPCS

## 2022-05-05 NOTE — OUTREACH NOTE
General Surgery Week 2 Survey    Flowsheet Row Responses   [facility] patient discharged from? [place]   Does the patient have one of the following disease processes/diagnoses(primary or secondary)? General Surgery   Week 2 attempt successful? Yes   Call start time 1706   Call end time 1707   Discharge diagnosis Lumbar fusion   Meds reviewed with patient/caregiver? Yes   Is the patient having any side effects they believe may be caused by any medication additions or changes? No   Does the patient have all medications related to this admission filled (includes all antibiotics, pain medications, etc.) Yes   Is the patient taking all medications as directed (includes completed medication regime)? Yes   Does the patient have a follow up appointment scheduled with their surgeon? Yes   Has the patient kept scheduled appointments due by today? N/A   What is the Home health agency?  [facility]   Has home health visited the patient within 72 hours of discharge? Yes   Psychosocial issues? No   Did the patient receive a copy of their discharge instructions? Yes   Nursing interventions Reviewed instructions with patient   What is the patient's perception of their health status since discharge? Improving   Nursing interventions Nurse provided patient education   Is the patient /caregiver able to teach back basic post-op care? Drive as instructed by MD in discharge instructions, No tub bath, swimming, or hot tub until instructed by MD, Take showers only when approved by MD-sponge bathe until then, Keep incision areas clean,dry and protected, Do not remove steri-strips, Lifting as instructed by MD in discharge instructions   Is the patient/caregiver able to teach back signs and symptoms of incisional infection? Increased redness, swelling or pain at the incisonal site, Increased drainage or bleeding, Incisional warmth, Pus or odor from incision, Fever   Is the patient/caregiver able to teach back steps to recovery at home? Set  small, achievable goals for return to baseline health, Rest and rebuild strength, gradually increase activity, Eat a well-balance diet, Make a list of questions for surgeon's appointment   If the patient is a current smoker, are they able to teach back resources for cessation? Not a smoker   Is the patient/caregiver able to teach back the hierarchy of who to call/visit for symptoms/problems? PCP, Specialist, Home health nurse, Urgent Care, ED, 911 Yes   Week 2 call completed? Yes   Wrap up additional comments Doing well. He reports incision healing well.           RIMA VEGA - Registered Nurse

## 2022-05-05 NOTE — HOME HEALTH
Subjective: I guess my back is doing ok, No drainage    Wound- back healing, no drainage and open to air because he is going to take shower after session and spouse will reapply.. Pictures loaded in EPIC. Spouse able to reapply bandage as needed  Edema- back minimal amount     Assessment: Patient is doing well with gait training and wanted to trial the cane and did fine and education on using either one.. Spouse appears knowledgeable with wound care.   Patient has bone stimulator however is not working and is sending a new one. Patient did well with HEP and balance    Plan for next visit/Communication   Gait training   review HEP   balance   wound education

## 2022-05-09 ENCOUNTER — HOME CARE VISIT (OUTPATIENT)
Dept: HOME HEALTH SERVICES | Facility: HOME HEALTHCARE | Age: 67
End: 2022-05-09

## 2022-05-09 VITALS
HEART RATE: 103 BPM | TEMPERATURE: 98 F | DIASTOLIC BLOOD PRESSURE: 82 MMHG | OXYGEN SATURATION: 95 % | SYSTOLIC BLOOD PRESSURE: 126 MMHG

## 2022-05-09 PROCEDURE — G0157 HHC PT ASSISTANT EA 15: HCPCS

## 2022-05-09 NOTE — HOME HEALTH
Subjective: I am doing better, the glue is a little loose each day and coming off. I got the bone stimulator on saturday finally.    Wound- back healing, no drainage on bandage. Spouse able to reapply bandage as needed   Edema- back minimal amount   Dr. Perez 5-11-22 at 9 am    Assessment:  Patient is improving with cane in the home and improved on norman and balance. Patient requires decreased cues with HEP and appears knowledgeable with wound care and medication.    Plan for next visit/Communication   Gait training   review HEP   balance assessments

## 2022-05-11 ENCOUNTER — OFFICE VISIT (OUTPATIENT)
Dept: ORTHOPEDIC SURGERY | Facility: CLINIC | Age: 67
End: 2022-05-11

## 2022-05-11 VITALS — WEIGHT: 225 LBS | HEIGHT: 70 IN | BODY MASS INDEX: 32.21 KG/M2

## 2022-05-11 DIAGNOSIS — M96.0 PSEUDARTHROSIS AFTER FUSION OR ARTHRODESIS: ICD-10-CM

## 2022-05-11 DIAGNOSIS — M54.16 LUMBAR RADICULOPATHY: ICD-10-CM

## 2022-05-11 DIAGNOSIS — R52 PAIN: Primary | ICD-10-CM

## 2022-05-11 PROCEDURE — 72080 X-RAY EXAM THORACOLMB 2/> VW: CPT | Performed by: ORTHOPAEDIC SURGERY

## 2022-05-11 PROCEDURE — 99024 POSTOP FOLLOW-UP VISIT: CPT | Performed by: ORTHOPAEDIC SURGERY

## 2022-05-11 RX ORDER — OXYCODONE HYDROCHLORIDE AND ACETAMINOPHEN 5; 325 MG/1; MG/1
TABLET ORAL
Qty: 45 TABLET | Refills: 0 | Status: SHIPPED | OUTPATIENT
Start: 2022-05-11 | End: 2022-06-07 | Stop reason: SDUPTHER

## 2022-05-11 RX ORDER — OXYCODONE HYDROCHLORIDE AND ACETAMINOPHEN 5; 325 MG/1; MG/1
TABLET ORAL
Qty: 45 TABLET | Refills: 0 | Status: SHIPPED | OUTPATIENT
Start: 2022-05-11 | End: 2022-05-11 | Stop reason: SDUPTHER

## 2022-05-11 NOTE — PROGRESS NOTES
Postoperatively the patient expresses normal incisional pain.  There is little or no radiating pain.  Good strength on exam.  The wound is intact.  No further drainage and none for 1 week and this was slight residual drainage from the drain site.  Final cultures are no growth.  He just stopped the antibiotics.  He will remain vigilant and look for  any swelling, fever or drainage in the near future.  2 view x-rays of the lumbar spine obtained to evaluate hardware position and fusion bone show good position thereof and are unchanged from intraoperative images.  Instructions given.  We'll need lumbar x-rays on return visit in 8 weeks.  I refilled his Percocet.

## 2022-05-12 ENCOUNTER — HOME CARE VISIT (OUTPATIENT)
Dept: HOME HEALTH SERVICES | Facility: HOME HEALTHCARE | Age: 67
End: 2022-05-12

## 2022-05-12 VITALS
OXYGEN SATURATION: 97 % | TEMPERATURE: 97.6 F | SYSTOLIC BLOOD PRESSURE: 140 MMHG | HEART RATE: 90 BPM | DIASTOLIC BLOOD PRESSURE: 80 MMHG

## 2022-05-12 PROCEDURE — G0151 HHCP-SERV OF PT,EA 15 MIN: HCPCS

## 2022-05-12 NOTE — HOME HEALTH
Patient reports he saw the doctor yesterday, he can start driving. He is using the bone stimulator for 2 hours 2x/day. Patient reports he is doing his exercises. He is wearing his brace.

## 2022-05-13 ENCOUNTER — READMISSION MANAGEMENT (OUTPATIENT)
Dept: CALL CENTER | Facility: HOSPITAL | Age: 67
End: 2022-05-13

## 2022-05-13 PROCEDURE — G0180 MD CERTIFICATION HHA PATIENT: HCPCS | Performed by: ORTHOPAEDIC SURGERY

## 2022-05-13 NOTE — OUTREACH NOTE
General Surgery Week 3 Survey    Flowsheet Row Responses   [facility] patient discharged from? [place]   Does the patient have one of the following disease processes/diagnoses(primary or secondary)? General Surgery   Week 3 attempt successful? No   Unsuccessful attempts Attempt 1          PHUONG Marti Registered Nurse

## 2022-05-18 ENCOUNTER — OFFICE VISIT (OUTPATIENT)
Dept: FAMILY MEDICINE CLINIC | Facility: CLINIC | Age: 67
End: 2022-05-18

## 2022-05-18 VITALS
DIASTOLIC BLOOD PRESSURE: 60 MMHG | WEIGHT: 226 LBS | RESPIRATION RATE: 16 BRPM | HEIGHT: 70 IN | BODY MASS INDEX: 32.35 KG/M2 | HEART RATE: 86 BPM | OXYGEN SATURATION: 98 % | TEMPERATURE: 97.5 F | SYSTOLIC BLOOD PRESSURE: 122 MMHG

## 2022-05-18 DIAGNOSIS — R73.01 IFG (IMPAIRED FASTING GLUCOSE): ICD-10-CM

## 2022-05-18 DIAGNOSIS — J43.8 OTHER EMPHYSEMA: ICD-10-CM

## 2022-05-18 DIAGNOSIS — F33.42 RECURRENT MAJOR DEPRESSIVE DISORDER, IN FULL REMISSION: ICD-10-CM

## 2022-05-18 DIAGNOSIS — E53.8 LOW FOLIC ACID: ICD-10-CM

## 2022-05-18 DIAGNOSIS — F41.1 GENERALIZED ANXIETY DISORDER: ICD-10-CM

## 2022-05-18 DIAGNOSIS — E03.9 HYPOTHYROIDISM, ACQUIRED: ICD-10-CM

## 2022-05-18 DIAGNOSIS — I10 PRIMARY HYPERTENSION: ICD-10-CM

## 2022-05-18 DIAGNOSIS — M05.79 RHEUMATOID ARTHRITIS INVOLVING MULTIPLE SITES WITH POSITIVE RHEUMATOID FACTOR: ICD-10-CM

## 2022-05-18 DIAGNOSIS — E55.9 VITAMIN D DEFICIENCY, UNSPECIFIED: ICD-10-CM

## 2022-05-18 DIAGNOSIS — M54.16 LUMBAR RADICULOPATHY: ICD-10-CM

## 2022-05-18 DIAGNOSIS — E53.8 LOW SERUM VITAMIN B12: ICD-10-CM

## 2022-05-18 DIAGNOSIS — E87.8 ELECTROLYTE ABNORMALITY: ICD-10-CM

## 2022-05-18 DIAGNOSIS — E78.2 MIXED HYPERLIPIDEMIA: ICD-10-CM

## 2022-05-18 DIAGNOSIS — Z09 HOSPITAL DISCHARGE FOLLOW-UP: Primary | ICD-10-CM

## 2022-05-18 DIAGNOSIS — I25.110 CORONARY ARTERY DISEASE INVOLVING NATIVE CORONARY ARTERY OF NATIVE HEART WITH UNSTABLE ANGINA PECTORIS: ICD-10-CM

## 2022-05-18 DIAGNOSIS — K29.00 ACUTE GASTRITIS WITHOUT HEMORRHAGE, UNSPECIFIED GASTRITIS TYPE: ICD-10-CM

## 2022-05-18 PROBLEM — M65.30 ACQUIRED TRIGGER FINGER: Status: ACTIVE | Noted: 2019-12-12

## 2022-05-18 PROCEDURE — 1159F MED LIST DOCD IN RCRD: CPT | Performed by: PHYSICIAN ASSISTANT

## 2022-05-18 PROCEDURE — G0439 PPPS, SUBSEQ VISIT: HCPCS | Performed by: PHYSICIAN ASSISTANT

## 2022-05-18 PROCEDURE — 1170F FXNL STATUS ASSESSED: CPT | Performed by: PHYSICIAN ASSISTANT

## 2022-05-18 PROCEDURE — 99214 OFFICE O/P EST MOD 30 MIN: CPT | Performed by: PHYSICIAN ASSISTANT

## 2022-05-18 RX ORDER — PANTOPRAZOLE SODIUM 40 MG/1
40 TABLET, DELAYED RELEASE ORAL DAILY
Qty: 90 TABLET | Refills: 1 | Status: SHIPPED | OUTPATIENT
Start: 2022-05-18 | End: 2022-10-07

## 2022-05-18 RX ORDER — IRBESARTAN 75 MG/1
75 TABLET ORAL NIGHTLY
Qty: 90 TABLET | Refills: 1 | Status: SHIPPED | OUTPATIENT
Start: 2022-05-18 | End: 2022-05-24 | Stop reason: SDUPTHER

## 2022-05-18 NOTE — PROGRESS NOTES
Subjective   Prem Thrasher is a 67 y.o. male.     History of Present Illness     Prem Thrasher 67 y.o. male presents today for hosptial follow up.  he was treated 4-13 to 4-26-22  for pseudoarthritis after fusion.  I reviewed all of the labs and diagnostic testing and noted:  Discharge Diagnosis: Thoracic 10-thoracic 11, thoracic 11-thoracic 12, thoracic 12-lumbar 1, lumbar 1-lumbar 2, lumbar 2-lumbar 3, lumbar 3-lumbar 4 fusion with instrumentation with iliac crest bone graft, and removal of implants from lumbar 2-lumbar 3, lumbar 3-lumbar 4 ,   Acute Blood Loss Anemia  Surgery was on 4/23/2022 and had follow-up visit with Dr. Perez--spine Ortho on 5/11/2022 and noted he was having postoperative incisional pain that was expected normal.  No radiating pain and good strength on exam and the wound was intact and final cultures were negative.  He had stopped taking the antibiotics and was to report if any drainage redness or fever.  Refilled his Percocet with plan to repeat lumbar x-rays 8 weeks.  Has been receiving home health with physical therapy.  I also want a note his office visit 3/31/2022 to his cardiologist Dr. Salazar for preoperative clearance due to his CAD.  He noted that patient was CAD status post previous non-STEMI, intervention with both LAD and L CX normal global left ventricular systolic function, controlled hypertension,.Mixed hyperlipidemia on statin .instructed patient to stop aspirin and Plavix 7 days prior to surgery and cleared.  --follow-up 1 year and then-tThe patient's medications.  Was seeing Dr. Cano in pain management. were not changed:  Current outpatient and discharge medications have been reconciled for the patient.  Had venous Doppler 4/19/2022 lower extremity right and negative for DVT  Reviewed by: Montse Cain PA-C  Do still want update thyroid labs.  Started him on levothyroxine with October labs did have TSH while inpatient on 4/18/2022 noting normal level at 0.948.  he  does have a follow up appointment with a specialist:     As noted on 1027 visit patient continues to see Dr. Lopez for rheumatoid arthritis and remains on Arava    Encourage yearly low-dose CT of the chest to screen for lung cancer  Note that Lexapro does work well for anxiety and depression and continue same dose  Continue albuterol for COPD working well  Flexeril as needed muscle spasms working well    Lab Results   Component Value Date    GLUCOSE 87 04/26/2022    CALCIUM 8.9 04/26/2022     (L) 04/26/2022    K 4.1 04/26/2022    CO2 24.0 04/26/2022    CL 97 (L) 04/26/2022    BUN 12 04/26/2022    CREATININE 1.06 04/26/2022    EGFRIFAFRI 62 10/27/2021    EGFRIFNONA 53 (L) 10/27/2021    BCR 11.3 04/26/2022    ANIONGAP 12.0 04/26/2022     Lab Results   Component Value Date    WBC 10.24 04/26/2022    HGB 9.8 (L) 04/26/2022    HCT 29.7 (L) 04/26/2022    MCV 87.4 04/26/2022     (H) 04/26/2022     Lab Results   Component Value Date    HGBA1C 6.2 (H) 10/27/2021     Lab Results   Component Value Date    [id number] >2,000 (H) 04/21/2022     Lab Results   Component Value Date    FOLATE >20.00 04/21/2022      Since the last visit, he has overall felt fairly well.  He has Impaired fasting glucose and will monitor labs to watch for DMII, Hyperlipidemia with goals met with current Rx, Vitamin D deficiency and labs are at goal >30 ng/mL and Epigastric pain worse when he first starts eating and this has been going on for several weeks we will start PPI.  Some help with acid reducer will Rx pantoprazole and have him update me if not helping to refer to GI.  Also has essential hypertension note blood pressure today is 80/60 and this was a same reading he got at home we need to dramatically drop the dose of irbesartan will go down to 75 mg and no his wife will monitor blood pressure and update me on my chart and can raise dose if blood pressure starts to creep back up above 130/80. .  he has been compliant with current  "medications have reviewed them.  The patient denies medication side effects.  Will refill medications. /60 (BP Location: Left arm, Patient Position: Sitting, Cuff Size: Adult)   Pulse 86   Temp 97.5 °F (36.4 °C)   Resp 16   Ht 177.8 cm (70\")   Wt 103 kg (226 lb)   SpO2 98%   BMI 32.43 kg/m²     Results for orders placed or performed during the hospital encounter of 04/13/22   Anaerobic Culture - Swab, Spine, Lumbar    Specimen: Spine, Lumbar; Swab   Result Value Ref Range    Anaerobic Culture No anaerobes isolated at 5 days    Wound Culture - Swab, Spine, Lumbar    Specimen: Spine, Lumbar; Swab   Result Value Ref Range    Wound Culture No growth at 3 days     Gram Stain No WBCs or organisms seen    COVID-19,[facility] IN-HOUSE CEPHEID/WILY NP SWAB IN TRANSPORT MEDIA 8-12 HR TAT - Swab, Nasopharynx    Specimen: Nasopharynx; Swab   Result Value Ref Range    COVID19 Not Detected Not Detected - Ref. Range   Anaerobic Culture - Swab, Spine, Lumbar    Specimen: Spine, Lumbar; Swab   Result Value Ref Range    Anaerobic Culture No anaerobes isolated at 5 days    Wound Culture - Swab, Spine, Lumbar    Specimen: Spine, Lumbar; Swab   Result Value Ref Range    Wound Culture No growth at 3 days     Gram Stain Rare (1+) WBCs seen     Gram Stain No organisms seen    Hemoglobin & Hematocrit, Blood    Specimen: Arm, Left; Blood   Result Value Ref Range    Hemoglobin 11.2 (L) 13.0 - 17.7 g/dL    Hematocrit 33.3 (L) 37.5 - 51.0 %   Hemoglobin & Hematocrit, Blood    Specimen: Blood   Result Value Ref Range    Hemoglobin 10.6 (L) 13.0 - 17.7 g/dL    Hematocrit 32.9 (L) 37.5 - 51.0 %   Basic Metabolic Panel    Specimen: Blood   Result Value Ref Range    Glucose 132 (H) 65 - 99 mg/dL    BUN 20 8 - 23 mg/dL    Creatinine 1.07 0.76 - 1.27 mg/dL    Sodium 130 (L) 136 - 145 mmol/L    Potassium 5.2 3.5 - 5.2 mmol/L    Chloride 100 98 - 107 mmol/L    CO2 22.7 22.0 - 29.0 mmol/L    Calcium 7.8 (L) 8.6 - 10.5 mg/dL    BUN/Creatinine " Ratio 18.7 7.0 - 25.0    Anion Gap 7.3 5.0 - 15.0 mmol/L    eGFR 75.6 >60.0 mL/min/1.73   Hemoglobin & Hematocrit, Blood    Specimen: Blood   Result Value Ref Range    Hemoglobin 9.8 (L) 13.0 - 17.7 g/dL    Hematocrit 30.1 (L) 37.5 - 51.0 %   Basic Metabolic Panel    Specimen: Blood   Result Value Ref Range    Glucose 114 (H) 65 - 99 mg/dL    BUN 25 (H) 8 - 23 mg/dL    Creatinine 1.35 (H) 0.76 - 1.27 mg/dL    Sodium 127 (L) 136 - 145 mmol/L    Potassium 4.8 3.5 - 5.2 mmol/L    Chloride 96 (L) 98 - 107 mmol/L    CO2 21.9 (L) 22.0 - 29.0 mmol/L    Calcium 8.1 (L) 8.6 - 10.5 mg/dL    BUN/Creatinine Ratio 18.5 7.0 - 25.0    Anion Gap 9.1 5.0 - 15.0 mmol/L    eGFR 57.2 (L) >60.0 mL/min/1.73   Basic Metabolic Panel    Specimen: Blood   Result Value Ref Range    Glucose 90 65 - 99 mg/dL    BUN 19 8 - 23 mg/dL    Creatinine 1.04 0.76 - 1.27 mg/dL    Sodium 132 (L) 136 - 145 mmol/L    Potassium 4.4 3.5 - 5.2 mmol/L    Chloride 101 98 - 107 mmol/L    CO2 21.8 (L) 22.0 - 29.0 mmol/L    Calcium 8.3 (L) 8.6 - 10.5 mg/dL    BUN/Creatinine Ratio 18.3 7.0 - 25.0    Anion Gap 9.2 5.0 - 15.0 mmol/L    eGFR 78.2 >60.0 mL/min/1.73   CBC (No Diff)    Specimen: Blood   Result Value Ref Range    WBC 11.10 (H) 3.40 - 10.80 10*3/mm3    RBC 3.43 (L) 4.14 - 5.80 10*6/mm3    Hemoglobin 10.1 (L) 13.0 - 17.7 g/dL    Hematocrit 31.0 (L) 37.5 - 51.0 %    MCV 90.4 79.0 - 97.0 fL    MCH 29.4 26.6 - 33.0 pg    MCHC 32.6 31.5 - 35.7 g/dL    RDW 12.9 12.3 - 15.4 %    RDW-SD 42.4 37.0 - 54.0 fl    MPV 10.6 6.0 - 12.0 fL    Platelets 202 140 - 450 10*3/mm3   Basic Metabolic Panel    Specimen: Blood   Result Value Ref Range    Glucose 74 65 - 99 mg/dL    BUN 14 8 - 23 mg/dL    Creatinine 0.91 0.76 - 1.27 mg/dL    Sodium 133 (L) 136 - 145 mmol/L    Potassium 4.0 3.5 - 5.2 mmol/L    Chloride 100 98 - 107 mmol/L    CO2 20.0 (L) 22.0 - 29.0 mmol/L    Calcium 8.4 (L) 8.6 - 10.5 mg/dL    BUN/Creatinine Ratio 15.4 7.0 - 25.0    Anion Gap 13.0 5.0 - 15.0 mmol/L     eGFR 91.8 >60.0 mL/min/1.73   CBC (No Diff)    Specimen: Blood   Result Value Ref Range    WBC 11.14 (H) 3.40 - 10.80 10*3/mm3    RBC 3.31 (L) 4.14 - 5.80 10*6/mm3    Hemoglobin 9.7 (L) 13.0 - 17.7 g/dL    Hematocrit 30.4 (L) 37.5 - 51.0 %    MCV 91.8 79.0 - 97.0 fL    MCH 29.3 26.6 - 33.0 pg    MCHC 31.9 31.5 - 35.7 g/dL    RDW 12.6 12.3 - 15.4 %    RDW-SD 42.2 37.0 - 54.0 fl    MPV 9.9 6.0 - 12.0 fL    Platelets 240 140 - 450 10*3/mm3   Basic Metabolic Panel    Specimen: Arm, Right; Blood   Result Value Ref Range    Glucose 85 65 - 99 mg/dL    BUN 14 8 - 23 mg/dL    Creatinine 0.84 0.76 - 1.27 mg/dL    Sodium 131 (L) 136 - 145 mmol/L    Potassium 4.0 3.5 - 5.2 mmol/L    Chloride 99 98 - 107 mmol/L    CO2 21.0 (L) 22.0 - 29.0 mmol/L    Calcium 8.2 (L) 8.6 - 10.5 mg/dL    BUN/Creatinine Ratio 16.7 7.0 - 25.0    Anion Gap 11.0 5.0 - 15.0 mmol/L    eGFR 95.0 >60.0 mL/min/1.73   CBC (No Diff)    Specimen: Arm, Right; Blood   Result Value Ref Range    WBC 10.14 3.40 - 10.80 10*3/mm3    RBC 2.92 (L) 4.14 - 5.80 10*6/mm3    Hemoglobin 8.5 (L) 13.0 - 17.7 g/dL    Hematocrit 25.9 (L) 37.5 - 51.0 %    MCV 88.7 79.0 - 97.0 fL    MCH 29.1 26.6 - 33.0 pg    MCHC 32.8 31.5 - 35.7 g/dL    RDW 12.5 12.3 - 15.4 %    RDW-SD 40.8 37.0 - 54.0 fl    MPV 9.8 6.0 - 12.0 fL    Platelets 242 140 - 450 10*3/mm3   Magnesium    Specimen: Arm, Right; Blood   Result Value Ref Range    Magnesium 1.9 1.6 - 2.4 mg/dL   TSH    Specimen: Arm, Right; Blood   Result Value Ref Range    TSH 0.948 0.270 - 4.200 uIU/mL   Uric Acid    Specimen: Arm, Right; Blood   Result Value Ref Range    Uric Acid 4.2 3.4 - 7.0 mg/dL   Osmolality, Urine - Urine, Clean Catch    Specimen: Urine, Clean Catch   Result Value Ref Range    Osmolality, Urine 327 mOsm/kg   CBC (No Diff)    Specimen: Blood   Result Value Ref Range    WBC 9.09 3.40 - 10.80 10*3/mm3    RBC 2.94 (L) 4.14 - 5.80 10*6/mm3    Hemoglobin 8.6 (L) 13.0 - 17.7 g/dL    Hematocrit 26.3 (L) 37.5 - 51.0 %     MCV 89.5 79.0 - 97.0 fL    MCH 29.3 26.6 - 33.0 pg    MCHC 32.7 31.5 - 35.7 g/dL    RDW 13.2 12.3 - 15.4 %    RDW-SD 43.6 37.0 - 54.0 fl    MPV 8.9 6.0 - 12.0 fL    Platelets 371 140 - 450 10*3/mm3   Basic Metabolic Panel    Specimen: Blood   Result Value Ref Range    Glucose 119 (H) 65 - 99 mg/dL    BUN 10 8 - 23 mg/dL    Creatinine 0.83 0.76 - 1.27 mg/dL    Sodium 134 (L) 136 - 145 mmol/L    Potassium 3.4 (L) 3.5 - 5.2 mmol/L    Chloride 97 (L) 98 - 107 mmol/L    CO2 26.0 22.0 - 29.0 mmol/L    Calcium 8.4 (L) 8.6 - 10.5 mg/dL    BUN/Creatinine Ratio 12.0 7.0 - 25.0    Anion Gap 11.0 5.0 - 15.0 mmol/L    eGFR 95.3 >60.0 mL/min/1.73   Iron Profile    Specimen: Blood   Result Value Ref Range    Iron 17 (L) 59 - 158 mcg/dL    Iron Saturation 9 (L) 20 - 50 %    Transferrin 129 (L) 200 - 360 mg/dL    TIBC 192 (L) 298 - 536 mcg/dL   Ferritin    Specimen: Blood   Result Value Ref Range    Ferritin 29.20 (L) 30.00 - 400.00 ng/mL   CBC (No Diff)    Specimen: Blood   Result Value Ref Range    WBC 9.21 3.40 - 10.80 10*3/mm3    RBC 3.00 (L) 4.14 - 5.80 10*6/mm3    Hemoglobin 8.6 (L) 13.0 - 17.7 g/dL    Hematocrit 26.7 (L) 37.5 - 51.0 %    MCV 89.0 79.0 - 97.0 fL    MCH 28.7 26.6 - 33.0 pg    MCHC 32.2 31.5 - 35.7 g/dL    RDW 13.1 12.3 - 15.4 %    RDW-SD 42.9 37.0 - 54.0 fl    MPV 8.8 6.0 - 12.0 fL    Platelets 400 140 - 450 10*3/mm3   Basic Metabolic Panel    Specimen: Blood   Result Value Ref Range    Glucose 95 65 - 99 mg/dL    BUN 10 8 - 23 mg/dL    Creatinine 0.82 0.76 - 1.27 mg/dL    Sodium 135 (L) 136 - 145 mmol/L    Potassium 3.9 3.5 - 5.2 mmol/L    Chloride 97 (L) 98 - 107 mmol/L    CO2 25.8 22.0 - 29.0 mmol/L    Calcium 9.0 8.6 - 10.5 mg/dL    BUN/Creatinine Ratio 12.2 7.0 - 25.0    Anion Gap 12.2 5.0 - 15.0 mmol/L    eGFR 95.7 >60.0 mL/min/1.73   Folate    Specimen: Blood   Result Value Ref Range    Folate >20.00 4.78 - 24.20 ng/mL   Vitamin B12    Specimen: Blood   Result Value Ref Range    Vitamin B-12 >2,000 (H)  211 - 946 pg/mL   CBC (No Diff)    Specimen: Blood   Result Value Ref Range    WBC 10.06 3.40 - 10.80 10*3/mm3    RBC 3.11 (L) 4.14 - 5.80 10*6/mm3    Hemoglobin 8.9 (L) 13.0 - 17.7 g/dL    Hematocrit 28.2 (L) 37.5 - 51.0 %    MCV 90.7 79.0 - 97.0 fL    MCH 28.6 26.6 - 33.0 pg    MCHC 31.6 31.5 - 35.7 g/dL    RDW 13.1 12.3 - 15.4 %    RDW-SD 43.6 37.0 - 54.0 fl    MPV 8.9 6.0 - 12.0 fL    Platelets 454 (H) 140 - 450 10*3/mm3   Basic Metabolic Panel    Specimen: Blood   Result Value Ref Range    Glucose 92 65 - 99 mg/dL    BUN 11 8 - 23 mg/dL    Creatinine 0.94 0.76 - 1.27 mg/dL    Sodium 134 (L) 136 - 145 mmol/L    Potassium 4.3 3.5 - 5.2 mmol/L    Chloride 95 (L) 98 - 107 mmol/L    CO2 25.0 22.0 - 29.0 mmol/L    Calcium 8.8 8.6 - 10.5 mg/dL    BUN/Creatinine Ratio 11.7 7.0 - 25.0    Anion Gap 14.0 5.0 - 15.0 mmol/L    eGFR 88.3 >60.0 mL/min/1.73   CBC (No Diff)    Specimen: Blood   Result Value Ref Range    WBC 11.01 (H) 3.40 - 10.80 10*3/mm3    RBC 3.13 (L) 4.14 - 5.80 10*6/mm3    Hemoglobin 9.1 (L) 13.0 - 17.7 g/dL    Hematocrit 27.9 (L) 37.5 - 51.0 %    MCV 89.1 79.0 - 97.0 fL    MCH 29.1 26.6 - 33.0 pg    MCHC 32.6 31.5 - 35.7 g/dL    RDW 12.9 12.3 - 15.4 %    RDW-SD 41.5 37.0 - 54.0 fl    MPV 8.6 6.0 - 12.0 fL    Platelets 448 140 - 450 10*3/mm3   Basic Metabolic Panel    Specimen: Blood   Result Value Ref Range    Glucose 95 65 - 99 mg/dL    BUN 11 8 - 23 mg/dL    Creatinine 0.97 0.76 - 1.27 mg/dL    Sodium 134 (L) 136 - 145 mmol/L    Potassium 4.3 3.5 - 5.2 mmol/L    Chloride 100 98 - 107 mmol/L    CO2 26.3 22.0 - 29.0 mmol/L    Calcium 9.0 8.6 - 10.5 mg/dL    BUN/Creatinine Ratio 11.3 7.0 - 25.0    Anion Gap 7.7 5.0 - 15.0 mmol/L    eGFR 85.0 >60.0 mL/min/1.73   CBC (No Diff)    Specimen: Blood   Result Value Ref Range    WBC 15.84 (H) 3.40 - 10.80 10*3/mm3    RBC 3.04 (L) 4.14 - 5.80 10*6/mm3    Hemoglobin 8.8 (L) 13.0 - 17.7 g/dL    Hematocrit 26.8 (L) 37.5 - 51.0 %    MCV 88.2 79.0 - 97.0 fL    MCH 28.9  26.6 - 33.0 pg    MCHC 32.8 31.5 - 35.7 g/dL    RDW 13.0 12.3 - 15.4 %    RDW-SD 41.3 37.0 - 54.0 fl    MPV 8.4 6.0 - 12.0 fL    Platelets 480 (H) 140 - 450 10*3/mm3   Basic Metabolic Panel    Specimen: Blood   Result Value Ref Range    Glucose 122 (H) 65 - 99 mg/dL    BUN 14 8 - 23 mg/dL    Creatinine 0.93 0.76 - 1.27 mg/dL    Sodium 131 (L) 136 - 145 mmol/L    Potassium 4.4 3.5 - 5.2 mmol/L    Chloride 98 98 - 107 mmol/L    CO2 21.4 (L) 22.0 - 29.0 mmol/L    Calcium 9.1 8.6 - 10.5 mg/dL    BUN/Creatinine Ratio 15.1 7.0 - 25.0    Anion Gap 11.6 5.0 - 15.0 mmol/L    eGFR 89.4 >60.0 mL/min/1.73   CBC (No Diff)    Specimen: Blood   Result Value Ref Range    WBC 10.90 (H) 3.40 - 10.80 10*3/mm3    RBC 3.16 (L) 4.14 - 5.80 10*6/mm3    Hemoglobin 9.1 (L) 13.0 - 17.7 g/dL    Hematocrit 28.4 (L) 37.5 - 51.0 %    MCV 89.9 79.0 - 97.0 fL    MCH 28.8 26.6 - 33.0 pg    MCHC 32.0 31.5 - 35.7 g/dL    RDW 13.8 12.3 - 15.4 %    RDW-SD 45.4 37.0 - 54.0 fl    MPV 8.5 6.0 - 12.0 fL    Platelets 507 (H) 140 - 450 10*3/mm3   Basic Metabolic Panel    Specimen: Blood   Result Value Ref Range    Glucose 100 (H) 65 - 99 mg/dL    BUN 13 8 - 23 mg/dL    Creatinine 0.96 0.76 - 1.27 mg/dL    Sodium 133 (L) 136 - 145 mmol/L    Potassium 4.0 3.5 - 5.2 mmol/L    Chloride 97 (L) 98 - 107 mmol/L    CO2 23.2 22.0 - 29.0 mmol/L    Calcium 8.9 8.6 - 10.5 mg/dL    BUN/Creatinine Ratio 13.5 7.0 - 25.0    Anion Gap 12.8 5.0 - 15.0 mmol/L    eGFR 86.1 >60.0 mL/min/1.73   CBC (No Diff)    Specimen: Blood   Result Value Ref Range    WBC 10.24 3.40 - 10.80 10*3/mm3    RBC 3.40 (L) 4.14 - 5.80 10*6/mm3    Hemoglobin 9.8 (L) 13.0 - 17.7 g/dL    Hematocrit 29.7 (L) 37.5 - 51.0 %    MCV 87.4 79.0 - 97.0 fL    MCH 28.8 26.6 - 33.0 pg    MCHC 33.0 31.5 - 35.7 g/dL    RDW 13.4 12.3 - 15.4 %    RDW-SD 41.8 37.0 - 54.0 fl    MPV 8.6 6.0 - 12.0 fL    Platelets 518 (H) 140 - 450 10*3/mm3   Basic Metabolic Panel    Specimen: Blood   Result Value Ref Range    Glucose 87  65 - 99 mg/dL    BUN 12 8 - 23 mg/dL    Creatinine 1.06 0.76 - 1.27 mg/dL    Sodium 133 (L) 136 - 145 mmol/L    Potassium 4.1 3.5 - 5.2 mmol/L    Chloride 97 (L) 98 - 107 mmol/L    CO2 24.0 22.0 - 29.0 mmol/L    Calcium 8.9 8.6 - 10.5 mg/dL    BUN/Creatinine Ratio 11.3 7.0 - 25.0    Anion Gap 12.0 5.0 - 15.0 mmol/L    eGFR 76.4 >60.0 mL/min/1.73   POC Glucose Once    Specimen: Blood   Result Value Ref Range    Glucose 215 (H) 70 - 130 mg/dL   POC Glucose Once    Specimen: Blood   Result Value Ref Range    Glucose 128 70 - 130 mg/dL   POC Glucose Once    Specimen: Blood   Result Value Ref Range    Glucose 132 (H) 70 - 130 mg/dL   POC Glucose Once    Specimen: Blood   Result Value Ref Range    Glucose 109 70 - 130 mg/dL   Type & Screen    Specimen: Blood   Result Value Ref Range    ABO Type A     RH type Positive     Antibody Screen Negative     T&S Expiration Date 4/16/2022 11:59:59 PM    Duplex Venous Lower Extremity - Right CAR   Result Value Ref Range    Target HR (85%) 129 bpm    Max. Pred. HR (100%) 152 bpm    Right Common Femoral Spont Y     Right Common Femoral Phasic Y     Right Common Femoral Augment Y     Right Common Femoral Competent Y     Right Common Femoral Compress C     Right Saphenofemoral Junction Compress C     Right Profunda Femoral Compress C     Right Proximal Femoral Compress C     Right Mid Femoral Spont Y     Right Mid Femoral Phasic Y     Right Mid Femoral Augment Y     Right Mid Femoral Competent Y     Right Mid Femoral Compress C     Right Distal Femoral Compress C     Right Popliteal Spont D     Right Popliteal Phasic D     Right Popliteal Augment Y     Right Popliteal Competent Y     Right Popliteal Compress C     Right Posterior Tibial Compress C     Right Peroneal Compress C     Right Gastronemius Compress C     Right Greater Saph AK Compress C     Right Greater Saph BK Compress C     Right Lesser Saph Compress C     Left Common Femoral Spont Y     Left Common Femoral Phasic Y      Left Common Femoral Augment Y     Left Common Femoral Competent Y     Left Common Femoral Compress C                  Note last lipids were performed 10/27/2021 with LDL cholesterol 41 and goal met  The following portions of the patient's history were reviewed and updated as appropriate: allergies, current medications, past family history, past medical history, past social history, past surgical history and problem list.    Review of Systems   Constitutional: Positive for fatigue. Negative for activity change, appetite change and unexpected weight change.   HENT: Negative for nosebleeds and trouble swallowing.    Eyes: Negative for pain and visual disturbance.   Respiratory: Negative for chest tightness, shortness of breath and wheezing.    Cardiovascular: Negative for chest pain and palpitations.   Gastrointestinal: Negative for abdominal pain and blood in stool.   Endocrine: Negative.    Genitourinary: Negative for difficulty urinating and hematuria.   Musculoskeletal: Positive for arthralgias, back pain and joint swelling.   Skin: Negative for color change and rash.   Allergic/Immunologic: Negative.    Neurological: Negative for syncope and speech difficulty.   Hematological: Negative for adenopathy.   Psychiatric/Behavioral: Negative for agitation and confusion.   All other systems reviewed and are negative.      Objective   Physical Exam  Vitals and nursing note reviewed.   Constitutional:       General: He is not in acute distress.     Appearance: He is well-developed. He is obese. He is not diaphoretic.   HENT:      Head: Normocephalic.      Right Ear: External ear normal.      Left Ear: External ear normal.      Mouth/Throat:      Mouth: Mucous membranes are moist.   Eyes:      General: No scleral icterus.     Conjunctiva/sclera: Conjunctivae normal.      Pupils: Pupils are equal, round, and reactive to light.   Neck:      Vascular: No carotid bruit.   Cardiovascular:      Rate and Rhythm: Normal rate and  regular rhythm.      Pulses: Normal pulses.      Heart sounds: Normal heart sounds. No murmur heard.  Pulmonary:      Effort: Pulmonary effort is normal.      Breath sounds: Normal breath sounds. No rales.   Musculoskeletal:         General: Normal range of motion.      Cervical back: Normal range of motion and neck supple.      Right lower leg: No edema.      Left lower leg: No edema.   Skin:     General: Skin is warm and dry.      Coloration: Skin is not jaundiced.      Findings: No rash.   Neurological:      General: No focal deficit present.      Mental Status: He is alert and oriented to person, place, and time.   Psychiatric:         Mood and Affect: Mood normal. Affect is not inappropriate.         Behavior: Behavior normal.         Thought Content: Thought content normal.         Judgment: Judgment normal.         Assessment & Plan   Diagnoses and all orders for this visit:    1. Hospital discharge follow-up (Primary)    2. IFG (impaired fasting glucose)    3. Rheumatoid arthritis involving multiple sites with positive rheumatoid factor (HCC)    4. Primary hypertension    5. Mixed hyperlipidemia    6. Other emphysema (HCC)    7. Low serum vitamin B12    8. Coronary artery disease involving native coronary artery of native heart with unstable angina pectoris (HCC)    9. Recurrent major depressive disorder, in full remission (HCC)    10. Generalized anxiety disorder    11. Low folic acid    12. Lumbar radiculopathy    Other orders  -     irbesartan (Avapro) 75 MG tablet; Take 1 tablet by mouth Every Night. For BP---new dose  Dispense: 90 tablet; Refill: 1        Encourage yearly low-dose CT of the chest to screen for lung cancer  Note that Lexapro does work well for anxiety and depression and continue same dose  Continue albuterol for COPD working well  Flexeril as needed muscle spasms working well  Followed by Dr. Lopez and under treatment for RA  Sees cardiology yearly for CAD note stable per end of March  visit  Has follow-up with spine Ortho Dr. Perez for postop surgery  Plan, Prem Thrasher, was seen today.  he was seen for Imparied fasting glucose and plan follow up labs, diet, and exercise, Hyperlipidemia and will continue current medication, Hypothyroidism and will need to update labs for continued treatment, Vitamin D deficiency and supplemented and primary HTN--bp too low.  Start PPI for the gastritis and refer to GI if no help  Continue folic acid and B12 levels were at goal in April labs  Update hemoglobin from hospitalization was low due to blood loss from surgery

## 2022-05-18 NOTE — PATIENT INSTRUCTIONS
Medicare Wellness  Personal Prevention Plan of Service     Date of Office Visit:    Encounter Provider:  Montse Cain PA-C  Place of Service:  [facility]  Patient Name: Prem Thrasher  :  1955    As part of the Medicare Wellness portion of your visit today, we are providing you with this personalized preventive plan of services (PPPS). This plan is based upon recommendations of the [place] Preventive Services Task Force (USPSTF) and the Advisory Committee on Immunization Practices (ACIP).    This lists the preventive care services that should be considered, and provides dates of when you are due. Items listed as completed are up-to-date and do not require any further intervention.    Health Maintenance   Topic Date Due    ZOSTER VACCINE (1 of 2) Never done    TDAP/TD VACCINES (2 - Tdap) 2013    Pneumococcal Vaccine 65+ (2 - PCV) 10/23/2019    LIPID PANEL  10/27/2022    LUNG CANCER SCREENING  2022    ANNUAL WELLNESS VISIT  2023    COLORECTAL CANCER SCREENING  2026    COVID-19 Vaccine  Completed    AAA SCREEN (ONE-TIME)  Completed    HEPATITIS C SCREENING  Addressed    INFLUENZA VACCINE  Discontinued       Orders Placed This Encounter   Procedures    Comprehensive metabolic panel     Order Specific Question:   Release to patient     Answer:   Immediate    Lipid panel    TSH     Order Specific Question:   Release to patient     Answer:   Immediate    T3, Free     Order Specific Question:   Release to patient     Answer:   Immediate    T4, Free     Order Specific Question:   Release to patient     Answer:   Immediate    Vitamin D 25 Hydroxy     Order Specific Question:   Release to patient     Answer:   Immediate    Hemoglobin A1c     Order Specific Question:   Release to patient     Answer:   Immediate    CBC and Differential     Order Specific Question:   Manual Differential     Answer:   No       Return in about 6 months (around 2022), or  if symptoms worsen or fail to improve.          Medicare Wellness  Personal Prevention Plan of Service     Date of Office Visit:    Encounter Provider:  Montse Cain PA-C  Place of Service:  [facility]  Patient Name: Prem Thrasher  :  1955    As part of the Medicare Wellness portion of your visit today, we are providing you with this personalized preventive plan of services (PPPS). This plan is based upon recommendations of the [place] Preventive Services Task Force (USPSTF) and the Advisory Committee on Immunization Practices (ACIP).    This lists the preventive care services that should be considered, and provides dates of when you are due. Items listed as completed are up-to-date and do not require any further intervention.    Health Maintenance   Topic Date Due    ZOSTER VACCINE (1 of 2) Never done    TDAP/TD VACCINES (2 - Tdap) 2013    Pneumococcal Vaccine 65+ (2 - PCV) 10/23/2019    LIPID PANEL  10/27/2022    LUNG CANCER SCREENING  2022    ANNUAL WELLNESS VISIT  2023    COLORECTAL CANCER SCREENING  2026    COVID-19 Vaccine  Completed    AAA SCREEN (ONE-TIME)  Completed    HEPATITIS C SCREENING  Addressed    INFLUENZA VACCINE  Discontinued       Orders Placed This Encounter   Procedures    Comprehensive metabolic panel     Order Specific Question:   Release to patient     Answer:   Immediate    Lipid panel    TSH     Order Specific Question:   Release to patient     Answer:   Immediate    T3, Free     Order Specific Question:   Release to patient     Answer:   Immediate    T4, Free     Order Specific Question:   Release to patient     Answer:   Immediate    Vitamin D 25 Hydroxy     Order Specific Question:   Release to patient     Answer:   Immediate    Hemoglobin A1c     Order Specific Question:   Release to patient     Answer:   Immediate    CBC and Differential     Order Specific Question:   Manual Differential     Answer:   No       No  follow-ups on file.

## 2022-05-18 NOTE — PROGRESS NOTES
The ABCs of the Annual Wellness Visit  Subsequent Medicare Wellness Visit    Chief Complaint   Patient presents with   • Hospital Follow Up Visit   • Hyperlipidemia   • Hypertension      Subjective    History of Present Illness:  Prem Thrasher is a 67 y.o. male who presents for a Subsequent Medicare Wellness Visit.    The following portions of the patient's history were reviewed and   updated as appropriate: allergies, current medications, past family history, past medical history, past social history, past surgical history and problem list.    Compared to one year ago, the patient feels his physical   health is the same.    Compared to one year ago, the patient feels his mental   health is the same.    Recent Hospitalizations:  This patient has had a [facility] admission record on file within the last 365 days.    Current Medical Providers:  Patient Care Team:  Montse Cain PA-C as PCP - General (Family Medicine)  Maria Antonia Lopez MD as Consulting Physician (Rheumatology)  Sawyer Rick MD as Surgeon (Neurosurgery)  Pradip Mcmillan MD as Consulting Physician (Pulmonary Disease)  Jacky Perez MD as Surgeon (Orthopedic Surgery)  Charli Sen MD as Consulting Physician (Infectious Diseases)  Tray Moody MD as Consulting Physician (Urology)  Suman Richards DPM as Consulting Physician (Podiatry)  Porsha Arcos MD as Consulting Physician (General Surgery)  Dioni Salazar MD as Consulting Physician (Cardiology)    Outpatient Medications Prior to Visit   Medication Sig Dispense Refill   • albuterol sulfate  (90 Base) MCG/ACT inhaler Inhale 2 puffs Every 4 (Four) Hours As Needed for Wheezing or Shortness of Air. 54 g 3   • aspirin  MG tablet Take 1 tablet by mouth Daily. (Patient taking differently: Take 325 mg by mouth Daily. STATES INSTRUCTED TO HOLD ONE WEEK PRIOR TO SURGERY) 30 tablet 0   • carvedilol (COREG) 12.5 MG tablet TAKE 1 TABLET TWICE DAILY WITH  MEALS FOR HEART 180 tablet 1   • Cholecalciferol (VITAMIN D) 2000 UNITS tablet Take 2,000 Units by mouth Every Evening.     • clopidogrel (PLAVIX) 75 MG tablet TAKE 1 TABLET EVERY DAY (Patient taking differently: Take 75 mg by mouth Daily. STATES INSTRUCTED TO STOP ONE WEEK PRIOR TO SURGERY) 90 tablet 2   • Cyanocobalamin (Vitamin B-12) 1000 MCG sublingual tablet DISSOLVE 1 TABLET UNDER THE TONGUE EVERY DAY (Patient taking differently: Place 1,000 mcg under the tongue Daily. DISSOLVE 1 TABLET UNDER THE TONGUE EVERY DAY) 90 tablet 3   • cyclobenzaprine (FLEXERIL) 10 MG tablet Take 10 mg by mouth 2 (Two) Times a Day.     • docusate sodium 100 MG capsule Take 1 capsule by mouth 2 (Two) Times a Day.     • escitalopram (LEXAPRO) 20 MG tablet TAKE 1 TABLET BY MOUTH EVERY NIGHT FOR ANXIETY (Patient taking differently: Take 20 mg by mouth Daily. For anxiety) 90 tablet 3   • ezetimibe (ZETIA) 10 MG tablet Take 1 tablet by mouth Every Evening. For cholesterol 90 tablet 3   • folic acid (FOLVITE) 1 MG tablet TAKE 1 TABLET EVERY DAY (Patient taking differently: Take 1 mg by mouth Daily.) 90 tablet 3   • irbesartan (AVAPRO) 300 MG tablet TAKE 1 TABLET EVERY DAY FOR BLOOD PRESSURE 90 tablet 0   • isosorbide mononitrate (IMDUR) 30 MG 24 hr tablet TAKE 1 TABLET EVERY DAY (Patient taking differently: Take 30 mg by mouth Daily.) 90 tablet 3   • leflunomide (ARAVA) 20 MG tablet Take 1 tablet by mouth Every Night. Continue to hold until seen in office with Dr. Perez     • levothyroxine (SYNTHROID, LEVOTHROID) 25 MCG tablet One PO daily before breakfast for thyroid (Patient taking differently: Take 25 mcg by mouth Daily.) 90 tablet 3   • multivitamin with minerals tablet tablet Take 1 tablet by mouth Daily. HOLD X1 WEEK PRIOR TO SURGERY     • nitroglycerin (NITROSTAT) 0.4 MG SL tablet Place 1 tablet under the tongue Every 5 (Five) Minutes As Needed for Chest Pain. Take no more than 3 doses in 15 minutes. 25 tablet 1   •  oxyCODONE-acetaminophen (PERCOCET) 5-325 MG per tablet 1 to 2 tablets p.o. every 4 to 6 hours as needed pain 45 tablet 0   • rosuvastatin (CRESTOR) 10 MG tablet TAKE 1 TABLET EVERY NIGHT AT BEDTIME 90 tablet 1     No facility-administered medications prior to visit.       Opioid medication/s are on active medication list.  and I have evaluated his active treatment plan and pain score trends (see table).  There were no vitals filed for this visit.  I have reviewed the chart for potential of high risk medication and harmful drug interactions in the elderly.            Aspirin is on active medication list. Aspirin use is indicated based on review of current medical condition/s. Pros and cons of this therapy have been discussed today. Benefits of this medication outweigh potential harm.  Patient has been encouraged to continue taking this medication.  .      Patient Active Problem List   Diagnosis   • Allergy   • COPD (chronic obstructive pulmonary disease) ([facility])   • Hyperlipidemia   • IFG (impaired fasting glucose)   • Rheumatoid arthritis ([facility])   • Hypertension   • Anxiety disorder   • Recurrent major depressive disorder, in full remission ([facility])   • Lumbar radiculopathy   • Spondylolisthesis of lumbar region   • Sepsis ([facility])   • Allegra's deformity of left heel   • Calcific Achilles tendonitis s/p OR 7/18   • Gastrocnemius equinus, left   • Abscess and cellulitis of gluteal region   • Tobacco abuse   • Perirectal abscess   • Anemia   • Wound dehiscence   • Gastrocnemius strain, left, initial encounter   • Low folic acid   • Low serum vitamin B12   • Exertional chest pain   • Unstable angina ([facility])   • Coronary artery disease involving native coronary artery of native heart with unstable angina pectoris ([facility])   • Unstable angina pectoris ([facility])   • Coronary artery disease involving native heart without angina pectoris   • Low back pain   • Non-STEMI (non-ST elevated myocardial infarction) ([facility])   • CAD S/P percutaneous  "coronary angioplasty   • S/P drug eluting coronary stent placement   • Prediabetes   • Tobacco abuse counseling   • Screening for colon cancer   • Colon polyps   • Diverticulosis   • Hypothyroidism, acquired   • Renal impairment   • Lumbar pain   • 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  ACP discussion was held with the patient during this visit. Patient has an advance directive in EMR which is still valid.           Objective    Vitals:    22 1333   BP: 122/60   BP Location: Left arm   Patient Position: Sitting   Cuff Size: Adult   Pulse: 86   Resp: 16   Temp: 97.5 °F (36.4 °C)   SpO2: 98%   Weight: 103 kg (226 lb)   Height: 177.8 cm (70\")     BMI is >= 30 and <= 34.9 (Class 1 obesity). The following options were offered after discussion: weight loss educational material (shared in after visit summary)  Does the patient have evidence of cognitive impairment? No    Physical Exam            HEALTH RISK ASSESSMENT    Smoking Status:  Social History     Tobacco Use   Smoking Status Former Smoker   • Packs/day: 1.00   • Years: 45.00   • Pack years: 45.00   • Types: Cigarettes   • Quit date: 10/20/2021   • Years since quittin.5   Smokeless Tobacco Never Used   Tobacco Comment    no caffeine      Alcohol Consumption:  Social History     Substance and Sexual Activity   Alcohol Use No     Fall Risk Screen:    JOSE MARIA Fall Risk Assessment was completed, and patient is at LOW risk for falls.Assessment completed on:2022    Depression Screening:  PHQ-2/PHQ-9 Depression Screening 2022   Retired PHQ-9 Total Score -   Retired Total Score -   Little Interest or Pleasure in Doing Things 0-->not at all   Feeling Down, Depressed or Hopeless 0-->not at all   PHQ-9: Brief " Depression Severity Measure Score 0       Health Habits and Functional and Cognitive Screening:  Functional & Cognitive Status 5/18/2022   Do you have difficulty preparing food and eating? No   Do you have difficulty bathing yourself, getting dressed or grooming yourself? No   Do you have difficulty using the toilet? Yes   Do you have difficulty moving around from place to place? Yes   Do you have trouble with steps or getting out of a bed or a chair? No   Current Diet Well Balanced Diet   Dental Exam Up to date   Eye Exam Up to date   Exercise (times per week) 7 times per week   Current Exercises Include Walking   Current Exercise Activities Include -   Do you need help using the phone?  No   Are you deaf or do you have serious difficulty hearing?  No   Do you need help with transportation? No   Do you need help shopping? Yes   Do you need help preparing meals?  Yes   Do you need help with housework?  Yes   Do you need help with laundry? Yes   Do you need help taking your medications? No   Do you need help managing money? No   Do you ever drive or ride in a car without wearing a seat belt? No   Have you felt unusual stress, anger or loneliness in the last month? Yes   Who do you live with? Spouse   If you need help, do you have trouble finding someone available to you? No   Have you been bothered in the last four weeks by sexual problems? Yes   Do you have difficulty concentrating, remembering or making decisions? No       Age-appropriate Screening Schedule:  Refer to the list below for future screening recommendations based on patient's age, sex and/or medical conditions. Orders for these recommended tests are listed in the plan section. The patient has been provided with a written plan.    Health Maintenance   Topic Date Due   • ZOSTER VACCINE (1 of 2) Never done   • TDAP/TD VACCINES (2 - Tdap) 07/12/2013   • LIPID PANEL  10/27/2022   • INFLUENZA VACCINE  Discontinued              Assessment & Plan   CMS  Preventative Services Quick Reference  Risk Factors Identified During Encounter  Cardiovascular Disease  The above risks/problems have been discussed with the patient.  Follow up actions/plans if indicated are seen below in the Assessment/Plan Section.  Pertinent information has been shared with the patient in the After Visit Summary.    There are no diagnoses linked to this encounter.    Follow Up:   No follow-ups on file.     An After Visit Summary and PPPS were made available to the patient.

## 2022-05-19 LAB
25(OH)D3+25(OH)D2 SERPL-MCNC: 36.5 NG/ML (ref 30–100)
ALBUMIN SERPL-MCNC: 4.5 G/DL (ref 3.8–4.8)
ALBUMIN/GLOB SERPL: 1.9 {RATIO} (ref 1.2–2.2)
ALP SERPL-CCNC: 134 IU/L (ref 44–121)
ALT SERPL-CCNC: 20 IU/L (ref 0–44)
AST SERPL-CCNC: 20 IU/L (ref 0–40)
BASOPHILS # BLD AUTO: 0.1 X10E3/UL (ref 0–0.2)
BASOPHILS NFR BLD AUTO: 1 %
BILIRUB SERPL-MCNC: 0.4 MG/DL (ref 0–1.2)
BUN SERPL-MCNC: 19 MG/DL (ref 8–27)
BUN/CREAT SERPL: 13 (ref 10–24)
CALCIUM SERPL-MCNC: 10 MG/DL (ref 8.6–10.2)
CHLORIDE SERPL-SCNC: 96 MMOL/L (ref 96–106)
CHOLEST SERPL-MCNC: 111 MG/DL (ref 100–199)
CO2 SERPL-SCNC: 20 MMOL/L (ref 20–29)
CREAT SERPL-MCNC: 1.47 MG/DL (ref 0.76–1.27)
EGFRCR SERPLBLD CKD-EPI 2021: 52 ML/MIN/1.73
EOSINOPHIL # BLD AUTO: 0.4 X10E3/UL (ref 0–0.4)
EOSINOPHIL NFR BLD AUTO: 4 %
ERYTHROCYTE [DISTWIDTH] IN BLOOD BY AUTOMATED COUNT: 13 % (ref 11.6–15.4)
GLOBULIN SER CALC-MCNC: 2.4 G/DL (ref 1.5–4.5)
GLUCOSE SERPL-MCNC: 90 MG/DL (ref 65–99)
HBA1C MFR BLD: 5.8 % (ref 4.8–5.6)
HCT VFR BLD AUTO: 36.6 % (ref 37.5–51)
HDLC SERPL-MCNC: 41 MG/DL
HGB BLD-MCNC: 12 G/DL (ref 13–17.7)
IMM GRANULOCYTES # BLD AUTO: 0.1 X10E3/UL (ref 0–0.1)
IMM GRANULOCYTES NFR BLD AUTO: 1 %
LDLC SERPL CALC-MCNC: 51 MG/DL (ref 0–99)
LYMPHOCYTES # BLD AUTO: 1.7 X10E3/UL (ref 0.7–3.1)
LYMPHOCYTES NFR BLD AUTO: 17 %
MCH RBC QN AUTO: 28.7 PG (ref 26.6–33)
MCHC RBC AUTO-ENTMCNC: 32.8 G/DL (ref 31.5–35.7)
MCV RBC AUTO: 88 FL (ref 79–97)
MONOCYTES # BLD AUTO: 1 X10E3/UL (ref 0.1–0.9)
MONOCYTES NFR BLD AUTO: 10 %
NEUTROPHILS # BLD AUTO: 6.6 X10E3/UL (ref 1.4–7)
NEUTROPHILS NFR BLD AUTO: 67 %
PLATELET # BLD AUTO: 337 X10E3/UL (ref 150–450)
POTASSIUM SERPL-SCNC: 5.6 MMOL/L (ref 3.5–5.2)
PROT SERPL-MCNC: 6.9 G/DL (ref 6–8.5)
RBC # BLD AUTO: 4.18 X10E6/UL (ref 4.14–5.8)
SODIUM SERPL-SCNC: 135 MMOL/L (ref 134–144)
T3FREE SERPL-MCNC: 3.2 PG/ML (ref 2–4.4)
T4 FREE SERPL-MCNC: 0.76 NG/DL (ref 0.82–1.77)
TRIGL SERPL-MCNC: 103 MG/DL (ref 0–149)
TSH SERPL DL<=0.005 MIU/L-ACNC: 0.97 UIU/ML (ref 0.45–4.5)
VLDLC SERPL CALC-MCNC: 19 MG/DL (ref 5–40)
WBC # BLD AUTO: 9.8 X10E3/UL (ref 3.4–10.8)

## 2022-05-19 RX ORDER — LEVOTHYROXINE SODIUM 0.05 MG/1
50 TABLET ORAL DAILY
Qty: 90 TABLET | Refills: 3 | Status: SHIPPED | OUTPATIENT
Start: 2022-05-19 | End: 2023-03-01 | Stop reason: SDUPTHER

## 2022-05-20 LAB
BUN SERPL-MCNC: 18 MG/DL (ref 8–27)
BUN/CREAT SERPL: 15 (ref 10–24)
CALCIUM SERPL-MCNC: 9.4 MG/DL (ref 8.6–10.2)
CHLORIDE SERPL-SCNC: 96 MMOL/L (ref 96–106)
CO2 SERPL-SCNC: 18 MMOL/L (ref 20–29)
CREAT SERPL-MCNC: 1.22 MG/DL (ref 0.76–1.27)
EGFRCR SERPLBLD CKD-EPI 2021: 65 ML/MIN/1.73
GLUCOSE SERPL-MCNC: 101 MG/DL (ref 65–99)
POTASSIUM SERPL-SCNC: 4.8 MMOL/L (ref 3.5–5.2)
SODIUM SERPL-SCNC: 132 MMOL/L (ref 134–144)

## 2022-05-24 RX ORDER — IRBESARTAN 75 MG/1
75 TABLET ORAL NIGHTLY
Qty: 90 TABLET | Refills: 1 | Status: SHIPPED | OUTPATIENT
Start: 2022-05-24 | End: 2022-10-03 | Stop reason: ALTCHOICE

## 2022-06-07 DIAGNOSIS — M54.16 LUMBAR RADICULOPATHY: ICD-10-CM

## 2022-06-07 DIAGNOSIS — M96.0 PSEUDARTHROSIS AFTER FUSION OR ARTHRODESIS: ICD-10-CM

## 2022-06-07 RX ORDER — OXYCODONE HYDROCHLORIDE AND ACETAMINOPHEN 5; 325 MG/1; MG/1
TABLET ORAL
Qty: 45 TABLET | Refills: 0 | Status: SHIPPED | OUTPATIENT
Start: 2022-06-07 | End: 2022-07-12 | Stop reason: SDUPTHER

## 2022-06-20 RX ORDER — CLOPIDOGREL BISULFATE 75 MG/1
TABLET ORAL
Qty: 90 TABLET | Refills: 2 | Status: SHIPPED | OUTPATIENT
Start: 2022-06-20 | End: 2022-10-03 | Stop reason: SDUPTHER

## 2022-07-04 RX ORDER — AMLODIPINE BESYLATE 10 MG/1
TABLET ORAL
Qty: 90 TABLET | Refills: 1 | Status: SHIPPED | OUTPATIENT
Start: 2022-07-04 | End: 2022-10-03 | Stop reason: SDUPTHER

## 2022-07-06 ENCOUNTER — OFFICE VISIT (OUTPATIENT)
Dept: ORTHOPEDIC SURGERY | Facility: CLINIC | Age: 67
End: 2022-07-06

## 2022-07-06 VITALS — TEMPERATURE: 96.4 F | HEIGHT: 70 IN | WEIGHT: 233 LBS | BODY MASS INDEX: 33.36 KG/M2

## 2022-07-06 DIAGNOSIS — M43.25 FUSION OF SPINE OF THORACOLUMBAR REGION: Primary | ICD-10-CM

## 2022-07-06 PROCEDURE — 99024 POSTOP FOLLOW-UP VISIT: CPT | Performed by: ORTHOPAEDIC SURGERY

## 2022-07-06 PROCEDURE — 72080 X-RAY EXAM THORACOLMB 2/> VW: CPT | Performed by: ORTHOPAEDIC SURGERY

## 2022-07-06 NOTE — PROGRESS NOTES
Back pain is improved.  No significant leg pain.  He states he feels like he is standing and sitting straighter and his wife notices his improved posture.  He has managed to see smoking!.  Two-view x-rays of the lumbar spine obtained to evaluate hardware and  fusion bone suggest further healing since prior x-ray.  We will need an AP and lateral  x-ray on return visit.  Instructions were given.  I reminded him to keep using the bone stimulator.

## 2022-07-12 DIAGNOSIS — M96.0 PSEUDARTHROSIS AFTER FUSION OR ARTHRODESIS: ICD-10-CM

## 2022-07-12 DIAGNOSIS — M54.16 LUMBAR RADICULOPATHY: ICD-10-CM

## 2022-07-12 RX ORDER — OXYCODONE HYDROCHLORIDE AND ACETAMINOPHEN 5; 325 MG/1; MG/1
TABLET ORAL
Qty: 45 TABLET | Refills: 0 | Status: SHIPPED | OUTPATIENT
Start: 2022-07-12 | End: 2022-12-01

## 2022-07-12 NOTE — TELEPHONE ENCOUNTER
Caller: YELENA     Relationship: SELF    Best call back number: [phone]    Requested Prescriptions:   OXYCODONE     Pharmacy where request should be sent:  [facility] #[id number]     Does the patient have less than a 3 day supply:  [] Yes  [x] No    Sarah Baer Rep   07/12/22 13:14 EDT

## 2022-08-10 RX ORDER — ISOSORBIDE MONONITRATE 30 MG/1
TABLET, EXTENDED RELEASE ORAL
Qty: 90 TABLET | Refills: 3 | Status: SHIPPED | OUTPATIENT
Start: 2022-08-10 | End: 2022-10-03 | Stop reason: SDUPTHER

## 2022-08-10 NOTE — TELEPHONE ENCOUNTER
Please advise filling Isosorbide    LOV   -   3/31/2022  Next   -   10/3/2022  Last labs   -   5/19/2022  BMP, TSH, CBC, Lipid    Jo DIAZ

## 2022-08-16 RX ORDER — ROSUVASTATIN CALCIUM 10 MG/1
10 TABLET, COATED ORAL
Qty: 90 TABLET | Refills: 1 | Status: SHIPPED | OUTPATIENT
Start: 2022-08-16 | End: 2022-10-03 | Stop reason: SDUPTHER

## 2022-08-19 ENCOUNTER — OFFICE VISIT (OUTPATIENT)
Dept: ORTHOPEDIC SURGERY | Facility: CLINIC | Age: 67
End: 2022-08-19

## 2022-08-19 DIAGNOSIS — G89.29 CHRONIC THORACIC BACK PAIN, UNSPECIFIED BACK PAIN LATERALITY: ICD-10-CM

## 2022-08-19 DIAGNOSIS — R52 PAIN: Primary | ICD-10-CM

## 2022-08-19 DIAGNOSIS — M54.6 CHRONIC THORACIC BACK PAIN, UNSPECIFIED BACK PAIN LATERALITY: ICD-10-CM

## 2022-08-19 PROCEDURE — 99213 OFFICE O/P EST LOW 20 MIN: CPT | Performed by: ORTHOPAEDIC SURGERY

## 2022-08-19 PROCEDURE — 72100 X-RAY EXAM L-S SPINE 2/3 VWS: CPT | Performed by: ORTHOPAEDIC SURGERY

## 2022-08-19 NOTE — PROGRESS NOTES
He looks and feels great now 4 months status post multilevel thoracolumbar extension of fusion and he is doing quite well.  X-rays show further healing compared to prior films.  The posterior lateral lumbar mass looks fine and it is harder to see the thoracic area since its midline.  He is walking with much more stability and improved posture.  There is slight lucency along the 1 cephalad screw, and the fusion mass is posterior in that part of the thoracic spine, so I do want to see him back for final x-ray in a couple of months but he is doing well.  Get repeat x-rays on return visit.  Make sure to check the levels.

## 2022-09-01 RX ORDER — EZETIMIBE 10 MG/1
TABLET ORAL
Qty: 90 TABLET | Refills: 3 | Status: SHIPPED | OUTPATIENT
Start: 2022-09-01 | End: 2022-10-03 | Stop reason: SDUPTHER

## 2022-09-25 RX ORDER — FOLIC ACID 1 MG/1
TABLET ORAL
Qty: 90 TABLET | Refills: 3 | Status: SHIPPED | OUTPATIENT
Start: 2022-09-25

## 2022-09-25 RX ORDER — MAGNESIUM 200 MG
1000 TABLET ORAL DAILY
Qty: 90 EACH | Refills: 3 | Status: SHIPPED | OUTPATIENT
Start: 2022-09-25

## 2022-10-03 ENCOUNTER — OFFICE VISIT (OUTPATIENT)
Dept: CARDIOLOGY | Facility: CLINIC | Age: 67
End: 2022-10-03

## 2022-10-03 VITALS
HEIGHT: 70 IN | HEART RATE: 92 BPM | OXYGEN SATURATION: 96 % | BODY MASS INDEX: 33.64 KG/M2 | SYSTOLIC BLOOD PRESSURE: 130 MMHG | WEIGHT: 235 LBS | DIASTOLIC BLOOD PRESSURE: 96 MMHG

## 2022-10-03 DIAGNOSIS — E78.2 MIXED HYPERLIPIDEMIA: Primary | ICD-10-CM

## 2022-10-03 DIAGNOSIS — I25.10 CORONARY ARTERY DISEASE INVOLVING NATIVE CORONARY ARTERY OF NATIVE HEART WITHOUT ANGINA PECTORIS: ICD-10-CM

## 2022-10-03 DIAGNOSIS — Z95.5 S/P DRUG ELUTING CORONARY STENT PLACEMENT: ICD-10-CM

## 2022-10-03 DIAGNOSIS — I10 PRIMARY HYPERTENSION: ICD-10-CM

## 2022-10-03 PROCEDURE — 99214 OFFICE O/P EST MOD 30 MIN: CPT | Performed by: NURSE PRACTITIONER

## 2022-10-03 PROCEDURE — 93000 ELECTROCARDIOGRAM COMPLETE: CPT | Performed by: NURSE PRACTITIONER

## 2022-10-03 RX ORDER — AMLODIPINE BESYLATE 10 MG/1
10 TABLET ORAL DAILY
Qty: 90 TABLET | Refills: 3 | Status: SHIPPED | OUTPATIENT
Start: 2022-10-03

## 2022-10-03 RX ORDER — CLOPIDOGREL BISULFATE 75 MG/1
75 TABLET ORAL DAILY
Qty: 90 TABLET | Refills: 3 | Status: SHIPPED | OUTPATIENT
Start: 2022-10-03

## 2022-10-03 RX ORDER — ISOSORBIDE MONONITRATE 30 MG/1
30 TABLET, EXTENDED RELEASE ORAL DAILY
Qty: 90 TABLET | Refills: 3 | Status: SHIPPED | OUTPATIENT
Start: 2022-10-03

## 2022-10-03 RX ORDER — CARVEDILOL 12.5 MG/1
12.5 TABLET ORAL 2 TIMES DAILY WITH MEALS
Qty: 180 TABLET | Refills: 3 | Status: SHIPPED | OUTPATIENT
Start: 2022-10-03

## 2022-10-03 RX ORDER — EZETIMIBE 10 MG/1
10 TABLET ORAL DAILY
Qty: 90 TABLET | Refills: 3 | Status: SHIPPED | OUTPATIENT
Start: 2022-10-03

## 2022-10-03 RX ORDER — ROSUVASTATIN CALCIUM 10 MG/1
10 TABLET, COATED ORAL
Qty: 90 TABLET | Refills: 3 | Status: SHIPPED | OUTPATIENT
Start: 2022-10-03

## 2022-10-03 NOTE — PROGRESS NOTES
Date of Office Visit: 10/03/2022  Encounter Provider: TEN Dumont  Place of Service: [facility] CARDIOLOGY  Patient Name: Prem Thrasher  :1955    Chief Complaint   Patient presents with   • Coronary Artery Disease   • Follow-up   :     HPI: Prem Thrasher is a 67 y.o. male who is a patient of Dr. Salazar.  He is new to me today and presents for a 6-month office follow-up.  He has a history of coronary artery disease, chronic back pain, hypertension, rheumatoid arthritis and hyperlipidemia.  Patient presented with unstable angina in  2020.  He received stents to the mid and distal left anterior descending artery.  He underwent repeat cardiac cath in 2021 and was found to have three-vessel disease.  There was 100% occlusion of distal LAD stent, 90% stenosis of distal circumflex and moderate disease in the RCA.  He underwent angioplasty to LAD and stenting to the distal circumflex lesion.  Left ventricular systolic function remain normal with an EF of 50 to 55%.     Today he presents with no chest pain.  He has multiple various complaints such as back pain, sciatic nerve pain as well as neuropathy.  He has some dyspnea when he moves around due to his pain.  No lower extremity noted.  Taken off irbesartan by PCP due to low BP approximately six months ago.  His blood pressure is normally well controlled however slightly elevated when he first arrives to MD appointments.  He remains on full dose aspirin and Plavix.  Lipid panel is in target range except HDL 41.  He is not diabetic.    Previous testing and notes have been reviewed by me.   Past Medical History:   Diagnosis Date   • Achilles tendon pain     LEFT   • Anxiety disorder    • CAD (coronary artery disease)    • Cervical disc disorder    • Chronic anticoagulation     PLAVIX-CARDIAC STENT X3   • Chronic lower back pain    • COPD (chronic obstructive pulmonary disease) ([facility])    • CTS (carpal tunnel  syndrome)    • Disease of thyroid gland    • Diverticulitis of colon    • Encounter for eye exam 10/2014    normal   • Exertional chest pain    • GERD (gastroesophageal reflux disease)    • History of bone density study 03/2014    Dr. Maria Antonia Lopez; normal   • History of chest x-ray 02/15/2011    also 3-6-15; normal chest   • History of colon polyps    • History of nuclear stress test 03/09/2015    cardiolite; Dr. Greenberg; negative   • History of pulmonary function tests 03/2015    COPD   • Hyperlipidemia    • Hypertension    • Low back strain    • Lumbosacral disc disease    • Nerve damage     KAYLYNN ELBOWS   • NSTEMI (non-ST elevated myocardial infarction) ([facility]) 01/2021   • Osteoarthritis, multiple sites    • Postoperative nausea and vomiting 08/01/2017   • Postoperative wound infection    • Rheumatoid arthritis ([facility])    • Rotator cuff syndrome    • Sciatica    • Short of breath on exertion    • Sleep apnea     no cpap   • Staph infection     WITH BACK SURGERY 2017  ON LONG TERM ANTIBIOTICS   • Thoracic disc disorder    • Unstable angina ([facility])    • Unsteady gait     D/T LOWER BACK       Past Surgical History:   Procedure Laterality Date   • ACHILLES TENDON SURGERY Left 07/03/2018    Procedure: Left Achilles debridement and secondary repair with partial excision of calcaneus. Possible left Achilles lengthening at the calf;  Surgeon: Vinay Smith MD;  Location: [facility] OR [facility];  Service: Orthopedics   • BACK SURGERY  10/2017   • CARDIAC CATHETERIZATION N/A 03/07/2020    Procedure: Left Heart Cath;  Surgeon: Dioni Salazar MD;  Location: [facility] CATH INVASIVE LOCATION;  Service: Cardiology;  Laterality: N/A;   • CARDIAC CATHETERIZATION N/A 03/07/2020    Procedure: Coronary angiography;  Surgeon: Dioni Salazar MD;  Location: [facility] CATH INVASIVE LOCATION;  Service: Cardiology;  Laterality: N/A;   • CARDIAC CATHETERIZATION N/A 03/07/2020    Procedure: Left ventriculography;  Surgeon: Dioni Salazar  MD ENRRIQUE;  Location: [facility] CATH INVASIVE LOCATION;  Service: Cardiology;  Laterality: N/A;   • CARDIAC CATHETERIZATION N/A 03/07/2020    Procedure: Stent RAZA coronary;  Surgeon: Dioni Salazar MD;  Location: [facility] CATH INVASIVE LOCATION;  Service: Cardiology;  Laterality: N/A;   • CARDIAC CATHETERIZATION N/A 01/22/2021    Procedure: Left Heart Cath;  Surgeon: Dioni Salazar MD;  Location: [facility] CATH INVASIVE LOCATION;  Service: Cardiology;  Laterality: N/A;   • CARDIAC CATHETERIZATION N/A 01/22/2021    Procedure: Coronary angiography;  Surgeon: Dioni Salazar MD;  Location: [facility] CATH INVASIVE LOCATION;  Service: Cardiology;  Laterality: N/A;   • CARDIAC CATHETERIZATION N/A 01/22/2021    Procedure: Left ventriculography;  Surgeon: Dioni Salazar MD;  Location: [facility] CATH INVASIVE LOCATION;  Service: Cardiology;  Laterality: N/A;   • CARDIAC CATHETERIZATION N/A 01/22/2021    Procedure: Percutaneous Coronary Intervention;  Surgeon: Dioni Salazar MD;  Location: [facility] CATH INVASIVE LOCATION;  Service: Cardiology;  Laterality: N/A;   • CARDIAC CATHETERIZATION N/A 01/22/2021    Procedure: Stent RAZA coronary;  Surgeon: Dioni Salazar MD;  Location: [facility] CATH INVASIVE LOCATION;  Service: Cardiology;  Laterality: N/A;   • CARDIAC SURGERY      3 stents total   • COLONOSCOPY N/A 02/02/2011    Normal colon except scattered diverticulosis-Dr. Guero Blunt   • COLONOSCOPY N/A 04/08/2021    Procedure: COLONOSCOPY TO CECUM WITH POLYPECTOMY (COLD BX);  Surgeon: Porsha Arcos MD;  Location: [facility] ENDOSCOPY;  Service: General;  Laterality: N/A;  SCREENING; HX OF COLON POLYPS  POST:DIVERTICULOSIS; COLON POLYP   • CYST REMOVAL  03/2016    x2  FROM FACE AND EAR   • DENTAL PROCEDURE  2008    teeth removed; dental implants   • EPIDURAL BLOCK  1995    L/S spine   • FINGER DEBRIDEMENT Right 07/12/2003    Debridement and full thickness skin grafting of the ring and small fingers of the right  hand-Dr. Rayray Long   • FOOT SURGERY     • HAND SURGERY  2003   • INCISION AND DRAINAGE PERIRECTAL ABSCESS N/A 07/10/2018    Procedure: INCISION AND DRAINAGE OF PERIRECTAL ABSCESS;  Surgeon: Porsha Arcos MD;  Location: [facility] OR;  Service: General   • INCISION AND DRAINAGE TRUNK N/A 04/23/2022    Procedure: Evacuation lumbar hematoma;  Surgeon: Jacky Perez MD;  Location: [facility] MAIN OR;  Service: Orthopedics;  Laterality: N/A;   • LUMBAR DISCECTOMY FUSION INSTRUMENTATION Left 10/10/2016    Procedure: LEFT L2-L3, L3-L4 LUMBAR LAMINECTOMY/ DISCECTOMY WITH METRIX ;  Surgeon: Sawyer Rick MD;  Location: [facility] OR;  Service:    • LUMBAR DISCECTOMY FUSION INSTRUMENTATION N/A 07/31/2017    Procedure: LUMBAR FUSION DECOMPRESSON WITH PEDICLE SCREWS with LAURA L2-3,L3-4;  Surgeon: Jacky Perez MD;  Location: [facility] OR;  Service:    • LUMBAR FUSION N/A 04/13/2022    Procedure: Thoracic 10-thoracic 11, thoracic 11-thoracic 12, thoracic 12-lumbar 1, lumbar 1-lumbar 2, lumbar 2-lumbar 3, lumbar 3-lumbar 4 fusion with instrumentation with iliac crest bone graft, and removal of implants from lumbar 2-lumbar 3, lumbar 3-lumbar 4;  Surgeon: Jacky Perez MD;  Location: [facility] OR;  Service: Orthopedic Spine;  Laterality: N/A;   • LUMBAR FUSION N/A 04/13/2022    Procedure: LUMBAR ANTERIOR INTERBODY FUSION L4,L5 Osteotomy interbody fusion with cage L1,L2;  Surgeon: Jacky Perez MD;  Location: [facility] OR;  Service: Orthopedic Spine;  Laterality: N/A;   • LUMBAR LAMINECTOMY DISCECTOMY DECOMPRESSION N/A 07/31/2017    Procedure: L2 to L4 laminectomy with neural lysis and a fusion by orthopedics;  Surgeon: Sawyer Rick MD;  Location: [facility] OR;  Service:    • LUMBAR LAMINECTOMY DISCECTOMY DECOMPRESSION N/A 09/05/2017    Procedure: I&D LUMBAR SPINE ;  Surgeon: Jacky Perez MD;  Location: [facility] MAIN OR;  Service:    • SHOULDER SURGERY Right 02/23/2011    Dr. Navarro   • SINUS  "SURGERY      Dr. Barrera   • TRIGGER POINT INJECTION         Social History     Socioeconomic History   • Marital status:    Tobacco Use   • Smoking status: Former Smoker     Packs/day: 1.00     Years: 45.00     Pack years: 45.00     Types: Cigarettes     Quit date: 10/20/2021     Years since quittin.9   • Smokeless tobacco: Never Used   • Tobacco comment: Current status is non smoker   Vaping Use   • Vaping Use: Never used   Substance and Sexual Activity   • Alcohol use: No   • Drug use: No   • Sexual activity: Not Currently     Partners: Female     Birth control/protection: None       Family History   Problem Relation Age of Onset   • Diabetes Mother    • Heart disease Mother    • Hyperlipidemia Mother    • Stroke Mother    • Heart disease Father    • Rheum arthritis Father    • Alcohol abuse Father    • Hyperlipidemia Father    • Depression Brother    • Cancer Brother         prostate   • Depression Brother    • Heart disease Brother    • Hyperlipidemia Brother    • Cancer Brother    • Thyroid disease Sister    • Malig Hyperthermia Neg Hx        Review of Systems   Constitutional: Negative.   Eyes: Negative.    Cardiovascular: Negative.    Respiratory: Negative.    Endocrine: Negative.    Hematologic/Lymphatic:        Full dose aspirin/Plavix   Skin: Negative.    Musculoskeletal: Positive for arthritis, back pain, joint pain, neck pain and stiffness.   Gastrointestinal: Negative.    Genitourinary: Negative.    Neurological: Negative.    Psychiatric/Behavioral: Negative.    Allergic/Immunologic: Negative.        Allergies   Allergen Reactions   • Atorvastatin Other (See Comments) and Myalgia     Leg cramps   • Ceclor [Cefaclor] Rash     Patient tolerated nafcillin during 2017 admission and has tolerated Augmentin in past outpatient.    • Methotrexate Derivatives Rash     \"Blisters\"         Current Outpatient Medications:   •  albuterol sulfate  (90 Base) MCG/ACT inhaler, Inhale 2 puffs Every " 4 (Four) Hours As Needed for Wheezing or Shortness of Air., Disp: 54 g, Rfl: 3  •  amLODIPine (NORVASC) 10 MG tablet, Take 1 tablet by mouth Daily. for blood pressure, Disp: 90 tablet, Rfl: 3  •  aspirin  MG tablet, Take 1 tablet by mouth Daily. (Patient taking differently: Take 325 mg by mouth Daily. STATES INSTRUCTED TO HOLD ONE WEEK PRIOR TO SURGERY), Disp: 30 tablet, Rfl: 0  •  carvedilol (COREG) 12.5 MG tablet, Take 1 tablet by mouth 2 (Two) Times a Day With Meals., Disp: 180 tablet, Rfl: 3  •  Cholecalciferol (VITAMIN D) 2000 UNITS tablet, Take 2,000 Units by mouth Every Evening., Disp: , Rfl:   •  clopidogrel (PLAVIX) 75 MG tablet, Take 1 tablet by mouth Daily., Disp: 90 tablet, Rfl: 3  •  Cyanocobalamin (Vitamin B-12) 1000 MCG sublingual tablet, Place 1 tablet under the tongue Daily. DISSOLVE 1 TABLET UNDER THE TONGUE EVERY DAY, Disp: 90 each, Rfl: 3  •  cyclobenzaprine (FLEXERIL) 10 MG tablet, Take 10 mg by mouth 2 (Two) Times a Day., Disp: , Rfl:   •  docusate sodium 100 MG capsule, Take 1 capsule by mouth 2 (Two) Times a Day., Disp: , Rfl:   •  escitalopram (LEXAPRO) 20 MG tablet, TAKE 1 TABLET BY MOUTH EVERY NIGHT FOR ANXIETY (Patient taking differently: Take 20 mg by mouth Daily. For anxiety), Disp: 90 tablet, Rfl: 3  •  ezetimibe (ZETIA) 10 MG tablet, Take 1 tablet by mouth Daily., Disp: 90 tablet, Rfl: 3  •  folic acid (FOLVITE) 1 MG tablet, TAKE 1 TABLET EVERY DAY, Disp: 90 tablet, Rfl: 3  •  isosorbide mononitrate (IMDUR) 30 MG 24 hr tablet, Take 1 tablet by mouth Daily., Disp: 90 tablet, Rfl: 3  •  leflunomide (ARAVA) 20 MG tablet, Take 1 tablet by mouth Every Night. Continue to hold until seen in office with Dr. Perez, Disp: , Rfl:   •  levothyroxine (Synthroid) 50 MCG tablet, Take 1 tablet by mouth Daily. For thyroid before breakfast, Disp: 90 tablet, Rfl: 3  •  multivitamin with minerals tablet tablet, Take 1 tablet by mouth Daily. HOLD X1 WEEK PRIOR TO SURGERY, Disp: , Rfl:   •   "nitroglycerin (NITROSTAT) 0.4 MG SL tablet, Place 1 tablet under the tongue Every 5 (Five) Minutes As Needed for Chest Pain. Take no more than 3 doses in 15 minutes., Disp: 25 tablet, Rfl: 1  •  pantoprazole (Protonix) 40 MG EC tablet, Take 1 tablet by mouth Daily. For Gastritis, Disp: 90 tablet, Rfl: 1  •  rosuvastatin (CRESTOR) 10 MG tablet, Take 1 tablet by mouth every night at bedtime., Disp: 90 tablet, Rfl: 3  •  oxyCODONE-acetaminophen (PERCOCET) 5-325 MG per tablet, 1 to 2 tablets p.o. every 4 to 6 hours as needed pain, Disp: 45 tablet, Rfl: 0      Objective:     Vitals:    10/03/22 1410   BP: 130/96   BP Location: Left arm   Patient Position: Sitting   Pulse: 92   SpO2: 96%   Weight: 107 kg (235 lb)   Height: 177.8 cm (70\")     Body mass index is 33.72 kg/m².    PHYSICAL EXAM:    Constitutional:       Appearance: Healthy appearance. Not in distress.   Neck:      Vascular: No JVR. JVD normal.   Pulmonary:      Effort: Pulmonary effort is normal.      Breath sounds: Normal breath sounds. No wheezing. No rhonchi. No rales.   Chest:      Chest wall: Not tender to palpatation.   Cardiovascular:      PMI at left midclavicular line. Normal rate. Regular rhythm. Normal S1. Normal S2.      Murmurs: There is no murmur.      No gallop. No click. No rub.   Pulses:     Intact distal pulses.   Edema:     Peripheral edema absent.   Abdominal:      General: Bowel sounds are normal.      Palpations: Abdomen is soft.      Tenderness: There is no abdominal tenderness.   Musculoskeletal: Normal range of motion.         General: No tenderness. Skin:     General: Skin is warm and dry.   Neurological:      General: No focal deficit present.      Mental Status: Alert and oriented to person, place and time.           ECG 12 Lead    Date/Time: 10/3/2022 3:03 PM  Performed by: Annie Mattson APRN  Authorized by: Annie Mattson APRN   Comparison: compared with previous ECG from 3/31/2022  Rhythm: sinus rhythm  Rate: " normal  BPM: 92  ST Segments: ST segments normal  T flattening: aVL                Assessment:       Diagnosis Plan   1. Mixed hyperlipidemia     2. Primary hypertension     3. Coronary artery disease involving native coronary artery of native heart without angina pectoris     4. S/P drug eluting coronary stent placement       No orders of the defined types were placed in this encounter.         Plan:       1.  Coronary artery disease status post stenting to mid and distal LAD in 2020, status post angioplasty to LAD due to in-stent restenosis and stent to left circumflex in 03/2021: On lifelong Plavix.  On beta-blocker.  No angina.  Stable EKG.  Normal LV systolic  2.  Chronic back pain  3.  Hyperlipidemia: On statin therapy.  Lipid panel in target range except HDL 41.  Difficult for patient to increase exercise activity.  I have recommended him to go back to what physical therapy recommended as far as mobility exercises.  4.  Hypertension: Well-controlled on current medication    Mr. Thrasher will follow up with  in 6 months.  He will call sooner for any questions or concerns peer       Your medication list          Accurate as of October 3, 2022  3:01 PM. If you have any questions, ask your nurse or doctor.            CHANGE how you take these medications      Instructions Last Dose Given Next Dose Due   aspirin  MG tablet  What changed: additional instructions      Take 1 tablet by mouth Daily.       carvedilol 12.5 MG tablet  Commonly known as: COREG  What changed: See the new instructions.  Changed by: TEN Dumont      Take 1 tablet by mouth 2 (Two) Times a Day With Meals.       escitalopram 20 MG tablet  Commonly known as: LEXAPRO  What changed: when to take this      TAKE 1 TABLET BY MOUTH EVERY NIGHT FOR ANXIETY       ezetimibe 10 MG tablet  Commonly known as: ZETIA  What changed: See the new instructions.  Changed by: TEN Dumont      Take 1 tablet by mouth Daily.           CONTINUE taking these medications      Instructions Last Dose Given Next Dose Due   albuterol sulfate  (90 Base) MCG/ACT inhaler  Commonly known as: PROVENTIL HFA;VENTOLIN HFA;PROAIR HFA      Inhale 2 puffs Every 4 (Four) Hours As Needed for Wheezing or Shortness of Air.       amLODIPine 10 MG tablet  Commonly known as: NORVASC      Take 1 tablet by mouth Daily. for blood pressure       clopidogrel 75 MG tablet  Commonly known as: PLAVIX      Take 1 tablet by mouth Daily.       cyclobenzaprine 10 MG tablet  Commonly known as: FLEXERIL      Take 10 mg by mouth 2 (Two) Times a Day.       docusate sodium 100 MG capsule      Take 1 capsule by mouth 2 (Two) Times a Day.       folic acid 1 MG tablet  Commonly known as: FOLVITE      TAKE 1 TABLET EVERY DAY       isosorbide mononitrate 30 MG 24 hr tablet  Commonly known as: IMDUR      Take 1 tablet by mouth Daily.       leflunomide 20 MG tablet  Commonly known as: ARAVA      Take 1 tablet by mouth Every Night. Continue to hold until seen in office with Dr. Perez       levothyroxine 50 MCG tablet  Commonly known as: Synthroid      Take 1 tablet by mouth Daily. For thyroid before breakfast       multivitamin with minerals tablet tablet      Take 1 tablet by mouth Daily. HOLD X1 WEEK PRIOR TO SURGERY       nitroglycerin 0.4 MG SL tablet  Commonly known as: Nitrostat      Place 1 tablet under the tongue Every 5 (Five) Minutes As Needed for Chest Pain. Take no more than 3 doses in 15 minutes.       oxyCODONE-acetaminophen 5-325 MG per tablet  Commonly known as: PERCOCET      1 to 2 tablets p.o. every 4 to 6 hours as needed pain       pantoprazole 40 MG EC tablet  Commonly known as: Protonix      Take 1 tablet by mouth Daily. For Gastritis       rosuvastatin 10 MG tablet  Commonly known as: CRESTOR      Take 1 tablet by mouth every night at bedtime.       Vitamin B-12 1000 MCG sublingual tablet      Place 1 tablet under the tongue Daily. DISSOLVE 1 TABLET UNDER THE  TONGUE EVERY DAY       Vitamin D 50 MCG (2000 UT) tablet      Take 2,000 Units by mouth Every Evening.             Where to Get Your Medications      These medications were sent to [facility] - [place], [place] - [address] - [phone]  - [phone] FX  [address], [place] [address]    Phone: [phone]   · amLODIPine 10 MG tablet  · carvedilol 12.5 MG tablet  · clopidogrel 75 MG tablet  · ezetimibe 10 MG tablet  · isosorbide mononitrate 30 MG 24 hr tablet  · rosuvastatin 10 MG tablet           As always, it has been a pleasure to participate in your patient's care.      Sincerely,       TEN Bolden

## 2022-10-07 RX ORDER — PANTOPRAZOLE SODIUM 40 MG/1
TABLET, DELAYED RELEASE ORAL
Qty: 90 TABLET | Refills: 1 | Status: SHIPPED | OUTPATIENT
Start: 2022-10-07 | End: 2023-03-01 | Stop reason: SDUPTHER

## 2022-10-28 ENCOUNTER — OFFICE VISIT (OUTPATIENT)
Dept: ORTHOPEDIC SURGERY | Facility: CLINIC | Age: 67
End: 2022-10-28

## 2022-10-28 VITALS — HEIGHT: 71 IN | TEMPERATURE: 97.1 F | WEIGHT: 230.6 LBS | BODY MASS INDEX: 32.28 KG/M2

## 2022-10-28 DIAGNOSIS — G89.29 CHRONIC RIGHT SHOULDER PAIN: ICD-10-CM

## 2022-10-28 DIAGNOSIS — R52 PAIN: ICD-10-CM

## 2022-10-28 DIAGNOSIS — M25.511 ACUTE PAIN OF RIGHT SHOULDER: ICD-10-CM

## 2022-10-28 DIAGNOSIS — Z98.1 HISTORY OF LUMBAR FUSION: Primary | ICD-10-CM

## 2022-10-28 DIAGNOSIS — M25.511 CHRONIC RIGHT SHOULDER PAIN: ICD-10-CM

## 2022-10-28 PROCEDURE — 72100 X-RAY EXAM L-S SPINE 2/3 VWS: CPT | Performed by: ORTHOPAEDIC SURGERY

## 2022-10-28 PROCEDURE — 99213 OFFICE O/P EST LOW 20 MIN: CPT | Performed by: ORTHOPAEDIC SURGERY

## 2022-10-28 PROCEDURE — 73030 X-RAY EXAM OF SHOULDER: CPT | Performed by: ORTHOPAEDIC SURGERY

## 2022-10-28 NOTE — PROGRESS NOTES
He is now 6 months out from posterior spinal decompression and fusion and doing much better.  Moves the legs well good strength x-rays suggest solid fusion compared to prior films slight lucency at the cephalad level but I think he is doing great.  He does complain of some shoulder pain since a fall 2 weeks ago.  He has some subacromial tenderness but good strength on abduction and full range of motion.  Dr. Suman Edwards did a rotator cuff repair about 12 years ago and he has some arthroscopic portal incisions that are well-healed.  X-rays show signs of prior acromioplasty and the humerus is somewhat high riding but still a pretty good subacromial space.  He may well have a partial rotator cuff tear.  I recommended therapy but he does not want to do that and prefers to wait and see Dr. Edwards first.  I think that is fine given that he has good motion and I do not think he is losing anything by waiting he will continue range of motion exercises I will see him as needed and he will see Dr. Edwards for the shoulder

## 2022-11-26 RX ORDER — ESCITALOPRAM OXALATE 20 MG/1
TABLET ORAL
Qty: 90 TABLET | Refills: 3 | Status: SHIPPED | OUTPATIENT
Start: 2022-11-26

## 2022-12-01 ENCOUNTER — OFFICE VISIT (OUTPATIENT)
Dept: FAMILY MEDICINE CLINIC | Facility: CLINIC | Age: 67
End: 2022-12-01

## 2022-12-01 VITALS
TEMPERATURE: 98.5 F | HEIGHT: 70 IN | RESPIRATION RATE: 18 BRPM | WEIGHT: 230 LBS | DIASTOLIC BLOOD PRESSURE: 85 MMHG | SYSTOLIC BLOOD PRESSURE: 124 MMHG | BODY MASS INDEX: 32.93 KG/M2 | HEART RATE: 98 BPM | OXYGEN SATURATION: 96 %

## 2022-12-01 DIAGNOSIS — R11.0 NAUSEA: ICD-10-CM

## 2022-12-01 DIAGNOSIS — U07.1 COVID-19: Primary | ICD-10-CM

## 2022-12-01 DIAGNOSIS — R05.8 COUGH WITH CONGESTION OF PARANASAL SINUS: ICD-10-CM

## 2022-12-01 DIAGNOSIS — R09.81 COUGH WITH CONGESTION OF PARANASAL SINUS: ICD-10-CM

## 2022-12-01 LAB
EXPIRATION DATE: ABNORMAL
FLUAV AG UPPER RESP QL IA.RAPID: NOT DETECTED
FLUBV AG UPPER RESP QL IA.RAPID: NOT DETECTED
INTERNAL CONTROL: ABNORMAL
Lab: ABNORMAL
SARS-COV-2 AG UPPER RESP QL IA.RAPID: DETECTED

## 2022-12-01 PROCEDURE — 87428 SARSCOV & INF VIR A&B AG IA: CPT | Performed by: NURSE PRACTITIONER

## 2022-12-01 PROCEDURE — 99214 OFFICE O/P EST MOD 30 MIN: CPT | Performed by: NURSE PRACTITIONER

## 2022-12-01 RX ORDER — BENZONATATE 200 MG/1
200 CAPSULE ORAL 3 TIMES DAILY PRN
Qty: 30 CAPSULE | Refills: 0 | Status: SHIPPED | OUTPATIENT
Start: 2022-12-01

## 2022-12-01 RX ORDER — ONDANSETRON 4 MG/1
4 TABLET, ORALLY DISINTEGRATING ORAL EVERY 8 HOURS PRN
Qty: 30 TABLET | Refills: 0 | Status: SHIPPED | OUTPATIENT
Start: 2022-12-01 | End: 2022-12-11

## 2022-12-01 RX ORDER — DOXYCYCLINE HYCLATE 100 MG/1
100 CAPSULE ORAL 2 TIMES DAILY
Qty: 20 CAPSULE | Refills: 0 | Status: SHIPPED | OUTPATIENT
Start: 2022-12-01 | End: 2023-03-01

## 2022-12-01 NOTE — PROGRESS NOTES
Chief Complaint  Nasal Congestion, Fever, and Nausea    Mali Garza presents to [facility]  as a 67-year-old male complaining of a 2-day history of nasal congestion, nonproductive cough, low-grade fever with a T-max of 100.5 relieved by Tylenol and intermittent nausea.  He denies any shortness of breath or wheezing.  No abdominal pain no vomiting or diarrhea.  He is able to eat and keep down fluids.  He is having some fatigue.  His wife is sick with similar symptoms.  He was at a  over the past weekend.  He is not taking any other over-the-counter medications for this complaint of.      Answers for HPI/ROS submitted by the patient on 2022  What is the primary reason for your visit?: Other  Please describe your symptoms.: Flue like symptoms  Have you had these symptoms before?: No  How long have you been having these symptoms?: 1-4 days  Please list any medications you are currently taking for this condition.: Na  Please describe any probable cause for these symptoms. : Body aches and pains dizziness    The following portions of the patient's history were reviewed and updated as appropriate: allergies, current medications, past family history, past medical history, past social history, past surgical history, and problem list    Review of Systems   Constitutional: Positive for chills, fatigue and fever.   HENT: Positive for congestion.    Eyes: Negative for visual disturbance.   Respiratory: Positive for cough. Negative for shortness of breath and wheezing.    Cardiovascular: Negative for chest pain, palpitations and leg swelling.   Gastrointestinal: Positive for nausea. Negative for abdominal pain, diarrhea and vomiting.   Skin: Negative for rash.   Neurological: Negative for dizziness and light-headedness.        Objective   Vital Signs:   Vitals:    22 0919   BP: 124/85   Pulse: 98   Resp: 18   Temp: 98.5 °F (36.9 °C)   TempSrc: Oral   SpO2: 96%  "  Weight: 104 kg (230 lb)   Height: 177.8 cm (70\")        BMI is >= 30 and <35. (Class 1 Obesity). The following options were offered after discussion;: weight loss educational material (shared in after visit summary)        Physical Exam  Vitals reviewed.   Constitutional:       General: He is not in acute distress.  HENT:      Nose: Congestion present.   Eyes:      Conjunctiva/sclera: Conjunctivae normal.   Neck:      Thyroid: No thyromegaly.      Vascular: No carotid bruit.   Cardiovascular:      Rate and Rhythm: Normal rate and regular rhythm.      Heart sounds: Normal heart sounds.   Pulmonary:      Effort: Pulmonary effort is normal. No respiratory distress.      Breath sounds: Normal breath sounds. No stridor. No wheezing, rhonchi or rales.   Chest:      Chest wall: No tenderness.   Lymphadenopathy:      Cervical: No cervical adenopathy.   Neurological:      Mental Status: He is alert.   Psychiatric:         Attention and Perception: Attention normal.         Mood and Affect: Mood normal.          Result Review :     The following data was reviewed by: TEN Azar on 12/01/2022:      POCT SARS-CoV-2 Antigen YASMINE + Flu (12/01/2022 09:35)  Influenza A/B-not detected  COVID-19/SARS-detected     Assessment and Plan    Diagnoses and all orders for this visit:    1. COVID-19 (Primary)  Comments:  COVID-19/SARS detected 12/1/2022  Plan:  -Take all medications as prescribed and until completed.  -Covid test was positive on 12/01/2022  -Monitor for fever and take Tylenol as needed.  Drink plenty of fluids and get plenty of rest.  -Use cool-mist humidifier as needed.  -Seek immediate medical attention for fever unrelieved by Tylenol, chest pain, shortness of breath, sharp back pain, or any other worsening signs or symptoms.  -Remain in quarantine until 10 days from symptom onset.  -The patient verbalized understanding of all instructions given today.     I would recommend an over-the-counter vitamin regimen " to boost your immune system of the following: Vitamin D3 5,000 IU daily, vitamin C 500-1,000 mg twice daily, Quercetin 250 mg twice daily, Zinc 100 mg/day, and Melatonin 5-10 mg before bedtime.  You can purchase a pulse oximeter at any local pharmacy to monitor your oxygen saturations.  Call 911 if you have shortness of breath, sharp chest pain, sharp pain in your back, or a fever that will not come down by Tylenol    2. Cough with congestion of paranasal sinus  Comments:  Follow-up with any worsening symptoms or no improvements  Orders:  -     POCT SARS-CoV-2 Antigen YASMINE + Flu  -     doxycycline (VIBRAMYCIN) 100 MG capsule; Take 1 capsule by mouth 2 (Two) Times a Day. For infection  Dispense: 20 capsule; Refill: 0  -     benzonatate (TESSALON) 200 MG capsule; Take 1 capsule by mouth 3 (Three) Times a Day As Needed for Cough.  Dispense: 30 capsule; Refill: 0    3. Nausea  Comments:  Frequent small sips of water bland diet  Take medication as directed  Orders:  -     ondansetron ODT (ZOFRAN-ODT) 4 MG disintegrating tablet; Place 1 tablet on the tongue Every 8 (Eight) Hours As Needed for Nausea or Vomiting for up to 10 days.  Dispense: 30 tablet; Refill: 0        Follow Up   Return if symptoms worsen or fail to improve.  Patient was given instructions and counseling regarding his condition or for health maintenance advice. Please see specific information pulled into the AVS if appropriate.

## 2022-12-12 RX ORDER — PREDNISONE 20 MG/1
20 TABLET ORAL DAILY
Qty: 7 TABLET | Refills: 0 | Status: SHIPPED | OUTPATIENT
Start: 2022-12-12 | End: 2023-03-01

## 2022-12-14 RX ORDER — IRBESARTAN 75 MG/1
TABLET ORAL
Qty: 90 TABLET | Refills: 0 | Status: SHIPPED | OUTPATIENT
Start: 2022-12-14 | End: 2023-03-01 | Stop reason: SDUPTHER

## 2023-03-01 ENCOUNTER — OFFICE VISIT (OUTPATIENT)
Dept: FAMILY MEDICINE CLINIC | Facility: CLINIC | Age: 68
End: 2023-03-01
Payer: MEDICARE

## 2023-03-01 VITALS
HEIGHT: 70 IN | OXYGEN SATURATION: 96 % | TEMPERATURE: 97.6 F | RESPIRATION RATE: 16 BRPM | SYSTOLIC BLOOD PRESSURE: 130 MMHG | DIASTOLIC BLOOD PRESSURE: 72 MMHG | HEART RATE: 80 BPM | WEIGHT: 244 LBS | BODY MASS INDEX: 34.93 KG/M2

## 2023-03-01 DIAGNOSIS — I25.110 CORONARY ARTERY DISEASE INVOLVING NATIVE CORONARY ARTERY OF NATIVE HEART WITH UNSTABLE ANGINA PECTORIS: ICD-10-CM

## 2023-03-01 DIAGNOSIS — F41.1 GENERALIZED ANXIETY DISORDER: ICD-10-CM

## 2023-03-01 DIAGNOSIS — I10 PRIMARY HYPERTENSION: ICD-10-CM

## 2023-03-01 DIAGNOSIS — M05.79 RHEUMATOID ARTHRITIS INVOLVING MULTIPLE SITES WITH POSITIVE RHEUMATOID FACTOR: ICD-10-CM

## 2023-03-01 DIAGNOSIS — E53.8 LOW SERUM VITAMIN B12: ICD-10-CM

## 2023-03-01 DIAGNOSIS — J44.9 CHRONIC OBSTRUCTIVE PULMONARY DISEASE, UNSPECIFIED COPD TYPE: ICD-10-CM

## 2023-03-01 DIAGNOSIS — E78.2 MIXED HYPERLIPIDEMIA: ICD-10-CM

## 2023-03-01 DIAGNOSIS — Z12.5 SCREENING PSA (PROSTATE SPECIFIC ANTIGEN): ICD-10-CM

## 2023-03-01 DIAGNOSIS — E53.8 LOW FOLIC ACID: ICD-10-CM

## 2023-03-01 DIAGNOSIS — R73.01 IFG (IMPAIRED FASTING GLUCOSE): Primary | ICD-10-CM

## 2023-03-01 DIAGNOSIS — E55.9 VITAMIN D DEFICIENCY: ICD-10-CM

## 2023-03-01 DIAGNOSIS — R05.1 ACUTE COUGH: ICD-10-CM

## 2023-03-01 DIAGNOSIS — J01.90 ACUTE SINUSITIS, RECURRENCE NOT SPECIFIED, UNSPECIFIED LOCATION: ICD-10-CM

## 2023-03-01 LAB
EXPIRATION DATE: NORMAL
INTERNAL CONTROL: NORMAL
Lab: NORMAL
SARS-COV-2 AG UPPER RESP QL IA.RAPID: NOT DETECTED

## 2023-03-01 PROCEDURE — 87426 SARSCOV CORONAVIRUS AG IA: CPT | Performed by: PHYSICIAN ASSISTANT

## 2023-03-01 PROCEDURE — 99214 OFFICE O/P EST MOD 30 MIN: CPT | Performed by: PHYSICIAN ASSISTANT

## 2023-03-01 RX ORDER — AMOXICILLIN AND CLAVULANATE POTASSIUM 875; 125 MG/1; MG/1
1 TABLET, FILM COATED ORAL EVERY 12 HOURS SCHEDULED
Qty: 28 TABLET | Refills: 1 | Status: SHIPPED | OUTPATIENT
Start: 2023-03-01 | End: 2023-04-06 | Stop reason: ALTCHOICE

## 2023-03-01 RX ORDER — IRBESARTAN 75 MG/1
75 TABLET ORAL DAILY
Qty: 90 TABLET | Refills: 0 | Status: SHIPPED | OUTPATIENT
Start: 2023-03-01

## 2023-03-01 RX ORDER — PANTOPRAZOLE SODIUM 40 MG/1
40 TABLET, DELAYED RELEASE ORAL DAILY
Qty: 90 TABLET | Refills: 1 | Status: SHIPPED | OUTPATIENT
Start: 2023-03-01

## 2023-03-01 RX ORDER — LEVOTHYROXINE SODIUM 0.05 MG/1
50 TABLET ORAL DAILY
Qty: 90 TABLET | Refills: 3 | Status: SHIPPED | OUTPATIENT
Start: 2023-03-01 | End: 2023-03-06

## 2023-03-01 RX ORDER — PREDNISONE 20 MG/1
20 TABLET ORAL 2 TIMES DAILY
Qty: 10 TABLET | Refills: 0 | Status: SHIPPED | OUTPATIENT
Start: 2023-03-01 | End: 2023-04-06 | Stop reason: ALTCHOICE

## 2023-03-01 NOTE — PATIENT INSTRUCTIONS
Sinusitis, Adult  Sinusitis is inflammation of your sinuses. Sinuses are hollow spaces in the bones around your face. Your sinuses are located:  Around your eyes.  In the middle of your forehead.  Behind your nose.  In your cheekbones.  Mucus normally drains out of your sinuses. When your nasal tissues become inflamed or swollen, mucus can become trapped or blocked. This allows bacteria, viruses, and fungi to grow, which leads to infection. Most infections of the sinuses are caused by a virus.  Sinusitis can develop quickly. It can last for up to 4 weeks (acute) or for more than 12 weeks (chronic). Sinusitis often develops after a cold.  What are the causes?  This condition is caused by anything that creates swelling in the sinuses or stops mucus from draining. This includes:  Allergies.  Asthma.  Infection from bacteria or viruses.  Deformities or blockages in your nose or sinuses.  Abnormal growths in the nose (nasal polyps).  Pollutants, such as chemicals or irritants in the air.  Infection from fungi (rare).  What increases the risk?  You are more likely to develop this condition if you:  Have a weak body defense system (immune system).  Do a lot of swimming or diving.  Overuse nasal sprays.  Smoke.  What are the signs or symptoms?  The main symptoms of this condition are pain and a feeling of pressure around the affected sinuses. Other symptoms include:  Stuffy nose or congestion.  Thick drainage from your nose.  Swelling and warmth over the affected sinuses.  Headache.  Upper toothache.  A cough that may get worse at night.  Extra mucus that collects in the throat or the back of the nose (postnasal drip).  Decreased sense of smell and taste.  Fatigue.  A fever.  Sore throat.  Bad breath.  How is this diagnosed?  This condition is diagnosed based on:  Your symptoms.  Your medical history.  A physical exam.  Tests to find out if your condition is acute or chronic. This may include:  Checking your nose for nasal  polyps.  Viewing your sinuses using a device that has a light (endoscope).  Testing for allergies or bacteria.  Imaging tests, such as an MRI or CT scan.  In rare cases, a bone biopsy may be done to rule out more serious types of fungal sinus disease.  How is this treated?  Treatment for sinusitis depends on the cause and whether your condition is chronic or acute.  If caused by a virus, your symptoms should go away on their own within 10 days. You may be given medicines to relieve symptoms. They include:  Medicines that shrink swollen nasal passages (topical intranasal decongestants).  Medicines that treat allergies (antihistamines).  A spray that eases inflammation of the nostrils (topical intranasal corticosteroids).  Rinses that help get rid of thick mucus in your nose (nasal saline washes).  If caused by bacteria, your health care provider may recommend waiting to see if your symptoms improve. Most bacterial infections will get better without antibiotic medicine. You may be given antibiotics if you have:  A severe infection.  A weak immune system.  If caused by narrow nasal passages or nasal polyps, you may need to have surgery.  Follow these instructions at home:  Medicines  Take, use, or apply over-the-counter and prescription medicines only as told by your health care provider. These may include nasal sprays.  If you were prescribed an antibiotic medicine, take it as told by your health care provider. Do not stop taking the antibiotic even if you start to feel better.  Hydrate and humidify    Drink enough fluid to keep your urine pale yellow. Staying hydrated will help to thin your mucus.  Use a cool mist humidifier to keep the humidity level in your home above 50%.  Inhale steam for 10-15 minutes, 3-4 times a day, or as told by your health care provider. You can do this in the bathroom while a hot shower is running.  Limit your exposure to cool or dry air.  Rest  Rest as much as possible.  Sleep with your  head raised (elevated).  Make sure you get enough sleep each night.  General instructions    Apply a warm, moist washcloth to your face 3-4 times a day or as told by your health care provider. This will help with discomfort.  Wash your hands often with soap and water to reduce your exposure to germs. If soap and water are not available, use hand .  Do not smoke. Avoid being around people who are smoking (secondhand smoke).  Keep all follow-up visits as told by your health care provider. This is important.  Contact a health care provider if:  You have a fever.  Your symptoms get worse.  Your symptoms do not improve within 10 days.  Get help right away if:  You have a severe headache.  You have persistent vomiting.  You have severe pain or swelling around your face or eyes.  You have vision problems.  You develop confusion.  Your neck is stiff.  You have trouble breathing.  Summary  Sinusitis is soreness and inflammation of your sinuses. Sinuses are hollow spaces in the bones around your face.  This condition is caused by nasal tissues that become inflamed or swollen. The swelling traps or blocks the flow of mucus. This allows bacteria, viruses, and fungi to grow, which leads to infection.  If you were prescribed an antibiotic medicine, take it as told by your health care provider. Do not stop taking the antibiotic even if you start to feel better.  Keep all follow-up visits as told by your health care provider. This is important.  This information is not intended to replace advice given to you by your health care provider. Make sure you discuss any questions you have with your health care provider.  Document Revised: 05/20/2019 Document Reviewed: 05/20/2019  [employer] Patient Education © 2022 [employer]

## 2023-03-01 NOTE — PROGRESS NOTES
"Subjective   Prem Thrasher is a 68 y.o. male.     History of Present Illness    Since the last visit, he has overall felt tired.  He has Primary Hypertension and well controlled on current medication, Impaired fasting glucose and will monitor labs to watch for DMII, GERD controlled on PPI Rx, Hyperlipidemia with goals met with current Rx, CAD and remains under care of their Cardiologist for mangement, CAD and remains on medication regimen, Hypothyroidism and must update labs to continue treatment and Vitamin D deficiency and will update labs for continued management.  he has been compliant with current medications have reviewed them.  The patient denies medication side effects.  Will refill medications. /72   Pulse 80   Temp 97.6 °F (36.4 °C)   Resp 16   Ht 177.8 cm (70\")   Wt 111 kg (244 lb)   SpO2 96%   BMI 35.01 kg/m²     Results for orders placed or performed in visit on 12/01/22   POCT SARS-CoV-2 Antigen YASMINE + Flu    Specimen: Swab   Result Value Ref Range    SARS Antigen Detected (A) Not Detected, Presumptive Negative    Influenza A Antigen YASMINE Not Detected Not Detected    Influenza B Antigen YASMINE Not Detected Not Detected    Internal Control Passed Passed    Lot Number [id number]     Expiration Date 01/29/2023      Prem Thrasher 68 y.o. male who presents for evaluation of sinus pressure and congestion,sore throat. cough, ear pressure. Symptoms include congestion, nasal blockage, post nasal drip and sinus pain.  Onset of symptoms was several month ago, unchanged since that time. Patient denies wheezing, fever.   Evaluation to date: none Treatment to date:  none.         Encourage yearly low-dose CT of the chest to screen for lung cancer  Note that Lexapro does work well for anxiety and depression and continue same dose  Continue albuterol for COPD working well  Flexeril as needed muscle spasms working well  Had visit with cardiology 10/3/2022 noting his history of coronary artery disease stable " and continuing his Plavix and beta-blocker and statin therapy.  Noted blood pressure was well controlled on medication and follow-up was due in 6 months  Last visit with spine Ortho was Dr. Bower 10/28/2022  Saw Mary Carmen for COVID 12/1/2022  Want him to see RA----DR Lemus  The following portions of the patient's history were reviewed and updated as appropriate: allergies, current medications, past family history, past medical history, past social history, past surgical history and problem list.    Review of Systems   Constitutional: Positive for fatigue. Negative for activity change, appetite change and unexpected weight change.   HENT: Negative for nosebleeds and trouble swallowing.    Eyes: Negative for pain and visual disturbance.   Respiratory: Negative for chest tightness, shortness of breath and wheezing.    Cardiovascular: Negative for chest pain and palpitations.   Gastrointestinal: Negative for abdominal pain and blood in stool.   Endocrine: Negative.    Genitourinary: Negative for difficulty urinating and hematuria.   Musculoskeletal: Positive for arthralgias and joint swelling.   Skin: Negative for color change and rash.   Allergic/Immunologic: Negative.    Neurological: Negative for syncope and speech difficulty.   Hematological: Negative for adenopathy.   Psychiatric/Behavioral: Negative for agitation and confusion. The patient is not nervous/anxious.    All other systems reviewed and are negative.      Objective   Physical Exam  Vitals and nursing note reviewed.   Constitutional:       General: He is not in acute distress.     Appearance: He is well-developed. He is not toxic-appearing or diaphoretic.   HENT:      Head: Normocephalic and atraumatic. Hair is normal.      Right Ear: External ear normal. No drainage, swelling or tenderness. Tympanic membrane is retracted.      Left Ear: External ear normal. No drainage, swelling or tenderness. Tympanic membrane is retracted.      Nose: Mucosal edema  present.      Mouth/Throat:      Mouth: No oral lesions.      Pharynx: Uvula midline. Posterior oropharyngeal erythema present. No oropharyngeal exudate or uvula swelling.   Eyes:      General: No scleral icterus.        Right eye: No discharge.         Left eye: No discharge.      Conjunctiva/sclera: Conjunctivae normal.      Pupils: Pupils are equal, round, and reactive to light.   Cardiovascular:      Rate and Rhythm: Normal rate and regular rhythm.      Heart sounds: Normal heart sounds. No murmur heard.    No gallop.   Pulmonary:      Effort: Pulmonary effort is normal. No respiratory distress.      Breath sounds: Normal breath sounds. No stridor. No wheezing or rales.   Chest:      Chest wall: No tenderness.   Abdominal:      Palpations: Abdomen is soft.      Tenderness: There is no abdominal tenderness.   Musculoskeletal:         General: Normal range of motion.      Cervical back: Normal range of motion and neck supple.   Lymphadenopathy:      Cervical: No cervical adenopathy.   Skin:     General: Skin is warm and dry.      Findings: No rash.   Neurological:      Mental Status: He is alert and oriented to person, place, and time.      Motor: No abnormal muscle tone.   Psychiatric:         Mood and Affect: Affect is not inappropriate.         Behavior: Behavior normal.         Thought Content: Thought content normal.         Judgment: Judgment normal.         Assessment & Plan   Diagnoses and all orders for this visit:    1. IFG (impaired fasting glucose) (Primary)  -     Comprehensive metabolic panel  -     Lipid panel  -     CBC and Differential  -     TSH  -     Hemoglobin A1c  -     T4, Free  -     Vitamin B12  -     Folate  -     Vitamin D,25-Hydroxy  -     Urinalysis With Microscopic - Urine, Clean Catch  -     Magnesium  -     PSA Screen    2. Rheumatoid arthritis involving multiple sites with positive rheumatoid factor (HCC)  -     Ambulatory Referral to Rheumatology  -     Comprehensive metabolic  panel  -     Lipid panel  -     CBC and Differential  -     TSH  -     Hemoglobin A1c  -     T4, Free  -     Vitamin B12  -     Folate  -     Vitamin D,25-Hydroxy  -     Urinalysis With Microscopic - Urine, Clean Catch  -     Magnesium  -     PSA Screen    3. Generalized anxiety disorder  -     Comprehensive metabolic panel  -     Lipid panel  -     CBC and Differential  -     TSH  -     Hemoglobin A1c  -     T4, Free  -     Vitamin B12  -     Folate  -     Vitamin D,25-Hydroxy  -     Urinalysis With Microscopic - Urine, Clean Catch  -     Magnesium  -     PSA Screen    4. Low serum vitamin B12  -     Comprehensive metabolic panel  -     Lipid panel  -     CBC and Differential  -     TSH  -     Hemoglobin A1c  -     T4, Free  -     Vitamin B12  -     Folate  -     Vitamin D,25-Hydroxy  -     Urinalysis With Microscopic - Urine, Clean Catch  -     Magnesium  -     PSA Screen    5. Coronary artery disease involving native coronary artery of native heart with unstable angina pectoris (HCC)  -     Comprehensive metabolic panel  -     Lipid panel  -     CBC and Differential  -     TSH  -     Hemoglobin A1c  -     T4, Free  -     Vitamin B12  -     Folate  -     Vitamin D,25-Hydroxy  -     Urinalysis With Microscopic - Urine, Clean Catch  -     Magnesium  -     PSA Screen    6. Chronic obstructive pulmonary disease, unspecified COPD type (HCC)  -     Comprehensive metabolic panel  -     Lipid panel  -     CBC and Differential  -     TSH  -     Hemoglobin A1c  -     T4, Free  -     Vitamin B12  -     Folate  -     Vitamin D,25-Hydroxy  -     Urinalysis With Microscopic - Urine, Clean Catch  -     Magnesium  -     PSA Screen    7. Low folic acid  -     Comprehensive metabolic panel  -     Lipid panel  -     CBC and Differential  -     TSH  -     Hemoglobin A1c  -     T4, Free  -     Vitamin B12  -     Folate  -     Vitamin D,25-Hydroxy  -     Urinalysis With Microscopic - Urine, Clean Catch  -     Magnesium  -     PSA  Screen    8. Mixed hyperlipidemia  -     Comprehensive metabolic panel  -     Lipid panel  -     CBC and Differential  -     TSH  -     Hemoglobin A1c  -     T4, Free  -     Vitamin B12  -     Folate  -     Vitamin D,25-Hydroxy  -     Urinalysis With Microscopic - Urine, Clean Catch  -     Magnesium  -     PSA Screen    9. Primary hypertension  -     Comprehensive metabolic panel  -     Lipid panel  -     CBC and Differential  -     TSH  -     Hemoglobin A1c  -     T4, Free  -     Vitamin B12  -     Folate  -     Vitamin D,25-Hydroxy  -     Urinalysis With Microscopic - Urine, Clean Catch  -     Magnesium  -     PSA Screen    10. Vitamin D deficiency  -     Comprehensive metabolic panel  -     Lipid panel  -     CBC and Differential  -     TSH  -     Hemoglobin A1c  -     T4, Free  -     Vitamin B12  -     Folate  -     Vitamin D,25-Hydroxy  -     Urinalysis With Microscopic - Urine, Clean Catch  -     Magnesium  -     PSA Screen    11. Screening PSA (prostate specific antigen)  -     Comprehensive metabolic panel  -     Lipid panel  -     CBC and Differential  -     TSH  -     Hemoglobin A1c  -     T4, Free  -     Vitamin B12  -     Folate  -     Vitamin D,25-Hydroxy  -     Urinalysis With Microscopic - Urine, Clean Catch  -     Magnesium  -     PSA Screen    Other orders  -     irbesartan (AVAPRO) 75 MG tablet; Take 1 tablet by mouth Daily. For BP  Dispense: 90 tablet; Refill: 0      Continues on folic acid and B12 we will update lab  [facility]  Plan, Prem Thrasher, was seen today.  he was seen for HTN and continue medication, Imparied fasting glucose and plan follow up labs, diet, and exercise, GERD and will continue on PPI medication, Hyperlipidemia and will continue current medication, CAD and is currently stable on medication management and stable, CAD and is under care of their Cardiologist for medical management and stable, Hypothyroidism and will need to update labs for continued treatment and Vitamin  D deficiency and will update labs .  Continue Lexapro working well for anxiety and depression  Encourage low-dose CT of the chest screening yearly  Update labs---taking folic acid, b12, d  Treat sinus infx--Augmentin----2 weeks and oral pred

## 2023-03-02 LAB
25(OH)D3+25(OH)D2 SERPL-MCNC: 47.7 NG/ML (ref 30–100)
ALBUMIN SERPL-MCNC: 4.5 G/DL (ref 3.8–4.8)
ALBUMIN/GLOB SERPL: 1.8 {RATIO} (ref 1.2–2.2)
ALP SERPL-CCNC: 75 IU/L (ref 44–121)
ALT SERPL-CCNC: 18 IU/L (ref 0–44)
APPEARANCE UR: CLEAR
AST SERPL-CCNC: 19 IU/L (ref 0–40)
BACTERIA #/AREA URNS HPF: NORMAL /[HPF]
BASOPHILS # BLD AUTO: 0.1 X10E3/UL (ref 0–0.2)
BASOPHILS NFR BLD AUTO: 1 %
BILIRUB SERPL-MCNC: 0.6 MG/DL (ref 0–1.2)
BILIRUB UR QL STRIP: NEGATIVE
BUN SERPL-MCNC: 18 MG/DL (ref 8–27)
BUN/CREAT SERPL: 15 (ref 10–24)
CALCIUM SERPL-MCNC: 9.4 MG/DL (ref 8.6–10.2)
CASTS URNS QL MICRO: NORMAL /LPF
CHLORIDE SERPL-SCNC: 100 MMOL/L (ref 96–106)
CHOLEST SERPL-MCNC: 94 MG/DL (ref 100–199)
CO2 SERPL-SCNC: 24 MMOL/L (ref 20–29)
COLOR UR: YELLOW
CREAT SERPL-MCNC: 1.19 MG/DL (ref 0.76–1.27)
EGFRCR SERPLBLD CKD-EPI 2021: 66 ML/MIN/1.73
EOSINOPHIL # BLD AUTO: 0.2 X10E3/UL (ref 0–0.4)
EOSINOPHIL NFR BLD AUTO: 2 %
EPI CELLS #/AREA URNS HPF: NORMAL /HPF (ref 0–10)
ERYTHROCYTE [DISTWIDTH] IN BLOOD BY AUTOMATED COUNT: 12.8 % (ref 11.6–15.4)
FOLATE SERPL-MCNC: >20 NG/ML
GLOBULIN SER CALC-MCNC: 2.5 G/DL (ref 1.5–4.5)
GLUCOSE SERPL-MCNC: 90 MG/DL (ref 70–99)
GLUCOSE UR QL STRIP: NEGATIVE
HBA1C MFR BLD: 6.6 % (ref 4.8–5.6)
HCT VFR BLD AUTO: 40.3 % (ref 37.5–51)
HDLC SERPL-MCNC: 41 MG/DL
HGB BLD-MCNC: 13 G/DL (ref 13–17.7)
HGB UR QL STRIP: NEGATIVE
IMM GRANULOCYTES # BLD AUTO: 0 X10E3/UL (ref 0–0.1)
IMM GRANULOCYTES NFR BLD AUTO: 0 %
KETONES UR QL STRIP: NEGATIVE
LDLC SERPL CALC-MCNC: 36 MG/DL (ref 0–99)
LEUKOCYTE ESTERASE UR QL STRIP: NEGATIVE
LYMPHOCYTES # BLD AUTO: 1.7 X10E3/UL (ref 0.7–3.1)
LYMPHOCYTES NFR BLD AUTO: 19 %
MAGNESIUM SERPL-MCNC: 2.3 MG/DL (ref 1.6–2.3)
MCH RBC QN AUTO: 28.8 PG (ref 26.6–33)
MCHC RBC AUTO-ENTMCNC: 32.3 G/DL (ref 31.5–35.7)
MCV RBC AUTO: 89 FL (ref 79–97)
MICRO URNS: NORMAL
MICRO URNS: NORMAL
MONOCYTES # BLD AUTO: 0.9 X10E3/UL (ref 0.1–0.9)
MONOCYTES NFR BLD AUTO: 10 %
NEUTROPHILS # BLD AUTO: 6.1 X10E3/UL (ref 1.4–7)
NEUTROPHILS NFR BLD AUTO: 68 %
NITRITE UR QL STRIP: NEGATIVE
PH UR STRIP: 7 [PH] (ref 5–7.5)
PLATELET # BLD AUTO: 268 X10E3/UL (ref 150–450)
POTASSIUM SERPL-SCNC: 5.1 MMOL/L (ref 3.5–5.2)
PROT SERPL-MCNC: 7 G/DL (ref 6–8.5)
PROT UR QL STRIP: NEGATIVE
PSA SERPL-MCNC: 1.5 NG/ML (ref 0–4)
RBC # BLD AUTO: 4.51 X10E6/UL (ref 4.14–5.8)
RBC #/AREA URNS HPF: NORMAL /HPF (ref 0–2)
SODIUM SERPL-SCNC: 139 MMOL/L (ref 134–144)
SP GR UR STRIP: 1.01 (ref 1–1.03)
T4 FREE SERPL-MCNC: 0.96 NG/DL (ref 0.82–1.77)
TRIGL SERPL-MCNC: 82 MG/DL (ref 0–149)
TSH SERPL DL<=0.005 MIU/L-ACNC: 0.66 UIU/ML (ref 0.45–4.5)
UROBILINOGEN UR STRIP-MCNC: 0.2 MG/DL (ref 0.2–1)
VIT B12 SERPL-MCNC: 1293 PG/ML (ref 232–1245)
VLDLC SERPL CALC-MCNC: 17 MG/DL (ref 5–40)
WBC # BLD AUTO: 9.1 X10E3/UL (ref 3.4–10.8)
WBC #/AREA URNS HPF: NORMAL /HPF (ref 0–5)

## 2023-03-06 RX ORDER — LEVOTHYROXINE SODIUM 0.05 MG/1
TABLET ORAL
Qty: 90 TABLET | Refills: 3 | Status: SHIPPED | OUTPATIENT
Start: 2023-03-06

## 2023-03-24 ENCOUNTER — OFFICE VISIT (OUTPATIENT)
Dept: FAMILY MEDICINE CLINIC | Facility: CLINIC | Age: 68
End: 2023-03-24
Payer: MEDICARE

## 2023-03-24 VITALS
RESPIRATION RATE: 16 BRPM | OXYGEN SATURATION: 96 % | WEIGHT: 240 LBS | DIASTOLIC BLOOD PRESSURE: 88 MMHG | TEMPERATURE: 97.5 F | BODY MASS INDEX: 34.36 KG/M2 | HEIGHT: 70 IN | HEART RATE: 87 BPM | SYSTOLIC BLOOD PRESSURE: 136 MMHG

## 2023-03-24 DIAGNOSIS — E11.9 TYPE 2 DIABETES MELLITUS WITHOUT COMPLICATION, WITHOUT LONG-TERM CURRENT USE OF INSULIN: Primary | ICD-10-CM

## 2023-03-24 DIAGNOSIS — I10 PRIMARY HYPERTENSION: ICD-10-CM

## 2023-03-24 DIAGNOSIS — M54.16 LUMBAR RADICULOPATHY: ICD-10-CM

## 2023-03-24 DIAGNOSIS — J44.9 CHRONIC OBSTRUCTIVE PULMONARY DISEASE, UNSPECIFIED COPD TYPE: ICD-10-CM

## 2023-03-24 DIAGNOSIS — I25.110 CORONARY ARTERY DISEASE INVOLVING NATIVE CORONARY ARTERY OF NATIVE HEART WITH UNSTABLE ANGINA PECTORIS: ICD-10-CM

## 2023-03-24 DIAGNOSIS — F41.1 GENERALIZED ANXIETY DISORDER: ICD-10-CM

## 2023-03-24 RX ORDER — BUSPIRONE HYDROCHLORIDE 10 MG/1
10 TABLET ORAL 2 TIMES DAILY
Qty: 180 TABLET | Refills: 1 | Status: SHIPPED | OUTPATIENT
Start: 2023-03-24

## 2023-03-24 RX ORDER — GABAPENTIN 300 MG/1
CAPSULE ORAL
Qty: 90 CAPSULE | Refills: 5 | Status: SHIPPED | OUTPATIENT
Start: 2023-03-24

## 2023-03-24 RX ORDER — FLUTICASONE FUROATE, UMECLIDINIUM BROMIDE AND VILANTEROL TRIFENATATE 100; 62.5; 25 UG/1; UG/1; UG/1
1 POWDER RESPIRATORY (INHALATION)
Qty: 60 EACH | Refills: 11 | Status: SHIPPED | OUTPATIENT
Start: 2023-03-24

## 2023-03-24 RX ORDER — METFORMIN HYDROCHLORIDE 500 MG/1
TABLET, EXTENDED RELEASE ORAL
Qty: 180 TABLET | Refills: 1 | Status: SHIPPED | OUTPATIENT
Start: 2023-03-24

## 2023-03-24 NOTE — PROGRESS NOTES
"Subjective   Prem Thrasher is a 68 y.o. male.     History of Present Illness    Since the last visit, he has overall felt tired.  He has New diagnosis today of type 2 diabetes due to A1c results on last labs.  I just saw him on 3/1/2023 for evaluation of primary hypertension stable on Rx, GERD controlled on PPI, mixed hyperlipidemia controlled on statin, heart disease managed by cardiology, acquired hypothyroidism treated due to with levothyroxine, vitamin D deficiency also treated with supplement..  he has been compliant with current medications have reviewed them.  The patient denies medication side effects.  Will refill medications. /88   Pulse 87   Temp 97.5 °F (36.4 °C)   Resp 16   Ht 177.8 cm (70\")   Wt 109 kg (240 lb)   SpO2 96%   BMI 34.44 kg/m²     Results for orders placed or performed in visit on 03/01/23   Comprehensive metabolic panel    Specimen: Blood   Result Value Ref Range    Glucose 90 70 - 99 mg/dL    BUN 18 8 - 27 mg/dL    Creatinine 1.19 0.76 - 1.27 mg/dL    EGFR Result 66 >59 mL/min/1.73    BUN/Creatinine Ratio 15 10 - 24    Sodium 139 134 - 144 mmol/L    Potassium 5.1 3.5 - 5.2 mmol/L    Chloride 100 96 - 106 mmol/L    Total CO2 24 20 - 29 mmol/L    Calcium 9.4 8.6 - 10.2 mg/dL    Total Protein 7.0 6.0 - 8.5 g/dL    Albumin 4.5 3.8 - 4.8 g/dL    Globulin 2.5 1.5 - 4.5 g/dL    A/G Ratio 1.8 1.2 - 2.2    Total Bilirubin 0.6 0.0 - 1.2 mg/dL    Alkaline Phosphatase 75 44 - 121 IU/L    AST (SGOT) 19 0 - 40 IU/L    ALT (SGPT) 18 0 - 44 IU/L   Lipid panel    Specimen: Blood   Result Value Ref Range    Total Cholesterol 94 (L) 100 - 199 mg/dL    Triglycerides 82 0 - 149 mg/dL    HDL Cholesterol 41 >39 mg/dL    VLDL Cholesterol Benjamin 17 5 - 40 mg/dL    LDL Chol Calc (NIH) 36 0 - 99 mg/dL   TSH    Specimen: Blood   Result Value Ref Range    TSH 0.660 0.450 - 4.500 uIU/mL   Hemoglobin A1c    Specimen: Blood   Result Value Ref Range    Hemoglobin A1C 6.6 (H) 4.8 - 5.6 %   T4, Free    " Specimen: Blood   Result Value Ref Range    Free T4 0.96 0.82 - 1.77 ng/dL   Vitamin B12    Specimen: Blood   Result Value Ref Range    Vitamin B-12 1,293 (H) 232 - 1,245 pg/mL   Folate    Specimen: Blood   Result Value Ref Range    Folate >20.0 >3.0 ng/mL   Vitamin D,25-Hydroxy    Specimen: Blood   Result Value Ref Range    25 Hydroxy, Vitamin D 47.7 30.0 - 100.0 ng/mL   Magnesium    Specimen: Blood   Result Value Ref Range    Magnesium 2.3 1.6 - 2.3 mg/dL   PSA Screen    Specimen: Blood   Result Value Ref Range    PSA 1.5 0.0 - 4.0 ng/mL   Microscopic Examination -   Result Value Ref Range    WBC, UA None seen 0 - 5 /hpf    RBC, UA None seen 0 - 2 /hpf    Epithelial Cells (non renal) None seen 0 - 10 /hpf    Casts None seen None seen /lpf    Bacteria, UA None seen None seen/Few   POCT SARS-CoV-2 Antigen YASMINE    Specimen: Swab   Result Value Ref Range    SARS Antigen Not Detected Not Detected, Presumptive Negative    Internal Control Passed Passed    Lot Number [id number]     Expiration Date 08/04/2023    CBC and Differential    Specimen: Blood   Result Value Ref Range    WBC 9.1 3.4 - 10.8 x10E3/uL    RBC 4.51 4.14 - 5.80 x10E6/uL    Hemoglobin 13.0 13.0 - 17.7 g/dL    Hematocrit 40.3 37.5 - 51.0 %    MCV 89 79 - 97 fL    MCH 28.8 26.6 - 33.0 pg    MCHC 32.3 31.5 - 35.7 g/dL    RDW 12.8 11.6 - 15.4 %    Platelets 268 150 - 450 x10E3/uL    Neutrophil Rel % 68 Not Estab. %    Lymphocyte Rel % 19 Not Estab. %    Monocyte Rel % 10 Not Estab. %    Eosinophil Rel % 2 Not Estab. %    Basophil Rel % 1 Not Estab. %    Neutrophils Absolute 6.1 1.4 - 7.0 x10E3/uL    Lymphocytes Absolute 1.7 0.7 - 3.1 x10E3/uL    Monocytes Absolute 0.9 0.1 - 0.9 x10E3/uL    Eosinophils Absolute 0.2 0.0 - 0.4 x10E3/uL    Basophils Absolute 0.1 0.0 - 0.2 x10E3/uL    Immature Granulocyte Rel % 0 Not Estab. %    Immature Grans Absolute 0.0 0.0 - 0.1 x10E3/uL   Urinalysis With Microscopic - Urine, Clean Catch    Specimen: Urine, Clean Catch   Result  Value Ref Range    Specific Gravity, UA 1.014 1.005 - 1.030    pH, UA 7.0 5.0 - 7.5    Color, UA Yellow Yellow    Appearance, UA Clear Clear    Leukocytes, UA Negative Negative    Protein Negative Negative/Trace    Glucose, UA Negative Negative    Ketones Negative Negative    Blood, UA Negative Negative    Bilirubin, UA Negative Negative    Urobilinogen, UA 0.2 0.2 - 1.0 mg/dL    Nitrite, UA Negative Negative    Microscopic Examination Comment     Microscopic Examination See below:    Reviewed notes from rheumatology consult 3/14/2023  Saw Dr Louise  I reviewed his notes and his impression was patient suffers from polyarthralgia from osteoarthritis and was going to order additional labs to check for inflammatory arthritis and imaging..... There was a question from prior rheumatologist and from this consult the patient had rheumatoid arthritis after all?  He has been on prior therapies and Dr. Louise did not notice any synovitis  on exam and did note osteoarthritic changes especially in his fingers.  Encourage yearly low-dose CT of the chest to screen for lung cancer  Note that Lexapro does work well for anxiety and depression and continue same dose  Continue albuterol for COPD working well  Flexeril as needed muscle spasms working well  Had visit with cardiology 10/3/2022 noting his history of coronary artery disease stable and continuing his Plavix and beta-blocker and statin therapy.  Noted blood pressure was well controlled on medication and follow-up was due in 6 months    Has seen pulm for COPD and mod persistent asthma.    The following portions of the patient's history were reviewed and updated as appropriate: allergies, current medications, past family history, past medical history, past social history, past surgical history and problem list.    Review of Systems   Constitutional: Positive for fatigue. Negative for activity change, appetite change and unexpected weight change.   HENT: Negative for nosebleeds  and trouble swallowing.    Eyes: Negative for pain and visual disturbance.   Respiratory: Positive for cough and shortness of breath. Negative for chest tightness and wheezing.    Cardiovascular: Negative for chest pain and palpitations.   Gastrointestinal: Negative for abdominal pain and blood in stool.   Endocrine: Negative.  Negative for polydipsia, polyphagia and polyuria.   Genitourinary: Negative for difficulty urinating and hematuria.   Musculoskeletal: Positive for arthralgias, back pain, gait problem and joint swelling.   Skin: Negative for color change, pallor and rash.   Allergic/Immunologic: Negative.    Neurological: Positive for headaches. Negative for dizziness, tremors, seizures, syncope, speech difficulty and weakness.   Hematological: Negative for adenopathy.   Psychiatric/Behavioral: Negative for agitation and confusion. The patient is nervous/anxious.    All other systems reviewed and are negative.      Objective   Physical Exam  Vitals and nursing note reviewed.   Constitutional:       General: He is not in acute distress.     Appearance: He is well-developed. He is obese. He is not diaphoretic.   HENT:      Head: Normocephalic.   Eyes:      Conjunctiva/sclera: Conjunctivae normal.      Pupils: Pupils are equal, round, and reactive to light.   Cardiovascular:      Rate and Rhythm: Normal rate and regular rhythm.      Heart sounds: Normal heart sounds. No murmur heard.  Pulmonary:      Effort: Pulmonary effort is normal.      Breath sounds: Normal breath sounds.   Musculoskeletal:         General: Normal range of motion.      Cervical back: Normal range of motion and neck supple.   Skin:     General: Skin is warm and dry.      Findings: No rash.   Neurological:      Mental Status: He is alert and oriented to person, place, and time.   Psychiatric:         Mood and Affect: Mood normal. Affect is not inappropriate.         Behavior: Behavior normal.         Thought Content: Thought content normal.          Judgment: Judgment normal.         Assessment & Plan   Diagnoses and all orders for this visit:    1. Type 2 diabetes mellitus without complication, without long-term current use of insulin ([facility]) (Primary)  -     Comprehensive Metabolic Panel; Future  -     Hemoglobin A1c; Future  -     Microalbumin / Creatinine Urine Ratio - Urine, Clean Catch; Future    2. Coronary artery disease involving native coronary artery of native heart with unstable angina pectoris ([facility])  -     Comprehensive Metabolic Panel; Future  -     Hemoglobin A1c; Future  -     Microalbumin / Creatinine Urine Ratio - Urine, Clean Catch; Future    3. Chronic obstructive pulmonary disease, unspecified COPD type ([facility])  -     Comprehensive Metabolic Panel; Future  -     Hemoglobin A1c; Future  -     Microalbumin / Creatinine Urine Ratio - Urine, Clean Catch; Future    4. Primary hypertension    5. Lumbar radiculopathy  -     gabapentin (NEURONTIN) 300 MG capsule; 1 p.o. nightly day 1 then 1 p.o. twice daily day 2 then routine 1 p.o. 3 times daily for lumbar radiculopathy pain  Dispense: 90 capsule; Refill: 5    6. Generalized anxiety disorder    Other orders  -     Fluticasone-Umeclidin-Vilant (Trelegy Ellipta) 100-62.5-25 MCG/ACT inhaler; Inhale 1 puff Daily. And rinse mouth after use  Dispense: 60 each; Refill: 11  -     metFORMIN ER (GLUCOPHAGE-XR) 500 MG 24 hr tablet; 1 PO daily with food for DMII X 1 week and then 2 PO daily routine  Dispense: 180 tablet; Refill: 1  -     busPIRone (BUSPAR) 10 MG tablet; Take 1 tablet by mouth 2 (Two) Times a Day. PRN anxiety  Dispense: 180 tablet; Refill: 1    Plan, Prem Thrasher, was seen today.  he was seen for HTN and continue medication, DMII and will start medication and plan, Hyperlipidemia and will continue current medication, CAD and is currently stable on medication management and stable and CAD and is under care of their Cardiologist for medical management and stable.  Will need yearly eye  exams due to diabetes diagnosis  Plan to check urine microalbumin next labs and yearly  Followed by cardiology for heart disease--stable  Advise low-dose CT of chest for lung cancer screening  Labs 3 mos  Check feet  Add Buspar for break through anxiety and still take Lexapro  Restart Gabapentin for pain  Can refer pain management  Declines referral to dietitian and diabetes education  Renewed controlled substance contract for restarting gabapentin for chronic back pain we will start 1 daily and increase to twice daily then 3 times a day as--- follow-up after 1 to 2 months may need to increase dose  Start metformin for type 2 diabetes after 1 week try to increase to 2 tabs with labs plan for 3-months  Try Trelegy 100mg for COPD       Answers for HPI/ROS submitted by the patient on 3/23/2023  What is the primary reason for your visit?: Diabetes

## 2023-03-24 NOTE — PATIENT INSTRUCTIONS
Diabetes Mellitus Basics  Diabetes mellitus, or diabetes, is a long-term (chronic) disease. It occurs when the body does not properly use sugar (glucose) that is released from food after you eat.  Diabetes mellitus may be caused by one or both of these problems:  Your pancreas does not make enough of a hormone called insulin.  Your body does not react in a normal way to the insulin that it makes.  Insulin lets glucose enter cells in your body. This gives you energy. If you have diabetes, glucose cannot get into cells. This causes high blood glucose (hyperglycemia).  How to treat and manage diabetes  You may need to take insulin or other diabetes medicines daily to keep your glucose in balance. If you are prescribed insulin, you will learn how to give yourself insulin by injection. You may need to adjust the amount of insulin you take based on the foods that you eat.  You will need to check your blood glucose levels using a glucose monitor as told by your health care provider. The readings can help determine if you have low or high blood glucose.  Generally, you should have these blood glucose levels:  Before meals (preprandial):  mg/dL (4.4-7.2 mmol/L).  After meals (postprandial): below 180 mg/dL (10 mmol/L).  Hemoglobin A1c (HbA1c) level: less than 7%.  Your health care provider will set treatment goals for you.  Keep all follow-up visits. This is important.  Follow these instructions at home:  Diabetes medicines  Take your diabetes medicines every day as told by your health care provider. List your diabetes medicines here:  Name of medicine: ______________________________  Amount (dose): _______________ Time (a.m./p.m.): _______________ Notes: ___________________________________  Name of medicine: ______________________________  Amount (dose): _______________ Time (a.m./p.m.): _______________ Notes: ___________________________________  Name of medicine: ______________________________  Amount (dose):  _______________ Time (a.m./p.m.): _______________ Notes: ___________________________________  Insulin  If you use insulin, list the types of insulin you use here:  Insulin type: ______________________________  Amount (dose): _______________ Time (a.m./p.m.): _______________Notes: ___________________________________  Insulin type: ______________________________  Amount (dose): _______________ Time (a.m./p.m.): _______________ Notes: ___________________________________  Insulin type: ______________________________  Amount (dose): _______________ Time (a.m./p.m.): _______________ Notes: ___________________________________  Insulin type: ______________________________  Amount (dose): _______________ Time (a.m./p.m.): _______________ Notes: ___________________________________  Insulin type: ______________________________  Amount (dose): _______________ Time (a.m./p.m.): _______________ Notes: ___________________________________  Managing blood glucose  Check your blood glucose levels using a glucose monitor as told by your health care provider.  Write down the times that you check your glucose levels here:  Time: _______________ Notes: ___________________________________  Time: _______________ Notes: ___________________________________  Time: _______________ Notes: ___________________________________  Time: _______________ Notes: ___________________________________  Time: _______________ Notes: ___________________________________  Time: _______________ Notes: ___________________________________    Low blood glucose  Low blood glucose (hypoglycemia) is when glucose is at or below 70 mg/dL (3.9 mmol/L). Symptoms may include:  Feeling:  Hungry.  Sweaty and clammy.  Irritable or easily upset.  Dizzy.  Sleepy.  Having:  A fast heartbeat.  A headache.  A change in your vision.  Numbness around the mouth, lips, or tongue.  Having trouble with:  Moving (coordination).  Sleeping.  Treating low blood glucose  To treat low blood  glucose, eat or drink something containing sugar right away. If you can think clearly and swallow safely, follow the 15:15 rule:  Take 15 grams of a fast-acting carb (carbohydrate), as told by your health care provider.  Some fast-acting carbs are:  Glucose tablets: take 3-4 tablets.  Hard candy: eat 3-5 pieces.  Fruit juice: drink 4 oz (120 mL).  Regular (not diet) soda: drink 4-6 oz (120-180 mL).  Honey or sugar: eat 1 Tbsp (15 mL).  Check your blood glucose levels 15 minutes after you take the carb.  If your glucose is still at or below 70 mg/dL (3.9 mmol/L), take 15 grams of a carb again.  If your glucose does not go above 70 mg/dL (3.9 mmol/L) after 3 tries, get help right away.  After your glucose goes back to normal, eat a meal or a snack within 1 hour.  Treating very low blood glucose  If your glucose is at or below 54 mg/dL (3 mmol/L), you have very low blood glucose (severe hypoglycemia).  This is an emergency. Do not wait to see if the symptoms will go away. Get medical help right away. Call your local emergency services (911 in the [place] Do not drive yourself to the hospital.  Questions to ask your health care provider  Should I talk with a diabetes educator?  What equipment will I need to care for myself at home?  What diabetes medicines do I need? When should I take them?  How often do I need to check my blood glucose levels?  What number can I call if I have questions?  When is my follow-up visit?  Where can I find a support group for people with diabetes?  Where to find more information  American Diabetes Association: www.diabetes.org  Association of Diabetes Care and Education Specialists: www.diabeteseducator.org  Contact a health care provider if:  Your blood glucose is at or above 240 mg/dL (13.3 mmol/L) for 2 days in a row.  You have been sick or have had a fever for 2 days or more, and you are not getting better.  You have any of these problems for more than 6 hours:  You cannot eat or  drink.  You feel nauseous.  You vomit.  You have diarrhea.  Get help right away if:  Your blood glucose is lower than 54 mg/dL (3 mmol/L).  You get confused.  You have trouble thinking clearly.  You have trouble breathing.  These symptoms may represent a serious problem that is an emergency. Do not wait to see if the symptoms will go away. Get medical help right away. Call your local emergency services (911 in the [place] Do not drive yourself to the hospital.  Summary  Diabetes mellitus is a chronic disease that occurs when the body does not properly use sugar (glucose) that is released from food after you eat.  Take insulin and diabetes medicines as told.  Check your blood glucose every day, as often as told.  Keep all follow-up visits. This is important.  This information is not intended to replace advice given to you by your health care provider. Make sure you discuss any questions you have with your health care provider.  Document Revised: 04/20/2021 Document Reviewed: 04/20/2021  [employer] Patient Education © 2022 [employer]

## 2023-03-30 DIAGNOSIS — M54.16 LUMBAR RADICULOPATHY: ICD-10-CM

## 2023-03-30 RX ORDER — GABAPENTIN 300 MG/1
CAPSULE ORAL
Qty: 90 CAPSULE | Refills: 5 | OUTPATIENT
Start: 2023-03-30

## 2023-03-30 NOTE — TELEPHONE ENCOUNTER
Caller: [facility] - [place], [place] - [address] - [phone] [facility] [phone] FX    Relationship: Pharmacy    Best call back number: [phone]    Requested Prescriptions:   Requested Prescriptions     Pending Prescriptions Disp Refills   • gabapentin (NEURONTIN) 300 MG capsule 90 capsule 5     Si p.o. nightly day 1 then 1 p.o. twice daily day 2 then routine 1 p.o. 3 times daily for lumbar radiculopathy pain        Pharmacy where request should be sent: [facility] - [place], [place] - [address] - [phone]  - [phone] FX     Last office visit with prescribing clinician: 3/24/2023   Last telemedicine visit with prescribing clinician: 2023   Next office visit with prescribing clinician: 2023     Does the patient have less than a 3 day supply:  [x] Yes  [] No    Would you like a call back once the refill request has been completed: [] Yes [x] No    If the office needs to give you a call back, can they leave a voicemail: [] Yes [x] No    Sarah Hernandez Rep   23 14:57 EDT

## 2023-04-06 ENCOUNTER — OFFICE VISIT (OUTPATIENT)
Dept: CARDIOLOGY | Facility: CLINIC | Age: 68
End: 2023-04-06
Payer: MEDICARE

## 2023-04-06 VITALS
WEIGHT: 249 LBS | HEIGHT: 70 IN | HEART RATE: 57 BPM | SYSTOLIC BLOOD PRESSURE: 138 MMHG | BODY MASS INDEX: 35.65 KG/M2 | DIASTOLIC BLOOD PRESSURE: 94 MMHG

## 2023-04-06 DIAGNOSIS — I25.10 CORONARY ARTERY DISEASE INVOLVING NATIVE CORONARY ARTERY OF NATIVE HEART WITHOUT ANGINA PECTORIS: Primary | ICD-10-CM

## 2023-04-06 PROCEDURE — 1159F MED LIST DOCD IN RCRD: CPT | Performed by: INTERNAL MEDICINE

## 2023-04-06 PROCEDURE — 99214 OFFICE O/P EST MOD 30 MIN: CPT | Performed by: INTERNAL MEDICINE

## 2023-04-06 PROCEDURE — 3080F DIAST BP >= 90 MM HG: CPT | Performed by: INTERNAL MEDICINE

## 2023-04-06 PROCEDURE — 3075F SYST BP GE 130 - 139MM HG: CPT | Performed by: INTERNAL MEDICINE

## 2023-04-06 PROCEDURE — 1160F RVW MEDS BY RX/DR IN RCRD: CPT | Performed by: INTERNAL MEDICINE

## 2023-04-06 NOTE — PROGRESS NOTES
Subjective:     Encounter Date:04/06/2023      Patient ID: Prem Thrasher is a 68 y.o. male.    Chief Complaint: CAD  HPI:   This is a pleasant 68-year-old male who presents for evaluation.  He was previously followed by Dr. Salazar and I am meeting him for the first time today.  2020 he had unstable angina received 2 discrete LAD stents in the mid and distal vessel.  The following year he had recurrent symptoms and was found to have a new LAD lesion just proximal to the distal lesion as well as circumflex lesion.  He went balloon angioplasty of the LAD and PCI of the circumflex  He has chronic back pain.  He is very limited over the last 2 years with walking.  He has chronic exertion as a result of this and his COPD.  He has had no further angina.  He is on aspirin 325 and Plavix and is tolerating this well and no history of bleeding.  He reports he was instructed to take this dosage    The following portions of the patient's history were reviewed and updated as appropriate: allergies, current medications, past family history, past medical history, past social history, past surgical history and problem list.     REVIEW OF SYSTEMS:   All systems reviewed.  Pertinent positives identified in HPI.  All other systems are negative.    Past Medical History:   Diagnosis Date   • Achilles tendon pain     LEFT   • Allergic    • Anxiety disorder    • CAD (coronary artery disease)    • Cervical disc disorder    • Chronic anticoagulation     PLAVIX-CARDIAC STENT X3   • Chronic lower back pain    • COPD (chronic obstructive pulmonary disease)    • CTS (carpal tunnel syndrome)    • Depression    • Disease of thyroid gland    • Diverticulitis of colon    • Encounter for eye exam 10/2014    normal   • Exertional chest pain    • GERD (gastroesophageal reflux disease)    • Headache    • History of bone density study 03/2014    Dr. Maria Antonia Lopez; normal   • History of chest x-ray 02/15/2011    also 3-6-15; normal chest   •  History of colon polyps    • History of nuclear stress test 2015    cardiolite; Dr. Greenberg; negative   • History of pulmonary function tests 2015    COPD   • Hyperlipidemia    • Hypertension    • Low back strain    • Lumbosacral disc disease    • Nerve damage     KAYLYNN ELBOWS   • NSTEMI (non-ST elevated myocardial infarction) 2021   • Obesity    • Osteoarthritis, multiple sites    • Pneumonia    • Postoperative nausea and vomiting 2017   • Postoperative wound infection    • Rheumatoid arthritis    • Rotator cuff syndrome    • Sciatica    • Short of breath on exertion    • Sleep apnea     no cpap   • Staph infection     WITH BACK SURGERY 2017  ON LONG TERM ANTIBIOTICS   • Thoracic disc disorder    • Unstable angina    • Unsteady gait     D/T LOWER BACK       Family History   Problem Relation Age of Onset   • Diabetes Mother    • Heart disease Mother    • Hyperlipidemia Mother    • Stroke Mother    • Heart disease Father    • Rheum arthritis Father    • Alcohol abuse Father    • Hyperlipidemia Father    • Depression Brother    • Cancer Brother         prostate   • Depression Brother    • Heart disease Brother    • Hyperlipidemia Brother    • Cancer Brother    • Thyroid disease Sister    • Arthritis Sister    • Asthma Son    • Malig Hyperthermia Neg Hx        Social History     Socioeconomic History   • Marital status:    Tobacco Use   • Smoking status: Former     Packs/day: 1.00     Years: 45.00     Pack years: 45.00     Types: Cigarettes     Quit date: 10/20/2021     Years since quittin.4   • Smokeless tobacco: Never   • Tobacco comments:     Current status is non smoker   Vaping Use   • Vaping Use: Never used   Substance and Sexual Activity   • Alcohol use: No   • Drug use: No   • Sexual activity: Not Currently     Partners: Female     Birth control/protection: None       Allergies   Allergen Reactions   • Atorvastatin Other (See Comments) and Myalgia     Leg cramps   • Ceclor [Cefaclor]  "Rash     Patient tolerated nafcillin during 9/2017 admission and has tolerated Augmentin in past outpatient.    • Methotrexate Derivatives Rash     \"Blisters\"       Past Surgical History:   Procedure Laterality Date   • ACHILLES TENDON SURGERY Left 07/03/2018    Procedure: Left Achilles debridement and secondary repair with partial excision of calcaneus. Possible left Achilles lengthening at the calf;  Surgeon: Vinay Smith MD;  [place] [facility] [place] [facility]  Service: Orthopedics   • BACK SURGERY  10/2017   • CARDIAC CATHETERIZATION N/A 03/07/2020    Procedure: Left Heart Cath;  Surgeon: Dioni Salazar MD;  Location: [facility] CATH INVASIVE LOCATION;  Service: Cardiology;  Laterality: N/A;   • CARDIAC CATHETERIZATION N/A 03/07/2020    Procedure: Coronary angiography;  Surgeon: Dioni Salazar MD;  Location: [facility] CATH INVASIVE LOCATION;  Service: Cardiology;  Laterality: N/A;   • CARDIAC CATHETERIZATION N/A 03/07/2020    Procedure: Left ventriculography;  Surgeon: Dioni Salazar MD;  Location: [facility] INVASIVE LOCATION;  Service: Cardiology;  Laterality: N/A;   • CARDIAC CATHETERIZATION N/A 03/07/2020    Procedure: Stent RAZA coronary;  Surgeon: Dioni Salazar MD;  Location: [facility] CATH INVASIVE LOCATION;  Service: Cardiology;  Laterality: N/A;   • CARDIAC CATHETERIZATION N/A 01/22/2021    Procedure: Left Heart Cath;  Surgeon: Dioni Salazar MD;  Location: [facility] CATH INVASIVE LOCATION;  Service: Cardiology;  Laterality: N/A;   • CARDIAC CATHETERIZATION N/A 01/22/2021    Procedure: Coronary angiography;  Surgeon: Dioni Salazar MD;  Location: [facility] CATH INVASIVE LOCATION;  Service: Cardiology;  Laterality: N/A;   • CARDIAC CATHETERIZATION N/A 01/22/2021    Procedure: Left ventriculography;  Surgeon: Dioni Salazar MD;  Location: [facility] CATH INVASIVE LOCATION;  Service: Cardiology;  Laterality: N/A;   • CARDIAC CATHETERIZATION N/A 01/22/2021    Procedure: Percutaneous " Coronary Intervention;  Surgeon: Dioni Salazar MD;  Location: [facility] CATH INVASIVE LOCATION;  Service: Cardiology;  Laterality: N/A;   • CARDIAC CATHETERIZATION N/A 01/22/2021    Procedure: Stent RAZA coronary;  Surgeon: Dioni Salazar MD;  [facility];  Service: Cardiology;  Laterality: N/A;   • CARDIAC SURGERY      3 stents total   • COLONOSCOPY N/A 02/02/2011    Normal colon except scattered diverticulosis-Dr. Guero Blunt   • COLONOSCOPY N/A 04/08/2021    Procedure: COLONOSCOPY TO CECUM WITH POLYPECTOMY (COLD BX);  Surgeon: Porsha Arcos MD;  Location: [facility] ENDOSCOPY;  Service: General;  Laterality: N/A;  SCREENING; HX OF COLON POLYPS  POST:DIVERTICULOSIS; COLON POLYP   • CORONARY STENT PLACEMENT     • CYST REMOVAL  03/2016    x2  FROM FACE AND EAR   • DENTAL PROCEDURE  2008    teeth removed; dental implants   • EPIDURAL BLOCK  1995    L/S spine   • FINGER DEBRIDEMENT Right 07/12/2003    Debridement and full thickness skin grafting of the ring and small fingers of the right hand-Dr. Rayray Long   • FOOT SURGERY     • HAND SURGERY  2003   • INCISION AND DRAINAGE PERIRECTAL ABSCESS N/A 07/10/2018    Procedure: INCISION AND DRAINAGE OF PERIRECTAL ABSCESS;  Surgeon: Porsha Arcos MD;  Location: [facility] MAIN OR;  Service: General   • INCISION AND DRAINAGE TRUNK N/A 04/23/2022    Procedure: Evacuation lumbar hematoma;  Surgeon: Jacky Perez MD;  Location: [facility] MAIN OR;  Service: Orthopedics;  Laterality: N/A;   • LUMBAR DISCECTOMY FUSION INSTRUMENTATION Left 10/10/2016    Procedure: LEFT L2-L3, L3-L4 LUMBAR LAMINECTOMY/ DISCECTOMY WITH METRIX ;  Surgeon: Sawyer Rick MD;  Location: [facility] MAIN OR;  Service:    • LUMBAR DISCECTOMY FUSION INSTRUMENTATION N/A 07/31/2017    Procedure: LUMBAR FUSION DECOMPRESSON WITH PEDICLE SCREWS with LAURA L2-3,L3-4;  Surgeon: Jacky Perez MD;  Location: [facility] MAIN OR;  Service:    • LUMBAR FUSION N/A 04/13/2022     Procedure: Thoracic 10-thoracic 11, thoracic 11-thoracic 12, thoracic 12-lumbar 1, lumbar 1-lumbar 2, lumbar 2-lumbar 3, lumbar 3-lumbar 4 fusion with instrumentation with iliac crest bone graft, and removal of implants from lumbar 2-lumbar 3, lumbar 3-lumbar 4;  Surgeon: Jacky Perez MD;  Location: [facility] MAIN OR;  Service: Orthopedic Spine;  Laterality: N/A;   • LUMBAR FUSION N/A 04/13/2022    Procedure: LUMBAR ANTERIOR INTERBODY FUSION L4,L5 Osteotomy interbody fusion with cage L1,L2;  Surgeon: Jacky Perez MD;  Location: [facility] MAIN OR;  Service: Orthopedic Spine;  Laterality: N/A;   • LUMBAR LAMINECTOMY DISCECTOMY DECOMPRESSION N/A 07/31/2017    Procedure: L2 to L4 laminectomy with neural lysis and a fusion by orthopedics;  Surgeon: Sawyer Rick MD;  Location: [facility] MAIN OR;  Service:    • LUMBAR LAMINECTOMY DISCECTOMY DECOMPRESSION N/A 09/05/2017    Procedure: I&D LUMBAR SPINE ;  Surgeon: Jacky Perez MD;  Location: [facility];  Service:    • SHOULDER SURGERY Right 02/23/2011    Dr. Navarro   • SINUS SURGERY  2003    Dr. Barrera   • SPINE SURGERY  2010   • TRIGGER POINT INJECTION         Procedures       Objective:         PHYSICAL EXAM:  GEN: VSS, no distress,   Eyes: normal sclera, normal lids and lashes  HENT: moist mucus membranes,   Respiratory: CTAB, no rales or wheezes  CV: RRR, no murmurs, , +2 DP and 2+ carotid pulses b/l  GI: NABS, soft,  Nontender, nondistended  MSK: no edema, no scoliosis or kyphosis  Skin: no rash, warm, dry  Heme/Lymph: no bruising or bleeding  Psych: organized thought, normal behavior and affect  Neuro: Cranial nerves grossly intact, Alert and Oriented x 3.         Assessment:          Diagnosis Plan   1. Coronary artery disease involving native coronary artery of native heart without angina pectoris               Plan:       1.  CAD: Diffuse coronary disease, status post multivessel stenting in the LAD and circumflex with balloon angioplasty of the distal  LAD in 2021.  Has been maintained on aspirin 325 and Plavix 75 since then presumably due to his diffuse disease and previously occluded distal LAD stent.  He is tolerating this however with any issues with GI bleeding in the future, would not hesitate to decrease the dosage.  2.  COPD on Trelegy  3.  Mildly elevated A1c.  He was recently started on metformin for an A1c of 6.6%.  This is probably unnecessary in a 69-year-old man.  I asked him to discuss this further with his PCP.  4.  Chronic back pain  5.  Chronic dyspnea  Montse, thank you very much for referring this kind patient to me. Please call me with any questions or concerns. I will see the patient again in the office in 6 months.         Jami Mars MD  04/06/23  [facility]    Outpatient Encounter Medications as of 4/6/2023   Medication Sig Dispense Refill   • albuterol sulfate  (90 Base) MCG/ACT inhaler Inhale 2 puffs Every 4 (Four) Hours As Needed for Wheezing or Shortness of Air. 54 g 3   • amLODIPine (NORVASC) 10 MG tablet Take 1 tablet by mouth Daily. for blood pressure 90 tablet 3   • aspirin  MG tablet Take 1 tablet by mouth Daily. (Patient taking differently: Take 1 tablet by mouth Daily. STATES INSTRUCTED TO HOLD ONE WEEK PRIOR TO SURGERY) 30 tablet 0   • benzonatate (TESSALON) 200 MG capsule Take 1 capsule by mouth 3 (Three) Times a Day As Needed for Cough. 30 capsule 0   • busPIRone (BUSPAR) 10 MG tablet Take 1 tablet by mouth 2 (Two) Times a Day. PRN anxiety 180 tablet 1   • carvedilol (COREG) 12.5 MG tablet Take 1 tablet by mouth 2 (Two) Times a Day With Meals. 180 tablet 3   • Cholecalciferol (VITAMIN D) 2000 UNITS tablet Take 2,000 Units by mouth Every Evening.     • clopidogrel (PLAVIX) 75 MG tablet Take 1 tablet by mouth Daily. 90 tablet 3   • Cyanocobalamin (Vitamin B-12) 1000 MCG sublingual tablet Place 1 tablet under the tongue Daily. DISSOLVE 1 TABLET UNDER THE TONGUE EVERY DAY 90 each 3   • cyclobenzaprine  (FLEXERIL) 10 MG tablet Take 1 tablet by mouth 2 (Two) Times a Day.     • docusate sodium 100 MG capsule Take 1 capsule by mouth 2 (Two) Times a Day.     • escitalopram (LEXAPRO) 20 MG tablet TAKE 1 TABLET EVERY NIGHT FOR ANXIETY 90 tablet 3   • ezetimibe (ZETIA) 10 MG tablet Take 1 tablet by mouth Daily. 90 tablet 3   • Fluticasone-Umeclidin-Vilant (Trelegy Ellipta) 100-62.5-25 MCG/ACT inhaler Inhale 1 puff Daily. And rinse mouth after use 60 each 11   • folic acid (FOLVITE) 1 MG tablet TAKE 1 TABLET EVERY DAY 90 tablet 3   • gabapentin (NEURONTIN) 300 MG capsule 1 p.o. nightly day 1 then 1 p.o. twice daily day 2 then routine 1 p.o. 3 times daily for lumbar radiculopathy pain 90 capsule 5   • irbesartan (AVAPRO) 75 MG tablet Take 1 tablet by mouth Daily. For BP 90 tablet 0   • isosorbide mononitrate (IMDUR) 30 MG 24 hr tablet Take 1 tablet by mouth Daily. 90 tablet 3   • levothyroxine (SYNTHROID, LEVOTHROID) 50 MCG tablet TAKE 1 TABLET EVERY DAY FOR THYROID BEFORE BREAKFAST (NEW DOSE) 90 tablet 3   • metFORMIN ER (GLUCOPHAGE-XR) 500 MG 24 hr tablet 1 PO daily with food for DMII X 1 week and then 2 PO daily routine 180 tablet 1   • multivitamin with minerals tablet tablet Take 1 tablet by mouth Daily. HOLD X1 WEEK PRIOR TO SURGERY     • nitroglycerin (NITROSTAT) 0.4 MG SL tablet Place 1 tablet under the tongue Every 5 (Five) Minutes As Needed for Chest Pain. Take no more than 3 doses in 15 minutes. 25 tablet 1   • pantoprazole (PROTONIX) 40 MG EC tablet Take 1 tablet by mouth Daily. For GERD 90 tablet 1   • rosuvastatin (CRESTOR) 10 MG tablet Take 1 tablet by mouth every night at bedtime. 90 tablet 3   • [DISCONTINUED] amoxicillin-clavulanate (Augmentin) 875-125 MG per tablet Take 1 tablet by mouth Every 12 (Twelve) Hours. One PO BID for infection with food 28 tablet 1   • [DISCONTINUED] predniSONE (DELTASONE) 20 MG tablet Take 1 tablet by mouth 2 (Two) Times a Day. With food for 5 days 10 tablet 0     No  facility-administered encounter medications on file as of 4/6/2023.

## 2023-04-11 ENCOUNTER — OFFICE VISIT (OUTPATIENT)
Dept: FAMILY MEDICINE CLINIC | Facility: CLINIC | Age: 68
End: 2023-04-11
Payer: MEDICARE

## 2023-04-11 VITALS
HEIGHT: 70 IN | WEIGHT: 249 LBS | DIASTOLIC BLOOD PRESSURE: 65 MMHG | OXYGEN SATURATION: 95 % | TEMPERATURE: 97.5 F | HEART RATE: 62 BPM | RESPIRATION RATE: 16 BRPM | SYSTOLIC BLOOD PRESSURE: 118 MMHG | BODY MASS INDEX: 35.65 KG/M2

## 2023-04-11 DIAGNOSIS — I25.110 CORONARY ARTERY DISEASE INVOLVING NATIVE CORONARY ARTERY OF NATIVE HEART WITH UNSTABLE ANGINA PECTORIS: ICD-10-CM

## 2023-04-11 DIAGNOSIS — M54.16 LUMBAR RADICULOPATHY: ICD-10-CM

## 2023-04-11 DIAGNOSIS — M15.9 GENERALIZED OSTEOARTHRITIS: Primary | ICD-10-CM

## 2023-04-11 DIAGNOSIS — M43.16 SPONDYLOLISTHESIS OF LUMBAR REGION: ICD-10-CM

## 2023-04-11 DIAGNOSIS — Z86.718 HISTORY OF DVT (DEEP VEIN THROMBOSIS): ICD-10-CM

## 2023-04-11 PROCEDURE — 99213 OFFICE O/P EST LOW 20 MIN: CPT

## 2023-04-11 PROCEDURE — 1159F MED LIST DOCD IN RCRD: CPT

## 2023-04-11 PROCEDURE — 3078F DIAST BP <80 MM HG: CPT

## 2023-04-11 PROCEDURE — 3044F HG A1C LEVEL LT 7.0%: CPT

## 2023-04-11 PROCEDURE — 3074F SYST BP LT 130 MM HG: CPT

## 2023-04-11 PROCEDURE — 1160F RVW MEDS BY RX/DR IN RCRD: CPT

## 2023-04-11 NOTE — PROGRESS NOTES
"Answers for HPI/ROS submitted by the patient on 4/10/2023  What is the primary reason for your visit?: Other  Please describe your symptoms.: I have no symptoms I need a statement to keep me from serving on jurors duty because of my medical conditions preventing me to serve  Have you had these symptoms before?: No  How long have you been having these symptoms?: 1-4 days    Chief Complaint  jury duty    Subjective          History of Present Illness    Prem Thrasher 68 y.o. male presents today for a statement to be released from jury duty due to neck medical conditions that would prevent him from sitting for long periods of time.  Medical conditions include, but not limited to, osteoarthritis of multiple sites, sciatica, chronic lower back pain, rheumatoid arthritis, spondylolisthesis of lumbar region, coronary artery disease, and history of DVT.  He is asymptomatic today.          Objective   Vital Signs:   /65 (BP Location: Left arm, Patient Position: Sitting, Cuff Size: Adult)   Pulse 62   Temp 97.5 °F (36.4 °C) (Oral)   Resp 16   Ht 177.8 cm (70\")   Wt 113 kg (249 lb)   SpO2 95%   BMI 35.73 kg/m²      Class 2 Severe Obesity (BMI >=35 and <=39.9). Obesity-related health conditions include the following: hypertension, coronary heart disease, diabetes mellitus, dyslipidemias, GERD and osteoarthritis. Obesity is unchanged. BMI is is above average; BMI management plan is completed. We discussed low calorie, low carb based diet program, portion control and increasing exercise.        Physical Exam  Vitals and nursing note reviewed.   Constitutional:       General: He is not in acute distress.     Appearance: He is well-developed. He is obese.   HENT:      Head: Normocephalic and atraumatic.   Eyes:      General: No scleral icterus.        Right eye: No discharge.         Left eye: No discharge.      Conjunctiva/sclera: Conjunctivae normal.      Pupils: Pupils are equal, round, and reactive to light. "   Neck:      Thyroid: No thyromegaly.      Trachea: No tracheal deviation.   Cardiovascular:      Rate and Rhythm: Normal rate and regular rhythm.      Pulses: Normal pulses.      Heart sounds: Normal heart sounds. No murmur heard.    No gallop.   Pulmonary:      Effort: Pulmonary effort is normal. No respiratory distress.      Breath sounds: Normal breath sounds. No decreased breath sounds, wheezing or rales.   Musculoskeletal:         General: Normal range of motion.      Cervical back: Normal range of motion and neck supple.   Skin:     General: Skin is warm.      Coloration: Skin is not pale.      Findings: No erythema or rash.   Neurological:      Mental Status: He is alert and oriented to person, place, and time.      Motor: Motor function is intact. No abnormal muscle tone.      Coordination: Coordination normal.      Gait: Gait normal.   Psychiatric:         Mood and Affect: Mood normal.                         Assessment and Plan      Diagnoses and all orders for this visit:    1. Generalized osteoarthritis (Primary)  Comments:  For jury duty release only  Excused from jury duty  Follow up with PCP in 2 months    2. Spondylolisthesis of lumbar region  Comments:  For jury duty release only  Excused from jury duty  Follow up with PCP in 2 months    3. Lumbar radiculopathy  Comments:  For jury duty release only  Excused from jury duty  Follow up with PCP in 2 months    4. Coronary artery disease involving native coronary artery of native heart with unstable angina pectoris  Comments:  For jury duty release only  Excused from [employer] duty  Follow up with PCP in 2 months    5. History of DVT (deep vein thrombosis)  Comments:  For jury duty release only  Excused from jury duty  Follow up with PCP in 2 months            Follow Up     Return in about 2 months (around 6/26/2023) for Next scheduled follow up.    Patient was given instructions and counseling regarding his condition or for health maintenance advice.  Please see specific information pulled into the AVS if appropriate.     -It is my medical opinion that Mr. Prem Thrasher should be excused from jury duty due to the above listed medical conditions.  -The patient has a scheduled appointment with his PCP on 6/26/2023 for a follow up.

## 2023-05-14 RX ORDER — IRBESARTAN 75 MG/1
TABLET ORAL
Qty: 90 TABLET | Refills: 0 | Status: SHIPPED | OUTPATIENT
Start: 2023-05-14

## 2023-07-28 ENCOUNTER — TELEPHONE (OUTPATIENT)
Dept: CARDIOLOGY | Facility: CLINIC | Age: 68
End: 2023-07-28

## 2023-07-28 NOTE — TELEPHONE ENCOUNTER
"    Caller: Prem Thrasher \"Greg\"    Relationship to patient: Self    Best call back number: [phone]    Chief complaint: NEEDING TO SCHEDULE FOR CARDIAC CLEARANCE    Type of visit: FOLLOW UP    Requested date: ASAP     Additional notes: SURGERY 9.7.23         "

## 2023-07-31 NOTE — TELEPHONE ENCOUNTER
Pt called back, he is having shoulder replacement surgery in September and needs clearance     [facility] Cristobal [facility]  Can be faxed to : [phone]    Ph: [phone] ([place])

## 2023-08-09 ENCOUNTER — HOSPITAL ENCOUNTER (OUTPATIENT)
Dept: CT IMAGING | Facility: HOSPITAL | Age: 68
Discharge: HOME | End: 2023-08-09
Admitting: PHYSICIAN ASSISTANT
Payer: MEDICARE

## 2023-08-09 DIAGNOSIS — J44.9 CHRONIC OBSTRUCTIVE PULMONARY DISEASE, UNSPECIFIED COPD TYPE: ICD-10-CM

## 2023-08-09 DIAGNOSIS — R91.8 ABNORMAL SCREENING COMPUTED TOMOGRAPHY (CT) OF LUNG: Primary | ICD-10-CM

## 2023-08-09 PROCEDURE — 71271 CT THORAX LUNG CANCER SCR C-: CPT

## 2023-08-13 RX ORDER — IRBESARTAN 75 MG/1
TABLET ORAL
Qty: 90 TABLET | Refills: 0 | Status: SHIPPED | OUTPATIENT
Start: 2023-08-13

## 2023-08-18 ENCOUNTER — TELEPHONE (OUTPATIENT)
Dept: FAMILY MEDICINE CLINIC | Facility: CLINIC | Age: 68
End: 2023-08-18

## 2023-08-18 NOTE — TELEPHONE ENCOUNTER
"  Caller: Prem Thrasher \"Greg\"    Relationship: Self    Best call back number: [phone]     What was the call regarding: PATIENT IS SCHEDULED FOR A PRE-OP APPOINTMENT ON 08/22/2023 WITH MAREN BAUMANN. PATIENT IS ASKING IF HE WILL NEED TO COMPLETE LABS FOR THE APPOINTMENT. PLEASE ADVISE.    Is it okay if the provider responds through MyChart: YES      "

## 2023-08-22 ENCOUNTER — OFFICE VISIT (OUTPATIENT)
Dept: FAMILY MEDICINE CLINIC | Facility: CLINIC | Age: 68
End: 2023-08-22
Payer: MEDICARE

## 2023-08-22 VITALS
TEMPERATURE: 97.7 F | HEART RATE: 79 BPM | SYSTOLIC BLOOD PRESSURE: 110 MMHG | DIASTOLIC BLOOD PRESSURE: 68 MMHG | WEIGHT: 249 LBS | RESPIRATION RATE: 16 BRPM | BODY MASS INDEX: 35.65 KG/M2 | HEIGHT: 70 IN | OXYGEN SATURATION: 94 %

## 2023-08-22 DIAGNOSIS — Z01.818 PREOPERATIVE CLEARANCE: Primary | ICD-10-CM

## 2023-08-22 DIAGNOSIS — E03.9 HYPOTHYROIDISM, ACQUIRED: ICD-10-CM

## 2023-08-22 DIAGNOSIS — I25.10 CAD S/P PERCUTANEOUS CORONARY ANGIOPLASTY: ICD-10-CM

## 2023-08-22 DIAGNOSIS — K22.89 ESOPHAGEAL THICKENING: ICD-10-CM

## 2023-08-22 DIAGNOSIS — E11.9 TYPE 2 DIABETES MELLITUS WITHOUT COMPLICATION, WITHOUT LONG-TERM CURRENT USE OF INSULIN: ICD-10-CM

## 2023-08-22 DIAGNOSIS — F41.1 GENERALIZED ANXIETY DISORDER: ICD-10-CM

## 2023-08-22 DIAGNOSIS — Z98.61 CAD S/P PERCUTANEOUS CORONARY ANGIOPLASTY: ICD-10-CM

## 2023-08-22 DIAGNOSIS — J44.9 CHRONIC OBSTRUCTIVE PULMONARY DISEASE, UNSPECIFIED COPD TYPE: ICD-10-CM

## 2023-08-22 PROCEDURE — 3074F SYST BP LT 130 MM HG: CPT | Performed by: PHYSICIAN ASSISTANT

## 2023-08-22 PROCEDURE — 3078F DIAST BP <80 MM HG: CPT | Performed by: PHYSICIAN ASSISTANT

## 2023-08-22 PROCEDURE — 1160F RVW MEDS BY RX/DR IN RCRD: CPT | Performed by: PHYSICIAN ASSISTANT

## 2023-08-22 PROCEDURE — 99213 OFFICE O/P EST LOW 20 MIN: CPT | Performed by: PHYSICIAN ASSISTANT

## 2023-08-22 PROCEDURE — 3044F HG A1C LEVEL LT 7.0%: CPT | Performed by: PHYSICIAN ASSISTANT

## 2023-08-22 NOTE — PROGRESS NOTES
"Subjective   Prem Thrasher is a 68 y.o. male.     History of Present Illness   Prem Thrasher 68 y.o. male /68   Pulse 79   Temp 97.7 øF (36.5 øC)   Resp 16   Ht 177.8 cm (70\")   Wt 113 kg (249 lb)   SpO2 94%   BMI 35.73 kg/mý  who presents today for preop clearance. He is not on inhaler for COPD.   he has a history of   Patient Active Problem List   Diagnosis    Allergy    COPD (chronic obstructive pulmonary disease)    Hyperlipidemia    IFG (impaired fasting glucose)    Rheumatoid arthritis    Hypertension    Anxiety disorder    Recurrent major depressive disorder, in full remission    Lumbar radiculopathy    Spondylolisthesis of lumbar region    Sepsis    Allegra's deformity of left heel    Calcific Achilles tendonitis s/p OR 7/18    Gastrocnemius equinus, left    Abscess and cellulitis of gluteal region    Tobacco abuse    Perirectal abscess    Anemia    Wound dehiscence    Gastrocnemius strain, left, initial encounter    Low folic acid    Low serum vitamin B12    Exertional chest pain    Unstable angina    Coronary artery disease involving native coronary artery of native heart with unstable angina pectoris    Unstable angina pectoris    Coronary artery disease involving native heart without angina pectoris    Low back pain    Non-STEMI (non-ST elevated myocardial infarction)    CAD S/P percutaneous coronary angioplasty    S/P drug eluting coronary stent placement    Prediabetes    Tobacco abuse counseling    Screening for colon cancer    Colon polyps    Diverticulosis    Hypothyroidism, acquired    Renal impairment    Lumbar pain    Pseudarthrosis after fusion or arthrodesis    Hyponatremia    Postoperative ileus    Postoperative anemia due to acute blood loss    Acquired trigger finger    Generalized osteoarthritis    Type 2 diabetes mellitus without complication, without long-term current use of insulin   .  Reviewed preop labs noting hemoglobin was in baselines, creatinine was slightly above " "range 1.24 but EGFR was normal at 63.  Glucose at 106.  Hemoglobin A1c was 6.6%  I was wondering if I could get a prescription for a lift chair? [employer] said you and your physician will complete and submit a PDF certificate of medical necessity for seat lift mechanism form.  Me   to Prem GARY Thrasher \"Greg\"     You do need to get information from the company that is selling the lift chair... He will have to make a separate appointment has to be a face-to-face appointment and it has to include an exact diagnoses that Medicare will cover in order to pay for the lift chair.  Will also need to send you to physical therapy and Occupational therapy to prove medical necessity..  See me for appointment and we will discuss     Preop clearance for right shoulder replacement  Patient's already obtained clearance from cardiologist Dr. Mars --- on 8/2/2023.  Patient had preop chest x-ray ordered by Ortho on 8/7/2023 noting no acute cardiopulmonary radiographic abnormality.  Patient had low-dose CT of the chest to screen for lung cancer on 8/9/2023 I noted no lung cancer was seen but had concerns about lung nodularity and bronchial wall thickening along with opacity left lower lobe and referred him back to Dr. Mcmillan, pulmonologist  He is also seeing pain management for the chronic lumbar pain.  Intol Cpap  The following portions of the patient's history were reviewed and updated as appropriate: allergies, current medications, past family history, past medical history, past social history, past surgical history, and problem list.    Review of Systems   Constitutional:  Positive for activity change, appetite change and fatigue. Negative for diaphoresis.   HENT:  Negative for nosebleeds and trouble swallowing.    Eyes:  Negative for blurred vision and visual disturbance.   Respiratory:  Negative for choking, shortness of breath and wheezing.    Gastrointestinal:  Negative for blood in stool.   Musculoskeletal:  Positive for " arthralgias, back pain and joint swelling.   Allergic/Immunologic: Negative for immunocompromised state.   Neurological:  Negative for facial asymmetry and speech difficulty.   Psychiatric/Behavioral:  Positive for sleep disturbance. Negative for self-injury and suicidal ideas.      Objective   Physical Exam  Vitals and nursing note reviewed.   Constitutional:       General: He is not in acute distress.     Appearance: He is well-developed. He is obese. He is not diaphoretic.   HENT:      Head: Normocephalic.   Eyes:      Conjunctiva/sclera: Conjunctivae normal.      Pupils: Pupils are equal, round, and reactive to light.   Cardiovascular:      Rate and Rhythm: Normal rate and regular rhythm.      Heart sounds: Normal heart sounds. No murmur heard.  Pulmonary:      Effort: Pulmonary effort is normal.      Breath sounds: Normal breath sounds. No rales.   Musculoskeletal:         General: Normal range of motion.      Cervical back: Normal range of motion and neck supple.      Right lower leg: Edema present.      Left lower leg: Edema present.      Comments: Trace pedal edema = bilat    Skin:     General: Skin is warm and dry.      Findings: No rash.   Neurological:      Mental Status: He is alert and oriented to person, place, and time.   Psychiatric:         Mood and Affect: Affect is not inappropriate.         Behavior: Behavior normal.         Thought Content: Thought content normal.         Judgment: Judgment normal.         Assessment & Plan   Diagnoses and all orders for this visit:    1. Preoperative clearance (Primary)    2. Type 2 diabetes mellitus without complication, without long-term current use of insulin    3. Generalized anxiety disorder    4. Chronic obstructive pulmonary disease, unspecified COPD type    5. CAD S/P percutaneous coronary angioplasty    6. Hypothyroidism, acquired        Still want him to see pulm for abn CT chest---Dr Mcmillan---declines inhalers  Already cleared by cardiology for the  shoulder surgery  Diabetes is stable and A1c was 6.6% he continues on metformin  Blood pressure remains controlled and coronary artery disease managed by cardiologist  Has follow-up with pain management the Lyrica is helping his lumbar pain some and the Flexeril  Has labs 12-1-23  GERD is controlled on PPI Rx.---distal esophageal thickening was also noted on low-dose CT of chest did advise he see a gastroenterologist to see about EGD  Disc low dose CT with DR Azar---see Pulm  Ok for surgery

## 2023-08-24 ENCOUNTER — OFFICE VISIT (OUTPATIENT)
Dept: PAIN MEDICINE | Facility: CLINIC | Age: 68
End: 2023-08-24
Payer: MEDICARE

## 2023-08-24 VITALS
TEMPERATURE: 97.5 F | WEIGHT: 246.4 LBS | SYSTOLIC BLOOD PRESSURE: 135 MMHG | OXYGEN SATURATION: 93 % | BODY MASS INDEX: 35.28 KG/M2 | DIASTOLIC BLOOD PRESSURE: 70 MMHG | HEIGHT: 70 IN | HEART RATE: 84 BPM | RESPIRATION RATE: 20 BRPM

## 2023-08-24 DIAGNOSIS — Z98.890 HISTORY OF LUMBAR SURGERY: ICD-10-CM

## 2023-08-24 DIAGNOSIS — M54.16 LUMBAR RADICULOPATHY: ICD-10-CM

## 2023-08-24 DIAGNOSIS — G89.4 CHRONIC PAIN SYNDROME: Primary | ICD-10-CM

## 2023-08-24 PROCEDURE — 1160F RVW MEDS BY RX/DR IN RCRD: CPT | Performed by: NURSE PRACTITIONER

## 2023-08-24 PROCEDURE — 3075F SYST BP GE 130 - 139MM HG: CPT | Performed by: NURSE PRACTITIONER

## 2023-08-24 PROCEDURE — 99214 OFFICE O/P EST MOD 30 MIN: CPT | Performed by: NURSE PRACTITIONER

## 2023-08-24 PROCEDURE — 1159F MED LIST DOCD IN RCRD: CPT | Performed by: NURSE PRACTITIONER

## 2023-08-24 PROCEDURE — 3078F DIAST BP <80 MM HG: CPT | Performed by: NURSE PRACTITIONER

## 2023-08-24 PROCEDURE — 1125F AMNT PAIN NOTED PAIN PRSNT: CPT | Performed by: NURSE PRACTITIONER

## 2023-08-24 RX ORDER — PREGABALIN 100 MG/1
100 CAPSULE ORAL 3 TIMES DAILY
Qty: 90 CAPSULE | Refills: 1 | Status: SHIPPED | OUTPATIENT
Start: 2023-08-24

## 2023-08-24 RX ORDER — TRAMADOL HYDROCHLORIDE 50 MG/1
50 TABLET ORAL EVERY 8 HOURS PRN
Qty: 60 TABLET | Refills: 0 | Status: SHIPPED | OUTPATIENT
Start: 2023-08-24
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
## 2023-08-25 ENCOUNTER — TELEPHONE (OUTPATIENT)
Dept: CARDIOLOGY | Facility: CLINIC | Age: 68
End: 2023-08-25
Payer: MEDICARE

## 2023-08-25 NOTE — TELEPHONE ENCOUNTER
Rec'd fax from [facility] asking for cardiac clearance for R Reverse Total Shoulder Replacement.   There was a clearance sent on 8/2/23, but apparently they needed more info.  Put form in Dr. Mars's tray  Jg   8/25/23

## 2023-10-01 RX ORDER — BUSPIRONE HYDROCHLORIDE 10 MG/1
TABLET ORAL
Qty: 180 TABLET | Refills: 1 | Status: SHIPPED | OUTPATIENT
Start: 2023-10-01

## 2023-10-02 RX ORDER — MAGNESIUM 200 MG
TABLET ORAL
Qty: 90 TABLET | Refills: 3 | Status: SHIPPED | OUTPATIENT
Start: 2023-10-02

## 2023-10-02 RX ORDER — FOLIC ACID 1 MG/1
TABLET ORAL
Qty: 90 TABLET | Refills: 3 | Status: SHIPPED | OUTPATIENT
Start: 2023-10-02

## 2023-10-02 RX ORDER — PANTOPRAZOLE SODIUM 40 MG/1
TABLET, DELAYED RELEASE ORAL
Qty: 90 TABLET | Refills: 1 | Status: SHIPPED | OUTPATIENT
Start: 2023-10-02

## 2023-10-02 RX ORDER — ISOSORBIDE MONONITRATE 30 MG/1
TABLET, EXTENDED RELEASE ORAL
Qty: 90 TABLET | Refills: 3 | Status: SHIPPED | OUTPATIENT
Start: 2023-10-02

## 2023-10-02 RX ORDER — CLOPIDOGREL BISULFATE 75 MG/1
TABLET ORAL
Qty: 90 TABLET | Refills: 3 | Status: SHIPPED | OUTPATIENT
Start: 2023-10-02

## 2023-10-03 ENCOUNTER — OFFICE VISIT (OUTPATIENT)
Dept: PAIN MEDICINE | Facility: CLINIC | Age: 68
End: 2023-10-03
Payer: MEDICARE

## 2023-10-03 VITALS
RESPIRATION RATE: 18 BRPM | WEIGHT: 245.2 LBS | DIASTOLIC BLOOD PRESSURE: 82 MMHG | HEIGHT: 70 IN | TEMPERATURE: 96.4 F | HEART RATE: 83 BPM | SYSTOLIC BLOOD PRESSURE: 118 MMHG | BODY MASS INDEX: 35.1 KG/M2 | OXYGEN SATURATION: 94 %

## 2023-10-03 DIAGNOSIS — Z79.899 HIGH RISK MEDICATION USE: ICD-10-CM

## 2023-10-03 DIAGNOSIS — G89.4 CHRONIC PAIN SYNDROME: Primary | ICD-10-CM

## 2023-10-03 DIAGNOSIS — Z98.890 HISTORY OF LUMBAR SURGERY: ICD-10-CM

## 2023-10-03 DIAGNOSIS — M54.16 LUMBAR RADICULOPATHY: ICD-10-CM

## 2023-10-03 PROCEDURE — 1159F MED LIST DOCD IN RCRD: CPT | Performed by: NURSE PRACTITIONER

## 2023-10-03 PROCEDURE — 3079F DIAST BP 80-89 MM HG: CPT | Performed by: NURSE PRACTITIONER

## 2023-10-03 PROCEDURE — 1125F AMNT PAIN NOTED PAIN PRSNT: CPT | Performed by: NURSE PRACTITIONER

## 2023-10-03 PROCEDURE — 3074F SYST BP LT 130 MM HG: CPT | Performed by: NURSE PRACTITIONER

## 2023-10-03 PROCEDURE — 99213 OFFICE O/P EST LOW 20 MIN: CPT | Performed by: NURSE PRACTITIONER

## 2023-10-03 PROCEDURE — 1160F RVW MEDS BY RX/DR IN RCRD: CPT | Performed by: NURSE PRACTITIONER

## 2023-10-03 RX ORDER — PREGABALIN 100 MG/1
100 CAPSULE ORAL 3 TIMES DAILY
Qty: 90 CAPSULE | Refills: 1 | Status: SHIPPED | OUTPATIENT
Start: 2023-10-03

## 2023-10-03 NOTE — PROGRESS NOTES
CHIEF COMPLAINT  Follow-up for back pain.    Subjective   Prem Thrasher is a 68 y.o. male  who presents for follow-up.  He has a history of back pain.  Pain today 7/10 VAS.  Pain is present in the mid and lower back.  Currently Lyrica 100 mg tid, we also started Tramadol at the previous office visit.  Today he reports that he does not remember if it helped. Has been taking Hydrocodone 7.5/325 sparingly since surgery, he is now out of this.       He is not a candidate for epidural injections due to history of infection following lumbar fusion.      He is not a candidate for NSAIDs (plavix, cardiac hx).     Right shoulder replacement 9/7/2023.  --- improving       Pertinent surgical history ---   4/13/2022 - lumbar fusion (Dr. Perez). Required evacuation of lumbar hematoma 4/23/2022  Lumbar fusion 2017 (Dr. Perez, Dr. Rick). Required second surgery to clean out infection   Back surgery 2010     Past Medications ---  Hydrocodone - constipation  Gabapentin - constipation    Back Pain  This is a chronic problem. The current episode started more than 1 year ago. The problem occurs daily. The problem has been gradually worsening since onset. The pain is present in the lumbar spine. The quality of the pain is described as aching, cramping and burning. The pain radiates to the left thigh and right thigh. The pain is at a severity of 7/10. The symptoms are aggravated by position, sitting, standing, twisting and bending. Associated symptoms include weakness (bilateral legs). Pertinent negatives include no abdominal pain, chest pain, dysuria, fever, headaches or numbness. He has tried analgesics, muscle relaxant, heat, ice and home exercises for the symptoms. The treatment provided mild relief.      PEG Assessment   What number best describes your pain on average in the past week?7  What number best describes how, during the past week, pain has interfered with your enjoyment of life?9  What number best describes how,  "during the past week, pain has interfered with your general activity?  8    Review of Pertinent Medical Data ---    The following portions of the patient's history were reviewed and updated as appropriate: allergies, current medications, past family history, past medical history, past social history, past surgical history, and problem list.    Review of Systems   Constitutional:  Negative for activity change and fever.   Cardiovascular:  Negative for chest pain.   Gastrointestinal:  Negative for abdominal pain, constipation and diarrhea.   Genitourinary:  Positive for difficulty urinating. Negative for dysuria.   Musculoskeletal:  Positive for arthralgias (right shoulder) and back pain.   Neurological:  Positive for weakness (bilateral legs). Negative for numbness and headaches.   Psychiatric/Behavioral:  Positive for sleep disturbance. Negative for suicidal ideas. The patient is nervous/anxious.      I have reviewed and confirmed the accuracy of the ROS as documented by the MA/LPN/RN [facility]    Vitals:    10/03/23 1026   BP: 118/82   Pulse: 83   Resp: 18   Temp: 96.4 °F (35.8 °C)   SpO2: 94%   Weight: 111 kg (245 lb 3.2 oz)   Height: 177.8 cm (70\")   PainSc:   7   PainLoc: Back         Objective   Physical Exam  Vitals and nursing note reviewed.   Constitutional:       General: He is not in acute distress.     Appearance: Normal appearance. He is not ill-appearing.   Pulmonary:      Effort: Pulmonary effort is normal. No respiratory distress.   Musculoskeletal:      Thoracic back: Tenderness present.      Lumbar back: Tenderness present.   Neurological:      Mental Status: He is alert and oriented to person, place, and time.      Gait: Gait abnormal (slowed, antalgia, ambulates with cane).   Psychiatric:         Mood and Affect: Mood normal.         Behavior: Behavior normal.         Assessment & Plan   Diagnoses and all orders for this visit:    1. Chronic pain syndrome (Primary)  -     pregabalin " (LYRICA) 100 MG capsule; Take 1 capsule by mouth 3 (Three) Times a Day.  Dispense: 90 capsule; Refill: 1    2. History of lumbar surgery  -     pregabalin (LYRICA) 100 MG capsule; Take 1 capsule by mouth 3 (Three) Times a Day.  Dispense: 90 capsule; Refill: 1    3. Lumbar radiculopathy  -     pregabalin (LYRICA) 100 MG capsule; Take 1 capsule by mouth 3 (Three) Times a Day.  Dispense: 90 capsule; Refill: 1    4. High risk medication use      --- He will try taking the Tramadol as previously recommended/prescribed.  He is now done with post-op pain meds.  Does not need refill at this time.    --- Refill Lyrica.    --- The urine drug screen confirmation from 8/24/2023 has been reviewed and the result is appropriate based on patient history and JESSICA report  --- The patient signed an updated copy of the controlled substance agreement on --- WILL OBTAIN IF MEDICATION CONTINUED   --- Moderate risk abuse/misuse - ORT   --- Non-interventional candidate  --- May have to return to surgery if leg symptoms continue to worsen - consider referral to Dr. Humphrey as Dr. Perez has now retired     Prem VEGA Thrasher reports a pain score of 7.  Given his pain assessment as noted, treatment options were discussed and the following options were decided upon as a follow-up plan to address the patient's pain:  see plan above .      --- Follow-up 1 month                  JESSICA REPORT  As part of the patient's treatment plan, I am prescribing controlled substances. The patient has been made aware of appropriate use of such medications, including potential risk of somnolence, limited ability to drive and/or work safely, and the potential for dependence or overdose. It has also been made clear that these medications are for use by this patient only, without concomitant use of alcohol or other substances unless prescribed.     Patient has completed prescribing agreement detailing terms of continued prescribing of controlled substances,  including monitoring JESSICA reports, urine drug screening, and pill counts if necessary. The patient is aware that inappropriate use will results in cessation of prescribing such medications.    As the clinician, I personally reviewed the JESSICA from 10/3/2023 while the patient was in the office today.    History and physical exam exhibit continued safe and appropriate use of controlled substances.       Dictated utilizing Dragon dictation.

## 2023-10-04 RX ORDER — EZETIMIBE 10 MG/1
10 TABLET ORAL DAILY
Qty: 90 TABLET | Refills: 3 | Status: SHIPPED | OUTPATIENT
Start: 2023-10-04

## 2023-10-09 ENCOUNTER — OFFICE VISIT (OUTPATIENT)
Dept: CARDIOLOGY | Facility: CLINIC | Age: 68
End: 2023-10-09
Payer: MEDICARE

## 2023-10-09 VITALS
SYSTOLIC BLOOD PRESSURE: 122 MMHG | HEIGHT: 70 IN | BODY MASS INDEX: 34.79 KG/M2 | DIASTOLIC BLOOD PRESSURE: 80 MMHG | OXYGEN SATURATION: 92 % | HEART RATE: 91 BPM | WEIGHT: 243 LBS

## 2023-10-09 DIAGNOSIS — Z72.0 TOBACCO ABUSE: ICD-10-CM

## 2023-10-09 DIAGNOSIS — E78.2 MIXED HYPERLIPIDEMIA: ICD-10-CM

## 2023-10-09 DIAGNOSIS — I10 PRIMARY HYPERTENSION: ICD-10-CM

## 2023-10-09 DIAGNOSIS — Z95.5 S/P DRUG ELUTING CORONARY STENT PLACEMENT: Primary | ICD-10-CM

## 2023-10-09 DIAGNOSIS — Z87.891 EX-SMOKER: ICD-10-CM

## 2023-10-09 DIAGNOSIS — E11.9 TYPE 2 DIABETES MELLITUS WITHOUT COMPLICATION, WITHOUT LONG-TERM CURRENT USE OF INSULIN: ICD-10-CM

## 2023-10-09 DIAGNOSIS — I25.10 CORONARY ARTERY DISEASE INVOLVING NATIVE CORONARY ARTERY OF NATIVE HEART WITHOUT ANGINA PECTORIS: ICD-10-CM

## 2023-10-09 PROBLEM — Z71.6 TOBACCO ABUSE COUNSELING: Status: RESOLVED | Noted: 2021-01-29 | Resolved: 2023-10-09

## 2023-10-09 PROCEDURE — 99214 OFFICE O/P EST MOD 30 MIN: CPT | Performed by: NURSE PRACTITIONER

## 2023-10-09 PROCEDURE — 1159F MED LIST DOCD IN RCRD: CPT | Performed by: NURSE PRACTITIONER

## 2023-10-09 PROCEDURE — 1160F RVW MEDS BY RX/DR IN RCRD: CPT | Performed by: NURSE PRACTITIONER

## 2023-10-09 PROCEDURE — 3079F DIAST BP 80-89 MM HG: CPT | Performed by: NURSE PRACTITIONER

## 2023-10-09 PROCEDURE — 3074F SYST BP LT 130 MM HG: CPT | Performed by: NURSE PRACTITIONER

## 2023-10-09 PROCEDURE — 93000 ELECTROCARDIOGRAM COMPLETE: CPT | Performed by: NURSE PRACTITIONER

## 2023-10-09 RX ORDER — CARVEDILOL 12.5 MG/1
12.5 TABLET ORAL 2 TIMES DAILY WITH MEALS
Qty: 180 TABLET | Refills: 3 | Status: SHIPPED | OUTPATIENT
Start: 2023-10-09

## 2023-10-09 RX ORDER — AMLODIPINE BESYLATE 10 MG/1
10 TABLET ORAL DAILY
Qty: 90 TABLET | Refills: 3 | Status: SHIPPED | OUTPATIENT
Start: 2023-10-09

## 2023-10-09 NOTE — PROGRESS NOTES
Date of Office Visit: 10/09/2023  Encounter Provider: TEN Deluna  Place of Service: [facility] CARDIOLOGY  Patient Name: Prem Thrasher  :1955    Chief Complaint   Patient presents with    Follow-up   : Coronary artery disease    HPI: Prem Thrasher is a 68 y.o. male who is a patient of Dr. Salazar previously, now follows with Dr. Mars.  He is new to me today.  He had unstable angina in  received 2 LAD stents in the mid and distal vessel.  In  he had some recurrent symptoms was found to have a new lesion in the LAD just proximal to the distal lesion as well as the circumflex.  He underwent balloon angioplasty of the LAD and PCI of the circumflex.    He was last in the office in April he has chronic dyspnea with exertion and chronic back pain.  He has COPD and continued tobacco abuse.  He comes in today for follow-up.  He denies any chest pain, pressure or tightness.  He has been having a lot of joint problems.  On  he had a right shoulder replacement.  He quit smoking in 2021 after his last stents.  He does have a lot of joint and arthritis pain and follows with pain management for that.   Previous testing and notes have been reviewed by me.   Past Medical History:   Diagnosis Date    Achilles tendon pain     LEFT    Allergic     Anxiety disorder     Asthma     When I get stressed out or try to do something I get short of breath    CAD (coronary artery disease)     Cervical disc disorder     Chronic anticoagulation     PLAVIX-CARDIAC STENT X3    Chronic lower back pain     COPD (chronic obstructive pulmonary disease)     CTS (carpal tunnel syndrome)     Deep vein thrombosis     A    Depression     Diabetes mellitus 2023    Type two diabetes    Disease of thyroid gland     Diverticulitis of colon     Encounter for eye exam 10/2014    normal    Exertional chest pain     GERD (gastroesophageal reflux disease)     Headache      History of bone density study 03/2014    Dr. Maria Antonia Lopez; normal    History of chest x-ray 02/15/2011    also 3-6-15; normal chest    History of colon polyps     History of nuclear stress test 03/09/2015    cardiolite; Dr. Greenberg; negative    History of pulmonary function tests 03/2015    COPD    Hyperlipidemia     Hypertension     Low back strain     Lumbosacral disc disease     Nerve damage     KAYLYNN ELBOWS    NSTEMI (non-ST elevated myocardial infarction) 01/2021    Obesity     Osteoarthritis, multiple sites     Pneumonia     Postoperative nausea and vomiting 08/01/2017    Postoperative wound infection     Rheumatoid arthritis     Rotator cuff syndrome     Sciatica     Short of breath on exertion     Sleep apnea     no cpap    Staph infection     WITH BACK SURGERY 2017  ON LONG TERM ANTIBIOTICS    Thoracic disc disorder     Unstable angina     Unsteady gait     D/T LOWER BACK    Visual impairment     Fill like my vision has changed sense I got my glasses       Past Surgical History:   Procedure Laterality Date    ACHILLES TENDON SURGERY Left 07/03/2018    Procedure: Left Achilles debridement and secondary repair with partial excision of calcaneus. Possible left Achilles lengthening at the calf;  Surgeon: Vinay Smith MD;  Location: [facility] [place];  Service: Orthopedics    BACK SURGERY  10/2017    CARDIAC CATHETERIZATION N/A 03/07/2020    Procedure: Left Heart Cath;  Surgeon: Dioni Salazar MD;  Location: [facility] INVASIVE LOCATION;  Service: Cardiology;  Laterality: N/A;    CARDIAC CATHETERIZATION N/A 03/07/2020    Procedure: Coronary angiography;  Surgeon: Dioni Salazar MD;  Location: [facility] CATH INVASIVE LOCATION;  Service: Cardiology;  Laterality: N/A;    CARDIAC CATHETERIZATION N/A 03/07/2020    Procedure: Left ventriculography;  Surgeon: Dioni Salazar MD;  Location: [facility] INVASIVE LOCATION;  Service: Cardiology;  Laterality: N/A;    CARDIAC CATHETERIZATION N/A  03/07/2020    Procedure: Stent RAZA coronary;  Surgeon: Dioni Salazar MD;  Location:  [facility] CATH INVASIVE LOCATION;  Service: Cardiology;  Laterality: N/A;    CARDIAC CATHETERIZATION N/A 01/22/2021    Procedure: Left Heart Cath;  Surgeon: Dioni Salazar MD;  Location:  [facility] CATH INVASIVE LOCATION;  Service: Cardiology;  Laterality: N/A;    CARDIAC CATHETERIZATION N/A 01/22/2021    Procedure: Coronary angiography;  Surgeon: Dioni Salazar MD;  Location: [facility] CATH INVASIVE LOCATION;  Service: Cardiology;  Laterality: N/A;    CARDIAC CATHETERIZATION N/A 01/22/2021    Procedure: Left ventriculography;  Surgeon: Dioni Salazar MD;  Location: [facility] CATH INVASIVE LOCATION;  Service: Cardiology;  Laterality: N/A;    CARDIAC CATHETERIZATION N/A 01/22/2021    Procedure: Percutaneous Coronary Intervention;  Surgeon: Dioni Salazar MD;  Location: [facility] CATH INVASIVE LOCATION;  Service: Cardiology;  Laterality: N/A;    CARDIAC CATHETERIZATION N/A 01/22/2021    Procedure: Stent RAZA coronary;  Surgeon: Dioni Salazar MD;  Location: [facility] CATH INVASIVE LOCATION;  Service: Cardiology;  Laterality: N/A;    CARDIAC SURGERY      3 stents total    COLONOSCOPY N/A 02/02/2011    Normal colon except scattered diverticulosis-Dr. Guero Blunt    COLONOSCOPY N/A 04/08/2021    Procedure: COLONOSCOPY TO CECUM WITH POLYPECTOMY (COLD BX);  Surgeon: Porsha Arcos MD;  Location: [facility] ENDOSCOPY;  Service: General;  Laterality: N/A;  SCREENING; HX OF COLON POLYPS  POST:DIVERTICULOSIS; COLON POLYP    CORONARY STENT PLACEMENT      CYST REMOVAL  03/2016    x2  FROM FACE AND EAR    DENTAL PROCEDURE  2008    teeth removed; dental implants    EPIDURAL BLOCK  1995    L/S spine    FINGER DEBRIDEMENT Right 07/12/2003    Debridement and full thickness skin grafting of the ring and small fingers of the right hand-Dr. Rayray Long    FOOT SURGERY      HAND SURGERY  2003    INCISION AND DRAINAGE PERIRECTAL  ABSCESS N/A 07/10/2018    Procedure: INCISION AND DRAINAGE OF PERIRECTAL ABSCESS;  Surgeon: Porsha Arcos MD;  Location: [facility] MAIN OR;  Service: General    INCISION AND DRAINAGE TRUNK N/A 04/23/2022    Procedure: Evacuation lumbar hematoma;  Surgeon: Jacky Perez MD;  Location: [facility] MAIN OR;  Service: Orthopedics;  Laterality: N/A;    LUMBAR DISCECTOMY FUSION INSTRUMENTATION Left 10/10/2016    Procedure: LEFT L2-L3, L3-L4 LUMBAR LAMINECTOMY/ DISCECTOMY WITH METRIX ;  Surgeon: Sawyer Rick MD;  Location: [facility] MAIN OR;  Service:     LUMBAR DISCECTOMY FUSION INSTRUMENTATION N/A 07/31/2017    Procedure: LUMBAR FUSION DECOMPRESSON WITH PEDICLE SCREWS with LAURA L2-3,L3-4;  Surgeon: Jacky Perez MD;  Location: [facility] MAIN OR;  Service:     LUMBAR FUSION N/A 04/13/2022    Procedure: Thoracic 10-thoracic 11, thoracic 11-thoracic 12, thoracic 12-lumbar 1, lumbar 1-lumbar 2, lumbar 2-lumbar 3, lumbar 3-lumbar 4 fusion with instrumentation with iliac crest bone graft, and removal of implants from lumbar 2-lumbar 3, lumbar 3-lumbar 4;  Surgeon: Jacky Perez MD;  Location: [facility] MAIN OR;  Service: Orthopedic Spine;  Laterality: N/A;    LUMBAR FUSION N/A 04/13/2022    Procedure: LUMBAR ANTERIOR INTERBODY FUSION L4,L5 Osteotomy interbody fusion with cage L1,L2;  Surgeon: Jacky Perez MD;  Location: [facility] MAIN OR;  Service: Orthopedic Spine;  Laterality: N/A;    LUMBAR LAMINECTOMY DISCECTOMY DECOMPRESSION N/A 07/31/2017    Procedure: L2 to L4 laminectomy with neural lysis and a fusion by orthopedics;  Surgeon: Sawyer Rick MD;  Location: [facility] MAIN OR;  Service:     LUMBAR LAMINECTOMY DISCECTOMY DECOMPRESSION N/A 09/05/2017    Procedure: I&D LUMBAR SPINE ;  Surgeon: Jacky Perez MD;  Location: [facility] MAIN OR;  Service:     SHOULDER SURGERY Right 02/23/2011    Dr. Navarro    SINUS SURGERY  2003    Dr. Barrera    SPINE SURGERY  2010    TOTAL SHOULDER REPLACEMENT Right 09/07/2023    TRIGGER  "POINT INJECTION         Social History     Socioeconomic History    Marital status:    Tobacco Use    Smoking status: Former     Packs/day: 0.00     Years: 45.00     Additional pack years: 0.00     Total pack years: 0.00     Types: Cigarettes     Quit date: 10/20/2021     Years since quittin.9    Smokeless tobacco: Never    Tobacco comments:     Current status is non smoker   Vaping Use    Vaping Use: Never used   Substance and Sexual Activity    Alcohol use: No    Drug use: No    Sexual activity: Not Currently     Partners: Female     Birth control/protection: None       Family History   Problem Relation Age of Onset    Diabetes Mother     Heart disease Mother     Hyperlipidemia Mother     Stroke Mother     Heart disease Father     Rheum arthritis Father     Alcohol abuse Father     Hyperlipidemia Father     Depression Brother     Cancer Brother         prostate    Depression Brother     Heart disease Brother     Hyperlipidemia Brother     Cancer Brother         Haert desease    Thyroid disease Sister     Arthritis Sister     Asthma Son     Malig Hyperthermia Neg Hx        Review of Systems   Constitutional: Negative for diaphoresis and malaise/fatigue.   Cardiovascular:  Negative for chest pain, claudication, dyspnea on exertion, irregular heartbeat, leg swelling, near-syncope, orthopnea, palpitations, paroxysmal nocturnal dyspnea and syncope.   Respiratory:  Negative for cough, shortness of breath and sleep disturbances due to breathing.    Musculoskeletal:  Negative for falls.   Neurological:  Negative for dizziness and weakness.   Psychiatric/Behavioral:  Negative for altered mental status and substance abuse.        Allergies   Allergen Reactions    Atorvastatin Other (See Comments) and Myalgia     Leg cramps    Ceclor [Cefaclor] Rash     Patient tolerated nafcillin during 2017 admission and has tolerated Augmentin in past outpatient.     Methotrexate Derivatives Rash     \"Blisters\" "         Current Outpatient Medications:     amLODIPine (NORVASC) 10 MG tablet, Take 1 tablet by mouth Daily. for blood pressure, Disp: 90 tablet, Rfl: 3    aspirin  MG tablet, Take 1 tablet by mouth Daily. (Patient taking differently: Take 1 tablet by mouth Daily. STATES INSTRUCTED TO HOLD ONE WEEK PRIOR TO SURGERY), Disp: 30 tablet, Rfl: 0    busPIRone (BUSPAR) 10 MG tablet, TAKE 1 TABLET TWICE DAILY AS NEEDED FOR ANXIETY, Disp: 180 tablet, Rfl: 1    carvedilol (COREG) 12.5 MG tablet, Take 1 tablet by mouth 2 (Two) Times a Day With Meals., Disp: 180 tablet, Rfl: 3    Cholecalciferol (VITAMIN D) 2000 UNITS tablet, Take 1 tablet by mouth Every Evening., Disp: , Rfl:     clopidogrel (PLAVIX) 75 MG tablet, TAKE 1 TABLET EVERY DAY, Disp: 90 tablet, Rfl: 3    Cyanocobalamin (B-12) 1000 MCG sublingual tablet, DISSOLVE 1 TABLET UNDER THE TONGUE EVERY DAY, Disp: 90 tablet, Rfl: 3    cyclobenzaprine (FLEXERIL) 10 MG tablet, Take 1 tablet by mouth 2 (Two) Times a Day., Disp: , Rfl:     docusate sodium 100 MG capsule, Take 1 capsule by mouth 2 (Two) Times a Day., Disp: , Rfl:     escitalopram (LEXAPRO) 20 MG tablet, TAKE 1 TABLET EVERY NIGHT FOR ANXIETY, Disp: 90 tablet, Rfl: 3    ezetimibe (ZETIA) 10 MG tablet, Take 1 tablet by mouth Daily., Disp: 90 tablet, Rfl: 3    folic acid (FOLVITE) 1 MG tablet, TAKE 1 TABLET EVERY DAY, Disp: 90 tablet, Rfl: 3    irbesartan (AVAPRO) 75 MG tablet, TAKE 1 TABLET EVERY DAY FOR BLOOD PRESSURE, Disp: 90 tablet, Rfl: 0    isosorbide mononitrate (IMDUR) 30 MG 24 hr tablet, TAKE 1 TABLET EVERY DAY, Disp: 90 tablet, Rfl: 3    levothyroxine (SYNTHROID, LEVOTHROID) 50 MCG tablet, TAKE 1 TABLET EVERY DAY FOR THYROID BEFORE BREAKFAST (NEW DOSE), Disp: 90 tablet, Rfl: 3    metFORMIN ER (GLUCOPHAGE-XR) 500 MG 24 hr tablet, TAKE 1 TABLET EVERY DAY WITH FOOD FOR 1 WEEK THEN TAKE 2 TABLETS EVERY DAY ROUTINE FOR DIABETES, Disp: 180 tablet, Rfl: 1    multivitamin with minerals tablet tablet, Take 1  "tablet by mouth Daily. HOLD X1 WEEK PRIOR TO SURGERY, Disp: , Rfl:     nitroglycerin (NITROSTAT) 0.4 MG SL tablet, Place 1 tablet under the tongue Every 5 (Five) Minutes As Needed for Chest Pain. Take no more than 3 doses in 15 minutes., Disp: 25 tablet, Rfl: 1    pantoprazole (PROTONIX) 40 MG EC tablet, TAKE 1 TABLET EVERY DAY FOR GERD, Disp: 90 tablet, Rfl: 1    pregabalin (LYRICA) 100 MG capsule, Take 1 capsule by mouth 3 (Three) Times a Day., Disp: 90 capsule, Rfl: 1    rosuvastatin (CRESTOR) 10 MG tablet, Take 1 tablet by mouth every night at bedtime., Disp: 90 tablet, Rfl: 3    traMADol (ULTRAM) 50 MG tablet, Take 1 tablet by mouth Every 8 (Eight) Hours As Needed for Severe Pain., Disp: 60 tablet, Rfl: 0      Objective:     Vitals:    10/09/23 1333   BP: 122/80   Pulse: 91   SpO2: 92%   Weight: 110 kg (243 lb)   Height: 177.8 cm (70\")     Body mass index is 34.87 kg/mý.    PHYSICAL EXAM:    Constitutional:       General: Not in acute distress.     Appearance: Normal appearance. Well-developed.   Eyes:      Pupils: Pupils are equal, round, and reactive to light.   HENT:      Head: Normocephalic.   Neck:      Vascular: No carotid bruit or JVD.   Pulmonary:      Effort: Pulmonary effort is normal. No tachypnea.      Breath sounds: Normal breath sounds. No wheezing. No rales.   Cardiovascular:      Normal rate. Regular rhythm.      No gallop.    Pulses:     Intact distal pulses.   Edema:     Peripheral edema absent.   Abdominal:      General: Bowel sounds are normal.      Palpations: Abdomen is soft.      Tenderness: There is no abdominal tenderness.   Musculoskeletal: Normal range of motion.      Cervical back: Normal range of motion and neck supple. No edema. Skin:     General: Skin is warm and dry.   Neurological:      Mental Status: Alert and oriented to person, place, and time.           ECG 12 Lead    Date/Time: 10/9/2023 1:43 PM  Performed by: Zaida Pretty APRN    Authorized by: Zaida Pretty, " APRN  Comparison: compared with previous ECG from 10/3/2022  Similar to previous ECG  Rate: normal  Conduction: 2nd degree AV block (Mobitz 1)  QRS axis: normal    Clinical impression: abnormal EKG            Assessment:       Diagnosis Plan   1. S/P drug eluting coronary stent placement     Remains on aspirin 325 and Plavix 75 mg daily.  Tolerating higher dose aspirin.  Okay to decrease in the future if needed.   2. Primary hypertension     Controlled on current regimen   3. Mixed hyperlipidemia     Remains on statin therapy last lipid panel at goal   4. Coronary artery disease involving native coronary artery of native heart without angina pectoris     No angina symptoms.   5. Ex-smoker    Remains nicotine free     No orders of the defined types were placed in this encounter.         Plan:       Follow-up in 6 months       Your medication list            Accurate as of October 9, 2023  1:40 PM. If you have any questions, ask your nurse or doctor.                CHANGE how you take these medications        Instructions Last Dose Given Next Dose Due   aspirin 325 MG EC tablet  What changed: additional instructions      Take 1 tablet by mouth Daily.              CONTINUE taking these medications        Instructions Last Dose Given Next Dose Due   amLODIPine 10 MG tablet  Commonly known as: NORVASC      Take 1 tablet by mouth Daily. for blood pressure       B-12 1000 MCG sublingual tablet      DISSOLVE 1 TABLET UNDER THE TONGUE EVERY DAY       busPIRone 10 MG tablet  Commonly known as: BUSPAR      TAKE 1 TABLET TWICE DAILY AS NEEDED FOR ANXIETY       carvedilol 12.5 MG tablet  Commonly known as: COREG      Take 1 tablet by mouth 2 (Two) Times a Day With Meals.       clopidogrel 75 MG tablet  Commonly known as: PLAVIX      TAKE 1 TABLET EVERY DAY       cyclobenzaprine 10 MG tablet  Commonly known as: FLEXERIL      Take 1 tablet by mouth 2 (Two) Times a Day.       docusate sodium 100 MG capsule      Take 1 capsule by  mouth 2 (Two) Times a Day.       escitalopram 20 MG tablet  Commonly known as: LEXAPRO      TAKE 1 TABLET EVERY NIGHT FOR ANXIETY       ezetimibe 10 MG tablet  Commonly known as: ZETIA      Take 1 tablet by mouth Daily.       folic acid 1 MG tablet  Commonly known as: FOLVITE      TAKE 1 TABLET EVERY DAY       irbesartan 75 MG tablet  Commonly known as: AVAPRO      TAKE 1 TABLET EVERY DAY FOR BLOOD PRESSURE       isosorbide mononitrate 30 MG 24 hr tablet  Commonly known as: IMDUR      TAKE 1 TABLET EVERY DAY       levothyroxine 50 MCG tablet  Commonly known as: SYNTHROID, LEVOTHROID      TAKE 1 TABLET EVERY DAY FOR THYROID BEFORE BREAKFAST (NEW DOSE)       metFORMIN  MG 24 hr tablet  Commonly known as: GLUCOPHAGE-XR      TAKE 1 TABLET EVERY DAY WITH FOOD FOR 1 WEEK THEN TAKE 2 TABLETS EVERY DAY ROUTINE FOR DIABETES       multivitamin with minerals tablet tablet      Take 1 tablet by mouth Daily. HOLD X1 WEEK PRIOR TO SURGERY       nitroglycerin 0.4 MG SL tablet  Commonly known as: Nitrostat      Place 1 tablet under the tongue Every 5 (Five) Minutes As Needed for Chest Pain. Take no more than 3 doses in 15 minutes.       pantoprazole 40 MG EC tablet  Commonly known as: PROTONIX      TAKE 1 TABLET EVERY DAY FOR GERD       pregabalin 100 MG capsule  Commonly known as: LYRICA      Take 1 capsule by mouth 3 (Three) Times a Day.       rosuvastatin 10 MG tablet  Commonly known as: CRESTOR      Take 1 tablet by mouth every night at bedtime.       traMADol 50 MG tablet  Commonly known as: ULTRAM      Take 1 tablet by mouth Every 8 (Eight) Hours As Needed for Severe Pain.       Vitamin D 50 MCG (2000 UT) tablet      Take 1 tablet by mouth Every Evening.                  As always, it has been a pleasure to participate in your patient's care.      Sincerely,     Zaida CAAL

## 2023-10-23 NOTE — PROGRESS NOTES
The patient has a pain history of the following:  Chronic pain syndrome  Lumbar radiculopathy   History of lumbar surgery & Infection  Rheumatoid arthritis     Previous interventions that the patient has received include:   Epidurals (no longer candidate due to infection history)     Pain medications include:  Flexeril BID   Lyrica 1 qAM and 2 qHS  Tramadol     Previously: Gabapentin - no benefit/constipation , MANY topical medications, CBD oil, Hydrocodone (constipation), No NSAIDs (2/2 plavix and cardiac hx), Hydrocodone-acetaminophen 7.5-325mg (surgical pain)     Other conservative modalities which the patient reports using include:  Physical Therapy: yes  Chiropractor: yes  Massage Therapy: yes  TENS: yes  Neck or back surgery: yes (back surgery  x3)  Past pain management: yes ( 20-25 yrs ago)  Stretching     Past Significant Surgical History:  Lumbar laminectomy & fusion L3-5 - Dr. Perez   Decompression, then fusion, then infection cleanout  Right shoulder replacement 9/7/23    HPI:     CHIEF COMPLAINT Back Pain  Back pain  Pain fluctuates.    Mali Thrasher is a 68 y.o. male  who presents for follow-up.  He has a history of chronic low back pain s/p surgery and infection.  At his last visit it was recommended that he resume taking Tramadol as prescribed to determine its effectiveness.     History of Present Illness  He state that he's been taking the Tramadol once a day and he's thinking that it helps him.  His pain worsens when he's walking - his back starts hurting and causes tightness up into his chest.  When he sits, that resolves.  He follows with cardiology.         Opioid Risk Tool:       Opioid Risk Tool:  Family history of substance abuse: Alcohol - Yes  Illegal drug - Yes  Prescription drugs - Yes   Personal history of substance abuse: Alcohol - No  Illegal drugs - No  Prescription drugs - No   Patient is between 16 and 45 years of age: No   History of preadolescent sexual abuse: N/A  "  Psychological disease: ADD/OCD/Bipolar/Schizophrenia - No  Depression - Yes           Opioid Risk: 11       Scorin - 3 = Low Risk    4 - 7 = Moderate Risk    8+ = High Risk         PEG Assessment   What number best describes your pain on average in the past week?7  What number best describes how, during the past week, pain has interfered with your enjoyment of life?8  What number best describes how, during the past week, pain has interfered with your general activity?  8    REVIEW OF PERTINENT MEDICAL DATA  No new     The following portions of the patient's history were reviewed and updated as appropriate: allergies, current medications, past family history, past medical history, past social history, past surgical history, and problem list.    Review of Systems   Constitutional:  Negative for activity change (less), fatigue and fever.   HENT:  Positive for congestion.    Respiratory:  Positive for shortness of breath. Negative for chest tightness.    Cardiovascular:  Positive for chest pain.   Gastrointestinal:  Negative for abdominal pain, constipation and diarrhea.   Genitourinary:  Positive for difficulty urinating. Negative for dysuria.   Neurological:  Positive for weakness (legs), light-headedness and numbness (right leg). Negative for dizziness and headaches.   Psychiatric/Behavioral:  Positive for agitation and sleep disturbance. Negative for suicidal ideas. The patient is not nervous/anxious.      I have reviewed and confirmed the accuracy of the ROS as documented by the MA/LPN/RN Alexandra Cano MD    Vitals:    10/24/23 1102   BP: 132/70   BP Location: Left arm   Patient Position: Sitting   Cuff Size: Adult   Pulse: 79   Resp: 20   Temp: 97.9 °F (36.6 °C)   TempSrc: Temporal   SpO2: 95%   Weight: 115 kg (252 lb 9.6 oz)   Height: 177.8 cm (70\")   PainSc:   8         Objective   Physical Exam  Constitutional:       General: He is not in acute distress.  Pulmonary:      Effort: Pulmonary effort is " normal. No respiratory distress.   Musculoskeletal:      Comments: Large vertical scar present in the thoracolumbar spine.  Positive tenderness to palpation over the facets and spinous processes.    Skin:     General: Skin is warm and dry.   Neurological:      Mental Status: He is alert.   Psychiatric:         Mood and Affect: Mood normal.         Thought Content: Thought content normal.           Assessment & Plan   Diagnoses and all orders for this visit:    1. History of lumbar surgery  -     traMADol (ULTRAM) 50 MG tablet; Take 1 tablet by mouth Every 8 (Eight) Hours As Needed for Severe Pain.  Dispense: 60 tablet; Refill: 0    2. Lumbar radiculopathy  -     traMADol (ULTRAM) 50 MG tablet; Take 1 tablet by mouth Every 8 (Eight) Hours As Needed for Severe Pain.  Dispense: 60 tablet; Refill: 0          - Opioid risk assessment performed today showing high risk due to multiple family members with history of substance use.    - Will increase Tramadol to twice daily.  Patient appears stable with current regimen. No adverse effects. Regarding continuation of opioids, there is no evidence of aberrant behavior or any red flags.  The patient continues with appropriate response to opioid therapy. ADL's remain intact by self.    - Counseled that if he's getting chest tightness or pressure that he needs to go to the ER/contact his cardiologist.   - As for his back and leg issues, I think it would be reasonable to follow-up with spine surgery.  He is not a candidate for Epidural steroid injection in my opinion due to history of lumbar infection.   - Prem Thrasher reports a pain score of 8.  Given his pain assessment as noted, treatment options were discussed and the following options were decided upon as a follow-up plan to address the patient's pain: prescription for opiod analgesics.  - Patient screened positive for depression based on a PHQ-9 score of 12 on 7/13/2023. Follow-up recommendations include: PCP managing  depression.    --- Follow-up 1 month office visit            JESSICA REPORT    As part of the patient's treatment plan, I am prescribing controlled substances. The patient has been made aware of appropriate use of such medications, including potential risk of somnolence, limited ability to drive and/or work safely, and the potential for dependence or overdose. It has also bee made clear that these medications are for use by this patient only, without concomitant use of alcohol or other substances unless prescribed.     Patient has completed prescribing agreement detailing terms of continued prescribing of controlled substances, including monitoring JESSICA reports, urine drug screening, and pill counts if necessary. The patient is aware that inappropriate use will results in cessation of prescribing such medications.    As the clinician, I personally reviewed the JESSICA from 10/24/23 .    History and physical exam exhibit continued safe and appropriate use of controlled substances.       Alexandra Cano MD  Pain Management    EMR Dragon/Transcription disclaimer:   Much of this encounter note is an electronic transcription/translation of spoken language to printed text. The electronic translation of spoken language may permit erroneous, or at times, nonsensical words or phrases to be inadvertently transcribed; Although I have reviewed the note for such errors, some may still exist.

## 2023-10-24 ENCOUNTER — OFFICE VISIT (OUTPATIENT)
Dept: PAIN MEDICINE | Facility: CLINIC | Age: 68
End: 2023-10-24
Payer: MEDICARE

## 2023-10-24 VITALS
DIASTOLIC BLOOD PRESSURE: 70 MMHG | TEMPERATURE: 97.9 F | HEIGHT: 70 IN | WEIGHT: 252.6 LBS | BODY MASS INDEX: 36.16 KG/M2 | RESPIRATION RATE: 20 BRPM | OXYGEN SATURATION: 95 % | SYSTOLIC BLOOD PRESSURE: 132 MMHG | HEART RATE: 79 BPM

## 2023-10-24 DIAGNOSIS — M54.16 LUMBAR RADICULOPATHY: ICD-10-CM

## 2023-10-24 DIAGNOSIS — Z98.890 HISTORY OF LUMBAR SURGERY: ICD-10-CM

## 2023-10-24 PROCEDURE — 1125F AMNT PAIN NOTED PAIN PRSNT: CPT | Performed by: ANESTHESIOLOGY

## 2023-10-24 PROCEDURE — 1160F RVW MEDS BY RX/DR IN RCRD: CPT | Performed by: ANESTHESIOLOGY

## 2023-10-24 PROCEDURE — 99214 OFFICE O/P EST MOD 30 MIN: CPT | Performed by: ANESTHESIOLOGY

## 2023-10-24 PROCEDURE — 3078F DIAST BP <80 MM HG: CPT | Performed by: ANESTHESIOLOGY

## 2023-10-24 PROCEDURE — 3075F SYST BP GE 130 - 139MM HG: CPT | Performed by: ANESTHESIOLOGY

## 2023-10-24 PROCEDURE — 1159F MED LIST DOCD IN RCRD: CPT | Performed by: ANESTHESIOLOGY

## 2023-10-24 RX ORDER — TRAMADOL HYDROCHLORIDE 50 MG/1
50 TABLET ORAL EVERY 8 HOURS PRN
Qty: 60 TABLET | Refills: 0 | Status: SHIPPED | OUTPATIENT
Start: 2023-10-24

## 2023-11-04 DIAGNOSIS — Z98.890 HISTORY OF LUMBAR SURGERY: ICD-10-CM

## 2023-11-04 DIAGNOSIS — M54.16 LUMBAR RADICULOPATHY: ICD-10-CM

## 2023-11-07 RX ORDER — TRAMADOL HYDROCHLORIDE 50 MG/1
50 TABLET ORAL EVERY 8 HOURS PRN
Qty: 60 TABLET | Refills: 0 | OUTPATIENT
Start: 2023-11-07

## 2023-11-07 NOTE — TELEPHONE ENCOUNTER
Hub staff attempted to follow warm transfer process and was unsuccessful     Caller: [facility]/pharmacy #[id number] - [place], [place] - [address] AT [facility] - [phone] [facility] [phone] FX    Relationship to patient: SELF    Best call back number: [phone]    Patient is needing: PATIENT WAS INFORMED BY [facility] THAT DR. ZELAYA DENIED PRESCRIPTION REQUEST. PATIENT WOULD LIKE A CALL BACK TO DISCUSS.

## 2023-11-07 NOTE — TELEPHONE ENCOUNTER
Tramadol filled on 10/25.  He should not need a new prescription yet.  Can you please see how he's taking it?

## 2023-11-07 NOTE — TELEPHONE ENCOUNTER
Unfortunately it's showing on his JESSICA that his mail-order pharmacy filled the prescription.  We'll need to call the mail-order pharmacy to find the medication.

## 2023-11-07 NOTE — TELEPHONE ENCOUNTER
Looks like the RX was sent to his mail order pharmacy [facility]. I just spoke with Mr. Thrasher and he states that he hasn't received his pain medication.

## 2023-11-08 DIAGNOSIS — G89.4 CHRONIC PAIN SYNDROME: ICD-10-CM

## 2023-11-08 DIAGNOSIS — M54.16 LUMBAR RADICULOPATHY: ICD-10-CM

## 2023-11-08 DIAGNOSIS — Z98.890 HISTORY OF LUMBAR SURGERY: ICD-10-CM

## 2023-11-09 RX ORDER — TRAMADOL HYDROCHLORIDE 50 MG/1
50 TABLET ORAL EVERY 8 HOURS PRN
Qty: 60 TABLET | Refills: 0 | OUTPATIENT
Start: 2023-11-09

## 2023-11-09 RX ORDER — PREGABALIN 100 MG/1
100 CAPSULE ORAL 3 TIMES DAILY
Qty: 90 CAPSULE | Refills: 1 | Status: SHIPPED | OUTPATIENT
Start: 2023-11-09

## 2023-11-28 ENCOUNTER — OFFICE VISIT (OUTPATIENT)
Dept: PAIN MEDICINE | Facility: CLINIC | Age: 68
End: 2023-11-28
Payer: MEDICARE

## 2023-11-28 VITALS
OXYGEN SATURATION: 98 % | RESPIRATION RATE: 20 BRPM | HEART RATE: 61 BPM | DIASTOLIC BLOOD PRESSURE: 62 MMHG | BODY MASS INDEX: 35.96 KG/M2 | TEMPERATURE: 96.8 F | HEIGHT: 70 IN | WEIGHT: 251.2 LBS | SYSTOLIC BLOOD PRESSURE: 110 MMHG

## 2023-11-28 DIAGNOSIS — T40.2X5A THERAPEUTIC OPIOID INDUCED CONSTIPATION: ICD-10-CM

## 2023-11-28 DIAGNOSIS — Z79.899 HIGH RISK MEDICATION USE: ICD-10-CM

## 2023-11-28 DIAGNOSIS — M54.16 LUMBAR RADICULOPATHY: ICD-10-CM

## 2023-11-28 DIAGNOSIS — K59.03 THERAPEUTIC OPIOID INDUCED CONSTIPATION: ICD-10-CM

## 2023-11-28 DIAGNOSIS — G89.4 CHRONIC PAIN SYNDROME: ICD-10-CM

## 2023-11-28 DIAGNOSIS — Z98.890 HISTORY OF LUMBAR SURGERY: Primary | ICD-10-CM

## 2023-11-28 RX ORDER — TRAMADOL HYDROCHLORIDE 50 MG/1
50 TABLET ORAL EVERY 8 HOURS PRN
Qty: 60 TABLET | Refills: 1 | Status: SHIPPED | OUTPATIENT
Start: 2023-11-28

## 2023-11-28 NOTE — PROGRESS NOTES
"CHIEF COMPLAINT  Back pain  Patient stated that his back tighten up on him and makes it hard for him to breathe.    Subjective   Prem Thrasher is a 68 y.o. male  who presents for follow-up.  He has a history of back pain.  Pain today 8/10 VAS.  Patient saw Dr. Cano last month.  Tramadol was increased to twice daily. He reports improvement in the \"severe\" pain levels, not screaming out in pain as much but still has great difficulty with activity for any duration of time.  Also continues to utilize Lyrica 100 mg bid (prescribed tid, does not remember to take it).  Constipation is an issue.  Not responding to stool softener, ExLax, Metamucil.      He is not a candidate for NSAIDs (plavix, cardiac hx).     Right shoulder replacement 9/7/2023.  --- improving        Pertinent surgical history ---   4/13/2022 - lumbar fusion (Dr. Perez). Required evacuation of lumbar hematoma 4/23/2022  Lumbar fusion 2017 (Dr. Perez, Dr. Rick). Required second surgery to clean out infection   Back surgery 2010     Past Medications ---  Hydrocodone - constipation  Gabapentin - constipation    Previous interventions that the patient has received include:   Epidurals (no longer candidate due to infection history)    Back Pain  This is a chronic problem. The current episode started more than 1 year ago. The problem occurs daily. The problem has been waxing and waning since onset. The pain is present in the lumbar spine. The quality of the pain is described as aching, cramping and burning. The pain radiates to the left thigh and right thigh. The pain is at a severity of 8/10. The symptoms are aggravated by position, sitting, standing, twisting and bending. Associated symptoms include weakness. Pertinent negatives include no abdominal pain, chest pain, dysuria, fever, headaches or numbness. He has tried analgesics, muscle relaxant, heat, ice and home exercises for the symptoms. The treatment provided mild relief.        PEG Assessment " "  What number best describes your pain on average in the past week?8  What number best describes how, during the past week, pain has interfered with your enjoyment of life?10  What number best describes how, during the past week, pain has interfered with your general activity?  10    Review of Pertinent Medical Data ---    The following portions of the patient's history were reviewed and updated as appropriate: allergies, current medications, past family history, past medical history, past social history, past surgical history, and problem list.    Review of Systems   Constitutional:  Positive for activity change and fatigue. Negative for appetite change and fever.   Cardiovascular:  Negative for chest pain.   Gastrointestinal:  Positive for constipation. Negative for abdominal pain and diarrhea.   Genitourinary:  Positive for difficulty urinating. Negative for dysuria.   Musculoskeletal:  Positive for back pain.   Neurological:  Positive for weakness. Negative for dizziness, light-headedness, numbness and headaches.   Psychiatric/Behavioral:  Positive for agitation and sleep disturbance. Negative for suicidal ideas. The patient is nervous/anxious.      I have reviewed and confirmed the accuracy of the ROS as documented by the MA/LPN/RN T[facility]    Vitals:    11/28/23 1351   BP: 110/62   BP Location: Left arm   Patient Position: Sitting   Cuff Size: Large Adult   Pulse: 61   Resp: 20   Temp: 96.8 °F (36 °C)   SpO2: 98%   Weight: 114 kg (251 lb 3.2 oz)   Height: 177.8 cm (70\")   PainSc:   8   PainLoc: Back         Objective   Physical Exam  Vitals and nursing note reviewed.   Constitutional:       General: He is not in acute distress.     Appearance: Normal appearance. He is not ill-appearing.   Pulmonary:      Effort: Pulmonary effort is normal. No respiratory distress.   Musculoskeletal:      Lumbar back: Tenderness and bony tenderness present.   Neurological:      Mental Status: He is alert and " oriented to person, place, and time.      Gait: Gait abnormal (slowed, antalgia, ambulates with walker).   Psychiatric:         Mood and Affect: Mood normal.         Behavior: Behavior normal.         Assessment & Plan   Diagnoses and all orders for this visit:    1. History of lumbar surgery (Primary)  -     traMADol (ULTRAM) 50 MG tablet; Take 1 tablet by mouth Every 8 (Eight) Hours As Needed for Severe Pain.  Dispense: 60 tablet; Refill: 1    2. Lumbar radiculopathy  -     traMADol (ULTRAM) 50 MG tablet; Take 1 tablet by mouth Every 8 (Eight) Hours As Needed for Severe Pain.  Dispense: 60 tablet; Refill: 1    3. Chronic pain syndrome    4. High risk medication use    5. Therapeutic opioid induced constipation  -     Methylnaltrexone Bromide (RELISTOR) 150 MG tablet; Take 3 tablets by mouth Daily.  Dispense: 90 tablet; Refill: 11      --- ORT - high risk   --- Refill Tramadol. Declines increasing to TID today, wants to take Lyrica more routinely first.  Patient appears stable with current regimen. No adverse effects. Regarding continuation of opioids, there is no evidence of aberrant behavior or any red flags.  The patient continues with appropriate response to opioid therapy. ADL's remain intact by self.   --- Take Lyrica regularly. Ok to take 1 AM and 2 HS if needed.   --- Trial Relistor. Take 3 tablets (450 mg) once daily. #18 samples dispense. Discussed medication with the patient.  Included in this discussion was the potential for side effects and adverse events.  Patient verbalized understanding and wished to proceed.  Prescription will be sent to pharmacy.  --- Once again discussed returning to neurosurgery. He will think about it.  Established with Dr. Rick.     Prem GARY Thrasher reports a pain score of 8.  Given his pain assessment as noted, treatment options were discussed and the following options were decided upon as a follow-up plan to address the patient's pain:  see plan above .    --- Follow-up 2  months/sooner if needed                  JESSICA REPORT  As part of the patient's treatment plan, I am prescribing controlled substances. The patient has been made aware of appropriate use of such medications, including potential risk of somnolence, limited ability to drive and/or work safely, and the potential for dependence or overdose. It has also been made clear that these medications are for use by this patient only, without concomitant use of alcohol or other substances unless prescribed.     Patient has completed prescribing agreement detailing terms of continued prescribing of controlled substances, including monitoring JESSICA reports, urine drug screening, and pill counts if necessary. The patient is aware that inappropriate use will results in cessation of prescribing such medications.    As the clinician, I personally reviewed the JESSICA from 11/28/2023 while the patient was in the office today.    History and physical exam exhibit continued safe and appropriate use of controlled substances.       Dictated utilizing Dragon dictation.

## 2023-11-30 LAB
ALBUMIN/CREAT UR: 23 MG/G CREAT (ref 0–29)
APPEARANCE UR: CLEAR
BACTERIA #/AREA URNS HPF: NORMAL /HPF
BILIRUB UR QL STRIP: NEGATIVE
CASTS URNS MICRO: NORMAL
COLOR UR: YELLOW
CREAT UR-MCNC: 54.6 MG/DL
EPI CELLS #/AREA URNS HPF: NORMAL /HPF
GLUCOSE UR QL STRIP: NEGATIVE
HGB UR QL STRIP: NEGATIVE
KETONES UR QL STRIP: NEGATIVE
LEUKOCYTE ESTERASE UR QL STRIP: NEGATIVE
MICROALBUMIN UR-MCNC: 12.6 UG/ML
NITRITE UR QL STRIP: NEGATIVE
PH UR STRIP: 7.5 [PH] (ref 5–8)
PROT UR QL STRIP: NEGATIVE
RBC #/AREA URNS HPF: NORMAL /HPF
SP GR UR STRIP: 1.01 (ref 1–1.03)
UROBILINOGEN UR STRIP-MCNC: NORMAL MG/DL
WBC #/AREA URNS HPF: NORMAL /HPF

## 2023-12-04 ENCOUNTER — TELEPHONE (OUTPATIENT)
Dept: PAIN MEDICINE | Facility: CLINIC | Age: 68
End: 2023-12-04

## 2023-12-04 NOTE — TELEPHONE ENCOUNTER
I spoke with  Price today and he states that the Relistor isn't helping with his constipation. Also the medication isn't covered by his insurance per his pharmacy. He would like to know if you could prescribe him something else.

## 2023-12-05 ENCOUNTER — OFFICE VISIT (OUTPATIENT)
Dept: FAMILY MEDICINE CLINIC | Facility: CLINIC | Age: 68
End: 2023-12-05
Payer: MEDICARE

## 2023-12-05 VITALS
BODY MASS INDEX: 35.79 KG/M2 | TEMPERATURE: 97.3 F | HEIGHT: 70 IN | HEART RATE: 79 BPM | WEIGHT: 250 LBS | OXYGEN SATURATION: 94 % | RESPIRATION RATE: 16 BRPM | DIASTOLIC BLOOD PRESSURE: 60 MMHG | SYSTOLIC BLOOD PRESSURE: 98 MMHG

## 2023-12-05 DIAGNOSIS — E53.8 LOW SERUM VITAMIN B12: ICD-10-CM

## 2023-12-05 DIAGNOSIS — F33.42 RECURRENT MAJOR DEPRESSIVE DISORDER, IN FULL REMISSION: ICD-10-CM

## 2023-12-05 DIAGNOSIS — I25.110 CORONARY ARTERY DISEASE INVOLVING NATIVE CORONARY ARTERY OF NATIVE HEART WITH UNSTABLE ANGINA PECTORIS: ICD-10-CM

## 2023-12-05 DIAGNOSIS — E03.9 HYPOTHYROIDISM, ACQUIRED: ICD-10-CM

## 2023-12-05 DIAGNOSIS — Z95.5 S/P DRUG ELUTING CORONARY STENT PLACEMENT: ICD-10-CM

## 2023-12-05 DIAGNOSIS — E78.2 MIXED HYPERLIPIDEMIA: ICD-10-CM

## 2023-12-05 DIAGNOSIS — J44.9 CHRONIC OBSTRUCTIVE PULMONARY DISEASE, UNSPECIFIED COPD TYPE: ICD-10-CM

## 2023-12-05 DIAGNOSIS — I10 PRIMARY HYPERTENSION: ICD-10-CM

## 2023-12-05 DIAGNOSIS — E11.9 TYPE 2 DIABETES MELLITUS WITHOUT COMPLICATION, WITHOUT LONG-TERM CURRENT USE OF INSULIN: ICD-10-CM

## 2023-12-05 DIAGNOSIS — E53.8 LOW FOLIC ACID: ICD-10-CM

## 2023-12-05 DIAGNOSIS — F41.1 GENERALIZED ANXIETY DISORDER: ICD-10-CM

## 2023-12-05 DIAGNOSIS — M54.16 LUMBAR RADICULOPATHY: Primary | ICD-10-CM

## 2023-12-05 DIAGNOSIS — Z12.5 SCREENING PSA (PROSTATE SPECIFIC ANTIGEN): ICD-10-CM

## 2023-12-05 RX ORDER — AMLODIPINE BESYLATE 10 MG/1
5 TABLET ORAL DAILY
Start: 2023-12-05

## 2023-12-05 RX ORDER — ESCITALOPRAM OXALATE 20 MG/1
20 TABLET ORAL EVERY MORNING
Qty: 90 TABLET | Refills: 3 | Status: SHIPPED | OUTPATIENT
Start: 2023-12-05

## 2023-12-05 RX ORDER — METFORMIN HYDROCHLORIDE 500 MG/1
TABLET, EXTENDED RELEASE ORAL
Qty: 180 TABLET | Refills: 1 | Status: SHIPPED | OUTPATIENT
Start: 2023-12-05

## 2023-12-05 RX ORDER — IRBESARTAN 75 MG/1
75 TABLET ORAL DAILY
Qty: 90 TABLET | Refills: 1 | Status: SHIPPED | OUTPATIENT
Start: 2023-12-05

## 2023-12-05 NOTE — PROGRESS NOTES
"Subjective   Prem Thrasher is a 68 y.o. male.     Hypertension  Associated symptoms include anxiety. Pertinent negatives include no blurred vision or shortness of breath.   Hyperlipidemia  Exacerbating diseases include hypothyroidism. Pertinent negatives include no shortness of breath.   Diabetes  Pertinent negatives for hypoglycemia include no speech difficulty. Associated symptoms include fatigue. Pertinent negatives for diabetes include no blurred vision.   Hypothyroidism  Associated symptoms include arthralgias, fatigue and joint swelling. Pertinent negatives include no diaphoresis.   Anxiety  Patient reports no shortness of breath or suicidal ideas.     His past medical history is significant for depression.   DepressionPatient is not experiencing: choking sensation, shortness of breath and suicidal ideas.            Since the last visit, he has overall felt tired.  He has DMII well controlled on medication and will continue regimen, GERD controlled on PPI Rx, Hyperlipidemia with goals met with current Rx, CAD and remains under care of their Cardiologist for mangement, CAD and remains on medication regimen, Hypothyroidism well controlled on current medication and will continue Rx, Vitamin D deficiency and labs are at goal >30 ng/mL, and primary hypertension blood pressure is too low today at 700/60 making plans to decrease amlodipine dose to 5 mg and monitor readings. .  he has been compliant with current medications have reviewed them.  The patient is having pain pain in his lumbar spine and legs.  Also is having constipation from the pain medication through pain management failed [employer] [facility]----he does have a phone call into pain management about neck steps.  The will refill medications. BP 98/60   Pulse 79   Temp 97.3 °F (36.3 °C)   Resp 16   Ht 177.8 cm (70\")   Wt 113 kg (250 lb)   SpO2 94%   BMI 35.87 kg/m²   Had labs 11/29/2023 noting his A1c improved on the metformin now down to 6.2%.  " Cholesterol goals were met patient has heart disease sees cardiology.  Vitamin levels were at goal and microalbumin was at goal.... Plan labs again in 6 months  Results for orders placed or performed in visit on 12/01/23   Comprehensive metabolic panel    Specimen: Blood   Result Value Ref Range    Glucose 101 (H) 70 - 99 mg/dL    BUN 15 8 - 27 mg/dL    Creatinine 1.24 0.76 - 1.27 mg/dL    EGFR Result 63 >59 mL/min/1.73    BUN/Creatinine Ratio 12 10 - 24    Sodium 138 134 - 144 mmol/L    Potassium 4.6 3.5 - 5.2 mmol/L    Chloride 99 96 - 106 mmol/L    Total CO2 25 20 - 29 mmol/L    Calcium 9.5 8.6 - 10.2 mg/dL    Total Protein 6.9 6.0 - 8.5 g/dL    Albumin 4.5 3.9 - 4.9 g/dL    Globulin 2.4 1.5 - 4.5 g/dL    A/G Ratio 1.9 1.2 - 2.2    Total Bilirubin 0.5 0.0 - 1.2 mg/dL    Alkaline Phosphatase 92 44 - 121 IU/L    AST (SGOT) 18 0 - 40 IU/L    ALT (SGPT) 16 0 - 44 IU/L   Lipid panel    Specimen: Blood   Result Value Ref Range    Total Cholesterol 91 (L) 100 - 199 mg/dL    Triglycerides 95 0 - 149 mg/dL    HDL Cholesterol 46 >39 mg/dL    VLDL Cholesterol Benjamin 18 5 - 40 mg/dL    LDL Chol Calc (NIH) 27 0 - 99 mg/dL   TSH    Specimen: Blood   Result Value Ref Range    TSH 2.460 0.450 - 4.500 uIU/mL   Hemoglobin A1c    Specimen: Blood   Result Value Ref Range    Hemoglobin A1C 6.2 (H) 4.8 - 5.6 %   T4, Free    Specimen: Blood   Result Value Ref Range    Free T4 1.04 0.82 - 1.77 ng/dL   Vitamin D,25-Hydroxy    Specimen: Blood   Result Value Ref Range    25 Hydroxy, Vitamin D 34.2 30.0 - 100.0 ng/mL   Vitamin B12    Specimen: Blood   Result Value Ref Range    Vitamin B-12 1,351 (H) 232 - 1,245 pg/mL   Folate    Specimen: Blood   Result Value Ref Range    Folate >20.0 >3.0 ng/mL   Magnesium    Specimen: Blood   Result Value Ref Range    Magnesium 2.2 1.6 - 2.3 mg/dL   Unable To Void   Result Value Ref Range    Unable to Void Comment    CBC and Differential    Specimen: Blood   Result Value Ref Range    WBC 6.9 3.4 - 10.8  x10E3/uL    RBC 4.60 4.14 - 5.80 x10E6/uL    Hemoglobin 12.8 (L) 13.0 - 17.7 g/dL    Hematocrit 39.9 37.5 - 51.0 %    MCV 87 79 - 97 fL    MCH 27.8 26.6 - 33.0 pg    MCHC 32.1 31.5 - 35.7 g/dL    RDW 13.1 11.6 - 15.4 %    Platelets 284 150 - 450 x10E3/uL    Neutrophil Rel % 59 Not Estab. %    Lymphocyte Rel % 26 Not Estab. %    Monocyte Rel % 11 Not Estab. %    Eosinophil Rel % 3 Not Estab. %    Basophil Rel % 1 Not Estab. %    Neutrophils Absolute 4.0 1.4 - 7.0 x10E3/uL    Lymphocytes Absolute 1.8 0.7 - 3.1 x10E3/uL    Monocytes Absolute 0.7 0.1 - 0.9 x10E3/uL    Eosinophils Absolute 0.2 0.0 - 0.4 x10E3/uL    Basophils Absolute 0.1 0.0 - 0.2 x10E3/uL    Immature Granulocyte Rel % 0 Not Estab. %    Immature Grans Absolute 0.0 0.0 - 0.1 x10E3/uL   DR Navarro replaced right shoulder 9-7-23 and he does have follow-up note  On B12 and folic acid with goals met on labs  Last visit with cardiology was [facility] Pretty on 10/9/2023 noting his history of CAD status post drug-eluting stent placement.  Remains on aspirin 325 mg and Plavix.  And on statin no medications were changed follow-up 6 months  Pain management wants him to see DR Rick----for his lumbar DDD.... My concern is he will need imaging.  He does have a replaced right shoulder and hardware in his back.  I will order a CT lumbar spine  Patient had low-dose CT of the chest to screen for lung cancer on 8/9/2023 I noted no lung cancer was seen but had concerns about lung nodularity and bronchial wall thickening along with opacity left lower lobe and referred him back to Dr. Mcmillan, pulmonologist  He is also seeing pain management for the chronic lumbar pain.  Intol Cpap  Note that Lexapro does work well for anxiety and depression and continue same dose   Also want to show notes from Dr. Louise and the rheumatologist from 3/14/2023  I reviewed his notes and his impression was patient suffers from polyarthralgia from osteoarthritis and was going to order additional labs to  check for inflammatory arthritis and imaging..... There was a question from prior rheumatologist and from this consult the patient had rheumatoid arthritis after all?  He has been on prior therapies and Dr. Louise did not notice any synovitis  on exam and did note osteoarthritic changes especially in his fingers  Flexeril as needed muscle spasms working well   The following portions of the patient's history were reviewed and updated as appropriate: allergies, current medications, past family history, past medical history, past social history, past surgical history, and problem list.    Review of Systems   Constitutional:  Positive for fatigue. Negative for appetite change and diaphoresis.   HENT:  Negative for nosebleeds and trouble swallowing.    Eyes:  Negative for blurred vision and visual disturbance.   Respiratory:  Negative for choking, shortness of breath and wheezing.    Gastrointestinal:  Negative for blood in stool.   Musculoskeletal:  Positive for arthralgias, back pain and joint swelling.   Allergic/Immunologic: Negative for immunocompromised state.   Neurological:  Negative for facial asymmetry and speech difficulty.   Psychiatric/Behavioral:  Positive for sleep disturbance. Negative for self-injury and suicidal ideas.        Objective   Physical Exam  Vitals and nursing note reviewed.   Constitutional:       General: He is not in acute distress.     Appearance: He is well-developed. He is obese. He is not diaphoretic.   HENT:      Head: Normocephalic.      Right Ear: External ear normal.      Left Ear: External ear normal.   Eyes:      General: No scleral icterus.     Conjunctiva/sclera: Conjunctivae normal.      Pupils: Pupils are equal, round, and reactive to light.   Neck:      Vascular: No carotid bruit.   Cardiovascular:      Rate and Rhythm: Normal rate and regular rhythm.      Pulses: Normal pulses.      Heart sounds: Normal heart sounds. No murmur heard.  Pulmonary:      Effort: Pulmonary effort  is normal.      Breath sounds: Normal breath sounds. No rales.   Musculoskeletal:         General: Normal range of motion.      Cervical back: Normal range of motion and neck supple.      Right lower leg: Edema present.      Left lower leg: Edema present.      Comments: Trace pedal edema = bilat    Skin:     General: Skin is warm and dry.      Findings: No rash.   Neurological:      General: No focal deficit present.      Mental Status: He is alert and oriented to person, place, and time.   Psychiatric:         Mood and Affect: Mood normal. Affect is not inappropriate.         Behavior: Behavior normal.         Thought Content: Thought content normal.         Judgment: Judgment normal.           Assessment & Plan   Diagnoses and all orders for this visit:    1. Lumbar radiculopathy (Primary)  -     CT lumbar spine wo contrast    2. Type 2 diabetes mellitus without complication, without long-term current use of insulin  -     Comprehensive Metabolic Panel; Future  -     Hemoglobin A1c; Future  -     Magnesium; Future  -     PSA Screen; Future    3. Mixed hyperlipidemia  -     Comprehensive Metabolic Panel; Future  -     Hemoglobin A1c; Future  -     Magnesium; Future  -     PSA Screen; Future    4. Primary hypertension  -     Comprehensive Metabolic Panel; Future  -     Hemoglobin A1c; Future  -     Magnesium; Future  -     PSA Screen; Future    5. Coronary artery disease involving native coronary artery of native heart with unstable angina pectoris  -     Comprehensive Metabolic Panel; Future  -     Hemoglobin A1c; Future  -     Magnesium; Future  -     PSA Screen; Future    6. S/P drug eluting coronary stent placement  -     Comprehensive Metabolic Panel; Future  -     Hemoglobin A1c; Future  -     Magnesium; Future  -     PSA Screen; Future    7. Hypothyroidism, acquired  -     Comprehensive Metabolic Panel; Future  -     Hemoglobin A1c; Future  -     Magnesium; Future  -     PSA Screen; Future    8. Chronic  obstructive pulmonary disease, unspecified COPD type  -     Comprehensive Metabolic Panel; Future  -     Hemoglobin A1c; Future  -     Magnesium; Future  -     PSA Screen; Future    9. Generalized anxiety disorder    10. Recurrent major depressive disorder, in full remission    11. Screening PSA (prostate specific antigen)  -     Comprehensive Metabolic Panel; Future  -     Hemoglobin A1c; Future  -     Magnesium; Future  -     PSA Screen; Future    12. Low serum vitamin B12    13. Low folic acid    Other orders  -     metFORMIN ER (GLUCOPHAGE-XR) 500 MG 24 hr tablet; 2 TABLETS EVERY DAY ROUTINE FOR DIABETES  Dispense: 180 tablet; Refill: 1  -     amLODIPine (NORVASC) 10 MG tablet; Take 0.5 tablets by mouth Daily. for blood pressure      Continues lumbar degenerative disc disease pain with radiculopathy to legs both leg pain management Dr. Amaya's office wants him to reestablish with neurosurgery Dr. Rick--- due to the hardware right shoulder and in his spine we will order a CT lumbar to prep for appointment and then refer  Plan, Prem VEGA Price, was seen today.  he was seen for HTN and add/adjust medication, DMII and refilled medications, GERD and will continue on PPI medication, Hyperlipidemia and will continue current medication, CAD and is currently stable on medication management and stable, CAD and is under care of their Cardiologist for medical management and stable, Hypothyroidism well controlled, continue medication, and Vitamin D deficiency and supplemented.  Continue Lexapro and BuSpar for anxiety and depression working well  Patient had low-dose CT of the chest to screen for lung cancer on 8/9/2023 I noted no lung cancer was seen but had concerns about lung nodularity and bronchial wall thickening along with opacity left lower lobe and referred him back to Dr. Mcmillan, pulmonologist--declines  GERD is controlled on PPI Rx.---distal esophageal thickening was also noted on low-dose CT of chest did  advise he see a gastroenterologist to see about EGD ---declines   Flexeril as needed muscle spasms working well   Blood pressure is too low today we will decrease amlodipine down to 5 mg goal is to have systolic blood pressure less than 140 but also greater than 115 so I will       Answers submitted by the patient for this visit:  Primary Reason for Visit (Submitted on 11/28/2023)  What is the primary reason for your visit?: Other  Other (Submitted on 11/28/2023)  Please describe your symptoms.: Lower back pain and shortness of breath left shoulder pain  Have you had these symptoms before?: Yes  How long have you been having these symptoms?: Greater than 2 weeks

## 2023-12-06 DIAGNOSIS — T40.2X5A THERAPEUTIC OPIOID INDUCED CONSTIPATION: Primary | ICD-10-CM

## 2023-12-06 DIAGNOSIS — K59.03 THERAPEUTIC OPIOID INDUCED CONSTIPATION: Primary | ICD-10-CM

## 2023-12-14 RX ORDER — ESCITALOPRAM OXALATE 20 MG/1
TABLET ORAL
Qty: 90 TABLET | Refills: 3 | Status: SHIPPED | OUTPATIENT
Start: 2023-12-14

## 2023-12-14 RX ORDER — ROSUVASTATIN CALCIUM 10 MG/1
10 TABLET, COATED ORAL
Qty: 90 TABLET | Refills: 3 | Status: SHIPPED | OUTPATIENT
Start: 2023-12-14

## 2024-01-21 RX ORDER — AMOXICILLIN AND CLAVULANATE POTASSIUM 875; 125 MG/1; MG/1
TABLET, FILM COATED ORAL
Qty: 28 TABLET | Refills: 1 | Status: SHIPPED | OUTPATIENT
Start: 2024-01-21

## 2024-01-22 ENCOUNTER — APPOINTMENT (OUTPATIENT)
Dept: ULTRASOUND IMAGING | Facility: HOSPITAL | Age: 69
End: 2024-01-22
Payer: MEDICARE

## 2024-01-22 ENCOUNTER — APPOINTMENT (OUTPATIENT)
Dept: GENERAL RADIOLOGY | Facility: HOSPITAL | Age: 69
End: 2024-01-22
Payer: MEDICARE

## 2024-01-22 ENCOUNTER — HOSPITAL ENCOUNTER (EMERGENCY)
Facility: HOSPITAL | Age: 69
Discharge: HOME | End: 2024-01-22
Attending: EMERGENCY MEDICINE | Admitting: EMERGENCY MEDICINE
Payer: MEDICARE

## 2024-01-22 VITALS
OXYGEN SATURATION: 96 % | BODY MASS INDEX: 33.46 KG/M2 | WEIGHT: 239 LBS | HEART RATE: 77 BPM | TEMPERATURE: 97.8 F | SYSTOLIC BLOOD PRESSURE: 134 MMHG | DIASTOLIC BLOOD PRESSURE: 82 MMHG | HEIGHT: 71 IN | RESPIRATION RATE: 16 BRPM

## 2024-01-22 DIAGNOSIS — T14.8XXA AVULSION FRACTURE: Primary | ICD-10-CM

## 2024-01-22 DIAGNOSIS — R60.0 LEG EDEMA: ICD-10-CM

## 2024-01-22 PROCEDURE — 93971 EXTREMITY STUDY: CPT

## 2024-01-22 PROCEDURE — 99284 EMERGENCY DEPT VISIT MOD MDM: CPT

## 2024-01-22 PROCEDURE — 73590 X-RAY EXAM OF LOWER LEG: CPT

## 2024-01-23 DIAGNOSIS — S82.891S CLOSED FRACTURE OF MALLEOLUS OF RIGHT ANKLE, SEQUELA: Primary | ICD-10-CM

## 2024-01-23 NOTE — FSED PROVIDER NOTE
Subjective   History of Present Illness  68-year-old male patient with chief complaint of swollen right leg.  Patient admits to pain to his right distal leg for the past 3 to 4 days.  Patient states his right distal leg and right foot is swollen.  Patient is currently on Plavix.  Denies history of DVT or PE.  Denies chest pain or shortness of air.  Admits to history of COPD.        Review of Systems   Cardiovascular:  Positive for leg swelling.   All other systems reviewed and are negative.      Past Medical History:   Diagnosis Date    Achilles tendon pain     LEFT    Allergic     Anxiety disorder     Asthma     When I get stressed out or try to do something I get short of breath    CAD (coronary artery disease)     Cervical disc disorder     Chronic anticoagulation     PLAVIX-CARDIAC STENT X3    Chronic lower back pain     Chronic pain disorder     COPD (chronic obstructive pulmonary disease)     CTS (carpal tunnel syndrome)     Deep vein thrombosis     A    Depression     Diabetes mellitus 03/12/2023    Type two diabetes    Disease of thyroid gland     Diverticulitis of colon     Encounter for eye exam 10/2014    normal    Exertional chest pain     Extremity pain     GERD (gastroesophageal reflux disease)     Headache     History of bone density study 03/2014    Dr. Maria Antonia Lopez; normal    History of chest x-ray 02/15/2011    also 3-6-15; normal chest    History of colon polyps     History of nuclear stress test 03/09/2015    cardiolite; Dr. Greenberg; negative    History of pulmonary function tests 03/2015    COPD    Hyperlipidemia     Hypertension     Joint pain     Low back strain     Lumbosacral disc disease     Migraine     Neck pain     Nerve damage     KAYLYNN ELBOWS    NSTEMI (non-ST elevated myocardial infarction) 01/2021    Obesity     Osteoarthritis, multiple sites     Pneumonia     Postoperative nausea and vomiting 08/01/2017    Postoperative wound infection     Rheumatoid arthritis     Rotator cuff syndrome  "    Sciatica     Short of breath on exertion     Sleep apnea     no cpap    Spinal stenosis     Staph infection     WITH BACK SURGERY 2017  ON LONG TERM ANTIBIOTICS    Thoracic disc disorder     Unstable angina     Unsteady gait     D/T LOWER BACK    Visual impairment     Fill like my vision has changed sense I got my glasses       Allergies   Allergen Reactions    Atorvastatin Other (See Comments) and Myalgia     Leg cramps    Ceclor [Cefaclor] Rash     Patient tolerated nafcillin during 9/2017 admission and has tolerated Augmentin in past outpatient.     Methotrexate Derivatives Rash     \"Blisters\"       Past Surgical History:   Procedure Laterality Date    ACHILLES TENDON SURGERY Left 07/03/2018    Procedure: Left Achilles debridement and secondary repair with partial excision of calcaneus. Possible left Achilles lengthening at the calf;  Surgeon: Vinay Smith MD;  Location: [facility] OR [facility];  Service: Orthopedics    BACK SURGERY  10/2017    CARDIAC CATHETERIZATION N/A 03/07/2020    Procedure: Left Heart Cath;  Surgeon: Dioni Salazar MD;  Location: [facility] INVASIVE LOCATION;  Service: Cardiology;  Laterality: N/A;    CARDIAC CATHETERIZATION N/A 03/07/2020    Procedure: Coronary angiography;  Surgeon: Dioni Salazar MD;  Location: [facility] CATH INVASIVE LOCATION;  Service: Cardiology;  Laterality: N/A;    CARDIAC CATHETERIZATION N/A 03/07/2020    Procedure: Left ventriculography;  Surgeon: Dioni Salazar MD;  Location: [facility] CATH INVASIVE LOCATION;  Service: Cardiology;  Laterality: N/A;    CARDIAC CATHETERIZATION N/A 03/07/2020    Procedure: Stent RAZA coronary;  Surgeon: Dioni Salazar MD;  Location: [facility] INVASIVE LOCATION;  Service: Cardiology;  Laterality: N/A;    CARDIAC CATHETERIZATION N/A 01/22/2021    Procedure: Left Heart Cath;  Surgeon: Dioni Salazar MD;  Location: [facility] CATH INVASIVE LOCATION;  Service: Cardiology;  Laterality: N/A;    CARDIAC " CATHETERIZATION N/A 01/22/2021    Procedure: Coronary angiography;  Surgeon: Dioni Salazar MD;  Location: [facility] CATH INVASIVE LOCATION;  Service: Cardiology;  Laterality: N/A;    CARDIAC CATHETERIZATION N/A 01/22/2021    Procedure: Left ventriculography;  Surgeon: Dioni Salazar MD;  Location: [facility] CATH INVASIVE LOCATION;  Service: Cardiology;  Laterality: N/A;    CARDIAC CATHETERIZATION N/A 01/22/2021    Procedure: Percutaneous Coronary Intervention;  Surgeon: Dioni Salazar MD;  Location: [facility] CATH INVASIVE LOCATION;  Service: Cardiology;  Laterality: N/A;    CARDIAC CATHETERIZATION N/A 01/22/2021    Procedure: Stent RAZA coronary;  Surgeon: Dioni Salazar MD;  Location: [facility] CATH INVASIVE LOCATION;  Service: Cardiology;  Laterality: N/A;    CARDIAC SURGERY      3 stents total    COLONOSCOPY N/A 02/02/2011    Normal colon except scattered diverticulosis-Dr. Guero Blunt    COLONOSCOPY N/A 04/08/2021    Procedure: COLONOSCOPY TO CECUM WITH POLYPECTOMY (COLD BX);  Surgeon: Porsha Arcos MD;  Location: [facility] ENDOSCOPY;  Service: General;  Laterality: N/A;  SCREENING; HX OF COLON POLYPS  POST:DIVERTICULOSIS; COLON POLYP    CORONARY STENT PLACEMENT      CYST REMOVAL  03/2016    x2  FROM FACE AND EAR    DENTAL PROCEDURE  2008    teeth removed; dental implants    EPIDURAL BLOCK  1995    L/S spine    FINGER DEBRIDEMENT Right 07/12/2003    Debridement and full thickness skin grafting of the ring and small fingers of the right hand-Dr. Rayray Long    FOOT SURGERY      HAND SURGERY  2003    INCISION AND DRAINAGE PERIRECTAL ABSCESS N/A 07/10/2018    Procedure: INCISION AND DRAINAGE OF PERIRECTAL ABSCESS;  Surgeon: Porsha Arcos MD;  Location: [facility] MAIN OR;  Service: General    INCISION AND DRAINAGE TRUNK N/A 04/23/2022    Procedure: Evacuation lumbar hematoma;  Surgeon: Jacky Perez MD;  Location: [facility] MAIN OR;  Service: Orthopedics;  Laterality: N/A;    JOINT  REPLACEMENT      LUMBAR DISCECTOMY FUSION INSTRUMENTATION Left 10/10/2016    Procedure: LEFT L2-L3, L3-L4 LUMBAR LAMINECTOMY/ DISCECTOMY WITH METRIX ;  Surgeon: Sawyer Rick MD;  Location: [facility] OR;  Service:     LUMBAR DISCECTOMY FUSION INSTRUMENTATION N/A 07/31/2017    Procedure: LUMBAR FUSION DECOMPRESSON WITH PEDICLE SCREWS with LAURA L2-3,L3-4;  Surgeon: Jacky Perez MD;  Location: [facility] OR;  Service:     LUMBAR FUSION N/A 04/13/2022    Procedure: Thoracic 10-thoracic 11, thoracic 11-thoracic 12, thoracic 12-lumbar 1, lumbar 1-lumbar 2, lumbar 2-lumbar 3, lumbar 3-lumbar 4 fusion with instrumentation with iliac crest bone graft, and removal of implants from lumbar 2-lumbar 3, lumbar 3-lumbar 4;  Surgeon: Jacky Perez MD;  Location: [facility];  Service: Orthopedic Spine;  Laterality: N/A;    LUMBAR FUSION N/A 04/13/2022    Procedure: LUMBAR ANTERIOR INTERBODY FUSION L4,L5 Osteotomy interbody fusion with cage L1,L2;  Surgeon: Jacky Perez MD;  Location: [facility] OR;  Service: Orthopedic Spine;  Laterality: N/A;    LUMBAR LAMINECTOMY DISCECTOMY DECOMPRESSION N/A 07/31/2017    Procedure: L2 to L4 laminectomy with neural lysis and a fusion by orthopedics;  Surgeon: Sawyer Rick MD;  Location: [facility];  Service:     LUMBAR LAMINECTOMY DISCECTOMY DECOMPRESSION N/A 09/05/2017    Procedure: I&D LUMBAR SPINE ;  Surgeon: Jacky Perez MD;  Location: [facility];  Service:     ORTHOPEDIC SURGERY      SHOULDER SURGERY Right 02/23/2011    Dr. Navarro    SINUS SURGERY  2003    Dr. Barrera    SPINAL FUSION      SPINE SURGERY  2010    TOTAL SHOULDER REPLACEMENT Right 09/07/2023    TRIGGER POINT INJECTION         Family History   Problem Relation Age of Onset    Diabetes Mother     Heart disease Mother     Hyperlipidemia Mother     Stroke Mother     Heart disease Father     Rheum arthritis Father     Alcohol abuse Father     Hyperlipidemia Father     Depression Brother      Cancer Brother         prostate    Depression Brother     Heart disease Brother     Hyperlipidemia Brother     Cancer Brother         Hagraciela desease    Thyroid disease Sister     Arthritis Sister     Asthma Son     Malig Hyperthermia Neg Hx        Social History     Socioeconomic History    Marital status:    Tobacco Use    Smoking status: Former     Packs/day: 1.00     Years: 50.00     Additional pack years: 0.00     Total pack years: 50.00     Types: Cigarettes     Quit date: 10/20/2021     Years since quittin.2    Smokeless tobacco: Never    Tobacco comments:     Current status is non smoker   Vaping Use    Vaping Use: Never used   Substance and Sexual Activity    Alcohol use: No    Drug use: No    Sexual activity: Not Currently     Partners: Female     Birth control/protection: None           Objective   Physical Exam  Vitals and nursing note reviewed.   Constitutional:       General: He is not in acute distress.     Appearance: Normal appearance.   HENT:      Head: Normocephalic and atraumatic.      Right Ear: Tympanic membrane normal.      Left Ear: Tympanic membrane normal.      Mouth/Throat:      Mouth: Mucous membranes are moist.   Eyes:      Extraocular Movements: Extraocular movements intact.      Conjunctiva/sclera: Conjunctivae normal.   Cardiovascular:      Rate and Rhythm: Normal rate.      Pulses: Normal pulses.      Heart sounds: Normal heart sounds.   Pulmonary:      Effort: Pulmonary effort is normal.      Breath sounds: Normal breath sounds.   Abdominal:      Palpations: Abdomen is soft.      Tenderness: There is no abdominal tenderness. There is no guarding or rebound.   Musculoskeletal:         General: Swelling (+1 edema right foot and right distal leg) and tenderness (Tenderness right distal leg.) present. Normal range of motion.      Cervical back: Normal range of motion.      Comments: Right foot is neurovascularly intact.  No tenderness noted to the right calf or right foot    Skin:     General: Skin is warm.   Neurological:      General: No focal deficit present.      Mental Status: He is alert.   Psychiatric:         Mood and Affect: Mood normal.         Procedures           ED Course                                           Medical Decision Making  X-ray shows age-indeterminate avulsion fracture of the right ankle.  On physical exam right ankle tenderness noted.  Patient placed in ankle Aircast.  Ultrasound is negative for DVT.  Patient told to take over-the-counter Tylenol.  Patient aware to follow-up with his primary care doctor.    Problems Addressed:  Avulsion fracture: complicated acute illness or injury  Leg edema: complicated acute illness or injury    Amount and/or Complexity of Data Reviewed  Radiology: ordered.     Details: US Venous Doppler Lower Extremity Right (duplex)    Result Date: 1/22/2024  Patient: YELENA CARRERA  Time Out: 21:49 Exam(s): US VENOUS RIGHT LOWER EXTREMITY EXAM:   US Duplex Right Lower Extremity Veins CLINICAL HISTORY:    Reason for exam: right leg swelling. TECHNIQUE:   Real-time duplex ultrasound scan of the right lower extremity veins integrating B-mode two-dimensional vascular structure, Doppler spectral analysis, color flow Doppler imaging and compression. COMPARISON:   None FINDINGS:   Deep veins:  Unremarkable.  No DVT in the visualized common femoral, femoral, proximal deep femoral or popliteal veins.  The veins demonstrate normal color flow, are normally compressible, with normal phasic flow and or augmentation response.   Superficial veins:  Unremarkable.  No thrombus in the visualized great saphenous vein.   Soft tissues:  No acute findings.  No popliteal cyst. IMPRESSION:     No deep venous thrombosis identified in the right lower extremity.     Electronically signed by Temitope Segundo M.D. on 01-22-24 at 2149    XR Tibia Fibula 2 View Right    Result Date: 1/22/2024  XR TIBIA FIBULA 2 VW RIGHT-  INDICATIONS: Pain, no injury  TECHNIQUE:  FRONTAL AND LATERAL VIEWS OF THE RIGHT LOWER LEG  COMPARISON: None available  FINDINGS:  Mild soft tissue swelling is apparent at the distal lower leg. Small ossific foci adjacent to the medial malleolus appear at least partly corticated, and could represent age-indeterminate avulsion fracture fragments. No other evidence of fracture, erosion, or dislocation is identified. Follow-up/further evaluation can be obtained as indications persist.       As described.    This report was finalized on 1/22/2024 7:44 PM by Dr. Leroy Jordan M.D on Workstation: [id number]                Final diagnoses:   Avulsion fracture   Leg edema       ED Disposition  ED Disposition       ED Disposition   Discharge    Condition   Stable    Comment   --               Montse Cain, PAVANNA  [id number] [address]  [phone]    Call in 1 day           Medication List        Changed      aspirin 325 MG EC tablet  Take 1 tablet by mouth Daily.  What changed: additional instructions

## 2024-01-23 NOTE — DISCHARGE INSTRUCTIONS
Follow-up with your primary care doctor as soon as possible.  Return for any worsening conditions.  Take over-the-counter Tylenol as directed for pain

## 2024-01-26 ENCOUNTER — OFFICE VISIT (OUTPATIENT)
Dept: ORTHOPEDIC SURGERY | Facility: CLINIC | Age: 69
End: 2024-01-26
Payer: MEDICARE

## 2024-01-26 VITALS — TEMPERATURE: 98 F | HEIGHT: 71 IN | BODY MASS INDEX: 33.52 KG/M2 | WEIGHT: 239.4 LBS

## 2024-01-26 DIAGNOSIS — M76.811 ANTERIOR TIBIAL TENDONITIS, RIGHT: Primary | ICD-10-CM

## 2024-01-26 DIAGNOSIS — R52 PAIN: ICD-10-CM

## 2024-01-26 RX ORDER — DOCUSATE SODIUM 100 MG/1
CAPSULE, LIQUID FILLED ORAL
COMMUNITY
Start: 2021-07-01

## 2024-01-26 NOTE — PROGRESS NOTES
"General Exam    Patient: Prem Thrasher    YOB: 1955    Medical Record Number: [id number]    Chief Complaints: Right anterior leg pain    History of Present Illness:     68 y.o. male patient who presents for evaluation of right anterior leg pain.  Symptoms started 1 week ago.  Reports pain and swelling anteriorly difficulty with range of motion.  Denies any injury.  Better with rest and ice.    Denies any numbness or tingling.  Denies any fevers, cough or shortness of breath.    Allergies:   Allergies   Allergen Reactions    Atorvastatin Other (See Comments) and Myalgia     Leg cramps    Ceclor [Cefaclor] Rash     Patient tolerated nafcillin during 9/2017 admission and has tolerated Augmentin in past outpatient.     Methotrexate Derivatives Rash     \"Blisters\"       Home Medications:      Current Outpatient Medications:     amLODIPine (NORVASC) 10 MG tablet, Take 0.5 tablets by mouth Daily. for blood pressure, Disp: , Rfl:     amoxicillin-clavulanate (AUGMENTIN) 875-125 MG per tablet, TAKE 1 TABLET BY MOUTH EVERY 12 (TWELVE) HOURS FOR INFECTION WITH FOOD, Disp: 28 tablet, Rfl: 1    aspirin  MG tablet, Take 1 tablet by mouth Daily. (Patient taking differently: Take 1 tablet by mouth Daily. STATES INSTRUCTED TO HOLD ONE WEEK PRIOR TO SURGERY), Disp: 30 tablet, Rfl: 0    Boswellia-Glucosamine-Vit D (OSTEO BI-FLEX ONE PER DAY PO), Take  by mouth., Disp: , Rfl:     busPIRone (BUSPAR) 10 MG tablet, TAKE 1 TABLET TWICE DAILY AS NEEDED FOR ANXIETY, Disp: 180 tablet, Rfl: 1    carvedilol (COREG) 12.5 MG tablet, Take 1 tablet by mouth 2 (Two) Times a Day With Meals., Disp: 180 tablet, Rfl: 3    Cholecalciferol (VITAMIN D) 2000 UNITS tablet, Take 1 tablet by mouth Every Evening., Disp: , Rfl:     clopidogrel (PLAVIX) 75 MG tablet, TAKE 1 TABLET EVERY DAY, Disp: 90 tablet, Rfl: 3    Cyanocobalamin (B-12) 1000 MCG sublingual tablet, DISSOLVE 1 TABLET UNDER THE TONGUE EVERY DAY, Disp: 90 tablet, Rfl: 3    " cyclobenzaprine (FLEXERIL) 10 MG tablet, Take 1 tablet by mouth 2 (Two) Times a Day., Disp: , Rfl:     docusate sodium (Stool Softener) 100 MG capsule, , Disp: , Rfl:     escitalopram (LEXAPRO) 20 MG tablet, TAKE 1 TABLET EVERY NIGHT FOR ANXIETY, Disp: 90 tablet, Rfl: 3    ezetimibe (ZETIA) 10 MG tablet, Take 1 tablet by mouth Daily., Disp: 90 tablet, Rfl: 3    folic acid (FOLVITE) 1 MG tablet, TAKE 1 TABLET EVERY DAY, Disp: 90 tablet, Rfl: 3    irbesartan (AVAPRO) 75 MG tablet, Take 1 tablet by mouth Daily. for blood pressure, Disp: 90 tablet, Rfl: 1    isosorbide mononitrate (IMDUR) 30 MG 24 hr tablet, TAKE 1 TABLET EVERY DAY, Disp: 90 tablet, Rfl: 3    levothyroxine (SYNTHROID, LEVOTHROID) 50 MCG tablet, TAKE 1 TABLET EVERY DAY FOR THYROID BEFORE BREAKFAST (NEW DOSE), Disp: 90 tablet, Rfl: 3    metFORMIN ER (GLUCOPHAGE-XR) 500 MG 24 hr tablet, 2 TABLETS EVERY DAY ROUTINE FOR DIABETES, Disp: 180 tablet, Rfl: 1    multivitamin with minerals tablet tablet, Take 1 tablet by mouth Daily. HOLD X1 WEEK PRIOR TO SURGERY, Disp: , Rfl:     nitroglycerin (NITROSTAT) 0.4 MG SL tablet, Place 1 tablet under the tongue Every 5 (Five) Minutes As Needed for Chest Pain. Take no more than 3 doses in 15 minutes., Disp: 25 tablet, Rfl: 1    pantoprazole (PROTONIX) 40 MG EC tablet, TAKE 1 TABLET EVERY DAY FOR GERD, Disp: 90 tablet, Rfl: 1    pregabalin (LYRICA) 100 MG capsule, Take 1 capsule by mouth 3 (Three) Times a Day., Disp: 90 capsule, Rfl: 1    rosuvastatin (CRESTOR) 10 MG tablet, TAKE 1 TABLET EVERY NIGHT AT BEDTIME, Disp: 90 tablet, Rfl: 3    traMADol (ULTRAM) 50 MG tablet, Take 1 tablet by mouth Every 8 (Eight) Hours As Needed for Severe Pain., Disp: 60 tablet, Rfl: 1    Bioflavonoid Products (BIOFLEX PO), Take  by mouth., Disp: , Rfl:     docusate sodium 100 MG capsule, Take 1 capsule by mouth 2 (Two) Times a Day., Disp: , Rfl:     Methylnaltrexone Bromide (RELISTOR) 150 MG tablet, Take 3 tablets by mouth Daily., Disp: 90  tablet, Rfl: 11    Naloxegol Oxalate (Movantik) 25 MG tablet, Take 1 tablet by mouth Every Morning., Disp: 30 tablet, Rfl: 5    Past Medical History:   Diagnosis Date    Achilles tendon pain     LEFT    Allergic     Anxiety disorder     Asthma     When I get stressed out or try to do something I get short of breath    CAD (coronary artery disease)     Cervical disc disorder     Chronic anticoagulation     PLAVIX-CARDIAC STENT X3    Chronic lower back pain     Chronic pain disorder     COPD (chronic obstructive pulmonary disease)     CTS (carpal tunnel syndrome)     Deep vein thrombosis     A    Depression     Diabetes mellitus 03/12/2023    Type two diabetes    Disease of thyroid gland     Diverticulitis of colon     Encounter for eye exam 10/2014    normal    Exertional chest pain     Extremity pain     GERD (gastroesophageal reflux disease)     Headache     History of bone density study 03/2014    Dr. Maria Antonia Lopez; normal    History of chest x-ray 02/15/2011    also 3-6-15; normal chest    History of colon polyps     History of nuclear stress test 03/09/2015    cardiolite; Dr. Greenberg; negative    History of pulmonary function tests 03/2015    COPD    Hyperlipidemia     Hypertension     Joint pain     Low back strain     Lumbosacral disc disease     Migraine     Neck pain     Nerve damage     KAYLYNN ELBOWS    NSTEMI (non-ST elevated myocardial infarction) 01/2021    Obesity     Osteoarthritis, multiple sites     Pneumonia     Postoperative nausea and vomiting 08/01/2017    Postoperative wound infection     Rheumatoid arthritis     Rotator cuff syndrome     Sciatica     Short of breath on exertion     Sleep apnea     no cpap    Spinal stenosis     Staph infection     WITH BACK SURGERY 2017  ON LONG TERM ANTIBIOTICS    Thoracic disc disorder     Unstable angina     Unsteady gait     D/T LOWER BACK    Visual impairment     Fill like my vision has changed sense I got my glasses       Past Surgical History:   Procedure  Laterality Date    ACHILLES TENDON SURGERY Left 07/03/2018    Procedure: Left Achilles debridement and secondary repair with partial excision of calcaneus. Possible left Achilles lengthening at the calf;  Surgeon: Vinay Smith MD;  [place] [facility] [place] [facility];  Service: Orthopedics    BACK SURGERY  10/2017    CARDIAC CATHETERIZATION N/A 03/07/2020    Procedure: Left Heart Cath;  Surgeon: Dioni Salazar MD;  Location: [facility] CATH INVASIVE LOCATION;  Service: Cardiology;  Laterality: N/A;    CARDIAC CATHETERIZATION N/A 03/07/2020    Procedure: Coronary angiography;  Surgeon: Dioni Salazar MD;  Location: [facility] CATH INVASIVE LOCATION;  Service: Cardiology;  Laterality: N/A;    CARDIAC CATHETERIZATION N/A 03/07/2020    Procedure: Left ventriculography;  Surgeon: Dioni Salazar MD;  Location: [facility] CATH INVASIVE LOCATION;  Service: Cardiology;  Laterality: N/A;    CARDIAC CATHETERIZATION N/A 03/07/2020    Procedure: Stent RAZA coronary;  Surgeon: Dioni Salazar MD;  Location: [facility] INVASIVE LOCATION;  Service: Cardiology;  Laterality: N/A;    CARDIAC CATHETERIZATION N/A 01/22/2021    Procedure: Left Heart Cath;  Surgeon: Dioni Salazar MD;  Location: [facility] INVASIVE LOCATION;  Service: Cardiology;  Laterality: N/A;    CARDIAC CATHETERIZATION N/A 01/22/2021    Procedure: Coronary angiography;  Surgeon: Dioni Salazar MD;  Location: [facility] INVASIVE LOCATION;  Service: Cardiology;  Laterality: N/A;    CARDIAC CATHETERIZATION N/A 01/22/2021    Procedure: Left ventriculography;  Surgeon: Dioni Salazar MD;  Location: [facility] CATH INVASIVE LOCATION;  Service: Cardiology;  Laterality: N/A;    CARDIAC CATHETERIZATION N/A 01/22/2021    Procedure: Percutaneous Coronary Intervention;  Surgeon: Dioni Salazar MD;  Location: [facility] CATH INVASIVE LOCATION;  Service: Cardiology;  Laterality: N/A;    CARDIAC CATHETERIZATION N/A 01/22/2021    Procedure: Stent RAZA  coronary;  Surgeon: Dioni Salazar MD;  Location: [facility] CATH INVASIVE LOCATION;  Service: Cardiology;  Laterality: N/A;    CARDIAC SURGERY      3 stents total    COLONOSCOPY N/A 02/02/2011    Normal colon except scattered diverticulosis-Dr. Guero Blunt    COLONOSCOPY N/A 04/08/2021    Procedure: COLONOSCOPY TO CECUM WITH POLYPECTOMY (COLD BX);  Surgeon: Porsha Arcos MD;  Location: [facility] ENDOSCOPY;  Service: General;  Laterality: N/A;  SCREENING; HX OF COLON POLYPS  POST:DIVERTICULOSIS; COLON POLYP    CORONARY STENT PLACEMENT      CYST REMOVAL  03/2016    x2  FROM FACE AND EAR    DENTAL PROCEDURE  2008    teeth removed; dental implants    EPIDURAL BLOCK  1995    L/S spine    FINGER DEBRIDEMENT Right 07/12/2003    Debridement and full thickness skin grafting of the ring and small fingers of the right hand-Dr. Rayray Long    FOOT SURGERY      HAND SURGERY  2003    INCISION AND DRAINAGE PERIRECTAL ABSCESS N/A 07/10/2018    Procedure: INCISION AND DRAINAGE OF PERIRECTAL ABSCESS;  Surgeon: Porsha Arcos MD;  Location: [facility] MAIN OR;  Service: General    INCISION AND DRAINAGE TRUNK N/A 04/23/2022    Procedure: Evacuation lumbar hematoma;  Surgeon: Jacky Perez MD;  Location: [facility] MAIN OR;  Service: Orthopedics;  Laterality: N/A;    JOINT REPLACEMENT      LUMBAR DISCECTOMY FUSION INSTRUMENTATION Left 10/10/2016    Procedure: LEFT L2-L3, L3-L4 LUMBAR LAMINECTOMY/ DISCECTOMY WITH METRIX ;  Surgeon: Sawyer Rick MD;  Location: [facility] OR;  Service:     LUMBAR DISCECTOMY FUSION INSTRUMENTATION N/A 07/31/2017    Procedure: LUMBAR FUSION DECOMPRESSON WITH PEDICLE SCREWS with LAURA L2-3,L3-4;  Surgeon: Jacky Perez MD;  Location: [facility] MAIN OR;  Service:     LUMBAR FUSION N/A 04/13/2022    Procedure: Thoracic 10-thoracic 11, thoracic 11-thoracic 12, thoracic 12-lumbar 1, lumbar 1-lumbar 2, lumbar 2-lumbar 3, lumbar 3-lumbar 4 fusion with instrumentation with iliac crest bone graft,  and removal of implants from lumbar 2-lumbar 3, lumbar 3-lumbar 4;  Surgeon: Jacky Perez MD;  Location: [facility] MAIN OR;  Service: Orthopedic Spine;  Laterality: N/A;    LUMBAR FUSION N/A 2022    Procedure: LUMBAR ANTERIOR INTERBODY FUSION L4,L5 Osteotomy interbody fusion with cage L1,L2;  Surgeon: Jacky Perez MD;  Location: [facility] MAIN OR;  Service: Orthopedic Spine;  Laterality: N/A;    LUMBAR LAMINECTOMY DISCECTOMY DECOMPRESSION N/A 2017    Procedure: L2 to L4 laminectomy with neural lysis and a fusion by orthopedics;  Surgeon: Sawyer Rick MD;  Location: [facility] MAIN OR;  Service:     LUMBAR LAMINECTOMY DISCECTOMY DECOMPRESSION N/A 2017    Procedure: I&D LUMBAR SPINE ;  Surgeon: Jacky Perez MD;  Location: [facility] OR;  Service:     ORTHOPEDIC SURGERY      SHOULDER SURGERY Right 2011    Dr. Navarro    SINUS SURGERY      Dr. Barrera    SPINAL FUSION      SPINE SURGERY      TOTAL SHOULDER REPLACEMENT Right 2023    TRIGGER POINT INJECTION         Social History     Occupational History    Occupation: retired   Tobacco Use    Smoking status: Former     Packs/day: 1.00     Years: 50.00     Additional pack years: 0.00     Total pack years: 50.00     Types: Cigarettes     Quit date: 10/20/2021     Years since quittin.2    Smokeless tobacco: Never    Tobacco comments:     Current status is non smoker   Vaping Use    Vaping Use: Never used   Substance and Sexual Activity    Alcohol use: No    Drug use: No    Sexual activity: Not Currently     Partners: Female     Birth control/protection: None      Social History     Social History Narrative    Not on file       Family History   Problem Relation Age of Onset    Diabetes Mother     Heart disease Mother     Hyperlipidemia Mother     Stroke Mother     Heart disease Father     Rheum arthritis Father     Alcohol abuse Father     Hyperlipidemia Father     Depression Brother     Cancer Brother         prostate     "Depression Brother     Heart disease Brother     Hyperlipidemia Brother     Cancer Brother         Hagraciela desease    Thyroid disease Sister     Arthritis Sister     Asthma Son     Malig Hyperthermia Neg Hx        Review of Systems:      Constitutional: Denies fever, shaking or chills         All other pertinent positives and negatives as noted above in HPI.    Physical Exam: 68 y.o. male    Vitals:    01/26/24 0922   Temp: 98 °F (36.7 °C)   TempSrc: Temporal   Weight: 109 kg (239 lb 6.4 oz)   Height: 180.3 cm (70.98\")       General:  Patient is awake and alert.  Appears in no acute distress or discomfort.      Musculoskeletal/Extremities:    Right lower extremity examined positive tenderness of the anterior tibialis tendon.  Worse with ankle dorsiflexion and plantarflexion.  No defect appreciated.  No tenderness about the medial or lateral malleolus.         Radiology:       3 views right ankle AP lateral oblique taken and reviewed to evaluate the patient's complaint/s.    Imaging demonstrates some degenerative changes in the midfoot and forefoot.  Likely chronic changes involving osteophyte versus bone ossicle adjacent to medial malleolus.     No imaging for comparison.    Assessment: Right anterior tibialis tendinitis      Plan:      Plan conservative treatment consisting of rest, ice, anti-inflammatories and formal physical therapy.  If symptoms are not improved in the next 4 weeks patient will call may consider advanced imaging if warranted.           We will plan for follow up as needed.    All questions were answered.  Patient understands and agrees with the plan.    Rayray Honeycutt MD    01/26/2024    CC to Montse Cain PA-C        "

## 2024-01-28 RX ORDER — CYCLOBENZAPRINE HCL 10 MG
10 TABLET ORAL 2 TIMES DAILY
Qty: 270 TABLET | Refills: 3 | Status: SHIPPED | OUTPATIENT
Start: 2024-01-28

## 2024-01-30 ENCOUNTER — OFFICE VISIT (OUTPATIENT)
Dept: PAIN MEDICINE | Facility: CLINIC | Age: 69
End: 2024-01-30
Payer: MEDICARE

## 2024-01-30 VITALS
WEIGHT: 242.4 LBS | HEIGHT: 71 IN | BODY MASS INDEX: 33.94 KG/M2 | OXYGEN SATURATION: 94 % | SYSTOLIC BLOOD PRESSURE: 122 MMHG | HEART RATE: 73 BPM | DIASTOLIC BLOOD PRESSURE: 74 MMHG | TEMPERATURE: 95.9 F

## 2024-01-30 DIAGNOSIS — M54.16 LUMBAR RADICULOPATHY: ICD-10-CM

## 2024-01-30 DIAGNOSIS — G89.4 CHRONIC PAIN SYNDROME: Primary | ICD-10-CM

## 2024-01-30 DIAGNOSIS — Z79.899 HIGH RISK MEDICATION USE: ICD-10-CM

## 2024-01-30 DIAGNOSIS — K59.03 THERAPEUTIC OPIOID INDUCED CONSTIPATION: ICD-10-CM

## 2024-01-30 DIAGNOSIS — Z98.890 HISTORY OF LUMBAR SURGERY: ICD-10-CM

## 2024-01-30 DIAGNOSIS — T40.2X5A THERAPEUTIC OPIOID INDUCED CONSTIPATION: ICD-10-CM

## 2024-01-30 RX ORDER — TRAMADOL HYDROCHLORIDE 50 MG/1
50 TABLET ORAL EVERY 8 HOURS PRN
Qty: 90 TABLET | Refills: 1 | Status: SHIPPED | OUTPATIENT
Start: 2024-01-30

## 2024-01-30 NOTE — PROGRESS NOTES
CHIEF COMPLAINT  Back pain. The patient states the back pain is still present but is improved, the spasms are less.    Subjective   Prem Thrasher is a 68 y.o. male  who presents for follow-up.  He has a history of chronic back pain.  Pain today 6/10 VAS.  Taking Tramadol 50 mg 2-3 times daily.  Also continues to utilize Lyrica 100 mg 1AM and 1 HS.  Constipation is an issue.  Relistor did not help and was cost prohibitive.  Sent Movantik to pharmacy, did not . Says he has been doing ok with a regimen of oatmeal, metamucil, and Miralax.  Overall he reports that his pain has been better with the current regimen, acknowledges at least moderate improvement.     He is not a candidate for NSAIDs (plavix, cardiac hx).    Right shoulder replacement 9/7/2023.  --- improving       Pertinent surgical history ---   4/13/2022 - lumbar fusion (Dr. Perez). Required evacuation of lumbar hematoma 4/23/2022  Lumbar fusion 2017 (Dr. Perez, Dr. Rick). Required second surgery to clean out infection   Back surgery 2010    Past Medications ---  Hydrocodone - constipation  Gabapentin - constipation    Previous interventions that the patient has received include:   Epidurals (no longer candidate due to infection history)    Back Pain  This is a chronic problem. The current episode started more than 1 year ago. The problem occurs daily. The problem has been waxing and waning since onset. The pain is present in the lumbar spine. The quality of the pain is described as aching, cramping and burning. The pain radiates to the left thigh and right thigh. The pain is at a severity of 6/10. The symptoms are aggravated by position, sitting, standing, twisting and bending. Associated symptoms include numbness (right foot) and weakness (generalized). Pertinent negatives include no abdominal pain, chest pain, dysuria, fever or headaches. He has tried analgesics, muscle relaxant, heat, ice and home exercises for the symptoms. The treatment  "provided mild relief.      PEG Assessment   What number best describes your pain on average in the past week?6  What number best describes how, during the past week, pain has interfered with your enjoyment of life?5  What number best describes how, during the past week, pain has interfered with your general activity?  6    Review of Pertinent Medical Data ---    The following portions of the patient's history were reviewed and updated as appropriate: allergies, current medications, past family history, past medical history, past social history, past surgical history, and problem list.    Review of Systems   Constitutional:  Positive for fatigue. Negative for chills and fever.   Respiratory:  Positive for shortness of breath (mild). Negative for cough.    Cardiovascular:  Positive for leg swelling (left foot and leg). Negative for chest pain.   Gastrointestinal:  Negative for abdominal pain, constipation and diarrhea.   Genitourinary:  Positive for difficulty urinating. Negative for dysuria.   Musculoskeletal:  Positive for back pain and gait problem (ambulates with cane).   Neurological:  Positive for weakness (generalized) and numbness (right foot). Negative for dizziness, light-headedness and headaches.     I have reviewed and confirmed the accuracy of the ROS as documented by the MA/LPN/RN TEN Camp    Vitals:    01/30/24 1153   BP: 122/74   BP Location: Left arm   Patient Position: Sitting   Pulse: 73   Temp: 95.9 °F (35.5 °C)   TempSrc: Temporal   SpO2: 94%   Weight: 110 kg (242 lb 6.4 oz)   Height: 180.3 cm (70.98\")   PainSc:   6   PainLoc: Back     Objective   Physical Exam  Vitals and nursing note reviewed.   Constitutional:       General: He is not in acute distress.     Appearance: Normal appearance. He is not ill-appearing.   Pulmonary:      Effort: Pulmonary effort is normal. No respiratory distress.   Musculoskeletal:      Lumbar back: Tenderness and bony tenderness present. Negative right " straight leg raise test and negative left straight leg raise test.   Neurological:      Mental Status: He is alert and oriented to person, place, and time.      Gait: Gait abnormal (slowed, antalgia, ambulates with walker).   Psychiatric:         Mood and Affect: Mood normal.         Behavior: Behavior normal.         Assessment & Plan   Diagnoses and all orders for this visit:    1. Chronic pain syndrome (Primary)    2. History of lumbar surgery  -     traMADol (ULTRAM) 50 MG tablet; Take 1 tablet by mouth Every 8 (Eight) Hours As Needed for Severe Pain.  Dispense: 90 tablet; Refill: 1    3. Lumbar radiculopathy  -     traMADol (ULTRAM) 50 MG tablet; Take 1 tablet by mouth Every 8 (Eight) Hours As Needed for Severe Pain.  Dispense: 90 tablet; Refill: 1    4. Therapeutic opioid induced constipation    5. High risk medication use      --- Refill Tramadol. Patient appears stable with current regimen. No adverse effects. Regarding continuation of opioids, there is no evidence of aberrant behavior or any red flags.  The patient continues with appropriate response to opioid therapy. ADL's remain intact by self.   --- The urine drug screen confirmation from 10/24/2023 has been reviewed and the result is appropriate based on patient history and JESSICA report  --- The patient signed an updated copy of the controlled substance agreement on 10/24/2023  --- ORT - high risk. Total daily MME low.      Prem Thrasher reports a pain score of 6.  Given his pain assessment as noted, treatment options were discussed and the following options were decided upon as a follow-up plan to address the patient's pain:  see plan above .      --- Follow-up 2 months                  JESSICA REPORT  As part of the patient's treatment plan, I am prescribing controlled substances. The patient has been made aware of appropriate use of such medications, including potential risk of somnolence, limited ability to drive and/or work safely, and the  potential for dependence or overdose. It has also been made clear that these medications are for use by this patient only, without concomitant use of alcohol or other substances unless prescribed.     Patient has completed prescribing agreement detailing terms of continued prescribing of controlled substances, including monitoring JESSICA reports, urine drug screening, and pill counts if necessary. The patient is aware that inappropriate use will results in cessation of prescribing such medications.    As the clinician, I personally reviewed the JESSICA from 1/30/2024 while the patient was in the office today.    History and physical exam exhibit continued safe and appropriate use of controlled substances.       Dictated utilizing Dragon dictation.

## 2024-02-09 ENCOUNTER — TREATMENT (OUTPATIENT)
Dept: PHYSICAL THERAPY | Facility: CLINIC | Age: 69
End: 2024-02-09
Payer: MEDICARE

## 2024-02-09 DIAGNOSIS — Z74.09 IMPAIRED FUNCTIONAL MOBILITY AND ACTIVITY TOLERANCE: ICD-10-CM

## 2024-02-09 DIAGNOSIS — M79.604 PAIN OF RIGHT ANTERIOR LOWER EXTREMITY: ICD-10-CM

## 2024-02-09 DIAGNOSIS — M54.42 CHRONIC BILATERAL LOW BACK PAIN WITH BILATERAL SCIATICA: ICD-10-CM

## 2024-02-09 DIAGNOSIS — M54.41 CHRONIC BILATERAL LOW BACK PAIN WITH BILATERAL SCIATICA: ICD-10-CM

## 2024-02-09 DIAGNOSIS — M76.811 ANTERIOR TIBIALIS TENDINITIS OF RIGHT LOWER EXTREMITY: Primary | ICD-10-CM

## 2024-02-09 DIAGNOSIS — G89.29 CHRONIC BILATERAL LOW BACK PAIN WITH BILATERAL SCIATICA: ICD-10-CM

## 2024-02-09 PROCEDURE — 97162 PT EVAL MOD COMPLEX 30 MIN: CPT | Performed by: PHYSICAL THERAPIST

## 2024-02-09 PROCEDURE — 97140 MANUAL THERAPY 1/> REGIONS: CPT | Performed by: PHYSICAL THERAPIST

## 2024-02-09 PROCEDURE — 97110 THERAPEUTIC EXERCISES: CPT | Performed by: PHYSICAL THERAPIST

## 2024-02-09 NOTE — PROGRESS NOTES
Physical Therapy Initial Evaluation and Plan of Care  [facility]  [address], [address]  [place] [address]    Patient: Prem Thrasher   : 1955  Diagnosis/ICD-10 Code:  Anterior tibialis tendinitis of right lower extremity [M76.811]  Referring practitioner: Rayray Honeycutt MD  Today's Date: 2024    Subjective Evaluation    History of Present Illness  Mechanism of injury: 3 week history of right lower leg pain of insidious onset  Saw Dr Honeycutt - xrays revealed some DJD right ankle and foot but no fracture - diagnosed as anterior tibialis tendonitis  Referred to PT  Has history of chronic lower back pain - has had 3 lumbar surgeries with most recent Lumbar fusion in   Sees Pain Mgmt for chronic pain - tramadol and lyrica   Does have intermittent sciatica of each leg    Pain  Current pain ratin  At best pain ratin  At worst pain ratin  Location: ache and sharp  pain in right anterior ankle, ankle swelled but has resolved now, pain greatly decreased in past week  Quality: dull ache, knife-like and sharp  Relieving factors: rest, ice and support  Aggravating factors: stairs, ambulation and standing  Progression: improved    Social Support  Lives in: multiple-level home    Diagnostic Tests  X-ray: abnormal    Treatments  Previous treatment: medication  Current treatment: medication and physical therapy  Current treatment comments: uses topical anti inflammatory.   Patient Goals  Patient goal: walk without pain           Objective          Observations     Additional Ankle/Foot Observation Details  Walks with short stride length gait pattern, both LE's slightly externally rotated    Palpation     Right Tenderness of the anterior tibialis and posterior tibialis.     Tenderness     Right Ankle/Foot   Tenderness in the posterior tibial tendon.     Additional Tenderness Details  Slight tenderness in distal anterior tibialis  tendon    Neurological Testing     Sensation     Ankle/Foot     Right Ankle/Foot   Intact: light touch     Active Range of Motion     Right Ankle/Foot   Dorsiflexion (ke): 18 degrees   Plantar flexion: 38 degrees   Inversion: 30 degrees   Eversion: 28 degrees     Strength/Myotome Testing     Right Ankle/Foot   Dorsiflexion: 4+  Plantar flexion: 4  Inversion: 5  Eversion: 4+    Tests     Right Ankle/Foot   Negative for anterior drawer, Homans' sign and navicular drop.           Assessment & Plan       Assessment  Impairments: abnormal or restricted ROM, activity intolerance, impaired physical strength and lacks appropriate home exercise program   Functional limitations: lifting, walking and standing   Assessment details: 68 y.o. male with resolving right anterior ankle/lower leg pain presents with: 1. Intermittent pain in the right anterior ankle/lower leg, 2. Slight decrease in ankle AROM, 3. Slight tenderness in right anterior tibialis tendon, 4. Chronic lower back pain preventing much WB activity, 5. Need for proper HEP  Prognosis: good    Goals  Plan Goals: Short Term Goals: 1 week  Patient will be able to tolerate initial exercises  Patient will have pain <2/10    Long Term Goals:   Patient will be independent in performing home exercise program.  Patient will have functional pain free ankle AROM  Patient will be able to walk for 5 minutes without increased ankle pain  Patient will be able to climb up/down a flight of steps without increased ankle pain        Plan  Therapy options: will be seen for skilled therapy services  Planned therapy interventions: manual therapy, stretching, strengthening, therapeutic activities, joint mobilization and home exercise program  Frequency: 1x week  Duration in visits: 3  Duration in weeks: 3        Manual Therapy:    10     mins  92631;  Therapeutic Exercise:    20     mins  88905;     Neuromuscular Emmy:    0    mins  72774;    Therapeutic Activity:     0     mins  16136;      Ultrasound                  __0_  mins  66860  Iontophoresis                 0    mins  63599    Electrical Stimulation     0    mins  [id number] ([id number])  Traction                         _0  mins  69565     Evaluation Time:     20  mins  Timed Treatment:   30   mins   Total Treatment:     55   mins    PT: Shama Hall, PT     License Number: [place] [id number]  Electronically signed by Shama Hall PT, 02/09/24, 7:21 AM EST    Certification Period: 2/9/2024 thru 5/8/2024  I certify that the therapy services are furnished while this patient is under my care.  The services outlined above are required by this patient, and will be reviewed every 90 days.         Physician Signature:__________________________________________________    PHYSICIAN: Rayray Honeycutt MD      DATE:     Please sign and return via fax to [email or web address] . Thank you, [facility].    PT SIGNATURE: Shama Hall PT   [place] LICENSE:  [id number]

## 2024-02-13 ENCOUNTER — TREATMENT (OUTPATIENT)
Dept: PHYSICAL THERAPY | Facility: CLINIC | Age: 69
End: 2024-02-13
Payer: MEDICARE

## 2024-02-13 DIAGNOSIS — M79.604 PAIN OF RIGHT ANTERIOR LOWER EXTREMITY: ICD-10-CM

## 2024-02-13 DIAGNOSIS — M54.41 CHRONIC BILATERAL LOW BACK PAIN WITH BILATERAL SCIATICA: ICD-10-CM

## 2024-02-13 DIAGNOSIS — G89.29 CHRONIC BILATERAL LOW BACK PAIN WITH BILATERAL SCIATICA: ICD-10-CM

## 2024-02-13 DIAGNOSIS — Z74.09 IMPAIRED FUNCTIONAL MOBILITY AND ACTIVITY TOLERANCE: ICD-10-CM

## 2024-02-13 DIAGNOSIS — M76.811 ANTERIOR TIBIALIS TENDINITIS OF RIGHT LOWER EXTREMITY: Primary | ICD-10-CM

## 2024-02-13 DIAGNOSIS — M54.42 CHRONIC BILATERAL LOW BACK PAIN WITH BILATERAL SCIATICA: ICD-10-CM

## 2024-02-13 PROCEDURE — 97140 MANUAL THERAPY 1/> REGIONS: CPT | Performed by: PHYSICAL THERAPIST

## 2024-02-22 DIAGNOSIS — M54.16 LUMBAR RADICULOPATHY: ICD-10-CM

## 2024-02-22 DIAGNOSIS — G89.4 CHRONIC PAIN SYNDROME: ICD-10-CM

## 2024-02-22 DIAGNOSIS — Z98.890 HISTORY OF LUMBAR SURGERY: ICD-10-CM

## 2024-02-23 RX ORDER — PREGABALIN 100 MG/1
100 CAPSULE ORAL 3 TIMES DAILY
Qty: 90 CAPSULE | Refills: 1 | Status: SHIPPED | OUTPATIENT
Start: 2024-02-23

## 2024-03-05 ENCOUNTER — TELEPHONE (OUTPATIENT)
Dept: FAMILY MEDICINE CLINIC | Facility: CLINIC | Age: 69
End: 2024-03-05
Payer: MEDICARE

## 2024-03-05 NOTE — TELEPHONE ENCOUNTER
Caller: Linda Thrasher    Relationship to patient: Emergency Contact    Best call back number: [phone]     Date of positive COVID19 test: 03/05/2024     COVID19 symptoms: BODY ACHES, SLIGHT COUGH, HEADACHE, NOT SLEEPING WELL, DIZZINESS, CONGESTION    Additional information or concerns: PATIENT'S WIFE IS CALLING TO SEE IF PATIENT CAN GET MEDICATION. PLEASE ADVISE.    What is the patients preferred pharmacy: [facility]/pharm[facility] #[id number] - [place], [place] - [address] AT [facility] [phone] [facility] [phone]

## 2024-03-06 ENCOUNTER — TELEMEDICINE (OUTPATIENT)
Dept: FAMILY MEDICINE CLINIC | Facility: CLINIC | Age: 69
End: 2024-03-06
Payer: MEDICARE

## 2024-03-06 ENCOUNTER — TELEPHONE (OUTPATIENT)
Dept: FAMILY MEDICINE CLINIC | Facility: CLINIC | Age: 69
End: 2024-03-06

## 2024-03-06 DIAGNOSIS — R42 VERTIGO: ICD-10-CM

## 2024-03-06 DIAGNOSIS — J06.9 ACUTE URI: ICD-10-CM

## 2024-03-06 DIAGNOSIS — U07.1 COVID: Primary | ICD-10-CM

## 2024-03-06 DIAGNOSIS — J44.9 CHRONIC OBSTRUCTIVE PULMONARY DISEASE, UNSPECIFIED COPD TYPE: ICD-10-CM

## 2024-03-06 PROCEDURE — 1160F RVW MEDS BY RX/DR IN RCRD: CPT | Performed by: PHYSICIAN ASSISTANT

## 2024-03-06 PROCEDURE — 99213 OFFICE O/P EST LOW 20 MIN: CPT | Performed by: PHYSICIAN ASSISTANT

## 2024-03-06 PROCEDURE — 1159F MED LIST DOCD IN RCRD: CPT | Performed by: PHYSICIAN ASSISTANT

## 2024-03-06 RX ORDER — MECLIZINE HYDROCHLORIDE 25 MG/1
25 TABLET ORAL 3 TIMES DAILY PRN
Qty: 30 TABLET | Refills: 0 | Status: SHIPPED | OUTPATIENT
Start: 2024-03-06

## 2024-03-06 RX ORDER — AZITHROMYCIN 250 MG/1
TABLET, FILM COATED ORAL
Qty: 6 TABLET | Refills: 1 | Status: SHIPPED | OUTPATIENT
Start: 2024-03-06

## 2024-03-06 RX ORDER — PREDNISONE 20 MG/1
20 TABLET ORAL 2 TIMES DAILY
Qty: 10 TABLET | Refills: 0 | Status: SHIPPED | OUTPATIENT
Start: 2024-03-06

## 2024-03-06 NOTE — TELEPHONE ENCOUNTER
"    Caller: Prem Thrasher \"Greg\"    Relationship to patient: Self    Best call back number: [phone]  Date of positive COVID19 test: 3[birth year]    Date of possible COVID19 exposure: N/A     COVID19 symptoms: DIZZY, SORE THROAT, BODYACHES     Date of initial quarantine: 3/5/24    Additional information or concerns: PLEASE SEND MEDICATION FOR COVID.     What is the patients preferred pharmacy: [facility]       "

## 2024-03-06 NOTE — TELEPHONE ENCOUNTER
Called and LVM for Linda to inform her to call back and schedule appt or go next door to Urgent Care

## 2024-03-06 NOTE — PROGRESS NOTES
Subjective   Prem Thrasher is a 69 y.o. male.   You have chosen to receive care through a telehealth visit.  Do you consent to use a video/audio connection for your medical care today? Yes  He is at home and I am at my desk    History of Present Illness   Prem Thrasher 69 y.o. male who presents for evaluation of upper respiratory congestion, sore throat, cough, wheezing, shortness of air, fatigue. Symptoms include congestion, sore throat, post nasal drip, headache, fatigue, and runny nose.  Also vertigo. Onset of symptoms was 2 days ago, unchanged since that time. Patient denies fever. ----low grade temp  Evaluation to date: none Treatment to date:  Tylenol.   He has also had vertigo with this illness and been laying down due to dizziness when he raises his head  Onset Monday  O2 sat staying 95%  He is asking for Paxlovid    The following portions of the patient's history were reviewed and updated as appropriate: allergies, current medications, past family history, past medical history, past social history, past surgical history, and problem list.    Review of Systems   Constitutional:  Positive for fatigue. Negative for diaphoresis.   HENT:  Positive for congestion, postnasal drip, rhinorrhea, sinus pressure, sneezing, sore throat and voice change. Negative for nosebleeds and trouble swallowing.    Eyes:  Negative for blurred vision and visual disturbance.   Respiratory:  Positive for cough, chest tightness, shortness of breath and wheezing. Negative for choking.    Gastrointestinal:  Negative for blood in stool and diarrhea.   Allergic/Immunologic: Negative for immunocompromised state.   Neurological:  Positive for headache. Negative for facial asymmetry and speech difficulty.   Psychiatric/Behavioral:  Negative for self-injury and suicidal ideas.        Objective   Physical Exam  Constitutional:       General: He is not in acute distress.     Appearance: He is well-developed. He is ill-appearing. He is not  diaphoretic.   HENT:      Head: Normocephalic and atraumatic.   Eyes:      General: No scleral icterus.     Conjunctiva/sclera: Conjunctivae normal.   Neck:      Trachea: No tracheal deviation.   Musculoskeletal:         General: Normal range of motion.      Cervical back: Normal range of motion.   Skin:     Coloration: Skin is not pale.   Neurological:      Mental Status: He is alert and oriented to person, place, and time.      Coordination: Coordination normal.   Psychiatric:         Behavior: Behavior normal.         Thought Content: Thought content normal.         Judgment: Judgment normal.           Assessment & Plan   Diagnoses and all orders for this visit:    1. COVID (Primary)    2. Acute URI    3. Chronic obstructive pulmonary disease, unspecified COPD type    Other orders  -     azithromycin (ZITHROMAX) 250 MG tablet; Take 2 tablets the first day, then 1 tablet daily for 4 days for infection  Dispense: 6 tablet; Refill: 1  -     Nirmatrelvir & Ritonavir, 300mg/100mg, (PAXLOVID) 20 x 150 MG & 10 x 100MG tablet therapy pack tablet; Take 3 tablets by mouth 2 (Two) Times a Day.  Dispense: 30 tablet; Refill: 0  -     predniSONE (DELTASONE) 20 MG tablet; Take 1 tablet by mouth 2 (Two) Times a Day. With food for 5 days  Dispense: 10 tablet; Refill: 0      Reviewed his medication list and will hold the Crestor for 7 days due to Paxlovid  Can take Vit D 5,000 IU daily, vit C 1000 mg twice daily, Zinc 50 mg twice daily -----all for 10 days  Need to hold Crestor 7 days d/t Paxlovid  Julissa  Has Albuterol MDI  Has Duoneb  Sent meclizine for dizziness watch drowsiness  Prefer he not take any prednisone unless his COPD is flared up do the DuoNeb with nebulizer first and albuterol

## 2024-03-13 ENCOUNTER — TRANSCRIBE ORDERS (OUTPATIENT)
Dept: ADMINISTRATIVE | Facility: HOSPITAL | Age: 69
End: 2024-03-13
Payer: MEDICARE

## 2024-03-13 DIAGNOSIS — Z87.891 PERSONAL HISTORY OF TOBACCO USE, PRESENTING HAZARDS TO HEALTH: Primary | ICD-10-CM

## 2024-03-20 ENCOUNTER — HOSPITAL ENCOUNTER (OUTPATIENT)
Facility: HOSPITAL | Age: 69
Setting detail: OBSERVATION
Discharge: HOME | End: 2024-03-23
Attending: EMERGENCY MEDICINE | Admitting: STUDENT IN AN ORGANIZED HEALTH CARE EDUCATION/TRAINING PROGRAM
Payer: MEDICARE

## 2024-03-20 ENCOUNTER — APPOINTMENT (OUTPATIENT)
Dept: GENERAL RADIOLOGY | Facility: HOSPITAL | Age: 69
End: 2024-03-20
Payer: MEDICARE

## 2024-03-20 DIAGNOSIS — Z95.5 S/P CORONARY ARTERY STENT PLACEMENT: Primary | ICD-10-CM

## 2024-03-20 DIAGNOSIS — I20.89 STABLE ANGINA PECTORIS: ICD-10-CM

## 2024-03-20 DIAGNOSIS — I25.10 CORONARY ARTERY DISEASE INVOLVING NATIVE HEART, UNSPECIFIED VESSEL OR LESION TYPE, UNSPECIFIED WHETHER ANGINA PRESENT: ICD-10-CM

## 2024-03-20 DIAGNOSIS — R07.9 EXERTIONAL CHEST PAIN: ICD-10-CM

## 2024-03-20 LAB
ALBUMIN SERPL-MCNC: 4.3 G/DL (ref 3.5–5.2)
ALBUMIN/GLOB SERPL: 1.7 G/DL
ALP SERPL-CCNC: 67 U/L (ref 39–117)
ALT SERPL W P-5'-P-CCNC: 21 U/L (ref 1–41)
ANION GAP SERPL CALCULATED.3IONS-SCNC: 11 MMOL/L (ref 5–15)
AST SERPL-CCNC: 16 U/L (ref 1–40)
BASOPHILS # BLD AUTO: 0.04 10*3/MM3 (ref 0–0.2)
BASOPHILS NFR BLD AUTO: 0.4 % (ref 0–1.5)
BILIRUB SERPL-MCNC: 0.5 MG/DL (ref 0–1.2)
BUN SERPL-MCNC: 19 MG/DL (ref 8–23)
BUN/CREAT SERPL: 15.8 (ref 7–25)
CALCIUM SPEC-SCNC: 9.1 MG/DL (ref 8.6–10.5)
CHLORIDE SERPL-SCNC: 99 MMOL/L (ref 98–107)
CO2 SERPL-SCNC: 24 MMOL/L (ref 22–29)
CREAT SERPL-MCNC: 1.2 MG/DL (ref 0.76–1.27)
DEPRECATED RDW RBC AUTO: 45.8 FL (ref 37–54)
EGFRCR SERPLBLD CKD-EPI 2021: 65.1 ML/MIN/1.73
EOSINOPHIL # BLD AUTO: 0.16 10*3/MM3 (ref 0–0.4)
EOSINOPHIL NFR BLD AUTO: 1.7 % (ref 0.3–6.2)
ERYTHROCYTE [DISTWIDTH] IN BLOOD BY AUTOMATED COUNT: 14.4 % (ref 12.3–15.4)
GEN 5 2HR TROPONIN T REFLEX: 28 NG/L
GLOBULIN UR ELPH-MCNC: 2.5 GM/DL
GLUCOSE SERPL-MCNC: 116 MG/DL (ref 65–99)
HCT VFR BLD AUTO: 39.5 % (ref 37.5–51)
HGB BLD-MCNC: 12.8 G/DL (ref 13–17.7)
HOLD SPECIMEN: NORMAL
HOLD SPECIMEN: NORMAL
IMM GRANULOCYTES # BLD AUTO: 0.04 10*3/MM3 (ref 0–0.05)
IMM GRANULOCYTES NFR BLD AUTO: 0.4 % (ref 0–0.5)
LYMPHOCYTES # BLD AUTO: 1.73 10*3/MM3 (ref 0.7–3.1)
LYMPHOCYTES NFR BLD AUTO: 17.9 % (ref 19.6–45.3)
MCH RBC QN AUTO: 27.9 PG (ref 26.6–33)
MCHC RBC AUTO-ENTMCNC: 32.4 G/DL (ref 31.5–35.7)
MCV RBC AUTO: 86.2 FL (ref 79–97)
MONOCYTES # BLD AUTO: 0.63 10*3/MM3 (ref 0.1–0.9)
MONOCYTES NFR BLD AUTO: 6.5 % (ref 5–12)
NEUTROPHILS NFR BLD AUTO: 7.06 10*3/MM3 (ref 1.7–7)
NEUTROPHILS NFR BLD AUTO: 73.1 % (ref 42.7–76)
NRBC BLD AUTO-RTO: 0 /100 WBC (ref 0–0.2)
PLATELET # BLD AUTO: 298 10*3/MM3 (ref 140–450)
PMV BLD AUTO: 9.2 FL (ref 6–12)
POTASSIUM SERPL-SCNC: 4.3 MMOL/L (ref 3.5–5.2)
PROT SERPL-MCNC: 6.8 G/DL (ref 6–8.5)
RBC # BLD AUTO: 4.58 10*6/MM3 (ref 4.14–5.8)
SODIUM SERPL-SCNC: 134 MMOL/L (ref 136–145)
TROPONIN T DELTA: 9 NG/L
TROPONIN T SERPL HS-MCNC: 19 NG/L
TROPONIN T SERPL HS-MCNC: 28 NG/L
WBC NRBC COR # BLD AUTO: 9.66 10*3/MM3 (ref 3.4–10.8)
WHOLE BLOOD HOLD COAG: NORMAL
WHOLE BLOOD HOLD SPECIMEN: NORMAL

## 2024-03-20 PROCEDURE — 84484 ASSAY OF TROPONIN QUANT: CPT | Performed by: NURSE PRACTITIONER

## 2024-03-20 PROCEDURE — 85025 COMPLETE CBC W/AUTO DIFF WBC: CPT

## 2024-03-20 PROCEDURE — 71045 X-RAY EXAM CHEST 1 VIEW: CPT

## 2024-03-20 PROCEDURE — 36415 COLL VENOUS BLD VENIPUNCTURE: CPT

## 2024-03-20 PROCEDURE — 93005 ELECTROCARDIOGRAM TRACING: CPT

## 2024-03-20 PROCEDURE — 84484 ASSAY OF TROPONIN QUANT: CPT

## 2024-03-20 PROCEDURE — G0378 HOSPITAL OBSERVATION PER HR: HCPCS

## 2024-03-20 PROCEDURE — 93010 ELECTROCARDIOGRAM REPORT: CPT | Performed by: INTERNAL MEDICINE

## 2024-03-20 PROCEDURE — 99285 EMERGENCY DEPT VISIT HI MDM: CPT

## 2024-03-20 PROCEDURE — 80053 COMPREHEN METABOLIC PANEL: CPT

## 2024-03-20 RX ORDER — ACETAMINOPHEN 325 MG/1
650 TABLET ORAL EVERY 4 HOURS PRN
Status: DISCONTINUED | OUTPATIENT
Start: 2024-03-20 | End: 2024-03-22 | Stop reason: SDUPTHER

## 2024-03-20 RX ORDER — ASPIRIN 325 MG
325 TABLET ORAL ONCE
Status: COMPLETED | OUTPATIENT
Start: 2024-03-20 | End: 2024-03-20

## 2024-03-20 RX ORDER — ONDANSETRON 2 MG/ML
4 INJECTION INTRAMUSCULAR; INTRAVENOUS EVERY 6 HOURS PRN
Status: DISCONTINUED | OUTPATIENT
Start: 2024-03-20 | End: 2024-03-23 | Stop reason: HOSPADM

## 2024-03-20 RX ORDER — NITROGLYCERIN 0.4 MG/1
0.4 TABLET SUBLINGUAL
Status: DISCONTINUED | OUTPATIENT
Start: 2024-03-20 | End: 2024-03-23 | Stop reason: HOSPADM

## 2024-03-20 RX ORDER — BISACODYL 5 MG/1
5 TABLET, DELAYED RELEASE ORAL DAILY PRN
Status: DISCONTINUED | OUTPATIENT
Start: 2024-03-20 | End: 2024-03-23 | Stop reason: HOSPADM

## 2024-03-20 RX ORDER — AMOXICILLIN 250 MG
2 CAPSULE ORAL 2 TIMES DAILY PRN
Status: DISCONTINUED | OUTPATIENT
Start: 2024-03-20 | End: 2024-03-23 | Stop reason: HOSPADM

## 2024-03-20 RX ORDER — SODIUM CHLORIDE 0.9 % (FLUSH) 0.9 %
10 SYRINGE (ML) INJECTION AS NEEDED
Status: DISCONTINUED | OUTPATIENT
Start: 2024-03-20 | End: 2024-03-23 | Stop reason: HOSPADM

## 2024-03-20 RX ORDER — ONDANSETRON 4 MG/1
4 TABLET, ORALLY DISINTEGRATING ORAL EVERY 6 HOURS PRN
Status: DISCONTINUED | OUTPATIENT
Start: 2024-03-20 | End: 2024-03-23 | Stop reason: HOSPADM

## 2024-03-20 RX ORDER — ALUMINA, MAGNESIA, AND SIMETHICONE 2400; 2400; 240 MG/30ML; MG/30ML; MG/30ML
15 SUSPENSION ORAL EVERY 6 HOURS PRN
Status: DISCONTINUED | OUTPATIENT
Start: 2024-03-20 | End: 2024-03-23 | Stop reason: HOSPADM

## 2024-03-20 RX ORDER — BISACODYL 10 MG
10 SUPPOSITORY, RECTAL RECTAL DAILY PRN
Status: DISCONTINUED | OUTPATIENT
Start: 2024-03-20 | End: 2024-03-23 | Stop reason: HOSPADM

## 2024-03-20 RX ORDER — POLYETHYLENE GLYCOL 3350 17 G/17G
17 POWDER, FOR SOLUTION ORAL DAILY PRN
Status: DISCONTINUED | OUTPATIENT
Start: 2024-03-20 | End: 2024-03-23 | Stop reason: HOSPADM

## 2024-03-20 RX ADMIN — ASPIRIN 325 MG: 325 TABLET ORAL at 18:53

## 2024-03-20 RX ADMIN — NITROGLYCERIN 1 INCH: 20 OINTMENT TOPICAL at 23:10

## 2024-03-20 RX ADMIN — NITROGLYCERIN 1 INCH: 20 OINTMENT TOPICAL at 18:53

## 2024-03-20 NOTE — ED PROVIDER NOTES
EMERGENCY DEPARTMENT ENCOUNTER  Room Number:  13/13  PCP: Montse Cain PA-C  Independent Historians: Patient      HPI:  Chief Complaint: Chest pain    A complete HPI/ROS/PMH/PSH/SH/FH are unobtainable due to: None    Chronic or social conditions impacting patient care (Social Determinants of Health): None      Context: Prem Thrasher is a 69 y.o. male with a medical history of coronary disease, MI, hypertension, hyperlipidemia, anxiety who presents to the ED c/o acute chest pain.  The patient reports for the last 3 weeks he has had intermittent left-sided chest pain that feels like shortness of breath.  He states that it is worse when he exerts himself.  He states he goes into his left arm.  It is better with rest.  He took 2 nitro today and the pain resolved.  He states his pain started this afternoon.  He states the pain is now returned.  Exertion makes it worse.  Rest makes it better.  He states it feels feel like his prior cardiac pain.  He does report he has taken 325 mg of aspirin today.      Review of prior external notes (non-ED) -and- Review of prior external test results outside of this encounter:  Cardiology note dated 10/9/2023 where he remains on aspirin 325 send Plavix 75 mg.  Cardiac cath 1/22/2021 where he had a distal LAD lesion with angioplasty.  He had a distal circumflex lesion with stent placement.    Prescription drug monitoring program review:         PAST MEDICAL HISTORY  Active Ambulatory Problems     Diagnosis Date Noted    Allergy     COPD (chronic obstructive pulmonary disease)     Hyperlipidemia     IFG (impaired fasting glucose)     Rheumatoid arthritis     Hypertension     Anxiety disorder 06/27/2016    Recurrent major depressive disorder, in full remission 06/27/2016    Lumbar radiculopathy 06/27/2016    Spondylolisthesis of lumbar region 07/31/2017    Sepsis 09/04/2017    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/08/2018    Calcific Achilles tendonitis s/p OR 7/18 06/08/2018     Gastrocnemius equinus, left 06/27/2018    Abscess and cellulitis of gluteal region 07/09/2018    Perirectal abscess 07/09/2018    Anemia 07/10/2018    Wound dehiscence 07/18/2018    Gastrocnemius strain, left, initial encounter 08/01/2019    Low folic acid 12/05/2019    Low serum vitamin B12 12/05/2019    Exertional chest pain 03/07/2020    Unstable angina 03/08/2020    Coronary artery disease involving native coronary artery of native heart with unstable angina pectoris 03/08/2020    Unstable angina pectoris 04/30/2020    Coronary artery disease involving native heart without angina pectoris 05/02/2020    Low back pain 06/04/2020    Non-STEMI (non-ST elevated myocardial infarction) 01/21/2021    CAD S/P percutaneous coronary angioplasty 01/29/2021    S/P drug eluting coronary stent placement 01/29/2021    Prediabetes 01/29/2021    Screening for colon cancer 03/12/2021    Colon polyps 04/08/2021    Diverticulosis 04/08/2021    Hypothyroidism, acquired 10/29/2021    Renal impairment 10/29/2021    Lumbar pain 02/25/2022    Pseudarthrosis after fusion or arthrodesis 03/16/2022    Hyponatremia 04/14/2022    Postoperative ileus 04/16/2022    Postoperative anemia due to acute blood loss 04/18/2022    Acquired trigger finger 12/12/2019    Generalized osteoarthritis 08/12/2014    Type 2 diabetes mellitus without complication, without long-term current use of insulin 03/24/2023    Ex-smoker 10/09/2023     Resolved Ambulatory Problems     Diagnosis Date Noted    Surgery follow-up examination 10/24/2016    Postoperative nausea and vomiting 08/01/2017    Postoperative ileus 08/01/2017    Infection of lumbar spine 09/06/2017    Sebaceous cyst 04/23/2018    Tobacco abuse 07/09/2018    Metabolic acidosis 07/10/2018    Tobacco abuse counseling 01/29/2021    Rheumatoid arthritis 08/12/2014     Past Medical History:   Diagnosis Date    Achilles tendon pain     Allergic     Asthma     CAD (coronary artery disease)     Cervical disc  disorder     Chronic anticoagulation     Chronic lower back pain     Chronic pain disorder     CTS (carpal tunnel syndrome)     Deep vein thrombosis     Depression     Diabetes mellitus 03/12/2023    Disease of thyroid gland     Diverticulitis of colon     Encounter for eye exam 10/2014    Extremity pain     GERD (gastroesophageal reflux disease)     Headache     History of bone density study 03/2014    History of chest x-ray 02/15/2011    History of colon polyps     History of nuclear stress test 03/09/2015    History of pulmonary function tests 03/2015    Joint pain     Low back strain     Lumbosacral disc disease     Migraine     Neck pain     Nerve damage     NSTEMI (non-ST elevated myocardial infarction) 01/2021    Obesity     Osteoarthritis, multiple sites     Pneumonia     Postoperative wound infection     Rotator cuff syndrome     Sciatica     Short of breath on exertion     Sleep apnea     Spinal stenosis     Staph infection     Thoracic disc disorder     Unsteady gait     Visual impairment          PAST SURGICAL HISTORY  Past Surgical History:   Procedure Laterality Date    ACHILLES TENDON SURGERY Left 07/03/2018    Procedure: Left Achilles debridement and secondary repair with partial excision of calcaneus. Possible left Achilles lengthening at the calf;  Surgeon: Vinay Smith MD;  [place] [facility] [place] [facility] [place]  Service: Orthopedics    BACK SURGERY  10/2017    CARDIAC CATHETERIZATION N/A 03/07/2020    Procedure: Left Heart Cath;  Surgeon: Dioni Salazar MD;  Location: [facility] INVASIVE LOCATION;  Service: Cardiology;  Laterality: N/A;    CARDIAC CATHETERIZATION N/A 03/07/2020    Procedure: Coronary angiography;  Surgeon: iDoni Salazar MD;  Location: [facility] CATH INVASIVE LOCATION;  Service: Cardiology;  Laterality: N/A;    CARDIAC CATHETERIZATION N/A 03/07/2020    Procedure: Left ventriculography;  Surgeon: Dioni Salazar MD;  Location: [facility] CATH INVASIVE LOCATION;  Service:  Cardiology;  Laterality: N/A;    CARDIAC CATHETERIZATION N/A 03/07/2020    Procedure: Stent RAZA coronary;  Surgeon: Dioni Salazar MD;  Location: [facility] CATH INVASIVE LOCATION;  Service: Cardiology;  Laterality: N/A;    CARDIAC CATHETERIZATION N/A 01/22/2021    Procedure: Left Heart Cath;  Surgeon: Dioni Salazar MD;  Location: [facility] CATH INVASIVE LOCATION;  Service: Cardiology;  Laterality: N/A;    CARDIAC CATHETERIZATION N/A 01/22/2021    Procedure: Coronary angiography;  Surgeon: Dioni Salazar MD;  Location: [facility] CATH INVASIVE LOCATION;  Service: Cardiology;  Laterality: N/A;    CARDIAC CATHETERIZATION N/A 01/22/2021    Procedure: Left ventriculography;  Surgeon: Dioni Salazar MD;  Location: [facility] CATH INVASIVE LOCATION;  Service: Cardiology;  Laterality: N/A;    CARDIAC CATHETERIZATION N/A 01/22/2021    Procedure: Percutaneous Coronary Intervention;  Surgeon: Dioni Salazar MD;  Location: [facility] CATH INVASIVE LOCATION;  Service: Cardiology;  Laterality: N/A;    CARDIAC CATHETERIZATION N/A 01/22/2021    Procedure: Stent RAZA coronary;  Surgeon: Dioni Salazar MD;  Location: [facility] CATH INVASIVE LOCATION;  Service: Cardiology;  Laterality: N/A;    CARDIAC SURGERY      3 stents total    COLONOSCOPY N/A 02/02/2011    Normal colon except scattered diverticulosis-Dr. Guero Blunt    COLONOSCOPY N/A 04/08/2021    Procedure: COLONOSCOPY TO CECUM WITH POLYPECTOMY (COLD BX);  Surgeon: Porsha Arcos MD;  Location: [facility] ENDOSCOPY;  Service: General;  Laterality: N/A;  SCREENING; HX OF COLON POLYPS  POST:DIVERTICULOSIS; COLON POLYP    CORONARY STENT PLACEMENT      CYST REMOVAL  03/2016    x2  FROM FACE AND EAR    DENTAL PROCEDURE  2008    teeth removed; dental implants    EPIDURAL BLOCK  1995    L/S spine    FINGER DEBRIDEMENT Right 07/12/2003    Debridement and full thickness skin grafting of the ring and small fingers of the right hand-Dr. Rayray Long    FOOT SURGERY       HAND SURGERY  2003    INCISION AND DRAINAGE PERIRECTAL ABSCESS N/A 07/10/2018    Procedure: INCISION AND DRAINAGE OF PERIRECTAL ABSCESS;  Surgeon: Porsha Arcos MD;  Location: [facility] MAIN OR;  Service: General    INCISION AND DRAINAGE TRUNK N/A 04/23/2022    Procedure: Evacuation lumbar hematoma;  Surgeon: Jacky Perez MD;  Location: [facility] MAIN OR;  Service: Orthopedics;  Laterality: N/A;    JOINT REPLACEMENT      LUMBAR DISCECTOMY FUSION INSTRUMENTATION Left 10/10/2016    Procedure: LEFT L2-L3, L3-L4 LUMBAR LAMINECTOMY/ DISCECTOMY WITH METRIX ;  Surgeon: Sawyer Rick MD;  Location: [facility] MAIN OR;  Service:     LUMBAR DISCECTOMY FUSION INSTRUMENTATION N/A 07/31/2017    Procedure: LUMBAR FUSION DECOMPRESSON WITH PEDICLE SCREWS with LAURA L2-3,L3-4;  Surgeon: Jacky Perez MD;  Location: [facility] MAIN OR;  Service:     LUMBAR FUSION N/A 04/13/2022    Procedure: Thoracic 10-thoracic 11, thoracic 11-thoracic 12, thoracic 12-lumbar 1, lumbar 1-lumbar 2, lumbar 2-lumbar 3, lumbar 3-lumbar 4 fusion with instrumentation with iliac crest bone graft, and removal of implants from lumbar 2-lumbar 3, lumbar 3-lumbar 4;  Surgeon: Jacky Perez MD;  Location: [facility] MAIN OR;  Service: Orthopedic Spine;  Laterality: N/A;    LUMBAR FUSION N/A 04/13/2022    Procedure: LUMBAR ANTERIOR INTERBODY FUSION L4,L5 Osteotomy interbody fusion with cage L1,L2;  Surgeon: Jacky Perez MD;  Location: [facility] MAIN OR;  Service: Orthopedic Spine;  Laterality: N/A;    LUMBAR LAMINECTOMY DISCECTOMY DECOMPRESSION N/A 07/31/2017    Procedure: L2 to L4 laminectomy with neural lysis and a fusion by orthopedics;  Surgeon: Sawyer Rick MD;  Location: [facility] MAIN OR;  Service:     LUMBAR LAMINECTOMY DISCECTOMY DECOMPRESSION N/A 09/05/2017    Procedure: I&D LUMBAR SPINE ;  Surgeon: Jacky Perez MD;  Location: [facility] MAIN OR;  Service:     ORTHOPEDIC SURGERY      SHOULDER SURGERY Right 02/23/2011    Dr. Navarro    SINUS  SURGERY  2003    Dr. Barrera    SPINAL FUSION      SPINE SURGERY  2010    TOTAL SHOULDER REPLACEMENT Right 2023    TRIGGER POINT INJECTION           FAMILY HISTORY  Family History   Problem Relation Age of Onset    Diabetes Mother     Heart disease Mother     Hyperlipidemia Mother     Stroke Mother     Heart disease Father     Rheum arthritis Father     Alcohol abuse Father     Hyperlipidemia Father     Depression Brother     Cancer Brother         prostate    Depression Brother     Heart disease Brother     Hyperlipidemia Brother     Cancer Brother         Haert desease    Thyroid disease Sister     Arthritis Sister     Asthma Son     Malgunjan Hyperthermia Neg Hx          SOCIAL HISTORY  Social History     Socioeconomic History    Marital status:    Tobacco Use    Smoking status: Former     Current packs/day: 0.00     Average packs/day: 1 pack/day for 50.0 years (50.0 ttl pk-yrs)     Types: Cigarettes     Start date: 10/20/1971     Quit date: 10/20/2021     Years since quittin.4    Smokeless tobacco: Never    Tobacco comments:     Current status is non smoker   Vaping Use    Vaping status: Never Used   Substance and Sexual Activity    Alcohol use: No    Drug use: No    Sexual activity: Not Currently     Partners: Female     Birth control/protection: None         ALLERGIES  Atorvastatin, Ceclor [cefaclor], and Methotrexate derivatives      REVIEW OF SYSTEMS  Review of Systems  Included in HPI  All systems reviewed and negative except for those discussed in HPI.      PHYSICAL EXAM    I have reviewed the triage vital signs and nursing notes.    ED Triage Vitals [24 1822]   Temp Heart Rate Resp BP SpO2   98.8 °F (37.1 °C) 93 -- -- 94 %      Temp src Heart Rate Source Patient Position BP Location FiO2 (%)   Tympanic -- -- -- --       Physical Exam  GENERAL: Awake, alert, no acute distress  SKIN: Warm, dry  HENT: Normocephalic, atraumatic  EYES: no scleral icterus  CV: regular rhythm, regular  rate  RESPIRATORY: normal effort, lungs clear  ABDOMEN: soft, nontender, nondistended  MUSCULOSKELETAL: no deformity, no calf tenderness or swelling  NEURO: alert, moves all extremities, follows commands            LAB RESULTS  Recent Results (from the past 24 hour(s))   ECG 12 Lead ED Triage Standing Order; Chest Pain    Collection Time: 03/20/24  6:28 PM   Result Value Ref Range    QT Interval 362 ms    QTC Interval 426 ms   Comprehensive Metabolic Panel    Collection Time: 03/20/24  6:36 PM    Specimen: Blood   Result Value Ref Range    Glucose 116 (H) 65 - 99 mg/dL    BUN 19 8 - 23 mg/dL    Creatinine 1.20 0.76 - 1.27 mg/dL    Sodium 134 (L) 136 - 145 mmol/L    Potassium 4.3 3.5 - 5.2 mmol/L    Chloride 99 98 - 107 mmol/L    CO2 24.0 22.0 - 29.0 mmol/L    Calcium 9.1 8.6 - 10.5 mg/dL    Total Protein 6.8 6.0 - 8.5 g/dL    Albumin 4.3 3.5 - 5.2 g/dL    ALT (SGPT) 21 1 - 41 U/L    AST (SGOT) 16 1 - 40 U/L    Alkaline Phosphatase 67 39 - 117 U/L    Total Bilirubin 0.5 0.0 - 1.2 mg/dL    Globulin 2.5 gm/dL    A/G Ratio 1.7 g/dL    BUN/Creatinine Ratio 15.8 7.0 - 25.0    Anion Gap 11.0 5.0 - 15.0 mmol/L    eGFR 65.1 >60.0 mL/min/1.73   High Sensitivity Troponin T    Collection Time: 03/20/24  6:36 PM    Specimen: Blood   Result Value Ref Range    HS Troponin T 19 <22 ng/L   CBC Auto Differential    Collection Time: 03/20/24  6:36 PM    Specimen: Blood   Result Value Ref Range    WBC 9.66 3.40 - 10.80 10*3/mm3    RBC 4.58 4.14 - 5.80 10*6/mm3    Hemoglobin 12.8 (L) 13.0 - 17.7 g/dL    Hematocrit 39.5 37.5 - 51.0 %    MCV 86.2 79.0 - 97.0 fL    MCH 27.9 26.6 - 33.0 pg    MCHC 32.4 31.5 - 35.7 g/dL    RDW 14.4 12.3 - 15.4 %    RDW-SD 45.8 37.0 - 54.0 fl    MPV 9.2 6.0 - 12.0 fL    Platelets 298 140 - 450 10*3/mm3    Neutrophil % 73.1 42.7 - 76.0 %    Lymphocyte % 17.9 (L) 19.6 - 45.3 %    Monocyte % 6.5 5.0 - 12.0 %    Eosinophil % 1.7 0.3 - 6.2 %    Basophil % 0.4 0.0 - 1.5 %    Immature Grans % 0.4 0.0 - 0.5 %     Neutrophils, Absolute 7.06 (H) 1.70 - 7.00 10*3/mm3    Lymphocytes, Absolute 1.73 0.70 - 3.10 10*3/mm3    Monocytes, Absolute 0.63 0.10 - 0.90 10*3/mm3    Eosinophils, Absolute 0.16 0.00 - 0.40 10*3/mm3    Basophils, Absolute 0.04 0.00 - 0.20 10*3/mm3    Immature Grans, Absolute 0.04 0.00 - 0.05 10*3/mm3    nRBC 0.0 0.0 - 0.2 /100 WBC         RADIOLOGY  XR Chest 1 View    Result Date: 3/20/2024  Emergency portable view of the chest on 3/20/2024  CLINICAL HISTORY: Chest pain  This correlated to a prior chest x-ray on 1/21/2021.  FINDINGS: There is a reverse right total shoulder arthroplasty prosthesis in place is only partially visualized on this exam. The cardiomediastinal silhouette and the pulmonary vasculature are within normal limits. The lungs are clear. The costophrenic angles are sharp.      1. No active disease is seen in the chest and the etiology of this patient's chest pain is not established on this exam.  2. There is a reverse right total shoulder arthroplasty in place that is only partially visualized on this exam.  This report was finalized on 3/20/2024 6:58 PM by Dr. Waldemar Khanna M.D on Workstation: [id number]         MEDICATIONS GIVEN IN ER  Medications   sodium chloride 0.9 % flush 10 mL (has no administration in time range)   aspirin tablet 325 mg (325 mg Oral Given 3/20/24 1853)   nitroglycerin (NITROSTAT) ointment 1 inch (1 inch Topical Given 3/20/24 1853)         ORDERS PLACED DURING THIS VISIT:  Orders Placed This Encounter   Procedures    XR Chest 1 View    [facility] Draw    Comprehensive Metabolic Panel    High Sensitivity Troponin T    CBC Auto Differential    High Sensitivity Troponin T 2Hr    NPO Diet NPO Type: Strict NPO    Undress & Gown    Continuous Pulse Oximetry    [facility] (on-call MD unless specified) Details    Oxygen Therapy- Nasal Cannula; Titrate 1-6 LPM Per SpO2; 90 - 95%    ECG 12 Lead ED Triage Standing Order; Chest Pain    ECG 12 Lead ED Triage Standing Order; Chest Pain    Insert  Peripheral IV    Initiate Observation Status    CBC & Differential    Green Top (Gel)    Lavender Top    Gold Top - SST    Light Blue Top         OUTPATIENT MEDICATION MANAGEMENT:  Current Facility-Administered Medications Ordered in Epic   Medication Dose Route Frequency Provider Last Rate Last Admin    sodium chloride 0.9 % flush 10 mL  10 mL Intravenous PRN Emergency, Triage Protocol, MD         Current Outpatient Medications Ordered in Epic   Medication Sig Dispense Refill    amLODIPine (NORVASC) 10 MG tablet Take 0.5 tablets by mouth Daily. for blood pressure      amoxicillin-clavulanate (AUGMENTIN) 875-125 MG per tablet TAKE 1 TABLET BY MOUTH EVERY 12 (TWELVE) HOURS FOR INFECTION WITH FOOD 28 tablet 1    aspirin  MG tablet Take 1 tablet by mouth Daily. (Patient taking differently: Take 1 tablet by mouth Daily. STATES INSTRUCTED TO HOLD ONE WEEK PRIOR TO SURGERY) 30 tablet 0    azithromycin (ZITHROMAX) 250 MG tablet Take 2 tablets the first day, then 1 tablet daily for 4 days for infection 6 tablet 1    Boswellia-Glucosamine-Vit D (OSTEO BI-FLEX ONE PER DAY PO) Take  by mouth.      busPIRone (BUSPAR) 10 MG tablet TAKE 1 TABLET TWICE DAILY AS NEEDED FOR ANXIETY 180 tablet 1    carvedilol (COREG) 12.5 MG tablet Take 1 tablet by mouth 2 (Two) Times a Day With Meals. 180 tablet 3    Cholecalciferol (VITAMIN D) 2000 UNITS tablet Take 1 tablet by mouth Every Evening.      clopidogrel (PLAVIX) 75 MG tablet TAKE 1 TABLET EVERY DAY 90 tablet 3    Cyanocobalamin (B-12) 1000 MCG sublingual tablet DISSOLVE 1 TABLET UNDER THE TONGUE EVERY DAY 90 tablet 3    cyclobenzaprine (FLEXERIL) 10 MG tablet Take 1 tablet by mouth 2 (Two) Times a Day. 270 tablet 3    docusate sodium (Stool Softener) 100 MG capsule       escitalopram (LEXAPRO) 20 MG tablet TAKE 1 TABLET EVERY NIGHT FOR ANXIETY 90 tablet 3    ezetimibe (ZETIA) 10 MG tablet Take 1 tablet by mouth Daily. 90 tablet 3    folic acid (FOLVITE) 1 MG tablet TAKE 1 TABLET  EVERY DAY 90 tablet 3    irbesartan (AVAPRO) 75 MG tablet Take 1 tablet by mouth Daily. for blood pressure 90 tablet 1    isosorbide mononitrate (IMDUR) 30 MG 24 hr tablet TAKE 1 TABLET EVERY DAY 90 tablet 3    levothyroxine (SYNTHROID, LEVOTHROID) 50 MCG tablet TAKE 1 TABLET EVERY DAY FOR THYROID BEFORE BREAKFAST (NEW DOSE) 90 tablet 3    meclizine (ANTIVERT) 25 MG tablet Take 1 tablet by mouth 3 (Three) Times a Day As Needed for Dizziness. 30 tablet 0    metFORMIN ER (GLUCOPHAGE-XR) 500 MG 24 hr tablet 2 TABLETS EVERY DAY ROUTINE FOR DIABETES 180 tablet 1    multivitamin with minerals tablet tablet Take 1 tablet by mouth Daily. HOLD X1 WEEK PRIOR TO SURGERY      Nirmatrelvir & Ritonavir, 300mg/100mg, (PAXLOVID) 20 x 150 MG & 10 x 100MG tablet therapy pack tablet Take 3 tablets by mouth 2 (Two) Times a Day. 30 tablet 0    nitroglycerin (NITROSTAT) 0.4 MG SL tablet Place 1 tablet under the tongue Every 5 (Five) Minutes As Needed for Chest Pain. Take no more than 3 doses in 15 minutes. 25 tablet 1    pantoprazole (PROTONIX) 40 MG EC tablet TAKE 1 TABLET EVERY DAY FOR GERD 90 tablet 1    predniSONE (DELTASONE) 20 MG tablet Take 1 tablet by mouth 2 (Two) Times a Day. With food for 5 days 10 tablet 0    pregabalin (LYRICA) 100 MG capsule TAKE 1 CAPSULE BY MOUTH THREE TIMES A DAY 90 capsule 1    rosuvastatin (CRESTOR) 10 MG tablet TAKE 1 TABLET EVERY NIGHT AT BEDTIME 90 tablet 3    traMADol (ULTRAM) 50 MG tablet Take 1 tablet by mouth Every 8 (Eight) Hours As Needed for Severe Pain. 90 tablet 1         PROCEDURES  Procedures            PROGRESS, DATA ANALYSIS, CONSULTS, AND MEDICAL DECISION MAKING  All labs have been independently interpreted by me.  All radiology studies have been reviewed by me. All EKG's have been independently viewed and interpreted by me.  Discussion below represents my analysis of pertinent findings related to patient's condition, differential diagnosis, treatment plan and final  disposition.    Differential diagnosis includes but is not limited to acute coronary syndrome, acute aortic syndrome, PE, pneumothorax, unstable angina.    Clinical Scores: HEART Score: 6                   ED Course as of 03/20/24 1942   Wed Mar 20, 2024   1837 EKG          EKG time: 1828  Rhythm/Rate: Normal sinus, rate 83  P waves and IN: Normal P, normal IN  QRS, axis: Narrow QRS, normal axis  ST and T waves: No acute    Independently Interpreted by me  Not significantly changed compared to prior 1/23/2021   [TR]   1905 Pain improved from a 7 down to a 3 with Nitropaste [TR]   1906 XR Chest 1 View  My independent interpretation of the imaging study is no pneumothorax [TR]   1917 HS Troponin T: 19 [TR]   1920 The patient has a history of coronary disease, concerning story, normal troponin.  Plan admission to the hospital for further cardiac evaluation. [TR]   1923 I reviewed the workup and findings with the patient and family at the bedside.  Answered all questions.  Plan admission for further evaluation.  They are agreeable. [TR]   1923 The ED observation unit is full.  Calling hospitalist for admission. [TR]   1942 Discussing with Dr. Guillaume with [facility].  He agrees to admit. [TR]      ED Course User Index  [TR] Laith Gregg MD             AS OF 19:42 EDT VITALS:    BP - 114/80  HR - 77  TEMP - 98.8 °F (37.1 °C) (Tympanic)  O2 SATS - 92%    COMPLEXITY OF CARE  The patient requires admission.      DIAGNOSIS  Final diagnoses:   Exertional chest pain   Coronary artery disease involving native heart, unspecified vessel or lesion type, unspecified whether angina present         DISPOSITION  ED Disposition       ED Disposition   Decision to Admit    Condition   --    Comment   Level of Care: Telemetry [5]   Diagnosis: Chest pain [750410]   Admitting Physician: CLIVE GUILLAUME [[id number]]   Attending Physician: CLIVE GUILLAUME [[id number]]   Bed Request Comments: 5 [address]                  Please note that portions of  this document were completed with a voice recognition program.    Note Disclaimer: At [facility], we believe that sharing information builds trust and better relationships. You are receiving this note because you recently visited [facility]. It is possible you will see health information before a provider has talked with you about it. This kind of information can be easy to misunderstand. To help you fully understand what it means for your health, we urge you to discuss this note with your provider.         Laith Gregg MD  03/20/24 1942

## 2024-03-20 NOTE — H&P
Patient Name:  Prem Thrasher  YOB: 1955  MRN:  [id number]  Admit Date:  3/20/2024  Patient Care Team:  Montse Cain PA-C as PCP - General (Family Medicine)  Sawyer Rick MD as Surgeon (Neurosurgery)  Pradip Mcmillan MD as Consulting Physician (Pulmonary Disease)  Jacky Perez MD as Surgeon (Orthopedic Surgery)  Charli Sen MD as Consulting Physician (Infectious Diseases)  Tray Moody MD as Consulting Physician (Urology)  Suman Richards DPM as Consulting Physician (Podiatry)  Porsha Arcos MD as Consulting Physician (General Surgery)  Dioni Salazar MD as Consulting Physician (Cardiology)  Ryan Louise MD (Rheumatology)      Subjective   History Present Illness     Chief Complaint   Patient presents with    Chest Pain     History of Present Illness  Mr. Thrasher is a 69 y.o. former smoker with a history of coronary artery disease status post drug-eluting stent, hypertension, HLD, COPD, anemia, hypothyroidism, rheumatoid arthritis, and non-insulin-dependent type 2 diabetes that presents to [facility] complaining of chest pain worse with exertion.  Patient reports the symptoms have been ongoing for the last 2 weeks.  Today he had some chest pain this afternoon that radiated to his arm in addition to his shortness of breath.  He has taken 2 nitroglycerin at home with relief prior to coming to the emergency department.  Labs obtained showed troponin of 19.  He has been admitted to our service for further observation.    Review of Systems   Constitutional:  Negative for chills and fever.   HENT:  Negative for congestion and rhinorrhea.    Eyes:  Negative for photophobia and visual disturbance.   Respiratory:  Positive for shortness of breath. Negative for cough.    Cardiovascular:  Positive for chest pain. Negative for palpitations.   Gastrointestinal:  Negative for constipation, diarrhea, nausea and vomiting.   Endocrine: Negative for  cold intolerance and heat intolerance.   Genitourinary:  Negative for difficulty urinating and dysuria.   Musculoskeletal:  Negative for gait problem and joint swelling.   Skin:  Negative for rash and wound.   Neurological:  Negative for dizziness, light-headedness and headaches.   Psychiatric/Behavioral:  Negative for sleep disturbance and suicidal ideas.         Personal History     Past Medical History:   Diagnosis Date    Achilles tendon pain     LEFT    Allergic     Anxiety disorder     Asthma     When I get stressed out or try to do something I get short of breath    CAD (coronary artery disease)     Cervical disc disorder     Chronic anticoagulation     PLAVIX-CARDIAC STENT X3    Chronic lower back pain     Chronic pain disorder     COPD (chronic obstructive pulmonary disease)     CTS (carpal tunnel syndrome)     Deep vein thrombosis     A    Depression     Diabetes mellitus 03/12/2023    Type two diabetes    Disease of thyroid gland     Diverticulitis of colon     Encounter for eye exam 10/2014    normal    Exertional chest pain     Extremity pain     GERD (gastroesophageal reflux disease)     Headache     History of bone density study 03/2014    Dr. Maria Antonia Lopez; normal    History of chest x-ray 02/15/2011    also 3-6-15; normal chest    History of colon polyps     History of nuclear stress test 03/09/2015    cardiolite; Dr. Greenberg; negative    History of pulmonary function tests 03/2015    COPD    Hyperlipidemia     Hypertension     Joint pain     Low back strain     Lumbosacral disc disease     Migraine     Neck pain     Nerve damage     KAYLYNN ELBOWS    NSTEMI (non-ST elevated myocardial infarction) 01/2021    Obesity     Osteoarthritis, multiple sites     Pneumonia     Postoperative nausea and vomiting 08/01/2017    Postoperative wound infection     Rheumatoid arthritis     Rotator cuff syndrome     Sciatica     Short of breath on exertion     Sleep apnea     no cpap    Spinal stenosis     Staph infection      WITH BACK SURGERY 2017  ON LONG TERM ANTIBIOTICS    Thoracic disc disorder     Unstable angina     Unsteady gait     D/T LOWER BACK    Visual impairment     Fill like my vision has changed sense I got my glasses     Past Surgical History:   Procedure Laterality Date    ACHILLES TENDON SURGERY Left 07/03/2018    Procedure: Left Achilles debridement and secondary repair with partial excision of calcaneus. Possible left Achilles lengthening at the calf;  Surgeon: Vinay Smith MD;  Location: [facility] OR [facility] [place];  Service: Orthopedics    BACK SURGERY  10/2017    CARDIAC CATHETERIZATION N/A 03/07/2020    Procedure: Left Heart Cath;  Surgeon: Dioni Salazar MD;  Location: [facility] CATH INVASIVE LOCATION;  Service: Cardiology;  Laterality: N/A;    CARDIAC CATHETERIZATION N/A 03/07/2020    Procedure: Coronary angiography;  Surgeon: Dioni Salazar MD;  Location: [facility] CATH INVASIVE LOCATION;  Service: Cardiology;  Laterality: N/A;    CARDIAC CATHETERIZATION N/A 03/07/2020    Procedure: Left ventriculography;  Surgeon: Dioni Salazar MD;  Location: [facility] INVASIVE LOCATION;  Service: Cardiology;  Laterality: N/A;    CARDIAC CATHETERIZATION N/A 03/07/2020    Procedure: Stent RAZA coronary;  Surgeon: Dioni Salazar MD;  Location: [facility] CATH INVASIVE LOCATION;  Service: Cardiology;  Laterality: N/A;    CARDIAC CATHETERIZATION N/A 01/22/2021    Procedure: Left Heart Cath;  Surgeon: Dioni Salazar MD;  Location: [facility] CATH INVASIVE LOCATION;  Service: Cardiology;  Laterality: N/A;    CARDIAC CATHETERIZATION N/A 01/22/2021    Procedure: Coronary angiography;  Surgeon: Dioni Salazar MD;  Location: [facility] CATH INVASIVE LOCATION;  Service: Cardiology;  Laterality: N/A;    CARDIAC CATHETERIZATION N/A 01/22/2021    Procedure: Left ventriculography;  Surgeon: Dioni Salazar MD;  Location: [facility] CATH INVASIVE LOCATION;  Service: Cardiology;  Laterality: N/A;    CARDIAC  CATHETERIZATION N/A 01/22/2021    Procedure: Percutaneous Coronary Intervention;  Surgeon: Dioni Salazar MD;  Location: [facility] CATH INVASIVE LOCATION;  Service: Cardiology;  Laterality: N/A;    CARDIAC CATHETERIZATION N/A 01/22/2021    Procedure: Stent RAZA coronary;  Surgeon: Dioni Salazar MD;  [facility];  Service: Cardiology;  Laterality: N/A;    CARDIAC SURGERY      3 stents total    COLONOSCOPY N/A 02/02/2011    Normal colon except scattered diverticulosis-Dr. Guero Blunt    COLONOSCOPY N/A 04/08/2021    Procedure: COLONOSCOPY TO CECUM WITH POLYPECTOMY (COLD BX);  Surgeon: Porsha Arcos MD;  Location: [facility] ENDOSCOPY;  Service: General;  Laterality: N/A;  SCREENING; HX OF COLON POLYPS  POST:DIVERTICULOSIS; COLON POLYP    CORONARY STENT PLACEMENT      CYST REMOVAL  03/2016    x2  FROM FACE AND EAR    DENTAL PROCEDURE  2008    teeth removed; dental implants    EPIDURAL BLOCK  1995    L/S spine    FINGER DEBRIDEMENT Right 07/12/2003    Debridement and full thickness skin grafting of the ring and small fingers of the right hand-Dr. Rayray Long    FOOT SURGERY      HAND SURGERY  2003    INCISION AND DRAINAGE PERIRECTAL ABSCESS N/A 07/10/2018    Procedure: INCISION AND DRAINAGE OF PERIRECTAL ABSCESS;  Surgeon: Porsha Arcos MD;  Location: [facility] OR;  Service: General    INCISION AND DRAINAGE TRUNK N/A 04/23/2022    Procedure: Evacuation lumbar hematoma;  Surgeon: Jacky Perez MD;  Location: [facility] OR;  Service: Orthopedics;  Laterality: N/A;    JOINT REPLACEMENT      LUMBAR DISCECTOMY FUSION INSTRUMENTATION Left 10/10/2016    Procedure: LEFT L2-L3, L3-L4 LUMBAR LAMINECTOMY/ DISCECTOMY WITH METRIX ;  Surgeon: Sawyer Rick MD;  Location: [facility] MAIN OR;  Service:     LUMBAR DISCECTOMY FUSION INSTRUMENTATION N/A 07/31/2017    Procedure: LUMBAR FUSION DECOMPRESSON WITH PEDICLE SCREWS with LAURA L2-3,L3-4;  Surgeon: Jacky Perez MD;   [facility];  Service:     LUMBAR FUSION N/A 04/13/2022    Procedure: Thoracic 10-thoracic 11, thoracic 11-thoracic 12, thoracic 12-lumbar 1, lumbar 1-lumbar 2, lumbar 2-lumbar 3, lumbar 3-lumbar 4 fusion with instrumentation with iliac crest bone graft, and removal of implants from lumbar 2-lumbar 3, lumbar 3-lumbar 4;  Surgeon: Jacky Perez MD;  Location: [facility] MAIN OR;  Service: Orthopedic Spine;  Laterality: N/A;    LUMBAR FUSION N/A 04/13/2022    Procedure: LUMBAR ANTERIOR INTERBODY FUSION L4,L5 Osteotomy interbody fusion with cage L1,L2;  Surgeon: Jacky Perez MD;  Location: [facility] MAIN OR;  Service: Orthopedic Spine;  Laterality: N/A;    LUMBAR LAMINECTOMY DISCECTOMY DECOMPRESSION N/A 07/31/2017    Procedure: L2 to L4 laminectomy with neural lysis and a fusion by orthopedics;  Surgeon: Sawyer Rick MD;  Location: [facility] MAIN OR;  Service:     LUMBAR LAMINECTOMY DISCECTOMY DECOMPRESSION N/A 09/05/2017    Procedure: I&D LUMBAR SPINE ;  Surgeon: Jacky Perez MD;  Location: [facility] MAIN OR;  Service:     ORTHOPEDIC SURGERY      SHOULDER SURGERY Right 02/23/2011    Dr. Navarro    SINUS SURGERY  2003    Dr. Barrera    SPINAL FUSION      SPINE SURGERY  2010    TOTAL SHOULDER REPLACEMENT Right 09/07/2023    TRIGGER POINT INJECTION       Family History   Problem Relation Age of Onset    Diabetes Mother     Heart disease Mother     Hyperlipidemia Mother     Stroke Mother     Heart disease Father     Rheum arthritis Father     Alcohol abuse Father     Hyperlipidemia Father     Depression Brother     Cancer Brother         prostate    Depression Brother     Heart disease Brother     Hyperlipidemia Brother     Cancer Brother         Haert desease    Thyroid disease Sister     Arthritis Sister     Asthma Son     Malig Hyperthermia Neg Hx      Social History     Tobacco Use    Smoking status: Former     Current packs/day: 0.00     Average packs/day: 1 pack/day for 50.0 years (50.0 ttl pk-yrs)      Types: Cigarettes     Start date: 10/20/1971     Quit date: 10/20/2021     Years since quittin.4    Smokeless tobacco: Never    Tobacco comments:     Current status is non smoker   Vaping Use    Vaping status: Never Used   Substance Use Topics    Alcohol use: No    Drug use: No     No current facility-administered medications on file prior to encounter.     Current Outpatient Medications on File Prior to Encounter   Medication Sig Dispense Refill    amLODIPine (NORVASC) 10 MG tablet Take 0.5 tablets by mouth Daily. for blood pressure      amoxicillin-clavulanate (AUGMENTIN) 875-125 MG per tablet TAKE 1 TABLET BY MOUTH EVERY 12 (TWELVE) HOURS FOR INFECTION WITH FOOD 28 tablet 1    aspirin  MG tablet Take 1 tablet by mouth Daily. (Patient taking differently: Take 1 tablet by mouth Daily. STATES INSTRUCTED TO HOLD ONE WEEK PRIOR TO SURGERY) 30 tablet 0    azithromycin (ZITHROMAX) 250 MG tablet Take 2 tablets the first day, then 1 tablet daily for 4 days for infection 6 tablet 1    Boswellia-Glucosamine-Vit D (OSTEO BI-FLEX ONE PER DAY PO) Take  by mouth.      busPIRone (BUSPAR) 10 MG tablet TAKE 1 TABLET TWICE DAILY AS NEEDED FOR ANXIETY 180 tablet 1    carvedilol (COREG) 12.5 MG tablet Take 1 tablet by mouth 2 (Two) Times a Day With Meals. 180 tablet 3    Cholecalciferol (VITAMIN D) 2000 UNITS tablet Take 1 tablet by mouth Every Evening.      clopidogrel (PLAVIX) 75 MG tablet TAKE 1 TABLET EVERY DAY 90 tablet 3    Cyanocobalamin (B-12) 1000 MCG sublingual tablet DISSOLVE 1 TABLET UNDER THE TONGUE EVERY DAY 90 tablet 3    cyclobenzaprine (FLEXERIL) 10 MG tablet Take 1 tablet by mouth 2 (Two) Times a Day. 270 tablet 3    docusate sodium (Stool Softener) 100 MG capsule       escitalopram (LEXAPRO) 20 MG tablet TAKE 1 TABLET EVERY NIGHT FOR ANXIETY 90 tablet 3    ezetimibe (ZETIA) 10 MG tablet Take 1 tablet by mouth Daily. 90 tablet 3    folic acid (FOLVITE) 1 MG tablet TAKE 1 TABLET EVERY DAY 90 tablet 3     "irbesartan (AVAPRO) 75 MG tablet Take 1 tablet by mouth Daily. for blood pressure 90 tablet 1    isosorbide mononitrate (IMDUR) 30 MG 24 hr tablet TAKE 1 TABLET EVERY DAY 90 tablet 3    levothyroxine (SYNTHROID, LEVOTHROID) 50 MCG tablet TAKE 1 TABLET EVERY DAY FOR THYROID BEFORE BREAKFAST (NEW DOSE) 90 tablet 3    meclizine (ANTIVERT) 25 MG tablet Take 1 tablet by mouth 3 (Three) Times a Day As Needed for Dizziness. 30 tablet 0    metFORMIN ER (GLUCOPHAGE-XR) 500 MG 24 hr tablet 2 TABLETS EVERY DAY ROUTINE FOR DIABETES 180 tablet 1    multivitamin with minerals tablet tablet Take 1 tablet by mouth Daily. HOLD X1 WEEK PRIOR TO SURGERY      Nirmatrelvir & Ritonavir, 300mg/100mg, (PAXLOVID) 20 x 150 MG & 10 x 100MG tablet therapy pack tablet Take 3 tablets by mouth 2 (Two) Times a Day. 30 tablet 0    nitroglycerin (NITROSTAT) 0.4 MG SL tablet Place 1 tablet under the tongue Every 5 (Five) Minutes As Needed for Chest Pain. Take no more than 3 doses in 15 minutes. 25 tablet 1    pantoprazole (PROTONIX) 40 MG EC tablet TAKE 1 TABLET EVERY DAY FOR GERD 90 tablet 1    predniSONE (DELTASONE) 20 MG tablet Take 1 tablet by mouth 2 (Two) Times a Day. With food for 5 days 10 tablet 0    pregabalin (LYRICA) 100 MG capsule TAKE 1 CAPSULE BY MOUTH THREE TIMES A DAY 90 capsule 1    rosuvastatin (CRESTOR) 10 MG tablet TAKE 1 TABLET EVERY NIGHT AT BEDTIME 90 tablet 3    traMADol (ULTRAM) 50 MG tablet Take 1 tablet by mouth Every 8 (Eight) Hours As Needed for Severe Pain. 90 tablet 1     Allergies   Allergen Reactions    Atorvastatin Other (See Comments) and Myalgia     Leg cramps    Ceclor [Cefaclor] Rash     Patient tolerated nafcillin during 9/2017 admission and has tolerated Augmentin in past outpatient.     Methotrexate Derivatives Rash     \"Blisters\"       Objective    Objective     Vital Signs  Temp:  [98.8 °F (37.1 °C)] 98.8 °F (37.1 °C)  Heart Rate:  [77-93] 77  BP: (114-126)/(80-82) 114/80  SpO2:  [91 %-94 %] 92 %  on   ; "   Device (Oxygen Therapy): room air  There is no height or weight on file to calculate BMI.    Physical Exam  Vitals and nursing note reviewed.   Constitutional:       General: He is not in acute distress.     Appearance: He is well-developed. He is not toxic-appearing.   HENT:      Head: Normocephalic and atraumatic.   Eyes:      General: No scleral icterus.        Right eye: No discharge.         Left eye: No discharge.      Conjunctiva/sclera: Conjunctivae normal.   Neck:      Vascular: No JVD.   Cardiovascular:      Rate and Rhythm: Normal rate and regular rhythm.      Heart sounds: Normal heart sounds. No murmur heard.     No friction rub. No gallop.   Pulmonary:      Effort: Pulmonary effort is normal. No respiratory distress.   Abdominal:      General: Bowel sounds are normal. There is no distension.      Palpations: Abdomen is soft.      Tenderness: There is no abdominal tenderness. There is no guarding.   Musculoskeletal:         General: No tenderness or deformity. Normal range of motion.      Cervical back: Normal range of motion and neck supple.   Skin:     General: Skin is warm and dry.      Capillary Refill: Capillary refill takes less than 2 seconds.   Neurological:      Mental Status: He is alert and oriented to person, place, and time.   Psychiatric:         Behavior: Behavior normal.     Results Review:  I reviewed the patient's new clinical results.  I reviewed the patient's new imaging results and agree with the interpretation.  I reviewed the patient's other test results and agree with the interpretation  I personally viewed and interpreted the patient's EKG/Telemetry data    Lab Results (last 24 hours)       Procedure Component Value Units Date/Time    CBC & Differential [[id number]]  (Abnormal) Collected: 03/20/24 1836    Specimen: Blood Updated: 03/20/24 1851    Narrative:      The following orders were created for panel order CBC & Differential.  Procedure                                Abnormality         Status                     ---------                               -----------         ------                     CBC Auto Differential[[id number]]        Abnormal            Final result                 Please view results for these tests on the individual orders.    Comprehensive Metabolic Panel [[id number]]  (Abnormal) Collected: 03/20/24 1836    Specimen: Blood Updated: 03/20/24 1917     Glucose 116 mg/dL      BUN 19 mg/dL      Creatinine 1.20 mg/dL      Sodium 134 mmol/L      Potassium 4.3 mmol/L      Chloride 99 mmol/L      CO2 24.0 mmol/L      Calcium 9.1 mg/dL      Total Protein 6.8 g/dL      Albumin 4.3 g/dL      ALT (SGPT) 21 U/L      AST (SGOT) 16 U/L      Alkaline Phosphatase 67 U/L      Total Bilirubin 0.5 mg/dL      Globulin 2.5 gm/dL      A/G Ratio 1.7 g/dL      BUN/Creatinine Ratio 15.8     Anion Gap 11.0 mmol/L      eGFR 65.1 mL/min/1.73     Narrative:      GFR Normal >60  Chronic Kidney Disease <60  Kidney Failure <15      High Sensitivity Troponin T [[id number]]  (Normal) Collected: 03/20/24 1836    Specimen: Blood Updated: 03/20/24 1917     HS Troponin T 19 ng/L     Narrative:      High Sensitive Troponin T Reference Range:  <14.0 ng/L- Negative Female for AMI  <22.0 ng/L- Negative Male for AMI  >=14 - Abnormal Female indicating possible myocardial injury.  >=22 - Abnormal Male indicating possible myocardial injury.   Clinicians would have to utilize clinical acumen, EKG, Troponin, and serial changes to determine if it is an Acute Myocardial Infarction or myocardial injury due to an underlying chronic condition.         CBC Auto Differential [[id number]]  (Abnormal) Collected: 03/20/24 1836    Specimen: Blood Updated: 03/20/24 1851     WBC 9.66 10*3/mm3      RBC 4.58 10*6/mm3      Hemoglobin 12.8 g/dL      Hematocrit 39.5 %      MCV 86.2 fL      MCH 27.9 pg      MCHC 32.4 g/dL      RDW 14.4 %      RDW-SD 45.8 fl      MPV 9.2 fL      Platelets 298 10*3/mm3      Neutrophil % 73.1 %       Lymphocyte % 17.9 %      Monocyte % 6.5 %      Eosinophil % 1.7 %      Basophil % 0.4 %      Immature Grans % 0.4 %      Neutrophils, Absolute 7.06 10*3/mm3      Lymphocytes, Absolute 1.73 10*3/mm3      Monocytes, Absolute 0.63 10*3/mm3      Eosinophils, Absolute 0.16 10*3/mm3      Basophils, Absolute 0.04 10*3/mm3      Immature Grans, Absolute 0.04 10*3/mm3      nRBC 0.0 /100 WBC             Imaging Results (Last 24 Hours)       Procedure Component Value Units Date/Time    XR Chest 1 View [[id number]] Collected: 03/20/24 1856     Updated: 03/20/24 1901    Narrative:      Emergency portable view of the chest on 3/20/2024     CLINICAL HISTORY: Chest pain     This correlated to a prior chest x-ray on 1/21/2021.     FINDINGS: There is a reverse right total shoulder arthroplasty  prosthesis in place is only partially visualized on this exam. The  cardiomediastinal silhouette and the pulmonary vasculature are within  normal limits. The lungs are clear. The costophrenic angles are sharp.       Impression:      1. No active disease is seen in the chest and the etiology of this  patient's chest pain is not established on this exam.     2. There is a reverse right total shoulder arthroplasty in place that is  only partially visualized on this exam.     This report was finalized on 3/20/2024 6:58 PM by Dr. Waldemar Khanna M.D  on Workstation: [id number]               Results for orders placed during the hospital encounter of 03/07/20    Echocardiogram 2D Complete    Interpretation Summary  · Left ventricular systolic function is normal.Calculated EF = 59.0%. Normal left ventricular cavity size and wall thickness noted. All left ventricular wall segments contract normally.  · Normal right ventricular cavity size, wall thickness, systolic function  · No significant valvular stenosis or regurgitation noted.      ECG 12 Lead ED Triage Standing Order; Chest Pain   Preliminary Result   HEART RATE= 83  bpm   RR Interval= 723  ms   MD  Interval= 295  ms   P Horizontal Axis= -3  deg   P Front Axis= 44  deg   QRSD Interval= 102  ms   QT Interval= 362  ms   QTcB= 426  ms   QRS Axis= -2  deg   T Wave Axis= 74  deg   - ABNORMAL ECG -   Sinus rhythm   Prolonged MT interval   Low voltage, extremity and precordial leads   Electronically Signed By:    Date and Time of Study: 2024-03-20 18:28:24           Assessment/Plan     Active Hospital Problems    Diagnosis  POA    **Chest pain [R07.9]  Yes    Type 2 diabetes mellitus without complication, without long-term current use of insulin [E11.9]  Yes    Hypothyroidism, acquired [E03.9]  Yes    CAD S/P percutaneous coronary angioplasty [I25.10, Z98.61]  Not Applicable    Anemia [D64.9]  Yes    COPD (chronic obstructive pulmonary disease) [J44.9]  Yes    Hyperlipidemia [E78.5]  Yes    Hypertension [I10]  Yes    Rheumatoid arthritis [M06.9]  Yes      Resolved Hospital Problems   No resolved problems to display.       Mr. Thrasher is a 69 y.o. former smoker with a history of coronary artery disease status post drug-eluting stent, hypertension, HLD, COPD, anemia, hypothyroidism, rheumatoid arthritis, and non-insulin-dependent type 2 diabetes who presents with chest pain.    Chest pain  CAD status post drug-eluting stent  Hyperlipidemia  -Received aspirin in the ED.  Troponin currently 19.  Will repeat in 6 hours.  -N.p.o. after midnight  -Cardiology consult  -Continue Nitropaste to chest  -Continue statin therapy    COPD  -Compensating, resume home nebulizer     Type 2 diabetes mellitus  -Accu-Cheks 4 times a day with meals and at bedtime  -Moderate dose correctional factor with hypoglycemic protocol  -Check hemoglobin A1c  -Hold oral diabetic medication    Essential hypertension  -Stable, resume home regimen once MAR has been reconciled      I discussed the patient's findings and my recommendations with patient and Dr. Haynes .    VTE Prophylaxis - SCDs.  Code Status - Full code.       Ayesha Moody,  TEN  [facility]  03/20/24  19:45 EDT

## 2024-03-20 NOTE — Clinical Note
First balloon inflation max pressure = 8 anita. First balloon inflation duration = 6 seconds. Second inflation of balloon - Max pressure = 8 anita. 2nd Inflation of balloon - Duration = 6 seconds. 2nd inflation was done at 13:24 EDT. Third inflation of balloon - Max pressure = 8 anita. 3rd Inflation of balloon - Duration = 6 seconds. 3rd inflation was done at 13:25 EDT.

## 2024-03-20 NOTE — NURSING NOTE
Nursing report ED to floor  Prem Thrasher  69 y.o.  male    HPI :  HPI (Adult)  Stated Reason for Visit: chest pain, soa    Chief Complaint  Chief Complaint   Patient presents with    Chest Pain       Admitting doctor:   Rhonda Haynes MD    Admitting diagnosis:   The primary encounter diagnosis was Exertional chest pain. A diagnosis of Coronary artery disease involving native heart, unspecified vessel or lesion type, unspecified whether angina present was also pertinent to this visit.    Code status:   Current Code Status       Date Active Code Status Order ID Comments User Context       3/20/2024 1943 CPR (Attempt to Resuscitate) [id number]  Ayesha Moody, [facility]        Question Answer    Code Status (Patient has no pulse and is not breathing) CPR (Attempt to Resuscitate)    Medical Interventions (Patient has pulse or is breathing) Full Support                    Allergies:   Atorvastatin, Ceclor [cefaclor], and Methotrexate derivatives    Isolation:   No active isolations    Intake and Output  No intake or output data in the 24 hours ending 03/1955    Weight:   There were no vitals filed for this visit.    Most recent vitals:   Vitals:    03/20/24 1822 03/20/24 1848 03/20/24 1852 03/20/24 1901   BP:   126/82 114/80   Pulse: 93 77 79 77   Temp: 98.8 °F (37.1 °C)      TempSrc: Tympanic      SpO2: 94%  91% 92%       Active LDAs/IV Access:   Lines, Drains & Airways       Active LDAs       Name Placement date Placement time Site Days    Peripheral IV Left;Posterior Hand --  --  Hand  --    Closed/Suction Drain Inferior Back Accordion 10 Fr. 04/23/22  1106  Back  697                    Labs (abnormal labs have a star):   Labs Reviewed   COMPREHENSIVE METABOLIC PANEL - Abnormal; Notable for the following components:       Result Value    Glucose 116 (*)     Sodium 134 (*)     All other components within normal limits    Narrative:     GFR Normal >60  Chronic Kidney Disease <60  Kidney Failure <15      CBC WITH AUTO DIFFERENTIAL - Abnormal; Notable for the following components:    Hemoglobin 12.8 (*)     Lymphocyte % 17.9 (*)     Neutrophils, Absolute 7.06 (*)     All other components within normal limits   TROPONIN - Normal    Narrative:     High Sensitive Troponin T Reference Range:  <14.0 ng/L- Negative Female for AMI  <22.0 ng/L- Negative Male for AMI  >=14 - Abnormal Female indicating possible myocardial injury.  >=22 - Abnormal Male indicating possible myocardial injury.   Clinicians would have to utilize clinical acumen, EKG, Troponin, and serial changes to determine if it is an Acute Myocardial Infarction or myocardial injury due to an underlying chronic condition.        RAINBOW DRAW    Narrative:     The following orders were created for panel order [place] Draw.  Procedure                               Abnormality         Status                     ---------                               -----------         ------                     Green Top (Gel)[[id number]]                                  Final result               Lavender Top[[id number]]                                     Final result               Gold Top - SST[[id number]]                                   Final result               Light Blue Top[[id number]]                                   Final result                 Please view results for these tests on the individual orders.   HIGH SENSITIVITIY TROPONIN T 2HR   TROPONIN   CBC AND DIFFERENTIAL    Narrative:     The following orders were created for panel order CBC & Differential.  Procedure                               Abnormality         Status                     ---------                               -----------         ------                     CBC Auto Differential[[id number]]        Abnormal            Final result                 Please view results for these tests on the individual orders.   GREEN TOP   LAVENDER TOP   GOLD TOP - SST   LIGHT BLUE TOP       EKG:   ECG 12 Lead ED  Triage Standing Order; Chest Pain   Preliminary Result   HEART RATE= 83  bpm   RR Interval= 723  ms   NJ Interval= 295  ms   P Horizontal Axis= -3  deg   P Front Axis= 44  deg   QRSD Interval= 102  ms   QT Interval= 362  ms   QTcB= 426  ms   QRS Axis= -2  deg   T Wave Axis= 74  deg   - ABNORMAL ECG -   Sinus rhythm   Prolonged NJ interval   Low voltage, extremity and precordial leads   Electronically Signed By:    Date and Time of Study: 2024-03-20 18:28:24          Meds given in ED:   Medications   sodium chloride 0.9 % flush 10 mL (has no administration in time range)   sodium chloride 0.9 % flush 10 mL (has no administration in time range)   nitroglycerin (NITROSTAT) SL tablet 0.4 mg (has no administration in time range)   acetaminophen (TYLENOL) tablet 650 mg (has no administration in time range)   sennosides-docusate (PERICOLACE) 8.6-50 MG per tablet 2 tablet (has no administration in time range)     And   polyethylene glycol (MIRALAX) packet 17 g (has no administration in time range)     And   bisacodyl (DULCOLAX) EC tablet 5 mg (has no administration in time range)     And   bisacodyl (DULCOLAX) suppository 10 mg (has no administration in time range)   ondansetron ODT (ZOFRAN-ODT) disintegrating tablet 4 mg (has no administration in time range)     Or   ondansetron (ZOFRAN) injection 4 mg (has no administration in time range)   aluminum-magnesium hydroxide-simethicone (MAALOX MAX) 400-400-40 MG/5ML suspension 15 mL (has no administration in time range)   aspirin tablet 325 mg (325 mg Oral Given 3/20/24 1853)   nitroglycerin (NITROSTAT) ointment 1 inch (1 inch Topical Given 3/20/24 1853)       Imaging results:  XR Chest 1 View    Result Date: 3/20/2024  1. No active disease is seen in the chest and the etiology of this patient's chest pain is not established on this exam.  2. There is a reverse right total shoulder arthroplasty in place that is only partially visualized on this exam.  This report was finalized  on 3/20/2024 6:58 PM by Dr. Waldemar Khanna M.D on Workstation: [email or web address]       Ambulatory status:   - br    Social issues:   Social History     Socioeconomic History    Marital status:    Tobacco Use    Smoking status: Former     Current packs/day: 0.00     Average packs/day: 1 pack/day for 50.0 years (50.0 ttl pk-yrs)     Types: Cigarettes     Start date: 10/20/1971     Quit date: 10/20/2021     Years since quittin.4    Smokeless tobacco: Never    Tobacco comments:     Current status is non smoker   Vaping Use    Vaping status: Never Used   Substance and Sexual Activity    Alcohol use: No    Drug use: No    Sexual activity: Not Currently     Partners: Female     Birth control/protection: None       Peripheral Neurovascular  Peripheral Neurovascular (Adult)  Peripheral Neurovascular WDL: WDL    Neuro Cognitive  Neuro Cognitive (Adult)  Cognitive/Neuro/Behavioral WDL: WDL    Learning  Learning Assessment (Adult)  Learning Readiness and Ability: no barriers identified    Respiratory  Respiratory WDL  Respiratory WDL: .WDL except, all  Rhythm/Pattern, Respiratory: shortness of breath    Abdominal Pain       Pain Assessments  Pain (Adult)  (0-10) Pain Rating: Rest: 7  (0-10) Pain Rating: Activity: 7  Pain Location: chest  Pain Side/Orientation: left    NIH Stroke Scale       Matheus Hayward RN  24 19:54 EDT

## 2024-03-20 NOTE — Clinical Note
Hemostasis started on the right radial artery. R-Band was used in achieving hemostasis. Radial compression device applied to vessel. Hemostasis achieved successfully. Closure device additional comment: 14cc air

## 2024-03-20 NOTE — ED TRIAGE NOTES
Patient reported chest pain that started this afternoon, along with arm pain and some soa. Patient reported he took 2 nitro pta to er.

## 2024-03-21 ENCOUNTER — APPOINTMENT (OUTPATIENT)
Dept: NUCLEAR MEDICINE | Facility: HOSPITAL | Age: 69
End: 2024-03-21
Payer: MEDICARE

## 2024-03-21 ENCOUNTER — APPOINTMENT (OUTPATIENT)
Dept: CARDIOLOGY | Facility: HOSPITAL | Age: 69
End: 2024-03-21
Payer: MEDICARE

## 2024-03-21 LAB
ANION GAP SERPL CALCULATED.3IONS-SCNC: 11.6 MMOL/L (ref 5–15)
AORTIC DIMENSIONLESS INDEX: 0.8 (DI)
ASCENDING AORTA: 3.2 CM
BH CV ECHO MEAS - ACS: 2.04 CM
BH CV ECHO MEAS - AO MEAN PG: 2.48 MMHG
BH CV ECHO MEAS - AO ROOT DIAM: 3.2 CM
BH CV ECHO MEAS - AO V2 VTI: 20.8 CM
BH CV ECHO MEAS - AVA(I,D): 2.8 CM2
BH CV ECHO MEAS - EDV(CUBED): 110.7 ML
BH CV ECHO MEAS - EDV(MOD-SP2): 114 ML
BH CV ECHO MEAS - EDV(MOD-SP4): 135 ML
BH CV ECHO MEAS - EF(MOD-BP): 59 %
BH CV ECHO MEAS - EF(MOD-SP2): 59.6 %
BH CV ECHO MEAS - EF(MOD-SP4): 57.8 %
BH CV ECHO MEAS - ESV(CUBED): 35.6 ML
BH CV ECHO MEAS - ESV(MOD-SP2): 46 ML
BH CV ECHO MEAS - ESV(MOD-SP4): 57 ML
BH CV ECHO MEAS - FS: 31.5 %
BH CV ECHO MEAS - IVS/LVPW: 1.12 CM
BH CV ECHO MEAS - IVSD: 1.41 CM
BH CV ECHO MEAS - LV MASS(C)D: 255.6 GRAMS
BH CV ECHO MEAS - LV MEAN PG: 1.66 MMHG
BH CV ECHO MEAS - LV V1 VTI: 17.9 CM
BH CV ECHO MEAS - LVIDD: 4.8 CM
BH CV ECHO MEAS - LVIDS: 3.3 CM
BH CV ECHO MEAS - LVOT AREA: 3.2 CM2
BH CV ECHO MEAS - LVOT DIAM: 2.03 CM
BH CV ECHO MEAS - LVPWD: 1.26 CM
BH CV ECHO MEAS - MR MAX PG: 23.4 MMHG
BH CV ECHO MEAS - MR MAX VEL: 241.6 CM/SEC
BH CV ECHO MEAS - MV A DUR: 0.18 SEC
BH CV ECHO MEAS - MV A MAX VEL: 93.3 CM/SEC
BH CV ECHO MEAS - MV DEC TIME: 0.17 SEC
BH CV ECHO MEAS - MV E MAX VEL: 67.1 CM/SEC
BH CV ECHO MEAS - MV E/A: 0.72
BH CV ECHO MEAS - PA ACC SLOPE: 0 CM/SEC2
BH CV ECHO MEAS - PA ACC TIME: 0.1 SEC
BH CV ECHO MEAS - PA V2 MAX: 97.7 CM/SEC
BH CV ECHO MEAS - PULM A REVS DUR: 0.11 SEC
BH CV ECHO MEAS - PULM A REVS VEL: 34.7 CM/SEC
BH CV ECHO MEAS - PULM DIAS VEL: 33.9 CM/SEC
BH CV ECHO MEAS - PULM S/D: 0.89
BH CV ECHO MEAS - PULM SYS VEL: 30.1 CM/SEC
BH CV ECHO MEAS - QP/QS: 1.06
BH CV ECHO MEAS - RAP SYSTOLE: 3 MMHG
BH CV ECHO MEAS - RV MAX PG: 1.91 MMHG
BH CV ECHO MEAS - RV V1 MAX: 69.1 CM/SEC
BH CV ECHO MEAS - RV V1 VTI: 15.4 CM
BH CV ECHO MEAS - RVOT DIAM: 2.26 CM
BH CV ECHO MEAS - RVSP: 16 MMHG
BH CV ECHO MEAS - SV(LVOT): 58.1 ML
BH CV ECHO MEAS - SV(MOD-SP2): 68 ML
BH CV ECHO MEAS - SV(MOD-SP4): 78 ML
BH CV ECHO MEAS - SV(RVOT): 61.8 ML
BH CV ECHO MEAS - TR MAX PG: 12.8 MMHG
BH CV ECHO MEAS - TR MAX VEL: 179.2 CM/SEC
BH CV REST NUCLEAR ISOTOPE DOSE: 11.8 MCI
BH CV STRESS NUCLEAR ISOTOPE DOSE: 32 MCI
BUN SERPL-MCNC: 19 MG/DL (ref 8–23)
BUN/CREAT SERPL: 20 (ref 7–25)
CALCIUM SPEC-SCNC: 9.3 MG/DL (ref 8.6–10.5)
CHLORIDE SERPL-SCNC: 102 MMOL/L (ref 98–107)
CHOLEST SERPL-MCNC: 89 MG/DL (ref 0–200)
CO2 SERPL-SCNC: 22.4 MMOL/L (ref 22–29)
CREAT SERPL-MCNC: 0.95 MG/DL (ref 0.76–1.27)
DEPRECATED RDW RBC AUTO: 43.6 FL (ref 37–54)
EGFRCR SERPLBLD CKD-EPI 2021: 86.1 ML/MIN/1.73
ERYTHROCYTE [DISTWIDTH] IN BLOOD BY AUTOMATED COUNT: 14 % (ref 12.3–15.4)
GLUCOSE BLDC GLUCOMTR-MCNC: 109 MG/DL (ref 70–130)
GLUCOSE BLDC GLUCOMTR-MCNC: 109 MG/DL (ref 70–130)
GLUCOSE BLDC GLUCOMTR-MCNC: 112 MG/DL (ref 70–130)
GLUCOSE BLDC GLUCOMTR-MCNC: 92 MG/DL (ref 70–130)
GLUCOSE SERPL-MCNC: 94 MG/DL (ref 65–99)
HBA1C MFR BLD: 6.5 % (ref 4.8–5.6)
HCT VFR BLD AUTO: 37.2 % (ref 37.5–51)
HDLC SERPL-MCNC: 41 MG/DL (ref 40–60)
HGB BLD-MCNC: 12.2 G/DL (ref 13–17.7)
LDLC SERPL CALC-MCNC: 33 MG/DL (ref 0–100)
LDLC/HDLC SERPL: 0.85 {RATIO}
LEFT ATRIUM VOLUME INDEX: 11.7 ML/M2
LV EF NUC BP: 67 %
MAGNESIUM SERPL-MCNC: 2.2 MG/DL (ref 1.6–2.4)
MCH RBC QN AUTO: 27.8 PG (ref 26.6–33)
MCHC RBC AUTO-ENTMCNC: 32.8 G/DL (ref 31.5–35.7)
MCV RBC AUTO: 84.7 FL (ref 79–97)
PHOSPHATE SERPL-MCNC: 3.5 MG/DL (ref 2.5–4.5)
PLATELET # BLD AUTO: 273 10*3/MM3 (ref 140–450)
PMV BLD AUTO: 9.2 FL (ref 6–12)
POTASSIUM SERPL-SCNC: 4.6 MMOL/L (ref 3.5–5.2)
QT INTERVAL: 362 MS
QTC INTERVAL: 426 MS
RBC # BLD AUTO: 4.39 10*6/MM3 (ref 4.14–5.8)
SINUS: 3.4 CM
SODIUM SERPL-SCNC: 136 MMOL/L (ref 136–145)
STJ: 2.9 CM
TRIGL SERPL-MCNC: 66 MG/DL (ref 0–150)
TROPONIN T SERPL HS-MCNC: 21 NG/L
VLDLC SERPL-MCNC: 15 MG/DL (ref 5–40)
WBC NRBC COR # BLD AUTO: 8.86 10*3/MM3 (ref 3.4–10.8)

## 2024-03-21 PROCEDURE — 80048 BASIC METABOLIC PNL TOTAL CA: CPT | Performed by: NURSE PRACTITIONER

## 2024-03-21 PROCEDURE — G0378 HOSPITAL OBSERVATION PER HR: HCPCS

## 2024-03-21 PROCEDURE — 25510000001 PERFLUTREN (DEFINITY) 8.476 MG IN SODIUM CHLORIDE (PF) 0.9 % 10 ML INJECTION: Performed by: INTERNAL MEDICINE

## 2024-03-21 PROCEDURE — 84484 ASSAY OF TROPONIN QUANT: CPT | Performed by: NURSE PRACTITIONER

## 2024-03-21 PROCEDURE — 82948 REAGENT STRIP/BLOOD GLUCOSE: CPT

## 2024-03-21 PROCEDURE — 94640 AIRWAY INHALATION TREATMENT: CPT

## 2024-03-21 PROCEDURE — 93306 TTE W/DOPPLER COMPLETE: CPT | Performed by: INTERNAL MEDICINE

## 2024-03-21 PROCEDURE — 36415 COLL VENOUS BLD VENIPUNCTURE: CPT | Performed by: NURSE PRACTITIONER

## 2024-03-21 PROCEDURE — 83735 ASSAY OF MAGNESIUM: CPT | Performed by: STUDENT IN AN ORGANIZED HEALTH CARE EDUCATION/TRAINING PROGRAM

## 2024-03-21 PROCEDURE — 99214 OFFICE O/P EST MOD 30 MIN: CPT | Performed by: INTERNAL MEDICINE

## 2024-03-21 PROCEDURE — 94799 UNLISTED PULMONARY SVC/PX: CPT

## 2024-03-21 PROCEDURE — 93016 CV STRESS TEST SUPVJ ONLY: CPT | Performed by: INTERNAL MEDICINE

## 2024-03-21 PROCEDURE — 93018 CV STRESS TEST I&R ONLY: CPT | Performed by: INTERNAL MEDICINE

## 2024-03-21 PROCEDURE — 83036 HEMOGLOBIN GLYCOSYLATED A1C: CPT | Performed by: STUDENT IN AN ORGANIZED HEALTH CARE EDUCATION/TRAINING PROGRAM

## 2024-03-21 PROCEDURE — 78452 HT MUSCLE IMAGE SPECT MULT: CPT | Performed by: INTERNAL MEDICINE

## 2024-03-21 PROCEDURE — 25010000002 REGADENOSON 0.4 MG/5ML SOLUTION: Performed by: INTERNAL MEDICINE

## 2024-03-21 PROCEDURE — 78452 HT MUSCLE IMAGE SPECT MULT: CPT

## 2024-03-21 PROCEDURE — 93306 TTE W/DOPPLER COMPLETE: CPT

## 2024-03-21 PROCEDURE — 85027 COMPLETE CBC AUTOMATED: CPT | Performed by: NURSE PRACTITIONER

## 2024-03-21 PROCEDURE — 94664 DEMO&/EVAL PT USE INHALER: CPT

## 2024-03-21 PROCEDURE — 84100 ASSAY OF PHOSPHORUS: CPT | Performed by: STUDENT IN AN ORGANIZED HEALTH CARE EDUCATION/TRAINING PROGRAM

## 2024-03-21 PROCEDURE — 93017 CV STRESS TEST TRACING ONLY: CPT

## 2024-03-21 PROCEDURE — 0 TECHNETIUM SESTAMIBI: Performed by: INTERNAL MEDICINE

## 2024-03-21 PROCEDURE — 80061 LIPID PANEL: CPT | Performed by: STUDENT IN AN ORGANIZED HEALTH CARE EDUCATION/TRAINING PROGRAM

## 2024-03-21 PROCEDURE — A9500 TC99M SESTAMIBI: HCPCS | Performed by: INTERNAL MEDICINE

## 2024-03-21 RX ORDER — ASPIRIN 325 MG
325 TABLET, DELAYED RELEASE (ENTERIC COATED) ORAL DAILY
Status: DISCONTINUED | OUTPATIENT
Start: 2024-03-21 | End: 2024-03-21

## 2024-03-21 RX ORDER — PANTOPRAZOLE SODIUM 40 MG/1
40 TABLET, DELAYED RELEASE ORAL
Status: DISCONTINUED | OUTPATIENT
Start: 2024-03-21 | End: 2024-03-23 | Stop reason: HOSPADM

## 2024-03-21 RX ORDER — PREGABALIN 100 MG/1
100 CAPSULE ORAL 3 TIMES DAILY
Status: DISCONTINUED | OUTPATIENT
Start: 2024-03-21 | End: 2024-03-23 | Stop reason: HOSPADM

## 2024-03-21 RX ORDER — CARVEDILOL 6.25 MG/1
6.25 TABLET ORAL 2 TIMES DAILY WITH MEALS
Status: DISCONTINUED | OUTPATIENT
Start: 2024-03-21 | End: 2024-03-23 | Stop reason: HOSPADM

## 2024-03-21 RX ORDER — ASPIRIN 81 MG/1
81 TABLET ORAL DAILY
Status: DISCONTINUED | OUTPATIENT
Start: 2024-03-21 | End: 2024-03-23 | Stop reason: HOSPADM

## 2024-03-21 RX ORDER — NICOTINE POLACRILEX 4 MG
15 LOZENGE BUCCAL
Status: DISCONTINUED | OUTPATIENT
Start: 2024-03-21 | End: 2024-03-23 | Stop reason: HOSPADM

## 2024-03-21 RX ORDER — AMLODIPINE BESYLATE 5 MG/1
5 TABLET ORAL DAILY
Status: DISCONTINUED | OUTPATIENT
Start: 2024-03-21 | End: 2024-03-23 | Stop reason: HOSPADM

## 2024-03-21 RX ORDER — IPRATROPIUM BROMIDE AND ALBUTEROL SULFATE 2.5; .5 MG/3ML; MG/3ML
3 SOLUTION RESPIRATORY (INHALATION)
Status: DISCONTINUED | OUTPATIENT
Start: 2024-03-21 | End: 2024-03-23 | Stop reason: HOSPADM

## 2024-03-21 RX ORDER — INSULIN LISPRO 100 [IU]/ML
2-7 INJECTION, SOLUTION INTRAVENOUS; SUBCUTANEOUS
Status: DISCONTINUED | OUTPATIENT
Start: 2024-03-21 | End: 2024-03-23 | Stop reason: HOSPADM

## 2024-03-21 RX ORDER — CLOPIDOGREL BISULFATE 75 MG/1
75 TABLET ORAL DAILY
Status: DISCONTINUED | OUTPATIENT
Start: 2024-03-21 | End: 2024-03-23 | Stop reason: HOSPADM

## 2024-03-21 RX ORDER — ROSUVASTATIN CALCIUM 10 MG/1
10 TABLET, COATED ORAL NIGHTLY
Status: DISCONTINUED | OUTPATIENT
Start: 2024-03-21 | End: 2024-03-22

## 2024-03-21 RX ORDER — CARVEDILOL 12.5 MG/1
12.5 TABLET ORAL 2 TIMES DAILY WITH MEALS
Status: DISCONTINUED | OUTPATIENT
Start: 2024-03-21 | End: 2024-03-21

## 2024-03-21 RX ORDER — ISOSORBIDE MONONITRATE 30 MG/1
30 TABLET, EXTENDED RELEASE ORAL DAILY
Status: DISCONTINUED | OUTPATIENT
Start: 2024-03-21 | End: 2024-03-23 | Stop reason: HOSPADM

## 2024-03-21 RX ORDER — TRAMADOL HYDROCHLORIDE 50 MG/1
50 TABLET ORAL EVERY 8 HOURS PRN
Status: DISCONTINUED | OUTPATIENT
Start: 2024-03-21 | End: 2024-03-23 | Stop reason: HOSPADM

## 2024-03-21 RX ORDER — LEVOTHYROXINE SODIUM 0.05 MG/1
50 TABLET ORAL
Status: DISCONTINUED | OUTPATIENT
Start: 2024-03-21 | End: 2024-03-23 | Stop reason: HOSPADM

## 2024-03-21 RX ORDER — ESCITALOPRAM OXALATE 10 MG/1
10 TABLET ORAL DAILY
Status: DISCONTINUED | OUTPATIENT
Start: 2024-03-21 | End: 2024-03-23 | Stop reason: HOSPADM

## 2024-03-21 RX ORDER — LOSARTAN POTASSIUM 25 MG/1
25 TABLET ORAL
Status: DISCONTINUED | OUTPATIENT
Start: 2024-03-21 | End: 2024-03-23 | Stop reason: HOSPADM

## 2024-03-21 RX ORDER — FOLIC ACID 1 MG/1
1000 TABLET ORAL DAILY
Status: DISCONTINUED | OUTPATIENT
Start: 2024-03-21 | End: 2024-03-23 | Stop reason: HOSPADM

## 2024-03-21 RX ORDER — DEXTROSE MONOHYDRATE 25 G/50ML
25 INJECTION, SOLUTION INTRAVENOUS
Status: DISCONTINUED | OUTPATIENT
Start: 2024-03-21 | End: 2024-03-23 | Stop reason: HOSPADM

## 2024-03-21 RX ORDER — REGADENOSON 0.08 MG/ML
0.4 INJECTION, SOLUTION INTRAVENOUS
Status: COMPLETED | OUTPATIENT
Start: 2024-03-21 | End: 2024-03-21

## 2024-03-21 RX ORDER — IBUPROFEN 600 MG/1
1 TABLET ORAL
Status: DISCONTINUED | OUTPATIENT
Start: 2024-03-21 | End: 2024-03-23 | Stop reason: HOSPADM

## 2024-03-21 RX ADMIN — AMLODIPINE BESYLATE 5 MG: 5 TABLET ORAL at 11:43

## 2024-03-21 RX ADMIN — LEVOTHYROXINE SODIUM 50 MCG: 50 TABLET ORAL at 06:08

## 2024-03-21 RX ADMIN — ESCITALOPRAM OXALATE 10 MG: 10 TABLET, FILM COATED ORAL at 12:12

## 2024-03-21 RX ADMIN — ROSUVASTATIN CALCIUM 10 MG: 10 TABLET, FILM COATED ORAL at 21:28

## 2024-03-21 RX ADMIN — NITROGLYCERIN 1 INCH: 20 OINTMENT TOPICAL at 12:45

## 2024-03-21 RX ADMIN — CARVEDILOL 6.25 MG: 6.25 TABLET, FILM COATED ORAL at 17:08

## 2024-03-21 RX ADMIN — NITROGLYCERIN 1 INCH: 20 OINTMENT TOPICAL at 06:08

## 2024-03-21 RX ADMIN — TECHNETIUM TC 99M SESTAMIBI 1 DOSE: 1 INJECTION INTRAVENOUS at 08:59

## 2024-03-21 RX ADMIN — TECHNETIUM TC 99M SESTAMIBI 1 DOSE: 1 INJECTION INTRAVENOUS at 10:12

## 2024-03-21 RX ADMIN — FOLIC ACID 1000 MCG: 1 TABLET ORAL at 12:12

## 2024-03-21 RX ADMIN — CARVEDILOL 6.25 MG: 6.25 TABLET, FILM COATED ORAL at 11:44

## 2024-03-21 RX ADMIN — CLOPIDOGREL BISULFATE 75 MG: 75 TABLET, FILM COATED ORAL at 11:44

## 2024-03-21 RX ADMIN — PREGABALIN 100 MG: 100 CAPSULE ORAL at 21:28

## 2024-03-21 RX ADMIN — ASPIRIN 81 MG: 81 TABLET, COATED ORAL at 11:44

## 2024-03-21 RX ADMIN — PREGABALIN 100 MG: 100 CAPSULE ORAL at 11:44

## 2024-03-21 RX ADMIN — IPRATROPIUM BROMIDE AND ALBUTEROL SULFATE 3 ML: .5; 3 SOLUTION RESPIRATORY (INHALATION) at 19:27

## 2024-03-21 RX ADMIN — ISOSORBIDE MONONITRATE 30 MG: 30 TABLET, EXTENDED RELEASE ORAL at 11:43

## 2024-03-21 RX ADMIN — ACETAMINOPHEN 325MG 650 MG: 325 TABLET ORAL at 21:52

## 2024-03-21 RX ADMIN — REGADENOSON 0.4 MG: 0.08 INJECTION, SOLUTION INTRAVENOUS at 10:12

## 2024-03-21 RX ADMIN — PANTOPRAZOLE SODIUM 40 MG: 40 TABLET, DELAYED RELEASE ORAL at 06:08

## 2024-03-21 RX ADMIN — NITROGLYCERIN 1 INCH: 20 OINTMENT TOPICAL at 17:08

## 2024-03-21 RX ADMIN — PERFLUTREN 3 ML: 6.52 INJECTION, SUSPENSION INTRAVENOUS at 14:06

## 2024-03-21 RX ADMIN — PREGABALIN 100 MG: 100 CAPSULE ORAL at 17:08

## 2024-03-21 RX ADMIN — LOSARTAN POTASSIUM 25 MG: 25 TABLET, FILM COATED ORAL at 11:43

## 2024-03-21 NOTE — SIGNIFICANT NOTE
03/21/24 1449   OTHER   Discipline physical therapist   Rehab Time/Intention   Session Not Performed other (see comments)  (Pt. has Am-Pac score of 23 per nursing notes.  When coming back from a test this PM, observed pt. perform bed mobility, transfers, and ambulation all independently with no balance deficits.  P.T. will sign off at this time.)

## 2024-03-21 NOTE — PROGRESS NOTES
" LOS: 0 days     Name: Prem Thrasher  Age: 69 y.o.  Sex: male  :  1955  MRN: [id number]         Primary Care Physician: Montse Cain PA-C    Subjective   Subjective  Denies chest pain.  No SOA at rest.  On room air.    Objective   Vital Signs  Temp:  [97.3 °F (36.3 °C)-98.8 °F (37.1 °C)] 97.7 °F (36.5 °C)  Heart Rate:  [74-93] 74  Resp:  [16-18] 16  BP: (108-130)/(62-87) 129/79  Body mass index is 40.04 kg/m².    Objective:  General Appearance:  Comfortable and in no acute distress.    Vital signs: (most recent): Blood pressure 129/79, pulse 74, temperature 97.7 °F (36.5 °C), temperature source Oral, resp. rate 16, height 165 cm (64.96\"), weight 109 kg (240 lb 4.8 oz), SpO2 95%.    Lungs:  Normal effort and normal respiratory rate.  There are wheezes.  No decreased breath sounds.  (Mild bilateral wheeze)  Heart: Normal rate.  Regular rhythm.    Abdomen: Abdomen is soft.  Bowel sounds are normal.   There is no abdominal tenderness.     Extremities: There is no dependent edema or local swelling.    Neurological: Patient is alert and oriented to person, place and time.    Skin:  Warm and dry.                Results Review:       I reviewed the patient's new clinical results.    Results from last 7 days   Lab Units 24  0659 24  1836   WBC 10*3/mm3 8.86 9.66   HEMOGLOBIN g/dL 12.2* 12.8*   PLATELETS 10*3/mm3 273 298     Results from last 7 days   Lab Units 24  0659 24  1836   SODIUM mmol/L 136 134*   POTASSIUM mmol/L 4.6 4.3   CHLORIDE mmol/L 102 99   CO2 mmol/L 22.4 24.0   BUN mg/dL 19 19   CREATININE mg/dL 0.95 1.20   CALCIUM mg/dL 9.3 9.1   GLUCOSE mg/dL 94 116*                 Scheduled Meds:   amLODIPine, 5 mg, Oral, Daily  aspirin, 81 mg, Oral, Daily  carvedilol, 6.25 mg, Oral, BID With Meals  clopidogrel, 75 mg, Oral, Daily  escitalopram, 10 mg, Oral, Daily  folic acid, 1,000 mcg, Oral, Daily  insulin lispro, 2-7 Units, Subcutaneous, 4x Daily AC & at Bedtime  isosorbide " mononitrate, 30 mg, Oral, Daily  levothyroxine, 50 mcg, Oral, Q AM  losartan, 25 mg, Oral, Q24H  nitroglycerin, 1 inch, Topical, Q6H  pantoprazole, 40 mg, Oral, Q AM  pregabalin, 100 mg, Oral, TID  rosuvastatin, 10 mg, Oral, Nightly      PRN Meds:     acetaminophen    aluminum-magnesium hydroxide-simethicone    senna-docusate sodium **AND** polyethylene glycol **AND** bisacodyl **AND** bisacodyl    dextrose    dextrose    glucagon (human recombinant)    nitroglycerin    ondansetron ODT **OR** ondansetron    sodium chloride    sodium chloride    traMADol  Continuous Infusions:       Assessment & Plan   Active Hospital Problems    Diagnosis  POA    **Chest pain [R07.9]  Yes    Type 2 diabetes mellitus without complication, without long-term current use of insulin [E11.9]  Yes    Hypothyroidism, acquired [E03.9]  Yes    CAD S/P percutaneous coronary angioplasty [I25.10, Z98.61]  Not Applicable    Anemia [D64.9]  Yes    COPD (chronic obstructive pulmonary disease) [J44.9]  Yes    Hyperlipidemia [E78.5]  Yes    Hypertension [I10]  Yes    Rheumatoid arthritis [M06.9]  Yes      Resolved Hospital Problems   No resolved problems to display.       Assessment & Plan    -Stress and ECHO performed today. Discussed with Dr. Mars who is going to speak to patient  about potential cath.  On ASA, plavix, BB and statin  - Wheezing is mild.  No dyspnea presently and on room air.  He just saw Dr. Mcmillan in the office for this and outpatient workup planned. Add nebs.  No need for steroids right now.  - Blood sugar controlled on SSI  -BP well controlled on losartan and norvasc        Expected Discharge Date: 3/22/2024; Expected Discharge Time:      Waldemar Farley MD  [facility]  03/21/24  17:14 EDT

## 2024-03-21 NOTE — PLAN OF CARE
Goal Outcome Evaluation:    Problem: Pain Chronic (Persistent) (Comorbidity Management)  Goal: Acceptable Pain Control and Functional Ability  Outcome: Ongoing, Progressing  Intervention: Manage Persistent Pain  Recent Flowsheet Documentation  Taken 3/21/2024 1600 by Bennie Mckeon RN  Medication Review/Management: medications reviewed  Taken 3/21/2024 1250 by Bennie Mckeon RN  Medication Review/Management: medications reviewed  Taken 3/21/2024 1000 by Bennie Mckeon RN  Medication Review/Management: medications reviewed  Taken 3/21/2024 0806 by Bennie Mckeon RN  Medication Review/Management: medications reviewed  Intervention: Optimize Psychosocial Wellbeing  Recent Flowsheet Documentation  Taken 3/21/2024 1250 by Bennie Mckeon RN  Diversional Activities: television  Family/Support System Care:   support provided   self-care encouraged  Taken 3/21/2024 0806 by Bennie Mckeon RN  Supportive Measures: active listening utilized  Diversional Activities:   television   smartphone  Family/Support System Care:   support provided   self-care encouraged   Plan of Care Reviewed With: patient alert, room air, had a stress test and an echo today, denies pain and distress on this shift, call light within reach.

## 2024-03-21 NOTE — CASE MANAGEMENT/SOCIAL WORK
Discharge Planning Assessment  [facility]     Patient Name: Prem Thrasher  MRN: [id number]  Today's Date: 3/21/2024    Admit Date: 3/20/2024    Plan: Return home with wife   Discharge Needs Assessment       Row Name 03/21/24 1125       Living Environment    People in Home spouse    Current Living Arrangements home    Potentially Unsafe Housing Conditions none    Primary Care Provided by self    Provides Primary Care For no one    Family Caregiver if Needed spouse    Quality of Family Relationships helpful;involved;supportive    Able to Return to Prior Arrangements yes       Resource/Environmental Concerns    Resource/Environmental Concerns none    Transportation Concerns none       Transition Planning    Patient/Family Anticipates Transition to home with family    Patient/Family Anticipated Services at Transition none    Transportation Anticipated family or friend will provide       Discharge Needs Assessment    Readmission Within the Last 30 Days current reason for admission unrelated to previous admission    Equipment Currently Used at Home none    Concerns to be Addressed no discharge needs identified;denies needs/concerns at this time    Anticipated Changes Related to Illness none    Equipment Needed After Discharge none    Provided Post Acute Provider List? N/A    Provided Post Acute Provider Quality & Resource List? N/A                   Discharge Plan       Row Name 03/21/24 1125       Plan    Plan Return home with wife    Patient/Family in Agreement with Plan yes    Plan Comments ALEXEI noted. CCP spoke to the patient via telephone due to being in COVID isolation. The patient confirmed the information on his face sheet was accurate. He stated that he lives in a 1-level home with his wife Linda. He confirmed his PCP is Montse Cain. 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 He denies any SNF history. He denies using or owning any DME. He  stated his pharmacy is [facility] (mail order) and his local pharmacy is [facility]. He stated there are 3 steps to enter the home at the backdoor with handrails on both sides. He denied any steps inside the home. He denied any needs at this time and stated his wife can transport him at discharge. CCP to follow. CD, CSW.                  Continued Care and Services - Admitted Since 3/20/2024    No active coordination exists for this encounter.       Expected Discharge Date and Time       Expected Discharge Date Expected Discharge Time    Mar 22, 2024            Demographic Summary       Row Name 03/21/24 1124       General Information    Admission Type observation    Arrived From emergency department    Required Notices Provided Observation Status Notice    Referral Source admission list    Reason for Consult discharge planning    Preferred Language English                   Functional Status       Row Name 03/21/24 1124       Functional Status    Usual Activity Tolerance good    Current Activity Tolerance moderate       Functional Status, IADL    Medications independent    Meal Preparation independent    Housekeeping independent    Laundry independent    Shopping independent       Mental Status    General Appearance WDL WDL       Mental Status Summary    Recent Changes in Mental Status/Cognitive Functioning no changes       Employment/    Employment Status retired                   Psychosocial    No documentation.                  Abuse/Neglect    No documentation.                  Legal    No documentation.                  Substance Abuse    No documentation.                  Patient Forms    No documentation.

## 2024-03-21 NOTE — PLAN OF CARE
Goal Outcome Evaluation:            No c/o CP after nitro paste applied.  VSS. NPO after MN except for meds.

## 2024-03-21 NOTE — CONSULTS
Patient Name: Prem Thrasher  :1955  69 y.o.    Date of Admission: 3/20/2024  Date of Consultation:  24  Encounter Provider: Jami Mars MD  [facility]  Referring Provider: Rhonda Haynes MD  Patient Care Team:  Montse Cain PA-C as PCP - General (Family Medicine)  Sawyer Rick MD as Surgeon (Neurosurgery)  Pradip Mcmillan MD as Consulting Physician (Pulmonary Disease)  Jacky Perez MD as Surgeon (Orthopedic Surgery)  Charli Sen MD as Consulting Physician (Infectious Diseases)  Tray Moody MD as Consulting Physician (Urology)  Suman Richards DPM as Consulting Physician (Podiatry)  Porsha Arcos MD as Consulting Physician (General Surgery)  Dioni Salazar MD as Consulting Physician (Cardiology)  Ryan Louise MD (Rheumatology)      Chief complaint: Chest pain    History of Present Illness:    This is a pleasant 69 year-old male who in  had unstable angina received 2 discrete LAD stents in the mid and distal vessel.  The following year he had recurrent symptoms and was found to have a new LAD lesion just proximal to the distal lesion as well as circumflex lesion.  He had balloon angioplasty of the LAD and PCI of the circumflex with Dr. Salazar  When I met him last year he states that he has been short of breath for the last 2 years with exertion, primarily due to back pain.  At his last office visit in October he had no complaints.  In early March she had COVID.  He said this was preceded by worsening dyspnea.  Over the last few weeks he has had worsening exertional dyspnea as well as some chest pressure and tightness.  He has no rest symptoms.  No orthopnea or PND no lower extremity edema or abdominal bloating though he does have a protuberant abdomen.  He has no unstable angina however he does have heaviness when walking around the house.  He does not do any regular exercise or  climb stairs.  However, walking through the grocery store has become more difficult.  He was treated with Paxlovid for COVID.  He has not had any recent fevers or chills.  He does notice that he has productive cough of yellow sputum which is a change for him.    Past Medical History:   Diagnosis Date    Achilles tendon pain     LEFT    Allergic     Anxiety disorder     Asthma     When I get stressed out or try to do something I get short of breath    CAD (coronary artery disease)     Cervical disc disorder     Chronic anticoagulation     PLAVIX-CARDIAC STENT X3    Chronic lower back pain     Chronic pain disorder     COPD (chronic obstructive pulmonary disease)     CTS (carpal tunnel syndrome)     Deep vein thrombosis     A    Depression     Diabetes mellitus 03/12/2023    Type two diabetes    Disease of thyroid gland     Diverticulitis of colon     Encounter for eye exam 10/2014    normal    Exertional chest pain     Extremity pain     GERD (gastroesophageal reflux disease)     Headache     History of bone density study 03/2014    Dr. Maria Antonia Lopez; normal    History of chest x-ray 02/15/2011    also 3-6-15; normal chest    History of colon polyps     History of nuclear stress test 03/09/2015    cardiolite; Dr. Greenberg; negative    History of pulmonary function tests 03/2015    COPD    Hyperlipidemia     Hypertension     Joint pain     Low back strain     Lumbosacral disc disease     Migraine     Neck pain     Nerve damage     KAYLYNN ELBOWS    NSTEMI (non-ST elevated myocardial infarction) 01/2021    Obesity     Osteoarthritis, multiple sites     Pneumonia     Postoperative nausea and vomiting 08/01/2017    Postoperative wound infection     Rheumatoid arthritis     Rotator cuff syndrome     Sciatica     Short of breath on exertion     Sleep apnea     no cpap    Spinal stenosis     Staph infection     WITH BACK SURGERY 2017  ON LONG TERM ANTIBIOTICS    Thoracic disc disorder     Unstable angina     Unsteady gait     D/T  LOWER BACK    Visual impairment     Fill like my vision has changed sense I got my glasses       Past Surgical History:   Procedure Laterality Date    ACHILLES TENDON SURGERY Left 07/03/2018    Procedure: Left Achilles debridement and secondary repair with partial excision of calcaneus. Possible left Achilles lengthening at the calf;  Surgeon: Vinay Smith MD;  Location: [facility] OR [facility];  Service: Orthopedics    BACK SURGERY  10/2017    CARDIAC CATHETERIZATION N/A 03/07/2020    Procedure: Left Heart Cath;  Surgeon: Dioni Salazar MD;  Location: [facility] CATH INVASIVE LOCATION;  Service: Cardiology;  Laterality: N/A;    CARDIAC CATHETERIZATION N/A 03/07/2020    Procedure: Coronary angiography;  Surgeon: Dioni Salazar MD;  Location: [facility] CATH INVASIVE LOCATION;  Service: Cardiology;  Laterality: N/A;    CARDIAC CATHETERIZATION N/A 03/07/2020    Procedure: Left ventriculography;  Surgeon: Dioni Salazar MD;  Location: [facility] CATH INVASIVE LOCATION;  Service: Cardiology;  Laterality: N/A;    CARDIAC CATHETERIZATION N/A 03/07/2020    Procedure: Stent RAZA coronary;  Surgeon: Dioni Salazar MD;  Location: [facility] CATH INVASIVE LOCATION;  Service: Cardiology;  Laterality: N/A;    CARDIAC CATHETERIZATION N/A 01/22/2021    Procedure: Left Heart Cath;  Surgeon: Dioni Salazar MD;  Location: [facility] CATH INVASIVE LOCATION;  Service: Cardiology;  Laterality: N/A;    CARDIAC CATHETERIZATION N/A 01/22/2021    Procedure: Coronary angiography;  Surgeon: Dioni Salazar MD;  Location: [facility] INVASIVE LOCATION;  Service: Cardiology;  Laterality: N/A;    CARDIAC CATHETERIZATION N/A 01/22/2021    Procedure: Left ventriculography;  Surgeon: Dioni Salazar MD;  Location: [facility] CATH INVASIVE LOCATION;  Service: Cardiology;  Laterality: N/A;    CARDIAC CATHETERIZATION N/A 01/22/2021    Procedure: Percutaneous Coronary Intervention;  Surgeon: Dioni Salazar MD;  Location: [facility]  CATH INVASIVE LOCATION;  Service: Cardiology;  Laterality: N/A;    CARDIAC CATHETERIZATION N/A 01/22/2021    Procedure: Stent RAZA coronary;  Surgeon: Dioni Salazar MD;  Location: [facility] CATH INVASIVE LOCATION;  Service: Cardiology;  Laterality: N/A;    CARDIAC SURGERY      3 stents total    COLONOSCOPY N/A 02/02/2011    Normal colon except scattered diverticulosis-Dr. Guero Blunt    COLONOSCOPY N/A 04/08/2021    Procedure: COLONOSCOPY TO CECUM WITH POLYPECTOMY (COLD BX);  Surgeon: Porsha Arcos MD;  Location: [facility] ENDOSCOPY;  Service: General;  Laterality: N/A;  SCREENING; HX OF COLON POLYPS  POST:DIVERTICULOSIS; COLON POLYP    CORONARY STENT PLACEMENT      CYST REMOVAL  03/2016    x2  FROM FACE AND EAR    DENTAL PROCEDURE  2008    teeth removed; dental implants    EPIDURAL BLOCK  1995    L/S spine    FINGER DEBRIDEMENT Right 07/12/2003    Debridement and full thickness skin grafting of the ring and small fingers of the right hand-Dr. Rayray Long    FOOT SURGERY      HAND SURGERY  2003    INCISION AND DRAINAGE PERIRECTAL ABSCESS N/A 07/10/2018    Procedure: INCISION AND DRAINAGE OF PERIRECTAL ABSCESS;  Surgeon: Porsha Arcos MD;  Location: [facility] MAIN OR;  Service: General    INCISION AND DRAINAGE TRUNK N/A 04/23/2022    Procedure: Evacuation lumbar hematoma;  Surgeon: Jacky Perez MD;  Location: [facility] MAIN OR;  Service: Orthopedics;  Laterality: N/A;    JOINT REPLACEMENT      LUMBAR DISCECTOMY FUSION INSTRUMENTATION Left 10/10/2016    Procedure: LEFT L2-L3, L3-L4 LUMBAR LAMINECTOMY/ DISCECTOMY WITH METRIX ;  Surgeon: Sawyer Rick MD;  Location: [facility] MAIN OR;  Service:     LUMBAR DISCECTOMY FUSION INSTRUMENTATION N/A 07/31/2017    Procedure: LUMBAR FUSION DECOMPRESSON WITH PEDICLE SCREWS with LAURA L2-3,L3-4;  Surgeon: Jacky Perez MD;  Location: [facility] MAIN OR;  Service:     LUMBAR FUSION N/A 04/13/2022    Procedure: Thoracic 10-thoracic 11, thoracic 11-thoracic 12,  thoracic 12-lumbar 1, lumbar 1-lumbar 2, lumbar 2-lumbar 3, lumbar 3-lumbar 4 fusion with instrumentation with iliac crest bone graft, and removal of implants from lumbar 2-lumbar 3, lumbar 3-lumbar 4;  Surgeon: Jacky Perez MD;  Location: [facility] OR;  Service: Orthopedic Spine;  Laterality: N/A;    LUMBAR FUSION N/A 04/13/2022    Procedure: LUMBAR ANTERIOR INTERBODY FUSION L4,L5 Osteotomy interbody fusion with cage L1,L2;  Surgeon: Jacky Perez MD;  Location: [facility] MAIN OR;  Service: Orthopedic Spine;  Laterality: N/A;    LUMBAR LAMINECTOMY DISCECTOMY DECOMPRESSION N/A 07/31/2017    Procedure: L2 to L4 laminectomy with neural lysis and a fusion by orthopedics;  Surgeon: Sawyer Rick MD;  Location: [facility] OR;  Service:     LUMBAR LAMINECTOMY DISCECTOMY DECOMPRESSION N/A 09/05/2017    Procedure: I&D LUMBAR SPINE ;  Surgeon: Jacky Perez MD;  Location: [facility] OR;  Service:     ORTHOPEDIC SURGERY      SHOULDER SURGERY Right 02/23/2011    Dr. Navarro    SINUS SURGERY  2003    Dr. Barrera    SPINAL FUSION      SPINE SURGERY  2010    TOTAL SHOULDER REPLACEMENT Right 09/07/2023    TRIGGER POINT INJECTION           Prior to Admission medications    Medication Sig Start Date End Date Taking? Authorizing Provider   amLODIPine (NORVASC) 10 MG tablet Take 0.5 tablets by mouth Daily. for blood pressure 12/5/23  Yes Montse Cain PA-C   aspirin  MG tablet Take 1 tablet by mouth Daily.  Patient taking differently: Take 1 tablet by mouth Daily. STATES INSTRUCTED TO HOLD ONE WEEK PRIOR TO SURGERY 7/30/18  Yes Vinay Smith MD Boswellia-Glucosamine-Vit D (OSTEO BI-FLEX ONE PER DAY PO) Take  by mouth.   Yes Provider, MD Angela   busPIRone (BUSPAR) 10 MG tablet TAKE 1 TABLET TWICE DAILY AS NEEDED FOR ANXIETY 10/1/23  Yes Montse Cain PA-C   carvedilol (COREG) 12.5 MG tablet Take 1 tablet by mouth 2 (Two) Times a Day With Meals. 10/9/23  Yes Zaida Pretty, TEN    Cholecalciferol (VITAMIN D) 2000 UNITS tablet Take 1 tablet by mouth Every Evening.   Yes Angela Pavon MD   clopidogrel (PLAVIX) 75 MG tablet TAKE 1 TABLET EVERY DAY 10/2/23  Yes Jami Mars MD   Cyanocobalamin (B-12) 1000 MCG sublingual tablet DISSOLVE 1 TABLET UNDER THE TONGUE EVERY DAY 10/2/23  Yes Montse Cain PA-C   cyclobenzaprine (FLEXERIL) 10 MG tablet Take 1 tablet by mouth 2 (Two) Times a Day. 1/28/24  Yes Montse Cain PA-C   docusate sodium (Stool Softener) 100 MG capsule  7/1/21  Yes Angela Pavon MD   escitalopram (LEXAPRO) 20 MG tablet TAKE 1 TABLET EVERY NIGHT FOR ANXIETY 12/14/23  Yes Montse Cain PA-C   ezetimibe (ZETIA) 10 MG tablet Take 1 tablet by mouth Daily. 10/4/23  Yes Jami Mars MD   folic acid (FOLVITE) 1 MG tablet TAKE 1 TABLET EVERY DAY 10/2/23  Yes Montse Cain PA-C   irbesartan (AVAPRO) 75 MG tablet Take 1 tablet by mouth Daily. for blood pressure 12/5/23  Yes Montse Cain PA-C   isosorbide mononitrate (IMDUR) 30 MG 24 hr tablet TAKE 1 TABLET EVERY DAY 10/2/23  Yes Jami Mars MD   levothyroxine (SYNTHROID, LEVOTHROID) 50 MCG tablet TAKE 1 TABLET EVERY DAY FOR THYROID BEFORE BREAKFAST (NEW DOSE) 3/6/23  Yes Montse Cain PA-C   metFORMIN ER (GLUCOPHAGE-XR) 500 MG 24 hr tablet 2 TABLETS EVERY DAY ROUTINE FOR DIABETES 12/5/23  Yes Montse Cain PA-C   nitroglycerin (NITROSTAT) 0.4 MG SL tablet Place 1 tablet under the tongue Every 5 (Five) Minutes As Needed for Chest Pain. Take no more than 3 doses in 15 minutes. 3/8/20  Yes Dioni Salazar MD   pantoprazole (PROTONIX) 40 MG EC tablet TAKE 1 TABLET EVERY DAY FOR GERD 10/2/23  Yes Montse Cain PA-C   pregabalin (LYRICA) 100 MG capsule TAKE 1 CAPSULE BY MOUTH THREE TIMES A DAY 2/23/24  Yes Senait Qurioz APRN   rosuvastatin (CRESTOR) 10 MG tablet TAKE 1 TABLET EVERY NIGHT AT BEDTIME 12/14/23  Yes Jami Mars MD   traMADol (ULTRAM) 50 MG tablet Take 1 tablet by mouth Every 8 (Eight)  "Hours As Needed for Severe Pain. 24  Yes Senait Quiroz APRN   amoxicillin-clavulanate (AUGMENTIN) 875-125 MG per tablet TAKE 1 TABLET BY MOUTH EVERY 12 (TWELVE) HOURS FOR INFECTION WITH FOOD 24   Montse Cain PA-C   azithromycin (ZITHROMAX) 250 MG tablet Take 2 tablets the first day, then 1 tablet daily for 4 days for infection 3/6/24   Montse Cain PA-C   meclizine (ANTIVERT) 25 MG tablet Take 1 tablet by mouth 3 (Three) Times a Day As Needed for Dizziness. 3/6/24   Montse Cain PA-C   multivitamin with minerals tablet tablet Take 1 tablet by mouth Daily. HOLD X1 WEEK PRIOR TO SURGERY    Provider, MD Angela   Nirmatrelvir & Ritonavir, 300mg/100mg, (PAXLOVID) 20 x 150 MG & 10 x 100MG tablet therapy pack tablet Take 3 tablets by mouth 2 (Two) Times a Day. 3/6/24   Montse Cain PA-C   predniSONE (DELTASONE) 20 MG tablet Take 1 tablet by mouth 2 (Two) Times a Day. With food for 5 days 3/6/24   Montse Cain PA-C       Allergies   Allergen Reactions    Atorvastatin Other (See Comments) and Myalgia     Leg cramps    Ceclor [Cefaclor] Rash     Patient tolerated nafcillin during 2017 admission and has tolerated Augmentin in past outpatient.     Methotrexate Derivatives Rash     \"Blisters\"       Social History     Socioeconomic History    Marital status:    Tobacco Use    Smoking status: Former     Current packs/day: 0.00     Average packs/day: 1 pack/day for 50.0 years (50.0 ttl pk-yrs)     Types: Cigarettes     Start date: 10/20/1971     Quit date: 10/20/2021     Years since quittin.4    Smokeless tobacco: Never    Tobacco comments:     Current status is non smoker   Vaping Use    Vaping status: Never Used   Substance and Sexual Activity    Alcohol use: No    Drug use: No    Sexual activity: Not Currently     Partners: Female     Birth control/protection: None       Family History   Problem Relation Age of Onset    Diabetes Mother     Heart disease Mother     Hyperlipidemia " "Mother     Stroke Mother     Heart disease Father     Rheum arthritis Father     Alcohol abuse Father     Hyperlipidemia Father     Depression Brother     Cancer Brother         prostate    Depression Brother     Heart disease Brother     Hyperlipidemia Brother     Cancer Brother         Haert desease    Thyroid disease Sister     Arthritis Sister     Asthma Son     Malig Hyperthermia Neg Hx        REVIEW OF SYSTEMS:   All systems reviewed.  Pertinent positives identified in HPI.  All other systems are negative.      Objective:     Vitals:    03/20/24 1901 03/20/24 2200 03/21/24 0520 03/21/24 0812   BP: 114/80 120/62 130/86 119/79   BP Location:  Right arm Right arm Right arm   Patient Position:  Lying Lying Lying   Pulse: 77 79 76 74   Resp:  18 18 18   Temp:  98.2 °F (36.8 °C) 98.1 °F (36.7 °C) 98.2 °F (36.8 °C)   TempSrc:  Oral Oral Oral   SpO2: 92%  96% 94%   Weight:   109 kg (240 lb 8.4 oz)    Height:   165.1 cm (65\")      Body mass index is 40.02 kg/m².    General Appearance:    Alert, cooperative, in no acute distress   Head:    Normocephalic, without obvious abnormality, atraumatic   Eyes:            Lids and lashes normal, conjunctivae and sclerae normal, no icterus, no pallor, corneas clear, PERRLA   Ears:    Ears appear intact with no abnormalities noted   Throat:   No oral lesions, no thrush, oral mucosa moist   Neck:   No adenopathy, supple, trachea midline, no thyromegaly, no carotid bruit, no JVD   Back:     No kyphosis present, no scoliosis present, no skin lesions, erythema or scars, no tenderness to percussion or palpation, range of motion normal   Lungs:   Bilateral wheezing, respirations regular, even and unlabored    Heart:    Regular rhythm and normal rate, normal S1 and S2, no murmur, no gallop, no rub, no click   Chest Wall:    No abnormalities observed   Abdomen:     Normal bowel sounds, no masses, no organomegaly, soft, nontender, nondistended, no guarding, no rebound  tenderness "   Extremities:   Moves all extremities well, no edema, no cyanosis, no redness   Pulses:   Pulses palpable and equal bilaterally. Normal radial, carotid, femoral, dorsalis pedis and posterior tibial pulses bilaterally. Normal abdominal aorta   Skin:  Psychiatric:   No bleeding, bruising or rash    Alert and oriented x 3, normal mood and affect   Lab Review:     Results from last 7 days   Lab Units 03/21/24  0659 03/20/24  1836   SODIUM mmol/L 136 134*   POTASSIUM mmol/L 4.6 4.3   CHLORIDE mmol/L 102 99   CO2 mmol/L 22.4 24.0   BUN mg/dL 19 19   CREATININE mg/dL 0.95 1.20   CALCIUM mg/dL 9.3 9.1   BILIRUBIN mg/dL  --  0.5   ALK PHOS U/L  --  67   ALT (SGPT) U/L  --  21   AST (SGOT) U/L  --  16   GLUCOSE mg/dL 94 116*     Results from last 7 days   Lab Units 03/21/24  0036 03/20/24  2025   HSTROP T ng/L 21 28*  28*     Results from last 7 days   Lab Units 03/21/24  0659   WBC 10*3/mm3 8.86   HEMOGLOBIN g/dL 12.2*   HEMATOCRIT % 37.2*   PLATELETS 10*3/mm3 273         Results from last 7 days   Lab Units 03/21/24  0659   MAGNESIUM mg/dL 2.2     Results from last 7 days   Lab Units 03/21/24  0659   CHOLESTEROL mg/dL 89   TRIGLYCERIDES mg/dL 66   HDL CHOL mg/dL 41   LDL CHOL mg/dL 33               I personally viewed and interpreted the patient's EKG/Telemetry data.        Assessment and Plan:       1.  Exertional dyspnea and chest pain: Appears to be a slight worsening of his chronic symptoms.  No evidence of ACS.  His troponin is minimally elevated peaked at 28 and then declined again.  EKG shows normal sinus rhythm without any ischemic changes, mild lateral ST deviation as not diagnostic.  I would start within the nuclear stress test, however this will be difficult to interpret given his large abdomen.  Will also get an echocardiogram.  2.  Bilateral wheezing, history of COPD: Treatment per primary service  3.  CAD: History of LAD stenting x 2, PTCA of the distal LAD, PCI of the circumflex.  In the past he has been  instructed to take aspirin 325.  Will reduce to aspirin 81 and continue Plavix.  He is on a number of antianginals, continue this.  No need to hold beta-blockade prior to nuclear stress test.  4.  Recent COVID infection    Jami Mars MD  03/21/24  08:35 EDT

## 2024-03-22 LAB
GLUCOSE BLDC GLUCOMTR-MCNC: 102 MG/DL (ref 70–130)
GLUCOSE BLDC GLUCOMTR-MCNC: 115 MG/DL (ref 70–130)
GLUCOSE BLDC GLUCOMTR-MCNC: 190 MG/DL (ref 70–130)
GLUCOSE BLDC GLUCOMTR-MCNC: 77 MG/DL (ref 70–130)

## 2024-03-22 PROCEDURE — 99214 OFFICE O/P EST MOD 30 MIN: CPT | Performed by: INTERNAL MEDICINE

## 2024-03-22 PROCEDURE — 92978 ENDOLUMINL IVUS OCT C 1ST: CPT | Performed by: INTERNAL MEDICINE

## 2024-03-22 PROCEDURE — C1887 CATHETER, GUIDING: HCPCS | Performed by: INTERNAL MEDICINE

## 2024-03-22 PROCEDURE — C1874 STENT, COATED/COV W/DEL SYS: HCPCS | Performed by: INTERNAL MEDICINE

## 2024-03-22 PROCEDURE — 85347 COAGULATION TIME ACTIVATED: CPT

## 2024-03-22 PROCEDURE — G0378 HOSPITAL OBSERVATION PER HR: HCPCS

## 2024-03-22 PROCEDURE — 94760 N-INVAS EAR/PLS OXIMETRY 1: CPT

## 2024-03-22 PROCEDURE — 94799 UNLISTED PULMONARY SVC/PX: CPT

## 2024-03-22 PROCEDURE — 94761 N-INVAS EAR/PLS OXIMETRY MLT: CPT

## 2024-03-22 PROCEDURE — 63710000001 INSULIN LISPRO (HUMAN) PER 5 UNITS: Performed by: INTERNAL MEDICINE

## 2024-03-22 PROCEDURE — C1725 CATH, TRANSLUMIN NON-LASER: HCPCS | Performed by: INTERNAL MEDICINE

## 2024-03-22 PROCEDURE — 93458 L HRT ARTERY/VENTRICLE ANGIO: CPT | Performed by: INTERNAL MEDICINE

## 2024-03-22 PROCEDURE — C1769 GUIDE WIRE: HCPCS | Performed by: INTERNAL MEDICINE

## 2024-03-22 PROCEDURE — 94664 DEMO&/EVAL PT USE INHALER: CPT

## 2024-03-22 PROCEDURE — 25010000002 MIDAZOLAM PER 1 MG: Performed by: INTERNAL MEDICINE

## 2024-03-22 PROCEDURE — 93799 UNLISTED CV SVC/PROCEDURE: CPT | Performed by: INTERNAL MEDICINE

## 2024-03-22 PROCEDURE — 25010000002 FENTANYL CITRATE (PF) 50 MCG/ML SOLUTION: Performed by: INTERNAL MEDICINE

## 2024-03-22 PROCEDURE — 25010000002 HEPARIN (PORCINE) PER 1000 UNITS: Performed by: INTERNAL MEDICINE

## 2024-03-22 PROCEDURE — C9607 PERC D-E COR REVASC CHRO SIN: HCPCS | Performed by: INTERNAL MEDICINE

## 2024-03-22 PROCEDURE — 25510000001 IOPAMIDOL PER 1 ML: Performed by: INTERNAL MEDICINE

## 2024-03-22 PROCEDURE — C1753 CATH, INTRAVAS ULTRASOUND: HCPCS | Performed by: INTERNAL MEDICINE

## 2024-03-22 PROCEDURE — 92943 PRQ TRLUML REVSC CH OCC ANT: CPT | Performed by: INTERNAL MEDICINE

## 2024-03-22 PROCEDURE — 82948 REAGENT STRIP/BLOOD GLUCOSE: CPT

## 2024-03-22 PROCEDURE — 93571 IV DOP VEL&/PRESS C FLO 1ST: CPT | Performed by: INTERNAL MEDICINE

## 2024-03-22 PROCEDURE — C1894 INTRO/SHEATH, NON-LASER: HCPCS | Performed by: INTERNAL MEDICINE

## 2024-03-22 DEVICE — XIENCE SKYPOINT™ EVEROLIMUS ELUTING CORONARY STENT SYSTEM 3.50 MM X 28 MM / RAPID-EXCHANGE
Type: IMPLANTABLE DEVICE | Site: HEART | Status: FUNCTIONAL
Brand: XIENCE SKYPOINT™

## 2024-03-22 RX ORDER — FENTANYL CITRATE 50 UG/ML
INJECTION, SOLUTION INTRAMUSCULAR; INTRAVENOUS
Status: DISCONTINUED | OUTPATIENT
Start: 2024-03-22 | End: 2024-03-22 | Stop reason: HOSPADM

## 2024-03-22 RX ORDER — LIDOCAINE HYDROCHLORIDE 20 MG/ML
INJECTION, SOLUTION INFILTRATION; PERINEURAL
Status: DISCONTINUED | OUTPATIENT
Start: 2024-03-22 | End: 2024-03-22 | Stop reason: HOSPADM

## 2024-03-22 RX ORDER — ROSUVASTATIN CALCIUM 20 MG/1
20 TABLET, COATED ORAL NIGHTLY
Status: DISCONTINUED | OUTPATIENT
Start: 2024-03-22 | End: 2024-03-23 | Stop reason: HOSPADM

## 2024-03-22 RX ORDER — HEPARIN SODIUM 1000 [USP'U]/ML
INJECTION, SOLUTION INTRAVENOUS; SUBCUTANEOUS
Status: DISCONTINUED | OUTPATIENT
Start: 2024-03-22 | End: 2024-03-22 | Stop reason: HOSPADM

## 2024-03-22 RX ORDER — SODIUM CHLORIDE 9 MG/ML
100 INJECTION, SOLUTION INTRAVENOUS CONTINUOUS
Status: ACTIVE | OUTPATIENT
Start: 2024-03-22 | End: 2024-03-22

## 2024-03-22 RX ORDER — CLOPIDOGREL BISULFATE 75 MG/1
TABLET ORAL
Status: DISCONTINUED | OUTPATIENT
Start: 2024-03-22 | End: 2024-03-22 | Stop reason: HOSPADM

## 2024-03-22 RX ORDER — SODIUM CHLORIDE 9 MG/ML
INJECTION, SOLUTION INTRAVENOUS
Status: DISCONTINUED | OUTPATIENT
Start: 2024-03-22 | End: 2024-03-22

## 2024-03-22 RX ORDER — ACETAMINOPHEN 325 MG/1
650 TABLET ORAL EVERY 4 HOURS PRN
Status: DISCONTINUED | OUTPATIENT
Start: 2024-03-22 | End: 2024-03-23 | Stop reason: HOSPADM

## 2024-03-22 RX ORDER — VERAPAMIL HYDROCHLORIDE 2.5 MG/ML
INJECTION, SOLUTION INTRAVENOUS
Status: DISCONTINUED | OUTPATIENT
Start: 2024-03-22 | End: 2024-03-22 | Stop reason: HOSPADM

## 2024-03-22 RX ORDER — MIDAZOLAM HYDROCHLORIDE 1 MG/ML
INJECTION INTRAMUSCULAR; INTRAVENOUS
Status: DISCONTINUED | OUTPATIENT
Start: 2024-03-22 | End: 2024-03-22 | Stop reason: HOSPADM

## 2024-03-22 RX ADMIN — PREGABALIN 100 MG: 100 CAPSULE ORAL at 15:38

## 2024-03-22 RX ADMIN — IPRATROPIUM BROMIDE AND ALBUTEROL SULFATE 3 ML: .5; 3 SOLUTION RESPIRATORY (INHALATION) at 20:23

## 2024-03-22 RX ADMIN — AMLODIPINE BESYLATE 5 MG: 5 TABLET ORAL at 08:42

## 2024-03-22 RX ADMIN — ACETAMINOPHEN 325MG 650 MG: 325 TABLET ORAL at 08:45

## 2024-03-22 RX ADMIN — LOSARTAN POTASSIUM 25 MG: 25 TABLET, FILM COATED ORAL at 08:42

## 2024-03-22 RX ADMIN — SODIUM CHLORIDE: 9 INJECTION, SOLUTION INTRAVENOUS at 14:45

## 2024-03-22 RX ADMIN — CLOPIDOGREL BISULFATE 75 MG: 75 TABLET, FILM COATED ORAL at 09:57

## 2024-03-22 RX ADMIN — SODIUM CHLORIDE 100 ML/HR: 9 INJECTION, SOLUTION INTRAVENOUS at 15:11

## 2024-03-22 RX ADMIN — PREGABALIN 100 MG: 100 CAPSULE ORAL at 08:42

## 2024-03-22 RX ADMIN — ROSUVASTATIN CALCIUM 20 MG: 20 TABLET, FILM COATED ORAL at 21:17

## 2024-03-22 RX ADMIN — IPRATROPIUM BROMIDE AND ALBUTEROL SULFATE 3 ML: .5; 3 SOLUTION RESPIRATORY (INHALATION) at 08:00

## 2024-03-22 RX ADMIN — CARVEDILOL 6.25 MG: 6.25 TABLET, FILM COATED ORAL at 21:17

## 2024-03-22 RX ADMIN — NITROGLYCERIN 1 INCH: 20 OINTMENT TOPICAL at 17:12

## 2024-03-22 RX ADMIN — PREGABALIN 100 MG: 100 CAPSULE ORAL at 21:17

## 2024-03-22 RX ADMIN — INSULIN LISPRO 2 UNITS: 100 INJECTION, SOLUTION INTRAVENOUS; SUBCUTANEOUS at 17:12

## 2024-03-22 RX ADMIN — IPRATROPIUM BROMIDE AND ALBUTEROL SULFATE 3 ML: .5; 3 SOLUTION RESPIRATORY (INHALATION) at 15:23

## 2024-03-22 RX ADMIN — CARVEDILOL 6.25 MG: 6.25 TABLET, FILM COATED ORAL at 08:42

## 2024-03-22 RX ADMIN — FOLIC ACID 1000 MCG: 1 TABLET ORAL at 08:42

## 2024-03-22 RX ADMIN — LEVOTHYROXINE SODIUM 50 MCG: 50 TABLET ORAL at 06:34

## 2024-03-22 RX ADMIN — ESCITALOPRAM OXALATE 10 MG: 10 TABLET, FILM COATED ORAL at 08:42

## 2024-03-22 RX ADMIN — NITROGLYCERIN 1 INCH: 20 OINTMENT TOPICAL at 06:34

## 2024-03-22 RX ADMIN — IPRATROPIUM BROMIDE AND ALBUTEROL SULFATE 3 ML: .5; 3 SOLUTION RESPIRATORY (INHALATION) at 11:13

## 2024-03-22 RX ADMIN — ASPIRIN 81 MG: 81 TABLET, COATED ORAL at 09:57

## 2024-03-22 RX ADMIN — PANTOPRAZOLE SODIUM 40 MG: 40 TABLET, DELAYED RELEASE ORAL at 06:34

## 2024-03-22 RX ADMIN — ISOSORBIDE MONONITRATE 30 MG: 30 TABLET, EXTENDED RELEASE ORAL at 08:42

## 2024-03-22 NOTE — PROGRESS NOTES
" LOS: 0 days     Name: Prem Thrasher  Age: 69 y.o.  Sex: male  :  1955  MRN: [id number]         Primary Care Physician: Montse Cain PA-C    Subjective   Subjective  No new complaints or acute overnight events.  Denies chest pain or shortness of breath.  Going for cardiac catheterization today    Objective   Vital Signs  Temp:  [97.3 °F (36.3 °C)-98.4 °F (36.9 °C)] 97.7 °F (36.5 °C)  Heart Rate:  [70-83] 75  Resp:  [10-18] 12  BP: (108-130)/(76-91) 130/91  Body mass index is 40.04 kg/m².    Objective:  General Appearance:  Comfortable and in no acute distress.    Vital signs: (most recent): Blood pressure 130/91, pulse 75, temperature 97.7 °F (36.5 °C), temperature source Oral, resp. rate 12, height 165 cm (64.96\"), weight 109 kg (240 lb 4.8 oz), SpO2 91%.    Lungs:  Normal effort and normal respiratory rate.    Heart: Normal rate.  Regular rhythm.    Abdomen: Abdomen is soft.  Bowel sounds are normal.   There is no abdominal tenderness.     Extremities: There is no dependent edema or local swelling.    Neurological: Patient is alert and oriented to person, place and time.    Skin:  Warm and dry.                Results Review:       I reviewed the patient's new clinical results.    Results from last 7 days   Lab Units 24  0659 24  1836   WBC 10*3/mm3 8.86 9.66   HEMOGLOBIN g/dL 12.2* 12.8*   PLATELETS 10*3/mm3 273 298     Results from last 7 days   Lab Units 24  0659 24  1836   SODIUM mmol/L 136 134*   POTASSIUM mmol/L 4.6 4.3   CHLORIDE mmol/L 102 99   CO2 mmol/L 22.4 24.0   BUN mg/dL 19 19   CREATININE mg/dL 0.95 1.20   CALCIUM mg/dL 9.3 9.1   GLUCOSE mg/dL 94 116*                 Scheduled Meds:   amLODIPine, 5 mg, Oral, Daily  aspirin, 81 mg, Oral, Daily  carvedilol, 6.25 mg, Oral, BID With Meals  clopidogrel, 75 mg, Oral, Daily  escitalopram, 10 mg, Oral, Daily  folic acid, 1,000 mcg, Oral, Daily  insulin lispro, 2-7 Units, Subcutaneous, 4x Daily AC & at " Bedtime  ipratropium-albuterol, 3 mL, Nebulization, 4x Daily - RT  isosorbide mononitrate, 30 mg, Oral, Daily  levothyroxine, 50 mcg, Oral, Q AM  losartan, 25 mg, Oral, Q24H  nitroglycerin, 1 inch, Topical, Q6H  pantoprazole, 40 mg, Oral, Q AM  pregabalin, 100 mg, Oral, TID  rosuvastatin, 20 mg, Oral, Nightly      PRN Meds:     acetaminophen    aluminum-magnesium hydroxide-simethicone    senna-docusate sodium **AND** polyethylene glycol **AND** bisacodyl **AND** bisacodyl    dextrose    dextrose    glucagon (human recombinant)    nitroglycerin    ondansetron ODT **OR** ondansetron    sodium chloride    sodium chloride    traMADol  Continuous Infusions:  sodium chloride, 100 mL/hr        Assessment & Plan   Active Hospital Problems    Diagnosis  POA    **Chest pain [R07.9]  Yes    Type 2 diabetes mellitus without complication, without long-term current use of insulin [E11.9]  Yes    Hypothyroidism, acquired [E03.9]  Yes    CAD S/P percutaneous coronary angioplasty [I25.10, Z98.61]  Not Applicable    Anemia [D64.9]  Yes    COPD (chronic obstructive pulmonary disease) [J44.9]  Yes    Hyperlipidemia [E78.5]  Yes    Hypertension [I10]  Yes    Rheumatoid arthritis [M06.9]  Yes      Resolved Hospital Problems   No resolved problems to display.       Assessment & Plan    -Cardiac catheterization today  -Wheezing has resolved.  Continue bronchodilators.  He will continue to follow-up with pulmonology in the outpatient setting for additional workup  -Blood sugar controlled on sliding scale insulin  -BP well controlled on losartan and norvasc      Expected Discharge Date: 3/22/2024; Expected Discharge Time:      Waldemar Farley MD  [facility]  03/22/24  14:26 EDT

## 2024-03-22 NOTE — PLAN OF CARE
Goal Outcome Evaluation:           Progress: no change  Outcome Evaluation: Pt appeared to sleep well, c/o cp at times, reports productive cough, o2 applied due to sats dropping while sleeping, up ad mo.pain managed with tylenol

## 2024-03-22 NOTE — PROGRESS NOTES
"    Patient Name: Prem Thrasher  :1955  69 y.o.      Patient Care Team:  Montse Cain PA-C as PCP - General (Family Medicine)  Sawyer Rick MD as Surgeon (Neurosurgery)  Pradip Mcmillan MD as Consulting Physician (Pulmonary Disease)  Jacky Perez MD as Surgeon (Orthopedic Surgery)  Charli Sen MD as Consulting Physician (Infectious Diseases)  Tray Moody MD as Consulting Physician (Urology)  Suman Richards DPM as Consulting Physician (Podiatry)  Porsha Arcos MD as Consulting Physician (General Surgery)  Dioni Salazar MD as Consulting Physician (Cardiology)  Ryan Louise MD (Rheumatology)    Chief Complaint:   CP  Interval History:    No further angina or dyspnea    Objective   Vital Signs  Temp:  [97.3 °F (36.3 °C)-98.5 °F (36.9 °C)] 97.7 °F (36.5 °C)  Heart Rate:  [70-83] 82  Resp:  [16-18] 18  BP: (108-130)/(76-91) 130/91  No intake or output data in the 24 hours ending 24 0905  Flowsheet Rows      Flowsheet Row First Filed Value   Admission Height 165.1 cm (65\") Documented at 2024 0520   Admission Weight 109 kg (240 lb 8.4 oz) Documented at 2024 0520            Physical Exam:   General Appearance:    Alert, cooperative, in no acute distress   Lungs:     Clear to auscultation.  Normal respiratory effort and rate.      Heart:    Regular rhythm and normal rate, normal S1 and S2, no murmurs, gallops or rubs.     Chest Wall:    No abnormalities observed   Abdomen:     Soft, nontender, positive bowel sounds.     Extremities:   no cyanosis, clubbing or edema.  No marked joint deformities.  Adequate musculoskeletal strength.       Results Review:    Results from last 7 days   Lab Units 24  0659   SODIUM mmol/L 136   POTASSIUM mmol/L 4.6   CHLORIDE mmol/L 102   CO2 mmol/L 22.4   BUN mg/dL 19   CREATININE mg/dL 0.95   GLUCOSE mg/dL 94   CALCIUM mg/dL 9.3     Results from last 7 days   Lab Units 24  0036 24   HSTROP T " ng/L 21 28*  28*     Results from last 7 days   Lab Units 03/21/24  0659   WBC 10*3/mm3 8.86   HEMOGLOBIN g/dL 12.2*   HEMATOCRIT % 37.2*   PLATELETS 10*3/mm3 273         Results from last 7 days   Lab Units 03/21/24  0659   MAGNESIUM mg/dL 2.2     Results from last 7 days   Lab Units 03/21/24  0659   CHOLESTEROL mg/dL 89   TRIGLYCERIDES mg/dL 66   HDL CHOL mg/dL 41   LDL CHOL mg/dL 33               Medication Review:   amLODIPine, 5 mg, Oral, Daily  aspirin, 81 mg, Oral, Daily  carvedilol, 6.25 mg, Oral, BID With Meals  clopidogrel, 75 mg, Oral, Daily  escitalopram, 10 mg, Oral, Daily  folic acid, 1,000 mcg, Oral, Daily  insulin lispro, 2-7 Units, Subcutaneous, 4x Daily AC & at Bedtime  ipratropium-albuterol, 3 mL, Nebulization, 4x Daily - RT  isosorbide mononitrate, 30 mg, Oral, Daily  levothyroxine, 50 mcg, Oral, Q AM  losartan, 25 mg, Oral, Q24H  nitroglycerin, 1 inch, Topical, Q6H  pantoprazole, 40 mg, Oral, Q AM  pregabalin, 100 mg, Oral, TID  rosuvastatin, 10 mg, Oral, Nightly              Assessment & Plan   Chest pain/  worsened chronic stable angina.  Nuclear with apical ischemia. Large protuberant abdomen; could be gut attenuation however this is similar in presentation to prior angina. Has had 2 PCI and 1 PTCa of LAD and PCI of circ in the past. Cath today. Echo with normal LV   COPD wheezing resolved  Recent JATINDER Mars MD  [facility]  03/22/24  09:05 EDT

## 2024-03-22 NOTE — CONSULTS
Met with patient to discuss the benefits of cardiac rehab. Provided phase II information along with the contact information for cardiac rehab here at [facility]. Pt reports he attended before but the program was too hard for him. Pt will review the information and call if interested in attending.

## 2024-03-23 ENCOUNTER — READMISSION MANAGEMENT (OUTPATIENT)
Dept: CALL CENTER | Facility: HOSPITAL | Age: 69
End: 2024-03-23
Payer: MEDICARE

## 2024-03-23 VITALS
OXYGEN SATURATION: 92 % | HEIGHT: 65 IN | DIASTOLIC BLOOD PRESSURE: 81 MMHG | WEIGHT: 240.3 LBS | SYSTOLIC BLOOD PRESSURE: 120 MMHG | BODY MASS INDEX: 40.04 KG/M2 | RESPIRATION RATE: 16 BRPM | HEART RATE: 76 BPM | TEMPERATURE: 98.2 F

## 2024-03-23 LAB
ANION GAP SERPL CALCULATED.3IONS-SCNC: 12 MMOL/L (ref 5–15)
BUN SERPL-MCNC: 17 MG/DL (ref 8–23)
BUN/CREAT SERPL: 17.3 (ref 7–25)
CALCIUM SPEC-SCNC: 8.8 MG/DL (ref 8.6–10.5)
CHLORIDE SERPL-SCNC: 99 MMOL/L (ref 98–107)
CO2 SERPL-SCNC: 21 MMOL/L (ref 22–29)
CREAT SERPL-MCNC: 0.98 MG/DL (ref 0.76–1.27)
DEPRECATED RDW RBC AUTO: 46.4 FL (ref 37–54)
EGFRCR SERPLBLD CKD-EPI 2021: 83 ML/MIN/1.73
ERYTHROCYTE [DISTWIDTH] IN BLOOD BY AUTOMATED COUNT: 14.5 % (ref 12.3–15.4)
GLUCOSE BLDC GLUCOMTR-MCNC: 106 MG/DL (ref 70–130)
GLUCOSE SERPL-MCNC: 91 MG/DL (ref 65–99)
HCT VFR BLD AUTO: 39.4 % (ref 37.5–51)
HGB BLD-MCNC: 12.8 G/DL (ref 13–17.7)
MCH RBC QN AUTO: 28 PG (ref 26.6–33)
MCHC RBC AUTO-ENTMCNC: 32.5 G/DL (ref 31.5–35.7)
MCV RBC AUTO: 86.2 FL (ref 79–97)
PLATELET # BLD AUTO: 274 10*3/MM3 (ref 140–450)
PMV BLD AUTO: 9.3 FL (ref 6–12)
POTASSIUM SERPL-SCNC: 3.8 MMOL/L (ref 3.5–5.2)
RBC # BLD AUTO: 4.57 10*6/MM3 (ref 4.14–5.8)
SODIUM SERPL-SCNC: 132 MMOL/L (ref 136–145)
WBC NRBC COR # BLD AUTO: 11.87 10*3/MM3 (ref 3.4–10.8)

## 2024-03-23 PROCEDURE — 82948 REAGENT STRIP/BLOOD GLUCOSE: CPT

## 2024-03-23 PROCEDURE — 99214 OFFICE O/P EST MOD 30 MIN: CPT

## 2024-03-23 PROCEDURE — 80048 BASIC METABOLIC PNL TOTAL CA: CPT | Performed by: INTERNAL MEDICINE

## 2024-03-23 PROCEDURE — 94799 UNLISTED PULMONARY SVC/PX: CPT

## 2024-03-23 PROCEDURE — 93010 ELECTROCARDIOGRAM REPORT: CPT | Performed by: INTERNAL MEDICINE

## 2024-03-23 PROCEDURE — G0378 HOSPITAL OBSERVATION PER HR: HCPCS

## 2024-03-23 PROCEDURE — 85027 COMPLETE CBC AUTOMATED: CPT | Performed by: INTERNAL MEDICINE

## 2024-03-23 PROCEDURE — 93005 ELECTROCARDIOGRAM TRACING: CPT | Performed by: INTERNAL MEDICINE

## 2024-03-23 RX ORDER — CARVEDILOL 6.25 MG/1
6.25 TABLET ORAL 2 TIMES DAILY WITH MEALS
Qty: 60 TABLET | Refills: 0 | Status: SHIPPED | OUTPATIENT
Start: 2024-03-23 | End: 2024-04-22

## 2024-03-23 RX ORDER — ASPIRIN 81 MG/1
81 TABLET ORAL DAILY
Qty: 30 TABLET | Refills: 0 | Status: SHIPPED | OUTPATIENT
Start: 2024-03-24 | End: 2024-04-23

## 2024-03-23 RX ADMIN — LOSARTAN POTASSIUM 25 MG: 25 TABLET, FILM COATED ORAL at 08:31

## 2024-03-23 RX ADMIN — PREGABALIN 100 MG: 100 CAPSULE ORAL at 08:31

## 2024-03-23 RX ADMIN — ACETAMINOPHEN 325MG 650 MG: 325 TABLET ORAL at 04:48

## 2024-03-23 RX ADMIN — FOLIC ACID 1000 MCG: 1 TABLET ORAL at 08:31

## 2024-03-23 RX ADMIN — ESCITALOPRAM OXALATE 10 MG: 10 TABLET, FILM COATED ORAL at 08:31

## 2024-03-23 RX ADMIN — AMLODIPINE BESYLATE 5 MG: 5 TABLET ORAL at 08:31

## 2024-03-23 RX ADMIN — PANTOPRAZOLE SODIUM 40 MG: 40 TABLET, DELAYED RELEASE ORAL at 06:36

## 2024-03-23 RX ADMIN — LEVOTHYROXINE SODIUM 50 MCG: 50 TABLET ORAL at 06:36

## 2024-03-23 RX ADMIN — CARVEDILOL 6.25 MG: 6.25 TABLET, FILM COATED ORAL at 08:31

## 2024-03-23 RX ADMIN — IPRATROPIUM BROMIDE AND ALBUTEROL SULFATE 3 ML: .5; 3 SOLUTION RESPIRATORY (INHALATION) at 07:16

## 2024-03-23 RX ADMIN — ASPIRIN 81 MG: 81 TABLET, COATED ORAL at 08:32

## 2024-03-23 RX ADMIN — ISOSORBIDE MONONITRATE 30 MG: 30 TABLET, EXTENDED RELEASE ORAL at 08:31

## 2024-03-23 RX ADMIN — CLOPIDOGREL BISULFATE 75 MG: 75 TABLET, FILM COATED ORAL at 08:31

## 2024-03-23 NOTE — CASE MANAGEMENT/SOCIAL WORK
Case Management Discharge Note      Final Note: dc home    Provided Post Acute Provider List?: N/A  Provided Post Acute Provider Quality & Resource List?: N/A    Selected Continued Care - Discharged on 3/23/2024 Admission date: 3/20/2024 - Discharge disposition: Home or Self Care      Destination    No services have been selected for the patient.                Durable Medical Equipment    No services have been selected for the patient.                Dialysis/Infusion    No services have been selected for the patient.                Home Medical Care    No services have been selected for the patient.                Therapy    No services have been selected for the patient.                Community Resources    No services have been selected for the patient.                Community & DME    No services have been selected for the patient.                         Final Discharge Disposition Code: 01 - home or self-care

## 2024-03-23 NOTE — PLAN OF CARE
Problem: Adult Inpatient Plan of Care  Goal: Plan of Care Review  Outcome: Ongoing, Progressing  Flowsheets (Taken 3/23/2024 0449)  Progress: no change  Plan of Care Reviewed With: patient  Outcome Evaluation: Pt is AO x4. All vs stable. No complaints of pain chest pain overnihgt. Rt radial site S/D/I. No distress noted. WCTM  Goal: Patient-Specific Goal (Individualized)  Outcome: Ongoing, Progressing  Goal: Absence of Hospital-Acquired Illness or Injury  Outcome: Ongoing, Progressing  Intervention: Identify and Manage Fall Risk  Description: Perform standard risk assessment on admission using a validated tool or comprehensive approach appropriate to the patient; reassess fall risk frequently, with change in status or transfer to another level of care.  Communicate fall injury risk to interprofessional healthcare team.  Determine need for increased observation, equipment and environmental modification, such as low bed, signage and supportive, nonskid footwear.  Adjust safety measures to individual developmental age, stage and identified risk factors.  Reinforce the importance of safety and physical activity with patient and family.  Perform regular intentional rounding to assess need for position change, pain assessment and personal needs, including assistance with toileting.  Recent Flowsheet Documentation  Taken 3/23/2024 0225 by Rob Toussaint, RN  Safety Promotion/Fall Prevention:   assistive device/personal items within reach   activity supervised   fall prevention program maintained   clutter free environment maintained   muscle strengthening facilitated   nonskid shoes/slippers when out of bed   room organization consistent   safety round/check completed  Taken 3/23/2024 0030 by Rob Toussaint, RN  Safety Promotion/Fall Prevention:   assistive device/personal items within reach   activity supervised   lighting adjusted   mobility aid in reach   room organization consistent   safety round/check completed  Taken  3/22/2024 2200 by Rob Toussaint RN  Safety Promotion/Fall Prevention:   activity supervised   assistive device/personal items within reach   clutter free environment maintained   fall prevention program maintained   nonskid shoes/slippers when out of bed   muscle strengthening facilitated   room organization consistent   safety round/check completed  Taken 3/22/2024 2030 by Rob Toussaint RN  Safety Promotion/Fall Prevention:   activity supervised   assistive device/personal items within reach   fall prevention program maintained   clutter free environment maintained   nonskid shoes/slippers when out of bed   muscle strengthening facilitated   safety round/check completed   room organization consistent  Intervention: Prevent Skin Injury  Description: Perform a screening for skin injury risk, such as pressure or moisture associated skin damage on admission and at regular intervals throughout hospital stay.  Keep all areas of skin (especially folds) clean and dry.  Maintain adequate skin hydration.  Relieve and redistribute pressure and protect bony prominences; implement measures based on patient-specific risk factors.  Match turning and repositioning schedule to clinical condition.  Encourage weight shift frequently; assist with reposition if unable to complete independently.  Float heels off bed; avoid pressure on the Achilles tendon.  Keep skin free from extended contact with medical devices.  Encourage functional activity and mobility, as early as tolerated.  Use aids (e.g., slide boards, mechanical lift) during transfer.  Recent Flowsheet Documentation  Taken 3/23/2024 0225 by Rob Toussaint RN  Body Position: position changed independently  Taken 3/23/2024 0030 by Rob Toussaint RN  Body Position: position changed independently  Taken 3/22/2024 2200 by Rob Toussaint RN  Body Position: position changed independently  Taken 3/22/2024 2030 by Rob Toussaint RN  Body Position: position changed  independently  Intervention: Prevent and Manage VTE (Venous Thromboembolism) Risk  Description: Assess for VTE (venous thromboembolism) risk.  Encourage and assist with early ambulation.  Initiate and maintain compression or other therapy, as indicated, based on identified risk in accordance with organizational protocol and provider order.  Encourage both active and passive leg exercises while in bed, if unable to ambulate.  Recent Flowsheet Documentation  Taken 3/23/2024 0225 by Rob Toussaint RN  Activity Management: activity encouraged  Taken 3/23/2024 0030 by Rob Toussaint RN  Activity Management: activity encouraged  Taken 3/22/2024 2200 by Rob Toussaint RN  Activity Management: activity encouraged  Taken 3/22/2024 2030 by Rob Toussaint RN  Activity Management: activity encouraged  Intervention: Prevent Infection  Description: Maintain skin and mucous membrane integrity; promote hand, oral and pulmonary hygiene.  Optimize fluid balance, nutrition, sleep and glycemic control to maximize infection resistance.  Identify potential sources of infection early to prevent or mitigate progression of infection (e.g., wound, lines, devices).  Evaluate ongoing need for invasive devices; remove promptly when no longer indicated.  Recent Flowsheet Documentation  Taken 3/23/2024 0225 by Rob Toussaint RN  Infection Prevention:   visitors restricted/screened   single patient room provided   personal protective equipment utilized   hand hygiene promoted   equipment surfaces disinfected   environmental surveillance performed   cohorting utilized  Taken 3/23/2024 0030 by Rob Toussaint RN  Infection Prevention:   single patient room provided   visitors restricted/screened   personal protective equipment utilized   rest/sleep promoted   equipment surfaces disinfected   environmental surveillance performed   cohorting utilized   hand hygiene promoted  Taken 3/22/2024 2200 by Rob Toussaint RN  Infection Prevention:   visitors  restricted/screened   single patient room provided   rest/sleep promoted   personal protective equipment utilized   hand hygiene promoted   equipment surfaces disinfected   environmental surveillance performed   cohorting utilized  Taken 3/22/2024 2030 by Rob Toussaint RN  Infection Prevention:   visitors restricted/screened   single patient room provided   rest/sleep promoted   personal protective equipment utilized   hand hygiene promoted   cohorting utilized   environmental surveillance performed   equipment surfaces disinfected  Goal: Optimal Comfort and Wellbeing  Outcome: Ongoing, Progressing  Intervention: Provide Person-Centered Care  Description: Use a family-focused approach to care.  Develop trust and rapport by proactively providing information, encouraging questions, addressing concerns and offering reassurance.  Acknowledge emotional response to hospitalization.  Recognize and utilize personal coping strategies.  Honor spiritual and cultural preferences.  Recent Flowsheet Documentation  Taken 3/23/2024 0030 by Rob Toussaint RN  Trust Relationship/Rapport:   thoughts/feelings acknowledged   reassurance provided   questions encouraged   questions answered   empathic listening provided   emotional support provided   care explained   choices provided  Taken 3/22/2024 2030 by Rob Toussaint RN  Trust Relationship/Rapport:   thoughts/feelings acknowledged   reassurance provided   questions encouraged   questions answered   empathic listening provided   emotional support provided   choices provided   care explained  Goal: Readiness for Transition of Care  Outcome: Ongoing, Progressing   Goal Outcome Evaluation:  Plan of Care Reviewed With: patient        Progress: no change  Outcome Evaluation: Pt is AO x4. All vs stable. No complaints of pain chest pain overnihgt. Rt radial site S/D/I. No distress noted. WCTM

## 2024-03-23 NOTE — PROGRESS NOTES
LOS: 0 days   Patient Care Team:  Montse Cain PA-C as PCP - General (Family Medicine)  Sawyer Rick MD as Surgeon (Neurosurgery)  Pradip Mcmillan MD as Consulting Physician (Pulmonary Disease)  Jacky Perez MD as Surgeon (Orthopedic Surgery)  Charli Sen MD as Consulting Physician (Infectious Diseases)  Tray Moody MD as Consulting Physician (Urology)  Suman Richards DPM as Consulting Physician (Podiatry)  Porsha Arcos MD as Consulting Physician (General Surgery)  Dioni Salazar MD as Consulting Physician (Cardiology)  Ryan Louise MD (Rheumatology)    Chief Complaint: follow up chest pain, exertional dyspnea     Interval History: He is resting in bed.  He is frustrated that some of his belongings were lost overnight.  He denies chest pain or discomfort, palpitations, or shortness of breath.    Vital Signs:  Temp:  [98.2 °F (36.8 °C)-98.4 °F (36.9 °C)] 98.2 °F (36.8 °C)  Heart Rate:  [71-81] 76  Resp:  [10-18] 16  BP: (112-125)/(72-94) 120/81    Intake/Output Summary (Last 24 hours) at 3/23/2024 0922  Last data filed at 3/23/2024 0844  Gross per 24 hour   Intake 560 ml   Output --   Net 560 ml      Physical Exam  Vitals reviewed.   Constitutional:       General: He is not in acute distress.  HENT:      Head: Normocephalic.      Nose: Nose normal.   Eyes:      Extraocular Movements: Extraocular movements intact.      Pupils: Pupils are equal, round, and reactive to light.   Cardiovascular:      Rate and Rhythm: Normal rate and regular rhythm.      Pulses: Normal pulses.      Heart sounds: Normal heart sounds. Heart sounds not distant. No murmur heard.     No friction rub. No gallop. No S3 or S4 sounds.   Pulmonary:      Effort: Pulmonary effort is normal.      Breath sounds: Normal breath sounds.   Abdominal:      General: Abdomen is flat. Bowel sounds are normal.      Palpations: Abdomen is soft.      Tenderness: There is no abdominal tenderness.   Skin:      General: Skin is warm and dry.   Neurological:      General: No focal deficit present.      Mental Status: He is alert and oriented to person, place, and time. Mental status is at baseline.   Psychiatric:         Mood and Affect: Mood normal.         Behavior: Behavior normal.       Right radial cath site well healed without erythema or ecchymosis, palpable proximal and distal pulses, good capillary refill, good motor function of hand, no thrill or bruit detected, site is soft and non-tender with no hematoma, no sensory deficits noted.      Results Review:    Results from last 7 days   Lab Units 03/23/24  0345   SODIUM mmol/L 132*   POTASSIUM mmol/L 3.8   CHLORIDE mmol/L 99   CO2 mmol/L 21.0*   BUN mg/dL 17   CREATININE mg/dL 0.98   GLUCOSE mg/dL 91   CALCIUM mg/dL 8.8     Results from last 7 days   Lab Units 03/21/24  0036 03/20/24 2025   HSTROP T ng/L 21 28*  28*     Results from last 7 days   Lab Units 03/23/24  0345   WBC 10*3/mm3 11.87*   HEMOGLOBIN g/dL 12.8*   HEMATOCRIT % 39.4   PLATELETS 10*3/mm3 274         Results from last 7 days   Lab Units 03/21/24  0659   CHOLESTEROL mg/dL 89     Results from last 7 days   Lab Units 03/21/24  0659   MAGNESIUM mg/dL 2.2     Results from last 7 days   Lab Units 03/21/24  0659   CHOLESTEROL mg/dL 89   TRIGLYCERIDES mg/dL 66   HDL CHOL mg/dL 41   LDL CHOL mg/dL 33       I reviewed the patient's new clinical results.        Assessment & Plan:  Chest pain and exertional dyspnea  His troponin was mildly elevated, and peaked at 28.  EKG showed normal sinus rhythm without any ischemic changes.  He underwent stress testing which showed apical ischemia.  He then underwent a cardiac cath on 3/22/2024 and was noted to have a 100% distal RCA chronic total occlusion with left-to-right collaterals and received a 3.5 x 28 mm Xience drug-eluting stent.  He was also noted to have distal LAD disease for which medical management was recommended for given the small caliber size and  distal location.  Continue Imdur 30 mg daily, aspirin 81 mg daily, Plavix 75 mg daily, carvedilol 6.25 mg twice daily, and rosuvastatin 20 mg daily.  Bilateral wheezing, history of COPD  Coronary artery disease  History of LAD stenting x 2, PTCA of the distal LAD, PCI of the circumflex.  Recent COVID infection    He is chest pain free this morning. No objection to discharge later today from a cardiology standpoint. I will arrange for a follow up in our office in one week.     Fara Patel, APRN  03/23/24  09:22 EDT

## 2024-03-23 NOTE — DISCHARGE SUMMARY
Date of Admission: 3/20/2024  Date of Discharge:  3/23/2024  Primary Care Physician: Montse Cain, PAFigueroaC     Discharge Diagnosis:  Active Hospital Problems    Diagnosis  POA    **Chest pain [R07.9]  Yes    Type 2 diabetes mellitus without complication, without long-term current use of insulin [E11.9]  Yes    Hypothyroidism, acquired [E03.9]  Yes    CAD S/P percutaneous coronary angioplasty [I25.10, Z98.61]  Not Applicable    Anemia [D64.9]  Yes    COPD (chronic obstructive pulmonary disease) [J44.9]  Yes    Hyperlipidemia [E78.5]  Yes    Hypertension [I10]  Yes    Rheumatoid arthritis [M06.9]  Yes      Resolved Hospital Problems   No resolved problems to display.       DETAILS OF HOSPITAL STAY     Pertinent Test Results and Procedures Performed    Chest x-ray:  1. No active disease is seen in the chest and the etiology of this   patient's chest pain is not established on this exam.      2. There is a reverse right total shoulder arthroplasty in place that is   only partially visualized on this exam.     Hospital Course  This is a 69-year-old male who presented to the emergency room with complaints of chest pain.  Please see H&P for full details of admission.  Cardiology was consulted.  His troponin was mildly elevated, and peaked at 28.  EKG showed normal sinus rhythm without any ischemic changes.  He underwent stress testing which showed apical ischemia.  He then underwent a cardiac cath on 3/22/2024 and was noted to have a 100% distal RCA chronic total occlusion with left-to-right collaterals and received a 3.5 x 28 mm Xience drug-eluting stent.  He was also noted to have distal LAD disease for which medical management was recommended for given the small caliber size and distal location.  Cardiology recommended he continue Imdur 30 mg daily, aspirin 81 mg daily, Plavix 75 mg daily, carvedilol 6.25 mg twice daily, and rosuvastatin 20 mg daily.  He is medically stable and chest pain-free today.  They have cleared  him for discharge and he will be released home and follow-up in their office in 1 week.    Physical Exam at Discharge:  General: No acute distress, AAOx3  HEENT: EOMI, PERRL  Cardiovascular: +s1 and s2, RRR  Lungs: No rhonchi or wheezing  Abdomen: soft, nontender    Consults:   Consults       Date and Time Order Name Status Description    3/20/2024  8:49 PM Inpatient Cardiology Consult Completed     3/20/2024  7:23 PM [facility] (on-call MD unless specified) Details                Condition on Discharge: Stable, improved    Discharge Disposition  Home or Self Care    Discharge Medications     Discharge Medications        Changes to Medications        Instructions Start Date   aspirin 81 MG EC tablet  What changed:   medication strength  how much to take   81 mg, Oral, Daily   Start Date: March 24, 2024     carvedilol 6.25 MG tablet  Commonly known as: COREG  What changed:   medication strength  how much to take   6.25 mg, Oral, 2 Times Daily With Meals             Continue These Medications        Instructions Start Date   amLODIPine 10 MG tablet  Commonly known as: NORVASC   5 mg, Oral, Daily, for blood pressure      B-12 1000 MCG sublingual tablet   DISSOLVE 1 TABLET UNDER THE TONGUE EVERY DAY      busPIRone 10 MG tablet  Commonly known as: BUSPAR   TAKE 1 TABLET TWICE DAILY AS NEEDED FOR ANXIETY      clopidogrel 75 MG tablet  Commonly known as: PLAVIX   TAKE 1 TABLET EVERY DAY      cyclobenzaprine 10 MG tablet  Commonly known as: FLEXERIL   10 mg, Oral, 2 Times Daily      escitalopram 20 MG tablet  Commonly known as: LEXAPRO   TAKE 1 TABLET EVERY NIGHT FOR ANXIETY      ezetimibe 10 MG tablet  Commonly known as: ZETIA   10 mg, Oral, Daily      folic acid 1 MG tablet  Commonly known as: FOLVITE   TAKE 1 TABLET EVERY DAY      irbesartan 75 MG tablet  Commonly known as: AVAPRO   75 mg, Oral, Daily, for blood pressure      isosorbide mononitrate 30 MG 24 hr tablet  Commonly known as: IMDUR   TAKE 1 TABLET EVERY DAY       levothyroxine 50 MCG tablet  Commonly known as: SYNTHROID, LEVOTHROID   TAKE 1 TABLET EVERY DAY FOR THYROID BEFORE BREAKFAST (NEW DOSE)      meclizine 25 MG tablet  Commonly known as: ANTIVERT   25 mg, Oral, 3 Times Daily PRN      metFORMIN  MG 24 hr tablet  Commonly known as: GLUCOPHAGE-XR   2 TABLETS EVERY DAY ROUTINE FOR DIABETES      multivitamin with minerals tablet tablet   1 tablet, Oral, Daily, HOLD X1 WEEK PRIOR TO SURGERY      nitroglycerin 0.4 MG SL tablet  Commonly known as: Nitrostat   0.4 mg, Sublingual, Every 5 Minutes PRN, Take no more than 3 doses in 15 minutes.      OSTEO BI-FLEX ONE PER DAY PO   Oral      pantoprazole 40 MG EC tablet  Commonly known as: PROTONIX   TAKE 1 TABLET EVERY DAY FOR GERD      pregabalin 100 MG capsule  Commonly known as: LYRICA   100 mg, Oral, 3 Times Daily      rosuvastatin 10 MG tablet  Commonly known as: CRESTOR   10 mg, Oral, Every Night at Bedtime      Stool Softener 100 MG capsule  Generic drug: docusate sodium       traMADol 50 MG tablet  Commonly known as: ULTRAM   50 mg, Oral, Every 8 Hours PRN      Vitamin D 50 MCG (2000 UT) tablet   2,000 Units, Oral, Every Evening             Stop These Medications      amoxicillin-clavulanate 875-125 MG per tablet  Commonly known as: AUGMENTIN     azithromycin 250 MG tablet  Commonly known as: ZITHROMAX     Nirmatrelvir & Ritonavir (300mg/100mg)  Commonly known as: PAXLOVID     predniSONE 20 MG tablet  Commonly known as: DELTASONE              Discharge Diet:   Diet Instructions       Diet: Regular/House Diet, Cardiac Diets; Healthy Heart (2-3 Na+); Regular (IDDSI 7); Thin (IDDSI 0)      Discharge Diet:  Regular/House Diet  Cardiac Diets       Cardiac Diet: Healthy Heart (2-3 Na+)    Texture: Regular (IDDSI 7)    Fluid Consistency: Thin (IDDSI 0)            Activity at Discharge:   Activity Instructions       Activity as Tolerated              Follow-up Appointments  Future Appointments   Date Time Provider Department  [place]   4/9/2024 12:40 PM Senait Quiroz APRN [facility] PM EASPT [facility]   4/12/2024 10:30 AM Jami Mars MD [facility] CD [id number] None   4/29/2024 11:00 AM [facility] CT 1 BH [facility] CT M [place]   5/30/2024 10:30 AM [facility] 2 [facility] PC JTWN2 [facility]   6/6/2024  1:15 PM Montse Cain PA-C [facility] PC JTWN2 [facility]     Additional Instructions for the Follow-ups that You Need to Schedule       Ambulatory Referral to Cardiac Rehab   As directed      Discharge Follow-up with PCP   As directed       Currently Documented PCP:    Montse Cain PA-C    PCP Phone Number:    [phone]     Follow Up Details: 1 week        Discharge Follow-up with Specialty: [facility] in 1 week   As directed      Specialty: [facility] in 1 week                  I have examined and discussed discharge planning with the patient today.    I wore full protective equipment throughout the patient encounter including eye protection and facemask.  Hand hygiene was performed before donning protective equipment and after removal when leaving the room.     Waldemar Farley MD  03/23/24  09:51 EDT    Time: Discharge greater than 30 min

## 2024-03-23 NOTE — OUTREACH NOTE
Prep Survey      Flowsheet Row Responses   [facility] patient discharged from? [place] [facility]   Is LACE score < 7 ? No   Eligibility [facility]   Date of Admission 03/20/24   Date of Discharge 03/23/24   Discharge Disposition Home or Self Care   Discharge diagnosis Chest pain   Does the patient have one of the following disease processes/diagnoses(primary or secondary)? Other   Does the patient have Home health ordered? No   Is there a DME ordered? No   Comments regarding appointments Referral to Cardiac Rehab--[facility]   Medication alerts for this patient see AVS   Prep survey completed? Yes            Lucy LIAO - Registered Nurse

## 2024-03-25 ENCOUNTER — TELEPHONE (OUTPATIENT)
Dept: FAMILY MEDICINE CLINIC | Facility: CLINIC | Age: 69
End: 2024-03-25
Payer: MEDICARE

## 2024-03-25 ENCOUNTER — TRANSITIONAL CARE MANAGEMENT TELEPHONE ENCOUNTER (OUTPATIENT)
Dept: CALL CENTER | Facility: HOSPITAL | Age: 69
End: 2024-03-25
Payer: MEDICARE

## 2024-03-25 LAB
ACT BLD: 325 SECONDS (ref 82–152)
ACT BLD: 347 SECONDS (ref 82–152)
QT INTERVAL: 396 MS
QTC INTERVAL: 440 MS

## 2024-03-25 NOTE — OUTREACH NOTE
Call Center TCM Note      Flowsheet Row Responses   [facility] patient discharged from? [place]   Does the patient have one of the following disease processes/diagnoses(primary or secondary)? Other   TCM attempt successful? Yes   Call start time 1205   Call end time 1213   Discharge diagnosis Chest pain   Is patient permission given to speak with other caregiver? No   Person spoke with today (if not patient) and relationship Patient   Meds reviewed with patient/caregiver? Yes   Is the patient having any side effects they believe may be caused by any medication additions or changes? No   Does the patient have all medications ordered at discharge? No   Nursing Interventions No intervention needed   Prescription comments Patient verified Aspirin dosage. He states he was on 325 mgs of Aspirin. His wife will  new dosage, 81 mgs for patient to start taking daily. Pt asked why Coreg dosage was changed. Looked into chart, nothing seen. Advised to speak to Cardiologist at f/u appt regarding the change and why.   Is the patient taking all medications as directed (includes completed medication regime)? Yes   Does the patient have an appointment with their PCP within 7-14 days of discharge? No appointments available   Nursing Interventions PCP office requested to make appointment - message sent   Has home health visited the patient within 72 hours of discharge? N/A   Psychosocial issues? No   Comments Pt states he is doing better. No chest pain since having procedure in the hospital. Pt does not wish to do Cardiac Rehab, he states he has a bad back and trouble with his legs, he cannot do the Cardiac Rehab program. Pt has f/u appt with Cardiology on 4/12/24 at 10:30 AM with Dr. Jami Mars. Verified with patient.   Did the patient receive a copy of their discharge instructions? Yes   Nursing interventions Reviewed instructions with patient   What is the patient's perception of their health status since  discharge? Improving   Is the patient/caregiver able to teach back signs and symptoms related to disease process for when to call PCP? Yes   Is the patient/caregiver able to teach back signs and symptoms related to disease process for when to call 911? Yes   Is the patient/caregiver able to teach back the hierarchy of who to call/visit for symptoms/problems? PCP, Specialist, Home health nurse, Urgent Care, ED, 911 Yes   If the patient is a current smoker, are they able to teach back resources for cessation? Not a smoker   TCM call completed? Yes   Wrap up additional comments Pt denies any needs or concerns.   Call end time 1213   Would this patient benefit from a Referral to Amb Social Work? No   Is the patient interested in additional calls from an ambulatory ? No            Lucy White RN    3/25/2024, 12:13 EDT

## 2024-03-25 NOTE — TELEPHONE ENCOUNTER
"UNABLE TO WARM TRANSFER.      Caller: Prem Thrasher \"Greg\"    Relationship to patient: Self    Best call back number: [phone]    Patient is needing: PATIENT IS NEEDING A HOSPITAL FOLLOW UP FOR HEART SURGERY AT [facility]. DISCHARGE 3/23. PLEASE CALL   "

## 2024-03-28 ENCOUNTER — OFFICE VISIT (OUTPATIENT)
Dept: FAMILY MEDICINE CLINIC | Facility: CLINIC | Age: 69
End: 2024-03-28
Payer: MEDICARE

## 2024-03-28 VITALS
DIASTOLIC BLOOD PRESSURE: 78 MMHG | HEIGHT: 65 IN | HEART RATE: 83 BPM | BODY MASS INDEX: 40.98 KG/M2 | OXYGEN SATURATION: 94 % | WEIGHT: 246 LBS | SYSTOLIC BLOOD PRESSURE: 126 MMHG | RESPIRATION RATE: 16 BRPM | TEMPERATURE: 97.9 F

## 2024-03-28 DIAGNOSIS — E55.9 VITAMIN D DEFICIENCY: Chronic | ICD-10-CM

## 2024-03-28 DIAGNOSIS — Z09 HOSPITAL DISCHARGE FOLLOW-UP: Primary | ICD-10-CM

## 2024-03-28 DIAGNOSIS — I25.110 CORONARY ARTERY DISEASE INVOLVING NATIVE CORONARY ARTERY OF NATIVE HEART WITH UNSTABLE ANGINA PECTORIS: ICD-10-CM

## 2024-03-28 DIAGNOSIS — E53.8 LOW SERUM VITAMIN B12: Chronic | ICD-10-CM

## 2024-03-28 DIAGNOSIS — M54.50 LUMBAR PAIN: Chronic | ICD-10-CM

## 2024-03-28 DIAGNOSIS — E11.9 TYPE 2 DIABETES MELLITUS WITHOUT COMPLICATION, WITHOUT LONG-TERM CURRENT USE OF INSULIN: Chronic | ICD-10-CM

## 2024-03-28 DIAGNOSIS — I10 PRIMARY HYPERTENSION: Chronic | ICD-10-CM

## 2024-03-28 DIAGNOSIS — F41.1 GENERALIZED ANXIETY DISORDER: Chronic | ICD-10-CM

## 2024-03-28 DIAGNOSIS — J44.9 CHRONIC OBSTRUCTIVE PULMONARY DISEASE, UNSPECIFIED COPD TYPE: Chronic | ICD-10-CM

## 2024-03-28 DIAGNOSIS — E53.8 LOW FOLIC ACID: Chronic | ICD-10-CM

## 2024-03-28 DIAGNOSIS — E78.2 MIXED HYPERLIPIDEMIA: Chronic | ICD-10-CM

## 2024-03-28 RX ORDER — BUSPIRONE HYDROCHLORIDE 10 MG/1
10 TABLET ORAL 2 TIMES DAILY PRN
Qty: 180 TABLET | Refills: 1 | Status: SHIPPED | OUTPATIENT
Start: 2024-03-28

## 2024-03-28 RX ORDER — METFORMIN HYDROCHLORIDE 500 MG/1
TABLET, EXTENDED RELEASE ORAL
Qty: 180 TABLET | Refills: 1 | Status: SHIPPED | OUTPATIENT
Start: 2024-03-28

## 2024-03-28 RX ORDER — AMLODIPINE BESYLATE 10 MG/1
5 TABLET ORAL DAILY
Qty: 90 TABLET | Refills: 1 | Status: SHIPPED | OUTPATIENT
Start: 2024-03-28

## 2024-03-28 RX ORDER — PANTOPRAZOLE SODIUM 40 MG/1
40 TABLET, DELAYED RELEASE ORAL DAILY
Qty: 90 TABLET | Refills: 1 | Status: SHIPPED | OUTPATIENT
Start: 2024-03-28

## 2024-03-28 RX ORDER — IRBESARTAN 75 MG/1
75 TABLET ORAL DAILY
Qty: 90 TABLET | Refills: 1 | Status: SHIPPED | OUTPATIENT
Start: 2024-03-28

## 2024-03-28 RX ORDER — LEVOTHYROXINE SODIUM 0.05 MG/1
50 TABLET ORAL
Qty: 90 TABLET | Refills: 3 | Status: SHIPPED | OUTPATIENT
Start: 2024-03-28

## 2024-03-28 RX ORDER — AMOXICILLIN AND CLAVULANATE POTASSIUM 875; 125 MG/1; MG/1
TABLET, FILM COATED ORAL
Qty: 28 TABLET | Refills: 1 | Status: SHIPPED | OUTPATIENT
Start: 2024-03-28 | End: 2024-03-28

## 2024-03-28 NOTE — PROGRESS NOTES
Subjective   Prem Thrasher is a 69 y.o. male.     History of Present Illness   Prem Thrasher 69 y.o. male presents today for hosptial follow up.  he was treated 3-20 to 3-23-24 for chest pain.  I reviewed all of the labs and diagnostic testing and noted: Cardiac catheterization with stent, several labs had chest x-ray  The patient's medications were changed: Nadolol was changed to 6.25 mg twice a day and aspirin 81 mg daily------ to continue the amlodipine 10 mg, Plavix 75 mg daily, Zetia 10 mg daily, irbesartan 75 mg daily, Imdur 30 mg daily, nitroglycerin as needed 0.4 mg sublingual, Crestor 10 mg daily-----for cardiac meds  Current outpatient and discharge medications have been reconciled for the patient.  Reviewed by: Montse Cain PA-C  He was first seen in the [facility] ER noting his troponin was mildly elevated and peaked at 28 noting EKG was normal without ex chemo changes..  Had.  Stress test myocardial PET perfusion scan on 3/21/2024 and echocardiogram--noting his    Findings consistent with a normal ECG stress test.    Left ventricular ejection fraction is normal (Calculated EF = 67%).    Myocardial perfusion imaging indicates a small-sized infarct located in the inferior wall with mild sangeeta-infarct ischemia.    Impressions are consistent with an intermediate risk study.  Echocardiogram with    Left ventricular systolic function is normal. Calculated left ventricular EF = 59% Normal left ventricular cavity size and wall thickness noted. All left ventricular wall segments contract normally. Left ventricular diastolic function is consistent with (grade I) impaired relaxation.    Normal right ventricular cavity size and systolic function noted. Right ventricular wall thickness is consistent with mild hypertrophy.    There is no evidence of pericardial effusion. . There is a moderate amount of intrapericardial density that is likely adipose tissue.  Had cardiac catheterization on 3/22/2024   "[facility]--had diastolic left heart catheterization and coronary artery angiography----drug-eluting stent to distal RCA was placed and had chronic total occlusion...... also noted RFR assessment LAD.  Also noting chest x-ray was negative for pneumonia.... This is important due to his recent COVID infection  he does have a follow up appointment with a specialist:     Right before his hospitalization we had done a video visit and treated COVID----I treated him with Paxlovid and Z-Jonas.  Last visit with pain management for his chronic arthritis and spine pain was with TEN Quiroz on 1/30/2024   Patient also saw pulmonologist, Dr. Mcmillan on 3/12/2024 following up on his COPD with asthma.... Noted to continue Spiriva.  Due to recent COVID infection positive unable to do PFTs day of visit.  Recommended lung CT low-dose screening for lung cancer yearly.  Noted patient has COPD with chronic bronchitis and moderate persistent asthma.  Lab Results   Component Value Date    HGBA1C 6.50 (H) 03/21/2024     Also saw ortho nimisha left shoulder--12-29-23  He is accompanied by his wife today    Walking better---less SOA----no more chest pain!!!!     Since the last visit, he has overall felt fairly well.  He has Primary Hypertension and well controlled on current medication, DMII well controlled on medication and will continue regimen, Hyperlipidemia with goals met with current Rx, CAD and remains under care of their Cardiologist for mangement, Hypothyroidism well controlled on current medication and will continue Rx, Asthma and remains under care of their specialist, and Vitamin D deficiency and labs are at goal >30 ng/mL.  he has been compliant with current medications have reviewed them.  The patient denies medication side effects.  Will refill medications. /78   Pulse 83   Temp 97.9 °F (36.6 °C)   Resp 16   Ht 165 cm (64.96\")   Wt 112 kg (246 lb)   SpO2 94%   BMI 40.99 kg/m²     Also note the Lexapro and BuSpar are working " well for his anxiety    The following portions of the patient's history were reviewed and updated as appropriate: allergies, current medications, past family history, past medical history, past social history, past surgical history, and problem list.    Review of Systems   Constitutional:  Positive for fatigue. Negative for diaphoresis.   HENT:  Negative for nosebleeds and trouble swallowing.    Eyes:  Negative for blurred vision and visual disturbance.   Respiratory:  Positive for cough and shortness of breath. Negative for choking.    Gastrointestinal:  Negative for blood in stool.   Musculoskeletal:  Positive for arthralgias and back pain.   Allergic/Immunologic: Negative for immunocompromised state.   Neurological:  Negative for facial asymmetry and speech difficulty.   Psychiatric/Behavioral:  Negative for self-injury and suicidal ideas.        Objective   Physical Exam  Vitals and nursing note reviewed.   Constitutional:       General: He is not in acute distress.     Appearance: He is well-developed. He is obese. He is ill-appearing. He is not diaphoretic.      Comments: He looks much better   HENT:      Head: Normocephalic.      Right Ear: External ear normal.      Left Ear: External ear normal.   Eyes:      General: No scleral icterus.     Conjunctiva/sclera: Conjunctivae normal.      Pupils: Pupils are equal, round, and reactive to light.   Neck:      Vascular: No carotid bruit.   Cardiovascular:      Rate and Rhythm: Normal rate and regular rhythm.      Pulses: Normal pulses.      Heart sounds: Normal heart sounds. No murmur heard.  Pulmonary:      Effort: Pulmonary effort is normal.      Breath sounds: Normal breath sounds. No rales.      Comments: Lungs were actually clear  Musculoskeletal:         General: Normal range of motion.      Cervical back: Normal range of motion and neck supple.      Right lower leg: Edema present.      Left lower leg: Edema present.      Comments: Trace pedal edema = bilat      Skin:     General: Skin is warm and dry.      Findings: No rash.   Neurological:      Mental Status: He is alert and oriented to person, place, and time.   Psychiatric:         Mood and Affect: Mood normal. Affect is not inappropriate.         Behavior: Behavior normal.         Thought Content: Thought content normal.         Judgment: Judgment normal.           Assessment & Plan   Diagnoses and all orders for this visit:    1. Hospital discharge follow-up (Primary)    2. Coronary artery disease involving native coronary artery of native heart with unstable angina pectoris    3. Primary hypertension    4. Mixed hyperlipidemia    5. Generalized anxiety disorder    6. Low folic acid    7. Low serum vitamin B12    8. Vitamin D deficiency    9. Chronic obstructive pulmonary disease, unspecified COPD type    10. Type 2 diabetes mellitus without complication, without long-term current use of insulin    11. Lumbar pain    Other orders  -     metFORMIN ER (GLUCOPHAGE-XR) 500 MG 24 hr tablet; 2 TABLETS EVERY DAY ROUTINE FOR DIABETES  Dispense: 180 tablet; Refill: 1  -     irbesartan (AVAPRO) 75 MG tablet; Take 1 tablet by mouth Daily. for blood pressure  Dispense: 90 tablet; Refill: 1  -     busPIRone (BUSPAR) 10 MG tablet; Take 1 tablet by mouth 2 (Two) Times a Day As Needed (anxiety).  Dispense: 180 tablet; Refill: 1  -     amLODIPine (NORVASC) 10 MG tablet; Take 0.5 tablets by mouth Daily. for blood pressure  Dispense: 90 tablet; Refill: 1  -     levothyroxine (SYNTHROID, LEVOTHROID) 50 MCG tablet; Take 1 tablet by mouth Every Morning. Before breakfast, for thyroid  Dispense: 90 tablet; Refill: 3  -     pantoprazole (PROTONIX) 40 MG EC tablet; Take 1 tablet by mouth Daily. For GERD  Dispense: 90 tablet; Refill: 1        Has follow-up appointment with [facility] Pretty cardiology tomorrow 3/29/2024 and will see Dr. Mars for follow-up for 1224  Pain management follow-up scheduled for 4/9/2024  On Lyrica and Ultram  Wait to do  next labs June 22 or after  Continue Lexapro and BuSpar for anxiety working well\.dina Peralta, Prem Thrasher, was seen today.  he was seen for HTN and continue medication, DMII stable and refilled medications, Hyperlipidemia and will continue current medication, CAD and is under care of their Cardiologist for medical management and stable, Hypothyroidism well controlled, continue medication, asthma managed by specialist , and Vitamin D deficiency and supplemented.  Continue B12 folic acid and vitamin D  Continue Flexeril for lumbar spasms works well

## 2024-03-29 ENCOUNTER — OFFICE VISIT (OUTPATIENT)
Dept: CARDIOLOGY | Facility: CLINIC | Age: 69
End: 2024-03-29
Payer: MEDICARE

## 2024-03-29 VITALS
HEART RATE: 88 BPM | OXYGEN SATURATION: 97 % | BODY MASS INDEX: 41.83 KG/M2 | DIASTOLIC BLOOD PRESSURE: 81 MMHG | HEIGHT: 64 IN | WEIGHT: 245 LBS | SYSTOLIC BLOOD PRESSURE: 118 MMHG

## 2024-03-29 DIAGNOSIS — Z98.61 CAD S/P PERCUTANEOUS CORONARY ANGIOPLASTY: Primary | ICD-10-CM

## 2024-03-29 DIAGNOSIS — I25.110 CORONARY ARTERY DISEASE INVOLVING NATIVE CORONARY ARTERY OF NATIVE HEART WITH UNSTABLE ANGINA PECTORIS: ICD-10-CM

## 2024-03-29 DIAGNOSIS — E78.2 MIXED HYPERLIPIDEMIA: ICD-10-CM

## 2024-03-29 DIAGNOSIS — Z87.891 EX-SMOKER: ICD-10-CM

## 2024-03-29 DIAGNOSIS — I25.10 CAD S/P PERCUTANEOUS CORONARY ANGIOPLASTY: Primary | ICD-10-CM

## 2024-03-29 DIAGNOSIS — I10 PRIMARY HYPERTENSION: ICD-10-CM

## 2024-03-29 NOTE — PROGRESS NOTES
Date of Office Visit: 2024  Encounter Provider: TEN Deluna  Place of Service: [facility]  Patient Name: Prem Thrasher  :1955    Chief Complaint   Patient presents with    Follow-up    Coronary Artery Disease   :     HPI: Prem Thrasher is a 69 y.o. male who is a patient of Dr. Mars and is known to me from previous.  He has a history of coronary disease he had unstable angina in  and received 2 drug-eluting stents to the mid and distal vessel.  Following year he had recurrent symptoms and was found to have a new lesion in the LAD just proximal to the distal lesion as well as the circumflex.  He had a balloon angioplasty of the LAD and PCI of the circumflex with Dr. Salazar.    Last year he came to see us and he has been short of breath he was diagnosed in March with COVID and last year.  He was admitted to the hospital last week with exertional dyspnea and chest pain was slightly worse and with chronic symptoms no evidence of acute coronary syndrome.  Troponin was minimally elevated at 28 and then declined.  We did a stress test that showed some apical ischemia he will underwent cardiac cath on  at 100% distal RCA chronic total occlusion with left-to-right collaterals and received a 3.5 x 28 mm Xience drug-eluting stent.  He was also noted to have a distal LAD disease that was treated with medical therapy.  It was small caliber.  He is here for follow-up today.    Overall he is feeling well. He denies chest pain or dyspnea. He is tolerating PLAvix. He does not want to enroll in cardiac rehab.   Previous testing and notes have been reviewed by me.   Past Medical History:   Diagnosis Date    Achilles tendon pain     LEFT    Allergic     Anxiety disorder     Asthma     When I get stressed out or try to do something I get short of breath    CAD (coronary artery disease)     Cervical disc disorder     Chronic anticoagulation      PLAVIX-CARDIAC STENT X3    Chronic lower back pain     Chronic pain disorder     COPD (chronic obstructive pulmonary disease)     CTS (carpal tunnel syndrome)     Deep vein thrombosis     A    Depression     Diabetes mellitus 03/12/2023    Type two diabetes    Disease of thyroid gland     Diverticulitis of colon     Encounter for eye exam 10/2014    normal    Exertional chest pain     Extremity pain     GERD (gastroesophageal reflux disease)     Headache     History of bone density study 03/2014    Dr. Maria Antonia Lopez; normal    History of chest x-ray 02/15/2011    also 3-6-15; normal chest    History of colon polyps     History of nuclear stress test 03/09/2015    cardiolite; Dr. Greenberg; negative    History of pulmonary function tests 03/2015    COPD    Hyperlipidemia     Hypertension     Joint pain     Low back strain     Lumbosacral disc disease     Migraine     Neck pain     Nerve damage     KAYLYNN ELBOWS    NSTEMI (non-ST elevated myocardial infarction) 01/2021    Obesity     Osteoarthritis, multiple sites     Pneumonia     Postoperative nausea and vomiting 08/01/2017    Postoperative wound infection     Rheumatoid arthritis     Rotator cuff syndrome     Sciatica     Short of breath on exertion     Sleep apnea     no cpap    Spinal stenosis     Staph infection     WITH BACK SURGERY 2017  ON LONG TERM ANTIBIOTICS    Thoracic disc disorder     Unstable angina     Unsteady gait     D/T LOWER BACK    Visual impairment     Fill like my vision has changed sense I got my glasses       Past Surgical History:   Procedure Laterality Date    ACHILLES TENDON SURGERY Left 07/03/2018    Procedure: Left Achilles debridement and secondary repair with partial excision of calcaneus. Possible left Achilles lengthening at the calf;  Surgeon: Vinay Smith MD;  Location: [facility];  Service: Orthopedics    BACK SURGERY  10/2017    CARDIAC CATHETERIZATION N/A 03/07/2020    Procedure: Left Heart Cath;  Surgeon: Martin  Dioni OSUNA MD;  Location: [facility] CATH INVASIVE LOCATION;  Service: Cardiology;  Laterality: N/A;    CARDIAC CATHETERIZATION N/A 03/07/2020    Procedure: Coronary angiography;  Surgeon: Dioni Salazar MD;  [facility] C[facility];  Service: Cardiology;  Laterality: N/A;    CARDIAC CATHETERIZATION N/A 03/07/2020    Procedure: Left ventriculography;  Surgeon: Dioni Salazar MD;  Location: [facility] CATH INVASIVE LOCATION;  Service: Cardiology;  Laterality: N/A;    CARDIAC CATHETERIZATION N/A 03/07/2020    Procedure: Stent RAZA coronary;  Surgeon: Dioni Salazar MD;  Location: [facility] CATH INVASIVE LOCATION;  Service: Cardiology;  Laterality: N/A;    CARDIAC CATHETERIZATION N/A 01/22/2021    Procedure: Left Heart Cath;  Surgeon: Dioni Salazar MD;  Location: [facility] CATH INVASIVE LOCATION;  Service: Cardiology;  Laterality: N/A;    CARDIAC CATHETERIZATION N/A 01/22/2021    Procedure: Coronary angiography;  Surgeon: Dioni Salazar MD;  Location: [facility] CATH INVASIVE LOCATION;  Service: Cardiology;  Laterality: N/A;    CARDIAC CATHETERIZATION N/A 01/22/2021    Procedure: Left ventriculography;  Surgeon: Dioni Salazar MD;  Location: [facility] CATH INVASIVE LOCATION;  Service: Cardiology;  Laterality: N/A;    CARDIAC CATHETERIZATION N/A 01/22/2021    Procedure: Percutaneous Coronary Intervention;  Surgeon: Dioni Salazar MD;  Location: [facility] CATH INVASIVE LOCATION;  Service: Cardiology;  Laterality: N/A;    CARDIAC CATHETERIZATION N/A 01/22/2021    Procedure: Stent RAZA coronary;  Surgeon: Dioni Salazar MD;  Location: [facility] CATH INVASIVE LOCATION;  Service: Cardiology;  Laterality: N/A;    CARDIAC CATHETERIZATION N/A 3/22/2024    Procedure: Left Heart Cath;  Surgeon: Kiko Evans MD;  Location: [facility] CATH INVASIVE LOCATION;  Service: Cardiovascular;  Laterality: N/A;    CARDIAC CATHETERIZATION N/A 3/22/2024    Procedure: Coronary angiography;  Surgeon:  Kiko Evans MD;  Location: [facility] CATH INVASIVE LOCATION;  Service: Cardiovascular;  Laterality: N/A;    CARDIAC CATHETERIZATION N/A 3/22/2024    Procedure: Resting Full Cycle Ratio;  Surgeon: Kiko Evans MD;  Location: [facility] CATH INVASIVE LOCATION;  Service: Cardiovascular;  Laterality: N/A;    CARDIAC CATHETERIZATION N/A 3/22/2024    Procedure: Percutaneous Coronary Intervention;  Surgeon: Kiko Evans MD;  Location: [facility] CATH INVASIVE LOCATION;  Service: Cardiovascular;  Laterality: N/A;    CARDIAC CATHETERIZATION N/A 3/22/2024    Procedure: Stent RAZA coronary;  Surgeon: Kiko Evans MD;  Location: [facility] CATH INVASIVE LOCATION;  Service: Cardiovascular;  Laterality: N/A;    CARDIAC CATHETERIZATION N/A 3/22/2024    Procedure: Optical Coherence Tomography;  Surgeon: Kiko Evans MD;  Location: [facility] CATH INVASIVE LOCATION;  Service: Cardiovascular;  Laterality: N/A;    CARDIAC SURGERY      3 stents total    COLONOSCOPY N/A 02/02/2011    Normal colon except scattered diverticulosis-Dr. Guero Blunt    COLONOSCOPY N/A 04/08/2021    Procedure: COLONOSCOPY TO CECUM WITH POLYPECTOMY (COLD BX);  Surgeon: Porsha Arcos MD;  Location: [facility] ENDOSCOPY;  Service: General;  Laterality: N/A;  SCREENING; HX OF COLON POLYPS  POST:DIVERTICULOSIS; COLON POLYP    CORONARY STENT PLACEMENT      CYST REMOVAL  03/2016    x2  FROM FACE AND EAR    DENTAL PROCEDURE  2008    teeth removed; dental implants    EPIDURAL BLOCK  1995    L/S spine    FINGER DEBRIDEMENT Right 07/12/2003    Debridement and full thickness skin grafting of the ring and small fingers of the right hand-Dr. Rayray Long    FOOT SURGERY      HAND SURGERY  2003    INCISION AND DRAINAGE PERIRECTAL ABSCESS N/A 07/10/2018    Procedure: INCISION AND DRAINAGE OF PERIRECTAL ABSCESS;  Surgeon: Porsha Arcos MD;  Location: [facility] MAIN OR;  Service: General    INCISION AND DRAINAGE TRUNK N/A 04/23/2022    Procedure: Evacuation  lumbar hematoma;  Surgeon: Jacky Perez MD;  Location: [facility] MAIN OR;  Service: Orthopedics;  Laterality: N/A;    JOINT REPLACEMENT      LUMBAR DISCECTOMY FUSION INSTRUMENTATION Left 10/10/2016    Procedure: LEFT L2-L3, L3-L4 LUMBAR LAMINECTOMY/ DISCECTOMY WITH METRIX ;  Surgeon: Sawyer Rick MD;  Location: [facility] MAIN OR;  Service:     LUMBAR DISCECTOMY FUSION INSTRUMENTATION N/A 07/31/2017    Procedure: LUMBAR FUSION DECOMPRESSON WITH PEDICLE SCREWS with LAURA L2-3,L3-4;  Surgeon: Jacky Perez MD;  Location: [facility] MAIN OR;  Service:     LUMBAR FUSION N/A 04/13/2022    Procedure: Thoracic 10-thoracic 11, thoracic 11-thoracic 12, thoracic 12-lumbar 1, lumbar 1-lumbar 2, lumbar 2-lumbar 3, lumbar 3-lumbar 4 fusion with instrumentation with iliac crest bone graft, and removal of implants from lumbar 2-lumbar 3, lumbar 3-lumbar 4;  Surgeon: Jacky Perez MD;  Location: [facility] MAIN OR;  Service: Orthopedic Spine;  Laterality: N/A;    LUMBAR FUSION N/A 04/13/2022    Procedure: LUMBAR ANTERIOR INTERBODY FUSION L4,L5 Osteotomy interbody fusion with cage L1,L2;  Surgeon: Jacky Perez MD;  Location: [facility] OR;  Service: Orthopedic Spine;  Laterality: N/A;    LUMBAR LAMINECTOMY DISCECTOMY DECOMPRESSION N/A 07/31/2017    Procedure: L2 to L4 laminectomy with neural lysis and a fusion by orthopedics;  Surgeon: Sawyer Rick MD;  Location: [facility] OR;  Service:     LUMBAR LAMINECTOMY DISCECTOMY DECOMPRESSION N/A 09/05/2017    Procedure: I&D LUMBAR SPINE ;  Surgeon: Jacky Perez MD;  Location: [facility] OR;  Service:     ORTHOPEDIC SURGERY      SHOULDER SURGERY Right 02/23/2011    Dr. Navarro    SINUS SURGERY  2003    Dr. Barrera    SPINAL FUSION      SPINE SURGERY  2010    TOTAL SHOULDER REPLACEMENT Right 09/07/2023    TRIGGER POINT INJECTION         Social History     Socioeconomic History    Marital status:    Tobacco Use    Smoking status: Former     Current packs/day: 0.00      "Average packs/day: 1 pack/day for 50.0 years (50.0 ttl pk-yrs)     Types: Cigarettes     Start date: 10/20/1971     Quit date: 10/20/2021     Years since quittin.4    Smokeless tobacco: Never    Tobacco comments:     Current status is non smoker   Vaping Use    Vaping status: Never Used   Substance and Sexual Activity    Alcohol use: No    Drug use: No    Sexual activity: Not Currently     Partners: Female     Birth control/protection: None       Family History   Problem Relation Age of Onset    Diabetes Mother     Heart disease Mother     Hyperlipidemia Mother     Stroke Mother     Heart disease Father     Rheum arthritis Father     Alcohol abuse Father     Hyperlipidemia Father     Depression Brother     Cancer Brother         prostate    Depression Brother     Heart disease Brother     Hyperlipidemia Brother     Cancer Brother         Haert desease    Thyroid disease Sister     Arthritis Sister     Asthma Son     Malig Hyperthermia Neg Hx        Review of Systems   Constitutional: Negative for diaphoresis and malaise/fatigue.   Cardiovascular:  Negative for chest pain, claudication, dyspnea on exertion, irregular heartbeat, leg swelling, near-syncope, orthopnea, palpitations, paroxysmal nocturnal dyspnea and syncope.   Respiratory:  Negative for cough, shortness of breath and sleep disturbances due to breathing.    Musculoskeletal:  Negative for falls.   Neurological:  Negative for dizziness and weakness.   Psychiatric/Behavioral:  Negative for altered mental status and substance abuse.        Allergies   Allergen Reactions    Atorvastatin Other (See Comments) and Myalgia     Leg cramps    Ceclor [Cefaclor] Rash     Patient tolerated nafcillin during 2017 admission and has tolerated Augmentin in past outpatient.     Methotrexate Derivatives Rash     \"Blisters\"         Current Outpatient Medications:     amLODIPine (NORVASC) 10 MG tablet, Take 0.5 tablets by mouth Daily. for blood pressure, Disp: 90 tablet, " Rfl: 1    aspirin 81 MG EC tablet, Take 1 tablet by mouth Daily for 30 days., Disp: 30 tablet, Rfl: 0    Boswellia-Glucosamine-Vit D (OSTEO BI-FLEX ONE PER DAY PO), Take  by mouth., Disp: , Rfl:     busPIRone (BUSPAR) 10 MG tablet, Take 1 tablet by mouth 2 (Two) Times a Day As Needed (anxiety)., Disp: 180 tablet, Rfl: 1    carvedilol (COREG) 6.25 MG tablet, Take 1 tablet by mouth 2 (Two) Times a Day With Meals for 30 days., Disp: 60 tablet, Rfl: 0    Cholecalciferol (VITAMIN D) 2000 UNITS tablet, Take 1 tablet by mouth Every Evening., Disp: , Rfl:     clopidogrel (PLAVIX) 75 MG tablet, TAKE 1 TABLET EVERY DAY, Disp: 90 tablet, Rfl: 3    Cyanocobalamin (B-12) 1000 MCG sublingual tablet, DISSOLVE 1 TABLET UNDER THE TONGUE EVERY DAY, Disp: 90 tablet, Rfl: 3    cyclobenzaprine (FLEXERIL) 10 MG tablet, Take 1 tablet by mouth 2 (Two) Times a Day., Disp: 270 tablet, Rfl: 3    docusate sodium (Stool Softener) 100 MG capsule, , Disp: , Rfl:     escitalopram (LEXAPRO) 20 MG tablet, TAKE 1 TABLET EVERY NIGHT FOR ANXIETY, Disp: 90 tablet, Rfl: 3    ezetimibe (ZETIA) 10 MG tablet, Take 1 tablet by mouth Daily., Disp: 90 tablet, Rfl: 3    folic acid (FOLVITE) 1 MG tablet, TAKE 1 TABLET EVERY DAY, Disp: 90 tablet, Rfl: 3    irbesartan (AVAPRO) 75 MG tablet, Take 1 tablet by mouth Daily. for blood pressure, Disp: 90 tablet, Rfl: 1    isosorbide mononitrate (IMDUR) 30 MG 24 hr tablet, TAKE 1 TABLET EVERY DAY, Disp: 90 tablet, Rfl: 3    levothyroxine (SYNTHROID, LEVOTHROID) 50 MCG tablet, Take 1 tablet by mouth Every Morning. Before breakfast, for thyroid, Disp: 90 tablet, Rfl: 3    meclizine (ANTIVERT) 25 MG tablet, Take 1 tablet by mouth 3 (Three) Times a Day As Needed for Dizziness., Disp: 30 tablet, Rfl: 0    metFORMIN ER (GLUCOPHAGE-XR) 500 MG 24 hr tablet, 2 TABLETS EVERY DAY ROUTINE FOR DIABETES, Disp: 180 tablet, Rfl: 1    multivitamin with minerals tablet tablet, Take 1 tablet by mouth Daily. HOLD X1 WEEK PRIOR TO SURGERY,  "Disp: , Rfl:     nitroglycerin (NITROSTAT) 0.4 MG SL tablet, Place 1 tablet under the tongue Every 5 (Five) Minutes As Needed for Chest Pain. Take no more than 3 doses in 15 minutes., Disp: 25 tablet, Rfl: 1    pantoprazole (PROTONIX) 40 MG EC tablet, Take 1 tablet by mouth Daily. For GERD, Disp: 90 tablet, Rfl: 1    pregabalin (LYRICA) 100 MG capsule, TAKE 1 CAPSULE BY MOUTH THREE TIMES A DAY, Disp: 90 capsule, Rfl: 1    rosuvastatin (CRESTOR) 10 MG tablet, TAKE 1 TABLET EVERY NIGHT AT BEDTIME, Disp: 90 tablet, Rfl: 3    traMADol (ULTRAM) 50 MG tablet, Take 1 tablet by mouth Every 8 (Eight) Hours As Needed for Severe Pain., Disp: 90 tablet, Rfl: 1      Objective:     Vitals:    03/29/24 1001   BP: 118/81   Pulse: 88   SpO2: 97%   Weight: 111 kg (245 lb)   Height: 162.6 cm (64\")     Body mass index is 42.05 kg/m².    PHYSICAL EXAM:    Constitutional:       General: Not in acute distress.     Appearance: Normal appearance. Well-developed.   Eyes:      Pupils: Pupils are equal, round, and reactive to light.   HENT:      Head: Normocephalic.   Neck:      Vascular: No carotid bruit or JVD.   Pulmonary:      Effort: Pulmonary effort is normal. No tachypnea.      Breath sounds: Normal breath sounds. No wheezing. No rales.   Cardiovascular:      Normal rate. Regular rhythm.      No gallop.    Pulses:     Intact distal pulses.   Edema:     Peripheral edema absent.   Abdominal:      General: Bowel sounds are normal.      Palpations: Abdomen is soft.      Tenderness: There is no abdominal tenderness.   Musculoskeletal: Normal range of motion.      Cervical back: Normal range of motion and neck supple. No edema. Skin:     General: Skin is warm and dry.   Neurological:      Mental Status: Alert and oriented to person, place, and time.           ECG 12 Lead    Date/Time: 3/29/2024 10:20 AM  Performed by: Zaida Pretty APRN    Authorized by: Zaida Pretty APRN  Comparison: compared with previous ECG from " 3/23/2024  Similar to previous ECG  Rhythm: sinus rhythm  Rate: normal  Conduction: left posterior fascicular block  QRS axis: normal    Clinical impression: non-specific ECG            Assessment:       Diagnosis Plan   1. CAD S/P percutaneous coronary angioplasty     ON asa, plavix and statin. No angin sympoms   2. Coronary artery disease involving native coronary artery of native heart with unstable angina pectoris     Continune with risk factor modification   3. Primary hypertension     BP controlled on current regimen   4. Mixed hyperlipidemia     LDL at goal   5. Ex-smoker     Continues to be tobacco free     Orders Placed This Encounter   Procedures    ECG 12 Lead     This order was created via procedure documentation     Order Specific Question:   Release to patient     Answer:   Routine Release [[id number]]          Plan:       Follow up with dr. Mars as scheduled         Your medication list            Accurate as of March 29, 2024 10:35 AM. If you have any questions, ask your nurse or doctor.                CONTINUE taking these medications        Instructions Last Dose Given Next Dose Due   amLODIPine 10 MG tablet  Commonly known as: NORVASC      Take 0.5 tablets by mouth Daily. for blood pressure       aspirin 81 MG EC tablet      Take 1 tablet by mouth Daily for 30 days.       B-12 1000 MCG sublingual tablet      DISSOLVE 1 TABLET UNDER THE TONGUE EVERY DAY       busPIRone 10 MG tablet  Commonly known as: BUSPAR      Take 1 tablet by mouth 2 (Two) Times a Day As Needed (anxiety).       carvedilol 6.25 MG tablet  Commonly known as: COREG      Take 1 tablet by mouth 2 (Two) Times a Day With Meals for 30 days.       clopidogrel 75 MG tablet  Commonly known as: PLAVIX      TAKE 1 TABLET EVERY DAY       cyclobenzaprine 10 MG tablet  Commonly known as: FLEXERIL      Take 1 tablet by mouth 2 (Two) Times a Day.       escitalopram 20 MG tablet  Commonly known as: LEXAPRO      TAKE 1 TABLET EVERY NIGHT FOR  ANXIETY       ezetimibe 10 MG tablet  Commonly known as: ZETIA      Take 1 tablet by mouth Daily.       folic acid 1 MG tablet  Commonly known as: FOLVITE      TAKE 1 TABLET EVERY DAY       irbesartan 75 MG tablet  Commonly known as: AVAPRO      Take 1 tablet by mouth Daily. for blood pressure       isosorbide mononitrate 30 MG 24 hr tablet  Commonly known as: IMDUR      TAKE 1 TABLET EVERY DAY       levothyroxine 50 MCG tablet  Commonly known as: SYNTHROID, LEVOTHROID      Take 1 tablet by mouth Every Morning. Before breakfast, for thyroid       meclizine 25 MG tablet  Commonly known as: ANTIVERT      Take 1 tablet by mouth 3 (Three) Times a Day As Needed for Dizziness.       metFORMIN  MG 24 hr tablet  Commonly known as: GLUCOPHAGE-XR      2 TABLETS EVERY DAY ROUTINE FOR DIABETES       multivitamin with minerals tablet tablet      Take 1 tablet by mouth Daily. HOLD X1 WEEK PRIOR TO SURGERY       nitroglycerin 0.4 MG SL tablet  Commonly known as: Nitrostat      Place 1 tablet under the tongue Every 5 (Five) Minutes As Needed for Chest Pain. Take no more than 3 doses in 15 minutes.       OSTEO BI-FLEX ONE PER DAY PO      Take  by mouth.       pantoprazole 40 MG EC tablet  Commonly known as: PROTONIX      Take 1 tablet by mouth Daily. For GERD       pregabalin 100 MG capsule  Commonly known as: LYRICA      TAKE 1 CAPSULE BY MOUTH THREE TIMES A DAY       rosuvastatin 10 MG tablet  Commonly known as: CRESTOR      TAKE 1 TABLET EVERY NIGHT AT BEDTIME       Stool Softener 100 MG capsule  Generic drug: docusate sodium           traMADol 50 MG tablet  Commonly known as: ULTRAM      Take 1 tablet by mouth Every 8 (Eight) Hours As Needed for Severe Pain.       Vitamin D 50 MCG (2000 UT) tablet      Take 1 tablet by mouth Every Evening.                  As always, it has been a pleasure to participate in your patient's care.      Sincerely,     Zaida CAAL

## 2024-04-09 ENCOUNTER — OFFICE VISIT (OUTPATIENT)
Dept: PAIN MEDICINE | Facility: CLINIC | Age: 69
End: 2024-04-09
Payer: MEDICARE

## 2024-04-09 VITALS
DIASTOLIC BLOOD PRESSURE: 73 MMHG | SYSTOLIC BLOOD PRESSURE: 110 MMHG | OXYGEN SATURATION: 93 % | BODY MASS INDEX: 34.77 KG/M2 | HEIGHT: 71 IN | RESPIRATION RATE: 18 BRPM | WEIGHT: 248.4 LBS | TEMPERATURE: 97.1 F | HEART RATE: 85 BPM

## 2024-04-09 DIAGNOSIS — G89.4 CHRONIC PAIN SYNDROME: Primary | ICD-10-CM

## 2024-04-09 DIAGNOSIS — T40.2X5A THERAPEUTIC OPIOID INDUCED CONSTIPATION: ICD-10-CM

## 2024-04-09 DIAGNOSIS — M54.16 LUMBAR RADICULOPATHY: ICD-10-CM

## 2024-04-09 DIAGNOSIS — Z98.890 HISTORY OF LUMBAR SURGERY: ICD-10-CM

## 2024-04-09 DIAGNOSIS — K59.03 THERAPEUTIC OPIOID INDUCED CONSTIPATION: ICD-10-CM

## 2024-04-09 DIAGNOSIS — Z79.899 HIGH RISK MEDICATION USE: ICD-10-CM

## 2024-04-09 PROCEDURE — 3078F DIAST BP <80 MM HG: CPT | Performed by: NURSE PRACTITIONER

## 2024-04-09 PROCEDURE — 1125F AMNT PAIN NOTED PAIN PRSNT: CPT | Performed by: NURSE PRACTITIONER

## 2024-04-09 PROCEDURE — 99214 OFFICE O/P EST MOD 30 MIN: CPT | Performed by: NURSE PRACTITIONER

## 2024-04-09 PROCEDURE — 1159F MED LIST DOCD IN RCRD: CPT | Performed by: NURSE PRACTITIONER

## 2024-04-09 PROCEDURE — 3074F SYST BP LT 130 MM HG: CPT | Performed by: NURSE PRACTITIONER

## 2024-04-09 PROCEDURE — 1160F RVW MEDS BY RX/DR IN RCRD: CPT | Performed by: NURSE PRACTITIONER

## 2024-04-12 ENCOUNTER — OFFICE VISIT (OUTPATIENT)
Age: 69
End: 2024-04-12
Payer: MEDICARE

## 2024-04-12 VITALS
DIASTOLIC BLOOD PRESSURE: 88 MMHG | HEIGHT: 71 IN | BODY MASS INDEX: 34.3 KG/M2 | SYSTOLIC BLOOD PRESSURE: 124 MMHG | WEIGHT: 245 LBS | HEART RATE: 81 BPM

## 2024-04-12 DIAGNOSIS — I25.10 CORONARY ARTERY DISEASE INVOLVING NATIVE CORONARY ARTERY OF NATIVE HEART WITHOUT ANGINA PECTORIS: Primary | ICD-10-CM

## 2024-04-12 NOTE — PROGRESS NOTES
Subjective:     Encounter Date:04/06/2023      Patient ID: Prem Thrasher is a 69 y.o. male.    Chief Complaint: CAD  HPI:   This is a pleasant 68-year-old male who presents for evaluation.  He was previously followed by Dr. Salazar     2020 he had unstable angina received 2 discrete LAD stents in the mid and distal vessel.  The following year he had recurrent symptoms and was found to have a new LAD lesion just proximal to the distal lesion as well as circumflex lesion.  He went balloon angioplasty of the LAD and PCI of the circumflex    He was admitted to the hospital in March 2024 with exertional dyspnea and chest pain.  This occurred a few weeks after a COVID infection treated with Paxlovid.  He had a nuclear stress test and echocardiogram and ultimately underwent cardiac catheterization.  He received drug-eluting stent to distal RCA using a 3.5 x 28 mm Xience brett point drug-eluting stent.  The distal LAD was severely diseased but treated medically due to small size of the vessel.  Proximal LAD was moderately diseased by RFR, 0.94.  Echocardiogram showed normal systolic ejection fraction, fatty pericardium.  Overall he feels well.  No further angina or dyspnea.  Has not needed any nitroglycerin since his intervention.  He had very classic symptoms at the time of the last stent to his chest pressure radiating into his arm relieved by nitro.  Due to back pain he is unable to walk.  He is thus deferred cardiac rehab.    The following portions of the patient's history were reviewed and updated as appropriate: allergies, current medications, past family history, past medical history, past social history, past surgical history and problem list.     REVIEW OF SYSTEMS:   All systems reviewed.  Pertinent positives identified in HPI.  All other systems are negative.    Past Medical History:   Diagnosis Date    Achilles tendon pain     LEFT    Allergic     Anxiety disorder     Asthma     When I get stressed out or  try to do something I get short of breath    CAD (coronary artery disease)     Cervical disc disorder     Chronic anticoagulation     PLAVIX-CARDIAC STENT X3    Chronic lower back pain     Chronic pain disorder     COPD (chronic obstructive pulmonary disease)     CTS (carpal tunnel syndrome)     Deep vein thrombosis     A    Depression     Diabetes mellitus 03/12/2023    Type two diabetes    Disease of thyroid gland     Diverticulitis of colon     Encounter for eye exam 10/2014    normal    Exertional chest pain     Extremity pain     GERD (gastroesophageal reflux disease)     Headache     History of bone density study 03/2014    Dr. Maria Antonia Lopez; normal    History of chest x-ray 02/15/2011    also 3-6-15; normal chest    History of colon polyps     History of nuclear stress test 03/09/2015    cardiolite; Dr. Greenberg; negative    History of pulmonary function tests 03/2015    COPD    Hyperlipidemia     Hypertension     Joint pain     Low back strain     Lumbosacral disc disease     Migraine     Neck pain     Nerve damage     KAYLYNN ELBOWS    NSTEMI (non-ST elevated myocardial infarction) 01/2021    Obesity     Osteoarthritis, multiple sites     Pneumonia     Postoperative nausea and vomiting 08/01/2017    Postoperative wound infection     Rheumatoid arthritis     Rotator cuff syndrome     Sciatica     Short of breath on exertion     Sleep apnea     no cpap    Spinal stenosis     Staph infection     WITH BACK SURGERY 2017  ON LONG TERM ANTIBIOTICS    Thoracic disc disorder     Unstable angina     Unsteady gait     D/T LOWER BACK    Visual impairment     Fill like my vision has changed sense I got my glasses       Family History   Problem Relation Age of Onset    Diabetes Mother     Heart disease Mother     Hyperlipidemia Mother     Stroke Mother     Heart disease Father     Rheum arthritis Father     Alcohol abuse Father     Hyperlipidemia Father     Depression Brother     Cancer Brother         prostate    Depression  "Brother     Heart disease Brother     Hyperlipidemia Brother     Cancer Brother         Moreno deseasdayanara    Thyroid disease Sister     Arthritis Sister     Asthma Son     Malig Hyperthermia Neg Hx        Social History     Socioeconomic History    Marital status:    Tobacco Use    Smoking status: Former     Current packs/day: 0.00     Average packs/day: 1 pack/day for 50.0 years (50.0 ttl pk-yrs)     Types: Cigarettes     Start date: 10/20/1971     Quit date: 10/20/2021     Years since quittin.4    Smokeless tobacco: Never    Tobacco comments:     Current status is non smoker   Vaping Use    Vaping status: Never Used   Substance and Sexual Activity    Alcohol use: No    Drug use: No    Sexual activity: Not Currently     Partners: Female     Birth control/protection: None       Allergies   Allergen Reactions    Atorvastatin Other (See Comments) and Myalgia     Leg cramps    Ceclor [Cefaclor] Rash     Patient tolerated nafcillin during 2017 admission and has tolerated Augmentin in past outpatient.     Methotrexate Derivatives Rash     \"Blisters\"       Past Surgical History:   Procedure Laterality Date    ACHILLES TENDON SURGERY Left 2018    Procedure: Left Achilles debridement and secondary repair with partial excision of calcaneus. Possible left Achilles lengthening at the calf;  Surgeon: Vinay Smith MD;  Location: [facility] OR [facility];  Service: Orthopedics    BACK SURGERY  10/2017    CARDIAC CATHETERIZATION N/A 2020    Procedure: Left Heart Cath;  Surgeon: Dioni Salazar MD;  Location: [facility] CATH INVASIVE LOCATION;  Service: Cardiology;  Laterality: N/A;    CARDIAC CATHETERIZATION N/A 2020    Procedure: Coronary angiography;  Surgeon: Dioni Salazar MD;  Location: [facility] CATH INVASIVE LOCATION;  Service: Cardiology;  Laterality: N/A;    CARDIAC CATHETERIZATION N/A 2020    Procedure: Left ventriculography;  Surgeon: Dioni Salazar MD;  Location: [facility] CATH " INVASIVE LOCATION;  Service: Cardiology;  Laterality: N/A;    CARDIAC CATHETERIZATION N/A 03/07/2020    Procedure: Stent RAZA coronary;  Surgeon: Dioni Salazar MD;  [facility];  Service: Cardiology;  Laterality: N/A;    CARDIAC CATHETERIZATION N/A 01/22/2021    Procedure: Left Heart Cath;  Surgeon: Dioni Salazar MD;  [facility];  Service: Cardiology;  Laterality: N/A;    CARDIAC CATHETERIZATION N/A 01/22/2021    Procedure: Coronary angiography;  Surgeon: Dioni Salazar MD;  [facility];  Service: Cardiology;  Laterality: N/A;    CARDIAC CATHETERIZATION N/A 01/22/2021    Procedure: Left ventriculography;  Surgeon: Dioni Salazar MD;  Location: [facility] CATH INVASIVE LOCATION;  Service: Cardiology;  Laterality: N/A;    CARDIAC CATHETERIZATION N/A 01/22/2021    Procedure: Percutaneous Coronary Intervention;  Surgeon: Dioni Salazar MD;  Location: [facility] CATH INVASIVE LOCATION;  Service: Cardiology;  Laterality: N/A;    CARDIAC CATHETERIZATION N/A 01/22/2021    Procedure: Stent RAZA coronary;  Surgeon: Dioni Salazar MD;  Location: [facility] CATH INVASIVE LOCATION;  Service: Cardiology;  Laterality: N/A;    CARDIAC CATHETERIZATION N/A 3/22/2024    Procedure: Left Heart Cath;  Surgeon: Kiko Evans MD;  Location: [facility] CATH INVASIVE LOCATION;  Service: Cardiovascular;  Laterality: N/A;    CARDIAC CATHETERIZATION N/A 3/22/2024    Procedure: Coronary angiography;  Surgeon: Kiko Evans MD;  Location: [facility] CATH INVASIVE LOCATION;  Service: Cardiovascular;  Laterality: N/A;    CARDIAC CATHETERIZATION N/A 3/22/2024    Procedure: Resting Full Cycle Ratio;  Surgeon: Kiko Evans MD;  Location: [facility] CATH INVASIVE LOCATION;  Service: Cardiovascular;  Laterality: N/A;    CARDIAC CATHETERIZATION N/A 3/22/2024    Procedure: Percutaneous Coronary Intervention;  Surgeon: Kiko Evans MD;   Location: [facility] CATH INVASIVE LOCATION;  Service: Cardiovascular;  Laterality: N/A;    CARDIAC CATHETERIZATION N/A 3/22/2024    Procedure: Stent RAZA coronary;  Surgeon: Kiko Evans MD;  Location: [facility] CATH INVASIVE LOCATION;  Service: Cardiovascular;  Laterality: N/A;    CARDIAC CATHETERIZATION N/A 3/22/2024    Procedure: Optical Coherence Tomography;  Surgeon: Kiko Evans MD;  Location: [facility] CATH INVASIVE LOCATION;  Service: Cardiovascular;  Laterality: N/A;    CARDIAC SURGERY      3 stents total    COLONOSCOPY N/A 02/02/2011    Normal colon except scattered diverticulosis-Dr. Guero Blunt    COLONOSCOPY N/A 04/08/2021    Procedure: COLONOSCOPY TO CECUM WITH POLYPECTOMY (COLD BX);  Surgeon: Porsha Arcos MD;  Location: [facility] ENDOSCOPY;  Service: General;  Laterality: N/A;  SCREENING; HX OF COLON POLYPS  POST:DIVERTICULOSIS; COLON POLYP    CORONARY STENT PLACEMENT      CYST REMOVAL  03/2016    x2  FROM FACE AND EAR    DENTAL PROCEDURE  2008    teeth removed; dental implants    EPIDURAL BLOCK  1995    L/S spine    FINGER DEBRIDEMENT Right 07/12/2003    Debridement and full thickness skin grafting of the ring and small fingers of the right hand-Dr. Rayray Long    FOOT SURGERY      HAND SURGERY  2003    INCISION AND DRAINAGE PERIRECTAL ABSCESS N/A 07/10/2018    Procedure: INCISION AND DRAINAGE OF PERIRECTAL ABSCESS;  Surgeon: Porsha Arcos MD;  Location: [facility] MAIN OR;  Service: General    INCISION AND DRAINAGE TRUNK N/A 04/23/2022    Procedure: Evacuation lumbar hematoma;  Surgeon: Jacky Perez MD;  Location: [facility] MAIN OR;  Service: Orthopedics;  Laterality: N/A;    JOINT REPLACEMENT      LUMBAR DISCECTOMY FUSION INSTRUMENTATION Left 10/10/2016    Procedure: LEFT L2-L3, L3-L4 LUMBAR LAMINECTOMY/ DISCECTOMY WITH METRIX ;  Surgeon: Sawyer Rick MD;  Location: [facility] MAIN OR;  Service:     LUMBAR DISCECTOMY FUSION INSTRUMENTATION N/A 07/31/2017    Procedure: LUMBAR FUSION  DECOMPRESSON WITH PEDICLE SCREWS with LAURA L2-3,L3-4;  Surgeon: Jacky Perez MD;  Location: [facility] MAIN OR;  Service:     LUMBAR FUSION N/A 04/13/2022    Procedure: Thoracic 10-thoracic 11, thoracic 11-thoracic 12, thoracic 12-lumbar 1, lumbar 1-lumbar 2, lumbar 2-lumbar 3, lumbar 3-lumbar 4 fusion with instrumentation with iliac crest bone graft, and removal of implants from lumbar 2-lumbar 3, lumbar 3-lumbar 4;  Surgeon: Jacky Perez MD;  Location: [facility] MAIN OR;  Service: Orthopedic Spine;  Laterality: N/A;    LUMBAR FUSION N/A 04/13/2022    Procedure: LUMBAR ANTERIOR INTERBODY FUSION L4,L5 Osteotomy interbody fusion with cage L1,L2;  Surgeon: Jacky Perez MD;  Location: [facility] OR;  Service: Orthopedic Spine;  Laterality: N/A;    LUMBAR LAMINECTOMY DISCECTOMY DECOMPRESSION N/A 07/31/2017    Procedure: L2 to L4 laminectomy with neural lysis and a fusion by orthopedics;  Surgeon: Sawyer Rick MD;  Location: [facility] OR;  Service:     LUMBAR LAMINECTOMY DISCECTOMY DECOMPRESSION N/A 09/05/2017    Procedure: I&D LUMBAR SPINE ;  Surgeon: Jacky Perez MD;  Location: [facility] OR;  Service:     ORTHOPEDIC SURGERY      SHOULDER SURGERY Right 02/23/2011    Dr. Navarro    SINUS SURGERY  2003    Dr. Barrera    SPINAL FUSION      SPINE SURGERY  2010    TOTAL SHOULDER REPLACEMENT Right 09/07/2023    TRIGGER POINT INJECTION         Procedures       Objective:         PHYSICAL EXAM:  GEN: VSS, no distress,   Eyes: normal sclera, normal lids and lashes  HENT: moist mucus membranes,   Respiratory: CTAB, no rales or wheezes  CV: RRR, no murmurs, , +2 DP and 2+ carotid pulses b/l  GI: NABS, soft,  Nontender, nondistended  MSK: no edema, no scoliosis or kyphosis  Skin: no rash, warm, dry  Heme/Lymph: no bruising or bleeding  Psych: organized thought, normal behavior and affect  Neuro: Cranial nerves grossly intact, Alert and Oriented x 3.         Assessment:          Diagnosis Plan   1. Coronary artery  disease involving native coronary artery of native heart without angina pectoris           Plan:       1.  CAD: Diffuse coronary disease, status post multivessel stenting in the LAD and circumflex with balloon angioplasty of the distal LAD in 2021.  Distal RCA PCI using a 3.5 x 28 mm Xience brett point drug-eluting stent.  No further angina.  Aspirin Plavix.    2.  COPD on Trelegy  3.  Mildly elevated A1c.  6.5%  4.  Chronic back pain  5.  Hyperlipidemia: Excellent control    Montse, thank you very much for referring this kind patient to me. Please call me with any questions or concerns. I will see the patient again in the office in 6 months.         Jami Mars MD  04/12/24  [facility]    Outpatient Encounter Medications as of 4/12/2024   Medication Sig Dispense Refill    amLODIPine (NORVASC) 10 MG tablet Take 0.5 tablets by mouth Daily. for blood pressure 90 tablet 1    aspirin 81 MG EC tablet Take 1 tablet by mouth Daily for 30 days. 30 tablet 0    Boswellia-Glucosamine-Vit D (OSTEO BI-FLEX ONE PER DAY PO) Take  by mouth.      busPIRone (BUSPAR) 10 MG tablet Take 1 tablet by mouth 2 (Two) Times a Day As Needed (anxiety). 180 tablet 1    carvedilol (COREG) 6.25 MG tablet Take 1 tablet by mouth 2 (Two) Times a Day With Meals for 30 days. 60 tablet 0    Cholecalciferol (VITAMIN D) 2000 UNITS tablet Take 1 tablet by mouth Every Evening.      clopidogrel (PLAVIX) 75 MG tablet TAKE 1 TABLET EVERY DAY 90 tablet 3    Cyanocobalamin (B-12) 1000 MCG sublingual tablet DISSOLVE 1 TABLET UNDER THE TONGUE EVERY DAY 90 tablet 3    cyclobenzaprine (FLEXERIL) 10 MG tablet Take 1 tablet by mouth 2 (Two) Times a Day. 270 tablet 3    docusate sodium (Stool Softener) 100 MG capsule       escitalopram (LEXAPRO) 20 MG tablet TAKE 1 TABLET EVERY NIGHT FOR ANXIETY 90 tablet 3    ezetimibe (ZETIA) 10 MG tablet Take 1 tablet by mouth Daily. 90 tablet 3    folic acid (FOLVITE) 1 MG tablet TAKE 1 TABLET EVERY DAY 90 tablet 3     irbesartan (AVAPRO) 75 MG tablet Take 1 tablet by mouth Daily. for blood pressure 90 tablet 1    isosorbide mononitrate (IMDUR) 30 MG 24 hr tablet TAKE 1 TABLET EVERY DAY 90 tablet 3    levothyroxine (SYNTHROID, LEVOTHROID) 50 MCG tablet Take 1 tablet by mouth Every Morning. Before breakfast, for thyroid 90 tablet 3    meclizine (ANTIVERT) 25 MG tablet Take 1 tablet by mouth 3 (Three) Times a Day As Needed for Dizziness. (Patient not taking: Reported on 4/9/2024) 30 tablet 0    metFORMIN ER (GLUCOPHAGE-XR) 500 MG 24 hr tablet 2 TABLETS EVERY DAY ROUTINE FOR DIABETES 180 tablet 1    multivitamin with minerals tablet tablet Take 1 tablet by mouth Daily. HOLD X1 WEEK PRIOR TO SURGERY      nitroglycerin (NITROSTAT) 0.4 MG SL tablet Place 1 tablet under the tongue Every 5 (Five) Minutes As Needed for Chest Pain. Take no more than 3 doses in 15 minutes. 25 tablet 1    pantoprazole (PROTONIX) 40 MG EC tablet Take 1 tablet by mouth Daily. For GERD 90 tablet 1    pregabalin (LYRICA) 100 MG capsule TAKE 1 CAPSULE BY MOUTH THREE TIMES A DAY 90 capsule 1    rosuvastatin (CRESTOR) 10 MG tablet TAKE 1 TABLET EVERY NIGHT AT BEDTIME 90 tablet 3    traMADol (ULTRAM) 50 MG tablet Take 1 tablet by mouth Every 8 (Eight) Hours As Needed for Severe Pain. 90 tablet 1    [DISCONTINUED] amLODIPine (NORVASC) 10 MG tablet Take 0.5 tablets by mouth Daily. for blood pressure      [DISCONTINUED] amoxicillin-clavulanate (AUGMENTIN) 875-125 MG per tablet TAKE 1 TABLET BY MOUTH EVERY 12 (TWELVE) HOURS FOR INFECTION WITH FOOD 28 tablet 1    [DISCONTINUED] aspirin  MG tablet Take 1 tablet by mouth Daily. (Patient taking differently: Take 1 tablet by mouth Daily. STATES INSTRUCTED TO HOLD ONE WEEK PRIOR TO SURGERY) 30 tablet 0    [DISCONTINUED] azithromycin (ZITHROMAX) 250 MG tablet Take 2 tablets the first day, then 1 tablet daily for 4 days for infection 6 tablet 1    [DISCONTINUED] busPIRone (BUSPAR) 10 MG tablet TAKE 1 TABLET TWICE DAILY AS  NEEDED FOR ANXIETY 180 tablet 1    [DISCONTINUED] carvedilol (COREG) 12.5 MG tablet Take 1 tablet by mouth 2 (Two) Times a Day With Meals. 180 tablet 3    [DISCONTINUED] irbesartan (AVAPRO) 75 MG tablet Take 1 tablet by mouth Daily. for blood pressure 90 tablet 1    [DISCONTINUED] levothyroxine (SYNTHROID, LEVOTHROID) 50 MCG tablet TAKE 1 TABLET EVERY DAY FOR THYROID BEFORE BREAKFAST (NEW DOSE) 90 tablet 3    [DISCONTINUED] metFORMIN ER (GLUCOPHAGE-XR) 500 MG 24 hr tablet 2 TABLETS EVERY DAY ROUTINE FOR DIABETES 180 tablet 1    [DISCONTINUED] Nirmatrelvir & Ritonavir, 300mg/100mg, (PAXLOVID) 20 x 150 MG & 10 x 100MG tablet therapy pack tablet Take 3 tablets by mouth 2 (Two) Times a Day. 30 tablet 0    [DISCONTINUED] pantoprazole (PROTONIX) 40 MG EC tablet TAKE 1 TABLET EVERY DAY FOR GERD 90 tablet 1    [DISCONTINUED] predniSONE (DELTASONE) 20 MG tablet Take 1 tablet by mouth 2 (Two) Times a Day. With food for 5 days 10 tablet 0     No facility-administered encounter medications on file as of 4/12/2024.

## 2024-04-27 DIAGNOSIS — Z98.890 HISTORY OF LUMBAR SURGERY: ICD-10-CM

## 2024-04-27 DIAGNOSIS — G89.4 CHRONIC PAIN SYNDROME: ICD-10-CM

## 2024-04-27 DIAGNOSIS — M54.16 LUMBAR RADICULOPATHY: ICD-10-CM

## 2024-04-29 ENCOUNTER — HOSPITAL ENCOUNTER (OUTPATIENT)
Dept: CT IMAGING | Facility: HOSPITAL | Age: 69
Discharge: HOME | End: 2024-04-29
Admitting: INTERNAL MEDICINE
Payer: MEDICARE

## 2024-04-29 DIAGNOSIS — Z87.891 PERSONAL HISTORY OF TOBACCO USE, PRESENTING HAZARDS TO HEALTH: ICD-10-CM

## 2024-04-29 PROCEDURE — 71271 CT THORAX LUNG CANCER SCR C-: CPT

## 2024-04-30 RX ORDER — PREGABALIN 100 MG/1
100 CAPSULE ORAL 3 TIMES DAILY
Qty: 90 CAPSULE | Refills: 1 | Status: SHIPPED | OUTPATIENT
Start: 2024-04-30

## 2024-05-02 DIAGNOSIS — Z87.891 PERSONAL HISTORY OF TOBACCO USE, PRESENTING HAZARDS TO HEALTH: Primary | ICD-10-CM

## 2024-05-12 DIAGNOSIS — M54.16 LUMBAR RADICULOPATHY: ICD-10-CM

## 2024-05-12 DIAGNOSIS — Z98.890 HISTORY OF LUMBAR SURGERY: ICD-10-CM

## 2024-05-14 RX ORDER — TRAMADOL HYDROCHLORIDE 50 MG/1
50 TABLET ORAL EVERY 8 HOURS PRN
Qty: 90 TABLET | Refills: 1 | Status: SHIPPED | OUTPATIENT
Start: 2024-05-14

## 2024-06-06 ENCOUNTER — OFFICE VISIT (OUTPATIENT)
Dept: FAMILY MEDICINE CLINIC | Facility: CLINIC | Age: 69
End: 2024-06-06
Payer: MEDICARE

## 2024-06-06 VITALS
RESPIRATION RATE: 16 BRPM | WEIGHT: 246 LBS | HEIGHT: 71 IN | DIASTOLIC BLOOD PRESSURE: 62 MMHG | SYSTOLIC BLOOD PRESSURE: 100 MMHG | TEMPERATURE: 97.3 F | HEART RATE: 84 BPM | OXYGEN SATURATION: 92 % | BODY MASS INDEX: 34.44 KG/M2

## 2024-06-06 DIAGNOSIS — E11.9 TYPE 2 DIABETES MELLITUS WITHOUT COMPLICATION, WITHOUT LONG-TERM CURRENT USE OF INSULIN: Primary | ICD-10-CM

## 2024-06-06 DIAGNOSIS — I10 PRIMARY HYPERTENSION: ICD-10-CM

## 2024-06-06 DIAGNOSIS — F41.1 GENERALIZED ANXIETY DISORDER: ICD-10-CM

## 2024-06-06 DIAGNOSIS — M54.50 LUMBAR PAIN: ICD-10-CM

## 2024-06-06 DIAGNOSIS — I25.10 CORONARY ARTERY DISEASE INVOLVING NATIVE CORONARY ARTERY OF NATIVE HEART WITHOUT ANGINA PECTORIS: ICD-10-CM

## 2024-06-06 DIAGNOSIS — E03.9 HYPOTHYROIDISM, ACQUIRED: ICD-10-CM

## 2024-06-06 DIAGNOSIS — E78.2 MIXED HYPERLIPIDEMIA: ICD-10-CM

## 2024-06-06 DIAGNOSIS — Z87.891 EX-SMOKER: ICD-10-CM

## 2024-06-06 DIAGNOSIS — R97.20 RISING PSA LEVEL: ICD-10-CM

## 2024-06-06 DIAGNOSIS — E53.8 LOW FOLIC ACID: ICD-10-CM

## 2024-06-06 DIAGNOSIS — J44.9 CHRONIC OBSTRUCTIVE PULMONARY DISEASE, UNSPECIFIED COPD TYPE: ICD-10-CM

## 2024-06-06 DIAGNOSIS — E53.8 LOW SERUM VITAMIN B12: ICD-10-CM

## 2024-06-06 DIAGNOSIS — E55.9 VITAMIN D DEFICIENCY, UNSPECIFIED: ICD-10-CM

## 2024-06-06 PROCEDURE — 3078F DIAST BP <80 MM HG: CPT | Performed by: PHYSICIAN ASSISTANT

## 2024-06-06 PROCEDURE — 1160F RVW MEDS BY RX/DR IN RCRD: CPT | Performed by: PHYSICIAN ASSISTANT

## 2024-06-06 PROCEDURE — 99214 OFFICE O/P EST MOD 30 MIN: CPT | Performed by: PHYSICIAN ASSISTANT

## 2024-06-06 PROCEDURE — 3074F SYST BP LT 130 MM HG: CPT | Performed by: PHYSICIAN ASSISTANT

## 2024-06-06 PROCEDURE — 3044F HG A1C LEVEL LT 7.0%: CPT | Performed by: PHYSICIAN ASSISTANT

## 2024-06-06 PROCEDURE — 1125F AMNT PAIN NOTED PAIN PRSNT: CPT | Performed by: PHYSICIAN ASSISTANT

## 2024-06-06 PROCEDURE — 1159F MED LIST DOCD IN RCRD: CPT | Performed by: PHYSICIAN ASSISTANT

## 2024-06-06 RX ORDER — CARVEDILOL 6.25 MG/1
6.25 TABLET ORAL 2 TIMES DAILY WITH MEALS
Qty: 60 TABLET | Refills: 0 | Status: SHIPPED | OUTPATIENT
Start: 2024-06-06 | End: 2024-06-06 | Stop reason: SDUPTHER

## 2024-06-06 RX ORDER — TIOTROPIUM BROMIDE INHALATION SPRAY 3.12 UG/1
SPRAY, METERED RESPIRATORY (INHALATION)
COMMUNITY
Start: 2024-05-08

## 2024-06-06 RX ORDER — CARVEDILOL 6.25 MG/1
6.25 TABLET ORAL 2 TIMES DAILY WITH MEALS
Qty: 60 TABLET | Refills: 0 | Status: SHIPPED | OUTPATIENT
Start: 2024-06-06 | End: 2024-07-06

## 2024-06-06 NOTE — PROGRESS NOTES
"Subjective   Prem Thrasher is a 69 y.o. male.     Hypertension  Associated symptoms include anxiety. Pertinent negatives include no blurred vision.   Hyperlipidemia  Associated symptoms include myalgias.   Diabetes  Pertinent negatives for hypoglycemia include no speech difficulty. Associated symptoms include fatigue. Pertinent negatives for diabetes include no blurred vision.   Anxiety   Patient reports no suicidal ideas. His past medical history is significant for depression.   DepressionPatient is not experiencing: choking sensation and suicidal ideas.  His past medical history is significant for depression.       Since the last visit, he has overall felt tired.  He has Primary Hypertension and well controlled on current medication, DMII well controlled on medication and will continue regimen, GERD controlled on PPI Rx, Hyperlipidemia and working on this with diet and exercise, CAD and remains under care of their Cardiologist for mangement, CAD and remains on medication regimen, Hypothyroidism well controlled on current medication and will continue Rx, and Vitamin D deficiency and labs are at goal >30 ng/mL.  he has been compliant with current medications have reviewed them.  The patient denies medication side effects.  Will refill medications. /62   Pulse 84   Temp 97.3 °F (36.3 °C)   Resp 16   Ht 180.3 cm (71\")   Wt 112 kg (246 lb)   SpO2 92%   BMI 34.31 kg/m² .      Still has chronic pain--sees pain management  Not smoking      Results for orders placed or performed in visit on 05/01/24   Comprehensive Metabolic Panel    Specimen: Blood   Result Value Ref Range    Glucose 101 (H) 65 - 99 mg/dL    BUN 13 8 - 23 mg/dL    Creatinine 0.99 0.76 - 1.27 mg/dL    EGFR Result 81.9 >60.0 mL/min/1.73    BUN/Creatinine Ratio 13.1 7.0 - 25.0    Sodium 137 136 - 145 mmol/L    Potassium 5.0 3.5 - 5.2 mmol/L    Chloride 99 98 - 107 mmol/L    Total CO2 26.3 22.0 - 29.0 mmol/L    Calcium 9.4 8.6 - 10.5 mg/dL    " Total Protein 6.5 6.0 - 8.5 g/dL    Albumin 4.2 3.5 - 5.2 g/dL    Globulin 2.3 gm/dL    A/G Ratio 1.8 g/dL    Total Bilirubin 0.5 0.0 - 1.2 mg/dL    Alkaline Phosphatase 74 39 - 117 U/L    AST (SGOT) 18 1 - 40 U/L    ALT (SGPT) 16 1 - 41 U/L   Hemoglobin A1c    Specimen: Blood   Result Value Ref Range    Hemoglobin A1C 6.40 (H) 4.80 - 5.60 %   Magnesium    Specimen: Blood   Result Value Ref Range    Magnesium 2.1 1.6 - 2.4 mg/dL   PSA Screen    Specimen: Blood   Result Value Ref Range    PSA 2.140 0.000 - 4.000 ng/mL     He had COPD follow-up with Dr. Mcmillan on 5/6/2024 and added Spiriva Respimat also order low-dose CT of the chest to screen for lung cancer----noted chronic bronchitis, chronic smoker's cough.  Had low-dose CT of the chest 4/29/2024 no lung cancer  Had f/u DR Mars 4-12-24----just had 3rd stent 3-22-24;  prior 2 stents was in 2020. ---to f/u 6 mos  Plan   1.  CAD: Diffuse coronary disease, status post multivessel stenting in the LAD and circumflex with balloon angioplasty of the distal LAD in 2021.  Distal RCA PCI using a 3.5 x 28 mm Xience brett point drug-eluting stent.  No further angina.  Aspirin Plavix.    2.  COPD on Trelegy  3.  Mildly elevated A1c.  6.5%  4.  Chronic back pain  5.  Hyperlipidemia: Excellent control  Intol Cpap  Note that Lexapro does work well for anxiety and depression and continue same dose   Also want to show notes from Dr. Louise and the rheumatologist from 3/14/2023  I reviewed his notes and his impression was patient suffers from polyarthralgia from osteoarthritis and was going to order additional labs to check for inflammatory arthritis and imaging..... There was a question from prior rheumatologist and from this consult the patient had rheumatoid arthritis after all?  He has been on prior therapies and Dr. Louise did not notice any synovitis  on exam and did note osteoarthritic changes especially in his fingers  Sees [facility] pain management for his osteoarthritis pain  and lumbar DDD  The following portions of the patient's history were reviewed and updated as appropriate: allergies, current medications, past family history, past medical history, past social history, past surgical history, and problem list.    Review of Systems   Constitutional:  Positive for fatigue. Negative for diaphoresis.   HENT:  Negative for nosebleeds and trouble swallowing.    Eyes:  Negative for blurred vision and visual disturbance.   Respiratory:  Negative for choking.    Gastrointestinal:  Negative for blood in stool.   Musculoskeletal:  Positive for arthralgias, back pain and myalgias.   Allergic/Immunologic: Negative for immunocompromised state.   Neurological:  Negative for facial asymmetry and speech difficulty.   Psychiatric/Behavioral:  Negative for self-injury and suicidal ideas.        Objective   Physical Exam  Vitals and nursing note reviewed.   Constitutional:       General: He is not in acute distress.     Appearance: He is well-developed. He is not diaphoretic.   HENT:      Head: Normocephalic.   Eyes:      Conjunctiva/sclera: Conjunctivae normal.      Pupils: Pupils are equal, round, and reactive to light.   Cardiovascular:      Rate and Rhythm: Normal rate and regular rhythm.      Heart sounds: Normal heart sounds. No murmur heard.  Pulmonary:      Effort: Pulmonary effort is normal.      Breath sounds: Normal breath sounds.   Musculoskeletal:         General: Normal range of motion.      Cervical back: Normal range of motion and neck supple.   Skin:     General: Skin is warm and dry.      Findings: No rash.   Neurological:      Mental Status: He is alert and oriented to person, place, and time.   Psychiatric:         Mood and Affect: Affect is not inappropriate.         Behavior: Behavior normal.         Thought Content: Thought content normal.         Judgment: Judgment normal.           Assessment & Plan   Diagnoses and all orders for this visit:    1. Type 2 diabetes mellitus  without complication, without long-term current use of insulin (Primary)  -     Comprehensive Metabolic Panel; Future  -     CBC & Differential; Future  -     Hemoglobin A1c; Future  -     T4, Free; Future  -     TSH; Future  -     Vitamin D,25-Hydroxy; Future  -     Vitamin B12; Future  -     Folate; Future  -     PSA DIAGNOSTIC; Future  -     Magnesium; Future  -     Microalbumin / Creatinine Urine Ratio - Urine, Clean Catch; Future  -     Urinalysis With Microscopic - Urine, Clean Catch; Future    2. Primary hypertension  -     Comprehensive Metabolic Panel; Future  -     CBC & Differential; Future  -     Hemoglobin A1c; Future  -     T4, Free; Future  -     TSH; Future  -     Vitamin D,25-Hydroxy; Future  -     Vitamin B12; Future  -     Folate; Future  -     PSA DIAGNOSTIC; Future  -     Magnesium; Future  -     Microalbumin / Creatinine Urine Ratio - Urine, Clean Catch; Future  -     Urinalysis With Microscopic - Urine, Clean Catch; Future    3. Generalized anxiety disorder  -     Comprehensive Metabolic Panel; Future  -     CBC & Differential; Future  -     Hemoglobin A1c; Future  -     T4, Free; Future  -     TSH; Future  -     Vitamin D,25-Hydroxy; Future  -     Vitamin B12; Future  -     Folate; Future  -     PSA DIAGNOSTIC; Future  -     Magnesium; Future  -     Microalbumin / Creatinine Urine Ratio - Urine, Clean Catch; Future  -     Urinalysis With Microscopic - Urine, Clean Catch; Future    4. Lumbar pain  -     Comprehensive Metabolic Panel; Future  -     CBC & Differential; Future  -     Hemoglobin A1c; Future  -     T4, Free; Future  -     TSH; Future  -     Vitamin D,25-Hydroxy; Future  -     Vitamin B12; Future  -     Folate; Future  -     PSA DIAGNOSTIC; Future  -     Magnesium; Future  -     Microalbumin / Creatinine Urine Ratio - Urine, Clean Catch; Future  -     Urinalysis With Microscopic - Urine, Clean Catch; Future    5. Chronic obstructive pulmonary disease, unspecified COPD type  -      Comprehensive Metabolic Panel; Future  -     CBC & Differential; Future  -     Hemoglobin A1c; Future  -     T4, Free; Future  -     TSH; Future  -     Vitamin D,25-Hydroxy; Future  -     Vitamin B12; Future  -     Folate; Future  -     PSA DIAGNOSTIC; Future  -     Magnesium; Future  -     Microalbumin / Creatinine Urine Ratio - Urine, Clean Catch; Future  -     Urinalysis With Microscopic - Urine, Clean Catch; Future    6. Ex-smoker  -     Comprehensive Metabolic Panel; Future  -     CBC & Differential; Future  -     Hemoglobin A1c; Future  -     T4, Free; Future  -     TSH; Future  -     Vitamin D,25-Hydroxy; Future  -     Vitamin B12; Future  -     Folate; Future  -     PSA DIAGNOSTIC; Future  -     Magnesium; Future  -     Microalbumin / Creatinine Urine Ratio - Urine, Clean Catch; Future  -     Urinalysis With Microscopic - Urine, Clean Catch; Future    7. Hypothyroidism, acquired  -     Comprehensive Metabolic Panel; Future  -     CBC & Differential; Future  -     Hemoglobin A1c; Future  -     T4, Free; Future  -     TSH; Future  -     Vitamin D,25-Hydroxy; Future  -     Vitamin B12; Future  -     Folate; Future  -     PSA DIAGNOSTIC; Future  -     Magnesium; Future  -     Microalbumin / Creatinine Urine Ratio - Urine, Clean Catch; Future  -     Urinalysis With Microscopic - Urine, Clean Catch; Future    8. Low serum vitamin B12  -     Comprehensive Metabolic Panel; Future  -     CBC & Differential; Future  -     Hemoglobin A1c; Future  -     T4, Free; Future  -     TSH; Future  -     Vitamin D,25-Hydroxy; Future  -     Vitamin B12; Future  -     Folate; Future  -     PSA DIAGNOSTIC; Future  -     Magnesium; Future  -     Microalbumin / Creatinine Urine Ratio - Urine, Clean Catch; Future  -     Urinalysis With Microscopic - Urine, Clean Catch; Future    9. Low folic acid  -     Comprehensive Metabolic Panel; Future  -     CBC & Differential; Future  -     Hemoglobin A1c; Future  -     T4, Free; Future  -      TSH; Future  -     Vitamin D,25-Hydroxy; Future  -     Vitamin B12; Future  -     Folate; Future  -     PSA DIAGNOSTIC; Future  -     Magnesium; Future  -     Microalbumin / Creatinine Urine Ratio - Urine, Clean Catch; Future  -     Urinalysis With Microscopic - Urine, Clean Catch; Future    10. Mixed hyperlipidemia  -     Comprehensive Metabolic Panel; Future  -     CBC & Differential; Future  -     Hemoglobin A1c; Future  -     T4, Free; Future  -     TSH; Future  -     Vitamin D,25-Hydroxy; Future  -     Vitamin B12; Future  -     Folate; Future  -     PSA DIAGNOSTIC; Future  -     Magnesium; Future  -     Microalbumin / Creatinine Urine Ratio - Urine, Clean Catch; Future  -     Urinalysis With Microscopic - Urine, Clean Catch; Future    11. Coronary artery disease involving native coronary artery of native heart without angina pectoris  -     Comprehensive Metabolic Panel; Future  -     CBC & Differential; Future  -     Hemoglobin A1c; Future  -     T4, Free; Future  -     TSH; Future  -     Vitamin D,25-Hydroxy; Future  -     Vitamin B12; Future  -     Folate; Future  -     PSA DIAGNOSTIC; Future  -     Magnesium; Future  -     Microalbumin / Creatinine Urine Ratio - Urine, Clean Catch; Future  -     Urinalysis With Microscopic - Urine, Clean Catch; Future    12. Vitamin D deficiency, unspecified  -     Vitamin D,25-Hydroxy; Future    13. Rising PSA level  -     PSA DIAGNOSTIC; Future    Plan, Prem VEGA Thrasher, was seen today.  he was seen for HTN and continue medication, DMII stable and refilled medications, GERD and will continue on PPI medication, Hyperlipidemia and will continue current medication, CAD and is currently stable on medication management and stable, CAD and is under care of their Cardiologist for medical management and stable, Hypothyroidism well controlled, continue medication, and Vitamin D deficiency and supplemented.  Continue Lexapro and BuSpar for anxiety working well  Continue B12 folic  acid and vitamin D  Continue Flexeril for lumbar spasms works well  PSA level increased just a bit from last labs and will repeat level in 6 months---declines exam  Continue Lexapro and BuSpar for anxiety working wel    To see cardio Q 6 mos for CAD f/u  Will follow-up with Dr. Mcmillan for the COPD  Takes D, B12 , folic acid===lab Dec    Answers submitted by the patient for this visit:  Primary Reason for Visit (Submitted on 5/30/2024)  What is the primary reason for your visit?: Other  Other (Submitted on 5/30/2024)  Please describe your symptoms.: Labs  Have you had these symptoms before?: No  How long have you been having these symptoms?: Greater than 2 weeks

## 2024-06-06 NOTE — PATIENT INSTRUCTIONS
Diabetes Mellitus Basics    Diabetes mellitus, or diabetes, is a long-term (chronic) disease. It occurs when the body does not properly use sugar (glucose) that is released from food after you eat.  Diabetes mellitus may be caused by one or both of these problems:  Your pancreas does not make enough of a hormone called insulin.  Your body does not react in a normal way to the insulin that it makes.  Insulin lets glucose enter cells in your body. This gives you energy. If you have diabetes, glucose cannot get into cells. This causes high blood glucose (hyperglycemia).  How to treat and manage diabetes  You may need to take insulin or other diabetes medicines daily to keep your glucose in balance. If you are prescribed insulin, you will learn how to give yourself insulin by injection. You may need to adjust the amount of insulin you take based on the foods that you eat.  You will need to check your blood glucose levels using a glucose monitor as told by your health care provider. The readings can help determine if you have low or high blood glucose.  Generally, you should have these blood glucose levels:  Before meals (preprandial):  mg/dL (4.4-7.2 mmol/L).  After meals (postprandial): below 180 mg/dL (10 mmol/L).  Hemoglobin A1c (HbA1c) level: less than 7%.  Your health care provider will set treatment goals for you.  Keep all follow-up visits. This is important.  Follow these instructions at home:  Diabetes medicines  Take your diabetes medicines every day as told by your health care provider. List your diabetes medicines here:  Name of medicine: ______________________________  Amount (dose): _______________ Time (a.m./p.m.): _______________ Notes: ___________________________________  Name of medicine: ______________________________  Amount (dose): _______________ Time (a.m./p.m.): _______________ Notes: ___________________________________  Name of medicine: ______________________________  Amount (dose):  _______________ Time (a.m./p.m.): _______________ Notes: ___________________________________  Insulin  If you use insulin, list the types of insulin you use here:  Insulin type: ______________________________  Amount (dose): _______________ Time (a.m./p.m.): _______________Notes: ___________________________________  Insulin type: ______________________________  Amount (dose): _______________ Time (a.m./p.m.): _______________ Notes: ___________________________________  Insulin type: ______________________________  Amount (dose): _______________ Time (a.m./p.m.): _______________ Notes: ___________________________________  Insulin type: ______________________________  Amount (dose): _______________ Time (a.m./p.m.): _______________ Notes: ___________________________________  Insulin type: ______________________________  Amount (dose): _______________ Time (a.m./p.m.): _______________ Notes: ___________________________________  Managing blood glucose    Check your blood glucose levels using a glucose monitor as told by your health care provider.  Write down the times that you check your glucose levels here:  Time: _______________ Notes: ___________________________________  Time: _______________ Notes: ___________________________________  Time: _______________ Notes: ___________________________________  Time: _______________ Notes: ___________________________________  Time: _______________ Notes: ___________________________________  Time: _______________ Notes: ___________________________________    Low blood glucose  Low blood glucose (hypoglycemia) is when glucose is at or below 70 mg/dL (3.9 mmol/L). Symptoms may include:  Feeling:  Hungry.  Sweaty and clammy.  Irritable or easily upset.  Dizzy.  Sleepy.  Having:  A fast heartbeat.  A headache.  A change in your vision.  Numbness around the mouth, lips, or tongue.  Having trouble with:  Moving (coordination).  Sleeping.  Treating low blood glucose  To treat low blood  glucose, eat or drink something containing sugar right away. If you can think clearly and swallow safely, follow the 15:15 rule:  Take 15 grams of a fast-acting carb (carbohydrate), as told by your health care provider.  Some fast-acting carbs are:  Glucose tablets: take 3-4 tablets.  Hard candy: eat 3-5 pieces.  Fruit juice: drink 4 oz (120 mL).  Regular (not diet) soda: drink 4-6 oz (120-180 mL).  Honey or sugar: eat 1 Tbsp (15 mL).  Check your blood glucose levels 15 minutes after you take the carb.  If your glucose is still at or below 70 mg/dL (3.9 mmol/L), take 15 grams of a carb again.  If your glucose does not go above 70 mg/dL (3.9 mmol/L) after 3 tries, get help right away.  After your glucose goes back to normal, eat a meal or a snack within 1 hour.  Treating very low blood glucose  If your glucose is at or below 54 mg/dL (3 mmol/L), you have very low blood glucose (severe hypoglycemia).  This is an emergency. Do not wait to see if the symptoms will go away. Get medical help right away. Call your local emergency services (911 in the [place] Do not drive yourself to the hospital.  Questions to ask your health care provider  Should I talk with a diabetes educator?  What equipment will I need to care for myself at home?  What diabetes medicines do I need? When should I take them?  How often do I need to check my blood glucose levels?  What number can I call if I have questions?  When is my follow-up visit?  Where can I find a support group for people with diabetes?  Where to find more information  American Diabetes Association: www.diabetes.org  Association of Diabetes Care and Education Specialists: www.diabeteseducator.org  Contact a health care provider if:  Your blood glucose is at or above 240 mg/dL (13.3 mmol/L) for 2 days in a row.  You have been sick or have had a fever for 2 days or more, and you are not getting better.  You have any of these problems for more than 6 hours:  You cannot eat or  drink.  You feel nauseous.  You vomit.  You have diarrhea.  Get help right away if:  Your blood glucose is lower than 54 mg/dL (3 mmol/L).  You get confused.  You have trouble thinking clearly.  You have trouble breathing.  These symptoms may represent a serious problem that is an emergency. Do not wait to see if the symptoms will go away. Get medical help right away. Call your local emergency services (911 in the [place] Do not drive yourself to the hospital.  Summary  Diabetes mellitus is a chronic disease that occurs when the body does not properly use sugar (glucose) that is released from food after you eat.  Take insulin and diabetes medicines as told.  Check your blood glucose every day, as often as told.  Keep all follow-up visits. This is important.  This information is not intended to replace advice given to you by your health care provider. Make sure you discuss any questions you have with your health care provider.  Document Revised: 04/20/2021 Document Reviewed: 04/20/2021  [employer] Patient Education © 2024 [employer]

## 2024-06-09 RX ORDER — CYCLOBENZAPRINE HCL 10 MG
10 TABLET ORAL 2 TIMES DAILY
Qty: 270 TABLET | Refills: 3 | Status: SHIPPED | OUTPATIENT
Start: 2024-06-09

## 2024-06-11 ENCOUNTER — OFFICE VISIT (OUTPATIENT)
Dept: PAIN MEDICINE | Facility: CLINIC | Age: 69
End: 2024-06-11
Payer: MEDICARE

## 2024-06-11 VITALS
OXYGEN SATURATION: 95 % | BODY MASS INDEX: 34.16 KG/M2 | DIASTOLIC BLOOD PRESSURE: 58 MMHG | WEIGHT: 244 LBS | HEIGHT: 71 IN | HEART RATE: 79 BPM | TEMPERATURE: 96.2 F | SYSTOLIC BLOOD PRESSURE: 98 MMHG

## 2024-06-11 DIAGNOSIS — T40.2X5A THERAPEUTIC OPIOID INDUCED CONSTIPATION: ICD-10-CM

## 2024-06-11 DIAGNOSIS — Z79.899 HIGH RISK MEDICATION USE: ICD-10-CM

## 2024-06-11 DIAGNOSIS — G89.4 CHRONIC PAIN SYNDROME: Primary | ICD-10-CM

## 2024-06-11 DIAGNOSIS — M54.16 LUMBAR RADICULOPATHY: ICD-10-CM

## 2024-06-11 DIAGNOSIS — Z98.890 HISTORY OF LUMBAR SURGERY: ICD-10-CM

## 2024-06-11 DIAGNOSIS — K59.03 THERAPEUTIC OPIOID INDUCED CONSTIPATION: ICD-10-CM

## 2024-06-11 PROCEDURE — 99214 OFFICE O/P EST MOD 30 MIN: CPT | Performed by: NURSE PRACTITIONER

## 2024-06-11 PROCEDURE — 1160F RVW MEDS BY RX/DR IN RCRD: CPT | Performed by: NURSE PRACTITIONER

## 2024-06-11 PROCEDURE — 1159F MED LIST DOCD IN RCRD: CPT | Performed by: NURSE PRACTITIONER

## 2024-06-11 PROCEDURE — 3074F SYST BP LT 130 MM HG: CPT | Performed by: NURSE PRACTITIONER

## 2024-06-11 PROCEDURE — 1125F AMNT PAIN NOTED PAIN PRSNT: CPT | Performed by: NURSE PRACTITIONER

## 2024-06-11 PROCEDURE — 3078F DIAST BP <80 MM HG: CPT | Performed by: NURSE PRACTITIONER

## 2024-06-11 NOTE — PROGRESS NOTES
CHIEF COMPLAINT  Back pain. The patient states the back pain is unchanged from last visit.    Subjective   Prem Thrasher is a 69 y.o. male  who presents for follow-up.  He has a history of back pain.  Pain today 9/10 VAS.  Taking Tramadol 50 mg 2-3 times daily.  Also continues to utilize Lyrica 100 mg 1-2/day.  Constipation is an issue.  Relistor did not help and was cost prohibitive, did not get Movantik.  Has been doing ok with a regimen of oatmeal, metamucil, and Miralax.  Overall he reports that his pain has been better with the current regimen, acknowledges at least moderate improvement.   Fewer episodes of yelling out in pain, in fact he is no longer really doing this at all.       He is not a candidate for NSAIDs (plavix, cardiac hx).     Pertinent surgical history ---   4/13/2022 - lumbar fusion (Dr. Perez). Required evacuation of lumbar hematoma 4/23/2022  Lumbar fusion 2017 (Dr. Perez, Dr. Rick). Required second surgery to clean out infection   Back surgery 2010     Past Medications ---  Hydrocodone - constipation  Gabapentin - constipation     Previous interventions that the patient has received include:   Epidurals (no longer candidate due to infection history)    Back Pain  This is a chronic problem. The current episode started more than 1 year ago. The problem occurs daily. The problem has been worse since onset. The pain is present in the lumbar spine. The quality of the pain is described as aching, cramping and burning. The pain radiates to the left thigh and right thigh. The pain is at a severity of 9/10. The symptoms are aggravated by position, sitting, standing, twisting and bending. Associated symptoms include numbness (bilateral leg, left worse than right and is intermittent) and weakness (bilateral leg). Pertinent negatives include no abdominal pain, chest pain, dysuria, fever or headaches. He has tried analgesics, muscle relaxant, heat, ice and home exercises for the symptoms. The  "treatment provided mild relief.        PEG Assessment   What number best describes your pain on average in the past week?9  What number best describes how, during the past week, pain has interfered with your enjoyment of life?10  What number best describes how, during the past week, pain has interfered with your general activity?  9    Review of Pertinent Medical Data ---    The following portions of the patient's history were reviewed and updated as appropriate: allergies, current medications, past family history, past medical history, past social history, past surgical history, and problem list.    Review of Systems   Constitutional:  Negative for chills and fever.   Respiratory:  Negative for cough and shortness of breath.    Cardiovascular:  Negative for chest pain.   Gastrointestinal:  Negative for abdominal pain, constipation and diarrhea.   Genitourinary:  Negative for difficulty urinating and dysuria.   Musculoskeletal:  Positive for back pain and gait problem (ambulates with a cane).   Neurological:  Positive for weakness (bilateral leg), light-headedness and numbness (bilateral leg, left worse than right and is intermittent). Negative for dizziness and headaches.   Psychiatric/Behavioral:  Negative for agitation.        I have reviewed and confirmed the accuracy of the ROS as documented by the MA/LPN/RN TEN Camp    Vitals:    06/11/24 1158   BP: 98/58   BP Location: Left arm   Patient Position: Sitting   Pulse: 79   Temp: 96.2 °F (35.7 °C)   TempSrc: Temporal   SpO2: 95%   Weight: 111 kg (244 lb)   Height: 180.3 cm (71\")   PainSc:   9   PainLoc: Back         Objective   Physical Exam  Vitals and nursing note reviewed.   Constitutional:       General: He is not in acute distress.     Appearance: Normal appearance. He is not ill-appearing.   Pulmonary:      Effort: Pulmonary effort is normal. No respiratory distress.   Musculoskeletal:      Lumbar back: Tenderness and bony tenderness present. " Positive right straight leg raise test and positive left straight leg raise test.   Neurological:      Mental Status: He is alert and oriented to person, place, and time.      Gait: Gait abnormal (slowed, antalgia, ambulates with cane).   Psychiatric:         Mood and Affect: Mood normal.         Behavior: Behavior normal.           Assessment & Plan   Diagnoses and all orders for this visit:    1. Chronic pain syndrome (Primary)    2. History of lumbar surgery    3. Lumbar radiculopathy    4. Therapeutic opioid induced constipation    5. High risk medication use        --- He has seen Dr. Lozano previously regarding thumb pain.  He will f/u with him regarding this  --- Continue Tramadol, Lyrica. Patient appears stable with current regimen. No adverse effects. Regarding continuation of opioids, there is no evidence of aberrant behavior or any red flags.  The patient continues with appropriate response to opioid therapy. ADL's remain intact by self.   --- Routine ODT in office today as part of monitoring requirements for controlled substances.  This specimen will be sent to [facility] laboratory for confirmation.     --- The patient signed an updated copy of the controlled substance agreement on 10/24/2023  --- ORT - high risk. Total daily MME low.     Prem Thrasher reports a pain score of 9.  Given his pain assessment as noted, treatment options were discussed and the following options were decided upon as a follow-up plan to address the patient's pain: continuation of current treatment plan for pain.      --- Follow-up 2 months                  JESSICA REPORT  As part of the patient's treatment plan, I am prescribing controlled substances. The patient has been made aware of appropriate use of such medications, including potential risk of somnolence, limited ability to drive and/or work safely, and the potential for dependence or overdose. It has also been made clear that these medications are for use by this  patient only, without concomitant use of alcohol or other substances unless prescribed.     Patient has completed prescribing agreement detailing terms of continued prescribing of controlled substances, including monitoring JESSICA reports, urine drug screening, and pill counts if necessary. The patient is aware that inappropriate use will results in cessation of prescribing such medications.    As the clinician, I personally reviewed the JESSICA from 6/11/2024 while the patient was in the office today.    History and physical exam exhibit continued safe and appropriate use of controlled substances.       Dictated utilizing Dragon dictation.

## 2024-06-14 ENCOUNTER — TELEPHONE (OUTPATIENT)
Dept: PAIN MEDICINE | Facility: CLINIC | Age: 69
End: 2024-06-14

## 2024-06-14 NOTE — TELEPHONE ENCOUNTER
I spoke with pt and educated him on controlled substance medications not going to mail order. Pt verbally understood,he knows he has to contact his pharmacy since he has one refill there for this month.

## 2024-06-14 NOTE — TELEPHONE ENCOUNTER
Provider: ESTUARDO    Caller: PATIENT    Pharmacy: [facility]    Phone Number: [phone]    Reason for Call: PATIENT STATES THAT [facility] IS SAYING THEY HAVEN'T BEEN ABLE TO REACH THE OFFICE REGARDING GETTING HIS PRESCRIPTION FOR TRAMADOL TRANSFERRED FROM [facility].

## 2024-07-05 DIAGNOSIS — M54.16 LUMBAR RADICULOPATHY: ICD-10-CM

## 2024-07-05 DIAGNOSIS — G89.4 CHRONIC PAIN SYNDROME: ICD-10-CM

## 2024-07-05 DIAGNOSIS — Z98.890 HISTORY OF LUMBAR SURGERY: ICD-10-CM

## 2024-07-05 RX ORDER — PREGABALIN 100 MG/1
100 CAPSULE ORAL 3 TIMES DAILY
Qty: 90 CAPSULE | Refills: 1 | Status: SHIPPED | OUTPATIENT
Start: 2024-07-05

## 2024-07-15 RX ORDER — METFORMIN HYDROCHLORIDE 500 MG/1
TABLET, EXTENDED RELEASE ORAL
Qty: 180 TABLET | Refills: 3 | Status: SHIPPED | OUTPATIENT
Start: 2024-07-15

## 2024-08-01 ENCOUNTER — TELEPHONE (OUTPATIENT)
Dept: PAIN MEDICINE | Facility: CLINIC | Age: 69
End: 2024-08-01
Payer: MEDICARE

## 2024-08-01 DIAGNOSIS — Z98.890 HISTORY OF LUMBAR SURGERY: ICD-10-CM

## 2024-08-01 DIAGNOSIS — M54.16 LUMBAR RADICULOPATHY: ICD-10-CM

## 2024-08-01 RX ORDER — TRAMADOL HYDROCHLORIDE 50 MG/1
50 TABLET ORAL EVERY 8 HOURS PRN
Qty: 90 TABLET | Refills: 0 | Status: SHIPPED | OUTPATIENT
Start: 2024-08-01

## 2024-08-01 NOTE — TELEPHONE ENCOUNTER
"Caller: Prem Thrasher \"Greg\"    Relationship: Self    Best call back number:     Requested Prescriptions:   traMADol (ULTRAM) 50 MG tablet     Pharmacy where request should be sent: [facility]/pharmacy #[id number] [place], [place] - [address] AT [facility] [phone] [facility] [phone] FX     Last office visit with prescribing clinician: 6/11/2024   Last telemedicine visit with prescribing clinician: Visit date not found   Next office visit with prescribing clinician: Visit date not found     Additional details provided by patient: HAS ABOUT 3 DAY SUPPLY    Does the patient have less than a 3 day supply:  [x] Yes  [] No    Would you like a call back once the refill request has been completed: [] Yes [] No    If the office needs to give you a call back, can they leave a voicemail: [] Yes [] No    Sarah Flores Rep   08/01/24 13:25 EDT           "

## 2024-08-09 ENCOUNTER — OFFICE VISIT (OUTPATIENT)
Dept: PAIN MEDICINE | Facility: CLINIC | Age: 69
End: 2024-08-09
Payer: MEDICARE

## 2024-08-09 VITALS
HEIGHT: 71 IN | HEART RATE: 101 BPM | SYSTOLIC BLOOD PRESSURE: 119 MMHG | DIASTOLIC BLOOD PRESSURE: 80 MMHG | RESPIRATION RATE: 18 BRPM | BODY MASS INDEX: 33.43 KG/M2 | WEIGHT: 238.8 LBS | TEMPERATURE: 98.9 F | OXYGEN SATURATION: 96 %

## 2024-08-09 DIAGNOSIS — G89.4 CHRONIC PAIN SYNDROME: ICD-10-CM

## 2024-08-09 DIAGNOSIS — Z98.890 HISTORY OF LUMBAR SURGERY: ICD-10-CM

## 2024-08-09 DIAGNOSIS — M54.16 LUMBAR RADICULOPATHY: ICD-10-CM

## 2024-08-09 RX ORDER — TRAMADOL HYDROCHLORIDE 50 MG/1
50 TABLET ORAL EVERY 8 HOURS PRN
Qty: 90 TABLET | Refills: 0 | Status: CANCELLED | OUTPATIENT
Start: 2024-08-09

## 2024-08-09 NOTE — PROGRESS NOTES
CHIEF COMPLAINT  Follow-up for back pain.    Subjective   Prem Thrasher is a 69 y.o. male  who presents for follow-up.  He has a history of back pain.  Pain today 6/10 VAS.  Taking Tramadol 50 mg 2-3 times daily.  Also continues to utilize Lyrica 100 mg 1-2/day.  Constipation is an issue.  Relistor did not help and was cost prohibitive, did not get Movantik.  Has been doing ok with a regimen of oatmeal, metamucil, and Miralax.  Overall he reports that his pain has been better with the current regimen, acknowledges at least moderate improvement.   Fewer episodes of yelling out in pain, in fact he is no longer really doing this at all.      He is not a candidate for NSAIDs (plavix, cardiac hx).     Pertinent surgical history ---   4/13/2022 - lumbar fusion (Dr. Perez). Required evacuation of lumbar hematoma 4/23/2022  Lumbar fusion 2017 (Dr. Perez, Dr. Rick). Required second surgery to clean out infection   Back surgery 2010     Past Medications ---  Hydrocodone - constipation  Gabapentin - constipation     Previous interventions that the patient has received include:   Epidurals (no longer candidate due to infection history)    Back Pain  This is a chronic problem. The current episode started more than 1 year ago. The problem occurs daily. The problem has been worse since onset. The pain is present in the lumbar spine. The quality of the pain is described as aching, cramping and burning. The pain radiates to the left thigh and right thigh. The pain is at a severity of 6/10. The symptoms are aggravated by position, sitting, standing, twisting and bending. Associated symptoms include numbness (bilateral feet) and weakness (bilateral shoulders and arms). Pertinent negatives include no abdominal pain, chest pain, dysuria, fever or headaches. He has tried analgesics, muscle relaxant, heat, ice and home exercises for the symptoms. The treatment provided moderate relief.      PEG Assessment   What number best describes  "your pain on average in the past week?6  What number best describes how, during the past week, pain has interfered with your enjoyment of life?8  What number best describes how, during the past week, pain has interfered with your general activity?  4    Review of Pertinent Medical Data ---    The following portions of the patient's history were reviewed and updated as appropriate: allergies, current medications, past family history, past medical history, past social history, past surgical history, and problem list.    Review of Systems   Constitutional:  Negative for fever.   Cardiovascular:  Negative for chest pain.   Gastrointestinal:  Negative for abdominal pain, constipation and diarrhea.   Genitourinary:  Positive for difficulty urinating. Negative for dysuria.   Musculoskeletal:  Positive for back pain.   Neurological:  Positive for weakness (bilateral shoulders and arms) and numbness (bilateral feet). Negative for headaches.   Psychiatric/Behavioral:  Negative for sleep disturbance and suicidal ideas. The patient is nervous/anxious.      I have reviewed and confirmed the accuracy of the ROS as documented by the MA/LPN/RN TEN Camp    Vitals:    08/09/24 0954   BP: 119/80   Pulse: 101   Resp: 18   Temp: 98.9 °F (37.2 °C)   SpO2: 96%   Weight: 108 kg (238 lb 12.8 oz)   Height: 180.3 cm (71\")   PainSc:   6   PainLoc: Back         Objective   Physical Exam  Vitals and nursing note reviewed.   Constitutional:       General: He is not in acute distress.     Appearance: Normal appearance. He is not ill-appearing.   Pulmonary:      Effort: Pulmonary effort is normal. No respiratory distress.   Musculoskeletal:      Lumbar back: Tenderness and bony tenderness present.   Neurological:      Mental Status: He is alert and oriented to person, place, and time.      Gait: Gait abnormal (slowed, antalgia, ambulates with cane).   Psychiatric:         Mood and Affect: Mood normal.         Behavior: Behavior " normal.         Assessment & Plan   Diagnoses and all orders for this visit:    1. History of lumbar surgery    2. Lumbar radiculopathy    3. Chronic pain syndrome      --- Continue Tramadol. Patient appears stable with current regimen. No adverse effects. Regarding continuation of opioids, there is no evidence of aberrant behavior or any red flags.  The patient continues with appropriate response to opioid therapy. ADL's remain intact by self.   --- The urine drug screen confirmation from 6/11/2024 has been reviewed and the result is appropriate based on patient history and JESSICA report  --- The patient signed an updated copy of the controlled substance agreement on 10/24/2023  --- ORT - high risk. Total daily MME low.     Prem Thrasher reports a pain score of 6.  Given his pain assessment as noted, treatment options were discussed and the following options were decided upon as a follow-up plan to address the patient's pain: continuation of current treatment plan for pain.    --- Follow-up 2 months                  JESSICA REPORT  As part of the patient's treatment plan, I am prescribing controlled substances. The patient has been made aware of appropriate use of such medications, including potential risk of somnolence, limited ability to drive and/or work safely, and the potential for dependence or overdose. It has also been made clear that these medications are for use by this patient only, without concomitant use of alcohol or other substances unless prescribed.     Patient has completed prescribing agreement detailing terms of continued prescribing of controlled substances, including monitoring JESSICA reports, urine drug screening, and pill counts if necessary. The patient is aware that inappropriate use will results in cessation of prescribing such medications.    As the clinician, I personally reviewed the JESSICA from 8/9/2024 while the patient was in the office today.    History and physical exam exhibit  continued safe and appropriate use of controlled substances.       Dictated utilizing Dragon dictation.

## 2024-09-09 DIAGNOSIS — M54.16 LUMBAR RADICULOPATHY: ICD-10-CM

## 2024-09-09 DIAGNOSIS — Z98.890 HISTORY OF LUMBAR SURGERY: ICD-10-CM

## 2024-09-09 DIAGNOSIS — G89.4 CHRONIC PAIN SYNDROME: ICD-10-CM

## 2024-09-10 RX ORDER — PREGABALIN 100 MG/1
100 CAPSULE ORAL 3 TIMES DAILY
Qty: 90 CAPSULE | Refills: 1 | Status: SHIPPED | OUTPATIENT
Start: 2024-09-10

## 2024-09-19 ENCOUNTER — TELEPHONE (OUTPATIENT)
Dept: PAIN MEDICINE | Facility: CLINIC | Age: 69
End: 2024-09-19
Payer: MEDICARE

## 2024-09-19 DIAGNOSIS — M54.16 LUMBAR RADICULOPATHY: ICD-10-CM

## 2024-09-19 DIAGNOSIS — Z98.890 HISTORY OF LUMBAR SURGERY: ICD-10-CM

## 2024-09-20 RX ORDER — TRAMADOL HYDROCHLORIDE 50 MG/1
50 TABLET ORAL EVERY 8 HOURS PRN
Qty: 90 TABLET | Refills: 0 | Status: SHIPPED | OUTPATIENT
Start: 2024-09-20

## 2024-09-27 RX ORDER — PANTOPRAZOLE SODIUM 40 MG/1
TABLET, DELAYED RELEASE ORAL
Qty: 90 TABLET | Refills: 3 | Status: SHIPPED | OUTPATIENT
Start: 2024-09-27

## 2024-10-06 RX ORDER — BUSPIRONE HYDROCHLORIDE 10 MG/1
10 TABLET ORAL 2 TIMES DAILY PRN
Qty: 180 TABLET | Refills: 3 | Status: SHIPPED | OUTPATIENT
Start: 2024-10-06

## 2024-10-11 ENCOUNTER — OFFICE VISIT (OUTPATIENT)
Dept: PAIN MEDICINE | Facility: CLINIC | Age: 69
End: 2024-10-11
Payer: MEDICARE

## 2024-10-11 VITALS
TEMPERATURE: 96 F | DIASTOLIC BLOOD PRESSURE: 77 MMHG | OXYGEN SATURATION: 96 % | HEART RATE: 97 BPM | RESPIRATION RATE: 18 BRPM | SYSTOLIC BLOOD PRESSURE: 112 MMHG | BODY MASS INDEX: 34.22 KG/M2 | HEIGHT: 71 IN | WEIGHT: 244.4 LBS

## 2024-10-11 DIAGNOSIS — Z98.890 HISTORY OF LUMBAR SURGERY: Primary | ICD-10-CM

## 2024-10-11 DIAGNOSIS — M54.16 LUMBAR RADICULOPATHY: ICD-10-CM

## 2024-10-11 DIAGNOSIS — T40.2X5A THERAPEUTIC OPIOID INDUCED CONSTIPATION: ICD-10-CM

## 2024-10-11 DIAGNOSIS — K59.03 THERAPEUTIC OPIOID INDUCED CONSTIPATION: ICD-10-CM

## 2024-10-11 DIAGNOSIS — Z79.899 HIGH RISK MEDICATION USE: ICD-10-CM

## 2024-10-11 RX ORDER — TRAMADOL HYDROCHLORIDE 50 MG/1
50 TABLET ORAL EVERY 6 HOURS PRN
Qty: 120 TABLET | Refills: 0 | Status: SHIPPED | OUTPATIENT
Start: 2024-10-11

## 2024-10-11 NOTE — PROGRESS NOTES
CHIEF COMPLAINT  Follow-up for back pain.    Subjective   Prem Thrasher is a 69 y.o. male  who presents for follow-up.  He has a history of chronic back pain.  Pain today 8/10 VAS.  Taking Tramadol 50 mg 2-3 times daily.  Also continues to utilize Lyrica 100 mg 1-2/day.  Constipation is an issue. Relistor did not help and was cost prohibitive, did not get Movantik.  Has been doing ok with a regimen of oatmeal, metamucil, and Miralax.  Overall he reports that his pain has been better with the current regimen, acknowledges at least moderate improvement.   Fewer episodes of yelling out in pain, in fact he is no longer really doing this at all.       He is not a candidate for NSAIDs (plavix, cardiac hx).     Pertinent surgical history ---   4/13/2022 - lumbar fusion (Dr. Perez). Required evacuation of lumbar hematoma 4/23/2022  Lumbar fusion 2017 (Dr. Perez, Dr. Rick). Required second surgery to clean out infection   Back surgery 2010     Past Medications ---  Hydrocodone - constipation  Gabapentin - constipation     Previous interventions that the patient has received include:   Epidurals (no longer candidate due to infection history)    Back Pain  This is a chronic problem. The current episode started more than 1 year ago. The problem occurs daily. The problem has been worse since onset. The pain is present in the lumbar spine. The quality of the pain is described as aching, cramping and burning. The pain radiates to the left thigh and right thigh. The pain is at a severity of 8/10. The symptoms are aggravated by position, sitting, standing, twisting and bending. Associated symptoms include numbness (left foot) and weakness. Pertinent negatives include no abdominal pain, chest pain, dysuria, fever or headaches. He has tried analgesics, muscle relaxant, heat, ice and home exercises for the symptoms. The treatment provided moderate relief.        PEG Assessment   What number best describes your pain on average in  "the past week?6  What number best describes how, during the past week, pain has interfered with your enjoyment of life?10  What number best describes how, during the past week, pain has interfered with your general activity?  7    Review of Pertinent Medical Data ---      The following portions of the patient's history were reviewed and updated as appropriate: allergies, current medications, past family history, past medical history, past social history, past surgical history, and problem list.    Review of Systems   Constitutional:  Negative for fever.   Cardiovascular:  Negative for chest pain.   Gastrointestinal:  Positive for constipation. Negative for abdominal pain and diarrhea.   Genitourinary:  Negative for difficulty urinating and dysuria.   Musculoskeletal:  Positive for back pain.   Neurological:  Positive for weakness and numbness (left foot). Negative for headaches.   Psychiatric/Behavioral:  Positive for sleep disturbance. Negative for suicidal ideas. The patient is nervous/anxious.      I have reviewed and confirmed the accuracy of the ROS as documented by the MA/LPN/RN TEN Camp    Vitals:    10/11/24 1006   BP: 112/77   Pulse: 97   Resp: 18   Temp: 96 °F (35.6 °C)   SpO2: 96%   Weight: 111 kg (244 lb 6.4 oz)   Height: 180.3 cm (71\")   PainSc:   8   PainLoc: Back         Objective   Physical Exam  Vitals and nursing note reviewed.   Constitutional:       General: He is not in acute distress.     Appearance: Normal appearance. He is not ill-appearing.   Pulmonary:      Effort: Pulmonary effort is normal. No respiratory distress.   Musculoskeletal:      Lumbar back: Tenderness and bony tenderness present. Negative right straight leg raise test and negative left straight leg raise test.   Neurological:      Mental Status: He is alert and oriented to person, place, and time.      Gait: Gait abnormal (slowed, antalgia, ambulates with cane).   Psychiatric:         Mood and Affect: Mood " normal.         Behavior: Behavior normal.           Assessment & Plan   Diagnoses and all orders for this visit:    1. History of lumbar surgery (Primary)  -     traMADol (ULTRAM) 50 MG tablet; Take 1 tablet by mouth Every 6 (Six) Hours As Needed for Severe Pain.  Dispense: 120 tablet; Refill: 0    2. Lumbar radiculopathy  -     traMADol (ULTRAM) 50 MG tablet; Take 1 tablet by mouth Every 6 (Six) Hours As Needed for Severe Pain.  Dispense: 120 tablet; Refill: 0    3. Therapeutic opioid induced constipation    4. High risk medication use      --- Refill Tramadol. Adjust sig to accommodate upcoming travel.  Return to previous regimen next refill (Q8 hours #90)  --- The patient signed an updated copy of the controlled substance agreement on 10/11/2024  --- Routine ODT in office today as part of monitoring requirements for controlled substances.   This specimen will be sent to [email or web address] laboratory for confirmation.     --- ORT - high risk. Total daily MME low.     Prem Thrasher reports a pain score of 8.  Given his pain assessment as noted, treatment options were discussed and the following options were decided upon as a follow-up plan to address the patient's pain: continuation of current treatment plan for pain.      --- Follow-up 2 months                  JESSICA REPORT  As part of the patient's treatment plan, I am prescribing controlled substances. The patient has been made aware of appropriate use of such medications, including potential risk of somnolence, limited ability to drive and/or work safely, and the potential for dependence or overdose. It has also been made clear that these medications are for use by this patient only, without concomitant use of alcohol or other substances unless prescribed.     Patient has completed prescribing agreement detailing terms of continued prescribing of controlled substances, including monitoring JESSICA reports, urine drug screening, and pill counts if necessary. The  patient is aware that inappropriate use will results in cessation of prescribing such medications.    As the clinician, I personally reviewed the JESSICA from 10/11/2024 while the patient was in the office today.    History and physical exam exhibit continued safe and appropriate use of controlled substances.       Dictated utilizing Dragon dictation.

## 2024-10-14 ENCOUNTER — OFFICE VISIT (OUTPATIENT)
Dept: CARDIOLOGY | Facility: CLINIC | Age: 69
End: 2024-10-14
Payer: MEDICARE

## 2024-10-14 VITALS
HEART RATE: 71 BPM | SYSTOLIC BLOOD PRESSURE: 130 MMHG | BODY MASS INDEX: 34.61 KG/M2 | OXYGEN SATURATION: 98 % | HEIGHT: 71 IN | DIASTOLIC BLOOD PRESSURE: 77 MMHG | WEIGHT: 247.2 LBS

## 2024-10-14 DIAGNOSIS — I25.10 CAD S/P PERCUTANEOUS CORONARY ANGIOPLASTY: Primary | ICD-10-CM

## 2024-10-14 DIAGNOSIS — Z98.61 CAD S/P PERCUTANEOUS CORONARY ANGIOPLASTY: Primary | ICD-10-CM

## 2024-10-14 PROCEDURE — 3075F SYST BP GE 130 - 139MM HG: CPT | Performed by: NURSE PRACTITIONER

## 2024-10-14 PROCEDURE — 99214 OFFICE O/P EST MOD 30 MIN: CPT | Performed by: NURSE PRACTITIONER

## 2024-10-14 PROCEDURE — 1160F RVW MEDS BY RX/DR IN RCRD: CPT | Performed by: NURSE PRACTITIONER

## 2024-10-14 PROCEDURE — 3078F DIAST BP <80 MM HG: CPT | Performed by: NURSE PRACTITIONER

## 2024-10-14 PROCEDURE — 93000 ELECTROCARDIOGRAM COMPLETE: CPT | Performed by: NURSE PRACTITIONER

## 2024-10-14 PROCEDURE — 1159F MED LIST DOCD IN RCRD: CPT | Performed by: NURSE PRACTITIONER

## 2024-10-14 RX ORDER — ASPIRIN 81 MG/1
81 TABLET ORAL DAILY
COMMUNITY

## 2024-10-14 RX ORDER — EZETIMIBE 10 MG/1
10 TABLET ORAL DAILY
Qty: 90 TABLET | Refills: 3 | Status: SHIPPED | OUTPATIENT
Start: 2024-10-14 | End: 2024-10-21 | Stop reason: SDUPTHER

## 2024-10-14 NOTE — PROGRESS NOTES
Date of Office Visit: 10/14/2024  Encounter Provider: TEN Deluan  Place of Service: [facility] CARDIOLOGY  Patient Name: Prem Thrasher  :1955    Chief Complaint   Patient presents with    6 month follow up   : Coronary artery disease    HPI: Prem Thrasher is a 69 y.o. male who is a patient of  Dr. Mars, previously followed by Dr. Salazar and is new to me today.  he had unstable angina received 2 discrete LAD stents in the mid and distal vessel.  The following year he had recurrent symptoms and was found to have a new LAD lesion just proximal to the distal lesion as well as circumflex lesion.  He went balloon angioplasty of the LAD and PCI of the circumflex     He was admitted to the hospital in 2024 with exertional dyspnea and chest pain.  This occurred a few weeks after a COVID infection treated with Paxlovid.  He had a nuclear stress test and echocardiogram and ultimately underwent cardiac catheterization.  He received drug-eluting stent to distal RCA using a 3.5 x 28 mm Xience brett point drug-eluting stent.  The distal LAD was severely diseased but treated medically due to small size of the vessel.  Proximal LAD was moderately diseased by RFR, 0.94.  Echocardiogram showed normal systolic ejection fraction, fatty pericardium.    He was last seen in the office in April.  He denied any chest pain pressure tightness.  He comes in for follow-up today he is getting ready to have cataract surgery.  He denies any chest pain, pressure or tightness.  Due to lower back pain that does take his breath away.  Earlier this year his hemoglobin A1c was 6.4 LDL was 33 HDL of 41.  Previous testing and notes have been reviewed by me.   Past Medical History:   Diagnosis Date    Achilles tendon pain     LEFT    Allergic     Anxiety disorder     Asthma     When I get stressed out or try to do something I get short of breath    CAD (coronary artery disease)     Cervical  disc disorder     Chronic anticoagulation     PLAVIX-CARDIAC STENT X3    Chronic lower back pain     Chronic pain disorder     COPD (chronic obstructive pulmonary disease)     CTS (carpal tunnel syndrome)     Deep vein thrombosis     A    Depression     Diabetes mellitus 03/12/2023    Type two diabetes    Disease of thyroid gland     Diverticulitis of colon     Encounter for eye exam 10/2014    normal    Exertional chest pain     Extremity pain     GERD (gastroesophageal reflux disease)     Headache     Headache, tension-type     History of bone density study 03/2014    Dr. Maria Antonia Lopez; normal    History of chest x-ray 02/15/2011    also 3-6-15; normal chest    History of colon polyps     History of nuclear stress test 03/09/2015    cardiolite; Dr. Greenberg; negative    History of pulmonary function tests 03/2015    COPD    Hyperlipidemia     Hypertension     Joint pain     Low back strain     Lumbosacral disc disease     Migraine     Neck pain     Nerve damage     KAYLYNN ELBOWS    NSTEMI (non-ST elevated myocardial infarction) 01/2021    Obesity     Osteoarthritis, multiple sites     Pneumonia     Postoperative nausea and vomiting 08/01/2017    Postoperative wound infection     Rheumatoid arthritis     Rotator cuff syndrome     Sciatica     Short of breath on exertion     Sleep apnea     no cpap    Spinal stenosis     Staph infection     WITH BACK SURGERY 2017  ON LONG TERM ANTIBIOTICS    Thoracic disc disorder     Unstable angina     Unsteady gait     D/T LOWER BACK    Visual impairment     Fill like my vision has changed sense I got my glasses       Past Surgical History:   Procedure Laterality Date    ACHILLES TENDON SURGERY Left 07/03/2018    Procedure: Left Achilles debridement and secondary repair with partial excision of calcaneus. Possible left Achilles lengthening at the calf;  Surgeon: Vinay Smith MD;  [place] [facility] [place] [facility] [place]  Service: Orthopedics    BACK SURGERY  10/2017    CARDIAC  CATHETERIZATION N/A 03/07/2020    Procedure: Left Heart Cath;  Surgeon: Dioni Salazar MD;  [facility];  Service: Cardiology;  Laterality: N/A;    CARDIAC CATHETERIZATION N/A 03/07/2020    Procedure: Coronary angiography;  Surgeon: Dioni Salazar MD;  [facility];  Service: Cardiology;  Laterality: N/A;    CARDIAC CATHETERIZATION N/A 03/07/2020    Procedure: Left ventriculography;  Surgeon: Dioni Salazar MD;  [facility];  Service: Cardiology;  Laterality: N/A;    CARDIAC CATHETERIZATION N/A 03/07/2020    Procedure: Stent RAZA coronary;  Surgeon: Dioni Salazar MD;  [facility];  Service: Cardiology;  Laterality: N/A;    CARDIAC CATHETERIZATION N/A 01/22/2021    Procedure: Left Heart Cath;  Surgeon: Dioni Salazar MD;  Location: [facility] CATH INVASIVE LOCATION;  Service: Cardiology;  Laterality: N/A;    CARDIAC CATHETERIZATION N/A 01/22/2021    Procedure: Coronary angiography;  Surgeon: Dioni Salazar MD;  Location: [facility] CATH INVASIVE LOCATION;  Service: Cardiology;  Laterality: N/A;    CARDIAC CATHETERIZATION N/A 01/22/2021    Procedure: Left ventriculography;  Surgeon: Dioni Salazar MD;  Location: [facility] CATH INVASIVE LOCATION;  Service: Cardiology;  Laterality: N/A;    CARDIAC CATHETERIZATION N/A 01/22/2021    Procedure: Percutaneous Coronary Intervention;  Surgeon: Dioni Salazar MD;  Location: [facility] CATH INVASIVE LOCATION;  Service: Cardiology;  Laterality: N/A;    CARDIAC CATHETERIZATION N/A 01/22/2021    Procedure: Stent RAZA coronary;  Surgeon: Dioni Salazar MD;  Location: [facility] CATH INVASIVE LOCATION;  Service: Cardiology;  Laterality: N/A;    CARDIAC CATHETERIZATION N/A 3/22/2024    Procedure: Left Heart Cath;  Surgeon: Kiko Evans MD;  Location: [facility] CATH INVASIVE LOCATION;  Service: Cardiovascular;  Laterality: N/A;    CARDIAC  CATHETERIZATION N/A 3/22/2024    Procedure: Coronary angiography;  Surgeon: Kiko Evans MD;  Location: [facility] CATH INVASIVE LOCATION;  Service: Cardiovascular;  Laterality: N/A;    CARDIAC CATHETERIZATION N/A 3/22/2024    Procedure: Resting Full Cycle Ratio;  Surgeon: Kiko Evans MD;  Location: [facility] CATH INVASIVE LOCATION;  Service: Cardiovascular;  Laterality: N/A;    CARDIAC CATHETERIZATION N/A 3/22/2024    Procedure: Percutaneous Coronary Intervention;  Surgeon: Kiko Evans MD;  Location: [facility] CATH INVASIVE LOCATION;  Service: Cardiovascular;  Laterality: N/A;    CARDIAC CATHETERIZATION N/A 3/22/2024    Procedure: Stent RAZA coronary;  Surgeon: iKko Evans MD;  Location: [facility] CATH INVASIVE LOCATION;  Service: Cardiovascular;  Laterality: N/A;    CARDIAC CATHETERIZATION N/A 3/22/2024    Procedure: Optical Coherence Tomography;  Surgeon: Kiko Evans MD;  Location: [facility] CATH INVASIVE LOCATION;  Service: Cardiovascular;  Laterality: N/A;    CARDIAC SURGERY      3 stents total    COLONOSCOPY N/A 02/02/2011    Normal colon except scattered diverticulosis-Dr. Guero Blunt    COLONOSCOPY N/A 04/08/2021    Procedure: COLONOSCOPY TO CECUM WITH POLYPECTOMY (COLD BX);  Surgeon: Porsha Arcos MD;  Location: [facility] ENDOSCOPY;  Service: General;  Laterality: N/A;  SCREENING; HX OF COLON POLYPS  POST:DIVERTICULOSIS; COLON POLYP    CORONARY STENT PLACEMENT      CYST REMOVAL  03/2016    x2  FROM FACE AND EAR    DENTAL PROCEDURE  2008    teeth removed; dental implants    EPIDURAL BLOCK  1995    L/S spine    FINGER DEBRIDEMENT Right 07/12/2003    Debridement and full thickness skin grafting of the ring and small fingers of the right hand-Dr. Rayray Long    FOOT SURGERY      HAND SURGERY  2003    INCISION AND DRAINAGE PERIRECTAL ABSCESS N/A 07/10/2018    Procedure: INCISION AND DRAINAGE OF PERIRECTAL ABSCESS;  Surgeon: Porsha Arcos MD;  Location: [facility] MAIN OR;  Service:  General    INCISION AND DRAINAGE TRUNK N/A 04/23/2022    Procedure: Evacuation lumbar hematoma;  Surgeon: Jacky Perez MD;  Location: [facility] MAIN OR;  Service: Orthopedics;  Laterality: N/A;    JOINT REPLACEMENT      LUMBAR DISCECTOMY FUSION INSTRUMENTATION Left 10/10/2016    Procedure: LEFT L2-L3, L3-L4 LUMBAR LAMINECTOMY/ DISCECTOMY WITH METRIX ;  Surgeon: Sawyer Rick MD;  Location: [facility] MAIN OR;  Service:     LUMBAR DISCECTOMY FUSION INSTRUMENTATION N/A 07/31/2017    Procedure: LUMBAR FUSION DECOMPRESSON WITH PEDICLE SCREWS with LAURA L2-3,L3-4;  Surgeon: Jacky Perez MD;  Location: [facility] MAIN OR;  Service:     LUMBAR FUSION N/A 04/13/2022    Procedure: Thoracic 10-thoracic 11, thoracic 11-thoracic 12, thoracic 12-lumbar 1, lumbar 1-lumbar 2, lumbar 2-lumbar 3, lumbar 3-lumbar 4 fusion with instrumentation with iliac crest bone graft, and removal of implants from lumbar 2-lumbar 3, lumbar 3-lumbar 4;  Surgeon: Jacky Perez MD;  Location: [facility] MAIN OR;  Service: Orthopedic Spine;  Laterality: N/A;    LUMBAR FUSION N/A 04/13/2022    Procedure: LUMBAR ANTERIOR INTERBODY FUSION L4,L5 Osteotomy interbody fusion with cage L1,L2;  Surgeon: Jacky Perez MD;  Location: [facility] MAIN OR;  Service: Orthopedic Spine;  Laterality: N/A;    LUMBAR LAMINECTOMY DISCECTOMY DECOMPRESSION N/A 07/31/2017    Procedure: L2 to L4 laminectomy with neural lysis and a fusion by orthopedics;  Surgeon: Sawyer Rick MD;  Location: [facility] OR;  Service:     LUMBAR LAMINECTOMY DISCECTOMY DECOMPRESSION N/A 09/05/2017    Procedure: I&D LUMBAR SPINE ;  Surgeon: Jakcy Perez MD;  Location: [facility] MAIN OR;  Service:     ORTHOPEDIC SURGERY      SHOULDER SURGERY Right 02/23/2011    Dr. Navarro    SINUS SURGERY  2003    Dr. Barrera    SPINAL FUSION      SPINE SURGERY  2010    TOTAL SHOULDER REPLACEMENT Right 09/07/2023    TRIGGER POINT INJECTION         Social History     Socioeconomic History    Marital status:  "   Tobacco Use    Smoking status: Former     Current packs/day: 0.00     Average packs/day: 1 pack/day for 50.0 years (50.0 ttl pk-yrs)     Types: Cigarettes     Start date: 10/20/1971     Quit date: 10/20/2021     Years since quittin.9     Passive exposure: Never    Smokeless tobacco: Never    Tobacco comments:     Current status is non smoker   Vaping Use    Vaping status: Never Used   Substance and Sexual Activity    Alcohol use: No    Drug use: No    Sexual activity: Not Currently     Partners: Female     Birth control/protection: None       Family History   Problem Relation Age of Onset    Diabetes Mother     Heart disease Mother     Hyperlipidemia Mother     Stroke Mother     Heart disease Father     Rheum arthritis Father     Alcohol abuse Father     Hyperlipidemia Father     Depression Brother     Cancer Brother         prostate    Depression Brother     Heart disease Brother     Hyperlipidemia Brother     Cancer Brother         Haert desease    Thyroid disease Sister     Arthritis Sister     Asthma Son     COPD Son     COPD Son     Malig Hyperthermia Neg Hx        ROS    Allergies   Allergen Reactions    Atorvastatin Other (See Comments) and Myalgia     Leg cramps    Ceclor [Cefaclor] Rash     Patient tolerated nafcillin during 2017 admission and has tolerated Augmentin in past outpatient.     Methotrexate Derivatives Rash     \"Blisters\"         Current Outpatient Medications:     amLODIPine (NORVASC) 10 MG tablet, Take 0.5 tablets by mouth Daily. for blood pressure, Disp: 90 tablet, Rfl: 1    amoxicillin-clavulanate (AUGMENTIN) 875-125 MG per tablet, Take 1 tablet by mouth 2 (Two) Times a Day., Disp: , Rfl:     Boswellia-Glucosamine-Vit D (OSTEO BI-FLEX ONE PER DAY PO), Take  by mouth., Disp: , Rfl:     busPIRone (BUSPAR) 10 MG tablet, TAKE 1 TABLET TWICE DAILY AS NEEDED FOR ANXIETY, Disp: 180 tablet, Rfl: 3    Cholecalciferol (VITAMIN D) 2000 UNITS tablet, Take 1 tablet by mouth Every " Evening., Disp: , Rfl:     clopidogrel (PLAVIX) 75 MG tablet, TAKE 1 TABLET EVERY DAY, Disp: 90 tablet, Rfl: 3    Cyanocobalamin (B-12) 1000 MCG sublingual tablet, DISSOLVE 1 TABLET UNDER THE TONGUE EVERY DAY, Disp: 90 tablet, Rfl: 3    cyclobenzaprine (FLEXERIL) 10 MG tablet, Take 1 tablet by mouth 2 (Two) Times a Day., Disp: 270 tablet, Rfl: 3    docusate sodium (Stool Softener) 100 MG capsule, , Disp: , Rfl:     escitalopram (LEXAPRO) 20 MG tablet, TAKE 1 TABLET EVERY NIGHT FOR ANXIETY, Disp: 90 tablet, Rfl: 3    ezetimibe (ZETIA) 10 MG tablet, Take 1 tablet by mouth Daily., Disp: 90 tablet, Rfl: 3    folic acid (FOLVITE) 1 MG tablet, TAKE 1 TABLET EVERY DAY, Disp: 90 tablet, Rfl: 3    irbesartan (AVAPRO) 75 MG tablet, Take 1 tablet by mouth Daily. for blood pressure, Disp: 90 tablet, Rfl: 1    isosorbide mononitrate (IMDUR) 30 MG 24 hr tablet, TAKE 1 TABLET EVERY DAY, Disp: 90 tablet, Rfl: 3    levothyroxine (SYNTHROID, LEVOTHROID) 50 MCG tablet, Take 1 tablet by mouth Every Morning. Before breakfast, for thyroid, Disp: 90 tablet, Rfl: 3    meclizine (ANTIVERT) 25 MG tablet, Take 1 tablet by mouth 3 (Three) Times a Day As Needed for Dizziness., Disp: 30 tablet, Rfl: 0    metFORMIN ER (GLUCOPHAGE-XR) 500 MG 24 hr tablet, TAKE 2 TABLETS EVERY DAY FOR DIABETES, Disp: 180 tablet, Rfl: 3    multivitamin with minerals tablet tablet, Take 1 tablet by mouth Daily. HOLD X1 WEEK PRIOR TO SURGERY, Disp: , Rfl:     nitroglycerin (NITROSTAT) 0.4 MG SL tablet, Place 1 tablet under the tongue Every 5 (Five) Minutes As Needed for Chest Pain. Take no more than 3 doses in 15 minutes., Disp: 25 tablet, Rfl: 1    pantoprazole (PROTONIX) 40 MG EC tablet, TAKE 1 TABLET EVERY DAY FOR GERD, Disp: 90 tablet, Rfl: 3    pregabalin (LYRICA) 100 MG capsule, TAKE 1 CAPSULE BY MOUTH THREE TIMES A DAY, Disp: 90 capsule, Rfl: 1    rosuvastatin (CRESTOR) 10 MG tablet, TAKE 1 TABLET EVERY NIGHT AT BEDTIME, Disp: 90 tablet, Rfl: 3    Spiriva  "Respimat 2.5 MCG/ACT aerosol solution inhaler, , Disp: , Rfl:     traMADol (ULTRAM) 50 MG tablet, Take 1 tablet by mouth Every 6 (Six) Hours As Needed for Severe Pain., Disp: 120 tablet, Rfl: 0    aspirin 81 MG EC tablet, Take 1 tablet by mouth Daily., Disp: , Rfl:     carvedilol (COREG) 6.25 MG tablet, Take 1 tablet by mouth 2 (Two) Times a Day With Meals for 30 days., Disp: 60 tablet, Rfl: 0      Objective:     Vitals:    10/14/24 1303   BP: 130/77   BP Location: Left arm   Patient Position: Sitting   Cuff Size: Adult   Pulse: 71   SpO2: 98%   Weight: 112 kg (247 lb 3.2 oz)   Height: 180.3 cm (71\")     Body mass index is 34.48 kg/m².    PHYSICAL EXAM:    Physical Exam      ECG 12 Lead    Date/Time: 10/14/2024 1:39 PM  Performed by: Zaida Pretty APRN    Authorized by: Zaida Pretty APRN  Comparison: compared with previous ECG from 3/29/2024  Similar to previous ECG  Rhythm: sinus rhythm  Rate: normal  Conduction: 1st degree AV block  QRS axis: normal  Other findings: non-specific ST-T wave changes    Clinical impression: non-specific ECG            Assessment:      1.  Coronary artery disease-on aspirin and Plavix.  Last stent was on March 24.,  On statin therapy no angina symptoms.    2.  Essential hypertension controlled on current regimen    3.  Dyslipidemia-lipids at goal    4.  Chronic back pain-limiting physical activity  Plan:       Follow-up in 6 months cleared for cataract surgery         Your medication list            Accurate as of October 14, 2024  1:37 PM. If you have any questions, ask your nurse or doctor.                CONTINUE taking these medications        Instructions Last Dose Given Next Dose Due   amLODIPine 10 MG tablet  Commonly known as: NORVASC      Take 0.5 tablets by mouth Daily. for blood pressure       amoxicillin-clavulanate 875-125 MG per tablet  Commonly known as: AUGMENTIN      Take 1 tablet by mouth 2 (Two) Times a Day.       aspirin 81 MG EC tablet      Take 1 tablet " by mouth Daily.       B-12 1000 MCG sublingual tablet      DISSOLVE 1 TABLET UNDER THE TONGUE EVERY DAY       busPIRone 10 MG tablet  Commonly known as: BUSPAR      TAKE 1 TABLET TWICE DAILY AS NEEDED FOR ANXIETY       carvedilol 6.25 MG tablet  Commonly known as: COREG      Take 1 tablet by mouth 2 (Two) Times a Day With Meals for 30 days.       clopidogrel 75 MG tablet  Commonly known as: PLAVIX      TAKE 1 TABLET EVERY DAY       cyclobenzaprine 10 MG tablet  Commonly known as: FLEXERIL      Take 1 tablet by mouth 2 (Two) Times a Day.       escitalopram 20 MG tablet  Commonly known as: LEXAPRO      TAKE 1 TABLET EVERY NIGHT FOR ANXIETY       ezetimibe 10 MG tablet  Commonly known as: ZETIA      Take 1 tablet by mouth Daily.       folic acid 1 MG tablet  Commonly known as: FOLVITE      TAKE 1 TABLET EVERY DAY       irbesartan 75 MG tablet  Commonly known as: AVAPRO      Take 1 tablet by mouth Daily. for blood pressure       isosorbide mononitrate 30 MG 24 hr tablet  Commonly known as: IMDUR      TAKE 1 TABLET EVERY DAY       levothyroxine 50 MCG tablet  Commonly known as: SYNTHROID, LEVOTHROID      Take 1 tablet by mouth Every Morning. Before breakfast, for thyroid       meclizine 25 MG tablet  Commonly known as: ANTIVERT      Take 1 tablet by mouth 3 (Three) Times a Day As Needed for Dizziness.       metFORMIN  MG 24 hr tablet  Commonly known as: GLUCOPHAGE-XR      TAKE 2 TABLETS EVERY DAY FOR DIABETES       multivitamin with minerals tablet tablet      Take 1 tablet by mouth Daily. HOLD X1 WEEK PRIOR TO SURGERY       nitroglycerin 0.4 MG SL tablet  Commonly known as: Nitrostat      Place 1 tablet under the tongue Every 5 (Five) Minutes As Needed for Chest Pain. Take no more than 3 doses in 15 minutes.       OSTEO BI-FLEX ONE PER DAY PO      Take  by mouth.       pantoprazole 40 MG EC tablet  Commonly known as: PROTONIX      TAKE 1 TABLET EVERY DAY FOR GERD       pregabalin 100 MG capsule  Commonly known as:  LYRICA      TAKE 1 CAPSULE BY MOUTH THREE TIMES A DAY       rosuvastatin 10 MG tablet  Commonly known as: CRESTOR      TAKE 1 TABLET EVERY NIGHT AT BEDTIME       Spiriva Respimat 2.5 MCG/ACT aerosol solution inhaler  Generic drug: tiotropium bromide monohydrate           Stool Softener 100 MG capsule  Generic drug: docusate sodium           traMADol 50 MG tablet  Commonly known as: ULTRAM      Take 1 tablet by mouth Every 6 (Six) Hours As Needed for Severe Pain.       Vitamin D 50 MCG (2000 UT) tablet      Take 1 tablet by mouth Every Evening.                  As always, it has been a pleasure to participate in your patient's care.      Sincerely,     Zaida CAAL

## 2024-10-21 RX ORDER — EZETIMIBE 10 MG/1
10 TABLET ORAL DAILY
Qty: 90 TABLET | Refills: 3 | Status: SHIPPED | OUTPATIENT
Start: 2024-10-21

## 2024-10-30 ENCOUNTER — TELEPHONE (OUTPATIENT)
Age: 69
End: 2024-10-30
Payer: MEDICARE

## 2024-10-30 NOTE — TELEPHONE ENCOUNTER
Put cardiac clearance for cataract surgery in Zaida's tray to address.   She did mention in her last note that pt was cleared, but they want the form signed too.  [facility] [phone] Dr. Corrie Martínez [facility]  10/30/24

## 2024-11-27 RX ORDER — ESCITALOPRAM OXALATE 20 MG/1
TABLET ORAL
Qty: 90 TABLET | Refills: 3 | Status: SHIPPED | OUTPATIENT
Start: 2024-11-27

## 2024-12-03 DIAGNOSIS — E53.8 LOW FOLIC ACID: Primary | ICD-10-CM

## 2024-12-03 RX ORDER — CLOPIDOGREL BISULFATE 75 MG/1
75 TABLET ORAL DAILY
Qty: 90 TABLET | Refills: 0 | Status: SHIPPED | OUTPATIENT
Start: 2024-12-03

## 2024-12-03 RX ORDER — FOLIC ACID 1 MG/1
TABLET ORAL
Qty: 90 TABLET | Refills: 3 | Status: SHIPPED | OUTPATIENT
Start: 2024-12-03

## 2024-12-03 RX ORDER — ROSUVASTATIN CALCIUM 10 MG/1
10 TABLET, COATED ORAL
Qty: 90 TABLET | Refills: 0 | Status: SHIPPED | OUTPATIENT
Start: 2024-12-03

## 2024-12-03 RX ORDER — ISOSORBIDE MONONITRATE 30 MG/1
30 TABLET, EXTENDED RELEASE ORAL DAILY
Qty: 90 TABLET | Refills: 0 | Status: SHIPPED | OUTPATIENT
Start: 2024-12-03

## 2024-12-03 RX ORDER — MAGNESIUM 200 MG
TABLET ORAL
Qty: 90 TABLET | Refills: 3 | Status: SHIPPED | OUTPATIENT
Start: 2024-12-03

## 2024-12-08 ENCOUNTER — HOSPITAL ENCOUNTER (OUTPATIENT)
Facility: HOSPITAL | Age: 69
Discharge: HOME | End: 2024-12-08
Attending: EMERGENCY MEDICINE | Admitting: EMERGENCY MEDICINE
Payer: MEDICARE

## 2024-12-08 ENCOUNTER — APPOINTMENT (OUTPATIENT)
Dept: GENERAL RADIOLOGY | Facility: HOSPITAL | Age: 69
End: 2024-12-08
Payer: MEDICARE

## 2024-12-08 VITALS
SYSTOLIC BLOOD PRESSURE: 122 MMHG | DIASTOLIC BLOOD PRESSURE: 78 MMHG | HEART RATE: 81 BPM | TEMPERATURE: 101.3 F | HEIGHT: 70 IN | BODY MASS INDEX: 34.22 KG/M2 | WEIGHT: 239 LBS | OXYGEN SATURATION: 92 % | RESPIRATION RATE: 18 BRPM

## 2024-12-08 DIAGNOSIS — J01.00 ACUTE NON-RECURRENT MAXILLARY SINUSITIS: Primary | ICD-10-CM

## 2024-12-08 LAB
FLUAV SUBTYP SPEC NAA+PROBE: NOT DETECTED
FLUBV RNA ISLT QL NAA+PROBE: NOT DETECTED
SARS-COV-2 RNA RESP QL NAA+PROBE: NOT DETECTED

## 2024-12-08 PROCEDURE — 71046 X-RAY EXAM CHEST 2 VIEWS: CPT

## 2024-12-08 PROCEDURE — G0463 HOSPITAL OUTPT CLINIC VISIT: HCPCS | Performed by: NURSE PRACTITIONER

## 2024-12-08 PROCEDURE — 87636 SARSCOV2 & INF A&B AMP PRB: CPT | Performed by: EMERGENCY MEDICINE

## 2024-12-08 RX ORDER — ACETAMINOPHEN 500 MG
1000 TABLET ORAL ONCE
Status: COMPLETED | OUTPATIENT
Start: 2024-12-08 | End: 2024-12-08

## 2024-12-08 RX ORDER — FLUTICASONE PROPIONATE 50 MCG
2 SPRAY, SUSPENSION (ML) NASAL DAILY
Qty: 16 G | Refills: 0 | Status: SHIPPED | OUTPATIENT
Start: 2024-12-08 | End: 2024-12-15

## 2024-12-08 RX ADMIN — AMOXICILLIN AND CLAVULANATE POTASSIUM 1 TABLET: 875; 125 TABLET, FILM COATED ORAL at 18:06

## 2024-12-08 RX ADMIN — ACETAMINOPHEN 1000 MG: 500 TABLET ORAL at 18:06

## 2024-12-08 NOTE — FSED PROVIDER NOTE
Subjective   History of Present Illness  69 yr old male presents with cough and chest congestion for several days.  Denies ear pain.  Had cataract surgery last week.  The prior week ws in the [place] where it was warmer.  Denies fever and chills.  No ABD pain, no N/V/D.  No headache.  Says he is stopped up and has draining going into his chest and he is coughing a lot.  Denies sore throat.  Has been been taking over the counter cough meds.          Review of Systems   Constitutional: Negative.    HENT:  Positive for postnasal drip, rhinorrhea and sinus pressure. Negative for ear discharge, ear pain and sore throat.    Respiratory:  Positive for cough. Negative for shortness of breath and wheezing.    Gastrointestinal: Negative.    Musculoskeletal: Negative.    Skin: Negative.    Neurological:  Negative for dizziness, weakness, light-headedness and headaches.       Past Medical History:   Diagnosis Date    Achilles tendon pain     LEFT    Allergic     Anxiety disorder     Asthma     When I get stressed out or try to do something I get short of breath    CAD (coronary artery disease)     Cervical disc disorder     Chronic anticoagulation     PLAVIX-CARDIAC STENT X3    Chronic lower back pain     Chronic pain disorder     COPD (chronic obstructive pulmonary disease)     CTS (carpal tunnel syndrome)     Deep vein thrombosis     A    Depression     Diabetes mellitus 03/12/2023    Type two diabetes    Disease of thyroid gland     Diverticulitis of colon     Encounter for eye exam 10/2014    normal    Exertional chest pain     Extremity pain     GERD (gastroesophageal reflux disease)     Headache     Headache, tension-type     History of bone density study 03/2014    Dr. Maria Antonia Lopez; normal    History of chest x-ray 02/15/2011    also 3-6-15; normal chest    History of colon polyps     History of nuclear stress test 03/09/2015    cardiolite; Dr. Greenberg; negative    History of pulmonary function tests 03/2015    COPD  "   Hyperlipidemia     Hypertension     Joint pain     Low back strain     Lumbosacral disc disease     Migraine     Neck pain     Nerve damage     KAYLYNN ELBOWS    NSTEMI (non-ST elevated myocardial infarction) 01/2021    Obesity     Osteoarthritis, multiple sites     Pneumonia     Postoperative nausea and vomiting 08/01/2017    Postoperative wound infection     Rheumatoid arthritis     Rotator cuff syndrome     Sciatica     Short of breath on exertion     Sleep apnea     no cpap    Spinal stenosis     Staph infection     WITH BACK SURGERY 2017  ON LONG TERM ANTIBIOTICS    Thoracic disc disorder     Unstable angina     Unsteady gait     D/T LOWER BACK    Visual impairment     Fill like my vision has changed sense I got my glasses       Allergies   Allergen Reactions    Atorvastatin Other (See Comments) and Myalgia     Leg cramps    Ceclor [Cefaclor] Rash     Patient tolerated nafcillin during 9/2017 admission and has tolerated Augmentin in past outpatient.     Methotrexate Derivatives Rash     \"Blisters\"       Past Surgical History:   Procedure Laterality Date    ACHILLES TENDON SURGERY Left 07/03/2018    Procedure: Left Achilles debridement and secondary repair with partial excision of calcaneus. Possible left Achilles lengthening at the calf;  Surgeon: Vinay Smith MD;  Location: [facility] OR [facility];  Service: Orthopedics    BACK SURGERY  10/2017    CARDIAC CATHETERIZATION N/A 03/07/2020    Procedure: Left Heart Cath;  Surgeon: Dioni Salazar MD;  Location: [facility] CATH INVASIVE LOCATION;  Service: Cardiology;  Laterality: N/A;    CARDIAC CATHETERIZATION N/A 03/07/2020    Procedure: Coronary angiography;  Surgeon: Dioni Salazar MD;  Location: [facility] CATH INVASIVE LOCATION;  Service: Cardiology;  Laterality: N/A;    CARDIAC CATHETERIZATION N/A 03/07/2020    Procedure: Left ventriculography;  Surgeon: Dioni Salazar MD;  Location: [facility] CATH INVASIVE LOCATION;  Service: Cardiology;  Laterality: " N/A;    CARDIAC CATHETERIZATION N/A 03/07/2020    Procedure: Stent RAZA coronary;  Surgeon: Dioni Salazar MD;  [facility]: [facility] C[facility] INVASIVE LOCATION;  Service: Cardiology;  Laterality: N/A;    CARDIAC CATHETERIZATION N/A 01/22/2021    Procedure: Left Heart Cath;  Surgeon: Dioni Salazar MD;  [facility]: [facility] C[facility] INVASIVE LOCATION;  Service: Cardiology;  Laterality: N/A;    CARDIAC CATHETERIZATION N/A 01/22/2021    Procedure: Coronary angiography;  Surgeon: Dioni Salazar MD;  Location: [facility] CATH INVASIVE LOCATION;  Service: Cardiology;  Laterality: N/A;    CARDIAC CATHETERIZATION N/A 01/22/2021    Procedure: Left ventriculography;  Surgeon: Dioni Salazar MD;  Location: [facility] CATH INVASIVE LOCATION;  Service: Cardiology;  Laterality: N/A;    CARDIAC CATHETERIZATION N/A 01/22/2021    Procedure: Percutaneous Coronary Intervention;  Surgeon: Dioni Salazar MD;  Location: [facility] CATH INVASIVE LOCATION;  Service: Cardiology;  Laterality: N/A;    CARDIAC CATHETERIZATION N/A 01/22/2021    Procedure: Stent RAZA coronary;  Surgeon: Dioni Salazar MD;  Location: [facility] CATH INVASIVE LOCATION;  Service: Cardiology;  Laterality: N/A;    CARDIAC CATHETERIZATION N/A 3/22/2024    Procedure: Left Heart Cath;  Surgeon: Kiko Evans MD;  Location: [facility] CATH INVASIVE LOCATION;  Service: Cardiovascular;  Laterality: N/A;    CARDIAC CATHETERIZATION N/A 3/22/2024    Procedure: Coronary angiography;  Surgeon: Kiko Evans MD;  Location: [facility] CATH INVASIVE LOCATION;  Service: Cardiovascular;  Laterality: N/A;    CARDIAC CATHETERIZATION N/A 3/22/2024    Procedure: Resting Full Cycle Ratio;  Surgeon: Kiko Evans MD;  Location: [facility] CATH INVASIVE LOCATION;  Service: Cardiovascular;  Laterality: N/A;    CARDIAC CATHETERIZATION N/A 3/22/2024    Procedure: Percutaneous Coronary Intervention;  Surgeon: Kiko Evans MD;  Location: [facility] CATH INVASIVE LOCATION;  Service:  Cardiovascular;  Laterality: N/A;    CARDIAC CATHETERIZATION N/A 3/22/2024    Procedure: Stent RAZA coronary;  Surgeon: Kiko Evans MD;  Location: [facility] CATH INVASIVE LOCATION;  Service: Cardiovascular;  Laterality: N/A;    CARDIAC CATHETERIZATION N/A 3/22/2024    Procedure: Optical Coherence Tomography;  Surgeon: Kiko Evans MD;  [facility];  Service: Cardiovascular;  Laterality: N/A;    CARDIAC SURGERY      3 stents total    COLONOSCOPY N/A 02/02/2011    Normal colon except scattered diverticulosis-Dr. Guero Blunt    COLONOSCOPY N/A 04/08/2021    Procedure: COLONOSCOPY TO CECUM WITH POLYPECTOMY (COLD BX);  Surgeon: Porsha Arcos MD;  Location: [facility] ENDOSCOPY;  Service: General;  Laterality: N/A;  SCREENING; HX OF COLON POLYPS  POST:DIVERTICULOSIS; COLON POLYP    CORONARY STENT PLACEMENT      CYST REMOVAL  03/2016    x2  FROM FACE AND EAR    DENTAL PROCEDURE  2008    teeth removed; dental implants    EPIDURAL BLOCK  1995    L/S spine    FINGER DEBRIDEMENT Right 07/12/2003    Debridement and full thickness skin grafting of the ring and small fingers of the right hand-Dr. Rayray Long    FOOT SURGERY      HAND SURGERY  2003    INCISION AND DRAINAGE PERIRECTAL ABSCESS N/A 07/10/2018    Procedure: INCISION AND DRAINAGE OF PERIRECTAL ABSCESS;  Surgeon: Porsha Arcos MD;  Location: [facility] OR;  Service: General    INCISION AND DRAINAGE TRUNK N/A 04/23/2022    Procedure: Evacuation lumbar hematoma;  Surgeon: Jacky Perez MD;  Location: [facility] MAIN OR;  Service: Orthopedics;  Laterality: N/A;    JOINT REPLACEMENT      LUMBAR DISCECTOMY FUSION INSTRUMENTATION Left 10/10/2016    Procedure: LEFT L2-L3, L3-L4 LUMBAR LAMINECTOMY/ DISCECTOMY WITH METRIX ;  Surgeon: Sawyer Rick MD;  Location: [facility] MAIN OR;  Service:     LUMBAR DISCECTOMY FUSION INSTRUMENTATION N/A 07/31/2017    Procedure: LUMBAR FUSION DECOMPRESSON WITH PEDICLE SCREWS with LAURA  L2-3,L3-4;  Surgeon: Jacky Perez MD;  Location: [facility] MAIN OR;  Service:     LUMBAR FUSION N/A 04/13/2022    Procedure: Thoracic 10-thoracic 11, thoracic 11-thoracic 12, thoracic 12-lumbar 1, lumbar 1-lumbar 2, lumbar 2-lumbar 3, lumbar 3-lumbar 4 fusion with instrumentation with iliac crest bone graft, and removal of implants from lumbar 2-lumbar 3, lumbar 3-lumbar 4;  Surgeon: Jacky Perez MD;  Location: [facility] MAIN OR;  Service: Orthopedic Spine;  Laterality: N/A;    LUMBAR FUSION N/A 04/13/2022    Procedure: LUMBAR ANTERIOR INTERBODY FUSION L4,L5 Osteotomy interbody fusion with cage L1,L2;  Surgeon: Jacky Perez MD;  Location: [facility] MAIN OR;  Service: Orthopedic Spine;  Laterality: N/A;    LUMBAR LAMINECTOMY DISCECTOMY DECOMPRESSION N/A 07/31/2017    Procedure: L2 to L4 laminectomy with neural lysis and a fusion by orthopedics;  Surgeon: Sawyer Rick MD;  Location: [facility] MAIN OR;  Service:     LUMBAR LAMINECTOMY DISCECTOMY DECOMPRESSION N/A 09/05/2017    Procedure: I&D LUMBAR SPINE ;  Surgeon: Jacky Perez MD;  Location: [facility] MAIN OR;  Service:     ORTHOPEDIC SURGERY      SHOULDER SURGERY Right 02/23/2011    Dr. Navarro    SINUS SURGERY  2003    Dr. Barrera    SPINAL FUSION      SPINE SURGERY  2010    TOTAL SHOULDER REPLACEMENT Right 09/07/2023    TRIGGER POINT INJECTION         Family History   Problem Relation Age of Onset    Diabetes Mother     Heart disease Mother     Hyperlipidemia Mother     Stroke Mother     Heart disease Father     Rheum arthritis Father     Alcohol abuse Father     Hyperlipidemia Father     Depression Brother     Cancer Brother         prostate    Depression Brother     Heart disease Brother     Hyperlipidemia Brother     Cancer Brother         Haert desease    Thyroid disease Sister     Arthritis Sister     Asthma Son     COPD Son     COPD Son     Malig Hyperthermia Neg Hx        Social History     Socioeconomic History    Marital status:    Tobacco Use     Smoking status: Former     Current packs/day: 0.00     Average packs/day: 1 pack/day for 50.0 years (50.0 ttl pk-yrs)     Types: Cigarettes     Start date: 10/20/1971     Quit date: 10/20/2021     Years since quitting: 3.1     Passive exposure: Never    Smokeless tobacco: Never    Tobacco comments:     Current status is non smoker   Vaping Use    Vaping status: Never Used   Substance and Sexual Activity    Alcohol use: No    Drug use: No    Sexual activity: Not Currently     Partners: Female     Birth control/protection: None           Objective   Physical Exam  Vitals and nursing note reviewed.   Constitutional:       Appearance: Normal appearance.   HENT:      Right Ear: Hearing, ear canal and external ear normal. There is impacted cerumen.      Left Ear: Hearing, ear canal and external ear normal. A middle ear effusion is present.      Nose: Mucosal edema, congestion and rhinorrhea present. No nasal tenderness.      Right Turbinates: Not enlarged or swollen.      Left Turbinates: Not enlarged or swollen.      Right Sinus: Maxillary sinus tenderness present. No frontal sinus tenderness.      Left Sinus: Maxillary sinus tenderness present. No frontal sinus tenderness.      Mouth/Throat:      Lips: Pink.      Mouth: Mucous membranes are moist.      Pharynx: Posterior oropharyngeal erythema and postnasal drip present.   Pulmonary:      Effort: Pulmonary effort is normal.      Breath sounds: Normal breath sounds and air entry.   Lymphadenopathy:      Head:      Right side of head: No submental, submandibular, tonsillar, preauricular, posterior auricular or occipital adenopathy.      Left side of head: No submental, submandibular, tonsillar, preauricular, posterior auricular or occipital adenopathy.      Cervical:      Right cervical: No superficial, deep or posterior cervical adenopathy.     Left cervical: No superficial, deep or posterior cervical adenopathy.   Skin:     General: Skin is warm and dry.       Capillary Refill: Capillary refill takes less than 2 seconds.   Neurological:      Mental Status: He is alert.         Procedures           ED Course                                           Medical Decision Making  Diff Dx:  URI, pneumonia, bronchitis, sinusitis    Problems Addressed:  Acute non-recurrent maxillary sinusitis: complicated acute illness or injury    Amount and/or Complexity of Data Reviewed  Radiology: ordered.    Risk  OTC drugs.  Prescription drug management.        Final diagnoses:   Acute non-recurrent maxillary sinusitis       ED Disposition  ED Disposition       ED Disposition   Discharge    Condition   Stable    Comment   --               No follow-up provider specified.       Medication List        New Prescriptions      fluticasone 50 MCG/ACT nasal spray  Commonly known as: FLONASE  Administer 2 sprays into the nostril(s) as directed by provider Daily for 7 days.               Where to Get Your Medications        These medications were sent to [facility]/pharmacy #[id number] - [place], [place] - [address] AT [facility] - [phone]  - [phone]   [address] [facility] [address]      Phone: [phone]   amoxicillin-clavulanate 875-125 MG per tablet  fluticasone 50 MCG/ACT nasal spray

## 2024-12-11 DIAGNOSIS — Z98.890 HISTORY OF LUMBAR SURGERY: ICD-10-CM

## 2024-12-11 DIAGNOSIS — G89.4 CHRONIC PAIN SYNDROME: ICD-10-CM

## 2024-12-11 DIAGNOSIS — M54.16 LUMBAR RADICULOPATHY: ICD-10-CM

## 2024-12-11 RX ORDER — TRAMADOL HYDROCHLORIDE 50 MG/1
50 TABLET ORAL EVERY 6 HOURS PRN
Qty: 120 TABLET | Refills: 0 | Status: SHIPPED | OUTPATIENT
Start: 2024-12-11

## 2024-12-11 RX ORDER — PREGABALIN 100 MG/1
100 CAPSULE ORAL 3 TIMES DAILY
Qty: 90 CAPSULE | Refills: 1 | Status: SHIPPED | OUTPATIENT
Start: 2024-12-11

## 2024-12-11 NOTE — TELEPHONE ENCOUNTER
"Caller: Prem Thrasher \"Greg\"    Relationship: Self    Best call back number: [phone]*    Requested Prescriptions: TRAMADOL & PREGABALIN      Pharmacy where request should be sent: [facility]/pharmacy #[id number] [place], [place] - [address] [facility] - [phone]  - [phone]  [phone]      Last office visit with prescribing clinician: 10/11/2024   Last telemedicine visit with prescribing clinician: Visit date not found   Next office visit with prescribing clinician: 12/31/2024     Additional details provided by patient: PT HAD TO CANCEL THE APPOINTMENT TODAY DUE TO BEING SICK.. PLEASE ADVISE..    Does the patient have less than a 3 day supply:  [x] Yes  [] No    Would you like a call back once the refill request has been completed: [x] Yes [] No    If the office needs to give you a call back, can they leave a voicemail: [x] Yes [] No    Ean Little   12/11/24 08:49 EST         "

## 2024-12-11 NOTE — TELEPHONE ENCOUNTER
Refill  Last OV: 10/11/2024  Next OV: 12/31/2024   Mr. Thrasher canceled his follow-up appt for today due to him being sick.

## 2024-12-31 ENCOUNTER — OFFICE VISIT (OUTPATIENT)
Dept: PAIN MEDICINE | Facility: CLINIC | Age: 69
End: 2024-12-31
Payer: MEDICARE

## 2024-12-31 VITALS
TEMPERATURE: 97.3 F | DIASTOLIC BLOOD PRESSURE: 72 MMHG | HEIGHT: 71 IN | OXYGEN SATURATION: 91 % | BODY MASS INDEX: 34.07 KG/M2 | HEART RATE: 85 BPM | SYSTOLIC BLOOD PRESSURE: 116 MMHG | WEIGHT: 243.4 LBS | RESPIRATION RATE: 18 BRPM

## 2024-12-31 DIAGNOSIS — K59.03 THERAPEUTIC OPIOID INDUCED CONSTIPATION: ICD-10-CM

## 2024-12-31 DIAGNOSIS — G89.4 CHRONIC PAIN SYNDROME: Primary | ICD-10-CM

## 2024-12-31 DIAGNOSIS — T40.2X5A THERAPEUTIC OPIOID INDUCED CONSTIPATION: ICD-10-CM

## 2024-12-31 DIAGNOSIS — M54.16 LUMBAR RADICULOPATHY: ICD-10-CM

## 2024-12-31 DIAGNOSIS — Z98.890 HISTORY OF LUMBAR SURGERY: ICD-10-CM

## 2024-12-31 DIAGNOSIS — Z79.899 HIGH RISK MEDICATION USE: ICD-10-CM

## 2024-12-31 NOTE — PROGRESS NOTES
CHIEF COMPLAINT  Follow-up for back pain.    Subjective   Prem Thrasher is a 69 y.o. male  who presents for follow-up.  He has a history of back pain.  Pain today 8/10 VAS.  Taking Tramadol 50 mg 3-4 times daily.  Also continues to utilize Lyrica 100 mg 1-2/day.  Constipation is an issue. Relistor did not help and was cost prohibitive, did not get Movantik.  Has been doing ok with a regimen of oatmeal, metamucil, and Miralax.  Overall he reports that his pain has been better with the current regimen, acknowledges at least moderate improvement.   Fewer episodes of yelling out in pain, in fact he is no longer really doing this at all.       He is not a candidate for NSAIDs (plavix, cardiac hx).     Pertinent surgical history ---   4/13/2022 - lumbar fusion (Dr. Perez). Required evacuation of lumbar hematoma 4/23/2022  Lumbar fusion 2017 (Dr. Perez, Dr. Rick). Required second surgery to clean out infection   Back surgery 2010     Past Medications ---  Hydrocodone - constipation  Gabapentin - constipation     Previous interventions that the patient has received include:   Epidurals (no longer candidate due to infection history)    Back Pain  This is a chronic problem. The current episode started more than 1 year ago. The problem occurs daily. The problem has been worse since onset. The pain is present in the lumbar spine. The quality of the pain is described as aching, cramping and burning. The pain radiates to the left thigh and right thigh. The pain is at a severity of 8/10. The symptoms are aggravated by position, sitting, standing, twisting and bending. Associated symptoms include numbness (left hand) and weakness. Pertinent negatives include no abdominal pain, chest pain, dysuria, fever or headaches. He has tried analgesics, muscle relaxant, heat, ice and home exercises for the symptoms. The treatment provided moderate relief.      PEG Assessment   What number best describes your pain on average in the past  "week?8  What number best describes how, during the past week, pain has interfered with your enjoyment of life?9  What number best describes how, during the past week, pain has interfered with your general activity?  4    Review of Pertinent Medical Data ---    The following portions of the patient's history were reviewed and updated as appropriate: allergies, current medications, past family history, past medical history, past social history, past surgical history, and problem list.    Review of Systems   Constitutional:  Negative for fever.   Cardiovascular:  Negative for chest pain.   Gastrointestinal:  Positive for constipation. Negative for abdominal pain and diarrhea.   Genitourinary:  Negative for difficulty urinating and dysuria.   Musculoskeletal:  Positive for back pain.   Neurological:  Positive for weakness and numbness (left hand). Negative for headaches.   Psychiatric/Behavioral:  Positive for sleep disturbance. Negative for suicidal ideas. The patient is nervous/anxious.      I have reviewed and confirmed the accuracy of the ROS as documented by the MA/LPN/RN TEN Camp    Vitals:    12/31/24 1051   BP: 116/72   Pulse: 85   Resp: 18   Temp: 97.3 °F (36.3 °C)   SpO2: 91%   Weight: 110 kg (243 lb 6.4 oz)   Height: 180.3 cm (71\")   PainSc:   8   PainLoc: Back     Objective   Physical Exam  Vitals and nursing note reviewed.   Constitutional:       General: He is not in acute distress.     Appearance: Normal appearance. He is not ill-appearing.   Pulmonary:      Effort: Pulmonary effort is normal. No respiratory distress.   Musculoskeletal:      Lumbar back: Tenderness and bony tenderness present. Negative right straight leg raise test and negative left straight leg raise test.   Neurological:      Mental Status: He is alert and oriented to person, place, and time.      Gait: Gait abnormal (slowed, antalgia, ambulates with cane).   Psychiatric:         Mood and Affect: Mood normal.         " Behavior: Behavior normal.       Assessment & Plan   Diagnoses and all orders for this visit:    1. Chronic pain syndrome (Primary)    2. History of lumbar surgery    3. Lumbar radiculopathy    4. Therapeutic opioid induced constipation    5. High risk medication use      --- Continue Tramadol.  Patient appears stable with current regimen. No adverse effects. Regarding continuation of opioids, there is no evidence of aberrant behavior or any red flags.  The patient continues with appropriate response to opioid therapy. ADL's remain intact by self.   --- The patient signed an updated copy of the controlled substance agreement on 10/11/2024  --- The urine drug screen confirmation from 10/11/2024 has been reviewed and the result is appropriate based on patient history and JESSICA report     --- ORT - high risk. Total daily MME low.    Prem Thrasher reports a pain score of 8.  Given his pain assessment as noted, treatment options were discussed and the following options were decided upon as a follow-up plan to address the patient's pain: continuation of current treatment plan for pain.    --- Follow-up 2 months        JESSICA REPORT  As part of the patient's treatment plan, I am prescribing controlled substances. The patient has been made aware of appropriate use of such medications, including potential risk of somnolence, limited ability to drive and/or work safely, and the potential for dependence or overdose. It has also been made clear that these medications are for use by this patient only, without concomitant use of alcohol or other substances unless prescribed.     Patient has completed prescribing agreement detailing terms of continued prescribing of controlled substances, including monitoring JESSICA reports, urine drug screening, and pill counts if necessary. The patient is aware that inappropriate use will results in cessation of prescribing such medications.    As the clinician, I personally reviewed the  JESSICA from 12/31/2024 while the patient was in the office today.    History and physical exam exhibit continued safe and appropriate use of controlled substances.    Dictated utilizing Dragon dictation.

## 2025-02-11 ENCOUNTER — TELEMEDICINE (OUTPATIENT)
Dept: FAMILY MEDICINE CLINIC | Facility: CLINIC | Age: 70
End: 2025-02-11
Payer: MEDICARE

## 2025-02-11 DIAGNOSIS — J44.1 COPD WITH EXACERBATION: Primary | ICD-10-CM

## 2025-02-11 PROCEDURE — 99213 OFFICE O/P EST LOW 20 MIN: CPT | Performed by: NURSE PRACTITIONER

## 2025-02-11 PROCEDURE — 1125F AMNT PAIN NOTED PAIN PRSNT: CPT | Performed by: NURSE PRACTITIONER

## 2025-02-11 NOTE — PROGRESS NOTES
Subjective   Prem Thrasher is a 69 y.o. male.   You have chosen to receive care through a telehealth visit.  Do you consent to use a video/audio connection for your medical care today? Yes  Patient at home during time of encounter, provider in office.  History of Present Illness         Prem Thrasher 69 y.o. male who presents for evaluation of sinus pressure and congestion, cough. Symptoms include congestion, post nasal drip, productive cough, sinus pain, and fatigue.  Onset of symptoms was a few days ago, gradually worsening since that time. Patient denies fever.   Evaluation to date: none Treatment to date:  OTC cough suppressant and Mucinex.  Patient has history of COPD.  He does report feeling some shortness of breath since onset of symptoms.  He does have pulse ox at home and reports that reading is 91 right now.  Blood pressure today was 134/83 per home monitor.  He reports coughing up greenish-brown drainage.      The following portions of the patient's history were reviewed and updated as appropriate: allergies, current medications, past family history, past medical history, past social history, past surgical history, and problem list.    Review of Systems   Constitutional:  Positive for fatigue. Negative for chills and fever.   HENT:  Positive for congestion, postnasal drip, sinus pressure and sinus pain.    Respiratory:  Positive for cough, chest tightness and shortness of breath.    Cardiovascular:  Negative for chest pain and palpitations.   Skin:  Negative for rash.   Psychiatric/Behavioral:  Negative for dysphoric mood and sleep disturbance. The patient is not nervous/anxious.        Objective   Physical Exam  Vitals and nursing note reviewed.   Constitutional:       Appearance: He is well-developed.   Pulmonary:      Effort: Pulmonary effort is normal.   Neurological:      Mental Status: He is alert and oriented to person, place, and time.   Psychiatric:         Mood and Affect: Mood normal.          Behavior: Behavior normal.         Thought Content: Thought content normal.         Judgment: Judgment normal.         Assessment & Plan   Diagnoses and all orders for this visit:    1. COPD with exacerbation (Primary)  -     amoxicillin-clavulanate (AUGMENTIN) 875-125 MG per tablet; Take 1 tablet by mouth 2 (Two) Times a Day for 10 days.  Dispense: 20 tablet; Refill: 0               Total time spent on encounter was 8 minutes.

## 2025-02-17 ENCOUNTER — TELEPHONE (OUTPATIENT)
Dept: FAMILY MEDICINE CLINIC | Facility: CLINIC | Age: 70
End: 2025-02-17
Payer: MEDICARE

## 2025-02-17 DIAGNOSIS — I10 PRIMARY HYPERTENSION: Primary | ICD-10-CM

## 2025-02-17 RX ORDER — AMLODIPINE BESYLATE 5 MG/1
5 TABLET ORAL DAILY
Qty: 90 TABLET | Refills: 1 | Status: SHIPPED | OUTPATIENT
Start: 2025-02-17

## 2025-02-17 NOTE — TELEPHONE ENCOUNTER
PATIENT'S WIFE, Linda Thrasher, CALLED ASKING FOR A MEDICATION REFILL FOR amLODIPine (NORVASC) 10 MG tablet     PATIENT WAS LAST SEEN FOR A VIDEO VISIT WITH NEETA ZHAO ON 2/11/25    Linda Thrasher, ALSO ASKED IF THE MEDICATION COULD BE CHANGE FROM amLODIPine (NORVASC) 10 MG tablet   TO amLODIPine (NORVASC) 5 MG tablet

## 2025-02-28 ENCOUNTER — OFFICE VISIT (OUTPATIENT)
Dept: PAIN MEDICINE | Facility: CLINIC | Age: 70
End: 2025-02-28
Payer: MEDICARE

## 2025-02-28 VITALS
OXYGEN SATURATION: 96 % | TEMPERATURE: 97.7 F | HEIGHT: 71 IN | DIASTOLIC BLOOD PRESSURE: 82 MMHG | HEART RATE: 90 BPM | SYSTOLIC BLOOD PRESSURE: 96 MMHG | BODY MASS INDEX: 33.95 KG/M2

## 2025-02-28 DIAGNOSIS — M54.16 LUMBAR RADICULOPATHY: ICD-10-CM

## 2025-02-28 DIAGNOSIS — K59.03 THERAPEUTIC OPIOID INDUCED CONSTIPATION: ICD-10-CM

## 2025-02-28 DIAGNOSIS — G89.4 CHRONIC PAIN SYNDROME: Primary | ICD-10-CM

## 2025-02-28 DIAGNOSIS — Z98.890 HISTORY OF LUMBAR SURGERY: ICD-10-CM

## 2025-02-28 DIAGNOSIS — Z79.899 HIGH RISK MEDICATION USE: ICD-10-CM

## 2025-02-28 DIAGNOSIS — T40.2X5A THERAPEUTIC OPIOID INDUCED CONSTIPATION: ICD-10-CM

## 2025-02-28 RX ORDER — BUPRENORPHINE 7.5 UG/H
1 PATCH TRANSDERMAL
Qty: 4 PATCH | Refills: 0 | Status: SHIPPED | OUTPATIENT
Start: 2025-02-28

## 2025-02-28 NOTE — PROGRESS NOTES
CHIEF COMPLAINT  F/U back pain- patient states that his pain has remained the same since his last visit.     Subjective   Prem Thrasher is a 70 y.o. male  who presents for follow-up.  He has a history of back pain.      Pain today 7/10 VAS.  Taking Tramadol 50 mg 3-4 times daily.  Also continues to utilize Lyrica 100 mg 2-3/day.  Constipation is an issue. Relistor did not help and was cost prohibitive, did not get Movantik.  Has been doing ok with a regimen of oatmeal, metamucil, and Miralax.  He is reporting poor pain control, can barely walk any distance without severe pain.       He is not a candidate for NSAIDs (plavix, cardiac hx).     Pertinent surgical history ---   4/13/2022 - lumbar fusion (Dr. Perez). Required evacuation of lumbar hematoma 4/23/2022  Lumbar fusion 2017 (Dr. Perez, Dr. Rick). Required second surgery to clean out infection   Back surgery 2010     Past Medications ---  Hydrocodone - constipation  Gabapentin - constipation     Previous interventions that the patient has received include:   Epidurals (no longer candidate due to infection history)    Back Pain  This is a chronic problem. The current episode started more than 1 year ago. The problem occurs daily. The problem is unchanged. The pain is present in the lumbar spine. The quality of the pain is described as aching, cramping and burning. The pain radiates to the left thigh and right thigh. The pain is at a severity of 7/10. The symptoms are aggravated by position, sitting, standing, twisting and bending. Associated symptoms include headaches, numbness and weakness. Pertinent negatives include no abdominal pain, chest pain, dysuria or fever. He has tried analgesics, muscle relaxant, heat, ice and home exercises for the symptoms. The treatment provided moderate relief.      PEG Assessment   What number best describes your pain on average in the past week?8  What number best describes how, during the past week, pain has interfered  "with your enjoyment of life?10  What number best describes how, during the past week, pain has interfered with your general activity?  10    Review of Pertinent Medical Data ---    The following portions of the patient's history were reviewed and updated as appropriate: allergies, current medications, past family history, past medical history, past social history, past surgical history, and problem list.    Review of Systems   Constitutional:  Positive for activity change (decreased) and fatigue. Negative for chills and fever.   HENT:  Positive for congestion.    Eyes:  Negative for visual disturbance.   Respiratory:  Positive for shortness of breath. Negative for chest tightness.    Cardiovascular:  Negative for chest pain.   Gastrointestinal:  Positive for constipation and diarrhea. Negative for abdominal pain.   Genitourinary:  Negative for difficulty urinating and dysuria.   Musculoskeletal:  Positive for back pain.   Neurological:  Positive for dizziness, weakness, light-headedness, numbness and headaches.   Psychiatric/Behavioral:  Positive for agitation and sleep disturbance. Negative for self-injury and suicidal ideas. The patient is nervous/anxious.        I have reviewed and confirmed the accuracy of the ROS as documented by the MA/LPN/RN TEN Camp    Vitals:    02/28/25 1103   BP: 96/82   Pulse: 90   Temp: 97.7 °F (36.5 °C)   SpO2: 96%   Height: 180.3 cm (71\")   PainSc: 7    PainLoc: Back         Objective   Physical Exam  Vitals and nursing note reviewed.   Constitutional:       General: He is not in acute distress.     Appearance: Normal appearance. He is not ill-appearing.   Pulmonary:      Effort: Pulmonary effort is normal. No respiratory distress.   Musculoskeletal:      Lumbar back: Tenderness and bony tenderness present.   Neurological:      Mental Status: He is alert and oriented to person, place, and time.      Gait: Gait abnormal (slowed, antalgia, ambulates with cane). "   Psychiatric:         Mood and Affect: Mood normal.         Behavior: Behavior normal.       Assessment & Plan   Diagnoses and all orders for this visit:    1. Chronic pain syndrome (Primary)  -     Buprenorphine (BUTRANS) patch weekly transdermal patch; Place 1 patch on the skin as directed by provider Every 7 (Seven) Days.  Dispense: 4 patch; Refill: 0    2. History of lumbar surgery  -     Buprenorphine (BUTRANS) patch weekly transdermal patch; Place 1 patch on the skin as directed by provider Every 7 (Seven) Days.  Dispense: 4 patch; Refill: 0    3. Lumbar radiculopathy  -     Buprenorphine (BUTRANS) patch weekly transdermal patch; Place 1 patch on the skin as directed by provider Every 7 (Seven) Days.  Dispense: 4 patch; Refill: 0    4. Therapeutic opioid induced constipation    5. High risk medication use      --- Trial Butrans patch - start at 7.5 mcg/hr. May need to increase.   --- Routine ODT in office today as part of monitoring requirements for controlled substances.  This specimen will be sent to [facility] for confirmation.     --- The urine drug screen confirmation from 10/11/2024 has been reviewed and the result is appropriate based on patient history and JESSICA report     --- ORT - high risk.     Prem Thrasher reports a pain score of 7.  Given his pain assessment as noted, treatment options were discussed and the following options were decided upon as a follow-up plan to address the patient's pain:  see plan .      --- Follow-up 1 month                  JESSICA REPORT  As part of the patient's treatment plan, I am prescribing controlled substances. The patient has been made aware of appropriate use of such medications, including potential risk of somnolence, limited ability to drive and/or work safely, and the potential for dependence or overdose. It has also been made clear that these medications are for use by this patient only, without concomitant use of alcohol or other substances  unless prescribed.     Patient has completed prescribing agreement detailing terms of continued prescribing of controlled substances, including monitoring JESSICA reports, urine drug screening, and pill counts if necessary. The patient is aware that inappropriate use will results in cessation of prescribing such medications.    As the clinician, I personally reviewed the JESSICA from 2/28/2025 while the patient was in the office today.    History and physical exam exhibit continued safe and appropriate use of controlled substances.       Dictated utilizing Dragon dictation.

## 2025-03-02 DIAGNOSIS — J44.1 COPD WITH EXACERBATION: ICD-10-CM

## 2025-03-03 NOTE — TELEPHONE ENCOUNTER
Patient comment: I’m still having a yellowish flam when I cough but my body aches are much better and my sinuses are better but I’m still concerned about the chest my wife said she here’s me wheezing       Last visit: 2-11-25  Next visit: nothing scheduled

## 2025-03-05 DIAGNOSIS — G89.4 CHRONIC PAIN SYNDROME: ICD-10-CM

## 2025-03-05 DIAGNOSIS — M54.16 LUMBAR RADICULOPATHY: ICD-10-CM

## 2025-03-05 DIAGNOSIS — Z98.890 HISTORY OF LUMBAR SURGERY: ICD-10-CM

## 2025-03-11 RX ORDER — BUPRENORPHINE 7.5 UG/H
1 PATCH TRANSDERMAL
Qty: 4 PATCH | Refills: 0 | OUTPATIENT
Start: 2025-03-11

## 2025-03-11 NOTE — TELEPHONE ENCOUNTER
I called Mr. Thrasher and left a message asking him to return my call. His last Rx was sent to the pharmacy on 2/28/2025.

## 2025-03-11 NOTE — TELEPHONE ENCOUNTER
I spoke with Mr. Amaro and he states that he already has the medication. You can disregard this message.

## 2025-03-12 ENCOUNTER — TELEPHONE (OUTPATIENT)
Dept: FAMILY MEDICINE CLINIC | Facility: CLINIC | Age: 70
End: 2025-03-12
Payer: MEDICARE

## 2025-03-12 NOTE — TELEPHONE ENCOUNTER
Caller: Linda Thrasher    Relationship to patient: Emergency Contact    Best call back number: [phone]     Type of visit: LABS    Requested date: WEEK OF 03/17/2025     Additional notes:PATIENT IS SCHEDULED FOR YEARLY WELLNESS ON 03/28/2025.     PATIENT WOULD LIKE TO HAVE LABS WEEK PRIOR.

## 2025-03-12 NOTE — TELEPHONE ENCOUNTER
This encounter is being sent as an FYI.   Patient was scheduled for annual wellness visit with an APC who is not the patient’s PCP.   Please review and follow up with the patient in the event patient should be worked in with the PCP.    VISIT CURRENTLY SCHEDULED WITH: NEETA ZHAO    DATE OF SCHEDULED VISIT: 03/28/2025

## 2025-03-28 ENCOUNTER — OFFICE VISIT (OUTPATIENT)
Dept: PAIN MEDICINE | Facility: CLINIC | Age: 70
End: 2025-03-28
Payer: MEDICARE

## 2025-03-28 ENCOUNTER — OFFICE VISIT (OUTPATIENT)
Dept: FAMILY MEDICINE CLINIC | Facility: CLINIC | Age: 70
End: 2025-03-28
Payer: MEDICARE

## 2025-03-28 VITALS
BODY MASS INDEX: 34.55 KG/M2 | HEART RATE: 83 BPM | TEMPERATURE: 98 F | WEIGHT: 246.8 LBS | DIASTOLIC BLOOD PRESSURE: 68 MMHG | OXYGEN SATURATION: 93 % | HEIGHT: 71 IN | RESPIRATION RATE: 16 BRPM | SYSTOLIC BLOOD PRESSURE: 108 MMHG

## 2025-03-28 VITALS
SYSTOLIC BLOOD PRESSURE: 104 MMHG | TEMPERATURE: 98 F | DIASTOLIC BLOOD PRESSURE: 65 MMHG | WEIGHT: 246.8 LBS | BODY MASS INDEX: 34.55 KG/M2 | HEIGHT: 71 IN | HEART RATE: 94 BPM | OXYGEN SATURATION: 92 %

## 2025-03-28 DIAGNOSIS — Z79.899 HIGH RISK MEDICATION USE: ICD-10-CM

## 2025-03-28 DIAGNOSIS — G89.4 CHRONIC PAIN SYNDROME: Primary | ICD-10-CM

## 2025-03-28 DIAGNOSIS — T40.2X5A THERAPEUTIC OPIOID INDUCED CONSTIPATION: ICD-10-CM

## 2025-03-28 DIAGNOSIS — I10 PRIMARY HYPERTENSION: ICD-10-CM

## 2025-03-28 DIAGNOSIS — Z00.00 MEDICARE ANNUAL WELLNESS VISIT, SUBSEQUENT: Primary | ICD-10-CM

## 2025-03-28 DIAGNOSIS — K59.03 THERAPEUTIC OPIOID INDUCED CONSTIPATION: ICD-10-CM

## 2025-03-28 DIAGNOSIS — M54.16 LUMBAR RADICULOPATHY: ICD-10-CM

## 2025-03-28 DIAGNOSIS — Z98.890 HISTORY OF LUMBAR SURGERY: ICD-10-CM

## 2025-03-28 RX ORDER — CARVEDILOL 6.25 MG/1
6.25 TABLET ORAL 2 TIMES DAILY WITH MEALS
Qty: 180 TABLET | Refills: 3 | Status: SHIPPED | OUTPATIENT
Start: 2025-03-28

## 2025-03-28 RX ORDER — LEVOTHYROXINE SODIUM 50 UG/1
50 TABLET ORAL
Qty: 90 TABLET | Refills: 3 | Status: SHIPPED | OUTPATIENT
Start: 2025-03-28

## 2025-03-28 RX ORDER — BUPRENORPHINE 7.5 UG/H
1 PATCH TRANSDERMAL
Qty: 4 PATCH | Refills: 0 | Status: SHIPPED | OUTPATIENT
Start: 2025-03-28

## 2025-03-28 NOTE — ASSESSMENT & PLAN NOTE
Bp is low.  Hold irbesartan.  They will monitor blood pressure at home daily for couple of weeks and send readings via [employer].    Orders:    carvedilol (COREG) 6.25 MG tablet; Take 1 tablet by mouth 2 (Two) Times a Day With Meals.

## 2025-03-28 NOTE — PATIENT INSTRUCTIONS
Medicare Wellness  Personal Prevention Plan of Service     Date of Office Visit:    Encounter Provider:  TEN Kelley  Place of Service:  [facility]  Patient Name: Prem Thrasher  :  1955    As part of the Medicare Wellness portion of your visit today, we are providing you with this personalized preventive plan of services (PPPS). This plan is based upon recommendations of the [place] Preventive Services Task Force (USPSTF) and the Advisory Committee on Immunization Practices (ACIP).    This lists the preventive care services that should be considered, and provides dates of when you are due. Items listed as completed are up-to-date and do not require any further intervention.    Health Maintenance   Topic Date Due    DIABETIC EYE EXAM  Never done    ZOSTER VACCINE (1 of 2) Never done    TDAP/TD VACCINES (2 - Tdap) 2013    Pneumococcal Vaccine 50+ (2 of 2 - PCV) 10/23/2019    COVID-19 Vaccine (4 -  season) 2024    LIPID PANEL  2025    LUNG CANCER SCREENING  2025    HEMOGLOBIN A1C  2025    URINE MICROALBUMIN-CREATININE RATIO (uACR)  2026    ANNUAL WELLNESS VISIT  2026    COLORECTAL CANCER SCREENING  2026    AAA SCREEN ONCE  Completed    HEPATITIS C SCREENING  Addressed    INFLUENZA VACCINE  Discontinued       No orders of the defined types were placed in this encounter.      Return for Next scheduled follow up.

## 2025-03-28 NOTE — PROGRESS NOTES
Subjective   The ABCs of the Annual Wellness Visit  Medicare Wellness Visit      Prem Thrasher is a 70 y.o. patient who presents for a Medicare Wellness Visit.    The following portions of the patient's history were reviewed and   updated as appropriate: allergies, current medications, past family history, past medical history, past social history, past surgical history, and problem list.    Compared to one year ago, the patient's physical   health is better.  Compared to one year ago, the patient's mental   health is better.    Recent Hospitalizations:  This patient has had a [facility] admission record on file within the last 365 days.  Current Medical Providers:  Patient Care Team:  Montse Cain PA-C as PCP - General (Family Medicine)  Sawyer Rick MD as Surgeon (Neurosurgery)  Pradip Mcmillan MD as Consulting Physician (Pulmonary Disease)  Jacky Perez MD as Surgeon (Orthopedic Surgery)  Charli Sen MD as Consulting Physician (Infectious Diseases)  Tray Moody MD as Consulting Physician (Urology)  Suman Richards DPM (Inactive) as Consulting Physician (Podiatry)  Porsha Arcos MD as Consulting Physician (General Surgery)  Dioni Salazar MD (Inactive) as Consulting Physician (Cardiology)  Ryan Louise MD (Rheumatology)    Outpatient Medications Prior to Visit   Medication Sig Dispense Refill   • amLODIPine (NORVASC) 5 MG tablet Take 1 tablet by mouth Daily. For BP 90 tablet 1   • aspirin 81 MG EC tablet Take 1 tablet by mouth Daily.     • Boswellia-Glucosamine-Vit D (OSTEO BI-FLEX ONE PER DAY PO) Take  by mouth.     • Buprenorphine (BUTRANS) patch weekly transdermal patch Place 1 patch on the skin as directed by provider Every 7 (Seven) Days. 4 patch 0   • busPIRone (BUSPAR) 10 MG tablet TAKE 1 TABLET TWICE DAILY AS NEEDED FOR ANXIETY 180 tablet 3   • Cholecalciferol (VITAMIN D) 2000 UNITS tablet Take 1 tablet by mouth Every Evening.     • clopidogrel  (PLAVIX) 75 MG tablet Take 1 tablet by mouth Daily. 90 tablet 0   • Cyanocobalamin (B-12) 1000 MCG sublingual tablet DISSOLVE 1 TABLET UNDER THE TONGUE EVERY DAY 90 tablet 3   • cyclobenzaprine (FLEXERIL) 10 MG tablet Take 1 tablet by mouth 2 (Two) Times a Day. 270 tablet 3   • docusate sodium (Stool Softener) 100 MG capsule      • escitalopram (LEXAPRO) 20 MG tablet TAKE 1 TABLET EVERY MORNING FOR ANXIETY AND DEPRESSION 90 tablet 3   • ezetimibe (ZETIA) 10 MG tablet Take 1 tablet by mouth Daily. 90 tablet 3   • folic acid (FOLVITE) 1 MG tablet TAKE 1 TABLET EVERY DAY 90 tablet 3   • isosorbide mononitrate (IMDUR) 30 MG 24 hr tablet Take 1 tablet by mouth Daily. 90 tablet 0   • meclizine (ANTIVERT) 25 MG tablet Take 1 tablet by mouth 3 (Three) Times a Day As Needed for Dizziness. 30 tablet 0   • metFORMIN ER (GLUCOPHAGE-XR) 500 MG 24 hr tablet TAKE 2 TABLETS EVERY DAY FOR DIABETES 180 tablet 3   • multivitamin with minerals tablet tablet Take 1 tablet by mouth Daily. HOLD X1 WEEK PRIOR TO SURGERY     • nitroglycerin (NITROSTAT) 0.4 MG SL tablet Place 1 tablet under the tongue Every 5 (Five) Minutes As Needed for Chest Pain. Take no more than 3 doses in 15 minutes. 25 tablet 1   • pantoprazole (PROTONIX) 40 MG EC tablet TAKE 1 TABLET EVERY DAY FOR GERD 90 tablet 3   • pregabalin (LYRICA) 100 MG capsule Take 1 capsule by mouth 3 (Three) Times a Day. 90 capsule 1   • rosuvastatin (CRESTOR) 10 MG tablet Take 1 tablet by mouth every night at bedtime. 90 tablet 0   • Spiriva Respimat 2.5 MCG/ACT aerosol solution inhaler      • irbesartan (AVAPRO) 75 MG tablet Take 1 tablet by mouth Daily. for blood pressure 90 tablet 1   • levothyroxine (SYNTHROID, LEVOTHROID) 50 MCG tablet Take 1 tablet by mouth Every Morning. Before breakfast, for thyroid 90 tablet 3   • fluticasone (FLONASE) 50 MCG/ACT nasal spray Administer 2 sprays into the nostril(s) as directed by provider Daily for 7 days. 16 g 0   • carvedilol (COREG) 6.25 MG  tablet Take 1 tablet by mouth 2 (Two) Times a Day With Meals for 30 days. 60 tablet 0     No facility-administered medications prior to visit.     Opioid medication/s are on active medication list.  and I have evaluated his active treatment plan and pain score trends (see table).  Vitals:    03/28/25 1006   PainSc: 6    PainLoc: Back     I have reviewed the chart for potential of high risk medication and harmful drug interactions in the elderly.        Aspirin is on active medication list. Aspirin use is indicated based on review of current medical condition/s. Pros and cons of this therapy have been discussed today. Benefits of this medication outweigh potential harm.  Patient has been encouraged to continue taking this medication.  .      Patient Active Problem List   Diagnosis   • Allergy   • COPD (chronic obstructive pulmonary disease)   • Hyperlipidemia   • Rheumatoid arthritis   • Hypertension   • Anxiety disorder   • Recurrent major depressive disorder, in full remission   • Lumbar radiculopathy   • Spondylolisthesis of lumbar region   • Sepsis   • Allegra's deformity of left heel   • Calcific Achilles tendonitis s/p OR 7/18   • Gastrocnemius equinus, left   • Abscess and cellulitis of gluteal region   • Perirectal abscess   • Anemia   • Wound dehiscence   • Gastrocnemius strain, left, initial encounter   • Low folic acid   • Low serum vitamin B12   • Exertional chest pain   • Unstable angina   • Coronary artery disease involving native coronary artery of native heart with unstable angina pectoris   • Unstable angina pectoris   • Coronary artery disease involving native heart without angina pectoris   • Low back pain   • Non-STEMI (non-ST elevated myocardial infarction)   • CAD S/P percutaneous coronary angioplasty   • S/P drug eluting coronary stent placement   • Screening for colon cancer   • Colon polyps   • Diverticulosis   • Hypothyroidism, acquired   • Renal impairment   • Lumbar pain   • Pseudarthrosis  "after fusion or arthrodesis   • Hyponatremia   • Postoperative ileus   • Postoperative anemia due to acute blood loss   • Acquired trigger finger   • Generalized osteoarthritis   • Type 2 diabetes mellitus without complication, without long-term current use of insulin   • Ex-smoker   • Chest pain     Advance Care Planning Advance Directive is on file.  ACP discussion was held with the patient during this visit. Patient has an advance directive in EMR which is still valid.             Objective   Vitals:    03/28/25 1006   BP: 108/68   BP Location: Left arm   Patient Position: Sitting   Cuff Size: Large Adult   Pulse: 83   Resp: 16   Temp: 98 °F (36.7 °C)   TempSrc: Oral   SpO2: 93%   Weight: 112 kg (246 lb 12.8 oz)   Height: 180.3 cm (70.98\")   PainSc: 6    PainLoc: Back       Estimated body mass index is 34.44 kg/m² as calculated from the following:    Height as of this encounter: 180.3 cm (70.98\").    Weight as of this encounter: 112 kg (246 lb 12.8 oz).             Does the patient have evidence of cognitive impairment? No  Lab Results   Component Value Date    HGBA1C 6.10 (H) 03/21/2025                                                                                                Health  Risk Assessment    Smoking Status:  Social History     Tobacco Use   Smoking Status Former   • Current packs/day: 0.00   • Average packs/day: 1 pack/day for 50.0 years (50.0 ttl pk-yrs)   • Types: Cigarettes   • Start date: 10/20/1971   • Quit date: 10/20/2021   • Years since quitting: 3.4   • Passive exposure: Never   Smokeless Tobacco Never   Tobacco Comments    Current status is non smoker     Alcohol Consumption:  Social History     Substance and Sexual Activity   Alcohol Use No       Fall Risk Screen  STEADI Fall Risk Assessment was completed, and patient is at MODERATE risk for falls. Assessment completed on:3/28/2025    Depression Screening   Little interest or pleasure in doing things? Not at all   Feeling down, " depressed, or hopeless? Several days   PHQ-2 Total Score 1      Health Habits and Functional and Cognitive Screening:      3/21/2025     9:08 AM   Functional & Cognitive Status   Do you have difficulty preparing food and eating? No   Do you have difficulty bathing yourself, getting dressed or grooming yourself? Yes   Do you have difficulty using the toilet? No   Do you have difficulty moving around from place to place? Yes   Do you have trouble with steps or getting out of a bed or a chair? Yes   Current Diet Other   Dental Exam Not up to date   Eye Exam Up to date   Exercise (times per week) 0 times per week   Current Exercises Include No Regular Exercise   Do you need help using the phone?  No   Are you deaf or do you have serious difficulty hearing?  No   Do you need help to go to places out of walking distance? Yes   Do you need help shopping? Yes   Do you need help preparing meals?  No   Do you need help with housework?  No   Do you need help with laundry? No   Do you need help taking your medications? No   Do you need help managing money? No   Do you ever drive or ride in a car without wearing a seat belt? No   Have you felt unusual stress, anger or loneliness in the last month? Yes   Who do you live with? Spouse   If you need help, do you have trouble finding someone available to you? No   Have you been bothered in the last four weeks by sexual problems? Yes   Do you have difficulty concentrating, remembering or making decisions? No           Age-appropriate Screening Schedule:  Refer to the list below for future screening recommendations based on patient's age, sex and/or medical conditions. Orders for these recommended tests are listed in the plan section. The patient has been provided with a written plan.    Health Maintenance List  Health Maintenance   Topic Date Due   • DIABETIC EYE EXAM  Never done   • ZOSTER VACCINE (1 of 2) Never done   • TDAP/TD VACCINES (2 - Tdap) 07/12/2013   • Pneumococcal Vaccine  "50+ (2 of 2 - PCV) 10/23/2019   • COVID-19 Vaccine (4 - 2024-25 season) 09/01/2024   • LIPID PANEL  03/21/2025   • LUNG CANCER SCREENING  04/29/2025   • HEMOGLOBIN A1C  09/21/2025   • URINE MICROALBUMIN-CREATININE RATIO (uACR)  03/21/2026   • ANNUAL WELLNESS VISIT  03/28/2026   • COLORECTAL CANCER SCREENING  04/08/2026   • AAA SCREEN ONCE  Completed   • HEPATITIS C SCREENING  Addressed   • INFLUENZA VACCINE  Discontinued                                                                                                                                                CMS Preventative Services Quick Reference  Risk Factors Identified During Encounter  Fall Risk-High or Moderate: Discussed Fall Prevention in the home    The above risks/problems have been discussed with the patient.  Pertinent information has been shared with the patient in the After Visit Summary.  An After Visit Summary and PPPS were made available to the patient.    Follow Up:   Next Medicare Wellness visit to be scheduled in 1 year.         Additional E&M Note during same encounter follows:  Patient has additional, significant, and separately identifiable condition(s)/problem(s) that require work above and beyond the Medicare Wellness Visit     Chief Complaint  Medicare Wellness-subsequent    Subjective   HPI  Greg is also being seen today for additional medical problem/s.    Review of Systems   Respiratory:  Positive for cough and shortness of breath.    Cardiovascular:  Negative for leg swelling.   Neurological:  Negative for confusion.              Objective   Vital Signs:  /68 (BP Location: Left arm, Patient Position: Sitting, Cuff Size: Large Adult)   Pulse 83   Temp 98 °F (36.7 °C) (Oral)   Resp 16   Ht 180.3 cm (70.98\")   Wt 112 kg (246 lb 12.8 oz)   SpO2 93%   BMI 34.44 kg/m²   Physical Exam  Vitals and nursing note reviewed.   Constitutional:       Appearance: He is well-developed.   Neck:      Vascular: No carotid bruit. "   Cardiovascular:      Rate and Rhythm: Normal rate and regular rhythm.   Pulmonary:      Effort: Pulmonary effort is normal.      Breath sounds: Normal breath sounds. Examination of the right-lower field reveals decreased breath sounds. Examination of the left-lower field reveals decreased breath sounds.   Neurological:      Mental Status: He is alert and oriented to person, place, and time.   Psychiatric:         Mood and Affect: Mood normal.         Behavior: Behavior normal.         Thought Content: Thought content normal.         Judgment: Judgment normal.           Common labs          5/30/2024    10:44 3/21/2025    09:42   Common Labs   Glucose 101  103    BUN 13  16    Creatinine 0.99  1.05    Sodium 137  139    Potassium 5.0  4.9    Chloride 99  99    Calcium 9.4  9.7    Albumin 4.2  4.2    Total Bilirubin 0.5  0.4    Alkaline Phosphatase 74  82    AST (SGOT) 18  20    ALT (SGPT) 16  16    WBC  9.50    Hemoglobin  14.0    Hematocrit  43.8    Platelets  307    Hemoglobin A1C 6.40  6.10    Microalbumin, Urine  <3.0    PSA 2.140  2.300           Assessment and Plan      Primary hypertension  Bp is low.  Hold irbesartan.  They will monitor blood pressure at home daily for couple of weeks and send readings via [employer].    Orders:  •  carvedilol (COREG) 6.25 MG tablet; Take 1 tablet by mouth 2 (Two) Times a Day With Meals.    Medicare annual wellness visit, subsequent                 Follow Up   Return if symptoms worsen or fail to improve, for Next scheduled follow up.  Patient was given instructions and counseling regarding his condition or for health maintenance advice. Please see specific information pulled into the AVS if appropriate.

## 2025-03-28 NOTE — PROGRESS NOTES
CHIEF COMPLAINT  F/U back pain    Subjective   Prem Thrasher is a 70 y.o. male  who presents for follow-up.  He has a history of chronic back pain.  Pain today 5/10 VAS.  Last month he was transitioned from Tramadol to Butrans 7.5 mcg/hr patch.  Also continues to utilize Lyrica 100 mg 2-3/day.  Constipation is much better since stopping oral pain medications. He also reports overall pain control feels improved. He is able to perform household tasks and ADL's independently.    Mild drowsiness initially which seems to have improved.  He is also sleeping better with fewer interruptions.      He is not a candidate for NSAIDs (plavix, cardiac hx).    Pertinent surgical history ---   4/13/2022 - lumbar fusion (Dr. Perez). Required evacuation of lumbar hematoma 4/23/2022  Lumbar fusion 2017 (Dr. Perez, Dr. Rick). Required second surgery to clean out infection   Back surgery 2010     Past Medications ---  Hydrocodone - constipation  Gabapentin - constipation     Previous interventions that the patient has received include:   Epidurals (no longer candidate due to infection history)    Back Pain  This is a chronic problem. The current episode started more than 1 year ago. The problem occurs daily. The problem is unchanged. The pain is present in the lumbar spine. The quality of the pain is described as aching, cramping and burning. The pain radiates to the left thigh and right thigh. The pain is at a severity of 5/10. The symptoms are aggravated by position, sitting, standing, twisting and bending. Associated symptoms include headaches, numbness and weakness. Pertinent negatives include no abdominal pain, chest pain, dysuria or fever. He has tried analgesics, muscle relaxant, heat, ice and home exercises for the symptoms. The treatment provided moderate relief.     PEG Assessment   What number best describes your pain on average in the past week?7  What number best describes how, during the past week, pain has interfered  "with your enjoyment of life?9  What number best describes how, during the past week, pain has interfered with your general activity?  9    Review of Pertinent Medical Data ---    The following portions of the patient's history were reviewed and updated as appropriate: allergies, current medications, past family history, past medical history, past social history, past surgical history, and problem list.    Review of Systems   Constitutional:  Positive for fatigue. Negative for activity change, chills and fever.   HENT:  Negative for congestion.    Eyes:  Negative for visual disturbance.   Respiratory:  Positive for shortness of breath. Negative for chest tightness.    Cardiovascular:  Negative for chest pain.   Gastrointestinal:  Negative for abdominal pain, constipation and diarrhea.   Genitourinary:  Negative for difficulty urinating and dysuria.   Musculoskeletal:  Positive for back pain.   Neurological:  Positive for weakness, numbness and headaches. Negative for dizziness and light-headedness.   Psychiatric/Behavioral:  Negative for agitation, self-injury, sleep disturbance and suicidal ideas. The patient is nervous/anxious.        I have reviewed and confirmed the accuracy of the ROS as documented by the MA/LPN/RN TEN Camp    Vitals:    03/28/25 1348   BP: 104/65   Pulse: 94   Temp: 98 °F (36.7 °C)   SpO2: 92%   Weight: 112 kg (246 lb 12.8 oz)   Height: 180.3 cm (71\")   PainSc: 5    PainLoc: Back         Objective   Physical Exam        Assessment & Plan   Diagnoses and all orders for this visit:    1. Chronic pain syndrome (Primary)  -     Buprenorphine (BUTRANS) patch weekly transdermal patch; Place 1 patch on the skin as directed by provider Every 7 (Seven) Days.  Dispense: 4 patch; Refill: 0    2. History of lumbar surgery  -     Buprenorphine (BUTRANS) patch weekly transdermal patch; Place 1 patch on the skin as directed by provider Every 7 (Seven) Days.  Dispense: 4 patch; Refill: 0    3. " Lumbar radiculopathy  -     Buprenorphine (BUTRANS) patch weekly transdermal patch; Place 1 patch on the skin as directed by provider Every 7 (Seven) Days.  Dispense: 4 patch; Refill: 0    4. Therapeutic opioid induced constipation    5. High risk medication use      --- Refill Butrans (dated). Patient appears stable with current regimen. No adverse effects. Regarding continuation of opioids, there is no evidence of aberrant behavior or any red flags.  The patient continues with appropriate response to opioid therapy. ADL's remain intact by self.   --- Routine ODT in office today as part of monitoring requirements for controlled substances.  This specimen will be sent to [email or web address] for confirmation.     --- The urine drug screen confirmation from 10/11/2024 has been reviewed and the result is appropriate based on patient history and JESSICA report     --- ORT - high risk.  Medication low risk for abuse/misuse.     Prem Thrasher reports a pain score of 5.  Given his pain assessment as noted, treatment options were discussed and the following options were decided upon as a follow-up plan to address the patient's pain: continuation of current treatment plan for pain.    --- Follow-up 2 months                  JESSICA REPORT  As part of the patient's treatment plan, I am prescribing controlled substances. The patient has been made aware of appropriate use of such medications, including potential risk of somnolence, limited ability to drive and/or work safely, and the potential for dependence or overdose. It has also been made clear that these medications are for use by this patient only, without concomitant use of alcohol or other substances unless prescribed.     Patient has completed prescribing agreement detailing terms of continued prescribing of controlled substances, including monitoring JESSICA reports, urine drug screening, and pill counts if necessary. The patient is aware that inappropriate use will  results in cessation of prescribing such medications.    As the clinician, I personally reviewed the JESSICA from 3/28/2025 while the patient was in the office today.    History and physical exam exhibit continued safe and appropriate use of controlled substances.       Dictated utilizing Dragon dictation.

## 2025-04-14 ENCOUNTER — OFFICE VISIT (OUTPATIENT)
Dept: CARDIOLOGY | Facility: CLINIC | Age: 70
End: 2025-04-14
Payer: MEDICARE

## 2025-04-14 VITALS
DIASTOLIC BLOOD PRESSURE: 78 MMHG | BODY MASS INDEX: 34.55 KG/M2 | HEART RATE: 87 BPM | HEIGHT: 71 IN | SYSTOLIC BLOOD PRESSURE: 132 MMHG | WEIGHT: 246.8 LBS

## 2025-04-14 DIAGNOSIS — I25.118 CORONARY ARTERY DISEASE OF NATIVE ARTERY OF NATIVE HEART WITH STABLE ANGINA PECTORIS: Primary | ICD-10-CM

## 2025-04-14 PROCEDURE — 99214 OFFICE O/P EST MOD 30 MIN: CPT | Performed by: INTERNAL MEDICINE

## 2025-04-14 PROCEDURE — 1159F MED LIST DOCD IN RCRD: CPT | Performed by: INTERNAL MEDICINE

## 2025-04-14 PROCEDURE — 3078F DIAST BP <80 MM HG: CPT | Performed by: INTERNAL MEDICINE

## 2025-04-14 PROCEDURE — 3075F SYST BP GE 130 - 139MM HG: CPT | Performed by: INTERNAL MEDICINE

## 2025-04-14 PROCEDURE — 1160F RVW MEDS BY RX/DR IN RCRD: CPT | Performed by: INTERNAL MEDICINE

## 2025-04-14 RX ORDER — ISOSORBIDE MONONITRATE 30 MG/1
60 TABLET, EXTENDED RELEASE ORAL DAILY
Qty: 90 TABLET | Refills: 3 | Status: SHIPPED | OUTPATIENT
Start: 2025-04-14

## 2025-04-14 RX ORDER — NITROGLYCERIN 0.4 MG/1
0.4 TABLET SUBLINGUAL
Qty: 25 TABLET | Refills: 1 | Status: SHIPPED | OUTPATIENT
Start: 2025-04-14

## 2025-04-14 NOTE — PROGRESS NOTES
Subjective:     Encounter Date:0 04/14/25      Patient ID: Prem Thrasher is a 70 y.o. male.    Chief Complaint: CAD  HPI:   This is a pleasant 68-year-old male who presents for evaluation.  He was previously followed by Dr. Salazar     2020 he had unstable angina received 2 discrete LAD stents in the mid and distal vessel.  The following year he had recurrent symptoms and was found to have a new LAD lesion just proximal to the distal lesion as well as circumflex lesion.  He went balloon angioplasty of the LAD and PCI of the circumflex    March 2024 with exertional dyspnea and chest pain.  This occurred a few weeks after a COVID infection treated with Paxlovid.  He had a nuclear stress test and echocardiogram and ultimately underwent cardiac catheterization.  He received drug-eluting stent to distal RCA using a 3.5 x 28 mm Xience brett point drug-eluting stent.  The distal LAD was severely diseased but treated medically due to small size of the vessel.  Proximal LAD was moderately diseased by RFR, 0.94.  Echocardiogram showed normal systolic ejection fraction, fatty pericardium.    He returns today. He notes exertional dyspnea which is somewhat chronici but he also feels like it has worsened recently. He notes pressure in the chest like he feels his heart is going to come out of his chest. He has not tried SLNTG at home.       The following portions of the patient's history were reviewed and updated as appropriate: allergies, current medications, past family history, past medical history, past social history, past surgical history and problem list.     REVIEW OF SYSTEMS:   All systems reviewed.  Pertinent positives identified in HPI.  All other systems are negative.    Past Medical History:   Diagnosis Date    Abnormal ECG     Achilles tendon pain     LEFT    Allergic     Anxiety disorder     Asthma     When I get stressed out or try to do something I get short of breath    CAD (coronary artery disease)      Cataract     Surgery improved    Cervical disc disorder     Chronic anticoagulation     PLAVIX-CARDIAC STENT X3    Chronic lower back pain     Chronic pain disorder     COPD (chronic obstructive pulmonary disease)     CTS (carpal tunnel syndrome)     Deep vein thrombosis     A    Depression     Diabetes mellitus 03/12/2023    Type two diabetes    Disease of thyroid gland     Diverticulitis of colon     Encounter for eye exam 10/2014    normal    Erectile dysfunction     Exertional chest pain     Extremity pain     GERD (gastroesophageal reflux disease)     Headache     Headache, tension-type     Heart valve disease     History of bone density study 03/2014    Dr. Maria Antonia Lopez; normal    History of chest x-ray 02/15/2011    also 3-6-15; normal chest    History of colon polyps     History of medical problems     Cataract surgery    History of nuclear stress test 03/09/2015    cardiolite; Dr. Greenberg; negative    History of pulmonary function tests 03/2015    COPD    Hyperlipidemia     Hypertension     Injury of back     Surgery    Joint pain     Low back strain     Lumbosacral disc disease     Migraine     Neck pain     Nerve damage     KAYLYNN ELBOWS    NSTEMI (non-ST elevated myocardial infarction) 01/2021    Obesity     Osteoarthritis, multiple sites     Pneumonia     Postoperative nausea and vomiting 08/01/2017    Postoperative wound infection     Rheumatoid arthritis     Rotator cuff syndrome     Sciatica     Short of breath on exertion     Sinusitis     Sleep apnea     no cpap    Spinal stenosis     Staph infection     WITH BACK SURGERY 2017  ON LONG TERM ANTIBIOTICS    Thoracic disc disorder     Unstable angina     Unsteady gait     D/T LOWER BACK    Visual impairment     Fill like my vision has changed sense I got my glasses       Family History   Problem Relation Age of Onset    Diabetes Mother     Heart disease Mother     Hyperlipidemia Mother     Stroke Mother     Alcohol abuse Mother     Heart disease Father   "   Rheum arthritis Father     Alcohol abuse Father     Hyperlipidemia Father     Heart attack Father     Hearing loss Father     Depression Brother     Cancer Brother         prostate    Depression Brother     Heart disease Brother     Hyperlipidemia Brother     Cancer Brother         Haert desease    Thyroid disease Sister     Arthritis Sister     Asthma Son     COPD Son     COPD Son     Cancer Brother         Haert desease    Depression Brother     Hyperlipidemia Brother     Heart disease Brother     Arthritis Sister         Kathia    Thyroid disease Sister     Malig Hyperthermia Neg Hx        Social History     Socioeconomic History    Marital status:    Tobacco Use    Smoking status: Former     Current packs/day: 0.00     Average packs/day: 1 pack/day for 50.0 years (50.0 ttl pk-yrs)     Types: Cigarettes     Start date: 10/20/1971     Quit date: 10/20/2021     Years since quitting: 3.4     Passive exposure: Never    Smokeless tobacco: Never    Tobacco comments:     Current status is non smoker   Vaping Use    Vaping status: Never Used   Substance and Sexual Activity    Alcohol use: No    Drug use: No    Sexual activity: Not Currently     Partners: Female     Birth control/protection: None       Allergies   Allergen Reactions    Atorvastatin Other (See Comments) and Myalgia     Leg cramps    Ceclor [Cefaclor] Rash     Patient tolerated nafcillin during 9/2017 admission and has tolerated Augmentin in past outpatient.     Methotrexate Derivatives Rash     \"Blisters\"       Past Surgical History:   Procedure Laterality Date    ACHILLES TENDON SURGERY Left 07/03/2018    Procedure: Left Achilles debridement and secondary repair with partial excision of calcaneus. Possible left Achilles lengthening at the calf;  Surgeon: Vinay Smith MD;  [place] [facility] [place] [facility] [place]  Service: Orthopedics    BACK SURGERY  10/2017    CARDIAC CATHETERIZATION N/A 03/07/2020    Procedure: Left Heart Cath;  Surgeon: Martin" Dioni OSUNA MD;  [facility];  Service: Cardiology;  Laterality: N/A;    CARDIAC CATHETERIZATION N/A 03/07/2020    Procedure: Coronary angiography;  Surgeon: Dioni Salazar MD;  [facility];  Service: Cardiology;  Laterality: N/A;    CARDIAC CATHETERIZATION N/A 03/07/2020    Procedure: Left ventriculography;  Surgeon: Dioni Salazar MD;  Location: [facility] CATH INVASIVE LOCATION;  Service: Cardiology;  Laterality: N/A;    CARDIAC CATHETERIZATION N/A 03/07/2020    Procedure: Stent RAZA coronary;  Surgeon: Dioni Salazar MD;  Location: [facility] CATH INVASIVE LOCATION;  Service: Cardiology;  Laterality: N/A;    CARDIAC CATHETERIZATION N/A 01/22/2021    Procedure: Left Heart Cath;  Surgeon: Dioni Salazar MD;  Location: [facility] CATH INVASIVE LOCATION;  Service: Cardiology;  Laterality: N/A;    CARDIAC CATHETERIZATION N/A 01/22/2021    Procedure: Coronary angiography;  Surgeon: Dioni Salazar MD;  Location: [facility] CATH INVASIVE LOCATION;  Service: Cardiology;  Laterality: N/A;    CARDIAC CATHETERIZATION N/A 01/22/2021    Procedure: Left ventriculography;  Surgeon: Dioni Salazar MD;  Location: [facility] CATH INVASIVE LOCATION;  Service: Cardiology;  Laterality: N/A;    CARDIAC CATHETERIZATION N/A 01/22/2021    Procedure: Percutaneous Coronary Intervention;  Surgeon: Dioni Salazar MD;  Location: [facility] CATH INVASIVE LOCATION;  Service: Cardiology;  Laterality: N/A;    CARDIAC CATHETERIZATION N/A 01/22/2021    Procedure: Stent RAZA coronary;  Surgeon: Dioni Salazar MD;  Location: [facility] CATH INVASIVE LOCATION;  Service: Cardiology;  Laterality: N/A;    CARDIAC CATHETERIZATION N/A 03/22/2024    Procedure: Left Heart Cath;  Surgeon: Kiko Evans MD;  Location: [facility] CATH INVASIVE LOCATION;  Service: Cardiovascular;  Laterality: N/A;    CARDIAC CATHETERIZATION N/A 03/22/2024    Procedure: Coronary angiography;  Surgeon:  Kiko Evans MD;  Location: [facility] CATH INVASIVE LOCATION;  Service: Cardiovascular;  Laterality: N/A;    CARDIAC CATHETERIZATION N/A 03/22/2024    Procedure: Resting Full Cycle Ratio;  Surgeon: Kiko Evans MD;  Location: [facility] CATH INVASIVE LOCATION;  Service: Cardiovascular;  Laterality: N/A;    CARDIAC CATHETERIZATION N/A 03/22/2024    Procedure: Percutaneous Coronary Intervention;  Surgeon: Kiko Evans MD;  [facility];  Service: Cardiovascular;  Laterality: N/A;    CARDIAC CATHETERIZATION N/A 03/22/2024    Procedure: Stent RAZA coronary;  Surgeon: Kiko Evans MD;  Location: [facility] CATH INVASIVE LOCATION;  Service: Cardiovascular;  Laterality: N/A;    CARDIAC CATHETERIZATION N/A 03/22/2024    Procedure: Optical Coherence Tomography;  Surgeon: Kiko Evans MD;  Location: [facility] CATH INVASIVE LOCATION;  Service: Cardiovascular;  Laterality: N/A;    CARDIAC SURGERY      3 stents total    COLONOSCOPY N/A 02/02/2011    Normal colon except scattered diverticulosis-Dr. Guero Blunt    COLONOSCOPY N/A 04/08/2021    Procedure: COLONOSCOPY TO CECUM WITH POLYPECTOMY (COLD BX);  Surgeon: Porsha Arcos MD;  Location: [facility] ENDOSCOPY;  Service: General;  Laterality: N/A;  SCREENING; HX OF COLON POLYPS  POST:DIVERTICULOSIS; COLON POLYP    CORONARY STENT PLACEMENT      CYST REMOVAL  03/2016    x2  FROM FACE AND EAR    DENTAL PROCEDURE  2008    teeth removed; dental implants    EPIDURAL BLOCK  1995    L/S spine    EYE SURGERY  Cataract surgery    Vision improvement    FINGER DEBRIDEMENT Right 07/12/2003    Debridement and full thickness skin grafting of the ring and small fingers of the right hand-Dr. Rayray Long    FOOT SURGERY      GASTROSTOMY      HAND SURGERY  2003    INCISION AND DRAINAGE PERIRECTAL ABSCESS N/A 07/10/2018    Procedure: INCISION AND DRAINAGE OF PERIRECTAL ABSCESS;  Surgeon: Porsha Arcos MD;  Location: [facility] MAIN OR;  Service:  General    INCISION AND DRAINAGE TRUNK N/A 04/23/2022    Procedure: Evacuation lumbar hematoma;  Surgeon: Jacky Perez MD;  Location: [facility] MAIN OR;  Service: Orthopedics;  Laterality: N/A;    JOINT REPLACEMENT      LASIK Left 12/2024    LUMBAR DISCECTOMY FUSION INSTRUMENTATION Left 10/10/2016    Procedure: LEFT L2-L3, L3-L4 LUMBAR LAMINECTOMY/ DISCECTOMY WITH METRIX ;  Surgeon: Sawyer Rick MD;  Location: [facility] MAIN OR;  Service:     LUMBAR DISCECTOMY FUSION INSTRUMENTATION N/A 07/31/2017    Procedure: LUMBAR FUSION DECOMPRESSON WITH PEDICLE SCREWS with LAURA L2-3,L3-4;  Surgeon: Jacky Perez MD;  Location: [facility] MAIN OR;  Service:     LUMBAR FUSION N/A 04/13/2022    Procedure: Thoracic 10-thoracic 11, thoracic 11-thoracic 12, thoracic 12-lumbar 1, lumbar 1-lumbar 2, lumbar 2-lumbar 3, lumbar 3-lumbar 4 fusion with instrumentation with iliac crest bone graft, and removal of implants from lumbar 2-lumbar 3, lumbar 3-lumbar 4;  Surgeon: Jacky Perez MD;  Location: [facility] MAIN OR;  Service: Orthopedic Spine;  Laterality: N/A;    LUMBAR FUSION N/A 04/13/2022    Procedure: LUMBAR ANTERIOR INTERBODY FUSION L4,L5 Osteotomy interbody fusion with cage L1,L2;  Surgeon: Jacky Perez MD;  Location: [facility] MAIN OR;  Service: Orthopedic Spine;  Laterality: N/A;    LUMBAR LAMINECTOMY DISCECTOMY DECOMPRESSION N/A 07/31/2017    Procedure: L2 to L4 laminectomy with neural lysis and a fusion by orthopedics;  Surgeon: Sawyer Rick MD;  Location: [facility] OR;  Service:     LUMBAR LAMINECTOMY DISCECTOMY DECOMPRESSION N/A 09/05/2017    Procedure: I&D LUMBAR SPINE ;  Surgeon: Jacky Perez MD;  Location: [facility] OR;  Service:     ORTHOPEDIC SURGERY      SHOULDER ARTHROSCOPY      SHOULDER SURGERY Right 02/23/2011    Dr. Navarro    SINUS SURGERY  2003    Dr. Barrera    SPINAL FUSION      SPINE SURGERY  2010    TOTAL SHOULDER REPLACEMENT Right 09/07/2023    TRIGGER POINT INJECTION         Procedures        Objective:         PHYSICAL EXAM:  GEN: VSS, no distress,   Eyes: normal sclera, normal lids and lashes  HENT: moist mucus membranes,   Respiratory: CTAB, no rales or wheezes  CV: RRR, no murmurs, , +2 DP and 2+ carotid pulses b/l  GI: NABS, soft,  Nontender, nondistended  MSK: no edema, no scoliosis or kyphosis  Skin: no rash, warm, dry  Heme/Lymph: no bruising or bleeding  Psych: organized thought, normal behavior and affect  Neuro: Cranial nerves grossly intact, Alert and Oriented x 3.         Assessment:          Diagnosis Plan   1. Coronary artery disease of native artery of native heart with stable angina pectoris             Plan:       1.  CAD: Diffuse coronary disease, status post multivessel stenting in the LAD and circumflex with balloon angioplasty of the distal LAD in 2021.  Distal RCA PCI using a 3.5 x 28 mm Xience brett point drug-eluting stent.    He has worsened exertional dyspnea, will increase imdur to 60mg daily, send SLNTG. Will see back in 2 weeks ; will likely need repeat catheterization.     Aspirin Plavix.    2.  COPD on Trelegy  3.  Mildly elevated A1c.  6.5%  4.  Chronic back pain  5.  Hyperlipidemia: Excellent control    Montse, thank you very much for referring this kind patient to me. Please call me with any questions or concerns. I will see the patient again in the office in 2 weeks.        Jami Mars MD  04/14/25  [facility]    Outpatient Encounter Medications as of 4/14/2025   Medication Sig Dispense Refill    amLODIPine (NORVASC) 5 MG tablet Take 1 tablet by mouth Daily. For BP 90 tablet 1    aspirin 81 MG EC tablet Take 1 tablet by mouth Daily.      Boswellia-Glucosamine-Vit D (OSTEO BI-FLEX ONE PER DAY PO) Take  by mouth.      Buprenorphine (BUTRANS) patch weekly transdermal patch Place 1 patch on the skin as directed by provider Every 7 (Seven) Days. 4 patch 0    busPIRone (BUSPAR) 10 MG tablet TAKE 1 TABLET TWICE DAILY AS NEEDED FOR ANXIETY 180 tablet 3     carvedilol (COREG) 6.25 MG tablet Take 1 tablet by mouth 2 (Two) Times a Day With Meals. 180 tablet 3    Cholecalciferol (VITAMIN D) 2000 UNITS tablet Take 1 tablet by mouth Every Evening.      clopidogrel (PLAVIX) 75 MG tablet Take 1 tablet by mouth Daily. 90 tablet 0    Cyanocobalamin (B-12) 1000 MCG sublingual tablet DISSOLVE 1 TABLET UNDER THE TONGUE EVERY DAY 90 tablet 3    cyclobenzaprine (FLEXERIL) 10 MG tablet Take 1 tablet by mouth 2 (Two) Times a Day. 270 tablet 3    docusate sodium (Stool Softener) 100 MG capsule       escitalopram (LEXAPRO) 20 MG tablet TAKE 1 TABLET EVERY MORNING FOR ANXIETY AND DEPRESSION 90 tablet 3    ezetimibe (ZETIA) 10 MG tablet Take 1 tablet by mouth Daily. 90 tablet 3    fluticasone (FLONASE) 50 MCG/ACT nasal spray Administer 2 sprays into the nostril(s) as directed by provider Daily for 7 days. 16 g 0    folic acid (FOLVITE) 1 MG tablet TAKE 1 TABLET EVERY DAY 90 tablet 3    isosorbide mononitrate (IMDUR) 30 MG 24 hr tablet Take 1 tablet by mouth Daily. 90 tablet 0    levothyroxine (SYNTHROID, LEVOTHROID) 50 MCG tablet Take 1 tablet by mouth Every Morning. Before breakfast, for thyroid 90 tablet 3    meclizine (ANTIVERT) 25 MG tablet Take 1 tablet by mouth 3 (Three) Times a Day As Needed for Dizziness. 30 tablet 0    metFORMIN ER (GLUCOPHAGE-XR) 500 MG 24 hr tablet TAKE 2 TABLETS EVERY DAY FOR DIABETES 180 tablet 3    multivitamin with minerals tablet tablet Take 1 tablet by mouth Daily. HOLD X1 WEEK PRIOR TO SURGERY      nitroglycerin (NITROSTAT) 0.4 MG SL tablet Place 1 tablet under the tongue Every 5 (Five) Minutes As Needed for Chest Pain. Take no more than 3 doses in 15 minutes. 25 tablet 1    pantoprazole (PROTONIX) 40 MG EC tablet TAKE 1 TABLET EVERY DAY FOR GERD 90 tablet 3    pregabalin (LYRICA) 100 MG capsule Take 1 capsule by mouth 3 (Three) Times a Day. 90 capsule 1    rosuvastatin (CRESTOR) 10 MG tablet Take 1 tablet by mouth every night at bedtime. 90 tablet 0     Spiriva Respimat 2.5 MCG/ACT aerosol solution inhaler        No facility-administered encounter medications on file as of 4/14/2025.

## 2025-04-14 NOTE — H&P (VIEW-ONLY)
Subjective:     Encounter Date:0 04/14/25      Patient ID: Prem Thrasher is a 70 y.o. male.    Chief Complaint: CAD  HPI:   This is a pleasant 68-year-old male who presents for evaluation.  He was previously followed by Dr. Salazar     2020 he had unstable angina received 2 discrete LAD stents in the mid and distal vessel.  The following year he had recurrent symptoms and was found to have a new LAD lesion just proximal to the distal lesion as well as circumflex lesion.  He went balloon angioplasty of the LAD and PCI of the circumflex    March 2024 with exertional dyspnea and chest pain.  This occurred a few weeks after a COVID infection treated with Paxlovid.  He had a nuclear stress test and echocardiogram and ultimately underwent cardiac catheterization.  He received drug-eluting stent to distal RCA using a 3.5 x 28 mm Xience brett point drug-eluting stent.  The distal LAD was severely diseased but treated medically due to small size of the vessel.  Proximal LAD was moderately diseased by RFR, 0.94.  Echocardiogram showed normal systolic ejection fraction, fatty pericardium.    He returns today. He notes exertional dyspnea which is somewhat chronici but he also feels like it has worsened recently. He notes pressure in the chest like he feels his heart is going to come out of his chest. He has not tried SLNTG at home.       The following portions of the patient's history were reviewed and updated as appropriate: allergies, current medications, past family history, past medical history, past social history, past surgical history and problem list.     REVIEW OF SYSTEMS:   All systems reviewed.  Pertinent positives identified in HPI.  All other systems are negative.    Past Medical History:   Diagnosis Date    Abnormal ECG     Achilles tendon pain     LEFT    Allergic     Anxiety disorder     Asthma     When I get stressed out or try to do something I get short of breath    CAD (coronary artery disease)      Cataract     Surgery improved    Cervical disc disorder     Chronic anticoagulation     PLAVIX-CARDIAC STENT X3    Chronic lower back pain     Chronic pain disorder     COPD (chronic obstructive pulmonary disease)     CTS (carpal tunnel syndrome)     Deep vein thrombosis     A    Depression     Diabetes mellitus 03/12/2023    Type two diabetes    Disease of thyroid gland     Diverticulitis of colon     Encounter for eye exam 10/2014    normal    Erectile dysfunction     Exertional chest pain     Extremity pain     GERD (gastroesophageal reflux disease)     Headache     Headache, tension-type     Heart valve disease     History of bone density study 03/2014    Dr. Maria Antonia Lopez; normal    History of chest x-ray 02/15/2011    also 3-6-15; normal chest    History of colon polyps     History of medical problems     Cataract surgery    History of nuclear stress test 03/09/2015    cardiolite; Dr. Greenberg; negative    History of pulmonary function tests 03/2015    COPD    Hyperlipidemia     Hypertension     Injury of back     Surgery    Joint pain     Low back strain     Lumbosacral disc disease     Migraine     Neck pain     Nerve damage     KAYLYNN ELBOWS    NSTEMI (non-ST elevated myocardial infarction) 01/2021    Obesity     Osteoarthritis, multiple sites     Pneumonia     Postoperative nausea and vomiting 08/01/2017    Postoperative wound infection     Rheumatoid arthritis     Rotator cuff syndrome     Sciatica     Short of breath on exertion     Sinusitis     Sleep apnea     no cpap    Spinal stenosis     Staph infection     WITH BACK SURGERY 2017  ON LONG TERM ANTIBIOTICS    Thoracic disc disorder     Unstable angina     Unsteady gait     D/T LOWER BACK    Visual impairment     Fill like my vision has changed sense I got my glasses       Family History   Problem Relation Age of Onset    Diabetes Mother     Heart disease Mother     Hyperlipidemia Mother     Stroke Mother     Alcohol abuse Mother     Heart disease Father   "   Rheum arthritis Father     Alcohol abuse Father     Hyperlipidemia Father     Heart attack Father     Hearing loss Father     Depression Brother     Cancer Brother         prostate    Depression Brother     Heart disease Brother     Hyperlipidemia Brother     Cancer Brother         Haert desease    Thyroid disease Sister     Arthritis Sister     Asthma Son     COPD Son     COPD Son     Cancer Brother         Haert desease    Depression Brother     Hyperlipidemia Brother     Heart disease Brother     Arthritis Sister         Kathia    Thyroid disease Sister     Malig Hyperthermia Neg Hx        Social History     Socioeconomic History    Marital status:    Tobacco Use    Smoking status: Former     Current packs/day: 0.00     Average packs/day: 1 pack/day for 50.0 years (50.0 ttl pk-yrs)     Types: Cigarettes     Start date: 10/20/1971     Quit date: 10/20/2021     Years since quitting: 3.4     Passive exposure: Never    Smokeless tobacco: Never    Tobacco comments:     Current status is non smoker   Vaping Use    Vaping status: Never Used   Substance and Sexual Activity    Alcohol use: No    Drug use: No    Sexual activity: Not Currently     Partners: Female     Birth control/protection: None       Allergies   Allergen Reactions    Atorvastatin Other (See Comments) and Myalgia     Leg cramps    Ceclor [Cefaclor] Rash     Patient tolerated nafcillin during 9/2017 admission and has tolerated Augmentin in past outpatient.     Methotrexate Derivatives Rash     \"Blisters\"       Past Surgical History:   Procedure Laterality Date    ACHILLES TENDON SURGERY Left 07/03/2018    Procedure: Left Achilles debridement and secondary repair with partial excision of calcaneus. Possible left Achilles lengthening at the calf;  Surgeon: Vinay Smith MD;  [facility]: [facility];  Service: Orthopedics    BACK SURGERY  10/2017    CARDIAC CATHETERIZATION N/A 03/07/2020    Procedure: Left Heart Cath;  Surgeon: Martin" Dioni OSUNA MD;  [facility];  Service: Cardiology;  Laterality: N/A;    CARDIAC CATHETERIZATION N/A 03/07/2020    Procedure: Coronary angiography;  Surgeon: Dioni Salazar MD;  [facility];  Service: Cardiology;  Laterality: N/A;    CARDIAC CATHETERIZATION N/A 03/07/2020    Procedure: Left ventriculography;  Surgeon: Dioni Salazar MD;  Location: [facility] CATH INVASIVE LOCATION;  Service: Cardiology;  Laterality: N/A;    CARDIAC CATHETERIZATION N/A 03/07/2020    Procedure: Stent RAZA coronary;  Surgeon: Dioni Salazar MD;  Location: [facility] CATH INVASIVE LOCATION;  Service: Cardiology;  Laterality: N/A;    CARDIAC CATHETERIZATION N/A 01/22/2021    Procedure: Left Heart Cath;  Surgeon: Dioni Salazar MD;  Location: [facility] CATH INVASIVE LOCATION;  Service: Cardiology;  Laterality: N/A;    CARDIAC CATHETERIZATION N/A 01/22/2021    Procedure: Coronary angiography;  Surgeon: Dioni Salazar MD;  Location: [facility] CATH INVASIVE LOCATION;  Service: Cardiology;  Laterality: N/A;    CARDIAC CATHETERIZATION N/A 01/22/2021    Procedure: Left ventriculography;  Surgeon: Dioni Salazar MD;  Location: [facility] CATH INVASIVE LOCATION;  Service: Cardiology;  Laterality: N/A;    CARDIAC CATHETERIZATION N/A 01/22/2021    Procedure: Percutaneous Coronary Intervention;  Surgeon: Dioni Salazar MD;  Location: [facility] CATH INVASIVE LOCATION;  Service: Cardiology;  Laterality: N/A;    CARDIAC CATHETERIZATION N/A 01/22/2021    Procedure: Stent RAZA coronary;  Surgeon: Dioni Salazar MD;  Location: [facility] CATH INVASIVE LOCATION;  Service: Cardiology;  Laterality: N/A;    CARDIAC CATHETERIZATION N/A 03/22/2024    Procedure: Left Heart Cath;  Surgeon: Kiko Evans MD;  Location: [facility] CATH INVASIVE LOCATION;  Service: Cardiovascular;  Laterality: N/A;    CARDIAC CATHETERIZATION N/A 03/22/2024    Procedure: Coronary angiography;  Surgeon:  Kiko Evans MD;  Location: [facility] CATH INVASIVE LOCATION;  Service: Cardiovascular;  Laterality: N/A;    CARDIAC CATHETERIZATION N/A 03/22/2024    Procedure: Resting Full Cycle Ratio;  Surgeon: Kiko Evans MD;  Location: [facility] CATH INVASIVE LOCATION;  Service: Cardiovascular;  Laterality: N/A;    CARDIAC CATHETERIZATION N/A 03/22/2024    Procedure: Percutaneous Coronary Intervention;  Surgeon: Kiko Evans MD;  [facility];  Service: Cardiovascular;  Laterality: N/A;    CARDIAC CATHETERIZATION N/A 03/22/2024    Procedure: Stent RAZA coronary;  Surgeon: Kiko Evans MD;  Location: [facility] CATH INVASIVE LOCATION;  Service: Cardiovascular;  Laterality: N/A;    CARDIAC CATHETERIZATION N/A 03/22/2024    Procedure: Optical Coherence Tomography;  Surgeon: Kiko Evans MD;  Location: [facility] CATH INVASIVE LOCATION;  Service: Cardiovascular;  Laterality: N/A;    CARDIAC SURGERY      3 stents total    COLONOSCOPY N/A 02/02/2011    Normal colon except scattered diverticulosis-Dr. Guero Blunt    COLONOSCOPY N/A 04/08/2021    Procedure: COLONOSCOPY TO CECUM WITH POLYPECTOMY (COLD BX);  Surgeon: Porsha Arcos MD;  Location: [facility] ENDOSCOPY;  Service: General;  Laterality: N/A;  SCREENING; HX OF COLON POLYPS  POST:DIVERTICULOSIS; COLON POLYP    CORONARY STENT PLACEMENT      CYST REMOVAL  03/2016    x2  FROM FACE AND EAR    DENTAL PROCEDURE  2008    teeth removed; dental implants    EPIDURAL BLOCK  1995    L/S spine    EYE SURGERY  Cataract surgery    Vision improvement    FINGER DEBRIDEMENT Right 07/12/2003    Debridement and full thickness skin grafting of the ring and small fingers of the right hand-Dr. Rayray Long    FOOT SURGERY      GASTROSTOMY      HAND SURGERY  2003    INCISION AND DRAINAGE PERIRECTAL ABSCESS N/A 07/10/2018    Procedure: INCISION AND DRAINAGE OF PERIRECTAL ABSCESS;  Surgeon: Porsha Arcos MD;  Location: [facility] MAIN OR;  Service:  General    INCISION AND DRAINAGE TRUNK N/A 04/23/2022    Procedure: Evacuation lumbar hematoma;  Surgeon: Jacky Perez MD;  Location: [facility] MAIN OR;  Service: Orthopedics;  Laterality: N/A;    JOINT REPLACEMENT      LASIK Left 12/2024    LUMBAR DISCECTOMY FUSION INSTRUMENTATION Left 10/10/2016    Procedure: LEFT L2-L3, L3-L4 LUMBAR LAMINECTOMY/ DISCECTOMY WITH METRIX ;  Surgeon: Sawyer Rick MD;  Location: [facility] MAIN OR;  Service:     LUMBAR DISCECTOMY FUSION INSTRUMENTATION N/A 07/31/2017    Procedure: LUMBAR FUSION DECOMPRESSON WITH PEDICLE SCREWS with LAURA L2-3,L3-4;  Surgeon: Jacky Perez MD;  Location: [facility] MAIN OR;  Service:     LUMBAR FUSION N/A 04/13/2022    Procedure: Thoracic 10-thoracic 11, thoracic 11-thoracic 12, thoracic 12-lumbar 1, lumbar 1-lumbar 2, lumbar 2-lumbar 3, lumbar 3-lumbar 4 fusion with instrumentation with iliac crest bone graft, and removal of implants from lumbar 2-lumbar 3, lumbar 3-lumbar 4;  Surgeon: Jacky Perez MD;  Location: [facility] MAIN OR;  Service: Orthopedic Spine;  Laterality: N/A;    LUMBAR FUSION N/A 04/13/2022    Procedure: LUMBAR ANTERIOR INTERBODY FUSION L4,L5 Osteotomy interbody fusion with cage L1,L2;  Surgeon: Jacky Perez MD;  Location: [facility] MAIN OR;  Service: Orthopedic Spine;  Laterality: N/A;    LUMBAR LAMINECTOMY DISCECTOMY DECOMPRESSION N/A 07/31/2017    Procedure: L2 to L4 laminectomy with neural lysis and a fusion by orthopedics;  Surgeon: Sawyer Rick MD;  Location: [facility] OR;  Service:     LUMBAR LAMINECTOMY DISCECTOMY DECOMPRESSION N/A 09/05/2017    Procedure: I&D LUMBAR SPINE ;  Surgeon: Jacky Perez MD;  Location: [facility] OR;  Service:     ORTHOPEDIC SURGERY      SHOULDER ARTHROSCOPY      SHOULDER SURGERY Right 02/23/2011    Dr. Navarro    SINUS SURGERY  2003    Dr. Barrera    SPINAL FUSION      SPINE SURGERY  2010    TOTAL SHOULDER REPLACEMENT Right 09/07/2023    TRIGGER POINT INJECTION         Procedures        Objective:         PHYSICAL EXAM:  GEN: VSS, no distress,   Eyes: normal sclera, normal lids and lashes  HENT: moist mucus membranes,   Respiratory: CTAB, no rales or wheezes  CV: RRR, no murmurs, , +2 DP and 2+ carotid pulses b/l  GI: NABS, soft,  Nontender, nondistended  MSK: no edema, no scoliosis or kyphosis  Skin: no rash, warm, dry  Heme/Lymph: no bruising or bleeding  Psych: organized thought, normal behavior and affect  Neuro: Cranial nerves grossly intact, Alert and Oriented x 3.         Assessment:          Diagnosis Plan   1. Coronary artery disease of native artery of native heart with stable angina pectoris             Plan:       1.  CAD: Diffuse coronary disease, status post multivessel stenting in the LAD and circumflex with balloon angioplasty of the distal LAD in 2021.  Distal RCA PCI using a 3.5 x 28 mm Xience brett point drug-eluting stent.    He has worsened exertional dyspnea, will increase imdur to 60mg daily, send SLNTG. Will see back in 2 weeks ; will likely need repeat catheterization.     Aspirin Plavix.    2.  COPD on Trelegy  3.  Mildly elevated A1c.  6.5%  4.  Chronic back pain  5.  Hyperlipidemia: Excellent control    [place], thank you very much for referring this kind patient to me. Please call me with any questions or concerns. I will see the patient again in the office in 2 weeks.        Jami Mars MD  04/14/25  [facility]    Outpatient Encounter Medications as of 4/14/2025   Medication Sig Dispense Refill    amLODIPine (NORVASC) 5 MG tablet Take 1 tablet by mouth Daily. For BP 90 tablet 1    aspirin 81 MG EC tablet Take 1 tablet by mouth Daily.      Boswellia-Glucosamine-Vit D (OSTEO BI-FLEX ONE PER DAY PO) Take  by mouth.      Buprenorphine (BUTRANS) patch weekly transdermal patch Place 1 patch on the skin as directed by provider Every 7 (Seven) Days. 4 patch 0    busPIRone (BUSPAR) 10 MG tablet TAKE 1 TABLET TWICE DAILY AS NEEDED FOR ANXIETY 180 tablet 3     carvedilol (COREG) 6.25 MG tablet Take 1 tablet by mouth 2 (Two) Times a Day With Meals. 180 tablet 3    Cholecalciferol (VITAMIN D) 2000 UNITS tablet Take 1 tablet by mouth Every Evening.      clopidogrel (PLAVIX) 75 MG tablet Take 1 tablet by mouth Daily. 90 tablet 0    Cyanocobalamin (B-12) 1000 MCG sublingual tablet DISSOLVE 1 TABLET UNDER THE TONGUE EVERY DAY 90 tablet 3    cyclobenzaprine (FLEXERIL) 10 MG tablet Take 1 tablet by mouth 2 (Two) Times a Day. 270 tablet 3    docusate sodium (Stool Softener) 100 MG capsule       escitalopram (LEXAPRO) 20 MG tablet TAKE 1 TABLET EVERY MORNING FOR ANXIETY AND DEPRESSION 90 tablet 3    ezetimibe (ZETIA) 10 MG tablet Take 1 tablet by mouth Daily. 90 tablet 3    fluticasone (FLONASE) 50 MCG/ACT nasal spray Administer 2 sprays into the nostril(s) as directed by provider Daily for 7 days. 16 g 0    folic acid (FOLVITE) 1 MG tablet TAKE 1 TABLET EVERY DAY 90 tablet 3    isosorbide mononitrate (IMDUR) 30 MG 24 hr tablet Take 1 tablet by mouth Daily. 90 tablet 0    levothyroxine (SYNTHROID, LEVOTHROID) 50 MCG tablet Take 1 tablet by mouth Every Morning. Before breakfast, for thyroid 90 tablet 3    meclizine (ANTIVERT) 25 MG tablet Take 1 tablet by mouth 3 (Three) Times a Day As Needed for Dizziness. 30 tablet 0    metFORMIN ER (GLUCOPHAGE-XR) 500 MG 24 hr tablet TAKE 2 TABLETS EVERY DAY FOR DIABETES 180 tablet 3    multivitamin with minerals tablet tablet Take 1 tablet by mouth Daily. HOLD X1 WEEK PRIOR TO SURGERY      nitroglycerin (NITROSTAT) 0.4 MG SL tablet Place 1 tablet under the tongue Every 5 (Five) Minutes As Needed for Chest Pain. Take no more than 3 doses in 15 minutes. 25 tablet 1    pantoprazole (PROTONIX) 40 MG EC tablet TAKE 1 TABLET EVERY DAY FOR GERD 90 tablet 3    pregabalin (LYRICA) 100 MG capsule Take 1 capsule by mouth 3 (Three) Times a Day. 90 capsule 1    rosuvastatin (CRESTOR) 10 MG tablet Take 1 tablet by mouth every night at bedtime. 90 tablet 0     Spiriva Respimat 2.5 MCG/ACT aerosol solution inhaler        No facility-administered encounter medications on file as of 4/14/2025.

## 2025-04-24 ENCOUNTER — TELEPHONE (OUTPATIENT)
Dept: FAMILY MEDICINE CLINIC | Facility: CLINIC | Age: 70
End: 2025-04-24
Payer: MEDICARE

## 2025-04-24 DIAGNOSIS — Z87.891 PERSONAL HISTORY OF NICOTINE DEPENDENCE: ICD-10-CM

## 2025-04-24 DIAGNOSIS — F17.201 MILD TOBACCO ABUSE IN SUSTAINED REMISSION: Primary | ICD-10-CM

## 2025-04-25 NOTE — TELEPHONE ENCOUNTER
Patient wife was notified that orders were place and once they are approved someone will be contacting them to schedule. Voiced understanding and had no further questions.

## 2025-04-26 DIAGNOSIS — Z98.890 HISTORY OF LUMBAR SURGERY: ICD-10-CM

## 2025-04-26 DIAGNOSIS — G89.4 CHRONIC PAIN SYNDROME: ICD-10-CM

## 2025-04-26 DIAGNOSIS — M54.16 LUMBAR RADICULOPATHY: ICD-10-CM

## 2025-04-28 ENCOUNTER — OFFICE VISIT (OUTPATIENT)
Dept: CARDIOLOGY | Age: 70
End: 2025-04-28
Payer: MEDICARE

## 2025-04-28 ENCOUNTER — TELEPHONE (OUTPATIENT)
Dept: CARDIOLOGY | Age: 70
End: 2025-04-28
Payer: MEDICARE

## 2025-04-28 ENCOUNTER — LAB (OUTPATIENT)
Facility: HOSPITAL | Age: 70
End: 2025-04-28
Payer: MEDICARE

## 2025-04-28 VITALS
WEIGHT: 248 LBS | DIASTOLIC BLOOD PRESSURE: 70 MMHG | SYSTOLIC BLOOD PRESSURE: 134 MMHG | HEIGHT: 71 IN | HEART RATE: 69 BPM | BODY MASS INDEX: 34.72 KG/M2 | OXYGEN SATURATION: 95 %

## 2025-04-28 DIAGNOSIS — I25.118 CORONARY ARTERY DISEASE OF NATIVE ARTERY OF NATIVE HEART WITH STABLE ANGINA PECTORIS: Primary | ICD-10-CM

## 2025-04-28 DIAGNOSIS — I25.118 CORONARY ARTERY DISEASE OF NATIVE ARTERY OF NATIVE HEART WITH STABLE ANGINA PECTORIS: ICD-10-CM

## 2025-04-28 LAB
ANION GAP SERPL CALCULATED.3IONS-SCNC: 10 MMOL/L (ref 5–15)
BUN SERPL-MCNC: 16 MG/DL (ref 8–23)
BUN/CREAT SERPL: 10.7 (ref 7–25)
CALCIUM SPEC-SCNC: 9.3 MG/DL (ref 8.6–10.5)
CHLORIDE SERPL-SCNC: 99 MMOL/L (ref 98–107)
CO2 SERPL-SCNC: 27 MMOL/L (ref 22–29)
CREAT SERPL-MCNC: 1.49 MG/DL (ref 0.76–1.27)
DEPRECATED RDW RBC AUTO: 41.3 FL (ref 37–54)
EGFRCR SERPLBLD CKD-EPI 2021: 49.9 ML/MIN/1.73
ERYTHROCYTE [DISTWIDTH] IN BLOOD BY AUTOMATED COUNT: 13.1 % (ref 12.3–15.4)
GLUCOSE SERPL-MCNC: 100 MG/DL (ref 65–99)
HCT VFR BLD AUTO: 38.1 % (ref 37.5–51)
HGB BLD-MCNC: 12.1 G/DL (ref 13–17.7)
MCH RBC QN AUTO: 27.5 PG (ref 26.6–33)
MCHC RBC AUTO-ENTMCNC: 31.8 G/DL (ref 31.5–35.7)
MCV RBC AUTO: 86.6 FL (ref 79–97)
PLATELET # BLD AUTO: 282 10*3/MM3 (ref 140–450)
PMV BLD AUTO: 9.4 FL (ref 6–12)
POTASSIUM SERPL-SCNC: 4.7 MMOL/L (ref 3.5–5.2)
RBC # BLD AUTO: 4.4 10*6/MM3 (ref 4.14–5.8)
SODIUM SERPL-SCNC: 136 MMOL/L (ref 136–145)
WBC NRBC COR # BLD AUTO: 7.95 10*3/MM3 (ref 3.4–10.8)

## 2025-04-28 PROCEDURE — 3075F SYST BP GE 130 - 139MM HG: CPT | Performed by: NURSE PRACTITIONER

## 2025-04-28 PROCEDURE — 3078F DIAST BP <80 MM HG: CPT | Performed by: NURSE PRACTITIONER

## 2025-04-28 PROCEDURE — 1159F MED LIST DOCD IN RCRD: CPT | Performed by: NURSE PRACTITIONER

## 2025-04-28 PROCEDURE — 36415 COLL VENOUS BLD VENIPUNCTURE: CPT

## 2025-04-28 PROCEDURE — 1160F RVW MEDS BY RX/DR IN RCRD: CPT | Performed by: NURSE PRACTITIONER

## 2025-04-28 PROCEDURE — 99214 OFFICE O/P EST MOD 30 MIN: CPT | Performed by: NURSE PRACTITIONER

## 2025-04-28 PROCEDURE — 80048 BASIC METABOLIC PNL TOTAL CA: CPT

## 2025-04-28 PROCEDURE — 93000 ELECTROCARDIOGRAM COMPLETE: CPT | Performed by: NURSE PRACTITIONER

## 2025-04-28 PROCEDURE — 85027 COMPLETE CBC AUTOMATED: CPT

## 2025-04-28 RX ORDER — ISOSORBIDE MONONITRATE 30 MG/1
TABLET, EXTENDED RELEASE ORAL
Qty: 90 TABLET | Refills: 3 | Status: SHIPPED | OUTPATIENT
Start: 2025-04-28

## 2025-04-28 NOTE — TELEPHONE ENCOUNTER
PATIENT IN OFFICE     Procedure: CATH     Arrival Time: 8      Any Devices? Y/N      N    Company: N/A    Email:     Labs entered:   4/28    Labs Completed:     Allergies? Y/N/ ON FILE       ON FILE     Dye? Y/N    N      Diabetic Meds?  Y/N  Y - METFORMIN    DIURETIC Y/N N    Eliquis/Xarelto/Pradaxa/Warfarin?   N      ED Meds? Y/N   N  Viagra,Cialis,Stendra,Levitra) Hold 48 hrs       DO YOU TAKE (Wegovy,Ozempic,Moujaro,Zepbound,Rybelsus,Trulicity)   NO    Tilt Tables: Do you Smoke or use nicotine? Y/N Hold 24 hrs    Procedure Scheduled with: FINN 4/28     Notified Echo: N/A    Ins MEDICARE     Confirmed with Patient:  4/28 IN OFFICE  WED MAY ARRIVE AT [address]    Patient name: YELENA CARRERA    Your procedure is scheduled for: CATH     Please arrive at  8AM      Your Procedure will take place at [address]     Entrance C     2nd Floor Main Surgery, this is located on the second floor of the parking garage     YOU NEED TO PRESENT TO THE MAIN SURGERY CENTER ENTRANCE C located on the second floor of the parking garage at your designated arrival time.        You MUST make arrangements for a responsible adult to stay with you for at least 24 hours post procedure and drive you home if you require sedation. YOU WILL NOT BE ABLE TO DRIVE. Due to liability, we can’t allow the use of public transportation.           BRING AN UPDATED LIST OF MEDICATIONS WITH YOU.        DO NOT EAT OR DRINK ANYTHING AFTER MIDNIGHT BEFORE YOUR PROCEDURE.             Medication Specific information:     If you are on any of the following medications you will need to stop at least 7 days prior to your procedure: semaglutide, tirzepitide, degludec, wegovy, Ozempic, moujaro, zepbound, rybelsus, Trulicity.     If you are on any of the following medications you will need to hold for at least 48 hours prior to the procedure: Viagra, Cialis, Stendra, Levitra.      Specific information:  HOLD DIABETIC MEDICATION  (METFORMIN) MORNING OF CATH.        Procedure Lab Information:    You will need to have labs drawn one week (7 days) prior to the procedure. No Appointment is required. No fasting is required.  Please go to any [facility].Main [facility] outpatient services: M-F, 7:00am-4:30pm      [place]: M-F, 7:30am-4pm     [facility] ([address] [facility] through ED): M-F, 7:00am-4:30pm      [place] ([address] [facility]): M-F, 7:30am-4pm     You can also get labs at any [facility] [place].    Please call [phone] & ASK FOR BREE with any questions or concerns regarding your procedure. This is the direct line to the scheduling department. If there is no answer leave a detailed message including your name, the procedure you are scheduled for, and the performing provider.

## 2025-04-28 NOTE — PROGRESS NOTES
Date of Office Visit: 2025  Encounter Provider: TEN Deluna  Place of Service: [facility] CARDIOLOGY  Patient Name: Prem Thrasher  :1955    Chief complaint:abnormal EKG, CAD    HPI: Prem Thrasher is a 70 y.o. male who is a patient of Dr. Shaw and is previously followed by Dr. Salazar.  He is new to me today.  He has a history of unstable angina in  where he received 2 discrete drug-eluting stents to the LAD in the mid and distal vessel.  The following year he had recurrent symptoms was found to have a new LAD lesion proximal to the distal lesion as well as in the circumflex.  He went balloon angioplasty of the LAD and PCI of the circumflex at that time.  In 2024 he was admitted with exertional dyspnea and chest pain.  This occurred a few weeks after having COVID that was treated with Paxlovid.  He had a stress test and echocardiogram ultimately underwent a cardiac cath he received a drug-eluting stent to the distal RCA with a 3.5 x 28 mm Xience brett point drug-eluting stent.  The distal LAD was severely diseased and treated medically due to the small size of the vessel.  Proximal LAD was moderate disease with RFR of 0.94.  Echocardiogram showed fatty pericardium but normal LV function.  He was last in the office on  he was having dyspnea with exertion which seem to be chronic he also noted some pressure that felt like his heart was going to come out of his chest.  We increased his Imdur to 60 mg and he is here for follow-up.  Previous testing and notes have been reviewed by me.   Past Medical History:   Diagnosis Date    Abnormal ECG     Achilles tendon pain     LEFT    Allergic     Anxiety disorder     Asthma     When I get stressed out or try to do something I get short of breath    CAD (coronary artery disease)     Cataract     Surgery improved    Cervical disc disorder     Chronic anticoagulation     PLAVIX-CARDIAC STENT X3    Chronic  lower back pain     Chronic pain disorder     COPD (chronic obstructive pulmonary disease)     CTS (carpal tunnel syndrome)     Deep vein thrombosis     A    Depression     Diabetes mellitus 03/12/2023    Type two diabetes    Disease of thyroid gland     Diverticulitis of colon     Encounter for eye exam 10/2014    normal    Erectile dysfunction     Exertional chest pain     Extremity pain     GERD (gastroesophageal reflux disease)     Headache     Headache, tension-type     Heart valve disease     History of bone density study 03/2014    Dr. Maria Antonia Lopez; normal    History of chest x-ray 02/15/2011    also 3-6-15; normal chest    History of colon polyps     History of medical problems     Cataract surgery    History of nuclear stress test 03/09/2015    cardiolite; Dr. Greenberg; negative    History of pulmonary function tests 03/2015    COPD    Hyperlipidemia     Hypertension     Injury of back     Surgery    Joint pain     Low back strain     Lumbosacral disc disease     Migraine     Neck pain     Nerve damage     KAYLYNN ELBOWS    NSTEMI (non-ST elevated myocardial infarction) 01/2021    Obesity     Osteoarthritis, multiple sites     Pneumonia     Postoperative nausea and vomiting 08/01/2017    Postoperative wound infection     Rheumatoid arthritis     Rotator cuff syndrome     Sciatica     Short of breath on exertion     Sinusitis     Sleep apnea     no cpap    Spinal stenosis     Staph infection     WITH BACK SURGERY 2017  ON LONG TERM ANTIBIOTICS    Thoracic disc disorder     Unstable angina     Unsteady gait     D/T LOWER BACK    Visual impairment     Fill like my vision has changed sense I got my glasses       Past Surgical History:   Procedure Laterality Date    ACHILLES TENDON SURGERY Left 07/03/2018    Procedure: Left Achilles debridement and secondary repair with partial excision of calcaneus. Possible left Achilles lengthening at the calf;  Surgeon: Vinay Smith MD;  Location: [facility] [place] [facility] [place];   Service: Orthopedics    BACK SURGERY  10/2017    CARDIAC CATHETERIZATION N/A 03/07/2020    Procedure: Left Heart Cath;  Surgeon: Dioni Salazar MD;  Location:  [facility] CATH INVASIVE LOCATION;  Service: Cardiology;  Laterality: N/A;    CARDIAC CATHETERIZATION N/A 03/07/2020    Procedure: Coronary angiography;  Surgeon: Dioni Salazar MD;  Location:  [facility] CATH INVASIVE LOCATION;  Service: Cardiology;  Laterality: N/A;    CARDIAC CATHETERIZATION N/A 03/07/2020    Procedure: Left ventriculography;  Surgeon: Dioni Salazar MD;  Location:  [facility] CATH INVASIVE LOCATION;  Service: Cardiology;  Laterality: N/A;    CARDIAC CATHETERIZATION N/A 03/07/2020    Procedure: Stent RAZA coronary;  Surgeon: Dioni Salazar MD;  [facility];  Service: Cardiology;  Laterality: N/A;    CARDIAC CATHETERIZATION N/A 01/22/2021    Procedure: Left Heart Cath;  Surgeon: Dioni Salazar MD;  [facility];  Service: Cardiology;  Laterality: N/A;    CARDIAC CATHETERIZATION N/A 01/22/2021    Procedure: Coronary angiography;  Surgeon: Dioni Salazar MD;  [facility];  Service: Cardiology;  Laterality: N/A;    CARDIAC CATHETERIZATION N/A 01/22/2021    Procedure: Left ventriculography;  Surgeon: Dioni Salazar MD;  [facility];  Service: Cardiology;  Laterality: N/A;    CARDIAC CATHETERIZATION N/A 01/22/2021    Procedure: Percutaneous Coronary Intervention;  Surgeon: Dioni Salazar MD;  [facility];  Service: Cardiology;  Laterality: N/A;    CARDIAC CATHETERIZATION N/A 01/22/2021    Procedure: Stent RAZA coronary;  Surgeon: Dioni Salazar MD;  [facility];  Service: Cardiology;  Laterality: N/A;    CARDIAC CATHETERIZATION N/A 03/22/2024    Procedure: Left Heart Cath;  Surgeon: Kiko Evans MD;  [facility]  [facility];  Service: Cardiovascular;  Laterality: N/A;    CARDIAC CATHETERIZATION N/A 03/22/2024    Procedure: Coronary angiography;  Surgeon: Kiko Evans MD;  Location:  [facility] CATH INVASIVE LOCATION;  Service: Cardiovascular;  Laterality: N/A;    CARDIAC CATHETERIZATION N/A 03/22/2024    Procedure: Resting Full Cycle Ratio;  Surgeon: Kiko Evans MD;  Location:  [facility] CATH INVASIVE LOCATION;  Service: Cardiovascular;  Laterality: N/A;    CARDIAC CATHETERIZATION N/A 03/22/2024    Procedure: Percutaneous Coronary Intervention;  Surgeon: Kiko Evans MD;  Location:  [facility] CATH INVASIVE LOCATION;  Service: Cardiovascular;  Laterality: N/A;    CARDIAC CATHETERIZATION N/A 03/22/2024    Procedure: Stent RAZA coronary;  Surgeon: Kiko Evans MD;  [facility];  Service: Cardiovascular;  Laterality: N/A;    CARDIAC CATHETERIZATION N/A 03/22/2024    Procedure: Optical Coherence Tomography;  Surgeon: Kiko Evans MD;  [facility];  Service: Cardiovascular;  Laterality: N/A;    CARDIAC SURGERY      3 stents total    COLONOSCOPY N/A 02/02/2011    Normal colon except scattered diverticulosis-Dr. Guero Blunt    COLONOSCOPY N/A 04/08/2021    Procedure: COLONOSCOPY TO CECUM WITH POLYPECTOMY (COLD BX);  Surgeon: Porsha Arcos MD;  Location: [facility] ENDOSCOPY;  Service: General;  Laterality: N/A;  SCREENING; HX OF COLON POLYPS  POST:DIVERTICULOSIS; COLON POLYP    CORONARY STENT PLACEMENT      CYST REMOVAL  03/2016    x2  FROM FACE AND EAR    DENTAL PROCEDURE  2008    teeth removed; dental implants    EPIDURAL BLOCK  1995    L/S spine    EYE SURGERY  Cataract surgery    Vision improvement    FINGER DEBRIDEMENT Right 07/12/2003    Debridement and full thickness skin grafting of the ring and small fingers of the right hand-Dr. Rayray Long    FOOT SURGERY      GASTROSTOMY      HAND SURGERY  2003    INCISION AND DRAINAGE PERIRECTAL ABSCESS N/A  07/10/2018    Procedure: INCISION AND DRAINAGE OF PERIRECTAL ABSCESS;  Surgeon: Porsha Arcos MD;  Location: [facility] MAIN OR;  Service: General    INCISION AND DRAINAGE TRUNK N/A 04/23/2022    Procedure: Evacuation lumbar hematoma;  Surgeon: Jacky Perez MD;  Location: [facility] MAIN OR;  Service: Orthopedics;  Laterality: N/A;    JOINT REPLACEMENT      LASIK Left 12/2024    LUMBAR DISCECTOMY FUSION INSTRUMENTATION Left 10/10/2016    Procedure: LEFT L2-L3, L3-L4 LUMBAR LAMINECTOMY/ DISCECTOMY WITH METRIX ;  Surgeon: Sawyer Rick MD;  Location: [facility] MAIN OR;  Service:     LUMBAR DISCECTOMY FUSION INSTRUMENTATION N/A 07/31/2017    Procedure: LUMBAR FUSION DECOMPRESSON WITH PEDICLE SCREWS with LAURA L2-3,L3-4;  Surgeon: Jacky Perez MD;  Location: [facility] MAIN OR;  Service:     LUMBAR FUSION N/A 04/13/2022    Procedure: Thoracic 10-thoracic 11, thoracic 11-thoracic 12, thoracic 12-lumbar 1, lumbar 1-lumbar 2, lumbar 2-lumbar 3, lumbar 3-lumbar 4 fusion with instrumentation with iliac crest bone graft, and removal of implants from lumbar 2-lumbar 3, lumbar 3-lumbar 4;  Surgeon: Jacky Perez MD;  Location: [facility] MAIN OR;  Service: Orthopedic Spine;  Laterality: N/A;    LUMBAR FUSION N/A 04/13/2022    Procedure: LUMBAR ANTERIOR INTERBODY FUSION L4,L5 Osteotomy interbody fusion with cage L1,L2;  Surgeon: Jacky Perez MD;  Location: [facility] MAIN OR;  Service: Orthopedic Spine;  Laterality: N/A;    LUMBAR LAMINECTOMY DISCECTOMY DECOMPRESSION N/A 07/31/2017    Procedure: L2 to L4 laminectomy with neural lysis and a fusion by orthopedics;  Surgeon: Sawyer Rick MD;  Location: [facility] MAIN OR;  Service:     LUMBAR LAMINECTOMY DISCECTOMY DECOMPRESSION N/A 09/05/2017    Procedure: I&D LUMBAR SPINE ;  Surgeon: Jacky Perez MD;  Location: [facility] MAIN OR;  Service:     ORTHOPEDIC SURGERY      SHOULDER ARTHROSCOPY      SHOULDER SURGERY Right 02/23/2011    Dr. Navarro    SINUS SURGERY  2003    Dr. Barrera     SPINAL FUSION      SPINE SURGERY  2010    TOTAL SHOULDER REPLACEMENT Right 09/07/2023    TRIGGER POINT INJECTION         Social History     Socioeconomic History    Marital status:     Number of children: 2   Tobacco Use    Smoking status: Former     Current packs/day: 0.00     Average packs/day: 1 pack/day for 50.0 years (50.0 ttl pk-yrs)     Types: Cigarettes     Start date: 10/20/1971     Quit date: 10/20/2021     Years since quitting: 3.5     Passive exposure: Never    Smokeless tobacco: Never    Tobacco comments:     Current status is non smoker   Vaping Use    Vaping status: Never Used   Substance and Sexual Activity    Alcohol use: No    Drug use: No    Sexual activity: Not Currently     Partners: Female     Birth control/protection: None       Family History   Problem Relation Age of Onset    Diabetes Mother     Heart disease Mother     Hyperlipidemia Mother     Stroke Mother     Alcohol abuse Mother     Heart disease Father     Rheum arthritis Father     Alcohol abuse Father     Hyperlipidemia Father     Heart attack Father     Hearing loss Father     Depression Brother     Cancer Brother         prostate    Depression Brother     Heart disease Brother     Hyperlipidemia Brother     Cancer Brother         Haert desease    Thyroid disease Sister     Arthritis Sister     Asthma Son     COPD Son     COPD Son     Cancer Brother         Haert desease    Depression Brother     Hyperlipidemia Brother     Heart disease Brother     Arthritis Sister         Kathia    Thyroid disease Sister     Malig Hyperthermia Neg Hx        Review of Systems   Constitutional: Negative for diaphoresis and malaise/fatigue.   Cardiovascular:  Positive for chest pain and dyspnea on exertion. Negative for claudication, irregular heartbeat, leg swelling, near-syncope, orthopnea, palpitations, paroxysmal nocturnal dyspnea and syncope.   Respiratory:  Negative for cough, shortness of breath and sleep disturbances due to breathing.   "  Musculoskeletal:  Negative for falls.   Neurological:  Negative for dizziness and weakness.   Psychiatric/Behavioral:  Negative for altered mental status and substance abuse.        Allergies   Allergen Reactions    Atorvastatin Other (See Comments) and Myalgia     Leg cramps    Ceclor [Cefaclor] Rash     Patient tolerated nafcillin during 9/2017 admission and has tolerated Augmentin in past outpatient.     Methotrexate Derivatives Rash     \"Blisters\"         Current Outpatient Medications:     amLODIPine (NORVASC) 5 MG tablet, Take 1 tablet by mouth Daily. For BP, Disp: 90 tablet, Rfl: 1    aspirin 81 MG EC tablet, Take 1 tablet by mouth Daily., Disp: , Rfl:     Boswellia-Glucosamine-Vit D (OSTEO BI-FLEX ONE PER DAY PO), Take  by mouth., Disp: , Rfl:     Buprenorphine (BUTRANS) patch weekly transdermal patch, Place 1 patch on the skin as directed by provider Every 7 (Seven) Days., Disp: 4 patch, Rfl: 0    busPIRone (BUSPAR) 10 MG tablet, TAKE 1 TABLET TWICE DAILY AS NEEDED FOR ANXIETY, Disp: 180 tablet, Rfl: 3    carvedilol (COREG) 6.25 MG tablet, Take 1 tablet by mouth 2 (Two) Times a Day With Meals., Disp: 180 tablet, Rfl: 3    Cholecalciferol (VITAMIN D) 2000 UNITS tablet, Take 1 tablet by mouth Every Evening., Disp: , Rfl:     clopidogrel (PLAVIX) 75 MG tablet, Take 1 tablet by mouth Daily., Disp: 90 tablet, Rfl: 0    Cyanocobalamin (B-12) 1000 MCG sublingual tablet, DISSOLVE 1 TABLET UNDER THE TONGUE EVERY DAY, Disp: 90 tablet, Rfl: 3    cyclobenzaprine (FLEXERIL) 10 MG tablet, Take 1 tablet by mouth 2 (Two) Times a Day., Disp: 270 tablet, Rfl: 3    docusate sodium (Stool Softener) 100 MG capsule, , Disp: , Rfl:     escitalopram (LEXAPRO) 20 MG tablet, TAKE 1 TABLET EVERY MORNING FOR ANXIETY AND DEPRESSION, Disp: 90 tablet, Rfl: 3    ezetimibe (ZETIA) 10 MG tablet, Take 1 tablet by mouth Daily., Disp: 90 tablet, Rfl: 3    folic acid (FOLVITE) 1 MG tablet, TAKE 1 TABLET EVERY DAY, Disp: 90 tablet, Rfl: 3    " "isosorbide mononitrate (IMDUR) 30 MG 24 hr tablet, Take 2 tablets by mouth Daily., Disp: 90 tablet, Rfl: 3    levothyroxine (SYNTHROID, LEVOTHROID) 50 MCG tablet, Take 1 tablet by mouth Every Morning. Before breakfast, for thyroid, Disp: 90 tablet, Rfl: 3    meclizine (ANTIVERT) 25 MG tablet, Take 1 tablet by mouth 3 (Three) Times a Day As Needed for Dizziness., Disp: 30 tablet, Rfl: 0    metFORMIN ER (GLUCOPHAGE-XR) 500 MG 24 hr tablet, TAKE 2 TABLETS EVERY DAY FOR DIABETES, Disp: 180 tablet, Rfl: 3    multivitamin with minerals tablet tablet, Take 1 tablet by mouth Daily. HOLD X1 WEEK PRIOR TO SURGERY, Disp: , Rfl:     nitroglycerin (Nitrostat) 0.4 MG SL tablet, Place 1 tablet under the tongue Every 5 (Five) Minutes As Needed for Chest Pain. Take no more than 3 doses in 15 minutes., Disp: 25 tablet, Rfl: 1    pantoprazole (PROTONIX) 40 MG EC tablet, TAKE 1 TABLET EVERY DAY FOR GERD, Disp: 90 tablet, Rfl: 3    pregabalin (LYRICA) 100 MG capsule, Take 1 capsule by mouth 3 (Three) Times a Day., Disp: 90 capsule, Rfl: 1    rosuvastatin (CRESTOR) 10 MG tablet, Take 1 tablet by mouth every night at bedtime., Disp: 90 tablet, Rfl: 0    Spiriva Respimat 2.5 MCG/ACT aerosol solution inhaler, , Disp: , Rfl:     fluticasone (FLONASE) 50 MCG/ACT nasal spray, Administer 2 sprays into the nostril(s) as directed by provider Daily for 7 days., Disp: 16 g, Rfl: 0        Objective:     Vitals:    04/28/25 0926   BP: 134/70   Pulse: 69   SpO2: 95%   Weight: 112 kg (248 lb)   Height: 180.3 cm (71\")     Body mass index is 34.59 kg/m².    PHYSICAL EXAM:    Constitutional:       General: Not in acute distress.     Appearance: Normal appearance. Well-developed.   Eyes:      Pupils: Pupils are equal, round, and reactive to light.   HENT:      Head: Normocephalic.   Neck:      Vascular: No carotid bruit or JVD.   Pulmonary:      Effort: Pulmonary effort is normal. No tachypnea.      Breath sounds: Normal breath sounds. No wheezing. No " rales.   Cardiovascular:      Normal rate. Regular rhythm.      No gallop.    Pulses:     Intact distal pulses.   Edema:     Peripheral edema absent.   Abdominal:      General: Bowel sounds are normal.      Palpations: Abdomen is soft.      Tenderness: There is no abdominal tenderness.   Musculoskeletal: Normal range of motion.      Cervical back: Normal range of motion and neck supple. No edema. Skin:     General: Skin is warm and dry.   Neurological:      Mental Status: Alert and oriented to person, place, and time.           ECG 12 Lead    Date/Time: 4/28/2025 10:50 AM  Performed by: Zaida Pretty APRN    Authorized by: Zaida Pretty APRN  Comparison: compared with previous ECG from 10/14/2024  Similar to previous ECG  Rhythm: sinus rhythm  Rate: normal  QRS axis: right    Clinical impression: non-specific ECG              Assessment/Plan:      1.  Coronary artery disease-request having dyspnea with exertion and tightness in the chest.  Some mild improvement with increasing isosorbide to 60 mg a day.  Will increase to 90 mg a day and schedule him for a left heart cath.  Risks and benefits discussed 3 questions answered    2.  Essential hypertension-amlodipine 5 mg daily, carvedilol 6.25 twice a day and isosorbide increased to 90 mg daily    3.  Dyslipidemia-lipids at goal continue Zetia 10 mg daily and rosuvastatin 10 mg daily    4.  COPD-has some chronic dyspnea but symptoms outside of usual for his COPD    Follow-up after cardiac cath         Your medication list            Accurate as of April 28, 2025  9:49 AM. If you have any questions, ask your nurse or doctor.                CONTINUE taking these medications        Instructions Last Dose Given Next Dose Due   amLODIPine 5 MG tablet  Commonly known as: NORVASC      Take 1 tablet by mouth Daily. For BP       aspirin 81 MG EC tablet      Take 1 tablet by mouth Daily.       B-12 1000 MCG sublingual tablet      DISSOLVE 1 TABLET UNDER THE TONGUE EVERY  DAY       Buprenorphine patch weekly transdermal patch  Commonly known as: BUTRANS      Place 1 patch on the skin as directed by provider Every 7 (Seven) Days.       busPIRone 10 MG tablet  Commonly known as: BUSPAR      TAKE 1 TABLET TWICE DAILY AS NEEDED FOR ANXIETY       carvedilol 6.25 MG tablet  Commonly known as: COREG      Take 1 tablet by mouth 2 (Two) Times a Day With Meals.       clopidogrel 75 MG tablet  Commonly known as: PLAVIX      Take 1 tablet by mouth Daily.       cyclobenzaprine 10 MG tablet  Commonly known as: FLEXERIL      Take 1 tablet by mouth 2 (Two) Times a Day.       escitalopram 20 MG tablet  Commonly known as: LEXAPRO      TAKE 1 TABLET EVERY MORNING FOR ANXIETY AND DEPRESSION       ezetimibe 10 MG tablet  Commonly known as: ZETIA      Take 1 tablet by mouth Daily.       fluticasone 50 MCG/ACT nasal spray  Commonly known as: FLONASE      Administer 2 sprays into the nostril(s) as directed by provider Daily for 7 days.       folic acid 1 MG tablet  Commonly known as: FOLVITE      TAKE 1 TABLET EVERY DAY       isosorbide mononitrate 30 MG 24 hr tablet  Commonly known as: IMDUR      Take 2 tablets by mouth Daily.       levothyroxine 50 MCG tablet  Commonly known as: SYNTHROID, LEVOTHROID      Take 1 tablet by mouth Every Morning. Before breakfast, for thyroid       meclizine 25 MG tablet  Commonly known as: ANTIVERT      Take 1 tablet by mouth 3 (Three) Times a Day As Needed for Dizziness.       metFORMIN  MG 24 hr tablet  Commonly known as: GLUCOPHAGE-XR      TAKE 2 TABLETS EVERY DAY FOR DIABETES       multivitamin with minerals tablet tablet      Take 1 tablet by mouth Daily. HOLD X1 WEEK PRIOR TO SURGERY       nitroglycerin 0.4 MG SL tablet  Commonly known as: Nitrostat      Place 1 tablet under the tongue Every 5 (Five) Minutes As Needed for Chest Pain. Take no more than 3 doses in 15 minutes.       OSTEO BI-FLEX ONE PER DAY PO      Take  by mouth.       pantoprazole 40 MG EC  tablet  Commonly known as: PROTONIX      TAKE 1 TABLET EVERY DAY FOR GERD       pregabalin 100 MG capsule  Commonly known as: LYRICA      Take 1 capsule by mouth 3 (Three) Times a Day.       rosuvastatin 10 MG tablet  Commonly known as: CRESTOR      Take 1 tablet by mouth every night at bedtime.       Spiriva Respimat 2.5 MCG/ACT aerosol solution inhaler  Generic drug: tiotropium bromide monohydrate           Stool Softener 100 MG capsule  Generic drug: docusate sodium           Vitamin D 50 MCG (2000 UT) tablet      Take 1 tablet by mouth Every Evening.                  As always, it has been a pleasure to participate in your patient's care.      Sincerely,     Zaida CAAL

## 2025-04-29 DIAGNOSIS — N18.2 CKD (CHRONIC KIDNEY DISEASE) STAGE 2, GFR 60-89 ML/MIN: Primary | ICD-10-CM

## 2025-04-29 RX ORDER — SODIUM CHLORIDE 9 MG/ML
125 INJECTION, SOLUTION INTRAVENOUS ONCE
Status: SHIPPED | OUTPATIENT
Start: 2025-04-29

## 2025-04-29 RX ORDER — BUPRENORPHINE 7.5 UG/H
1 PATCH TRANSDERMAL
Qty: 4 PATCH | Refills: 0 | Status: SHIPPED | OUTPATIENT
Start: 2025-04-29

## 2025-04-29 RX ORDER — PREGABALIN 100 MG/1
100 CAPSULE ORAL 3 TIMES DAILY
Qty: 90 CAPSULE | Refills: 1 | Status: SHIPPED | OUTPATIENT
Start: 2025-04-29

## 2025-04-30 ENCOUNTER — TELEPHONE (OUTPATIENT)
Dept: CARDIOLOGY | Age: 70
End: 2025-04-30
Payer: MEDICARE

## 2025-04-30 NOTE — TELEPHONE ENCOUNTER
4/30 - STP, HE CONFIRMED CATH APPT ON 5/7/25 W/ DR LEMOS. PT VERBALIZED UNDERSTANDING OF MEDICATION INSTRUCTIONS. LABS WER DRAWN ON 4/28/25

## 2025-05-07 ENCOUNTER — HOSPITAL ENCOUNTER (OUTPATIENT)
Facility: HOSPITAL | Age: 70
Setting detail: HOSPITAL OUTPATIENT SURGERY
Discharge: HOME | End: 2025-05-07
Attending: INTERNAL MEDICINE | Admitting: INTERNAL MEDICINE
Payer: MEDICARE

## 2025-05-07 VITALS
HEIGHT: 71 IN | OXYGEN SATURATION: 92 % | HEART RATE: 70 BPM | WEIGHT: 245 LBS | RESPIRATION RATE: 16 BRPM | TEMPERATURE: 97.4 F | BODY MASS INDEX: 34.3 KG/M2 | SYSTOLIC BLOOD PRESSURE: 169 MMHG | DIASTOLIC BLOOD PRESSURE: 102 MMHG

## 2025-05-07 DIAGNOSIS — I25.118 CORONARY ARTERY DISEASE OF NATIVE ARTERY OF NATIVE HEART WITH STABLE ANGINA PECTORIS: ICD-10-CM

## 2025-05-07 LAB — GLUCOSE BLDC GLUCOMTR-MCNC: 102 MG/DL (ref 70–130)

## 2025-05-07 PROCEDURE — 25010000002 FENTANYL CITRATE (PF) 50 MCG/ML SOLUTION: Performed by: INTERNAL MEDICINE

## 2025-05-07 PROCEDURE — C1769 GUIDE WIRE: HCPCS | Performed by: INTERNAL MEDICINE

## 2025-05-07 PROCEDURE — 82948 REAGENT STRIP/BLOOD GLUCOSE: CPT

## 2025-05-07 PROCEDURE — 25010000002 LIDOCAINE 2% SOLUTION: Performed by: INTERNAL MEDICINE

## 2025-05-07 PROCEDURE — 25010000002 HEPARIN (PORCINE) PER 1000 UNITS: Performed by: INTERNAL MEDICINE

## 2025-05-07 PROCEDURE — 25510000001 IOPAMIDOL PER 1 ML: Performed by: INTERNAL MEDICINE

## 2025-05-07 PROCEDURE — 25010000002 MIDAZOLAM PER 1 MG: Performed by: INTERNAL MEDICINE

## 2025-05-07 PROCEDURE — 25810000003 SODIUM CHLORIDE 0.9 % SOLUTION: Performed by: INTERNAL MEDICINE

## 2025-05-07 PROCEDURE — C1894 INTRO/SHEATH, NON-LASER: HCPCS | Performed by: INTERNAL MEDICINE

## 2025-05-07 PROCEDURE — 93458 L HRT ARTERY/VENTRICLE ANGIO: CPT | Performed by: INTERNAL MEDICINE

## 2025-05-07 RX ORDER — LIDOCAINE HYDROCHLORIDE 20 MG/ML
INJECTION, SOLUTION INFILTRATION; PERINEURAL
Status: DISCONTINUED | OUTPATIENT
Start: 2025-05-07 | End: 2025-05-07 | Stop reason: HOSPADM

## 2025-05-07 RX ORDER — HEPARIN SODIUM 1000 [USP'U]/ML
INJECTION, SOLUTION INTRAVENOUS; SUBCUTANEOUS
Status: DISCONTINUED | OUTPATIENT
Start: 2025-05-07 | End: 2025-05-07 | Stop reason: HOSPADM

## 2025-05-07 RX ORDER — MIDAZOLAM HYDROCHLORIDE 1 MG/ML
INJECTION, SOLUTION INTRAMUSCULAR; INTRAVENOUS
Status: DISCONTINUED | OUTPATIENT
Start: 2025-05-07 | End: 2025-05-07 | Stop reason: HOSPADM

## 2025-05-07 RX ORDER — SODIUM CHLORIDE 9 MG/ML
75 INJECTION, SOLUTION INTRAVENOUS CONTINUOUS
Status: DISCONTINUED | OUTPATIENT
Start: 2025-05-07 | End: 2025-05-07 | Stop reason: HOSPADM

## 2025-05-07 RX ORDER — NITROGLYCERIN 0.4 MG/1
0.4 TABLET SUBLINGUAL
Status: DISCONTINUED | OUTPATIENT
Start: 2025-05-07 | End: 2025-05-07 | Stop reason: HOSPADM

## 2025-05-07 RX ORDER — SODIUM CHLORIDE 0.9 % (FLUSH) 0.9 %
10 SYRINGE (ML) INJECTION AS NEEDED
Status: DISCONTINUED | OUTPATIENT
Start: 2025-05-07 | End: 2025-05-07 | Stop reason: HOSPADM

## 2025-05-07 RX ORDER — ISOSORBIDE MONONITRATE 120 MG/1
120 TABLET, EXTENDED RELEASE ORAL DAILY
Qty: 90 TABLET | Refills: 3 | Status: SHIPPED | OUTPATIENT
Start: 2025-05-07

## 2025-05-07 RX ORDER — VERAPAMIL HYDROCHLORIDE 2.5 MG/ML
INJECTION INTRAVENOUS
Status: DISCONTINUED | OUTPATIENT
Start: 2025-05-07 | End: 2025-05-07 | Stop reason: HOSPADM

## 2025-05-07 RX ORDER — SODIUM CHLORIDE 9 MG/ML
1 INJECTION, SOLUTION INTRAVENOUS CONTINUOUS
Status: ACTIVE | OUTPATIENT
Start: 2025-05-07 | End: 2025-05-07

## 2025-05-07 RX ORDER — FENTANYL CITRATE 50 UG/ML
INJECTION, SOLUTION INTRAMUSCULAR; INTRAVENOUS
Status: DISCONTINUED | OUTPATIENT
Start: 2025-05-07 | End: 2025-05-07 | Stop reason: HOSPADM

## 2025-05-07 RX ORDER — ACETAMINOPHEN 325 MG/1
650 TABLET ORAL EVERY 4 HOURS PRN
Status: DISCONTINUED | OUTPATIENT
Start: 2025-05-07 | End: 2025-05-07 | Stop reason: HOSPADM

## 2025-05-07 RX ORDER — IOPAMIDOL 755 MG/ML
INJECTION, SOLUTION INTRAVASCULAR
Status: DISCONTINUED | OUTPATIENT
Start: 2025-05-07 | End: 2025-05-07 | Stop reason: HOSPADM

## 2025-05-07 RX ADMIN — SODIUM CHLORIDE 75 ML/HR: 9 INJECTION, SOLUTION INTRAVENOUS at 08:28

## 2025-05-07 NOTE — Clinical Note
Hemostasis started on the right radial artery. R-Band was used in achieving hemostasis. Radial compression device applied to vessel. Hemostasis achieved successfully. Closure device additional comment: Tr band 12cc air

## 2025-05-07 NOTE — DISCHARGE INSTRUCTIONS
[facility]  [address] [place] [address]    Coronary Angiogram (Radial/Ulnar Approach) After Care    Refer to this sheet in the next few weeks. These instructions provide you with information on caring for yourself after your procedure. Your caregiver may also give you more specific instructions. Your treatment has been planned according to current medical practices, but problems sometimes occur. Call your caregiver if you have any problems or questions after your procedure.    Home Care Instructions:  You may shower the day after the procedure. Remove the bandage (dressing) and gently wash the site with plain soap and water. Gently pat the site dry. You may apply a band aid daily for 2 days if desired.    Do not apply powder or lotion to the site.  Do not submerge the affected site in water for 3 to 5 days or until the site is completely healed.   Do not lift, push or pull anything over 5 pounds for 5 days after your procedure or as directed by your physician.  As a reference, a gallon of milk weighs 8 pounds.   Inspect the site at least twice daily. You may notice some bruising at the site and it may be tender for 1 to 2 weeks.     Increase your fluid intake for the next 2 days.    Keep arm elevated for 24 hours. For the remainder of the day, keep your arm in “Pledge of Allegiance” position when up and about.     You may drive 24 hours after the procedure unless otherwise instructed by your caregiver.  Do not operate machinery or power tools for 24 hours.  A responsible adult should be with you for the first 24 hours after you arrive home. Do not make any important legal decisions or sign legal papers for 24 hours.  Do not drink alcohol for 24 hours.    Metformin or any medications containing Metformin should not be taken for 48 hours after your procedure.      Call Your Doctor if:   You have unusual pain at the radial/ulnar (wrist) site.  You have redness, warmth, swelling, or pain at the  radial/ulnar (wrist) site.  You have drainage (other than a small amount of blood on the dressing).  `You have chills or a fever > 101.  Your arm becomes pale or dark, cool, tingly, or numb.  You develop chest pain, shortness of breath, feel faint or pass out.    You have heavy bleeding from the site, hold pressure on the site for 20 minutes.  If the bleeding stops, apply a fresh bandage and call your cardiologist.  However, if you        continue to have bleeding, call 911 and continue to apply pressure to the site.   You have any symptoms of a stroke.  Remember BE FAST  B-balance. Sudden trouble walking or loss of balance.  E-eyes.  Sudden changes in how you see or a sudden onset of a very bad headache.   F-face. Sudden weakness or loss of feeling of the face or facial droop on one side.   A-arms Sudden weakness or numbness in one arm.  One arm drifts down if they are both held out in front of you. This happens suddenly and usually on one side of the body.   S-speech.  Sudden trouble speaking, slurred speech or trouble understanding what are saying.   T-time  Time to call emergency services.  Write down the symptoms and the time they started.

## 2025-05-21 ENCOUNTER — HOSPITAL ENCOUNTER (OUTPATIENT)
Dept: CT IMAGING | Facility: HOSPITAL | Age: 70
Discharge: HOME | End: 2025-05-21
Admitting: PHYSICIAN ASSISTANT
Payer: MEDICARE

## 2025-05-21 DIAGNOSIS — Z87.891 PERSONAL HISTORY OF NICOTINE DEPENDENCE: ICD-10-CM

## 2025-05-21 DIAGNOSIS — F17.201 MILD TOBACCO ABUSE IN SUSTAINED REMISSION: ICD-10-CM

## 2025-05-21 PROCEDURE — 71271 CT THORAX LUNG CANCER SCR C-: CPT

## 2025-05-25 ENCOUNTER — APPOINTMENT (OUTPATIENT)
Dept: GENERAL RADIOLOGY | Facility: HOSPITAL | Age: 70
End: 2025-05-25
Payer: MEDICARE

## 2025-05-25 ENCOUNTER — HOSPITAL ENCOUNTER (OUTPATIENT)
Facility: HOSPITAL | Age: 70
Discharge: HOME | End: 2025-05-25
Attending: EMERGENCY MEDICINE | Admitting: EMERGENCY MEDICINE
Payer: MEDICARE

## 2025-05-25 VITALS
OXYGEN SATURATION: 94 % | DIASTOLIC BLOOD PRESSURE: 85 MMHG | HEIGHT: 70 IN | TEMPERATURE: 98.4 F | WEIGHT: 245 LBS | SYSTOLIC BLOOD PRESSURE: 146 MMHG | RESPIRATION RATE: 20 BRPM | BODY MASS INDEX: 35.07 KG/M2 | HEART RATE: 83 BPM

## 2025-05-25 DIAGNOSIS — J01.00 ACUTE MAXILLARY SINUSITIS, RECURRENCE NOT SPECIFIED: Primary | ICD-10-CM

## 2025-05-25 LAB — STREP A PCR: NOT DETECTED

## 2025-05-25 PROCEDURE — 87651 STREP A DNA AMP PROBE: CPT | Performed by: EMERGENCY MEDICINE

## 2025-05-25 PROCEDURE — 71045 X-RAY EXAM CHEST 1 VIEW: CPT

## 2025-05-25 PROCEDURE — G0463 HOSPITAL OUTPT CLINIC VISIT: HCPCS | Performed by: EMERGENCY MEDICINE

## 2025-05-25 PROCEDURE — 87636 SARSCOV2 & INF A&B AMP PRB: CPT | Performed by: EMERGENCY MEDICINE

## 2025-05-25 RX ADMIN — AMOXICILLIN AND CLAVULANATE POTASSIUM 1 TABLET: 875; 125 TABLET, COATED ORAL at 20:58

## 2025-05-25 NOTE — FSED PROVIDER NOTE
Subjective   History of Present Illness  Patient is complaining of 3 days of cough congestion sinus pressure no earaches but sore throat.  His wife was just treated for sinusitis she is just getting over that.  The patient states the cough is yellow productive sputum.  He does have a history of COPD.  He denies any wheezing.  His chest is sore from coughing.      Review of Systems   HENT:  Positive for congestion and sore throat.    Respiratory:  Positive for cough.    Cardiovascular:  Positive for chest pain.   All other systems reviewed and are negative.      Past Medical History:   Diagnosis Date    Abnormal ECG     Achilles tendon pain     LEFT    Allergic     Anxiety disorder     Asthma     When I get stressed out or try to do something I get short of breath    CAD (coronary artery disease)     Cataract     Surgery improved    Cervical disc disorder     Chronic anticoagulation     PLAVIX-CARDIAC STENT X3    Chronic lower back pain     Chronic pain disorder     COPD (chronic obstructive pulmonary disease)     CTS (carpal tunnel syndrome)     Deep vein thrombosis     A    Depression     Diabetes mellitus 03/12/2023    Type two diabetes    Disease of thyroid gland     Diverticulitis of colon     Encounter for eye exam 10/2014    normal    Erectile dysfunction     Exertional chest pain     Extremity pain     GERD (gastroesophageal reflux disease)     Headache     Headache, tension-type     Heart valve disease     History of bone density study 03/2014    Dr. Maria Antonia Lopez; normal    History of chest x-ray 02/15/2011    also 3-6-15; normal chest    History of colon polyps     History of medical problems     Cataract surgery    History of nuclear stress test 03/09/2015    cardiolite; Dr. Greenberg; negative    History of pulmonary function tests 03/2015    COPD    Hyperlipidemia     Hypertension     Injury of back     Surgery    Joint pain     Low back strain     Lumbosacral disc disease     Migraine     Neck pain      "Nerve damage     KAYLYNN ELBOWS    NSTEMI (non-ST elevated myocardial infarction) 01/2021    Obesity     Osteoarthritis, multiple sites     Pneumonia     Postoperative nausea and vomiting 08/01/2017    Postoperative wound infection     Rheumatoid arthritis     Rotator cuff syndrome     Sciatica     Short of breath on exertion     Sinusitis     Sleep apnea     no cpap    Spinal stenosis     Staph infection     WITH BACK SURGERY 2017  ON LONG TERM ANTIBIOTICS    Thoracic disc disorder     Unstable angina     Unsteady gait     D/T LOWER BACK    Visual impairment     Fill like my vision has changed sense I got my glasses       Allergies   Allergen Reactions    Atorvastatin Other (See Comments) and Myalgia     Leg cramps    Ceclor [Cefaclor] Rash     Patient tolerated nafcillin during 9/2017 admission and has tolerated Augmentin in past outpatient.     Methotrexate Derivatives Rash     \"Blisters\"       Past Surgical History:   Procedure Laterality Date    ACHILLES TENDON SURGERY Left 07/03/2018    Procedure: Left Achilles debridement and secondary repair with partial excision of calcaneus. Possible left Achilles lengthening at the calf;  Surgeon: Vinay Smith MD;  [place] [facility] [place] [facility] [place]  Service: Orthopedics    BACK SURGERY  10/2017    CARDIAC CATHETERIZATION N/A 03/07/2020    Procedure: Left Heart Cath;  Surgeon: Dioni Salazar MD;  Location: [facility] CATH INVASIVE LOCATION;  Service: Cardiology;  Laterality: N/A;    CARDIAC CATHETERIZATION N/A 03/07/2020    Procedure: Coronary angiography;  Surgeon: Dioni Salazar MD;  Location: [facility] CATH INVASIVE LOCATION;  Service: Cardiology;  Laterality: N/A;    CARDIAC CATHETERIZATION N/A 03/07/2020    Procedure: Left ventriculography;  Surgeon: Dioni Salazar MD;  Location: [facility] CATH INVASIVE LOCATION;  Service: Cardiology;  Laterality: N/A;    CARDIAC CATHETERIZATION N/A 03/07/2020    Procedure: Stent RAZA coronary;  Surgeon: Dioni Salazar MD; "  [facility];  Service: Cardiology;  Laterality: N/A;    CARDIAC CATHETERIZATION N/A 01/22/2021    Procedure: Left Heart Cath;  Surgeon: Dioni Salazar MD;  [facility];  Service: Cardiology;  Laterality: N/A;    CARDIAC CATHETERIZATION N/A 01/22/2021    Procedure: Coronary angiography;  Surgeon: Dioni Salazar MD;  [facility];  Service: Cardiology;  Laterality: N/A;    CARDIAC CATHETERIZATION N/A 01/22/2021    Procedure: Left ventriculography;  Surgeon: Dioni Salazar MD;  [facility];  Service: Cardiology;  Laterality: N/A;    CARDIAC CATHETERIZATION N/A 01/22/2021    Procedure: Percutaneous Coronary Intervention;  Surgeon: Dioni Salazar MD;  Location: [facility] CATH INVASIVE LOCATION;  Service: Cardiology;  Laterality: N/A;    CARDIAC CATHETERIZATION N/A 01/22/2021    Procedure: Stent RAZA coronary;  Surgeon: Dioni Salazar MD;  Location: [facility] CATH INVASIVE LOCATION;  Service: Cardiology;  Laterality: N/A;    CARDIAC CATHETERIZATION N/A 03/22/2024    Procedure: Left Heart Cath;  Surgeon: Kiko Evans MD;  Location: [facility] CATH INVASIVE LOCATION;  Service: Cardiovascular;  Laterality: N/A;    CARDIAC CATHETERIZATION N/A 03/22/2024    Procedure: Coronary angiography;  Surgeon: Kiko Evans MD;  Location: [facility] CATH INVASIVE LOCATION;  Service: Cardiovascular;  Laterality: N/A;    CARDIAC CATHETERIZATION N/A 03/22/2024    Procedure: Resting Full Cycle Ratio;  Surgeon: Kiko Evans MD;  Location: [facility] CATH INVASIVE LOCATION;  Service: Cardiovascular;  Laterality: N/A;    CARDIAC CATHETERIZATION N/A 03/22/2024    Procedure: Percutaneous Coronary Intervention;  Surgeon: Kiko Evans MD;  Location: [facility] CATH INVASIVE LOCATION;  Service: Cardiovascular;  Laterality: N/A;    CARDIAC CATHETERIZATION N/A 03/22/2024    Procedure: Stent RAZA coronary;   Surgeon: Kiko Evans MD;  Location: [facility] CATH INVASIVE LOCATION;  Service: Cardiovascular;  Laterality: N/A;    CARDIAC CATHETERIZATION N/A 03/22/2024    Procedure: Optical Coherence Tomography;  Surgeon: Kiko Evans MD;  [facility];  Service: Cardiovascular;  Laterality: N/A;    CARDIAC CATHETERIZATION N/A 5/7/2025    Procedure: Left Heart Cath;  Surgeon: Jami Mars MD;  Location: [facility] CATH INVASIVE LOCATION;  Service: Cardiovascular;  Laterality: N/A;    CARDIAC SURGERY      3 stents total    CATARACT EXTRACTION Bilateral     COLONOSCOPY N/A 02/02/2011    Normal colon except scattered diverticulosis-Dr. Guero Blunt    COLONOSCOPY N/A 04/08/2021    Procedure: COLONOSCOPY TO CECUM WITH POLYPECTOMY (COLD BX);  Surgeon: Porsha Arcos MD;  Location: [facility] ENDOSCOPY;  Service: General;  Laterality: N/A;  SCREENING; HX OF COLON POLYPS  POST:DIVERTICULOSIS; COLON POLYP    CORONARY STENT PLACEMENT      CYST REMOVAL  03/2016    x2  FROM FACE AND EAR    DENTAL PROCEDURE  2008    teeth removed; dental implants    EPIDURAL BLOCK  1995    L/S spine    FINGER DEBRIDEMENT Right 07/12/2003    Debridement and full thickness skin grafting of the ring and small fingers of the right hand-Dr. Rayray Long    FOOT SURGERY Left     bone spurs    HAND SURGERY  2003    INCISION AND DRAINAGE PERIRECTAL ABSCESS N/A 07/10/2018    Procedure: INCISION AND DRAINAGE OF PERIRECTAL ABSCESS;  Surgeon: Porsha Arcos MD;  Location: [facility] MAIN OR;  Service: General    INCISION AND DRAINAGE TRUNK N/A 04/23/2022    Procedure: Evacuation lumbar hematoma;  Surgeon: Jacky Perez MD;  Location: [facility] MAIN OR;  Service: Orthopedics;  Laterality: N/A;    LASIK Bilateral 12/2024    LUMBAR DISCECTOMY FUSION INSTRUMENTATION Left 10/10/2016    Procedure: LEFT L2-L3, L3-L4 LUMBAR LAMINECTOMY/ DISCECTOMY WITH METRIX ;  Surgeon: Sawyer Rick MD;  Location: [facility] MAIN OR;  Service:      LUMBAR DISCECTOMY FUSION INSTRUMENTATION N/A 07/31/2017    Procedure: LUMBAR FUSION DECOMPRESSON WITH PEDICLE SCREWS with LAURA L2-3,L3-4;  Surgeon: Jacky Perez MD;  Location: [facility] MAIN OR;  Service:     LUMBAR FUSION N/A 04/13/2022    Procedure: Thoracic 10-thoracic 11, thoracic 11-thoracic 12, thoracic 12-lumbar 1, lumbar 1-lumbar 2, lumbar 2-lumbar 3, lumbar 3-lumbar 4 fusion with instrumentation with iliac crest bone graft, and removal of implants from lumbar 2-lumbar 3, lumbar 3-lumbar 4;  Surgeon: Jacky Perez MD;  Location: [facility] MAIN OR;  Service: Orthopedic Spine;  Laterality: N/A;    LUMBAR FUSION N/A 04/13/2022    Procedure: LUMBAR ANTERIOR INTERBODY FUSION L4,L5 Osteotomy interbody fusion with cage L1,L2;  Surgeon: Jacky Perez MD;  Location: [facility] MAIN OR;  Service: Orthopedic Spine;  Laterality: N/A;    LUMBAR LAMINECTOMY DISCECTOMY DECOMPRESSION N/A 07/31/2017    Procedure: L2 to L4 laminectomy with neural lysis and a fusion by orthopedics;  Surgeon: Sawyer Rick MD;  Location: [facility] OR;  Service:     LUMBAR LAMINECTOMY DISCECTOMY DECOMPRESSION N/A 09/05/2017    Procedure: I&D LUMBAR SPINE ;  Surgeon: Jacky Perez MD;  Location: [facility] OR;  Service:     SHOULDER ARTHROSCOPY      SINUS SURGERY  2003    Dr. Barrera    SPINAL FUSION      SPINE SURGERY  2010    TOTAL SHOULDER REPLACEMENT Right 09/07/2023    TRIGGER POINT INJECTION         Family History   Problem Relation Age of Onset    Diabetes Mother     Heart disease Mother     Hyperlipidemia Mother     Stroke Mother     Alcohol abuse Mother     Heart disease Father     Rheum arthritis Father     Alcohol abuse Father     Hyperlipidemia Father     Heart attack Father     Hearing loss Father     Depression Brother     Cancer Brother         prostate    Depression Brother     Heart disease Brother     Hyperlipidemia Brother     Cancer Brother         Haert desease    Thyroid disease Sister     Arthritis Sister      Asthma Son     COPD Son     COPD Son     Cancer Brother         Haert desease    Depression Brother     Hyperlipidemia Brother     Heart disease Brother     Arthritis Sister         Kathia    Thyroid disease Sister     Malig Hyperthermia Neg Hx        Social History     Socioeconomic History    Marital status:     Number of children: 2   Tobacco Use    Smoking status: Former     Current packs/day: 0.00     Average packs/day: 1 pack/day for 50.0 years (50.0 ttl pk-yrs)     Types: Cigarettes     Start date: 10/20/1971     Quit date: 10/20/2021     Years since quitting: 3.5     Passive exposure: Never    Smokeless tobacco: Never    Tobacco comments:     Current status is non smoker   Vaping Use    Vaping status: Never Used   Substance and Sexual Activity    Alcohol use: No    Drug use: No    Sexual activity: Not Currently     Partners: Female     Birth control/protection: None           Objective   Physical Exam  Vitals and nursing note reviewed.   Constitutional:       General: He is not in acute distress.     Appearance: Normal appearance. He is not ill-appearing, toxic-appearing or diaphoretic.   HENT:      Head: Normocephalic and atraumatic.      Right Ear: Tympanic membrane normal.      Left Ear: Tympanic membrane normal.      Nose: Congestion present.      Mouth/Throat:      Mouth: Mucous membranes are moist.      Pharynx: No oropharyngeal exudate or posterior oropharyngeal erythema.   Eyes:      Extraocular Movements: Extraocular movements intact.      Conjunctiva/sclera: Conjunctivae normal.      Pupils: Pupils are equal, round, and reactive to light.   Cardiovascular:      Rate and Rhythm: Normal rate and regular rhythm.      Heart sounds: Normal heart sounds.   Pulmonary:      Effort: Pulmonary effort is normal. No respiratory distress.      Breath sounds: Normal breath sounds. No stridor.   Musculoskeletal:         General: Normal range of motion.      Cervical back: Normal range of motion and neck  supple. No rigidity or tenderness.   Skin:     General: Skin is warm and dry.      Capillary Refill: Capillary refill takes less than 2 seconds.   Neurological:      General: No focal deficit present.      Mental Status: He is alert and oriented to person, place, and time.   Psychiatric:         Mood and Affect: Mood normal.         Procedures           ED Course                                           Medical Decision Making  Patient is strep screen is negative the patient probably has sinusitis sinus pressure and yellow drainage.  The patient's going to be treated with Augmentin we will give him his first dose here and then a prescription of the same.    Amount and/or Complexity of Data Reviewed  Radiology: ordered.     Details: Drug screen is negative.        Final diagnoses:   Acute maxillary sinusitis, recurrence not specified       ED Disposition  ED Disposition       ED Disposition   Discharge    Condition   Stable    Comment   --               Montse Cain, PAFigueroaC  [id number] [id number] [phone]    In 1 week           Medication List        New Prescriptions      amoxicillin-clavulanate 875-125 MG per tablet  Commonly known as: AUGMENTIN  Take 1 tablet by mouth 2 (Two) Times a Day.               Where to Get Your Medications        These medications were sent to [facility]/pharmacy #[id number] - [place], [place] - [address] AT [facility] - [phone] [facility] [phone] [place] [address], [facility] [address]      Phone: [phone]   amoxicillin-clavulanate 875-125 MG per tablet

## 2025-05-26 NOTE — DISCHARGE INSTRUCTIONS
Initially antibiotic until gone.  Urine to the emergency department if fever greater than 100.4 and you are not getting better.  Call and follow-up with your primary.

## 2025-05-28 ENCOUNTER — OFFICE VISIT (OUTPATIENT)
Dept: PAIN MEDICINE | Facility: CLINIC | Age: 70
End: 2025-05-28
Payer: MEDICARE

## 2025-05-28 VITALS
SYSTOLIC BLOOD PRESSURE: 137 MMHG | WEIGHT: 250 LBS | HEIGHT: 70 IN | DIASTOLIC BLOOD PRESSURE: 81 MMHG | HEART RATE: 83 BPM | OXYGEN SATURATION: 95 % | TEMPERATURE: 98.7 F | RESPIRATION RATE: 18 BRPM | BODY MASS INDEX: 35.79 KG/M2

## 2025-05-28 DIAGNOSIS — M54.16 LUMBAR RADICULOPATHY: ICD-10-CM

## 2025-05-28 DIAGNOSIS — Z79.899 HIGH RISK MEDICATION USE: ICD-10-CM

## 2025-05-28 DIAGNOSIS — Z98.890 HISTORY OF LUMBAR SURGERY: ICD-10-CM

## 2025-05-28 DIAGNOSIS — G89.4 CHRONIC PAIN SYNDROME: Primary | ICD-10-CM

## 2025-05-28 DIAGNOSIS — T40.2X5A THERAPEUTIC OPIOID INDUCED CONSTIPATION: ICD-10-CM

## 2025-05-28 DIAGNOSIS — K59.03 THERAPEUTIC OPIOID INDUCED CONSTIPATION: ICD-10-CM

## 2025-05-28 RX ORDER — BUPRENORPHINE 10 UG/H
1 PATCH TRANSDERMAL
Qty: 4 PATCH | Refills: 0 | Status: SHIPPED | OUTPATIENT
Start: 2025-05-28

## 2025-05-28 RX ORDER — FLUTICASONE FUROATE, UMECLIDINIUM BROMIDE AND VILANTEROL TRIFENATATE 100; 62.5; 25 UG/1; UG/1; UG/1
1 POWDER RESPIRATORY (INHALATION)
COMMUNITY
Start: 2025-05-21

## 2025-05-28 NOTE — PROGRESS NOTES
CHIEF COMPLAINT  Follow-up for back pain.    Subjective   Prem Thrasher is a 70 y.o. male  who presents for follow-up.  He has a history of chronic back pain.      Pain today 7/10 VAS.  He is maintained on Butrans 7.5 mcg/hr patch.  Also continues to utilize Lyrica 100 mg 2-3/day.  Constipation is much better since stopping oral pain medications. He also reports overall pain control feels improved. He is able to perform household tasks and ADL's independently.  Mild drowsiness initially which seems to have improved.  He is also sleeping better with fewer interruptions.  He is interested in increasing the patch to gain better overall pain control.       He is not a candidate for NSAIDs (plavix, cardiac hx).     Pertinent surgical history ---   4/13/2022 - lumbar fusion (Dr. Perez). Required evacuation of lumbar hematoma 4/23/2022  Lumbar fusion 2017 (Dr. Perez, Dr. Rick). Required second surgery to clean out infection   Back surgery 2010     Past Medications ---  Hydrocodone - constipation  Gabapentin - constipation     Previous interventions that the patient has received include:   Epidurals (no longer candidate due to infection history)    Back Pain  Chronicity:  Chronic  Onset:  More than 1 year ago  Frequency:  Daily  Progression since onset:  Unchanged  Pain location:  Lumbar spine  Pain quality:  Aching, cramping and burning  Radiates to:  Left thigh and right thigh  Pain-numeric:  7/10  Aggravated by:  Position, sitting, standing, twisting and bending  Associated symptoms: headaches, numbness (left foot) and weakness    Associated symptoms: no abdominal pain, no chest pain, no dysuria and no fever    Treatments tried:  Analgesics, muscle relaxant, heat, ice and home exercises  Improvement on treatment:  Moderate     PEG Assessment   What number best describes your pain on average in the past week?7  What number best describes how, during the past week, pain has interfered with your enjoyment of  "life?10  What number best describes how, during the past week, pain has interfered with your general activity?  4    Review of Pertinent Medical Data ---    The following portions of the patient's history were reviewed and updated as appropriate: allergies, current medications, past family history, past medical history, past social history, past surgical history, and problem list.    Review of Systems   Constitutional:  Negative for fever.   Cardiovascular:  Negative for chest pain.   Gastrointestinal:  Positive for constipation. Negative for abdominal pain and diarrhea.   Genitourinary:  Negative for difficulty urinating and dysuria.   Musculoskeletal:  Positive for back pain.   Neurological:  Positive for weakness, numbness (left foot) and headaches.   Psychiatric/Behavioral:  Negative for sleep disturbance and suicidal ideas. The patient is nervous/anxious.      I have reviewed and confirmed the accuracy of the ROS as documented by the MA/LPN/RN TEN Camp    Vitals:    05/28/25 1042   BP: 137/81   Pulse: 83   Resp: 18   Temp: 98.7 °F (37.1 °C)   SpO2: 95%   Weight: 113 kg (250 lb)   Height: 177.8 cm (70\")   PainSc: 7    PainLoc: Back         Objective   Physical Exam  Vitals and nursing note reviewed.   Constitutional:       General: He is not in acute distress.     Appearance: Normal appearance. He is not ill-appearing.   Pulmonary:      Effort: Pulmonary effort is normal. No respiratory distress.   Musculoskeletal:      Lumbar back: Tenderness and bony tenderness present. Positive right straight leg raise test and positive left straight leg raise test.   Neurological:      Mental Status: He is alert and oriented to person, place, and time.      Gait: Gait abnormal (slowed, antalgia, ambulates with cane).   Psychiatric:         Mood and Affect: Mood normal.         Behavior: Behavior normal.         Assessment & Plan   Diagnoses and all orders for this visit:    1. Chronic pain syndrome " (Primary)    2. History of lumbar surgery    3. Lumbar radiculopathy    4. Therapeutic opioid induced constipation    5. High risk medication use      --- Increase Butrans patch to 10 mcg/hr. Patient appears stable with current regimen. No adverse effects. Regarding continuation of opioids, there is no evidence of aberrant behavior or any red flags.  The patient continues with appropriate response to opioid therapy. ADL's remain intact by self.   --- The urine drug screen confirmation from 3/28/2025 has been reviewed and the result is appropriate based on patient history and JESSICA report  --- The patient signed an updated copy of the controlled substance agreement on 10/11/2024  --- ORT - high risk.  Prescribed medication has a low risk for abuse/misuse/diversion.  Overall risk stratified as moderate    Prem Thrasher reports a pain score of 7.  Given his pain assessment as noted, treatment options were discussed and the following options were decided upon as a follow-up plan to address the patient's pain: continuation of current treatment plan for pain.    --- Follow-up 1 month                  JESSICA REPORT  As part of the patient's treatment plan, I am prescribing controlled substances. The patient has been made aware of appropriate use of such medications, including potential risk of somnolence, limited ability to drive and/or work safely, and the potential for dependence or overdose. It has also been made clear that these medications are for use by this patient only, without concomitant use of alcohol or other substances unless prescribed.     Patient has completed prescribing agreement detailing terms of continued prescribing of controlled substances, including monitoring JESSICA reports, urine drug screening, and pill counts if necessary. The patient is aware that inappropriate use will results in cessation of prescribing such medications.    As the clinician, I personally reviewed the JESSICA from 5/28/2025  while the patient was in the office today.    History and physical exam exhibit continued safe and appropriate use of controlled substances.       Dictated utilizing Dragon dictation.

## 2025-05-29 DIAGNOSIS — I10 PRIMARY HYPERTENSION: ICD-10-CM

## 2025-05-29 RX ORDER — AMLODIPINE BESYLATE 5 MG/1
5 TABLET ORAL DAILY
Qty: 90 TABLET | Refills: 3 | OUTPATIENT
Start: 2025-05-29

## 2025-05-29 NOTE — TELEPHONE ENCOUNTER
Rx Refill Note  Requested Prescriptions     Pending Prescriptions Disp Refills    amLODIPine (NORVASC) 5 MG tablet [Pharmacy Med Name: amLODIPine Besylate Oral Tablet 5 MG] 90 tablet 3     Sig: TAKE 1 TABLET EVERY DAY FOR BLOOD PRESSURE      Last office visit with prescribing clinician: 6/6/2024   Last telemedicine visit with prescribing clinician: 2/11/2025   Next office visit with prescribing clinician: Visit date not found                         Would you like a call back once the refill request has been completed: [] Yes [] No    If the office needs to give you a call back, can they leave a voicemail: [] Yes [] No    Corazon Angulo MA  05/29/25, 11:45 EDT

## 2025-06-06 ENCOUNTER — TELEMEDICINE (OUTPATIENT)
Dept: FAMILY MEDICINE CLINIC | Facility: CLINIC | Age: 70
End: 2025-06-06
Payer: MEDICARE

## 2025-06-06 DIAGNOSIS — J44.1 COPD WITH EXACERBATION: Primary | ICD-10-CM

## 2025-06-06 PROCEDURE — 99213 OFFICE O/P EST LOW 20 MIN: CPT | Performed by: NURSE PRACTITIONER

## 2025-06-06 PROCEDURE — 3044F HG A1C LEVEL LT 7.0%: CPT | Performed by: NURSE PRACTITIONER

## 2025-06-06 PROCEDURE — 1125F AMNT PAIN NOTED PAIN PRSNT: CPT | Performed by: NURSE PRACTITIONER

## 2025-06-06 RX ORDER — AZITHROMYCIN 500 MG/1
500 TABLET, FILM COATED ORAL DAILY
Qty: 5 TABLET | Refills: 0 | Status: SHIPPED | OUTPATIENT
Start: 2025-06-06

## 2025-06-06 RX ORDER — PREDNISONE 20 MG/1
20 TABLET ORAL 2 TIMES DAILY
Qty: 14 TABLET | Refills: 0 | Status: SHIPPED | OUTPATIENT
Start: 2025-06-06 | End: 2025-06-11

## 2025-06-07 NOTE — PROGRESS NOTES
See session report   We will repeat echo in September for evaluation of aortic stenosis.  The patient left atrial volume was high, discussed with patient the findings in detail.  Orders:    CBC & Differential; Future    Basic Metabolic Panel; Future    Lipid Panel; Future    dapagliflozin Propanediol (Farxiga) 10 MG tablet; Take 10 mg by mouth Daily.

## 2025-06-10 DIAGNOSIS — J44.1 COPD WITH EXACERBATION: ICD-10-CM

## 2025-06-10 RX ORDER — AZITHROMYCIN 500 MG/1
500 TABLET, FILM COATED ORAL DAILY
Qty: 5 TABLET | Refills: 0 | Status: SHIPPED | OUTPATIENT
Start: 2025-06-10

## 2025-06-10 RX ORDER — PREDNISONE 20 MG/1
20 TABLET ORAL 2 TIMES DAILY
Qty: 14 TABLET | Refills: 0 | Status: SHIPPED | OUTPATIENT
Start: 2025-06-10 | End: 2025-06-15

## 2025-06-25 ENCOUNTER — OFFICE VISIT (OUTPATIENT)
Dept: PAIN MEDICINE | Facility: CLINIC | Age: 70
End: 2025-06-25
Payer: MEDICARE

## 2025-06-25 VITALS
DIASTOLIC BLOOD PRESSURE: 71 MMHG | RESPIRATION RATE: 18 BRPM | TEMPERATURE: 96.9 F | HEIGHT: 70 IN | HEART RATE: 81 BPM | SYSTOLIC BLOOD PRESSURE: 136 MMHG | OXYGEN SATURATION: 94 % | WEIGHT: 251 LBS | BODY MASS INDEX: 35.93 KG/M2

## 2025-06-25 DIAGNOSIS — Z98.890 HISTORY OF LUMBAR SURGERY: ICD-10-CM

## 2025-06-25 DIAGNOSIS — T40.2X5A THERAPEUTIC OPIOID INDUCED CONSTIPATION: ICD-10-CM

## 2025-06-25 DIAGNOSIS — M54.16 LUMBAR RADICULOPATHY: ICD-10-CM

## 2025-06-25 DIAGNOSIS — Z79.899 HIGH RISK MEDICATION USE: ICD-10-CM

## 2025-06-25 DIAGNOSIS — K59.03 THERAPEUTIC OPIOID INDUCED CONSTIPATION: ICD-10-CM

## 2025-06-25 DIAGNOSIS — G89.4 CHRONIC PAIN SYNDROME: Primary | ICD-10-CM

## 2025-06-25 RX ORDER — BUPRENORPHINE 10 UG/H
1 PATCH TRANSDERMAL
Qty: 4 PATCH | Refills: 0 | Status: SHIPPED | OUTPATIENT
Start: 2025-06-25 | End: 2025-06-30 | Stop reason: SDUPTHER

## 2025-06-25 NOTE — PROGRESS NOTES
CHIEF COMPLAINT  Back pain  Patient reports his back pain has gotten better since his last ov.    Subjective   Prem Thrasher is a 70 y.o. male  who presents for follow-up.  He has a history of chronic back pain.  Pain today 5/10 VAS (Improved).  He is maintained on Butrans 10 mcg/hr patch.  Also continues to utilize Lyrica 100 mg 2-3/day.  Constipation is much better since stopping oral pain medications, able to rely on OTC's at this time. He also reports overall pain control feels improved. He is able to perform household tasks and ADL's independently.  Mild drowsiness initially which seems to have improved.  He is also sleeping better with fewer interruptions.  He reports improvement in mobility and range of motion over the past month, has been working on this since increasing his patch last month.       He is not a candidate for NSAIDs (plavix, cardiac hx).     Pertinent surgical history ---   4/13/2022 - lumbar fusion (Dr. Perez). Required evacuation of lumbar hematoma 4/23/2022  Lumbar fusion 2017 (Dr. Perez, Dr. Rick). Required second surgery to clean out infection   Back surgery 2010     Past Medications ---  Hydrocodone - constipation  Gabapentin - constipation     Previous interventions that the patient has received include:   Epidurals (no longer candidate due to infection history)    Back Pain  Chronicity:  Chronic  Onset:  More than 1 year ago  Frequency:  Daily  Progression since onset:  Unchanged  Pain location:  Lumbar spine  Pain quality:  Aching, cramping and burning  Radiates to:  Left thigh and right thigh  Pain-numeric:  5/10  Aggravated by:  Position, sitting, standing, twisting and bending  Associated symptoms: headaches, numbness (bilateral shoulders and left leg) and weakness (bilateral arms)    Associated symptoms: no abdominal pain, no chest pain, no dysuria and no fever    Treatments tried:  Analgesics, muscle relaxant, heat, ice and home exercises  Improvement on treatment:   "Moderate       PEG Assessment   What number best describes your pain on average in the past week?5  What number best describes how, during the past week, pain has interfered with your enjoyment of life?9  What number best describes how, during the past week, pain has interfered with your general activity?  8    Review of Pertinent Medical Data ---    The following portions of the patient's history were reviewed and updated as appropriate: allergies, current medications, past family history, past medical history, past social history, past surgical history, and problem list.    Review of Systems   Constitutional:  Negative for activity change and fever.   Cardiovascular:  Negative for chest pain.   Gastrointestinal:  Negative for abdominal pain, constipation and diarrhea.   Genitourinary:  Negative for difficulty urinating and dysuria.   Musculoskeletal:  Positive for back pain.   Neurological:  Positive for weakness (bilateral arms), numbness (bilateral shoulders and left leg) and headaches.   Psychiatric/Behavioral:  Positive for sleep disturbance. Negative for self-injury and suicidal ideas.      I have reviewed and confirmed the accuracy of the ROS as documented by the MA/LPN/RN TEN Camp    Vitals:    06/25/25 1131   BP: 136/71   Pulse: 81   Resp: 18   Temp: 96.9 °F (36.1 °C)   SpO2: 94%   Weight: 114 kg (251 lb)   Height: 177.8 cm (70\")   PainSc: 5      Objective   Physical Exam  Vitals and nursing note reviewed.   Constitutional:       General: He is not in acute distress.     Appearance: Normal appearance. He is not ill-appearing.   Pulmonary:      Effort: Pulmonary effort is normal. No respiratory distress.   Musculoskeletal:      Lumbar back: Tenderness and bony tenderness present. Negative right straight leg raise test and negative left straight leg raise test.   Neurological:      Mental Status: He is alert and oriented to person, place, and time.      Gait: Gait abnormal (slowed, antalgia, " ambulates with cane).   Psychiatric:         Mood and Affect: Mood normal.         Behavior: Behavior normal.       Assessment & Plan   Diagnoses and all orders for this visit:    1. Chronic pain syndrome (Primary)  -     Buprenorphine 10 MCG/HR patch weekly; Place 1 patch on the skin as directed by provider Every 7 (Seven) Days.  Dispense: 4 patch; Refill: 0    2. History of lumbar surgery  -     Buprenorphine 10 MCG/HR patch weekly; Place 1 patch on the skin as directed by provider Every 7 (Seven) Days.  Dispense: 4 patch; Refill: 0    3. Lumbar radiculopathy  -     Buprenorphine 10 MCG/HR patch weekly; Place 1 patch on the skin as directed by provider Every 7 (Seven) Days.  Dispense: 4 patch; Refill: 0    4. Therapeutic opioid induced constipation  -     Buprenorphine 10 MCG/HR patch weekly; Place 1 patch on the skin as directed by provider Every 7 (Seven) Days.  Dispense: 4 patch; Refill: 0    5. High risk medication use  -     Buprenorphine 10 MCG/HR patch weekly; Place 1 patch on the skin as directed by provider Every 7 (Seven) Days.  Dispense: 4 patch; Refill: 0      --- Refill Butrans. Patient appears stable with current regimen. No adverse effects. Regarding continuation of opioids, there is no evidence of aberrant behavior or any red flags.  The patient continues with appropriate response to opioid therapy. ADL's remain intact by self.   --- The urine drug screen confirmation from 3/28/2025 has been reviewed and the result is appropriate based on patient history and JESSICA report  --- The patient signed an updated copy of the controlled substance agreement on 10/11/2024  --- ORT - high risk.  Prescribed medication has a low risk for abuse/misuse/diversion.  Overall risk stratified as moderate    Prem Thrasher reports a pain score of 5.  Given his pain assessment as noted, treatment options were discussed and the following options were decided upon as a follow-up plan to address the patient's pain:  continuation of current treatment plan for pain.    --- Follow-up 2 months        JESSICA REPORT  As part of the patient's treatment plan, I am prescribing controlled substances. The patient has been made aware of appropriate use of such medications, including potential risk of somnolence, limited ability to drive and/or work safely, and the potential for dependence or overdose. It has also been made clear that these medications are for use by this patient only, without concomitant use of alcohol or other substances unless prescribed.     Patient has completed prescribing agreement detailing terms of continued prescribing of controlled substances, including monitoring JESSICA reports, urine drug screening, and pill counts if necessary. The patient is aware that inappropriate use will results in cessation of prescribing such medications.    As the clinician, I personally reviewed the JESSICA from 6/25/2025 while the patient was in the office today.    History and physical exam exhibit continued safe and appropriate use of controlled substances.    Dictated utilizing Dragon dictation.

## 2025-06-27 ENCOUNTER — TELEPHONE (OUTPATIENT)
Dept: PAIN MEDICINE | Facility: CLINIC | Age: 70
End: 2025-06-27
Payer: MEDICARE

## 2025-06-27 DIAGNOSIS — M54.16 LUMBAR RADICULOPATHY: ICD-10-CM

## 2025-06-27 DIAGNOSIS — Z79.899 HIGH RISK MEDICATION USE: ICD-10-CM

## 2025-06-27 DIAGNOSIS — K59.03 THERAPEUTIC OPIOID INDUCED CONSTIPATION: ICD-10-CM

## 2025-06-27 DIAGNOSIS — Z98.890 HISTORY OF LUMBAR SURGERY: ICD-10-CM

## 2025-06-27 DIAGNOSIS — T40.2X5A THERAPEUTIC OPIOID INDUCED CONSTIPATION: ICD-10-CM

## 2025-06-27 DIAGNOSIS — G89.4 CHRONIC PAIN SYNDROME: ICD-10-CM

## 2025-06-29 DIAGNOSIS — I10 PRIMARY HYPERTENSION: ICD-10-CM

## 2025-06-29 RX ORDER — CYCLOBENZAPRINE HCL 10 MG
10 TABLET ORAL 2 TIMES DAILY
Qty: 180 TABLET | Refills: 0 | Status: SHIPPED | OUTPATIENT
Start: 2025-06-29

## 2025-06-29 RX ORDER — AMLODIPINE BESYLATE 5 MG/1
5 TABLET ORAL DAILY
Qty: 90 TABLET | Refills: 0 | Status: SHIPPED | OUTPATIENT
Start: 2025-06-29

## 2025-06-30 DIAGNOSIS — Z79.899 HIGH RISK MEDICATION USE: ICD-10-CM

## 2025-06-30 DIAGNOSIS — Z98.890 HISTORY OF LUMBAR SURGERY: ICD-10-CM

## 2025-06-30 DIAGNOSIS — G89.4 CHRONIC PAIN SYNDROME: ICD-10-CM

## 2025-06-30 DIAGNOSIS — M54.16 LUMBAR RADICULOPATHY: ICD-10-CM

## 2025-06-30 DIAGNOSIS — K59.03 THERAPEUTIC OPIOID INDUCED CONSTIPATION: ICD-10-CM

## 2025-06-30 DIAGNOSIS — T40.2X5A THERAPEUTIC OPIOID INDUCED CONSTIPATION: ICD-10-CM

## 2025-06-30 RX ORDER — BUPRENORPHINE 10 UG/H
1 PATCH TRANSDERMAL
Qty: 4 PATCH | Refills: 0 | OUTPATIENT
Start: 2025-06-30

## 2025-06-30 RX ORDER — BUPRENORPHINE 10 UG/H
1 PATCH TRANSDERMAL
Qty: 4 PATCH | Refills: 0 | Status: SHIPPED | OUTPATIENT
Start: 2025-06-30

## 2025-06-30 NOTE — TELEPHONE ENCOUNTER
PT CALLED AND STATED THAT [facility]OULD FILL IT IF WE CAN SEND THE ORDER TO THEM

## 2025-06-30 NOTE — TELEPHONE ENCOUNTER
Spoke to both pharmacy and patient. [facility] states the medication is out of stock and no date of when medication will be in stock. Spoke to patient and advised he contact other pharmacies to see if they have the medication available.

## 2025-06-30 NOTE — TELEPHONE ENCOUNTER
Please cancel the rx at the [facility]--resending to [facility].  Covering for TEN Perez who is out of the office.  Reviewed GAURAV and JESSICA. Both updated and appropriate. Refill appropriate.

## 2025-07-10 DIAGNOSIS — M54.16 LUMBAR RADICULOPATHY: ICD-10-CM

## 2025-07-10 DIAGNOSIS — Z79.899 HIGH RISK MEDICATION USE: ICD-10-CM

## 2025-07-10 DIAGNOSIS — K59.03 THERAPEUTIC OPIOID INDUCED CONSTIPATION: ICD-10-CM

## 2025-07-10 DIAGNOSIS — T40.2X5A THERAPEUTIC OPIOID INDUCED CONSTIPATION: ICD-10-CM

## 2025-07-10 DIAGNOSIS — G89.4 CHRONIC PAIN SYNDROME: ICD-10-CM

## 2025-07-10 DIAGNOSIS — Z98.890 HISTORY OF LUMBAR SURGERY: ICD-10-CM

## 2025-07-11 ENCOUNTER — TELEPHONE (OUTPATIENT)
Dept: PAIN MEDICINE | Facility: CLINIC | Age: 70
End: 2025-07-11
Payer: MEDICARE

## 2025-07-11 DIAGNOSIS — Z98.890 HISTORY OF LUMBAR SURGERY: ICD-10-CM

## 2025-07-11 DIAGNOSIS — G89.4 CHRONIC PAIN SYNDROME: ICD-10-CM

## 2025-07-11 DIAGNOSIS — M54.16 LUMBAR RADICULOPATHY: Primary | ICD-10-CM

## 2025-07-11 RX ORDER — TRAMADOL HYDROCHLORIDE 50 MG/1
50 TABLET ORAL EVERY 6 HOURS PRN
Qty: 30 TABLET | Refills: 0 | Status: SHIPPED | OUTPATIENT
Start: 2025-07-11

## 2025-07-11 RX ORDER — BUPRENORPHINE 10 UG/H
PATCH TRANSDERMAL
Qty: 4 PATCH | OUTPATIENT
Start: 2025-07-11

## 2025-07-11 NOTE — TELEPHONE ENCOUNTER
Patient called and stated that  has been unable to find a pharmacy that has the butrans patch in stock. He would like you to send in  tramadol if possible.

## 2025-07-11 NOTE — TELEPHONE ENCOUNTER
Sent a 1 week supply of Tramadol.  Hopefully he can get the patch filled in the next week.  Has his pharmacy at least ordered the Butrans patch?

## 2025-07-14 ENCOUNTER — APPOINTMENT (OUTPATIENT)
Dept: GENERAL RADIOLOGY | Facility: HOSPITAL | Age: 70
End: 2025-07-14
Payer: MEDICARE

## 2025-07-14 ENCOUNTER — TELEPHONE (OUTPATIENT)
Dept: PAIN MEDICINE | Facility: CLINIC | Age: 70
End: 2025-07-14

## 2025-07-14 ENCOUNTER — HOSPITAL ENCOUNTER (EMERGENCY)
Facility: HOSPITAL | Age: 70
Discharge: HOME | End: 2025-07-14
Attending: EMERGENCY MEDICINE | Admitting: EMERGENCY MEDICINE
Payer: MEDICARE

## 2025-07-14 ENCOUNTER — RESULTS FOLLOW-UP (OUTPATIENT)
Dept: EMERGENCY DEPT | Facility: HOSPITAL | Age: 70
End: 2025-07-14
Payer: MEDICARE

## 2025-07-14 VITALS
DIASTOLIC BLOOD PRESSURE: 88 MMHG | TEMPERATURE: 98.2 F | RESPIRATION RATE: 18 BRPM | OXYGEN SATURATION: 90 % | SYSTOLIC BLOOD PRESSURE: 133 MMHG | HEART RATE: 51 BPM

## 2025-07-14 DIAGNOSIS — R07.89 ACUTE CHEST WALL PAIN: ICD-10-CM

## 2025-07-14 DIAGNOSIS — R07.89 ATYPICAL CHEST PAIN: Primary | ICD-10-CM

## 2025-07-14 LAB
ALBUMIN SERPL-MCNC: 4.5 G/DL (ref 3.5–5.2)
ALBUMIN/GLOB SERPL: 1.5 G/DL
ALP SERPL-CCNC: 77 U/L (ref 39–117)
ALT SERPL W P-5'-P-CCNC: 21 U/L (ref 1–41)
ANION GAP SERPL CALCULATED.3IONS-SCNC: 14.3 MMOL/L (ref 5–15)
AST SERPL-CCNC: 22 U/L (ref 1–40)
BASOPHILS # BLD AUTO: 0.1 10*3/MM3 (ref 0–0.2)
BASOPHILS NFR BLD AUTO: 0.9 % (ref 0–1.5)
BILIRUB SERPL-MCNC: 0.5 MG/DL (ref 0–1.2)
BUN SERPL-MCNC: 14 MG/DL (ref 8–23)
BUN/CREAT SERPL: 12.7 (ref 7–25)
CALCIUM SPEC-SCNC: 9.4 MG/DL (ref 8.6–10.5)
CHLORIDE SERPL-SCNC: 97 MMOL/L (ref 98–107)
CO2 SERPL-SCNC: 26.7 MMOL/L (ref 22–29)
CREAT SERPL-MCNC: 1.1 MG/DL (ref 0.76–1.27)
DEPRECATED RDW RBC AUTO: 42.8 FL (ref 37–54)
EGFRCR SERPLBLD CKD-EPI 2021: 71.8 ML/MIN/1.73
EOSINOPHIL # BLD AUTO: 0.19 10*3/MM3 (ref 0–0.4)
EOSINOPHIL NFR BLD AUTO: 1.7 % (ref 0.3–6.2)
ERYTHROCYTE [DISTWIDTH] IN BLOOD BY AUTOMATED COUNT: 13.4 % (ref 12.3–15.4)
GEN 5 1HR TROPONIN T REFLEX: 14 NG/L
GLOBULIN UR ELPH-MCNC: 3.1 GM/DL
GLUCOSE SERPL-MCNC: 97 MG/DL (ref 65–99)
HCT VFR BLD AUTO: 41.4 % (ref 37.5–51)
HGB BLD-MCNC: 13.1 G/DL (ref 13–17.7)
HOLD SPECIMEN: NORMAL
HOLD SPECIMEN: NORMAL
IMM GRANULOCYTES # BLD AUTO: 0.02 10*3/MM3 (ref 0–0.05)
IMM GRANULOCYTES NFR BLD AUTO: 0.2 % (ref 0–0.5)
LYMPHOCYTES # BLD AUTO: 1.93 10*3/MM3 (ref 0.7–3.1)
LYMPHOCYTES NFR BLD AUTO: 17.5 % (ref 19.6–45.3)
MCH RBC QN AUTO: 27.5 PG (ref 26.6–33)
MCHC RBC AUTO-ENTMCNC: 31.6 G/DL (ref 31.5–35.7)
MCV RBC AUTO: 86.8 FL (ref 79–97)
MONOCYTES # BLD AUTO: 1.12 10*3/MM3 (ref 0.1–0.9)
MONOCYTES NFR BLD AUTO: 10.1 % (ref 5–12)
NEUTROPHILS NFR BLD AUTO: 69.6 % (ref 42.7–76)
NEUTROPHILS NFR BLD AUTO: 7.69 10*3/MM3 (ref 1.7–7)
NRBC BLD AUTO-RTO: 0 /100 WBC (ref 0–0.2)
PLATELET # BLD AUTO: 386 10*3/MM3 (ref 140–450)
PMV BLD AUTO: 9.2 FL (ref 6–12)
POTASSIUM SERPL-SCNC: 3.6 MMOL/L (ref 3.5–5.2)
PROT SERPL-MCNC: 7.6 G/DL (ref 6–8.5)
RBC # BLD AUTO: 4.77 10*6/MM3 (ref 4.14–5.8)
SODIUM SERPL-SCNC: 138 MMOL/L (ref 136–145)
TROPONIN T NUMERIC DELTA: -3 NG/L
TROPONIN T SERPL HS-MCNC: 17 NG/L
WBC NRBC COR # BLD AUTO: 11.05 10*3/MM3 (ref 3.4–10.8)
WHOLE BLOOD HOLD COAG: NORMAL
WHOLE BLOOD HOLD SPECIMEN: NORMAL

## 2025-07-14 PROCEDURE — 36415 COLL VENOUS BLD VENIPUNCTURE: CPT

## 2025-07-14 PROCEDURE — 71045 X-RAY EXAM CHEST 1 VIEW: CPT

## 2025-07-14 PROCEDURE — 80053 COMPREHEN METABOLIC PANEL: CPT | Performed by: EMERGENCY MEDICINE

## 2025-07-14 PROCEDURE — 85025 COMPLETE CBC W/AUTO DIFF WBC: CPT | Performed by: EMERGENCY MEDICINE

## 2025-07-14 PROCEDURE — 93005 ELECTROCARDIOGRAM TRACING: CPT | Performed by: EMERGENCY MEDICINE

## 2025-07-14 PROCEDURE — 84484 ASSAY OF TROPONIN QUANT: CPT | Performed by: EMERGENCY MEDICINE

## 2025-07-14 PROCEDURE — 99284 EMERGENCY DEPT VISIT MOD MDM: CPT

## 2025-07-14 RX ORDER — ASPIRIN 325 MG
325 TABLET ORAL ONCE
Status: DISCONTINUED | OUTPATIENT
Start: 2025-07-14 | End: 2025-07-14 | Stop reason: HOSPADM

## 2025-07-14 RX ORDER — SODIUM CHLORIDE 0.9 % (FLUSH) 0.9 %
10 SYRINGE (ML) INJECTION AS NEEDED
Status: DISCONTINUED | OUTPATIENT
Start: 2025-07-14 | End: 2025-07-14 | Stop reason: HOSPADM

## 2025-07-14 RX ORDER — ACETAMINOPHEN 500 MG
1000 TABLET ORAL ONCE
Status: COMPLETED | OUTPATIENT
Start: 2025-07-14 | End: 2025-07-14

## 2025-07-14 RX ADMIN — ACETAMINOPHEN 1000 MG: 500 TABLET, FILM COATED ORAL at 04:25

## 2025-07-14 NOTE — LETTER
Prem VEGA Price  [address] [facility] [address]    July 14, 2025     Dear Mr. Thrasher:    Below are the results from your recent visit:  Mild elevation of your white blood cell count can repeat at your next appointment otherwise labs are        Resulted Orders   XR Chest 1 View    Impression       Allowing for submaximal inspiratory result, heart size appears within  normal limits.     There is a submaximal inspiratory result. Allowing for that, there is no  convincing evidence of infiltrate, effusion or pneumothorax.     There is no evidence of acute bony abnormality.     This report was finalized on 7/14/2025 1:16 AM by Dr. Rayray Calvert M.D on Workstation: [id number]      Comprehensive Metabolic Panel   Result Value Ref Range    Glucose 97 65 - 99 mg/dL    BUN 14.0 8.0 - 23.0 mg/dL    Creatinine 1.10 0.76 - 1.27 mg/dL    Sodium 138 136 - 145 mmol/L    Potassium 3.6 3.5 - 5.2 mmol/L    Chloride 97 (L) 98 - 107 mmol/L    CO2 26.7 22.0 - 29.0 mmol/L    Calcium 9.4 8.6 - 10.5 mg/dL    Total Protein 7.6 6.0 - 8.5 g/dL    Albumin 4.5 3.5 - 5.2 g/dL    ALT (SGPT) 21 1 - 41 U/L    AST (SGOT) 22 1 - 40 U/L    Alkaline Phosphatase 77 39 - 117 U/L    Total Bilirubin 0.5 0.0 - 1.2 mg/dL    Globulin 3.1 gm/dL    A/G Ratio 1.5 g/dL    BUN/Creatinine Ratio 12.7 7.0 - 25.0    Anion Gap 14.3 5.0 - 15.0 mmol/L    eGFR 71.8 >60.0 mL/min/1.73   High Sensitivity Troponin T   Result Value Ref Range    HS Troponin T 17 <22 ng/L   Green Top (Gel)   Result Value Ref Range    Extra Tube Hold for add-ons.       Comment:      Auto resulted.   Lavender Top   Result Value Ref Range    Extra Tube hold for add-on       Comment:      Auto resulted   Gold Top - SST   Result Value Ref Range    Extra Tube Hold for add-ons.       Comment:      Auto resulted.   Light Blue Top   Result Value Ref Range    Extra Tube Hold for add-ons.       Comment:      Auto resulted   CBC Auto Differential   Result Value Ref Range    WBC 11.05 (H)  3.40 - 10.80 10*3/mm3    RBC 4.77 4.14 - 5.80 10*6/mm3    Hemoglobin 13.1 13.0 - 17.7 g/dL    Hematocrit 41.4 37.5 - 51.0 %    MCV 86.8 79.0 - 97.0 fL    MCH 27.5 26.6 - 33.0 pg    MCHC 31.6 31.5 - 35.7 g/dL    RDW 13.4 12.3 - 15.4 %    RDW-SD 42.8 37.0 - 54.0 fl    MPV 9.2 6.0 - 12.0 fL    Platelets 386 140 - 450 10*3/mm3    Neutrophil % 69.6 42.7 - 76.0 %    Lymphocyte % 17.5 (L) 19.6 - 45.3 %    Monocyte % 10.1 5.0 - 12.0 %    Eosinophil % 1.7 0.3 - 6.2 %    Basophil % 0.9 0.0 - 1.5 %    Immature Grans % 0.2 0.0 - 0.5 %    Neutrophils, Absolute 7.69 (H) 1.70 - 7.00 10*3/mm3    Lymphocytes, Absolute 1.93 0.70 - 3.10 10*3/mm3    Monocytes, Absolute 1.12 (H) 0.10 - 0.90 10*3/mm3    Eosinophils, Absolute 0.19 0.00 - 0.40 10*3/mm3    Basophils, Absolute 0.10 0.00 - 0.20 10*3/mm3    Immature Grans, Absolute 0.02 0.00 - 0.05 10*3/mm3    nRBC 0.0 0.0 - 0.2 /100 WBC   High Sensitivity Troponin T 1Hr   Result Value Ref Range    HS Troponin T 14 <22 ng/L    Troponin T Numeric Delta -3 Abnormal if >/=3 ng/L         If you have any questions or concerns, please don't hesitate to call.         Sincerely,        Montse Cain PA-C

## 2025-07-14 NOTE — TELEPHONE ENCOUNTER
Results  mailed to pt     Mild elevation of your white blood cell count can repeat at your next appointment otherwise labs are

## 2025-07-14 NOTE — TELEPHONE ENCOUNTER
"  Hub staff attempted to follow warm transfer process and was unsuccessful     Caller: Prem Thrasher \"Greg\"    Relationship to patient: Self    Best call back number: [phone]    Patient is needing: MR THRASHER IS RETURNING LORETTA'S CALL  "

## 2025-07-14 NOTE — ED PROVIDER NOTES
EMERGENCY DEPARTMENT ENCOUNTER  Room Number:  10/10  PCP: Montse Cain PA-C  Independent Historians: Patient      HPI:  Chief Complaint: had concerns including Chest Pain.      A complete HPI/ROS/PMH/PSH/SH/FH are unobtainable due to: Nothing      Context: Prem Thrasher is a 70 y.o. male with a medical history of COPD, chronic pain, CAD status post PCI, who presents to the ED c/o acute right sided chest pain.  He states that he took 2 nitro prior to arrival at home and they first nitro seem to help a little bit and the second nitro did not seem to have much effect.  He states that he has been little short of breath he has also had a cough that is dry, nonproductive.  He denies fever or chills.  He denies any known injury or trauma.  He does have a history of coronary disease and prior stents.  He reports compliance with his Plavix and Imdur.  He states that he rarely takes nitroglycerin.    He is also in pain management and he had been on a buprenorphine patch but the pharmacy was unable to stop that recently so she was transition to oral tramadol.      Review of prior external notes (non-ED) -and- Review of prior external test results outside of this encounter: See ED course below        PAST MEDICAL HISTORY  Active Ambulatory Problems     Diagnosis Date Noted    Allergy     COPD (chronic obstructive pulmonary disease)     Hyperlipidemia     Rheumatoid arthritis     Hypertension     Anxiety disorder 06/27/2016    Recurrent major depressive disorder, in full remission 06/27/2016    Lumbar radiculopathy 06/27/2016    Spondylolisthesis of lumbar region 07/31/2017    Sepsis 09/04/2017    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/08/2018    Calcific Achilles tendonitis s/p OR 7/18 06/08/2018    Gastrocnemius equinus, left 06/27/2018    Abscess and cellulitis of gluteal region 07/09/2018    Perirectal abscess 07/09/2018    Anemia 07/10/2018    Wound dehiscence 07/18/2018    Gastrocnemius strain, left, initial encounter  08/01/2019    Low folic acid 12/05/2019    Low serum vitamin B12 12/05/2019    Exertional chest pain 03/07/2020    Unstable angina 03/08/2020    Coronary artery disease involving native coronary artery of native heart with unstable angina pectoris 03/08/2020    Unstable angina pectoris 04/30/2020    Coronary artery disease involving native heart without angina pectoris 05/02/2020    Low back pain 06/04/2020    Non-STEMI (non-ST elevated myocardial infarction) 01/21/2021    CAD S/P percutaneous coronary angioplasty 01/29/2021    S/P drug eluting coronary stent placement 01/29/2021    Screening for colon cancer 03/12/2021    Colon polyps 04/08/2021    Diverticulosis 04/08/2021    Hypothyroidism, acquired 10/29/2021    Renal impairment 10/29/2021    Lumbar pain 02/25/2022    Pseudarthrosis after fusion or arthrodesis 03/16/2022    Hyponatremia 04/14/2022    Postoperative ileus 04/16/2022    Postoperative anemia due to acute blood loss 04/18/2022    Acquired trigger finger 12/12/2019    Generalized osteoarthritis 08/12/2014    Type 2 diabetes mellitus without complication, without long-term current use of insulin 03/24/2023    Ex-smoker 10/09/2023    Chest pain 03/20/2024    Coronary artery disease of native artery of native heart with stable angina pectoris 04/28/2025     Resolved Ambulatory Problems     Diagnosis Date Noted    Surgery follow-up examination 10/24/2016    Postoperative nausea and vomiting 08/01/2017    Postoperative ileus 08/01/2017    Infection of lumbar spine 09/06/2017    Sebaceous cyst 04/23/2018    Tobacco abuse 07/09/2018    Metabolic acidosis 07/10/2018    Tobacco abuse counseling 01/29/2021    Rheumatoid arthritis 08/12/2014     Past Medical History:   Diagnosis Date    Abnormal ECG     Achilles tendon pain     Allergic     Asthma     CAD (coronary artery disease)     Cataract     Cervical disc disorder     Chronic anticoagulation     Chronic lower back pain     Chronic pain disorder     CTS  (carpal tunnel syndrome)     Deep vein thrombosis     Depression     Diabetes mellitus 03/12/2023    Disease of thyroid gland     Diverticulitis of colon     Encounter for eye exam 10/2014    Erectile dysfunction     Extremity pain     GERD (gastroesophageal reflux disease)     Headache     Headache, tension-type     Heart valve disease     History of bone density study 03/2014    History of chest x-ray 02/15/2011    History of colon polyps     History of medical problems     History of nuclear stress test 03/09/2015    History of pulmonary function tests 03/2015    Injury of back     Joint pain     Low back strain     Lumbosacral disc disease     Migraine     Neck pain     Nerve damage     NSTEMI (non-ST elevated myocardial infarction) 01/2021    Obesity     Osteoarthritis, multiple sites     Pneumonia     Postoperative wound infection     Rotator cuff syndrome     Sciatica     Short of breath on exertion     Sinusitis     Sleep apnea     Spinal stenosis     Staph infection     Thoracic disc disorder     Unsteady gait     Visual impairment          PAST SURGICAL HISTORY  Past Surgical History:   Procedure Laterality Date    ACHILLES TENDON SURGERY Left 07/03/2018    Procedure: Left Achilles debridement and secondary repair with partial excision of calcaneus. Possible left Achilles lengthening at the calf;  Surgeon: Vinay Smith MD;  [place] [facility] [place] [facility] [place]: [place]opedics    BACK SURGERY  10/2017    CARDIAC CATHETERIZATION N/A 03/07/2020    Procedure: Left Heart Cath;  Surgeon: Dioni Salazar MD;  Location: [facility] CATH INVASIVE LOCATION;  Service: Cardiology;  Laterality: N/A;    CARDIAC CATHETERIZATION N/A 03/07/2020    Procedure: Coronary angiography;  Surgeon: Dioni Salazar MD;  Location: [facility] CATH INVASIVE LOCATION;  Service: Cardiology;  Laterality: N/A;    CARDIAC CATHETERIZATION N/A 03/07/2020    Procedure: Left ventriculography;  Surgeon: Dioni Salazar MD;  Location:   [facility] CATH INVASIVE LOCATION;  Service: Cardiology;  Laterality: N/A;    CARDIAC CATHETERIZATION N/A 03/07/2020    Procedure: Stent RAZA coronary;  Surgeon: Dioni Salazar MD;  [facility];  Service: Cardiology;  Laterality: N/A;    CARDIAC CATHETERIZATION N/A 01/22/2021    Procedure: Left Heart Cath;  Surgeon: Dioni Salazar MD;  [facility];  Service: Cardiology;  Laterality: N/A;    CARDIAC CATHETERIZATION N/A 01/22/2021    Procedure: Coronary angiography;  Surgeon: Dioni Salazar MD;  Location: [facility] CATH INVASIVE LOCATION;  Service: Cardiology;  Laterality: N/A;    CARDIAC CATHETERIZATION N/A 01/22/2021    Procedure: Left ventriculography;  Surgeon: Dioni Salazar MD;  Location: [facility] CATH INVASIVE LOCATION;  Service: Cardiology;  Laterality: N/A;    CARDIAC CATHETERIZATION N/A 01/22/2021    Procedure: Percutaneous Coronary Intervention;  Surgeon: Dioni Salazar MD;  Location: [facility] CATH INVASIVE LOCATION;  Service: Cardiology;  Laterality: N/A;    CARDIAC CATHETERIZATION N/A 01/22/2021    Procedure: Stent RAZA coronary;  Surgeon: Dioni Salazar MD;  Location: [facility] CATH INVASIVE LOCATION;  Service: Cardiology;  Laterality: N/A;    CARDIAC CATHETERIZATION N/A 03/22/2024    Procedure: Left Heart Cath;  Surgeon: Kiko Evans MD;  Location: [facility] CATH INVASIVE LOCATION;  Service: Cardiovascular;  Laterality: N/A;    CARDIAC CATHETERIZATION N/A 03/22/2024    Procedure: Coronary angiography;  Surgeon: Kiko Evans MD;  Location: [facility] CATH INVASIVE LOCATION;  Service: Cardiovascular;  Laterality: N/A;    CARDIAC CATHETERIZATION N/A 03/22/2024    Procedure: Resting Full Cycle Ratio;  Surgeon: Kiko Evans MD;  Location: [facility] CATH INVASIVE LOCATION;  Service: Cardiovascular;  Laterality: N/A;    CARDIAC CATHETERIZATION N/A 03/22/2024    Procedure: Percutaneous Coronary Intervention;  Surgeon: Nathan  Kiko VEGA MD;  Location: [facility] CATH INVASIVE LOCATION;  Service: Cardiovascular;  Laterality: N/A;    CARDIAC CATHETERIZATION N/A 03/22/2024    Procedure: Stent RAZA coronary;  Surgeon: Kiko Evans MD;  [facility];  Service: Cardiovascular;  Laterality: N/A;    CARDIAC CATHETERIZATION N/A 03/22/2024    Procedure: Optical Coherence Tomography;  Surgeon: Kiko Evans MD;  Location: [facility] CATH INVASIVE LOCATION;  Service: Cardiovascular;  Laterality: N/A;    CARDIAC CATHETERIZATION N/A 05/07/2025    Procedure: Left Heart Cath;  Surgeon: Jami Mars MD;  Location: [facility] CATH INVASIVE LOCATION;  Service: Cardiovascular;  Laterality: N/A;    CARDIAC SURGERY      3 stents total    CATARACT EXTRACTION Bilateral     COLONOSCOPY N/A 02/02/2011    Normal colon except scattered diverticulosis-Dr. Guero Blunt    COLONOSCOPY N/A 04/08/2021    Procedure: COLONOSCOPY TO CECUM WITH POLYPECTOMY (COLD BX);  Surgeon: Porsha Arcos MD;  Location: [facility] ENDOSCOPY;  Service: General;  Laterality: N/A;  SCREENING; HX OF COLON POLYPS  POST:DIVERTICULOSIS; COLON POLYP    CORONARY STENT PLACEMENT      CYST REMOVAL  03/2016    x2  FROM FACE AND EAR    DENTAL PROCEDURE  2008    teeth removed; dental implants    EPIDURAL BLOCK  1995    L/S spine    FINGER DEBRIDEMENT Right 07/12/2003    Debridement and full thickness skin grafting of the ring and small fingers of the right hand-Dr. Rayray Long    FOOT SURGERY Left     bone spurs    HAND SURGERY  2003    INCISION AND DRAINAGE PERIRECTAL ABSCESS N/A 07/10/2018    Procedure: INCISION AND DRAINAGE OF PERIRECTAL ABSCESS;  Surgeon: Porsha Arcos MD;  Location: [facility] MAIN OR;  Service: General    INCISION AND DRAINAGE TRUNK N/A 04/23/2022    Procedure: Evacuation lumbar hematoma;  Surgeon: Jacky Perez MD;  Location: [facility] MAIN OR;  Service: Orthopedics;  Laterality: N/A;    JOINT REPLACEMENT      Shoulder    LASIK Bilateral  12/2024    LUMBAR DISCECTOMY FUSION INSTRUMENTATION Left 10/10/2016    Procedure: LEFT L2-L3, L3-L4 LUMBAR LAMINECTOMY/ DISCECTOMY WITH METRIX ;  Surgeon: Sawyer Rick MD;  Location: [facility] OR;  Service:     LUMBAR DISCECTOMY FUSION INSTRUMENTATION N/A 07/31/2017    Procedure: LUMBAR FUSION DECOMPRESSON WITH PEDICLE SCREWS with LAURA L2-3,L3-4;  Surgeon: Jacky Perez MD;  Location: [facility] OR;  Service:     LUMBAR FUSION N/A 04/13/2022    Procedure: Thoracic 10-thoracic 11, thoracic 11-thoracic 12, thoracic 12-lumbar 1, lumbar 1-lumbar 2, lumbar 2-lumbar 3, lumbar 3-lumbar 4 fusion with instrumentation with iliac crest bone graft, and removal of implants from lumbar 2-lumbar 3, lumbar 3-lumbar 4;  Surgeon: Jacky Perez MD;  Location: [facility] OR;  Service: Orthopedic Spine;  Laterality: N/A;    LUMBAR FUSION N/A 04/13/2022    Procedure: LUMBAR ANTERIOR INTERBODY FUSION L4,L5 Osteotomy interbody fusion with cage L1,L2;  Surgeon: Jacky Perez MD;  Location: [facility] OR;  Service: Orthopedic Spine;  Laterality: N/A;    LUMBAR LAMINECTOMY DISCECTOMY DECOMPRESSION N/A 07/31/2017    Procedure: L2 to L4 laminectomy with neural lysis and a fusion by orthopedics;  Surgeon: Sawyer Rick MD;  Location: [facility] OR;  Service:     LUMBAR LAMINECTOMY DISCECTOMY DECOMPRESSION N/A 09/05/2017    Procedure: I&D LUMBAR SPINE ;  Surgeon: Jacky Perez MD;  Location: [facility] OR;  Service:     ORTHOPEDIC SURGERY      SHOULDER ARTHROSCOPY      SINUS SURGERY  2003    Dr. Barrera    SPINAL FUSION      SPINE SURGERY  2010    Still have a lot of pain starting to give up on reliefj    TOTAL SHOULDER REPLACEMENT Right 09/07/2023    TRIGGER POINT INJECTION           FAMILY HISTORY  Family History   Problem Relation Age of Onset    Diabetes Mother     Heart disease Mother     Hyperlipidemia Mother     Stroke Mother     Alcohol abuse Mother     Heart disease Father     Rheum arthritis Father     Alcohol  abuse Father     Hyperlipidemia Father     Heart attack Father     Hearing loss Father     Depression Brother     Cancer Brother         prostate    Depression Brother     Heart disease Brother     Hyperlipidemia Brother     Cancer Brother         Haert desease    Thyroid disease Sister     Arthritis Sister     Asthma Son     COPD Son     COPD Son     Cancer Brother         Haert desease    Depression Brother     Hyperlipidemia Brother     Heart disease Brother     Arthritis Sister         Kathia    Thyroid disease Sister     Depression Brother     Malig Hyperthermia Neg Hx          SOCIAL HISTORY  Social History     Socioeconomic History    Marital status:     Number of children: 2   Tobacco Use    Smoking status: Former     Current packs/day: 0.00     Average packs/day: 1 pack/day for 50.0 years (50.0 ttl pk-yrs)     Types: Cigarettes     Start date: 10/20/1971     Quit date: 10/20/2021     Years since quitting: 3.7     Passive exposure: Never    Smokeless tobacco: Never    Tobacco comments:     Current status is non smoker   Vaping Use    Vaping status: Never Used   Substance and Sexual Activity    Alcohol use: No    Drug use: No    Sexual activity: Not Currently     Partners: Female     Birth control/protection: None         ALLERGIES  Atorvastatin, Ceclor [cefaclor], and Methotrexate derivatives      REVIEW OF SYSTEMS  Review of all 14 systems is negative other than stated in the HPI above.      PHYSICAL EXAM    I have reviewed the triage vital signs and nursing notes.    ED Triage Vitals   Temp Heart Rate Resp BP SpO2   07/14/25 0015 07/14/25 0014 07/14/25 0014 07/14/25 0014 07/14/25 0014   98.2 °F (36.8 °C) 75 19 147/91 98 %      Temp src Heart Rate Source Patient Position BP Location FiO2 (%)   07/14/25 0015 07/14/25 0014 -- -- --   Oral Monitor            GENERAL: awake and alert, no acute distress  HENT: Normocephalic, atraumatic  EYES: no scleral icterus  CV: regular rhythm, regular rate, no  murmur  RESPIRATORY: normal effort, lungs clear to auscultation bilaterally  ABDOMEN: soft, nondistended, nontender throughout  MUSCULOSKELETAL: no deformity, mild right anterior chest wall tenderness without crepitus, no left anterior chest wall tenderness  NEURO: alert, moves all extremities, follows commands  PSYCH: calm, cooperative  SKIN: Warm, dry          LAB RESULTS  Recent Results (from the past 24 hours)   ECG 12 Lead ED Triage Standing Order; Chest Pain    Collection Time: 07/14/25  2:44 AM   Result Value Ref Range    QT Interval 393 ms    QTC Interval 454 ms   Comprehensive Metabolic Panel    Collection Time: 07/14/25  2:49 AM    Specimen: Blood   Result Value Ref Range    Glucose 97 65 - 99 mg/dL    BUN 14.0 8.0 - 23.0 mg/dL    Creatinine 1.10 0.76 - 1.27 mg/dL    Sodium 138 136 - 145 mmol/L    Potassium 3.6 3.5 - 5.2 mmol/L    Chloride 97 (L) 98 - 107 mmol/L    CO2 26.7 22.0 - 29.0 mmol/L    Calcium 9.4 8.6 - 10.5 mg/dL    Total Protein 7.6 6.0 - 8.5 g/dL    Albumin 4.5 3.5 - 5.2 g/dL    ALT (SGPT) 21 1 - 41 U/L    AST (SGOT) 22 1 - 40 U/L    Alkaline Phosphatase 77 39 - 117 U/L    Total Bilirubin 0.5 0.0 - 1.2 mg/dL    Globulin 3.1 gm/dL    A/G Ratio 1.5 g/dL    BUN/Creatinine Ratio 12.7 7.0 - 25.0    Anion Gap 14.3 5.0 - 15.0 mmol/L    eGFR 71.8 >60.0 mL/min/1.73   High Sensitivity Troponin T    Collection Time: 07/14/25  2:49 AM    Specimen: Blood   Result Value Ref Range    HS Troponin T 17 <22 ng/L   Green Top (Gel)    Collection Time: 07/14/25  2:49 AM   Result Value Ref Range    Extra Tube Hold for add-ons.    Lavender Top    Collection Time: 07/14/25  2:49 AM   Result Value Ref Range    Extra Tube hold for add-on    Gold Top - SST    Collection Time: 07/14/25  2:49 AM   Result Value Ref Range    Extra Tube Hold for add-ons.    Light Blue Top    Collection Time: 07/14/25  2:49 AM   Result Value Ref Range    Extra Tube Hold for add-ons.    CBC Auto Differential    Collection Time: 07/14/25  2:49  AM    Specimen: Blood   Result Value Ref Range    WBC 11.05 (H) 3.40 - 10.80 10*3/mm3    RBC 4.77 4.14 - 5.80 10*6/mm3    Hemoglobin 13.1 13.0 - 17.7 g/dL    Hematocrit 41.4 37.5 - 51.0 %    MCV 86.8 79.0 - 97.0 fL    MCH 27.5 26.6 - 33.0 pg    MCHC 31.6 31.5 - 35.7 g/dL    RDW 13.4 12.3 - 15.4 %    RDW-SD 42.8 37.0 - 54.0 fl    MPV 9.2 6.0 - 12.0 fL    Platelets 386 140 - 450 10*3/mm3    Neutrophil % 69.6 42.7 - 76.0 %    Lymphocyte % 17.5 (L) 19.6 - 45.3 %    Monocyte % 10.1 5.0 - 12.0 %    Eosinophil % 1.7 0.3 - 6.2 %    Basophil % 0.9 0.0 - 1.5 %    Immature Grans % 0.2 0.0 - 0.5 %    Neutrophils, Absolute 7.69 (H) 1.70 - 7.00 10*3/mm3    Lymphocytes, Absolute 1.93 0.70 - 3.10 10*3/mm3    Monocytes, Absolute 1.12 (H) 0.10 - 0.90 10*3/mm3    Eosinophils, Absolute 0.19 0.00 - 0.40 10*3/mm3    Basophils, Absolute 0.10 0.00 - 0.20 10*3/mm3    Immature Grans, Absolute 0.02 0.00 - 0.05 10*3/mm3    nRBC 0.0 0.0 - 0.2 /100 WBC   High Sensitivity Troponin T 1Hr    Collection Time: 07/14/25  4:25 AM    Specimen: Arm, Left; Blood   Result Value Ref Range    HS Troponin T 14 <22 ng/L    Troponin T Numeric Delta -3 Abnormal if >/=3 ng/L       The above labs were ordered by me and independently reviewed by me.     RADIOLOGY  XR Chest 1 View  Result Date: 7/14/2025  CXR ONE VIEW  HISTORY: Chest Pain Triage Protocol  COMPARISON: 5/25/2025  TECHNIQUE: single portable AP       Allowing for submaximal inspiratory result, heart size appears within normal limits.  There is a submaximal inspiratory result. Allowing for that, there is no convincing evidence of infiltrate, effusion or pneumothorax.  There is no evidence of acute bony abnormality.  This report was finalized on 7/14/2025 1:16 AM by Dr. Rayray Calvert M.D on Workstation: [id number]        The above radiology studies were ordered by me.  See ED course for independent interpretations.     MEDICATIONS GIVEN IN ER  Medications   sodium chloride 0.9 % flush 10 mL (has no  administration in time range)   aspirin tablet 325 mg (325 mg Oral Not Given 7/14/25 8443)   acetaminophen (TYLENOL) tablet 1,000 mg (1,000 mg Oral Given 7/14/25 3577)         ORDERS PLACED DURING THIS VISIT:  Orders Placed This Encounter   Procedures    XR Chest 1 View    [place] Draw    Comprehensive Metabolic Panel    High Sensitivity Troponin T    CBC Auto Differential    High Sensitivity Troponin T 1Hr    NPO Diet NPO Type: Strict NPO    Undress & Gown    Continuous Pulse Oximetry    Oxygen Therapy- Nasal Cannula; Titrate 1-6 LPM Per SpO2; 90 - 95%    ECG 12 Lead ED Triage Standing Order; Chest Pain    ECG 12 Lead ED Triage Standing Order; Chest Pain    Insert Peripheral IV    CBC & Differential    Green Top (Gel)    Lavender Top    Gold Top - SST    Light Blue Top         OUTPATIENT MEDICATION MANAGEMENT:  Current Facility-Administered Medications Ordered in Epic   Medication Dose Route Frequency Provider Last Rate Last Admin    aspirin tablet 325 mg  325 mg Oral Once Jacky Ordoñez MD        sodium chloride 0.9 % flush 10 mL  10 mL Intravenous PRN Jacky Ordoñez MD        sodium chloride 0.9 % infusion  125 mL/hr Intravenous Once Zaida Pretty APRN         Current Outpatient Medications Ordered in Epic   Medication Sig Dispense Refill    amLODIPine (NORVASC) 5 MG tablet TAKE 1 TABLET EVERY DAY FOR BLOOD PRESSURE 90 tablet 0    aspirin 81 MG EC tablet Take 1 tablet by mouth Daily.      azithromycin (Zithromax) 500 MG tablet Take 1 tablet by mouth Daily. 5 tablet 0    Boswellia-Glucosamine-Vit D (OSTEO BI-FLEX ONE PER DAY PO) Take  by mouth.      Buprenorphine 10 MCG/HR patch weekly Place 1 patch on the skin as directed by provider Every 7 (Seven) Days. 4 patch 0    busPIRone (BUSPAR) 10 MG tablet TAKE 1 TABLET TWICE DAILY AS NEEDED FOR ANXIETY 180 tablet 3    carvedilol (COREG) 6.25 MG tablet Take 1 tablet by mouth 2 (Two) Times a Day With Meals. 180 tablet 3    Cholecalciferol (VITAMIN D)  2000 UNITS tablet Take 1 tablet by mouth Every Evening.      clopidogrel (PLAVIX) 75 MG tablet Take 1 tablet by mouth Daily. 90 tablet 0    Cyanocobalamin (B-12) 1000 MCG sublingual tablet DISSOLVE 1 TABLET UNDER THE TONGUE EVERY DAY 90 tablet 3    cyclobenzaprine (FLEXERIL) 10 MG tablet TAKE 1 TABLET TWICE DAILY 180 tablet 0    docusate sodium (Stool Softener) 100 MG capsule       escitalopram (LEXAPRO) 20 MG tablet TAKE 1 TABLET EVERY MORNING FOR ANXIETY AND DEPRESSION 90 tablet 3    ezetimibe (ZETIA) 10 MG tablet Take 1 tablet by mouth Daily. 90 tablet 3    fluticasone (FLONASE) 50 MCG/ACT nasal spray Administer 2 sprays into the nostril(s) as directed by provider Daily for 7 days. 16 g 0    folic acid (FOLVITE) 1 MG tablet TAKE 1 TABLET EVERY DAY 90 tablet 3    isosorbide mononitrate (IMDUR) 120 MG 24 hr tablet Take 1 tablet by mouth Daily. 90 tablet 3    levothyroxine (SYNTHROID, LEVOTHROID) 50 MCG tablet Take 1 tablet by mouth Every Morning. Before breakfast, for thyroid 90 tablet 3    meclizine (ANTIVERT) 25 MG tablet Take 1 tablet by mouth 3 (Three) Times a Day As Needed for Dizziness. 30 tablet 0    metFORMIN ER (GLUCOPHAGE-XR) 500 MG 24 hr tablet TAKE 2 TABLETS EVERY DAY FOR DIABETES 180 tablet 3    multivitamin with minerals tablet tablet Take 1 tablet by mouth Daily. HOLD X1 WEEK PRIOR TO SURGERY      nitroglycerin (Nitrostat) 0.4 MG SL tablet Place 1 tablet under the tongue Every 5 (Five) Minutes As Needed for Chest Pain. Take no more than 3 doses in 15 minutes. 25 tablet 1    pantoprazole (PROTONIX) 40 MG EC tablet TAKE 1 TABLET EVERY DAY FOR GERD 90 tablet 3    pregabalin (LYRICA) 100 MG capsule TAKE 1 CAPSULE BY MOUTH THREE TIMES A DAY 90 capsule 1    rosuvastatin (CRESTOR) 10 MG tablet Take 1 tablet by mouth every night at bedtime. 90 tablet 0    traMADol (ULTRAM) 50 MG tablet Take 1 tablet by mouth Every 6 (Six) Hours As Needed for Moderate Pain. 30 tablet 0    Trelegy Ellipta 100-62.5-25 MCG/ACT  inhaler Inhale 1 puff Daily.           PROCEDURES  Procedures            PROGRESS, DATA ANALYSIS, CONSULTS, AND MEDICAL DECISION MAKING  All labs have been independently interpreted by me.  All radiology studies have been reviewed by me. All EKG's have been independently viewed and interpreted by me.  Discussion below represents my analysis of pertinent findings related to patient's condition, differential diagnosis, treatment plan and final disposition.    Differential diagnosis includes but is not limited to:  Acute coronary syndrome  Pericarditis  Pleurisy  Pulmonary embolism  Intercostal strain  Chest wall contusion      Clinical Scores: HEART Score: 5                  ED Course as of 07/14/25 0508   Mon Jul 14, 2025   0355 HS Troponin T: 17 [JR]   0357 I reviewed cardiac cath from 5/7/2025.  Patient had stable proximal LAD stenosis, patent mid LAD and distal RCA stents as well as a chronic subtotal occlusion of the apical LAD stent medically managed.  Cardiology recommended uptitrating antianginal therapy and aggressive risk factor modification.  It appears that he remains on Plavix, Imdur 120 mg daily, nitroglycerin as needed [JR]   0406 EKG          EKG time: 2:44 AM  Rhythm/Rate: Sinus rhythm, 80  P waves and MA: First-degree AV block  QRS, axis: Low voltage, borderline left axis  ST and T waves: No acute ischemic changes    Interpreted Contemporaneously by me, independently viewed  Similar compared to prior 3/23/2024       [JR]   0408 Chest x-ray independently interpreted in PACS.  There is no infiltrate or pleural effusion. [JR]   0505 HS Troponin T: 14 [JR]   0505 Troponin T Numeric Delta: -3 [JR]   0507 Repeat cardiac troponin is again normal.  Patient has reproducible chest wall tenderness on exam.  The symptoms that he experienced tonight are not characteristic of his prior anginal symptoms.  He had recent cardiac cath demonstrating patent stents and no lesion amenable to further aggressive  intervention.  He is appropriate discharge home with outpatient follow-up with his PCP as well as a cardiologist, Tylenol as needed for pain.  He will also be following up with pain management. [JR]      ED Course User Index  [JR] Jacky Ordoñez MD             AS OF 05:08 EDT VITALS:    BP - 133/88  HR - 51  TEMP - 98.2 °F (36.8 °C) (Oral)  O2 SATS - 90%    COMPLEXITY OF CARE  Admission was considered but after careful review of the patient's presentation, physical examination, diagnostic results, and response to treatment the patient may be safely discharged with outpatient follow-up.      Chronic or social conditions impacting patient care (Social Determinants of Health):     DIAGNOSIS  Final diagnoses:   Atypical chest pain   Acute chest wall pain           DISPOSITION  DISCHARGE    Patient discharged in stable condition.    Reviewed implications of results, diagnosis, meds, responsibility to follow up, warning signs and symptoms of possible worsening, potential complications and reasons to return to ER.    Patient/Family voiced understanding of above instructions.    Discussed plan for discharge, as there is no emergent indication for admission. Patient referred to primary care provider for BP management due to today's BP. Pt/family is agreeable and understands need for follow up and repeat testing.  Pt is aware that discharge does not mean that nothing is wrong but it indicates no emergency is present that requires admission and they must continue care with follow-up as given below or physician of their choice.     FOLLOW-UP  Montse Cain, PAVANNA  [address]  [place] [place] [id number] [phone]    Schedule an appointment as soon as possible for a visit       Jami Mars MD  [address]  [address]  [place] [place] [id number] [phone]      As needed         Medication List      No changes were made to your prescriptions during this visit.             Prescription drug monitoring  program review:           Please note that portions of this document were completed with a voice recognition program.    Note Disclaimer: At [facility], we believe that sharing information builds trust and better relationships. You are receiving this note because you recently visited [facility]. It is possible you will see health information before a provider has talked with you about it. This kind of information can be easy to misunderstand. To help you fully understand what it means for your health, we urge you to discuss this note with your provider.         Jacky Ordoñez MD  07/14/25 1624

## 2025-07-14 NOTE — TELEPHONE ENCOUNTER
Patient still not able to get his butrans. I called [facility] this morning and they are still out of stock. Pt stated he will wait for [facility] to notify him when they get medication in.

## 2025-07-15 LAB
QT INTERVAL: 393 MS
QTC INTERVAL: 454 MS

## 2025-07-20 DIAGNOSIS — Z98.890 HISTORY OF LUMBAR SURGERY: ICD-10-CM

## 2025-07-20 DIAGNOSIS — T40.2X5A THERAPEUTIC OPIOID INDUCED CONSTIPATION: ICD-10-CM

## 2025-07-20 DIAGNOSIS — K59.03 THERAPEUTIC OPIOID INDUCED CONSTIPATION: ICD-10-CM

## 2025-07-20 DIAGNOSIS — G89.4 CHRONIC PAIN SYNDROME: ICD-10-CM

## 2025-07-20 DIAGNOSIS — Z79.899 HIGH RISK MEDICATION USE: ICD-10-CM

## 2025-07-20 DIAGNOSIS — M54.16 LUMBAR RADICULOPATHY: ICD-10-CM

## 2025-07-22 RX ORDER — BUPRENORPHINE 10 UG/H
1 PATCH TRANSDERMAL
Qty: 4 PATCH | Refills: 0 | Status: SHIPPED | OUTPATIENT
Start: 2025-07-22

## 2025-07-23 RX ORDER — CYCLOBENZAPRINE HCL 10 MG
10 TABLET ORAL 2 TIMES DAILY
Qty: 180 TABLET | Refills: 3 | Status: SHIPPED | OUTPATIENT
Start: 2025-07-23

## 2025-07-25 DIAGNOSIS — Z98.890 HISTORY OF LUMBAR SURGERY: ICD-10-CM

## 2025-07-25 DIAGNOSIS — Z79.899 HIGH RISK MEDICATION USE: ICD-10-CM

## 2025-07-25 DIAGNOSIS — T40.2X5A THERAPEUTIC OPIOID INDUCED CONSTIPATION: ICD-10-CM

## 2025-07-25 DIAGNOSIS — G89.4 CHRONIC PAIN SYNDROME: Primary | ICD-10-CM

## 2025-07-25 DIAGNOSIS — M54.16 LUMBAR RADICULOPATHY: ICD-10-CM

## 2025-07-25 DIAGNOSIS — K59.03 THERAPEUTIC OPIOID INDUCED CONSTIPATION: ICD-10-CM

## 2025-07-25 RX ORDER — BUPRENORPHINE 7.5 UG/H
1 PATCH TRANSDERMAL
Qty: 4 PATCH | Refills: 0 | Status: SHIPPED | OUTPATIENT
Start: 2025-07-25

## 2025-07-26 DIAGNOSIS — G89.4 CHRONIC PAIN SYNDROME: ICD-10-CM

## 2025-07-26 DIAGNOSIS — M54.16 LUMBAR RADICULOPATHY: ICD-10-CM

## 2025-07-26 DIAGNOSIS — Z98.890 HISTORY OF LUMBAR SURGERY: ICD-10-CM

## 2025-07-29 RX ORDER — PREGABALIN 100 MG/1
100 CAPSULE ORAL 3 TIMES DAILY
Qty: 90 CAPSULE | Refills: 1 | Status: SHIPPED | OUTPATIENT
Start: 2025-07-29

## 2025-08-06 RX ORDER — BUSPIRONE HYDROCHLORIDE 10 MG/1
10 TABLET ORAL 2 TIMES DAILY PRN
Qty: 180 TABLET | Refills: 3 | Status: SHIPPED | OUTPATIENT
Start: 2025-08-06

## 2025-08-14 RX ORDER — ROSUVASTATIN CALCIUM 10 MG/1
10 TABLET, COATED ORAL
Qty: 90 TABLET | Refills: 3 | Status: SHIPPED | OUTPATIENT
Start: 2025-08-14

## 2025-08-18 DIAGNOSIS — I10 PRIMARY HYPERTENSION: ICD-10-CM

## 2025-08-18 RX ORDER — ISOSORBIDE MONONITRATE 30 MG/1
30 TABLET, EXTENDED RELEASE ORAL DAILY
Qty: 90 TABLET | Refills: 3 | OUTPATIENT
Start: 2025-08-18

## 2025-08-19 RX ORDER — AMLODIPINE BESYLATE 5 MG/1
5 TABLET ORAL DAILY
Qty: 90 TABLET | Refills: 3 | Status: SHIPPED | OUTPATIENT
Start: 2025-08-19

## 2025-08-19 RX ORDER — METFORMIN HYDROCHLORIDE 500 MG/1
TABLET, EXTENDED RELEASE ORAL
Qty: 180 TABLET | Refills: 3 | Status: SHIPPED | OUTPATIENT
Start: 2025-08-19

## 2025-08-20 ENCOUNTER — OFFICE VISIT (OUTPATIENT)
Dept: PAIN MEDICINE | Facility: CLINIC | Age: 70
End: 2025-08-20
Payer: MEDICARE

## 2025-08-20 VITALS
SYSTOLIC BLOOD PRESSURE: 122 MMHG | DIASTOLIC BLOOD PRESSURE: 76 MMHG | HEIGHT: 70 IN | OXYGEN SATURATION: 92 % | WEIGHT: 245.6 LBS | RESPIRATION RATE: 20 BRPM | TEMPERATURE: 97.1 F | HEART RATE: 79 BPM | BODY MASS INDEX: 35.16 KG/M2

## 2025-08-20 DIAGNOSIS — M54.16 LUMBAR RADICULOPATHY: ICD-10-CM

## 2025-08-20 DIAGNOSIS — Z79.899 HIGH RISK MEDICATION USE: ICD-10-CM

## 2025-08-20 DIAGNOSIS — G89.4 CHRONIC PAIN SYNDROME: Primary | ICD-10-CM

## 2025-08-20 DIAGNOSIS — Z98.890 HISTORY OF LUMBAR SURGERY: ICD-10-CM

## 2025-08-20 PROCEDURE — 99214 OFFICE O/P EST MOD 30 MIN: CPT | Performed by: NURSE PRACTITIONER

## 2025-08-20 PROCEDURE — 1125F AMNT PAIN NOTED PAIN PRSNT: CPT | Performed by: NURSE PRACTITIONER

## 2025-08-20 PROCEDURE — 3074F SYST BP LT 130 MM HG: CPT | Performed by: NURSE PRACTITIONER

## 2025-08-20 PROCEDURE — 3078F DIAST BP <80 MM HG: CPT | Performed by: NURSE PRACTITIONER

## 2025-08-20 PROCEDURE — 1159F MED LIST DOCD IN RCRD: CPT | Performed by: NURSE PRACTITIONER

## 2025-08-20 PROCEDURE — 1160F RVW MEDS BY RX/DR IN RCRD: CPT | Performed by: NURSE PRACTITIONER

## 2025-08-20 RX ORDER — BUPRENORPHINE 15 UG/H
1 PATCH TRANSDERMAL
Qty: 4 PATCH | Refills: 0 | Status: SHIPPED | OUTPATIENT
Start: 2025-08-20

## 2025-08-22 DIAGNOSIS — M54.16 LUMBAR RADICULOPATHY: ICD-10-CM

## 2025-08-22 DIAGNOSIS — Z98.890 HISTORY OF LUMBAR SURGERY: ICD-10-CM

## 2025-08-22 DIAGNOSIS — G89.4 CHRONIC PAIN SYNDROME: ICD-10-CM

## 2025-08-22 DIAGNOSIS — Z79.899 HIGH RISK MEDICATION USE: ICD-10-CM

## 2025-08-27 RX ORDER — BUPRENORPHINE 15 UG/H
1 PATCH TRANSDERMAL
Qty: 4 PATCH | OUTPATIENT
Start: 2025-08-27

## (undated) DEVICE — KT MANIFLD CARDIAC

## (undated) DEVICE — SUT MNCRYL PLS ANTIB UD 4/0 PS2 18IN

## (undated) DEVICE — GLV SURG BIOGEL LTX PF 7

## (undated) DEVICE — KT CATH IMG DRAGONFLY/OPSTAR 2.7F 135CM

## (undated) DEVICE — DISPOSABLE BIPOLAR CABLE 12FT. (3.6M): Brand: KIRWAN

## (undated) DEVICE — INTENDED FOR TISSUE SEPARATION, AND OTHER PROCEDURES THAT REQUIRE A SHARP SURGICAL BLADE TO PUNCTURE OR CUT.: Brand: BARD-PARKER ® STAINLESS STEEL BLADES

## (undated) DEVICE — FLOSEAL MATRIX IS INDICATED IN SURGICAL PROCEDURES (OTHER THAN IN OPHTHALMIC) AS AN ADJUNCT TO HEMOSTASIS WHEN CONTROL OF BLEEDING BY LIGATURE OR CONVENTIONAL PROCEDURES IS INEFFECTIVE OR IMPRACTICAL.: Brand: FLOSEAL HEMOSTATIC MATRIX

## (undated) DEVICE — TUBING, SUCTION, 1/4" X 10', STRAIGHT: Brand: MEDLINE

## (undated) DEVICE — OCCLUSIVE GAUZE STRIP,3% BISMUTH TRIBROMOPHENATE IN PETROLATUM BLEND: Brand: XEROFORM

## (undated) DEVICE — PK CATH CARD 40

## (undated) DEVICE — DIFFUSER: Brand: CORE, MAESTRO

## (undated) DEVICE — DISPOSABLE TOURNIQUET CUFF SINGLE BLADDER, SINGLE PORT AND QUICK CONNECT CONNECTOR: Brand: COLOR CUFF

## (undated) DEVICE — HANDPIECE SET WITH COAXIAL HIGH FLOW TIP AND SUCTION TUBE: Brand: INTERPULSE

## (undated) DEVICE — RADIFOCUS OPTITORQUE ANGIOGRAPHIC CATHETER: Brand: OPTITORQUE

## (undated) DEVICE — GAUZE,SPONGE,FLUFF,6"X6.75",STRL,10/TRAY: Brand: MEDLINE

## (undated) DEVICE — DEV INDEFLATOR P/N 580289

## (undated) DEVICE — CATH VENT MIV RADL PIG ST TIP 5F 110CM

## (undated) DEVICE — CATH DIAG IMPULSE FR4 5F 100CM

## (undated) DEVICE — TR BAND RADIAL ARTERY COMPRESSION DEVICE: Brand: TR BAND

## (undated) DEVICE — ANTIBACTERIAL UNDYED BRAIDED (POLYGLACTIN 910), SYNTHETIC ABSORBABLE SUTURE: Brand: COATED VICRYL

## (undated) DEVICE — ELECTRD BLD EXT EDGE 1P COAT 6.5IN STRL

## (undated) DEVICE — GLV SURG BIOGEL LTX PF 6

## (undated) DEVICE — APPL CHLORAPREP HI/LITE 26ML ORNG

## (undated) DEVICE — CATH DIAG IMPULSE FL3.5 6F 100CM

## (undated) DEVICE — SPONGE,LAP,12"X12",XR,ST,5/PK,40PK/CS: Brand: MEDLINE

## (undated) DEVICE — TRAP FLD MINIVAC MEGADYNE 100ML

## (undated) DEVICE — MEDI-VAC YANKAUER SUCTION HANDLE W/BULBOUS TIP: Brand: CARDINAL HEALTH

## (undated) DEVICE — DRSNG GZ PETROLTM XEROFORM CURAD 1X8IN STRL

## (undated) DEVICE — SOL NACL 0.9PCT 1000ML

## (undated) DEVICE — CATH GUIDE LAUNCHER EBU3.5 6F 100CM

## (undated) DEVICE — MINI TREK CORONARY DILATATION CATHETER 2.0 MM X 15 MM / RAPID-EXCHANGE: Brand: MINI TREK

## (undated) DEVICE — CATH DIAG IMPULSE FL3.5 5F 100CM

## (undated) DEVICE — GLV SURG BIOGEL LTX PF 8

## (undated) DEVICE — GLV SURG SENSICARE GREEN W/ALOE PF LF 6.5 STRL

## (undated) DEVICE — BONE MARROW ASPIRATION NEEDLES ASPIRATOR KIT 3-HOLE 4 INCHES NDLE

## (undated) DEVICE — 6F .070 JL3.5 100CM: Brand: CORDIS

## (undated) DEVICE — TREK CORONARY DILATATION CATHETER 3.0 MM X 15 MM / RAPID-EXCHANGE: Brand: TREK

## (undated) DEVICE — CONN TBG Y 5 IN 1 LF STRL

## (undated) DEVICE — TREK CORONARY DILATATION CATHETER 3.0 MM X 20 MM / RAPID-EXCHANGE: Brand: TREK

## (undated) DEVICE — SUT NUROLON 4/0 TF18 CR8 I8IN C584D

## (undated) DEVICE — Device

## (undated) DEVICE — PK NEURO SPINE 40

## (undated) DEVICE — GOWN ,SIRUS,NONREINFORCED SMALL: Brand: MEDLINE

## (undated) DEVICE — INTENT TO BE USED WITH SUTURE MATERIAL FOR TISSUE CLOSURE: Brand: RICHARD-ALLAN® NEEDLE 1/2 CIRCLE TAPER

## (undated) DEVICE — PAD,ABDOMINAL,8"X10",ST,LF: Brand: MEDLINE

## (undated) DEVICE — DGW .035 FC J3MM 260CM TEF: Brand: EMERALD

## (undated) DEVICE — CODMAN® SURGICAL PATTIES 1/2" X 3" (1.27CM X 7.62CM): Brand: CODMAN®

## (undated) DEVICE — ENCORE® LATEX ORTHO SIZE 8, STERILE LATEX POWDER-FREE SURGICAL GLOVE: Brand: ENCORE

## (undated) DEVICE — DRSNG BRDR MEPILEX P/OP SIL 4X8IN

## (undated) DEVICE — SKIN PREP TRAY W/CHG: Brand: MEDLINE INDUSTRIES, INC.

## (undated) DEVICE — GOWN,ECLIPSE,FABRIC-REINFORCED,X-LARGE: Brand: MEDLINE

## (undated) DEVICE — TREK CORONARY DILATATION CATHETER 3.50 MM X 20 MM / RAPID-EXCHANGE: Brand: TREK

## (undated) DEVICE — ADAPT CLN BIOGUARD AIR/H2O DISP

## (undated) DEVICE — ELECTRD NDL EZ CLN MOD 2.75IN

## (undated) DEVICE — 3.0MM NEURO (MATCH HEAD) LESS AGGRESSIVE

## (undated) DEVICE — GUIDE CATHETER: Brand: MACH1™

## (undated) DEVICE — GLIDESHEATH BASIC HYDROPHILIC COATED INTRODUCER SHEATH: Brand: GLIDESHEATH

## (undated) DEVICE — GW PRESSUREWIRE X WIRELESS FFR 175CM

## (undated) DEVICE — BG TRANSF W/COUPLER SPK 600ML

## (undated) DEVICE — Device: Brand: CORSAIR PRO

## (undated) DEVICE — TOTAL TRAY, 16FR 10ML SIL FOLEY, URN: Brand: MEDLINE

## (undated) DEVICE — GAUZE,PACKING STRIP,IODOFORM,1/2"X5YD,ST: Brand: CURAD

## (undated) DEVICE — LOU MINOR PROCEDURE: Brand: MEDLINE INDUSTRIES, INC.

## (undated) DEVICE — ADHS SKIN DERMABOND TOP ADVANCED

## (undated) DEVICE — SPNG GZ WOVN 4X4IN 12PLY 10/BX STRL

## (undated) DEVICE — IRRIGATOR BULB ASEPTO 60CC STRL

## (undated) DEVICE — SYR LUERLOK 20CC

## (undated) DEVICE — DRP MICROSCP LEICA W/GLASS LENS

## (undated) DEVICE — Device: Brand: PROWATER

## (undated) DEVICE — 6.0MM PRECISION ROUND

## (undated) DEVICE — 6F .070 JR 4 100CM: Brand: CORDIS

## (undated) DEVICE — SYR LUERLOK 20CC BX/50

## (undated) DEVICE — SPNG LAP 18X18IN LF STRL PK/5

## (undated) DEVICE — ADHS SKIN PREMIERPRO EXOFIN TOPICAL HI/VISC .5ML

## (undated) DEVICE — ADHS SKIN SURG TISS VISC PREMIERPRO EXOFIN HI/VISC FAST/DRY

## (undated) DEVICE — HI-TORQUE EXTRA S'PORT GUIDE WIRE .014 STRAIGHT TIP 3.0 CM X 190 CM: Brand: HI-TORQUE EXTRA S'PORT

## (undated) DEVICE — OPTIFOAM GENTLE SA, POSTOP, 4X12: Brand: MEDLINE

## (undated) DEVICE — THE TORRENT IRRIGATION SCOPE CONNECTOR IS USED WITH THE TORRENT IRRIGATION TUBING TO PROVIDE IRRIGATION FLUIDS SUCH AS STERILE WATER DURING GASTROINTESTINAL ENDOSCOPIC PROCEDURES WHEN USED IN CONJUNCTION WITH AN IRRIGATION PUMP (OR ELECTROSURGICAL UNIT).: Brand: TORRENT

## (undated) DEVICE — HI-TORQUE WHISPER ES GUIDE WIRE .014 STRAIGHTTIP 3.0 CM X 190 CM: Brand: HI-TORQUE WHISPER

## (undated) DEVICE — NDL SPINE 20G 3 1/2 YEL STRL 1P/U

## (undated) DEVICE — SYR CONTRL LUERLOK 10CC

## (undated) DEVICE — OIL CARTRIDGE: Brand: CORE, MAESTRO

## (undated) DEVICE — GOWN,REINF,POLY,ECL,PP SLV,XL: Brand: MEDLINE

## (undated) DEVICE — CODMAN® SURGICAL PATTIES 3/4" X 3/4" (1.91CM X 1.91CM): Brand: CODMAN®

## (undated) DEVICE — GLV SURG BIOGEL LTX PF 7 1/2

## (undated) DEVICE — RUNTHROUGH NS EXTRA FLOPPY PTCA GUIDEWIRE: Brand: RUNTHROUGH

## (undated) DEVICE — GW EMR FIX EXCHG J STD .035 3MM 260CM

## (undated) DEVICE — CATH DIAG IMPULSE PIG145 6F 110CM

## (undated) DEVICE — NC TREK CORONARY DILATATION CATHETER 3.5 MM X 15 MM / RAPID-EXCHANGE: Brand: NC TREK

## (undated) DEVICE — TBG PENCL TELESCP MEGADYNE SMOKE EVAC 10FT

## (undated) DEVICE — SUT VIC 0 CT1 CR8 27IN JJ41G

## (undated) DEVICE — PK ATS CUST W CARDIOTOMY RESEVOIR

## (undated) DEVICE — APPL CHLORAPREP W/TINT 26ML ORNG

## (undated) DEVICE — APPL DURAPREP IODOPHOR APL 26ML

## (undated) DEVICE — SINGLE-USE BIOPSY FORCEPS: Brand: RADIAL JAW 4

## (undated) DEVICE — UNDERCAST PADDING: Brand: DEROYAL

## (undated) DEVICE — BANDAGE,GAUZE,BULKEE II,4.5"X4.1YD,STRL: Brand: MEDLINE

## (undated) DEVICE — BNDG ELAS ELITE V/CLOSE 4IN 5YD LF STRL

## (undated) DEVICE — TREK CORONARY DILATATION CATHETER 2.50 MM X 15 MM / RAPID-EXCHANGE: Brand: TREK

## (undated) DEVICE — TOWEL,OR,DSP,ST,BLUE,STD,4/PK,20PK/CS: Brand: MEDLINE

## (undated) DEVICE — 1 ML TUBERCULIN SYRINGE REGULAR TIP: Brand: MONOJECT

## (undated) DEVICE — GOWN,NON-REINFORCED,SIRUS,SET IN SLV,XXL: Brand: MEDLINE

## (undated) DEVICE — SMOKE EVACUATION TUBING WITH 7/8 IN TO 1/4 IN REDUCER: Brand: BUFFALO FILTER

## (undated) DEVICE — PK ORTHO MINOR TOWER 40

## (undated) DEVICE — KT ORCA ORCAPOD DISP STRL

## (undated) DEVICE — CATH DIAG IMPULSE FR4 6F 100CM

## (undated) DEVICE — SYS PERFUS SEP PLATLT W TIPS CUST

## (undated) DEVICE — GLV SURG SENSICARE W/ALOE PF LF 7.5 STRL

## (undated) DEVICE — BLOOD TRANSFUSION FILTER: Brand: HAEMONETICS

## (undated) DEVICE — THIN OFFSET (13.0 X 0.38 X 39.0MM)

## (undated) DEVICE — NEEDLE, QUINCKE, 18GX3.5": Brand: MEDLINE

## (undated) DEVICE — PANTY KNIT MATERN L/XL

## (undated) DEVICE — UNDYED BRAIDED (POLYGLACTIN 910), SYNTHETIC ABSORBABLE SUTURE: Brand: COATED VICRYL

## (undated) DEVICE — LOU PACE DEFIB: Brand: MEDLINE INDUSTRIES, INC.

## (undated) DEVICE — CANN O2 ETCO2 FITS ALL CONN CO2 SMPL A/ 7IN DISP LF

## (undated) DEVICE — SUT ETHLN 3/0 PC5 18IN 1893G

## (undated) DEVICE — OPTIFOAM GENTLE SA, POSTOP, 4X8: Brand: MEDLINE

## (undated) DEVICE — SHEET, DRAPE, SPLIT, STERILE: Brand: MEDLINE

## (undated) DEVICE — SENSR O2 OXIMAX FNGR A/ 18IN NONSTR

## (undated) DEVICE — GLIDESHEATH SLENDER STAINLESS STEEL KIT: Brand: GLIDESHEATH SLENDER

## (undated) DEVICE — PATIENT RETURN ELECTRODE, SINGLE-USE, CONTACT QUALITY MONITORING, ADULT, WITH 9FT CORD, FOR PATIENTS WEIGING OVER 33LBS. (15KG): Brand: MEGADYNE

## (undated) DEVICE — NDL SPINE 18G 31/2IN PNK

## (undated) DEVICE — SUT VIC 2/0 SH 27IN

## (undated) DEVICE — TOOL MR8-15BA50T MR8 15CM BAL SYMTRI 5MM: Brand: MIDAS REX MR8

## (undated) DEVICE — GLV SURG PREMIERPRO ORTHO LTX PF SZ8 BRN

## (undated) DEVICE — THE MILL DISPOSABLE - MEDIUM

## (undated) DEVICE — GLV SURG TRIUMPH CLASSIC PF LTX 8 STRL

## (undated) DEVICE — SUT VIC 1 OS8 27IN J699H

## (undated) DEVICE — SYR LL 3CC

## (undated) DEVICE — KT DRN EVAC WND PVC PCH WTROC RND 10F400

## (undated) DEVICE — FEMORAL ENTRY ANGIOGRAPHY SHIELD-YELLOW: Brand: RADPAD

## (undated) DEVICE — NC TREK CORONARY DILATATION CATHETER 2.0 MM X 12 MM / RAPID-EXCHANGE: Brand: NC TREK

## (undated) DEVICE — STPLR SKIN VISISTAT WD 35CT

## (undated) DEVICE — PREMIUM WET SKIN PREP TRAY: Brand: MEDLINE INDUSTRIES, INC.

## (undated) DEVICE — CATH GUIDE GUIDELINERV3 5.5F 150CM

## (undated) DEVICE — INTENDED FOR TISSUE SEPARATION, AND OTHER PROCEDURES THAT REQUIRE A SHARP SURGICAL BLADE TO PUNCTURE OR CUT.: Brand: BARD-PARKER ® CARBON RIB-BACK BLADES

## (undated) DEVICE — DISPOSABLE IRRIGATION BIPOLAR CORD, M1000 TYPE: Brand: KIRWAN